# Patient Record
Sex: MALE | Race: WHITE | NOT HISPANIC OR LATINO | Employment: UNEMPLOYED | URBAN - METROPOLITAN AREA
[De-identification: names, ages, dates, MRNs, and addresses within clinical notes are randomized per-mention and may not be internally consistent; named-entity substitution may affect disease eponyms.]

---

## 2017-01-10 ENCOUNTER — TELEPHONE (OUTPATIENT)
Dept: ENDOCRINOLOGY | Facility: CLINIC | Age: 62
End: 2017-01-10

## 2017-01-10 NOTE — TELEPHONE ENCOUNTER
Patient sent two patient portal message in early December-5th and 9th.  He attached letters as well.  Please review and see what you can do to assist with this matter.

## 2017-01-10 NOTE — TELEPHONE ENCOUNTER
It says the letter needs to be done by the physician who performed the surgery ... i cant write it

## 2017-01-10 NOTE — TELEPHONE ENCOUNTER
----- Message from Sid Viveros sent at 1/10/2017 12:32 PM CST -----  Contact: Patient  Patient is requesting a call back in regards to getting paperwork to have birth certificate updated.    Please call patient at [phone].

## 2017-01-24 RX ORDER — METOPROLOL SUCCINATE 100 MG/1
TABLET, EXTENDED RELEASE ORAL
Qty: 30 TABLET | Refills: 1 | Status: SHIPPED | OUTPATIENT
Start: 2017-01-24 | End: 2017-04-15 | Stop reason: SDUPTHER

## 2017-01-24 RX ORDER — LISINOPRIL 40 MG/1
TABLET ORAL
Qty: 30 TABLET | Refills: 1 | Status: SHIPPED | OUTPATIENT
Start: 2017-01-24 | End: 2017-05-01 | Stop reason: SDUPTHER

## 2017-02-09 ENCOUNTER — PATIENT OUTREACH (OUTPATIENT)
Dept: ADMINISTRATIVE | Facility: HOSPITAL | Age: 62
End: 2017-02-09

## 2017-02-09 NOTE — LETTER
February 20, 2017    Mane Ramires  [address] [place] [id number]             [facility]  [address]  [facility] [address]  Phone: [phone] Dear Mr. Ramires:    We have tried to reach you by mychart unsuccessfully.    [facility] is committed to your overall health.  To help you get the most out of each of your visits, we will review your information to make sure you are up to date on all of your recommended tests and/or procedures.  We have Dr. Patience Gordon listed as your primary care provider.     When you were last in our office, your blood pressure was 156/82, which is high.  Dr. Gordon would like you to come in for a nurse visit so that we can check your blood pressure.  We are having a blood pressure fair the week of Fernando Jimenez.  Please contact us so that we can schedule this for you.     She has found that you may also be due for flu and tetanus immunizations.     If you have had any of the above done at an outside facility, please let us know so I can update your record.  If you have a copy of these records, please provide a copy for us to scan into your chart.  If not, please provide that provider/facilities contact information so that we may obtain copies from that facility.     Otherwise, please schedule these appointments at your earliest convenience.  You are due for your annual follow up with Dr. Gordon in September of 2017.     If you have any questions or concerns, please don't hesitate to call.    Thank you for letting us care for you,  Belinda Benavides LPN Clinical Care Coordinator  [facility] [place]  [phone]

## 2017-02-21 ENCOUNTER — TELEPHONE (OUTPATIENT)
Dept: ORTHOPEDIC SURGERY | Facility: CLINIC | Age: 62
End: 2017-02-21

## 2017-02-21 DIAGNOSIS — M54.2 CERVICALGIA: Primary | ICD-10-CM

## 2017-02-21 DIAGNOSIS — M47.22 OSTEOARTHRITIS OF SPINE WITH RADICULOPATHY, CERVICAL REGION: ICD-10-CM

## 2017-02-21 NOTE — TELEPHONE ENCOUNTER
Chart reviewed.  I last saw him 9/2016.  He has not had the cervical spine xrays nor cervical spine MRI as recommended.  New orders placed for both studies.  Xray cervical spine with flex/ext  Mri cervical spine

## 2017-02-21 NOTE — TELEPHONE ENCOUNTER
----- Message from Trell Gregory sent at 2/21/2017 11:58 AM CST -----  Need a new order/referral for MRI so that Pre-cert dept can contact insurance provider  for authorization

## 2017-02-21 NOTE — TELEPHONE ENCOUNTER
----- Message from Sofia Chery LPN sent at 2/21/2017 12:12 PM CST -----      ----- Message -----     From: Trell Gregory     Sent: 2/21/2017  11:58 AM       To: [facility]ASSETT Staff    Need a new order/referral for MRI so that Pre-cert dept can contact insurance provider  for authorization

## 2017-02-22 ENCOUNTER — HOSPITAL ENCOUNTER (OUTPATIENT)
Dept: RADIOLOGY | Facility: CLINIC | Age: 62
Discharge: HOME | End: 2017-02-22
Attending: NEUROLOGICAL SURGERY
Payer: COMMERCIAL

## 2017-02-22 DIAGNOSIS — M54.2 CERVICALGIA: ICD-10-CM

## 2017-02-22 DIAGNOSIS — M47.22 OSTEOARTHRITIS OF SPINE WITH RADICULOPATHY, CERVICAL REGION: ICD-10-CM

## 2017-02-22 PROCEDURE — 72052 X-RAY EXAM NECK SPINE 6/>VWS: CPT | Mod: TC,PO

## 2017-02-22 PROCEDURE — 72052 X-RAY EXAM NECK SPINE 6/>VWS: CPT | Mod: 26,,, | Performed by: RADIOLOGY

## 2017-02-23 ENCOUNTER — PATIENT MESSAGE (OUTPATIENT)
Dept: ENDOCRINOLOGY | Facility: CLINIC | Age: 62
End: 2017-02-23

## 2017-02-23 ENCOUNTER — PATIENT MESSAGE (OUTPATIENT)
Dept: FAMILY MEDICINE | Facility: CLINIC | Age: 62
End: 2017-02-23

## 2017-03-01 ENCOUNTER — HOSPITAL ENCOUNTER (OUTPATIENT)
Dept: RADIOLOGY | Facility: HOSPITAL | Age: 62
Discharge: HOME | End: 2017-03-01
Attending: NEUROLOGICAL SURGERY
Payer: COMMERCIAL

## 2017-03-01 DIAGNOSIS — M47.22 OSTEOARTHRITIS OF SPINE WITH RADICULOPATHY, CERVICAL REGION: ICD-10-CM

## 2017-03-01 DIAGNOSIS — M54.2 CERVICALGIA: ICD-10-CM

## 2017-03-01 PROCEDURE — 72141 MRI NECK SPINE W/O DYE: CPT | Mod: TC

## 2017-03-01 PROCEDURE — 72141 MRI NECK SPINE W/O DYE: CPT | Mod: 26,,, | Performed by: RADIOLOGY

## 2017-03-07 ENCOUNTER — HOSPITAL ENCOUNTER (OUTPATIENT)
Dept: RADIOLOGY | Facility: HOSPITAL | Age: 62
Discharge: HOME | End: 2017-03-07
Attending: INTERNAL MEDICINE
Payer: COMMERCIAL

## 2017-03-07 ENCOUNTER — OFFICE VISIT (OUTPATIENT)
Dept: RHEUMATOLOGY | Facility: CLINIC | Age: 62
End: 2017-03-07
Payer: COMMERCIAL

## 2017-03-07 VITALS
SYSTOLIC BLOOD PRESSURE: 127 MMHG | HEART RATE: 73 BPM | DIASTOLIC BLOOD PRESSURE: 77 MMHG | WEIGHT: 168.44 LBS | BODY MASS INDEX: 28.06 KG/M2 | HEIGHT: 65 IN

## 2017-03-07 DIAGNOSIS — M25.561 CHRONIC PAIN OF RIGHT KNEE: ICD-10-CM

## 2017-03-07 DIAGNOSIS — M51.36 DDD (DEGENERATIVE DISC DISEASE), LUMBAR: ICD-10-CM

## 2017-03-07 DIAGNOSIS — M25.551 RIGHT HIP PAIN: ICD-10-CM

## 2017-03-07 DIAGNOSIS — Z79.1 NSAID LONG-TERM USE: Primary | ICD-10-CM

## 2017-03-07 DIAGNOSIS — G89.29 CHRONIC PAIN OF RIGHT KNEE: ICD-10-CM

## 2017-03-07 PROCEDURE — 73562 X-RAY EXAM OF KNEE 3: CPT | Mod: 26,RT,, | Performed by: RADIOLOGY

## 2017-03-07 PROCEDURE — 3074F SYST BP LT 130 MM HG: CPT | Mod: S$GLB,,, | Performed by: INTERNAL MEDICINE

## 2017-03-07 PROCEDURE — 3078F DIAST BP <80 MM HG: CPT | Mod: S$GLB,,, | Performed by: INTERNAL MEDICINE

## 2017-03-07 PROCEDURE — 73502 X-RAY EXAM HIP UNI 2-3 VIEWS: CPT | Mod: 26,RT,, | Performed by: RADIOLOGY

## 2017-03-07 PROCEDURE — 73502 X-RAY EXAM HIP UNI 2-3 VIEWS: CPT | Mod: TC,RT

## 2017-03-07 PROCEDURE — 73562 X-RAY EXAM OF KNEE 3: CPT | Mod: TC,RT

## 2017-03-07 PROCEDURE — 1160F RVW MEDS BY RX/DR IN RCRD: CPT | Mod: S$GLB,,, | Performed by: INTERNAL MEDICINE

## 2017-03-07 PROCEDURE — 99214 OFFICE O/P EST MOD 30 MIN: CPT | Mod: S$GLB,,, | Performed by: INTERNAL MEDICINE

## 2017-03-07 PROCEDURE — 99999 PR PBB SHADOW E&M-EST. PATIENT-LVL III: CPT | Mod: PBBFAC,,, | Performed by: INTERNAL MEDICINE

## 2017-03-07 RX ORDER — NAPROXEN 500 MG/1
500 TABLET ORAL 2 TIMES DAILY
Qty: 60 TABLET | Refills: 2 | Status: SHIPPED | OUTPATIENT
Start: 2017-03-07 | End: 2017-05-17

## 2017-03-07 ASSESSMENT — ROUTINE ASSESSMENT OF PATIENT INDEX DATA (RAPID3)
MDHAQ FUNCTION SCORE: 1.2
PATIENT GLOBAL ASSESSMENT SCORE: 5
PSYCHOLOGICAL DISTRESS SCORE: 2.2
PAIN SCORE: 8
TOTAL RAPID3 SCORE: 5.67

## 2017-03-07 NOTE — PROGRESS NOTES
"Subjective:       Patient ID: Mane Ramires is a 61 y.o. male.    Chief Complaint: Osteoarthritis  HPI61 yo male with Gender identity disorder who SRS in 1984, 2015 is seen in consultation for joint pain. He saw Dr Kaufman in 2014- diagnosed with OA.   Today, he complains of pain in right hip radiating down to right knee that started 3 months ago. Pain is aching type, 8/10, continuous, worse in the evening, worse with activity better with  NSAIDSNo joint swelling.    He has DDD - with pain in lower back.  Has appointment with neurosurgery tomorrow.  Denies any "red flag signs" including B/L sciatica, saddle anesthesia, spasticity, bladder or bowel incontinence, motor weakness of the lower extremities          Review of Systems   HENT: Negative for ear discharge and ear pain.    Eyes: Negative for pain and redness.   Respiratory: Negative for cough and shortness of breath.    Cardiovascular: Negative for chest pain and palpitations.   Gastrointestinal: Negative for abdominal distention and abdominal pain.   Genitourinary: Negative for genital sores and hematuria.   Musculoskeletal: Positive for back pain. Negative for joint swelling.   Neurological: Negative for tremors and seizures.         Objective:     /77 (BP Location: Left arm, Patient Position: Sitting, BP Method: Automatic)  Pulse 73  Ht 5' 4.5" (1.638 m)  Wt 76.4 kg (168 lb 6.9 oz)  BMI 28.46 kg/m2     Physical Exam   Constitutional: He is oriented to person, place, and time and well-developed, well-nourished, and in no distress.   HENT:   Head: Normocephalic and atraumatic.   Eyes: Conjunctivae and EOM are normal. Pupils are equal, round, and reactive to light.   Neck: Neck supple. No tracheal deviation present. No thyromegaly present.   Cardiovascular: Normal rate and regular rhythm.    Pulmonary/Chest: Effort normal and breath sounds normal.   Abdominal: Soft. Bowel sounds are normal.   Neurological: He is alert and oriented to person, " place, and time.   Skin: Skin is warm and dry.     Psychiatric: Memory and affect normal.   Musculoskeletal: He exhibits no edema.     Right hip  with external rotation 15° and  internal rotation 5°   Bilateral knee crepitus  No joint swelling             Lab Results   Component Value Date    WBC 12.54 05/18/2016    HGB 16.5 05/18/2016    HCT 48.5 05/18/2016    MCV 93 05/18/2016     05/18/2016     CMP  Sodium   Date Value Ref Range Status   08/18/2016 141 136 - 145 mmol/L Final     Potassium   Date Value Ref Range Status   08/18/2016 5.0 3.5 - 5.1 mmol/L Final     Chloride   Date Value Ref Range Status   08/18/2016 108 95 - 110 mmol/L Final     CO2   Date Value Ref Range Status   08/18/2016 26 23 - 29 mmol/L Final     Glucose   Date Value Ref Range Status   08/18/2016 95 70 - 110 mg/dL Final     BUN, Bld   Date Value Ref Range Status   08/18/2016 14 6 - 20 mg/dL Final     Creatinine   Date Value Ref Range Status   08/18/2016 1.0 0.5 - 1.4 mg/dL Final     Calcium   Date Value Ref Range Status   08/18/2016 9.6 8.7 - 10.5 mg/dL Final     Total Protein   Date Value Ref Range Status   08/18/2016 7.4 6.0 - 8.4 g/dL Final     Albumin   Date Value Ref Range Status   08/18/2016 4.2 3.5 - 5.2 g/dL Final     Total Bilirubin   Date Value Ref Range Status   08/18/2016 0.6 0.1 - 1.0 mg/dL Final     Comment:     For infants and newborns, interpretation of results should be based  on gestational age, weight and in agreement with clinical  observations.  Premature Infant recommended reference ranges:  Up to 24 hours.............<8.0 mg/dL  Up to 48 hours............<12.0 mg/dL  3-5 days..................<15.0 mg/dL  6-29 days.................<15.0 mg/dL       Alkaline Phosphatase   Date Value Ref Range Status   08/18/2016 74 55 - 135 U/L Final     AST   Date Value Ref Range Status   08/18/2016 14 10 - 40 U/L Final     ALT   Date Value Ref Range Status   08/18/2016 16 10 - 44 U/L Final     Anion Gap   Date Value Ref Range  "Status   08/18/2016 7 (L) 8 - 16 mmol/L Final     eGFR if    Date Value Ref Range Status   08/18/2016 >60.0 >60 mL/min/1.73 m^2 Final     eGFR if non    Date Value Ref Range Status   08/18/2016 >60.0 >60 mL/min/1.73 m^2 Final     Comment:     Calculation used to obtain the estimated glomerular filtration  rate (eGFR) is the CKD-EPI equation. Since race is unknown   in our information system, the eGFR values for   -American and Non--American patients are given   for each creatinine result.       Lab Results   Component Value Date    SEDRATE 1 08/18/2016     Lab Results   Component Value Date    CRP 1.3 08/18/2016         Assessment:   Right hip and knee pain -check x-ray of right hip and knee  Long term use of NSAIDS  DDD    Plan:   Check x-ray of right hip and knee  Start naproxen 500 mg twice daily with meal  DC Pennsaid and all other NSAIDs  The patient was advised that NSAID-type medications have potential side effects: gastrointestinal irritation including hemorrhage, adverse cardiovascular effects and renal injuries. Patient was asked to take the medication with food and to stop if he experiences any GI upset. Advised to call if any vomiting, abdominal pain or black/bloody stools. The patient expresses understanding of these issues and questions were answered.  Continue gabapentin  Patient informed about the "red flag signs" and advised to call 911 immediately  if the following symptoms develop- worsening back pain, B/L sciatica, saddle anesthesia, spasticity, bladder or bowel incontinence, motor weakness of the lower extremities      RTC in 3 months        "

## 2017-03-07 NOTE — MR AVS SNAPSHOT
[facility]  [address]  [place] [address]  Phone: [phone]  Fax: [phone]                  Mane Ramires   3/7/2017 10:30 AM   Office Visit    Description:  Male : 1955   Provider:  Demetrice Phillip MD   Department:  [place] [facility]           Reason for Visit     Follow-up           Diagnoses this Visit        Comments    NSAID long-term use    -  Primary     Chronic pain of right knee                To Do List           Future Appointments        Provider Department Dept Phone    3/7/2017 11:30 AM [facility] [id number] [facility] [phone]    3/8/2017 10:00 AM Dasia Uriarte PA-C [place] [facility] [phone]    3/17/2017 10:00 AM MD [facility] [phone]      Goals (5 Years of Data)     None       These Medications        Disp Refills Start End    naproxen (EC NAPROSYN) 500 MG EC tablet 60 tablet 2 3/7/2017     Take 1 tablet (500 mg total) by mouth 2 (two) times daily. - Oral    Pharmacy: [facility] #[id number] - [place] - [address] [facility].  #: [phone]         [facility]     [facility] -  Assistance  Registered nurses in the [facility] provide clinical advisement, health education, appointment booking, and other advisory services.  Call for this free service at [phone].             Medications           Message regarding Medications     Verify the changes and/or additions to your medication regime listed below are the same as discussed with your clinician today.  If any of these changes or additions are incorrect, please notify your healthcare provider.        START taking these NEW medications        Refills    naproxen (EC NAPROSYN) 500 MG EC tablet 2    Sig: Take 1 tablet (500 mg total) by mouth 2 (two) times daily.    Class: Normal    Route: Oral      STOP taking these medications     ibuprofen (ADVIL,MOTRIN) 800 MG tablet  "Take 1 tablet (800 mg total) by mouth 3 (three) times daily as needed for Pain.    diclofenac sodium (PENNSAID) 20 mg/gram /actuation(2 %) sopm Apply 40 mg topically 2 (two) times daily.           Verify that the below list of medications is an accurate representation of the medications you are currently taking.  If none reported, the list may be blank. If incorrect, please contact your healthcare provider. Carry this list with you in case of emergency.           Current Medications     diclofenac sodium 1 % Gel APPLY 2 G TOPICALLY 4 (FOUR) TIMES DAILY.    gabapentin (NEURONTIN) 100 MG capsule Take 1 capsule (100 mg total) by mouth every evening.    gabapentin (NEURONTIN) 300 MG capsule Take 300 mg by mouth once daily.     levocetirizine (XYZAL) 5 MG tablet Take 1 tablet (5 mg total) by mouth nightly as needed for Allergies.    lisinopril (PRINIVIL,ZESTRIL) 40 MG tablet TAKE 1 TABLET (40 MG TOTAL) BY MOUTH ONCE DAILY.    metoprolol succinate (TOPROL-XL) 100 MG 24 hr tablet TAKE 1 TABLET (100 MG TOTAL) BY MOUTH ONCE DAILY.    tavaborole (KERYDIN) 5 % Usha Apply 1 application topically once daily.    testosterone cypionate (DEPOTESTOTERONE CYPIONATE) 200 mg/mL injection Inject 1 mL (200 mg total) into the muscle every 14 (fourteen) days. 3 ml syringe with 18 g needle  to draw  X 10   22 g 1 inch needle to inject x 10    diazepam (VALIUM) 10 MG Tab Take 1 tablet (10 mg total) by mouth once. Take 1 tablet 30 minutes prior to MRI.  Must have someone drive you to and from MRI.    naproxen (EC NAPROSYN) 500 MG EC tablet Take 1 tablet (500 mg total) by mouth 2 (two) times daily.    PROAIR HFA 90 mcg/actuation inhaler INHALE 1 TO 2 PUFFS BY MOUTH EVERY 4 TO 6 HOURS AS NEEDED           Clinical Reference Information           Your Vitals Were     BP Pulse Height Weight BMI    127/77 (BP Location: Left arm, Patient Position: Sitting, BP Method: Automatic) 73 5' 4.5" (1.638 m) 76.4 kg (168 lb 6.9 oz) 28.46 kg/m2      Blood " Pressure          Most Recent Value    BP  127/77      Allergies as of 3/7/2017     Sudafed [Pseudoephedrine Hcl]    Codeine    Hydrocodone    Cortisone      Immunizations Administered on Date of Encounter - 3/7/2017     None      Orders Placed During Today's Visit      Normal Orders This Visit    Ambulatory Referral to Physical/Occupational Therapy     Future Labs/Procedures Expected by Expires    X-Ray Hip 2 View Right  3/7/2017 3/7/2018    X-Ray Knee 3 View Right  3/7/2017 3/7/2018      Language Assistance Services     ATTENTION: Language assistance services are available, free of charge. Please call [phone].      ATENCIÓN: Si habla español, tiene a slade disposición servicios gratuitos de asistencia lingüística. Llame al [phone].     CHÚ Ý: N?u b?n nói Ti?ng Vi?t, có các d?ch v? h? tr? ngôn ng? mi?n phí dành cho b?n. G?i s? [phone].         [place] [facility] complies with applicable Federal civil rights laws and does not discriminate on the basis of race, color, national origin, age, disability, or sex.

## 2017-03-08 ENCOUNTER — OFFICE VISIT (OUTPATIENT)
Dept: ORTHOPEDIC SURGERY | Facility: CLINIC | Age: 62
End: 2017-03-08
Payer: COMMERCIAL

## 2017-03-08 VITALS
SYSTOLIC BLOOD PRESSURE: 150 MMHG | HEIGHT: 65 IN | WEIGHT: 169.56 LBS | HEART RATE: 78 BPM | DIASTOLIC BLOOD PRESSURE: 86 MMHG | BODY MASS INDEX: 28.25 KG/M2

## 2017-03-08 DIAGNOSIS — G89.29 CHRONIC RIGHT-SIDED LOW BACK PAIN, WITH SCIATICA PRESENCE UNSPECIFIED: ICD-10-CM

## 2017-03-08 DIAGNOSIS — M54.2 CERVICALGIA: Primary | ICD-10-CM

## 2017-03-08 DIAGNOSIS — M47.22 OSTEOARTHRITIS OF SPINE WITH RADICULOPATHY, CERVICAL REGION: ICD-10-CM

## 2017-03-08 DIAGNOSIS — M54.5 CHRONIC RIGHT-SIDED LOW BACK PAIN, WITH SCIATICA PRESENCE UNSPECIFIED: ICD-10-CM

## 2017-03-08 PROCEDURE — 99999 PR PBB SHADOW E&M-EST. PATIENT-LVL III: CPT | Mod: PBBFAC,,, | Performed by: PHYSICIAN ASSISTANT

## 2017-03-08 PROCEDURE — 3077F SYST BP >= 140 MM HG: CPT | Mod: S$GLB,,, | Performed by: PHYSICIAN ASSISTANT

## 2017-03-08 PROCEDURE — 1160F RVW MEDS BY RX/DR IN RCRD: CPT | Mod: S$GLB,,, | Performed by: PHYSICIAN ASSISTANT

## 2017-03-08 PROCEDURE — 3079F DIAST BP 80-89 MM HG: CPT | Mod: S$GLB,,, | Performed by: PHYSICIAN ASSISTANT

## 2017-03-08 PROCEDURE — 99214 OFFICE O/P EST MOD 30 MIN: CPT | Mod: S$GLB,,, | Performed by: PHYSICIAN ASSISTANT

## 2017-03-08 NOTE — PROGRESS NOTES
Neurosurgery History & Physical    Patient ID: Mane Ramires is a 61 y.o. male.    Chief Complaint   Patient presents with    Low-back Pain    Back Pain    Neck Pain       Review of Systems   Constitutional: Negative for activity change, chills, fatigue and unexpected weight change.   HENT: Negative for hearing loss, tinnitus, trouble swallowing and voice change.    Eyes: Negative for visual disturbance.   Respiratory: Negative for apnea, chest tightness and shortness of breath.    Cardiovascular: Negative for chest pain and palpitations.   Gastrointestinal: Negative for abdominal pain, constipation, diarrhea, nausea and vomiting.   Genitourinary: Negative for difficulty urinating, dysuria and frequency.   Musculoskeletal: Positive for back pain and neck pain. Negative for gait problem and neck stiffness.   Skin: Negative for wound.   Neurological: Positive for numbness. Negative for dizziness, tremors, seizures, facial asymmetry, speech difficulty, weakness, light-headedness and headaches.   Psychiatric/Behavioral: Negative for confusion and decreased concentration.       Past Medical History:   Diagnosis Date    Accident     fell from roof with TBI (word finding issues personality changes, memory deficets), R rib fractures, clavicle fx    Allergy     ASD (atrial septal defect)     noted on ECHO 6/16    Cervical stenosis of spine     noted on 6/15 MRI    DDD (degenerative disc disease), cervical 10/2014    C5-6 and C6-C7    Dyslipidemia     Gender identity disorder     H/O cardiovascular stress test     12/14 normal    Heart murmur 05/26/2016    Herpes simplex type 2 infection     Hypertension     IGT (impaired glucose tolerance)     Myelomalacia     c-spine noted on 8/15 MRI    OA (osteoarthritis)     Prediabetes     TBI (traumatic brain injury)     after falling off a roof in 2003     Social History     Social History    Marital status: Single     Spouse name: N/A    Number of children:  N/A    Years of education: N/A     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    Smoking status: Former Smoker    Smokeless tobacco: Never Used    Alcohol use Yes      Comment: rare     Drug use: No    Sexual activity: Yes     Partners: Female     Other Topics Concern    Not on file     Social History Narrative     Family History   Problem Relation Age of Onset    Diabetes Mother     Heart disease Mother     Coronary artery disease Father     Breast cancer Sister     Lung cancer Paternal Grandfather     Heart disease Paternal Grandfather     Heart disease Maternal Grandmother     Heart attack Brother      Review of patient's allergies indicates:   Allergen Reactions    Sudafed [pseudoephedrine hcl] Other (See Comments)     Heart racing      Codeine Other (See Comments)     Heart racing    Hydrocodone Other (See Comments)     insomnia    Cortisone Palpitations       Current Outpatient Prescriptions:     diclofenac sodium 1 % Gel, APPLY 2 G TOPICALLY 4 (FOUR) TIMES DAILY., Disp: , Rfl: 10    gabapentin (NEURONTIN) 100 MG capsule, Take 1 capsule (100 mg total) by mouth every evening., Disp: 30 capsule, Rfl: 2    gabapentin (NEURONTIN) 300 MG capsule, Take 300 mg by mouth once daily. , Disp: , Rfl:     levocetirizine (XYZAL) 5 MG tablet, Take 1 tablet (5 mg total) by mouth nightly as needed for Allergies., Disp: 30 tablet, Rfl: 11    lisinopril (PRINIVIL,ZESTRIL) 40 MG tablet, TAKE 1 TABLET (40 MG TOTAL) BY MOUTH ONCE DAILY., Disp: 30 tablet, Rfl: 1    metoprolol succinate (TOPROL-XL) 100 MG 24 hr tablet, TAKE 1 TABLET (100 MG TOTAL) BY MOUTH ONCE DAILY., Disp: 30 tablet, Rfl: 1    naproxen (EC NAPROSYN) 500 MG EC tablet, Take 1 tablet (500 mg total) by mouth 2 (two) times daily., Disp: 60 tablet, Rfl: 2    PROAIR HFA 90 mcg/actuation inhaler, INHALE 1 TO 2 PUFFS BY MOUTH EVERY 4 TO 6 HOURS AS NEEDED, Disp: , Rfl: 0    tavaborole (KERYDIN) 5 % Usha, Apply 1 application  "topically once daily., Disp: 10 mL, Rfl: 11    diazepam (VALIUM) 10 MG Tab, Take 1 tablet (10 mg total) by mouth once. Take 1 tablet 30 minutes prior to MRI.  Must have someone drive you to and from MRI., Disp: 1 tablet, Rfl: 0    testosterone cypionate (DEPOTESTOTERONE CYPIONATE) 200 mg/mL injection, Inject 1 mL (200 mg total) into the muscle every 14 (fourteen) days. 3 ml syringe with 18 g needle  to draw  X 10  22 g 1 inch needle to inject x 10, Disp: 10 mL, Rfl: 5    Vitals:    03/08/17 1004   BP: (!) 150/86   BP Location: Right arm   Patient Position: Sitting   BP Method: Automatic   Pulse: 78   Weight: 76.9 kg (169 lb 8.5 oz)   Height: 5' 5" (1.651 m)       Physical Exam   Constitutional: He is oriented to person, place, and time. He appears well-developed and well-nourished.   HENT:   Head: Normocephalic and atraumatic.   Eyes: Pupils are equal, round, and reactive to light.   Neck: Normal range of motion. Neck supple.   Cardiovascular: Normal rate.    Pulmonary/Chest: Effort normal.   Abdominal: He exhibits no distension.   Musculoskeletal: Normal range of motion. He exhibits no edema.   Neurological: He is alert and oriented to person, place, and time. He has a normal Finger-Nose-Finger Test, a normal Heel to Shin Test, a normal Romberg Test and a normal Tandem Gait Test. Gait normal.   Reflex Scores:       Tricep reflexes are 2+ on the right side and 2+ on the left side.       Bicep reflexes are 2+ on the right side and 2+ on the left side.       Brachioradialis reflexes are 2+ on the right side and 2+ on the left side.       Patellar reflexes are 2+ on the right side and 2+ on the left side.       Achilles reflexes are 2+ on the right side and 2+ on the left side.  Skin: Skin is warm and dry.   Psychiatric: He has a normal mood and affect. His speech is normal and behavior is normal. Judgment and thought content normal.   Nursing note and vitals reviewed.      Neurologic Exam     Mental Status "   Oriented to person, place, and time.   Oriented to person.   Oriented to place.   Oriented to time.   Follows 3 step commands.   Attention: normal. Concentration: normal.   Speech: speech is normal   Level of consciousness: alert  Knowledge: consistent with education.   Able to name object. Able to read. Able to repeat. Able to write. Normal comprehension.     Cranial Nerves     CN II   Visual acuity: normal  Right visual field deficit: none  Left visual field deficit: none     CN III, IV, VI   Pupils are equal, round, and reactive to light.  Right pupil: Size: 3 mm. Shape: regular. Reactivity: brisk. Consensual response: intact.   Left pupil: Size: 3 mm. Shape: regular. Reactivity: brisk. Consensual response: intact.   CN III: no CN III palsy  CN VI: no CN VI palsy  Nystagmus: none   Diplopia: none  Ophthalmoparesis: none  Conjugate gaze: present    CN V   Right facial sensation deficit: none  Left facial sensation deficit: none    CN VII   Right facial weakness: none  Left facial weakness: none    CN VIII   Hearing: intact    CN IX, X   CN IX normal.   CN X normal.     CN XI   Right sternocleidomastoid strength: normal  Left sternocleidomastoid strength: normal  Right trapezius strength: normal  Left trapezius strength: normal    CN XII   Fasciculations: absent  Tongue deviation: none    Motor Exam   Muscle bulk: normal  Overall muscle tone: normal  Right arm pronator drift: absent  Left arm pronator drift: absent    Strength   Right neck flexion: 5/5  Left neck flexion: 5/5  Right neck extension: 5/5  Left neck extension: 5/5  Right deltoid: 5/5  Left deltoid: 5/5  Right biceps: 5/5  Left biceps: 5/5  Right triceps: 5/5  Left triceps: 5/5  Right wrist flexion: 5/5  Left wrist flexion: 5/5  Right wrist extension: 5/5  Left wrist extension: 5/5  Right interossei: 5/5  Left interossei: 5/5  Right abdominals: 5/5  Left abdominals: 5/5  Right iliopsoas: 5/5  Left iliopsoas: 5/5  Right quadriceps: 5/5  Left  quadriceps: 5/5  Right hamstrin/5  Left hamstrin/5  Right glutei: 5/5  Left glutei: 5/5  Right anterior tibial: 5/5  Left anterior tibial: 5/5  Right posterior tibial: 5/5  Left posterior tibial: 5/5  Right peroneal: 5/5  Left peroneal: 5/5  Right gastroc: 5/5  Left gastroc: 5/5    Sensory Exam   Right arm light touch: normal  Left arm light touch: normal  Right leg light touch: normal  Left leg light touch: normal  Right arm vibration: normal  Left arm vibration: normal  Right arm pinprick: normal  Left arm pinprick: normal    Gait, Coordination, and Reflexes     Gait  Gait: normal    Coordination   Romberg: negative  Finger to nose coordination: normal  Heel to shin coordination: normal  Tandem walking coordination: normal    Tremor   Resting tremor: absent  Intention tremor: absent  Action tremor: absent    Reflexes   Right brachioradialis: 2+  Left brachioradialis: 2+  Right biceps: 2+  Left biceps: 2+  Right triceps: 2+  Left triceps: 2+  Right patellar: 2+  Left patellar: 2+  Right achilles: 2+  Left achilles: 2+  Right Al: absent  Left Al: absent  Right ankle clonus: absent  Left ankle clonus: absent      Provider dictation:  60 year old  male with history of arthritis, lower back pain, neck pain, and prior cervical spine surgery presents for follow up of his cervcial spine after undergoing xrays and an MRI of the neck.  He recalls a fall from a horse in  resulting in a traumatic brain injury.  He underwent surgery in  with Dr. Myers for C5/6, C6/7 ACDF.  He had tingling and numbness in all fingers bilaterally prior to surgery, but it continues to worsen.  Numbness is constant from the finger tips to the mid foreamr bilaterally.  He continues to have left sided neck pain in the paracervical area without radicular arm pain.  More recently, he has felt right sided neck pain without radicular pain, either.  He rates pain as 4-5/10 today.  He is taking gabapentin for pain.  He  has not had recent PT and states that he cannot tolerate steroids due to side effects.  He also describes right sided lower back pain and muscle spasms without focal radiculoapthy.    NDI:48%  Oswestry score: 44%.    PHQ:  0.    On exam, he is neurologically intact with 2+ DTRs and 5/5 strength bilaterally in the upper and lower extremities.  He is not tender to palpation along the spine and he does not appreciate any sensory deficits.  Phalens and tinels negative at the wrists bilaterally for carpal tunnel.  phalens negative at the elbows for ulnar neuropathy.    I have reviewed an MRI of the cervical spine from [facility] date 6-18-15 and [facility] from 3-1-17. There are no significant changes between the two studies.  There are post op changes at C5/6, C6/7 of ACDF.  C4/5 broad disc/ osteophyte, facet arthropathy and ligamentous hypertrophy with mild central canal narrowing and mild left foraminal narrowing.  C5/6 and C6/7  spondylosis with mild-moderate bilateral foraminal narrowing.    Assessment:  60 year old male with prior cervical spine surgery - C5/6, C6/7 ACDF and persistent left greater than right neck pain and numbness in the bilateral hands greater than the arms.  Known cervical spondylosis at C4/5, C5/6, C6/7.  There has been no major change on the MRI studies.  We will refer to PT for neck and back pain.  We will arrange for EMG/ NCV fo the bialteral upper extremities to evaluate for carpal tunnel vs ulnar neuropathy vs cervical radiculpathy to be the cause of hand numbness.  I believe his neck pain and back pain is myofascial.  It is unclear if hand numbness is related to the neck or something more preipheral, thus nerve testing ordered.  Follow up in about 8 weeks.    Visit Diagnosis:  Cervicalgia  -     Ambulatory Referral to Physical/Occupational Therapy  -     EMG - 2 Extremities; Future    Osteoarthritis of spine with radiculopathy, cervical region  -     Ambulatory Referral to Physical/Occupational  Therapy  -     EMG - 2 Extremities; Future    Chronic right-sided low back pain, with sciatica presence unspecified  -     Ambulatory Referral to Physical/Occupational Therapy        Total time spent counseling greater than fifty percent of total visit time.  Counseling included discussion regarding imaging findings, diagnosis possibilities, treatment options, risks and benefits.   The patient had many questions regarding the options and long-term effects.

## 2017-03-17 ENCOUNTER — OFFICE VISIT (OUTPATIENT)
Dept: CARDIOLOGY | Facility: CLINIC | Age: 62
End: 2017-03-17
Payer: COMMERCIAL

## 2017-03-17 ENCOUNTER — PATIENT MESSAGE (OUTPATIENT)
Dept: CARDIOLOGY | Facility: CLINIC | Age: 62
End: 2017-03-17

## 2017-03-17 VITALS
HEIGHT: 66 IN | WEIGHT: 165.38 LBS | DIASTOLIC BLOOD PRESSURE: 71 MMHG | OXYGEN SATURATION: 97 % | BODY MASS INDEX: 26.58 KG/M2 | SYSTOLIC BLOOD PRESSURE: 146 MMHG | HEART RATE: 79 BPM

## 2017-03-17 DIAGNOSIS — F64.0 GENDER DYSPHORIA IN ADULT: ICD-10-CM

## 2017-03-17 DIAGNOSIS — E78.5 DYSLIPIDEMIA: Primary | ICD-10-CM

## 2017-03-17 DIAGNOSIS — R73.02 IGT (IMPAIRED GLUCOSE TOLERANCE): ICD-10-CM

## 2017-03-17 DIAGNOSIS — Z87.820 HISTORY OF TRAUMATIC BRAIN INJURY: ICD-10-CM

## 2017-03-17 DIAGNOSIS — I10 ESSENTIAL HYPERTENSION: Primary | ICD-10-CM

## 2017-03-17 DIAGNOSIS — E78.5 DYSLIPIDEMIA: ICD-10-CM

## 2017-03-17 DIAGNOSIS — I10 ESSENTIAL HYPERTENSION: ICD-10-CM

## 2017-03-17 PROCEDURE — 99215 OFFICE O/P EST HI 40 MIN: CPT | Mod: S$GLB,,, | Performed by: INTERNAL MEDICINE

## 2017-03-17 PROCEDURE — 3077F SYST BP >= 140 MM HG: CPT | Mod: S$GLB,,, | Performed by: INTERNAL MEDICINE

## 2017-03-17 PROCEDURE — 1160F RVW MEDS BY RX/DR IN RCRD: CPT | Mod: S$GLB,,, | Performed by: INTERNAL MEDICINE

## 2017-03-17 PROCEDURE — 99999 PR PBB SHADOW E&M-EST. PATIENT-LVL III: CPT | Mod: PBBFAC,,, | Performed by: INTERNAL MEDICINE

## 2017-03-17 PROCEDURE — 3078F DIAST BP <80 MM HG: CPT | Mod: S$GLB,,, | Performed by: INTERNAL MEDICINE

## 2017-03-17 NOTE — LETTER
March 17, 2017      Patience Gordon MD  [address]  [facility] [address]           [facility]  [address]  [facility] [address]  Phone: [phone]          Patient: Mane Ramires   MR Number: [id number]   YOB: 1955   Date of Visit: 3/17/2017       Dear Dr. Patience Gordon:    Thank you for referring Mane Ramires to me for evaluation. Attached you will find relevant portions of my assessment and plan of care.    If you have questions, please do not hesitate to call me. I look forward to following Mane Ramires along with you.    Sincerely,    Davian Art MD    Enclosure  CC:  No Recipients    If you would like to receive this communication electronically, please contact [email or web address] or [phone] to request more information on [employer] Link access.    For providers and/or their staff who would like to refer a patient to [facility], please contact us through our one-stop-shop provider referral line, [facility], at [phone].    If you feel you have received this communication in error or would no longer like to receive these types of communications, please e-mail [email or web address]

## 2017-03-17 NOTE — PATIENT INSTRUCTIONS
Continue current medications  Keep log of home blood pressure/heart rate and bring in next visit  Schedule treadmill nuclear stress test  Follow up in 2 months

## 2017-03-17 NOTE — MR AVS SNAPSHOT
[facility]   [address]  [place] [address]  Phone: [phone]                  Mane Ramires   3/17/2017 10:00 AM   Office Visit    Description:  Male : 1955   Provider:  Davian Art MD   Department:  [facility]           Reason for Visit     Consult           Diagnoses this Visit        Comments    Essential hypertension    -  Primary     Dyslipidemia                To Do List           Future Appointments        Provider Department Dept Phone    3/21/2017 11:00 AM Melba Elizondo, PT [facility] [phone]    2017 10:00 AM [facility] NM2 [facility] [phone]    2017 11:00 AM [facility] STRESS TESTS [facility] [phone]    2017 12:00 PM [facility] NM[facility] [phone]    4/10/2017 8:00 AM Katarzyna Flanagan [facility] [phone]      Goals (5 Years of Data)     None      Follow-Up and Disposition     Return in about 2 months (around 2017).      [facility]     [facility] Nurse Care Line - 24/7 Assistance  Registered nurses in the [facility] provide clinical advisement, health education, appointment booking, and other advisory services.  Call for this free service at [phone].             Medications           Message regarding Medications     Verify the changes and/or additions to your medication regime listed below are the same as discussed with your clinician today.  If any of these changes or additions are incorrect, please notify your healthcare provider.        STOP taking these medications     diazepam (VALIUM) 10 MG Tab Take 1 tablet (10 mg total) by mouth once. Take 1 tablet 30 minutes prior to MRI.  Must have someone drive you to and from MRI.    PROAIR HFA 90 mcg/actuation inhaler INHALE 1 TO 2 PUFFS BY MOUTH EVERY 4 TO 6 HOURS AS NEEDED           Verify that the below list of  "medications is an accurate representation of the medications you are currently taking.  If none reported, the list may be blank. If incorrect, please contact your healthcare provider. Carry this list with you in case of emergency.           Current Medications     diclofenac sodium 1 % Gel APPLY 2 G TOPICALLY 4 (FOUR) TIMES DAILY.    gabapentin (NEURONTIN) 100 MG capsule Take 1 capsule (100 mg total) by mouth every evening.    gabapentin (NEURONTIN) 300 MG capsule Take 300 mg by mouth once daily.     levocetirizine (XYZAL) 5 MG tablet Take 1 tablet (5 mg total) by mouth nightly as needed for Allergies.    lisinopril (PRINIVIL,ZESTRIL) 40 MG tablet TAKE 1 TABLET (40 MG TOTAL) BY MOUTH ONCE DAILY.    metoprolol succinate (TOPROL-XL) 100 MG 24 hr tablet TAKE 1 TABLET (100 MG TOTAL) BY MOUTH ONCE DAILY.    naproxen (EC NAPROSYN) 500 MG EC tablet Take 1 tablet (500 mg total) by mouth 2 (two) times daily.    tavaborole (KERYDIN) 5 % Usha Apply 1 application topically once daily.    testosterone cypionate (DEPOTESTOTERONE CYPIONATE) 200 mg/mL injection Inject 1 mL (200 mg total) into the muscle every 14 (fourteen) days. 3 ml syringe with 18 g needle  to draw  X 10   22 g 1 inch needle to inject x 10           Clinical Reference Information           Your Vitals Were     BP Pulse Height Weight SpO2 BMI    146/71 (BP Location: Left arm) 79 5' 6" (1.676 m) 75 kg (165 lb 5.5 oz) 97% 26.69 kg/m2      Blood Pressure          Most Recent Value    Right Arm BP - Sitting  147/77    Left Arm BP - Sitting  146/71    BP  (!)  146/71      Allergies as of 3/17/2017     Sudafed [Pseudoephedrine Hcl]    Codeine    Hydrocodone    Cortisone      Immunizations Administered on Date of Encounter - 3/17/2017     None      Orders Placed During Today's Visit     Future Labs/Procedures Expected by Expires    NM Myocardial Perfusion Spect Multi Exer  3/17/2017 3/17/2018    NM Multi Tread Stress Cardiac Component  5/10/2017 3/17/2018    "   Instructions    Continue current medications  Keep log of home blood pressure/heart rate and bring in next visit  Schedule treadmill nuclear stress test  Follow up in 2 months       Language Assistance Services     ATTENTION: Language assistance services are available, free of charge. Please call [phone].      ATENCIÓN: Si xiomara muñoz, tiene a slade disposición servicios gratuitos de asistencia lingüística. Llame al [phone].     CHÚ Ý: N?u b?n nói Ti?ng Vi?t, có các d?ch v? h? tr? ngôn ng? mi?n phí dành cho b?n. G?i s? [phone].         [facility] complies with applicable Federal civil rights laws and does not discriminate on the basis of race, color, national origin, age, disability, or sex.
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
## 2017-03-21 ENCOUNTER — CLINICAL SUPPORT (OUTPATIENT)
Dept: REHABILITATION | Facility: HOSPITAL | Age: 62
End: 2017-03-21
Attending: NEUROLOGICAL SURGERY
Payer: COMMERCIAL

## 2017-03-21 DIAGNOSIS — M54.5 CHRONIC RIGHT-SIDED LOW BACK PAIN, WITH SCIATICA PRESENCE UNSPECIFIED: ICD-10-CM

## 2017-03-21 DIAGNOSIS — M54.2 CERVICALGIA: Primary | ICD-10-CM

## 2017-03-21 DIAGNOSIS — M47.22 OSTEOARTHRITIS OF SPINE WITH RADICULOPATHY, CERVICAL REGION: ICD-10-CM

## 2017-03-21 DIAGNOSIS — G89.29 CHRONIC RIGHT-SIDED LOW BACK PAIN, WITH SCIATICA PRESENCE UNSPECIFIED: ICD-10-CM

## 2017-03-21 PROBLEM — M54.50 CHRONIC RIGHT-SIDED LOW BACK PAIN: Status: ACTIVE | Noted: 2017-03-21

## 2017-03-21 PROCEDURE — 97161 PT EVAL LOW COMPLEX 20 MIN: CPT | Mod: PN | Performed by: PHYSICAL THERAPIST

## 2017-03-21 PROCEDURE — 97014 ELECTRIC STIMULATION THERAPY: CPT | Mod: PN | Performed by: PHYSICAL THERAPIST

## 2017-03-21 PROCEDURE — 97010 HOT OR COLD PACKS THERAPY: CPT | Mod: PN | Performed by: PHYSICAL THERAPIST
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
## 2017-03-23 ENCOUNTER — CLINICAL SUPPORT (OUTPATIENT)
Dept: REHABILITATION | Facility: HOSPITAL | Age: 62
End: 2017-03-23
Attending: NEUROLOGICAL SURGERY
Payer: COMMERCIAL

## 2017-03-23 DIAGNOSIS — M54.2 CERVICALGIA: ICD-10-CM

## 2017-03-23 PROCEDURE — 97010 HOT OR COLD PACKS THERAPY: CPT | Mod: PN

## 2017-03-23 PROCEDURE — 97014 ELECTRIC STIMULATION THERAPY: CPT | Mod: PN

## 2017-03-23 PROCEDURE — 97110 THERAPEUTIC EXERCISES: CPT | Mod: PN

## 2017-03-23 NOTE — PATIENT INSTRUCTIONS
AROM: Neck Rotation        Turn head slowly to look over one shoulder, then the other. Hold each position ____ seconds.  Repeat ____ times per set. Do ____ sets per session. Do ____ sessions per day.     [email or web address]     Copyright © [employer]. All rights reserved.

## 2017-03-23 NOTE — PROGRESS NOTES
Name: Mane Dodd [facility] Number: [id number]  Date of Treatment: 03/23/2017   Diagnosis:   Encounter Diagnosis   Name Primary?    Cervicalgia        Time in: 1300  Time Out: 1415  Total Treatment Time: 75    Subjective:    Mane reports R LBP and R shoulder occasional numbness/tingling.  Patient reports their pain to be 4/10 on a 0-10 scale with 0 being no pain and 10 being the worst pain imaginable. Took naproxen prior to appt. Pain is worse with sitting and usually has to pause with initial standing 2* back discomfort.    Objective    Patient received individual therapy to increase strength, endurance, ROM, flexibility, posture and core stabilization with activities as follows:     Mane received therapeutic exercises to develop strength, endurance, ROM, flexibility, posture and core stabilization for 55 minutes including:     UBE 5/5 L1  Seated chin tucks 10/2  C/S AROM 10/2 rotation L/R  Seated retro shoulder rolls B 10/2  Seated scap retraction 10/3 B  Seated UT stretches L/R 3/30s  NuStep L3 LE's only 11'  Seated hamstring stretches 3/30s L/R  Seated piriformis stretches 3/30s L/R    The patient received the following supervised modalities after being cleared for contradictions: IFC Electrical Stimulation:  Mane received IFC Electrical Stimulation for pain control applied to the LB. Pt received stimulation for 15 minutes in conjunction with MHP for pain purposes in supine hooklying. Mane tolerated treatment well without any adverse effects.      Educated pt of DOMS effect.     Pt demo good understanding of the education provided. Mane demonstrated good return demonstration of activities.     Assessment:     Antalgic transfers and reports discomfort with sitting, requiring extra time for initial standing from prolonged sitting.    Pt will continue to benefit from skilled PT intervention. Medical Necessity is demonstrated by:  Requires skilled supervision to complete and progress HEP and  Weakness.    Patient is making good progress towards established goals.    Plan:    Continue with established Plan of Care towards PT goals.

## 2017-03-29 ENCOUNTER — PATIENT MESSAGE (OUTPATIENT)
Dept: FAMILY MEDICINE | Facility: CLINIC | Age: 62
End: 2017-03-29

## 2017-03-31 RX ORDER — AMLODIPINE BESYLATE 5 MG/1
5 TABLET ORAL DAILY
Qty: 30 TABLET | Refills: 1 | Status: SHIPPED | OUTPATIENT
Start: 2017-03-31 | End: 2017-04-05

## 2017-04-03 ENCOUNTER — PATIENT MESSAGE (OUTPATIENT)
Dept: FAMILY MEDICINE | Facility: CLINIC | Age: 62
End: 2017-04-03

## 2017-04-04 ENCOUNTER — DOCUMENTATION ONLY (OUTPATIENT)
Dept: ADMINISTRATIVE | Facility: HOSPITAL | Age: 62
End: 2017-04-04

## 2017-04-04 NOTE — PROGRESS NOTES
PRE-VISIT CHART REVIEW    Appointment Scheduled on 4/5/17    Department stratifications & guidelines reviewed:yes    Target Chronic Diagnosis: HTN    Chronic Diagnosis Intervention Due: no    Goals Updated:No    Health Maintenance Due   Topic Date Due    TETANUS VACCINE  10/08/1975    Influenza Vaccine  08/01/2016       Advanced Directives:   65 years of age or older?  No  Directive on file?  N\A                                      Pre-visit patient communication:  In Person    Studies or screenings scheduled pre-visit: no    Educate patient on HTN (146/71)

## 2017-04-05 ENCOUNTER — PATIENT MESSAGE (OUTPATIENT)
Dept: FAMILY MEDICINE | Facility: CLINIC | Age: 62
End: 2017-04-05

## 2017-04-05 ENCOUNTER — OFFICE VISIT (OUTPATIENT)
Dept: FAMILY MEDICINE | Facility: CLINIC | Age: 62
End: 2017-04-05
Payer: COMMERCIAL

## 2017-04-05 VITALS
HEART RATE: 79 BPM | TEMPERATURE: 99 F | OXYGEN SATURATION: 97 % | WEIGHT: 166.13 LBS | DIASTOLIC BLOOD PRESSURE: 75 MMHG | BODY MASS INDEX: 26.81 KG/M2 | SYSTOLIC BLOOD PRESSURE: 140 MMHG

## 2017-04-05 DIAGNOSIS — I10 HYPERTENSION, ESSENTIAL: ICD-10-CM

## 2017-04-05 DIAGNOSIS — G89.29 CHRONIC RIGHT-SIDED LOW BACK PAIN WITH RIGHT-SIDED SCIATICA: Primary | ICD-10-CM

## 2017-04-05 DIAGNOSIS — M54.41 CHRONIC RIGHT-SIDED LOW BACK PAIN WITH RIGHT-SIDED SCIATICA: Primary | ICD-10-CM

## 2017-04-05 PROCEDURE — 1160F RVW MEDS BY RX/DR IN RCRD: CPT | Mod: S$GLB,,, | Performed by: FAMILY MEDICINE

## 2017-04-05 PROCEDURE — 3077F SYST BP >= 140 MM HG: CPT | Mod: S$GLB,,, | Performed by: FAMILY MEDICINE

## 2017-04-05 PROCEDURE — 3078F DIAST BP <80 MM HG: CPT | Mod: S$GLB,,, | Performed by: FAMILY MEDICINE

## 2017-04-05 PROCEDURE — 99214 OFFICE O/P EST MOD 30 MIN: CPT | Mod: S$GLB,,, | Performed by: FAMILY MEDICINE

## 2017-04-05 RX ORDER — TESTOSTERONE CYPIONATE 200 MG/ML
INJECTION, SOLUTION INTRAMUSCULAR
Refills: 5 | COMMUNITY
Start: 2017-03-24 | End: 2017-08-23 | Stop reason: SDUPTHER

## 2017-04-05 RX ORDER — TRAMADOL HYDROCHLORIDE 50 MG/1
50 TABLET ORAL EVERY 8 HOURS PRN
Qty: 45 TABLET | Refills: 0 | Status: SHIPPED | OUTPATIENT
Start: 2017-04-05 | End: 2017-04-15

## 2017-04-05 RX ORDER — AMLODIPINE BESYLATE 10 MG/1
10 TABLET ORAL DAILY
Qty: 30 TABLET | Refills: 1 | Status: SHIPPED | OUTPATIENT
Start: 2017-04-05 | End: 2017-05-17

## 2017-04-05 NOTE — MR AVS SNAPSHOT
[facility]  [id number] [place] [address] [address]  [facility] [address]  Phone: [phone]  Fax: [phone]                  Mane Ramires   2017 11:30 AM   Office Visit    Description:  Male : 1955   Provider:  Patience Gordon MD   Department:  [facility]           Reason for Visit     Back Pain           Diagnoses this Visit        Comments    Chronic right-sided low back pain with right-sided sciatica    -  Primary     Hypertension, essential                To Do List           Future Appointments        Provider Department Dept Phone    2017 2:00 PM Dipika Uriarte PTA [facility] [phone]    4/10/2017 8:00 AM Katarzyna Flanagan [facility] Med[facility]Re[facility] [phone]    2017 2:00 PM Dipika Uriarte PTA [facility] [phone]    2017 10:00 AM [facility] NM2 [facility] [phone]    2017 11:00 AM [facility] STRESS TESTS [facility] [phone]      Goals (5 Years of Data)     None      Follow-Up and Disposition     Return in about 6 weeks (around 2017).       These Medications        Disp Refills Start End    amlodipine (NORVASC) 10 MG tablet 30 tablet 1 2017    Take 1 tablet (10 mg total) by mouth once daily. - Oral    Pharmacy: [facility]/pharmacy #[id number] - [place] - [address]. Ph #: [phone]       tramadol (ULTRAM) 50 mg tablet 45 tablet 0 2017 4/15/2017    Take 1 tablet (50 mg total) by mouth every 8 (eight) hours as needed for Pain. - Oral    Pharmacy: [facility]/pharmacy #[id number] - [place] - [address]. Ph #: [phone]         [facility] On Call     [facility] Nurse Care Line -  Assistance  Unless otherwise directed by your provider, please contact [facility], our nurse care line that is available for  assistance.     Registered nurses in the [facility] provide: appointment  scheduling, clinical advisement, health education, and other advisory services.  Call: [phone] (toll free)               Medications           Message regarding Medications     Verify the changes and/or additions to your medication regime listed below are the same as discussed with your clinician today.  If any of these changes or additions are incorrect, please notify your healthcare provider.        START taking these NEW medications        Refills    amlodipine (NORVASC) 10 MG tablet 1    Sig: Take 1 tablet (10 mg total) by mouth once daily.    Class: Normal    Route: Oral    tramadol (ULTRAM) 50 mg tablet 0    Sig: Take 1 tablet (50 mg total) by mouth every 8 (eight) hours as needed for Pain.    Class: Print    Route: Oral           Verify that the below list of medications is an accurate representation of the medications you are currently taking.  If none reported, the list may be blank. If incorrect, please contact your healthcare provider. Carry this list with you in case of emergency.           Current Medications     amlodipine (NORVASC) 10 MG tablet Take 1 tablet (10 mg total) by mouth once daily.    diclofenac sodium 1 % Gel APPLY 2 G TOPICALLY 4 (FOUR) TIMES DAILY.    gabapentin (NEURONTIN) 300 MG capsule Take 300 mg by mouth once daily.     levocetirizine (XYZAL) 5 MG tablet Take 1 tablet (5 mg total) by mouth nightly as needed for Allergies.    lisinopril (PRINIVIL,ZESTRIL) 40 MG tablet TAKE 1 TABLET (40 MG TOTAL) BY MOUTH ONCE DAILY.    metoprolol succinate (TOPROL-XL) 100 MG 24 hr tablet TAKE 1 TABLET (100 MG TOTAL) BY MOUTH ONCE DAILY.    naproxen (EC NAPROSYN) 500 MG EC tablet Take 1 tablet (500 mg total) by mouth 2 (two) times daily.    tavaborole (KERYDIN) 5 % Usha Apply 1 application topically once daily.    testosterone cypionate (DEPOTESTOTERONE CYPIONATE) 200 mg/mL injection INJECT 1ML INTO THE MUSCLE EVERY 14 DAYS    tramadol (ULTRAM) 50 mg tablet Take 1 tablet (50 mg total) by mouth  every 8 (eight) hours as needed for Pain.           Clinical Reference Information           Your Vitals Were     BP Pulse Temp Weight SpO2 BMI    140/75 (BP Location: Left arm, Patient Position: Sitting, BP Method: Manual) 79 98.7 °F (37.1 °C) (Oral) 75.4 kg (166 lb 1.9 oz) 97% 26.81 kg/m2      Blood Pressure          Most Recent Value    BP  (!)  140/75      Allergies as of 4/5/2017     Sudafed [Pseudoephedrine Hcl]    Codeine    Hydrocodone    Cortisone      Immunizations Administered on Date of Encounter - 4/5/2017     None      Orders Placed During Today's Visit      Normal Orders This Visit    Ambulatory consult to Physical Therapy     Ambulatory referral to Pain Clinic     Assign HDMP Onboarding Questionnaire Series     Hypertension Digital Medicine (HDMP)  Enrollment Order       Instructions    Call [facility] at [phone] to schedule.        Hypertension Digital Medicine Program Information              As discussed, you could benefit from enrolling in the Hypertension Digital Medicine Program. The goal of the program is to help you effectively manage your high blood pressure through an appropriate balance of medication and lifestyle changes, all from the comfort of your own home. Effectively managed blood pressure reduces your risk of having a heart attack or a stroke in the future, so you can enjoy a long and healthy life. We want to make blood pressure control your goal.        What is the Hypertension Digital Medicine Program?  Finding the right balance in managing high blood pressure is different for every person, and we will tailor our treatment approach based on your individual needs using the most current evidence-based guidelines.  As a participant, you will be able to send home blood pressure readings, on your schedule, directly into your medical record at [facility]. I, along with a team of pharmacists, will monitor this data, help you adjust your medication(s) and/or make lifestyle recommendations to  "better manage your hypertension.       What are the requirements of the program?   Participating in the program is as easy as 1 - 2 - 3.   1. Smartphone - You must have your own smartphone to participate (either an iPhone or an Android phone such as Pervasip, HomeAway, HTC, Imagga, [employer] Apt[employer], or LUXA).    2. [facility] account - [facility] offers a great way to connect through the online patient portal, [facility], which is free and provides you access to your [facility] medical record.  3. Digital blood pressure cuff - Using this blood pressure cuff that hooks up to your smartphone, you will be able to send in your home blood pressure readings to the Hypertension Digital Medicine Team. Cuffs are available for purchase at the [facility] locations and financial assistance plans are available           What can I do to get started?   Complete the Hypertension Digital Medicine Patient Consent questionnaire already available in your [facility] account. To access and complete this questionnaire, either  - Use the [facility] website on a computer and select My Medical Record, then Questionnaires.  - Use the EventSorbet rohan on your smartphone and select "Questionnaires".    Once you have given consent, additional onboarding questionnaires will be assigned to you to complete prior to starting the program. You must return to the "Questionnaires" page of your [facility] account to start the additional questionnaires.     How do I purchase a digital blood pressure cuff and obtain a discount?  [facility] has negotiated a discounted price from industry leading healthcare technology companies. While supplies last patients using an Apple iPhone can purchase an iHealth Ease Blood Pressure cuff for $31.99 (originally $39.99) and Android users can purchase the WithinTraitify Blood Pressure Monitor for $79.99 (originally $99.99).  Financial assistance plans are also available for increased discounting. Purchase locations will be sent once all " onboarding questionnaires have been completed (see above).    If you have any questions regarding this program or would like more information, please visit our Hypertension Digital Medicine website ([email or web address]) or call Digital Medicine Patient Support at [phone].          Language Assistance Services     ATTENTION: Language assistance services are available, free of charge. Please call [phone].      ATENCIÓN: Si habla lisbetañol, tiene a slade disposición servicios gratuitos de asistencia lingüística. Llame al [phone].     CHÚ Ý: N?u b?n nói Ti?ng Vi?t, có các d?ch v? h? tr? ngôn ng? mi?n phí dành cho b?n. G?i s? [phone].         [facility] complies with applicable Federal civil rights laws and does not discriminate on the basis of race, color, national origin, age, disability, or sex.

## 2017-04-06 ENCOUNTER — CLINICAL SUPPORT (OUTPATIENT)
Dept: REHABILITATION | Facility: HOSPITAL | Age: 62
End: 2017-04-06
Attending: NEUROLOGICAL SURGERY
Payer: COMMERCIAL

## 2017-04-06 DIAGNOSIS — M54.5 CHRONIC RIGHT-SIDED LOW BACK PAIN, WITH SCIATICA PRESENCE UNSPECIFIED: ICD-10-CM

## 2017-04-06 DIAGNOSIS — G89.29 CHRONIC RIGHT-SIDED LOW BACK PAIN, WITH SCIATICA PRESENCE UNSPECIFIED: ICD-10-CM

## 2017-04-06 DIAGNOSIS — M54.2 CERVICALGIA: ICD-10-CM

## 2017-04-06 PROCEDURE — 97010 HOT OR COLD PACKS THERAPY: CPT | Mod: PN | Performed by: PHYSICAL THERAPIST

## 2017-04-06 PROCEDURE — 97110 THERAPEUTIC EXERCISES: CPT | Mod: PN | Performed by: PHYSICAL THERAPIST

## 2017-04-06 PROCEDURE — 97014 ELECTRIC STIMULATION THERAPY: CPT | Mod: PN | Performed by: PHYSICAL THERAPIST

## 2017-04-06 PROCEDURE — 97140 MANUAL THERAPY 1/> REGIONS: CPT | Mod: PN | Performed by: PHYSICAL THERAPIST

## 2017-04-06 NOTE — PROGRESS NOTES
Name: Mane Dodd [facility] Number: [id number]  Date of Treatment: 04/06/2017   Diagnosis:   Encounter Diagnosis   Name Primary?    Cervicalgia        Time in: 200  Time Out: 255  Total Treatment Time: 55  Group Time: 0      Subjective:    Mane reports he saw MD yesterday regarding high BP and low back pain. Thinking of having RITA.  Patient reports their pain to be 4/10 on a 0-10 scale with 0 being no pain and 10 being the worst pain imaginable.    Objective    Patient received individual therapy to increase strength, ROM and flexibility with 0 patients with activities as follows:     aMne received therapeutic exercises to develop strength, ROM and flexibility for 23 minutes including:     SKC with towel, 3x30s each  LTR, 30x  Glute squeeze, 2x10  Chin tucks, 1x10 5 s hold  Scap retraction 30x  Manual HSS 3x30s    Mane received the following manual therapy techniques: Soft tissue Mobilization were applied to the: B lumbar paraspinals for 18 minutes.     IFC and MHP to the lumbar paraspinals in hooklying x 15 min.       Assessment:   More normalized tone noted, as well as decreased pain, following soft tissue mobilization of the lower R lumbar paraspinals.     Pt will continue to benefit from skilled PT intervention. Medical Necessity is demonstrated by:  Pain limits function of effected part for some activities, Unable to participate fully in daily activities, Requires skilled supervision to complete and progress HEP and Weakness.    Patient is making good progress towards established goals.    New/Revised goals: NA      Plan:  Continue with established Plan of Care towards PT goals.

## 2017-04-08 NOTE — PROGRESS NOTES
Subjective:       Patient ID: Mane Ramires is a 61 y.o. male.    Chief Complaint: Back Pain    HPI   The patient is a 61-year-old who is here today with 2 issues.  Today we discussed the followin)  back pain.  He has been having back pain for the past several months but it is gradually getting worse.  He localizes his back pain to the right SI area.  The pain will move into his right groin and down into his mid thigh.  At its worst, the pain is a 20 on a scale of 1-10.  The pain is usually a 7 out of 10 but can be as good as a 4 out of 10 when he is flat in bed.  He notes that he has a hard time getting up from a supine or seated position.  He cannot sit or stand for too long and is usually up and down every 10-15 minutes.  This all started when he bent over his car to get an umbrella stand out and heard and felt a pop in his back.  He had seen the rheumatologist for this in March.  X-rays were done which were unremarkable.  He was given a prescription for Naprosyn 500 mg twice a day.  Initially the Naprosyn worked very well and completely relieved his symptoms.  Just recently, this is no longer helping and now only gives him approximately 50% relief for the first hour after taking the Naprosyn  2)  hypertension.  Recently, his blood pressures have been trending high.  He had sent me an email regarding high blood pressures on .  At that time, I added Norvasc to his Toprol and lisinopril and requested he schedule a follow-up appointment.  Before the addition of the Norvasc, his blood pressures have been in the 160s to 180s/80s to 90s.  Since adding the Norvasc, his blood pressures have been in the 140s to 150s/80s to 90s      Review of Systems   Constitutional: Negative for appetite change, chills, diaphoresis, fatigue, fever and unexpected weight change.   HENT: Negative for congestion, ear pain, postnasal drip, rhinorrhea, sinus pressure, sneezing, sore throat and trouble swallowing.    Eyes:  Negative for pain, discharge and visual disturbance.   Respiratory: Negative for cough, chest tightness, shortness of breath and wheezing.    Cardiovascular: Negative for chest pain, palpitations and leg swelling.   Gastrointestinal: Negative for abdominal distention, abdominal pain, blood in stool, constipation, diarrhea, nausea and vomiting.   Musculoskeletal: Positive for back pain. Negative for neck pain.   Skin: Negative for rash.   Neurological: Positive for headaches.       Objective:      Physical Exam   Constitutional: He is oriented to person, place, and time. He appears well-developed and well-nourished. No distress.   He is in pain.  He is up and down during our interview because of the pain.  He is walking hunched over and bent at the knees.   HENT:   Head: Normocephalic and atraumatic.   Right Ear: Hearing, tympanic membrane, external ear and ear canal normal.   Left Ear: Hearing, tympanic membrane, external ear and ear canal normal.   Nose: Nose normal.   Mouth/Throat: Oropharynx is clear and moist and mucous membranes are normal. No oral lesions. No oropharyngeal exudate, posterior oropharyngeal edema or posterior oropharyngeal erythema.   Eyes: Conjunctivae, EOM and lids are normal. Pupils are equal, round, and reactive to light. No scleral icterus.   Neck: Normal range of motion. Neck supple. Carotid bruit is not present. No thyroid mass and no thyromegaly present.   Cardiovascular: Normal rate, regular rhythm and normal heart sounds.   No extrasystoles are present. PMI is not displaced.  Exam reveals no gallop.    No murmur heard.  Pulmonary/Chest: Effort normal and breath sounds normal. No accessory muscle usage. No respiratory distress.   Clear to auscultation bilaterally.   Abdominal: Soft. Normal appearance and bowel sounds are normal. He exhibits no abdominal bruit. There is no hepatosplenomegaly. There is no tenderness. There is no rebound.   Musculoskeletal:   Back:  Normal range of motion.   He is tender in the right SI area.  Positive straight leg raise bilaterally.  Lower extremeties reveal normal muscle tone and strength throughout.  Reflexes are 1+ and symmetric.   Lymphadenopathy:        Head (right side): No submental and no submandibular adenopathy present.        Head (left side): No submental and no submandibular adenopathy present.        Right cervical: No superficial cervical, no deep cervical and no posterior cervical adenopathy present.       Left cervical: No superficial cervical, no deep cervical and no posterior cervical adenopathy present.        Right: No supraclavicular adenopathy present.        Left: No supraclavicular adenopathy present.   Neurological: He is alert and oriented to person, place, and time.   Skin: Skin is warm, dry and intact.   Psychiatric: He has a normal mood and affect.     Blood pressure (!) 140/75, pulse 79, temperature 98.7 °F (37.1 °C), temperature source Oral, weight 75.4 kg (166 lb 1.9 oz), SpO2 97 %.Body mass index is 26.81 kg/(m^2).        A/P:  1)  sciatica.  New to me with recent worsening.  I'm going to refer him to physical therapy.  I'm also going to refer him to pain management to be considered for epidural steroid injection.  In the meantime, I am going to treat him with a course of Ultram 50 milligrams 3 times a day when necessary with no refills.  2)  hypertension.  Uncontrolled.  We will increase Norvasc to 10 mg once a day.  He will continue with the lisinopril and Toprol.  I did encourage him to consider registering for the digital hypertension medicine program so we can keep closer tabs on his blood pressure    I will see him back in 6 weeks or sooner if needed

## 2017-04-10 ENCOUNTER — OFFICE VISIT (OUTPATIENT)
Dept: PHYSICAL MEDICINE AND REHAB | Facility: CLINIC | Age: 62
End: 2017-04-10
Payer: COMMERCIAL

## 2017-04-10 DIAGNOSIS — M47.22 OSTEOARTHRITIS OF SPINE WITH RADICULOPATHY, CERVICAL REGION: ICD-10-CM

## 2017-04-10 DIAGNOSIS — M54.2 CERVICALGIA: ICD-10-CM

## 2017-04-10 PROCEDURE — 99999 PR PBB SHADOW E&M-EST. PATIENT-LVL II: CPT | Mod: PBBFAC,,, | Performed by: PHYSICAL MEDICINE & REHABILITATION

## 2017-04-10 PROCEDURE — 99499 UNLISTED E&M SERVICE: CPT | Mod: S$GLB,,, | Performed by: PHYSICAL MEDICINE & REHABILITATION

## 2017-04-10 PROCEDURE — 95910 NRV CNDJ TEST 7-8 STUDIES: CPT | Mod: S$GLB,,, | Performed by: PHYSICAL MEDICINE & REHABILITATION

## 2017-04-10 PROCEDURE — 95886 MUSC TEST DONE W/N TEST COMP: CPT | Mod: S$GLB,,, | Performed by: PHYSICAL MEDICINE & REHABILITATION

## 2017-04-10 NOTE — PROGRESS NOTES
[facility]  Katarzyna Flanagan D.O  Physical Medicine and Rehab  Phone: [phone]   Fax: [phone]        Full Name: TIMMY DOZIER Gender: Male  Patient ID: [id number] YOB: 1955      Visit Date: 4/10/2017 08:28  Age: 61 Years 6 Months Old      Bilateral arm pain and numbness. Neck pain resolving with therapy. Numbness wakes patient at night.  Sensory NCS      Nerve / Sites Rec. Site Onset Lat Peak Lat Ref. NP Amp Ref. PP Amp Ref. Segments Distance Velocity     ms ms ms µV µV µV µV  cm m/s   R Median - Digit II (Antidromic)      Wrist Dig II 2.76 3.65 ?3.40 13.9 ?15.0 31.9 ?20.0 Wrist - Dig II 13 47   L Median - Digit III (Antidromic)      Wrist Dig II 2.71 3.33 ?3.40 18.3 ?15.0 28.9 ?20.0 Wrist - Dig II 13 48   R Ulnar - Digit V (Antidromic)      Wrist Dig V 2.29 2.76 ?3.10 12.2 ?10.0 27.8 ?15.0 Wrist - Dig V 11 48   L Ulnar - Digit V (Antidromic)      Wrist Dig V 2.29 2.86 ?3.10 15.4 ?10.0 26.3 ?15.0 Wrist - Dig V 11 48       Motor NCS      Nerve / Sites Muscle Latency Ref. Amplitude Ref. Amp % Duration Segments Distance Lat Diff Velocity Ref.     ms ms mV mV % ms  cm ms m/s m/s   L Median - APB      Wrist APB 3.39 ?4.40 4.6 ?4.0 100 5.57 Wrist - APB 7         Elbow APB 6.82  5.0  109 5.83 Elbow - Wrist 19 3.44 55 ?49   R Median - APB      Wrist APB 3.23 ?4.40 8.8 ?4.0 100 6.15 Wrist - APB 7         Elbow APB 7.86  6.4  71.9 6.98 Elbow - Wrist 20 4.64 43 ?49   L Ulnar - ADM      Wrist ADM 2.50 ?3.60 9.4 ?5.0 100 5.78 Wrist - ADM 7         B.Elbow ADM 6.15  7.6  80.7 5.78 B.Elbow - Wrist 18 3.65 49 ?49      A.Elbow ADM 7.97  7.4  78.5 5.99 A.Elbow - B.Elbow 10 1.82 55 ?49   R Ulnar - ADM      Wrist ADM 2.60 ?3.60 11.2 ?5.0 100 5.57 Wrist - ADM 7         B.Elbow ADM 6.15  9.9  88.8 5.83 B.Elbow - Wrist 20 3.54 56 ?49      A.Elbow ADM 7.97  9.8  87.7 6.04 A.Elbow - B.Elbow 10 1.82 55 ?49       EMG      EMG Summary Table     Spontaneous MUAP Recruitment   Muscle IA Fib PSW Fasc H.F. Amp  Dur. PPP Pattern   L. Deltoid N None None None None N N N N   R. Deltoid N None None None None N N N N   L. Triceps brachii N None None None None N N N N   R. Triceps brachii N None None None None N N N N   L. Biceps brachii N None None None None N N N N   R. Biceps brachii N None None None None N N N N   L. Extensor carpi radialis brevis N None None None None N N N N   R. Extensor carpi radialis brevis N None None None None N N N N   L. First dorsal interosseous N None None None None N N N N   R. First dorsal interosseous N None None None None N N N N       Summary    The motor conduction test was performed on 4 nerve(s). The results were normal in 3 nerve(s): L Median - APB, L Ulnar - ADM, R Ulnar - ADM. Results outside the specified normal range were found in 1 nerve(s), as follows:   In the R Median - APB study  o the take off velocity result was reduced for Elbow - Wrist segment    The sensory conduction test was performed on 4 nerve(s). The results were normal in 3 nerve(s): L Median - Digit III (Antidromic), R Ulnar - Digit V (Antidromic), L Ulnar - Digit V (Antidromic). Results outside the specified normal range were found in 1 nerve(s), as follows:   In the R Median - Digit II (Antidromic) study  o the peak latency result was increased for Wrist stimulation  o the peak amplitude result was reduced for Wrist stimulation    The needle EMG study was normal in all 10 tested muscles: L. Deltoid, R. Deltoid, L. Triceps brachii, R. Triceps brachii, L. Biceps brachii, R. Biceps brachii, L. Extensor carpi radialis brevis, R. Extensor carpi radialis brevis, L. First dorsal interosseous, R. First dorsal interosseous.      NOTE- Cervical paraspinals not studied in EMG portion of test d/t it being low yield secondary to patient being s/p cervical fusion.      Conclusion:   Mild to moderate right carpal tunnel syndrome.  No electrophysiologic evidence of a cervical  surya.            ____________________________  Katarzyna Flanagan D.O.

## 2017-04-10 NOTE — LETTER
April 10, 2017      Carlos Busby MD  [address] [facility] [address]  [address]  [facility] [id number] [place], [place] [address]Physical Med/Rehab  [address] [place] [address]  Phone: [phone]  Fax: [phone]          Patient: Mane Ramires   MR Number: [id number]   YOB: 1955   Date of Visit: 4/10/2017       Dear Dr. Carlos Busby:    Thank you for referring Mane Ramires to me for evaluation. Attached you will find relevant portions of my assessment and plan of care.    If you have questions, please do not hesitate to call me. I look forward to following Mane Ramires along with you.    Sincerely,    Katarzyna Flanagan,     Enclosure  CC:  No Recipients    If you would like to receive this communication electronically, please contact [email or web address] or [phone] to request more information on [employer] Link access.    For providers and/or their staff who would like to refer a patient to [facility], please contact us through our one-stop-shop provider referral line, [facility] , at [phone].    If you feel you have received this communication in error or would no longer like to receive these types of communications, please e-mail [email or web address]

## 2017-04-12 ENCOUNTER — HOSPITAL ENCOUNTER (OUTPATIENT)
Dept: RADIOLOGY | Facility: HOSPITAL | Age: 62
Discharge: HOME | End: 2017-04-12
Attending: INTERNAL MEDICINE
Payer: COMMERCIAL

## 2017-04-12 ENCOUNTER — HOSPITAL ENCOUNTER (OUTPATIENT)
Dept: CARDIOLOGY | Facility: HOSPITAL | Age: 62
Discharge: HOME | End: 2017-04-12
Attending: INTERNAL MEDICINE
Payer: COMMERCIAL

## 2017-04-12 DIAGNOSIS — I10 ESSENTIAL HYPERTENSION: ICD-10-CM

## 2017-04-12 DIAGNOSIS — E78.5 DYSLIPIDEMIA: ICD-10-CM

## 2017-04-12 LAB — DIASTOLIC DYSFUNCTION: NO

## 2017-04-12 PROCEDURE — 93017 CV STRESS TEST TRACING ONLY: CPT

## 2017-04-12 PROCEDURE — 93016 CV STRESS TEST SUPVJ ONLY: CPT | Mod: ,,, | Performed by: INTERNAL MEDICINE

## 2017-04-12 PROCEDURE — 93018 CV STRESS TEST I&R ONLY: CPT | Mod: ,,, | Performed by: INTERNAL MEDICINE

## 2017-04-12 PROCEDURE — 78452 HT MUSCLE IMAGE SPECT MULT: CPT | Mod: 26,,, | Performed by: INTERNAL MEDICINE

## 2017-04-12 PROCEDURE — 78452 HT MUSCLE IMAGE SPECT MULT: CPT | Mod: TC

## 2017-04-15 RX ORDER — METOPROLOL SUCCINATE 100 MG/1
TABLET, EXTENDED RELEASE ORAL
Qty: 30 TABLET | Refills: 1 | Status: SHIPPED | OUTPATIENT
Start: 2017-04-15 | End: 2017-06-11 | Stop reason: SDUPTHER

## 2017-04-18 ENCOUNTER — CLINICAL SUPPORT (OUTPATIENT)
Dept: REHABILITATION | Facility: HOSPITAL | Age: 62
End: 2017-04-18
Attending: NEUROLOGICAL SURGERY
Payer: COMMERCIAL

## 2017-04-18 DIAGNOSIS — G89.29 CHRONIC RIGHT-SIDED LOW BACK PAIN, WITH SCIATICA PRESENCE UNSPECIFIED: ICD-10-CM

## 2017-04-18 DIAGNOSIS — M54.5 CHRONIC RIGHT-SIDED LOW BACK PAIN, WITH SCIATICA PRESENCE UNSPECIFIED: ICD-10-CM

## 2017-04-18 DIAGNOSIS — M54.2 CERVICALGIA: ICD-10-CM

## 2017-04-18 PROCEDURE — 97110 THERAPEUTIC EXERCISES: CPT | Mod: PN

## 2017-04-18 NOTE — PROGRESS NOTES
"Name: Mane Dodd [facility] Number: [id number]  Date of Treatment: 04/18/2017   Diagnosis:   Encounter Diagnoses   Name Primary?    Cervicalgia     Chronic right-sided low back pain, with sciatica presence unspecified        Time in: 1405  Time Out: 1505  Total Treatment Time: 60  Group Time: 60      Subjective:    Mane reports "not much change, still waking up at night with numbness in my arms from my neck".  Patient reports their pain to be 4/10 on a 0-10 scale with 0 being no pain and 10 being the worst pain imaginable.    Objective    Patient received group therapy to increase strength, endurance, ROM, flexibility, posture and core stabilization with 1 patients with activities as follows:     Mane received therapeutic exercises to develop strength, endurance, ROM, flexibility, posture and core stabilization for 60 minutes including:    BP prior to therex 112/71 HR 65   UBE 5/5 L1   Seated chin tucks 10/2   C/S AROM 10/2 rotation L/R   Seated retro shoulder rolls B 10/2   Seated scap retraction 10/3 B   Seated UT stretches L/R 3/30s   Bike L10 LE's only 10'   Seated hamstring stretches 3/30s L/R   Seated piriformis stretches 3/30s L/R    Patient given HEP of UT stretch 3/30s 2 x day, LS stretch 3/30s 2 x day, AROM cx flex/ext/sb/rot 3/10, chin tucks 3/10, and scap squeeze 3/10 1 x day  Verbal explanation, pictoral, and written instructions for each therex    Assessment:       Pt will continue to benefit from skilled PT intervention. Medical Necessity is demonstrated by:  Continued inability to participate in vocational pursuits, Pain limits function of effected part for some activities, Requires skilled supervision to complete and progress HEP and Weakness.    Patient is making fair progress towards established goals.    Plan:  Continue with established Plan of Care towards PT goals.   "

## 2017-05-01 ENCOUNTER — OFFICE VISIT (OUTPATIENT)
Dept: PAIN MEDICINE | Facility: CLINIC | Age: 62
End: 2017-05-01
Payer: COMMERCIAL

## 2017-05-01 ENCOUNTER — HOSPITAL ENCOUNTER (OUTPATIENT)
Dept: RADIOLOGY | Facility: HOSPITAL | Age: 62
Discharge: HOME | End: 2017-05-01
Attending: ANESTHESIOLOGY
Payer: COMMERCIAL

## 2017-05-01 VITALS
SYSTOLIC BLOOD PRESSURE: 117 MMHG | BODY MASS INDEX: 27.36 KG/M2 | HEIGHT: 65 IN | WEIGHT: 164.25 LBS | HEART RATE: 76 BPM | DIASTOLIC BLOOD PRESSURE: 74 MMHG

## 2017-05-01 DIAGNOSIS — M47.819 FACET ARTHROPATHY: ICD-10-CM

## 2017-05-01 DIAGNOSIS — M50.30 DDD (DEGENERATIVE DISC DISEASE), CERVICAL: ICD-10-CM

## 2017-05-01 DIAGNOSIS — M53.3 SACROILIAC JOINT PAIN: ICD-10-CM

## 2017-05-01 DIAGNOSIS — M51.36 DDD (DEGENERATIVE DISC DISEASE), LUMBAR: Primary | ICD-10-CM

## 2017-05-01 DIAGNOSIS — M51.36 DDD (DEGENERATIVE DISC DISEASE), LUMBAR: ICD-10-CM

## 2017-05-01 DIAGNOSIS — M47.812 OSTEOARTHRITIS OF CERVICAL SPINE, UNSPECIFIED SPINAL OSTEOARTHRITIS COMPLICATION STATUS: ICD-10-CM

## 2017-05-01 PROCEDURE — 72110 X-RAY EXAM L-2 SPINE 4/>VWS: CPT | Mod: TC

## 2017-05-01 PROCEDURE — 72110 X-RAY EXAM L-2 SPINE 4/>VWS: CPT | Mod: 26,,, | Performed by: RADIOLOGY

## 2017-05-01 PROCEDURE — 99999 PR PBB SHADOW E&M-EST. PATIENT-LVL III: CPT | Mod: PBBFAC,,, | Performed by: ANESTHESIOLOGY

## 2017-05-01 PROCEDURE — 99244 OFF/OP CNSLTJ NEW/EST MOD 40: CPT | Mod: S$GLB,,, | Performed by: ANESTHESIOLOGY
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
## 2017-05-01 NOTE — MR AVS SNAPSHOT
[facility]  [address] [place] [address] [address]  [place] [address]  Phone: [phone]                  Mane Ramires   2017 11:00 AM   Office Visit    Description:  Male : 1955   Provider:  Abdirashid David MD   Department:  [facility]           Reason for Visit     Back Pain     Neck Pain     Joint Pain           Diagnoses this Visit        Comments    DDD (degenerative disc disease), lumbar    -  Primary     Facet arthropathy         Sacroiliac joint pain         DDD (degenerative disc disease), cervical         Osteoarthritis of cervical spine, unspecified spinal osteoarthritis complication status                To Do List           Future Appointments        Provider Department Dept Phone    2017 11:45 AM [facility] RU1NOAT [facility] [phone]    2017 1:00 PM [facility] - Neurosurgery [phone]    2017 10:50 AM Patience Gordon MD [facility] [phone]    2017 9:30 AM LAB, MOB [address] [facility] [phone]    2017 3:00 PM Davian Art MD [facility] Card[facility] [phone]      Goals (5 Years of Data)     None      [facility]     [facility] Nurse Care Line - 24/ Assistance  Unless otherwise directed by your provider, please contact [facility], our nurse care line that is available for  assistance.     Registered nurses in the [facility] provide: appointment scheduling, clinical advisement, health education, and other advisory services.  Call: [phone] (toll free)               Medications           Message regarding Medications     Verify the changes and/or additions to your medication regime listed below are the same as discussed with your clinician today.  If any of these changes or additions are incorrect, please notify your healthcare provider.             Verify that the below list of medications is  "an accurate representation of the medications you are currently taking.  If none reported, the list may be blank. If incorrect, please contact your healthcare provider. Carry this list with you in case of emergency.           Current Medications     amlodipine (NORVASC) 10 MG tablet Take 1 tablet (10 mg total) by mouth once daily.    diclofenac sodium 1 % Gel APPLY 2 G TOPICALLY 4 (FOUR) TIMES DAILY.    gabapentin (NEURONTIN) 300 MG capsule Take 300 mg by mouth once daily.     lisinopril (PRINIVIL,ZESTRIL) 40 MG tablet TAKE 1 TABLET (40 MG TOTAL) BY MOUTH ONCE DAILY.    metoprolol succinate (TOPROL-XL) 100 MG 24 hr tablet TAKE 1 TABLET (100 MG TOTAL) BY MOUTH ONCE DAILY.    tavaborole (KERYDIN) 5 % Usha Apply 1 application topically once daily.    levocetirizine (XYZAL) 5 MG tablet Take 1 tablet (5 mg total) by mouth nightly as needed for Allergies.    naproxen (EC NAPROSYN) 500 MG EC tablet Take 1 tablet (500 mg total) by mouth 2 (two) times daily.    testosterone cypionate (DEPOTESTOTERONE CYPIONATE) 200 mg/mL injection INJECT 1ML INTO THE MUSCLE EVERY 14 DAYS           Clinical Reference Information           Your Vitals Were     BP Pulse Height Weight BMI    117/74 76 5' 5" (1.651 m) 74.5 kg (164 lb 3.9 oz) 27.33 kg/m2      Blood Pressure          Most Recent Value    BP  117/74      Allergies as of 5/1/2017     Sudafed [Pseudoephedrine Hcl]    Codeine    Hydrocodone    Cortisone      Immunizations Administered on Date of Encounter - 5/1/2017     None      Orders Placed During Today's Visit     Future Labs/Procedures Expected by Expires    X-Ray Lumbar Spine Complete 5 View  5/1/2017 5/2/2018      Language Assistance Services     ATTENTION: Language assistance services are available, free of charge. Please call [phone].      ATENCIÓN: Si arunla toni, tiene a slade disposición servicios gratuitos de asistencia lingüística. Llame al [phone].     CHÚ Ý: N?u b?n nói Ti?ng Vi?t, có các d?ch v? h? tr? " coral alford? mi?n phí dành cho b?n. G?i s? [phone].         [facility] complies with applicable Federal civil rights laws and does not discriminate on the basis of race, color, national origin, age, disability, or sex.

## 2017-05-02 ENCOUNTER — TELEPHONE (OUTPATIENT)
Dept: PAIN MEDICINE | Facility: CLINIC | Age: 62
End: 2017-05-02

## 2017-05-02 RX ORDER — LISINOPRIL 40 MG/1
TABLET ORAL
Qty: 30 TABLET | Refills: 1 | Status: SHIPPED | OUTPATIENT
Start: 2017-05-02 | End: 2017-06-25 | Stop reason: SDUPTHER

## 2017-05-03 ENCOUNTER — PATIENT OUTREACH (OUTPATIENT)
Dept: ADMINISTRATIVE | Facility: HOSPITAL | Age: 62
End: 2017-05-03

## 2017-05-03 NOTE — PROGRESS NOTES
PRE-VISIT CHART REVIEW    Appointment Scheduled on 5/17/17    Department stratifications & guidelines reviewed:yes    Target Chronic Diagnosis: HTN    Chronic Diagnosis Intervention Due: no    Goals Updated:No    Health Maintenance Due   Topic Date Due    TETANUS VACCINE  10/08/1975       Advanced Directives:   65 years of age or older?  No  Directive on file?  N\A                                      Pre-visit patient communication: 05/03/2017 MyChart/Letter    Studies or screenings scheduled pre-visit: no

## 2017-05-17 ENCOUNTER — OFFICE VISIT (OUTPATIENT)
Dept: FAMILY MEDICINE | Facility: CLINIC | Age: 62
End: 2017-05-17
Payer: COMMERCIAL

## 2017-05-17 VITALS
TEMPERATURE: 98 F | WEIGHT: 165.13 LBS | RESPIRATION RATE: 16 BRPM | HEART RATE: 73 BPM | DIASTOLIC BLOOD PRESSURE: 68 MMHG | SYSTOLIC BLOOD PRESSURE: 118 MMHG | BODY MASS INDEX: 28.19 KG/M2 | HEIGHT: 64 IN

## 2017-05-17 DIAGNOSIS — Z23 IMMUNIZATION DUE: ICD-10-CM

## 2017-05-17 DIAGNOSIS — R73.03 PREDIABETES: ICD-10-CM

## 2017-05-17 DIAGNOSIS — G89.29 CHRONIC RIGHT-SIDED LOW BACK PAIN WITH RIGHT-SIDED SCIATICA: Primary | ICD-10-CM

## 2017-05-17 DIAGNOSIS — I10 HYPERTENSION, ESSENTIAL: ICD-10-CM

## 2017-05-17 DIAGNOSIS — M54.41 CHRONIC RIGHT-SIDED LOW BACK PAIN WITH RIGHT-SIDED SCIATICA: Primary | ICD-10-CM

## 2017-05-17 PROCEDURE — 3074F SYST BP LT 130 MM HG: CPT | Mod: S$GLB,,, | Performed by: FAMILY MEDICINE

## 2017-05-17 PROCEDURE — 99214 OFFICE O/P EST MOD 30 MIN: CPT | Mod: 25,S$GLB,, | Performed by: FAMILY MEDICINE

## 2017-05-17 PROCEDURE — 90471 IMMUNIZATION ADMIN: CPT | Mod: S$GLB,,, | Performed by: FAMILY MEDICINE

## 2017-05-17 PROCEDURE — 3078F DIAST BP <80 MM HG: CPT | Mod: S$GLB,,, | Performed by: FAMILY MEDICINE

## 2017-05-17 PROCEDURE — 1160F RVW MEDS BY RX/DR IN RCRD: CPT | Mod: S$GLB,,, | Performed by: FAMILY MEDICINE

## 2017-05-17 PROCEDURE — 90715 TDAP VACCINE 7 YRS/> IM: CPT | Mod: S$GLB,,, | Performed by: FAMILY MEDICINE

## 2017-05-17 RX ORDER — AMLODIPINE BESYLATE 5 MG/1
5 TABLET ORAL DAILY
Qty: 30 TABLET | Refills: 2 | Status: SHIPPED | OUTPATIENT
Start: 2017-05-17 | End: 2017-09-05 | Stop reason: SDUPTHER

## 2017-05-17 RX ORDER — PREGABALIN 100 MG/1
100 CAPSULE ORAL 2 TIMES DAILY
Qty: 60 CAPSULE | Refills: 1 | Status: SHIPPED | OUTPATIENT
Start: 2017-05-17 | End: 2017-07-18 | Stop reason: SDUPTHER

## 2017-05-17 NOTE — MR AVS SNAPSHOT
[facility]  [address]  [facility] [address]  Phone: [phone]  Fax: [phone]                  Mane Ramires   2017 10:50 AM   Office Visit    Description:  Male : 1955   Provider:  Patience Gordno MD   Department:  [facility]           Reason for Visit     Follow-up           Diagnoses this Visit        Comments    Chronic right-sided low back pain with right-sided sciatica    -  Primary     Hypertension, essential         Prediabetes         Immunization due                To Do List           Future Appointments        Provider Department Dept Phone    2017 3:30 PM Dasia Uriarte PA-C [facility] Neuro[facility] [phone]    2017 9:30 AM LAB, MOB [address] [facility] [phone]    2017 3:00 PM MD [facility] Cardiology [phone]    2017 10:10 AM Patience Gordon MD [facility] [phone]      Goals (5 Years of Data)     None       These Medications        Disp Refills Start End    pregabalin (LYRICA) 100 MG capsule 60 capsule 1 2017 11/15/2017    Take 1 capsule (100 mg total) by mouth 2 (two) times daily. - Oral    Pharmacy: [facility] #[id number] - [place] - [address] Ph #: [phone]       amlodipine (NORVASC) 5 MG tablet 30 tablet 2 2017    Take 1 tablet (5 mg total) by mouth once daily. - Oral    Pharmacy: [facility] #[id number] - [place] - [address] Ph #: [phone]         [facility]     [facility] - 24/ Assistance  Unless otherwise directed by your provider, please contact [facility], our nurse care line that is available for 24/7 assistance.     Registered nurses in the [facility] provide: appointment scheduling, clinical advisement, health education, and other advisory services.  Call: [phone] (toll free)               Medications            Message regarding Medications     Verify the changes and/or additions to your medication regime listed below are the same as discussed with your clinician today.  If any of these changes or additions are incorrect, please notify your healthcare provider.        START taking these NEW medications        Refills    pregabalin (LYRICA) 100 MG capsule 1    Sig: Take 1 capsule (100 mg total) by mouth 2 (two) times daily.    Class: Print    Route: Oral    amlodipine (NORVASC) 5 MG tablet 2    Sig: Take 1 tablet (5 mg total) by mouth once daily.    Class: Normal    Route: Oral      STOP taking these medications     naproxen (EC NAPROSYN) 500 MG EC tablet Take 1 tablet (500 mg total) by mouth 2 (two) times daily.    gabapentin (NEURONTIN) 300 MG capsule Take 300 mg by mouth once daily.            Verify that the below list of medications is an accurate representation of the medications you are currently taking.  If none reported, the list may be blank. If incorrect, please contact your healthcare provider. Carry this list with you in case of emergency.           Current Medications     diclofenac sodium 1 % Gel APPLY 2 G TOPICALLY 4 (FOUR) TIMES DAILY.    levocetirizine (XYZAL) 5 MG tablet Take 1 tablet (5 mg total) by mouth nightly as needed for Allergies.    lisinopril (PRINIVIL,ZESTRIL) 40 MG tablet TAKE 1 TABLET (40 MG TOTAL) BY MOUTH ONCE DAILY.    metoprolol succinate (TOPROL-XL) 100 MG 24 hr tablet TAKE 1 TABLET (100 MG TOTAL) BY MOUTH ONCE DAILY.    tavaborole (KERYDIN) 5 % Usha Apply 1 application topically once daily.    testosterone cypionate (DEPOTESTOTERONE CYPIONATE) 200 mg/mL injection INJECT 1ML INTO THE MUSCLE EVERY 14 DAYS    amlodipine (NORVASC) 5 MG tablet Take 1 tablet (5 mg total) by mouth once daily.    pregabalin (LYRICA) 100 MG capsule Take 1 capsule (100 mg total) by mouth 2 (two) times daily.           Clinical Reference Information           Your Vitals Were     BP Pulse Temp Resp  "Height Weight    118/68 73 98.3 °F (36.8 °C) (Oral) 16 5' 4" (1.626 m) 74.9 kg (165 lb 2 oz)    BMI                28.34 kg/m2          Blood Pressure          Most Recent Value    BP  118/68      Allergies as of 5/17/2017     Sudafed [Pseudoephedrine Hcl]    Codeine    Hydrocodone    Cortisone      Immunizations Administered on Date of Encounter - 5/17/2017     Name Date Dose VIS Date Route    TDAP 5/17/2017 0.5 mL 2/24/2015 Intramuscular      Orders Placed During Today's Visit      Normal Orders This Visit    Tdap Vaccine     Future Labs/Procedures Expected by Expires    Basic metabolic panel  5/17/2017 5/18/2018    Hemoglobin A1c  5/17/2017 5/18/2018      Language Assistance Services     ATTENTION: Language assistance services are available, free of charge. Please call [phone].      ATENCIÓN: Si habla español, tiene a slade disposición servicios gratuitos de asistencia lingüística. Llame al [phone].     SETH Ý: N?u b?n nói Ti?ng Vi?t, có các d?ch v? h? tr? ngôn ng? mi?n phí dành cho b?n. G?i s? [phone].         [facility] complies with applicable Federal civil rights laws and does not discriminate on the basis of race, color, national origin, age, disability, or sex.        "

## 2017-05-22 NOTE — PROGRESS NOTES
Subjective:       Patient ID: Mane Ramires is a 61 y.o. male.    Chief Complaint: Follow-up    HPI   The patient is a 61-year-old who is here today for follow-up.  Today we discussed the followin)  sciatica.  His right-sided sciatica has improved but is still present.  He is still having some pain in his right SI area with radiation into his upper leg but does not extend below the knee.  At its worst, the pain as a 10 out of 10.  He cannot sit for more than 10 minutes.  He has been going to physical therapy but inconsistently due to a family issue.  He recently used one of his significant other's Lyrica and found that the Lyrica worked better than anything else he has tried for his back pain.  He took Lyrica 100 mg at night and the next day he had no pain.  He notes that the Lyrica does not make him feel foggy like the Neurontin dose.  He wonders about receiving a prescription for his own Lyrica.  He has met with pain management and they did recommend a steroid injection.  He is very hesitant to consider steroid injections given his prior experience  2)  hypertension.  At his last visit, we increased his Norvasc to 10 mg once a day and continued with the Toprol- mg once a day and lisinopril 40 mg once a day.  After increasing the Norvasc, he noticed that he was feeling more tired and that his blood pressures were in the low 110s and his heart rates were in the low 60s    Review of Systems   Constitutional: Negative for appetite change, chills, diaphoresis, fatigue, fever and unexpected weight change.   HENT: Negative for congestion, ear pain, postnasal drip, rhinorrhea, sinus pressure, sneezing, sore throat and trouble swallowing.    Eyes: Negative for pain, discharge and visual disturbance.   Respiratory: Negative for cough, chest tightness, shortness of breath and wheezing.    Cardiovascular: Negative for chest pain, palpitations and leg swelling.   Gastrointestinal: Negative for abdominal  distention, abdominal pain, blood in stool, constipation, diarrhea, nausea and vomiting.   Skin: Negative for rash.       Objective:      Physical Exam   Constitutional: He is oriented to person, place, and time. He appears well-developed and well-nourished. No distress.   HENT:   Head: Normocephalic and atraumatic.   Right Ear: Hearing, tympanic membrane, external ear and ear canal normal.   Left Ear: Hearing, tympanic membrane, external ear and ear canal normal.   Nose: Nose normal.   Mouth/Throat: Oropharynx is clear and moist and mucous membranes are normal. No oral lesions. No oropharyngeal exudate, posterior oropharyngeal edema or posterior oropharyngeal erythema.   Eyes: Conjunctivae, EOM and lids are normal. Pupils are equal, round, and reactive to light. No scleral icterus.   Neck: Normal range of motion. Neck supple. Carotid bruit is not present. No thyroid mass and no thyromegaly present.   Cardiovascular: Normal rate, regular rhythm and normal heart sounds.   No extrasystoles are present. PMI is not displaced.  Exam reveals no gallop.    No murmur heard.  Pulmonary/Chest: Effort normal and breath sounds normal. No accessory muscle usage. No respiratory distress.   Clear to auscultation bilaterally.   Abdominal: Soft. Normal appearance and bowel sounds are normal. He exhibits no abdominal bruit. There is no hepatosplenomegaly. There is no tenderness. There is no rebound.   Musculoskeletal:   Tender in the right SI area   Lymphadenopathy:        Head (right side): No submental and no submandibular adenopathy present.        Head (left side): No submental and no submandibular adenopathy present.        Right cervical: No superficial cervical, no deep cervical and no posterior cervical adenopathy present.       Left cervical: No superficial cervical, no deep cervical and no posterior cervical adenopathy present.        Right: No supraclavicular adenopathy present.        Left: No supraclavicular adenopathy  "present.   Neurological: He is alert and oriented to person, place, and time.   Skin: Skin is warm, dry and intact.   Psychiatric: He has a normal mood and affect.     Blood pressure 118/68, pulse 73, temperature 98.3 °F (36.8 °C), temperature source Oral, resp. rate 16, height 5' 4" (1.626 m), weight 74.9 kg (165 lb 2 oz).Body mass index is 28.34 kg/m².            A/P:  1)  right-sided sciatica.  Improving.  We will start him on Lyrica 100 mg twice a day.  He will be more consistent with physical therapy.  Since he is having improvement, we are going to defer the steroid injection but may consider this in the future.  If he develops any new or worsening symptoms he will let me know  2)  hypertension.  Possibly over controlled.  We are going to decrease the Norvasc to 5 mg once a day.  He will continue with the Toprol and lisinopril.  If his blood pressures are consistently higher than 139/89, he will let me know  3)  prediabetes.  Status unknown.  We will check an A1c with his upcoming labs  4)  health maintenance issues.  We will administer Tdap today    "

## 2017-05-30 ENCOUNTER — DOCUMENTATION ONLY (OUTPATIENT)
Dept: REHABILITATION | Facility: HOSPITAL | Age: 62
End: 2017-05-30

## 2017-05-30 NOTE — PT/OT/SLP DISCHARGE
Name: Mane Dodd [facility] Number: [id number]   Age: 61 y.o.   Diagnosis: No diagnosis found.   Physician: No ref. provider found   Original Orders : PT eval and treat  Initial visit: 3/21/17  Date of Last visit: 4/18/17  Date of Discharge Note:  5/30/17  Total Visits Received: 4  Missed Visits: several    Subjective: Pt reported minimal change in symptoms at the last appt he attended. He had cancelled several sessions prior to the last session he attended for various reasons.    Objective:  Treatment :    Included:Therapeutic exercise, Soft tissue mobilizations, Moist heat, Electrical stimulation and Stretching      ROM:  See eval    UE Strength: see eval   LE Strength: see eval      Assessment:  Minimal progress, then stopped attending sessions.   Goals Achieved: NA  Goals Not achieved and why: stopped attending sessions    Discharge reason : Non-Compliance with attendance    Discharge plan :Pt to follow-up with MD as planned    Plan:  This patient is discharged from Physical Therapy Services.

## 2017-06-05 ENCOUNTER — OFFICE VISIT (OUTPATIENT)
Dept: NEUROSURGERY | Facility: CLINIC | Age: 62
End: 2017-06-05
Payer: COMMERCIAL

## 2017-06-05 VITALS
BODY MASS INDEX: 27.53 KG/M2 | DIASTOLIC BLOOD PRESSURE: 80 MMHG | HEIGHT: 65 IN | HEART RATE: 67 BPM | SYSTOLIC BLOOD PRESSURE: 136 MMHG | WEIGHT: 165.25 LBS

## 2017-06-05 DIAGNOSIS — G56.01 CARPAL TUNNEL SYNDROME OF RIGHT WRIST: Primary | ICD-10-CM

## 2017-06-05 DIAGNOSIS — M54.50 CHRONIC BILATERAL LOW BACK PAIN WITHOUT SCIATICA: ICD-10-CM

## 2017-06-05 DIAGNOSIS — Z98.890 H/O CERVICAL SPINE SURGERY: ICD-10-CM

## 2017-06-05 DIAGNOSIS — G89.29 CHRONIC BILATERAL LOW BACK PAIN WITHOUT SCIATICA: ICD-10-CM

## 2017-06-05 DIAGNOSIS — M54.2 NECK PAIN: ICD-10-CM

## 2017-06-05 PROCEDURE — 99213 OFFICE O/P EST LOW 20 MIN: CPT | Mod: S$GLB,,, | Performed by: PHYSICIAN ASSISTANT

## 2017-06-05 RX ORDER — NAPROXEN SODIUM 220 MG
220 TABLET ORAL
COMMUNITY
End: 2017-06-20

## 2017-06-06 NOTE — PROGRESS NOTES
Neurosurgery History & Physical    Patient ID: Mane Ramires is a 61 y.o. male.    Chief Complaint   Patient presents with    Follow-up     Neck pain f/u after PT and EMG. Pt states relief since starting PT. Pt states worsening lower back pain with difficulty walking long distances.        Review of Systems   Constitutional: Negative for activity change, chills, fatigue and unexpected weight change.   HENT: Negative for hearing loss, tinnitus, trouble swallowing and voice change.    Eyes: Negative for visual disturbance.   Respiratory: Negative for apnea, chest tightness and shortness of breath.    Cardiovascular: Negative for chest pain and palpitations.   Gastrointestinal: Negative for abdominal pain, constipation, diarrhea, nausea and vomiting.   Genitourinary: Negative for difficulty urinating, dysuria and frequency.   Musculoskeletal: Positive for back pain and neck pain. Negative for gait problem and neck stiffness.   Skin: Negative for wound.   Neurological: Positive for numbness. Negative for dizziness, tremors, seizures, facial asymmetry, speech difficulty, weakness, light-headedness and headaches.   Psychiatric/Behavioral: Negative for confusion and decreased concentration.       Past Medical History:   Diagnosis Date    Accident     fell from roof with TBI (word finding issues personality changes, memory deficets), R rib fractures, clavicle fx    Allergy     ASD (atrial septal defect)     noted on ECHO 6/16    Cervical stenosis of spine     noted on 6/15 MRI    CTS (carpal tunnel syndrome)     mild-mod on 4/17 NCS    DDD (degenerative disc disease), cervical 10/2014    C5-6 and C6-C7    Dyslipidemia     Gender identity disorder     H/O cardiovascular stress test     12/14 normal    Heart murmur 05/26/2016    Herpes simplex type 2 infection     Hypertension     IGT (impaired glucose tolerance)     Myelomalacia     c-spine noted on 8/15 MRI    OA (osteoarthritis)     Prediabetes      TBI (traumatic brain injury)     after falling off a roof in 2003     Social History     Social History    Marital status: Single     Spouse name: N/A    Number of children: N/A    Years of education: N/A     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    Smoking status: Former Smoker    Smokeless tobacco: Never Used    Alcohol use Yes      Comment: rare     Drug use: No    Sexual activity: Yes     Partners: Female     Other Topics Concern    Not on file     Social History Narrative    No narrative on file     Family History   Problem Relation Age of Onset    Diabetes Mother     Heart disease Mother     Coronary artery disease Father     Breast cancer Sister     Lung cancer Paternal Grandfather     Heart disease Paternal Grandfather     Heart disease Maternal Grandmother     Heart attack Brother      Review of patient's allergies indicates:   Allergen Reactions    Sudafed [pseudoephedrine hcl] Other (See Comments)     Heart racing      Codeine Other (See Comments)     Heart racing    Hydrocodone Other (See Comments)     insomnia    Cortisone Palpitations       Current Outpatient Prescriptions:     amlodipine (NORVASC) 5 MG tablet, Take 1 tablet (5 mg total) by mouth once daily., Disp: 30 tablet, Rfl: 2    diclofenac sodium 1 % Gel, APPLY 2 G TOPICALLY 4 (FOUR) TIMES DAILY., Disp: , Rfl: 10    levocetirizine (XYZAL) 5 MG tablet, Take 1 tablet (5 mg total) by mouth nightly as needed for Allergies., Disp: 30 tablet, Rfl: 11    lisinopril (PRINIVIL,ZESTRIL) 40 MG tablet, TAKE 1 TABLET (40 MG TOTAL) BY MOUTH ONCE DAILY., Disp: 30 tablet, Rfl: 1    metoprolol succinate (TOPROL-XL) 100 MG 24 hr tablet, TAKE 1 TABLET (100 MG TOTAL) BY MOUTH ONCE DAILY., Disp: 30 tablet, Rfl: 1    naproxen sodium (ANAPROX) 220 MG tablet, Take 220 mg by mouth every 12 (twelve) hours., Disp: , Rfl:     pregabalin (LYRICA) 100 MG capsule, Take 1 capsule (100 mg total) by mouth 2 (two) times  "daily., Disp: 60 capsule, Rfl: 1    tavaborole (KERYDIN) 5 % Usha, Apply 1 application topically once daily., Disp: 10 mL, Rfl: 11    testosterone cypionate (DEPOTESTOTERONE CYPIONATE) 200 mg/mL injection, INJECT 1ML INTO THE MUSCLE EVERY 14 DAYS, Disp: , Rfl: 5    Vitals:    06/05/17 1548   BP: 136/80   Pulse: 67   Weight: 75 kg (165 lb 3.8 oz)   Height: 5' 4.5" (1.638 m)       Physical Exam   Constitutional: He is oriented to person, place, and time. He appears well-developed and well-nourished.   HENT:   Head: Normocephalic and atraumatic.   Eyes: Pupils are equal, round, and reactive to light.   Neck: Normal range of motion. Neck supple.   Cardiovascular: Normal rate.    Pulmonary/Chest: Effort normal.   Abdominal: He exhibits no distension.   Musculoskeletal: Normal range of motion. He exhibits no edema.   Neurological: He is alert and oriented to person, place, and time. He has a normal Finger-Nose-Finger Test, a normal Heel to Shin Test, a normal Romberg Test and a normal Tandem Gait Test. Gait normal.   Reflex Scores:       Tricep reflexes are 2+ on the right side and 2+ on the left side.       Bicep reflexes are 2+ on the right side and 2+ on the left side.       Brachioradialis reflexes are 2+ on the right side and 2+ on the left side.       Patellar reflexes are 2+ on the right side and 2+ on the left side.       Achilles reflexes are 2+ on the right side and 2+ on the left side.  Skin: Skin is warm and dry.   Psychiatric: He has a normal mood and affect. His speech is normal and behavior is normal. Judgment and thought content normal.   Nursing note and vitals reviewed.      Neurologic Exam     Mental Status   Oriented to person, place, and time.   Oriented to person.   Oriented to place.   Oriented to time.   Follows 3 step commands.   Attention: normal. Concentration: normal.   Speech: speech is normal   Level of consciousness: alert  Knowledge: consistent with education.   Able to name object. Able " to read. Able to repeat. Able to write. Normal comprehension.     Cranial Nerves     CN II   Visual acuity: normal  Right visual field deficit: none  Left visual field deficit: none     CN III, IV, VI   Pupils are equal, round, and reactive to light.  Right pupil: Size: 3 mm. Shape: regular. Reactivity: brisk. Consensual response: intact.   Left pupil: Size: 3 mm. Shape: regular. Reactivity: brisk. Consensual response: intact.   CN III: no CN III palsy  CN VI: no CN VI palsy  Nystagmus: none   Diplopia: none  Ophthalmoparesis: none  Conjugate gaze: present    CN V   Right facial sensation deficit: none  Left facial sensation deficit: none    CN VII   Right facial weakness: none  Left facial weakness: none    CN VIII   Hearing: intact    CN IX, X   CN IX normal.   CN X normal.     CN XI   Right sternocleidomastoid strength: normal  Left sternocleidomastoid strength: normal  Right trapezius strength: normal  Left trapezius strength: normal    CN XII   Fasciculations: absent  Tongue deviation: none    Motor Exam   Muscle bulk: normal  Overall muscle tone: normal  Right arm pronator drift: absent  Left arm pronator drift: absent    Strength   Right neck flexion: 5/5  Left neck flexion: 5/5  Right neck extension: 5/5  Left neck extension: 5/5  Right deltoid: 5/5  Left deltoid: 5/5  Right biceps: 5/5  Left biceps: 5/5  Right triceps: 5/5  Left triceps: 5/5  Right wrist flexion: 5/5  Left wrist flexion: 5/5  Right wrist extension: 5/5  Left wrist extension: 5/5  Right interossei: 5/5  Left interossei: 5/5  Right abdominals: 5/5  Left abdominals: 5/5  Right iliopsoas: 5/5  Left iliopsoas: 5/5  Right quadriceps: 5/5  Left quadriceps: 5/5  Right hamstrin/5  Left hamstrin/5  Right glutei: 5/5  Left glutei: 5/5  Right anterior tibial: 5/5  Left anterior tibial: 5/5  Right posterior tibial: 5/5  Left posterior tibial: 5/5  Right peroneal: 5/5  Left peroneal: 5/5  Right gastroc: 5/5  Left gastroc: 5/5    Sensory Exam    Right arm light touch: normal  Left arm light touch: normal  Right leg light touch: normal  Left leg light touch: normal  Right arm vibration: normal  Left arm vibration: normal  Right arm pinprick: normal  Left arm pinprick: normal    Gait, Coordination, and Reflexes     Gait  Gait: normal    Coordination   Romberg: negative  Finger to nose coordination: normal  Heel to shin coordination: normal  Tandem walking coordination: normal    Tremor   Resting tremor: absent  Intention tremor: absent  Action tremor: absent    Reflexes   Right brachioradialis: 2+  Left brachioradialis: 2+  Right biceps: 2+  Left biceps: 2+  Right triceps: 2+  Left triceps: 2+  Right patellar: 2+  Left patellar: 2+  Right achilles: 2+  Left achilles: 2+  Right Al: absent  Left Al: absent  Right ankle clonus: absent  Left ankle clonus: absent      Provider dictation:  60 year old  male with history of traumatic brain injury (fall from horse in 2003), arthritis, lower back pain, neck pain, and prior cervical spine surgery (C5/6, C6/7 in 2014 with Dr. Myers) presents for follow up of his cervcial spine after undergoing PT and nerve testing.  He has had some relief of his neck pain only with PT.  He does continue to have tingling and numbness in all fingers bilaterally extending to the mid foreamr bilaterally.  Right hand numbness is the greatest.  He does still have some, bilateral neck pain in the paracervical area without radicular arm pain.  He is taking gabapentin for pain.  He states that he cannot tolerate steroids due to side effects.  He also describes right sided lower back pain and muscle spasms without focal radiculoapthy - he is following with Dr. David and procedures have been discussed.    On exam, he is neurologically intact with 2+ DTRs and 5/5 strength bilaterally in the upper and lower extremities.  He is not tender to palpation along the spine and he does not appreciate any sensory deficits.  Phalens and  tinels negative at the wrists bilaterally for carpal tunnel.  phalens negative at the elbows for ulnar neuropathy.    I have reviewed an MRI of the cervical spine from [facility] date 6-18-15 and [facility] from 3-1-17. There are no significant changes between the two studies.  There are post op changes at C5/6, C6/7 of ACDF.  C4/5 broad disc/ osteophyte, facet arthropathy and ligamentous hypertrophy with mild central canal narrowing and mild left foraminal narrowing.  C5/6 and C6/7  spondylosis with mild-moderate bilateral foraminal narrowing.    EMG/ NCV of the bilateral upper extremities by Dr. Flanagan 4-10-17 reveals mild-moderate right carpal tunnel with no evidence of cervical radiculoapthy    I have reviwed lumbar spine xrays revealing spondylosis at L4/5 and L5/S1.    Assessment:  60 year old male with prior cervical spine surgery - C5/6, C6/7 ACDF and persistent left greater than right neck pain and numbness in the bilateral hands greater than the arms.  Known cervical spondylosis at C4/5, C5/6, C6/7.  There has been no major change on the MRI studies.  His arm numbness is likely residual from nerve damage present prior to his operation.  He does have evidence of right carapl tunnel syndrome, which would explain why the numbness is greatest in the right hand.  I believe his neck pain and back pain is myofascial. He may continue with PT to help with pain.  I have encouraged home exercise as well.  He can discuss any pain management procedures for the neck and lower back with Dr. David.  No cervical spine surgery recommended at this time.  For carpal tunnel, he can wear wrist splints, see ortho for injections or consider surgical release; he prefers to try splinting at home.  Follow up if needed.    Visit Diagnosis:  Carpal tunnel syndrome of right wrist    H/O cervical spine surgery    Neck pain    Chronic bilateral low back pain without sciatica        Total time spent counseling greater than fifty percent of total  visit time.  Counseling included discussion regarding imaging findings, diagnosis possibilities, treatment options, risks and benefits.   The patient had many questions regarding the options and long-term effects.

## 2017-06-07 ENCOUNTER — TELEPHONE (OUTPATIENT)
Dept: CARDIOLOGY | Facility: CLINIC | Age: 62
End: 2017-06-07

## 2017-06-07 NOTE — TELEPHONE ENCOUNTER
Called pt to reschedule appointment per Dr Art discussion with pt. No answer. Left message advising pt that appointments are being rescheduled per his discussion with Dr. Romano number to office if pt needs to contact. Letters mailed out.

## 2017-06-12 RX ORDER — METOPROLOL SUCCINATE 100 MG/1
TABLET, EXTENDED RELEASE ORAL
Qty: 30 TABLET | Refills: 1 | Status: SHIPPED | OUTPATIENT
Start: 2017-06-12 | End: 2017-08-08 | Stop reason: SDUPTHER

## 2017-06-16 RX ORDER — TRAMADOL HYDROCHLORIDE 50 MG/1
50 TABLET ORAL 2 TIMES DAILY PRN
Qty: 60 TABLET | Refills: 0 | Status: ON HOLD | OUTPATIENT
Start: 2017-06-16 | End: 2017-12-04 | Stop reason: HOSPADM

## 2017-06-19 ENCOUNTER — TELEPHONE (OUTPATIENT)
Dept: PODIATRY | Facility: CLINIC | Age: 62
End: 2017-06-19

## 2017-06-19 NOTE — TELEPHONE ENCOUNTER
Called [facility] and they stated that they are unable to get Kerydin from any of their og[facility] so they are switching any of the orders that come in to [facility]ublia.

## 2017-06-19 NOTE — TELEPHONE ENCOUNTER
----- Message from Gayatri Hsu sent at 6/17/2017 11:52 AM CDT -----  [facility] in [place]    Calling in reference to a medication called kerydin, it is no longer available and would like to substitute Jublia.     Please call back at [phone]    Thank you.

## 2017-06-20 ENCOUNTER — HOSPITAL ENCOUNTER (EMERGENCY)
Facility: HOSPITAL | Age: 62
Discharge: HOME | End: 2017-06-20
Attending: EMERGENCY MEDICINE
Payer: COMMERCIAL

## 2017-06-20 VITALS
OXYGEN SATURATION: 96 % | RESPIRATION RATE: 16 BRPM | SYSTOLIC BLOOD PRESSURE: 164 MMHG | WEIGHT: 163 LBS | HEART RATE: 76 BPM | HEIGHT: 65 IN | DIASTOLIC BLOOD PRESSURE: 77 MMHG | BODY MASS INDEX: 27.16 KG/M2

## 2017-06-20 DIAGNOSIS — R10.13 EPIGASTRIC ABDOMINAL PAIN: Primary | ICD-10-CM

## 2017-06-20 PROCEDURE — 99283 EMERGENCY DEPT VISIT LOW MDM: CPT

## 2017-06-20 PROCEDURE — 25000003 PHARM REV CODE 250: Performed by: EMERGENCY MEDICINE

## 2017-06-20 RX ORDER — DICYCLOMINE HYDROCHLORIDE 20 MG/1
20 TABLET ORAL 2 TIMES DAILY
Qty: 20 TABLET | Refills: 0 | Status: SHIPPED | OUTPATIENT
Start: 2017-06-20 | End: 2017-07-20

## 2017-06-20 RX ORDER — SUCRALFATE 1 G/1
1 TABLET ORAL 4 TIMES DAILY
Qty: 20 TABLET | Refills: 0 | Status: SHIPPED | OUTPATIENT
Start: 2017-06-20 | End: 2017-08-02 | Stop reason: ALTCHOICE

## 2017-06-20 RX ADMIN — LIDOCAINE HYDROCHLORIDE: 20 SOLUTION ORAL; TOPICAL at 02:06

## 2017-06-20 NOTE — ED NOTES
Patient identifiers for Mane Ramires checked and correct.  LOC: Patient is awake, alert, and aware of environment with an appropriate affect. Patient is oriented x 3 and speaking appropriately.  APPEARANCE: Patient resting comfortably and in no acute distress. Patient is clean and well groomed, patient's clothing is properly fastened.  SKIN: The skin is warm and dry. Patient has normal skin turgor and moist mucus membrances. Skin is intact; no bruising or breakdown noted.  MUSCULOSKELETAL: Patient is moving all extremities well, no obvious deformities noted. Pulses intact.   RESPIRATORY: Airway is open and patent. Respirations are spontaneous and non-labored with normal effort and rate. Pt placed on continuous pulse ox.  CARDIAC: Patient has a normal rate and rhythm. No peripheral edema noted. Capillary refill < 3 seconds. Pt placed on continuous cardiac monitor.  ABDOMEN: Pt c/o bloating that started around 11pm. Pt c/o epigastric abd pain with tenderness across the upper abd. Pt reports similar episodes in the past similar. EKG obtained.  Bowel sounds active in all 4 quadrants.   NEUROLOGICAL: PERRL. Facial expression is symmetrical. Hand grasps are equal bilaterally. Normal sensation in all extremities when touched with finger.  Allergies reported:   Review of patient's allergies indicates:   Allergen Reactions    Sudafed [pseudoephedrine hcl] Other (See Comments)     Heart racing      Codeine Other (See Comments)     Heart racing    Hydrocodone Other (See Comments)     insomnia    Cortisone Palpitations     Bed locked, lowest position. Call light within easy reach. Side rails up x2.

## 2017-06-20 NOTE — ED PROVIDER NOTES
Encounter Date: 6/20/2017       History     Chief Complaint   Patient presents with    Abdominal Pain     pt c/o epigastric pain / swelling that began about 10:30pm      Review of patient's allergies indicates:   Allergen Reactions    Sudafed [pseudoephedrine hcl] Other (See Comments)     Heart racing      Codeine Other (See Comments)     Heart racing    Hydrocodone Other (See Comments)     insomnia    Cortisone Palpitations     HPI   Patient is a very pleasant 61-year-old man with a history of gender dysphoria who presents emergency department plenty of sudden onset of severe epigastric pain that occurred after eating a strawberry Sunday.  He had been having a mild pain earlier that became significantly worse after eating the Sunday.  His pain is constant, lasting for the past 3 hours without relief.  He states he has had several episodes of similar pain in the past, especially after eating cooked onions but this episode is more pronounced.  There is no radiation.  He took 2 Tums without relief.  Past Medical History:   Diagnosis Date    Accident     fell from roof with TBI (word finding issues personality changes, memory deficets), R rib fractures, clavicle fx    Allergy     ASD (atrial septal defect)     noted on ECHO 6/16    Cervical stenosis of spine     noted on 6/15 MRI    CTS (carpal tunnel syndrome)     mild-mod on 4/17 NCS    DDD (degenerative disc disease), cervical 10/2014    C5-6 and C6-C7    Dyslipidemia     Gender identity disorder     H/O cardiovascular stress test     12/14 normal    Heart murmur 05/26/2016    Herpes simplex type 2 infection     Hypertension     IGT (impaired glucose tolerance)     Myelomalacia     c-spine noted on 8/15 MRI    OA (osteoarthritis)     Prediabetes     TBI (traumatic brain injury)     after falling off a roof in 2003     Past Surgical History:   Procedure Laterality Date    AMPUTATION      L index finger    APPENDECTOMY  1969    BILATERAL  SALPINGOOPHORECTOMY  02/2015    CERVICAL FUSION  10/2014    C5-C6 and C6-C7    HYSTERECTOMY  1984    MIGUEL    MASTECTOMY  02/2015     Family History   Problem Relation Age of Onset    Diabetes Mother     Heart disease Mother     Coronary artery disease Father     Breast cancer Sister     Lung cancer Paternal Grandfather     Heart disease Paternal Grandfather     Heart disease Maternal Grandmother     Heart attack Brother      Social History   Substance Use Topics    Smoking status: Former Smoker    Smokeless tobacco: Never Used    Alcohol use Yes      Comment: rare      Review of Systems  REVIEW OF SYSTEMS  CONSTITUTIONAL: Negative for fever.  HEENT:  Negative for sore throat.   HEART:   Negative for chest pain..  LUNG:  Negative for shortness of breath.  ABDOMEN:  Negative for nausea. Positive for abdominal pain.  :  No discharge, dysuria  EXTREMITIES:  No swelling  NEURO:  Negative for weakness.   SKIN:  Negative for rash.  Psych: No depression  HEME: Does not bruise/bleed easily.           Physical Exam     Initial Vitals [06/20/17 0130]   BP Pulse Resp Temp SpO2   (!) 183/91 88 16 -- 100 %     Physical Exam  Physical Exam   Nursing note and vitals reviewed.   Constitutional: Oriented to person, place, and time. Appears well-developed and well-nourished.   HENT:   Head: Normocephalic and atraumatic.   Eyes: EOM are normal.   Neck: Normal range of motion. Neck supple.   Cardiovascular: Normal rate.   Pulmonary/Chest: Effort normal. Clear BS b/l   Abdominal: Soft, epigastric and left upper quadrant abdominal tenderness. no distension.  Bowel sounds present.  Negative Gil sign.    Musculoskeletal: Normal range of motion.   Neurological:Alert and oriented to person, place, and time.   Psychiatric: Normal mood and affect. Behavior is normal.         ED Course   Procedures  Labs Reviewed - No data to display  EKG Readings: (Independently Interpreted)   Normal sinus rhythm, 85 BPM, normal axis,  nonspecific ST changes, normal T waves, LVH          Medical Decision Making:   History:   Old Medical Records: I decided to obtain old medical records.  Initial Assessment:   Patient is a 61-year-old man who presents emergency department for evaluation of epigastric abdominal pain after eating an ice cream.  He has had several episodes like this in the past, usually with eating cooked onions.  He has a strong family history of lactose intolerance and patient really drinks milk products.  No chest pain or shortness of breath.  On examination he is tender in the epigastrium without Gil sign or periumbilical tenderness.  GI cocktail was provided patient with significant improvement in his symptoms from 11/10 to 4/10 in severity.  EKG does not show any ischemic changes and I do not think further assessment with troponin monitoring is warning.  I have low suspicion for ACS.  Patient would like to go home.  I think this is reasonable.  I have low suspicion for acute pancreatitis, acute cholecystitis or peritonitis.  Return precautions for recurrence or worsening symptoms discussed with patient.  He is discharge improved in no acute distress.                   ED Course     Clinical Impression:   The encounter diagnosis was Epigastric abdominal pain.          Mike Ortiz MD  06/20/17 0314

## 2017-06-22 ENCOUNTER — PATIENT MESSAGE (OUTPATIENT)
Dept: FAMILY MEDICINE | Facility: CLINIC | Age: 62
End: 2017-06-22

## 2017-06-22 ENCOUNTER — PATIENT MESSAGE (OUTPATIENT)
Dept: NEUROSURGERY | Facility: CLINIC | Age: 62
End: 2017-06-22

## 2017-06-26 RX ORDER — LISINOPRIL 40 MG/1
TABLET ORAL
Qty: 30 TABLET | Refills: 1 | Status: SHIPPED | OUTPATIENT
Start: 2017-06-26 | End: 2017-08-18 | Stop reason: SDUPTHER

## 2017-06-27 ENCOUNTER — TELEPHONE (OUTPATIENT)
Dept: NEUROSURGERY | Facility: CLINIC | Age: 62
End: 2017-06-27

## 2017-06-29 ENCOUNTER — TELEPHONE (OUTPATIENT)
Dept: FAMILY MEDICINE | Facility: CLINIC | Age: 62
End: 2017-06-29

## 2017-06-29 ENCOUNTER — OFFICE VISIT (OUTPATIENT)
Dept: FAMILY MEDICINE | Facility: CLINIC | Age: 62
End: 2017-06-29
Payer: COMMERCIAL

## 2017-06-29 VITALS
RESPIRATION RATE: 18 BRPM | SYSTOLIC BLOOD PRESSURE: 124 MMHG | OXYGEN SATURATION: 98 % | TEMPERATURE: 98 F | HEIGHT: 65 IN | DIASTOLIC BLOOD PRESSURE: 74 MMHG | WEIGHT: 163.13 LBS | BODY MASS INDEX: 27.18 KG/M2 | HEART RATE: 73 BPM

## 2017-06-29 DIAGNOSIS — R10.13 EPIGASTRIC PAIN: Primary | ICD-10-CM

## 2017-06-29 DIAGNOSIS — Z00.00 LABORATORY EXAM ORDERED AS PART OF ROUTINE GENERAL MEDICAL EXAMINATION: ICD-10-CM

## 2017-06-29 LAB
ALBUMIN SERPL BCP-MCNC: 4.2 G/DL
ALP SERPL-CCNC: 71 U/L
ALT SERPL W/O P-5'-P-CCNC: 13 U/L
ANION GAP SERPL CALC-SCNC: 10 MMOL/L
AST SERPL-CCNC: 15 U/L
BASOPHILS # BLD AUTO: 0.02 K/UL
BASOPHILS NFR BLD: 0.2 %
BILIRUB SERPL-MCNC: 0.6 MG/DL
BUN SERPL-MCNC: 20 MG/DL
CALCIUM SERPL-MCNC: 9.6 MG/DL
CHLORIDE SERPL-SCNC: 108 MMOL/L
CHOLEST/HDLC SERPL: 6.2 {RATIO}
CO2 SERPL-SCNC: 20 MMOL/L
CREAT SERPL-MCNC: 1.2 MG/DL
DIFFERENTIAL METHOD: ABNORMAL
EOSINOPHIL # BLD AUTO: 0.2 K/UL
EOSINOPHIL NFR BLD: 1.6 %
ERYTHROCYTE [DISTWIDTH] IN BLOOD BY AUTOMATED COUNT: 12.4 %
EST. GFR  (AFRICAN AMERICAN): >60 ML/MIN/1.73 M^2
EST. GFR  (NON AFRICAN AMERICAN): >60 ML/MIN/1.73 M^2
GLUCOSE SERPL-MCNC: 97 MG/DL
HCT VFR BLD AUTO: 48.1 %
HDL/CHOLESTEROL RATIO: 16.2 %
HDLC SERPL-MCNC: 272 MG/DL
HDLC SERPL-MCNC: 44 MG/DL
HGB BLD-MCNC: 16.5 G/DL
LDLC SERPL CALC-MCNC: 195.8 MG/DL
LYMPHOCYTES # BLD AUTO: 2.6 K/UL
LYMPHOCYTES NFR BLD: 26 %
MCH RBC QN AUTO: 31.3 PG
MCHC RBC AUTO-ENTMCNC: 34.3 %
MCV RBC AUTO: 91 FL
MONOCYTES # BLD AUTO: 0.4 K/UL
MONOCYTES NFR BLD: 4.4 %
NEUTROPHILS # BLD AUTO: 6.7 K/UL
NEUTROPHILS NFR BLD: 67.6 %
NONHDLC SERPL-MCNC: 228 MG/DL
PLATELET # BLD AUTO: 241 K/UL
PMV BLD AUTO: 10 FL
POTASSIUM SERPL-SCNC: 4.1 MMOL/L
PROT SERPL-MCNC: 7.7 G/DL
RBC # BLD AUTO: 5.27 M/UL
SODIUM SERPL-SCNC: 138 MMOL/L
TRIGL SERPL-MCNC: 161 MG/DL
TSH SERPL DL<=0.005 MIU/L-ACNC: 1.08 UIU/ML
WBC # BLD AUTO: 9.91 K/UL

## 2017-06-29 PROCEDURE — 80053 COMPREHEN METABOLIC PANEL: CPT

## 2017-06-29 PROCEDURE — 99214 OFFICE O/P EST MOD 30 MIN: CPT | Mod: S$GLB,,, | Performed by: NURSE PRACTITIONER

## 2017-06-29 PROCEDURE — 84443 ASSAY THYROID STIM HORMONE: CPT

## 2017-06-29 PROCEDURE — 36415 COLL VENOUS BLD VENIPUNCTURE: CPT | Mod: S$GLB,,, | Performed by: NURSE PRACTITIONER

## 2017-06-29 PROCEDURE — 80061 LIPID PANEL: CPT

## 2017-06-29 PROCEDURE — 83036 HEMOGLOBIN GLYCOSYLATED A1C: CPT

## 2017-06-29 PROCEDURE — 85025 COMPLETE CBC W/AUTO DIFF WBC: CPT

## 2017-06-29 RX ORDER — EFINACONAZOLE 100 MG/ML
SOLUTION TOPICAL
Refills: 10 | COMMUNITY
Start: 2017-06-19 | End: 2018-09-26

## 2017-06-29 NOTE — TELEPHONE ENCOUNTER
Pt would like to see Dr. Busby. Appt date, time, and location given. Pt verbalized understanding.

## 2017-06-29 NOTE — TELEPHONE ENCOUNTER
Pt is interested in proceeding with [facility] as previously discussed. He sent a [employer] message about this but it was at the very bottom and unsure if it was overlooked. Can you let him know how to proceed?

## 2017-06-29 NOTE — PROGRESS NOTES
Subjective:       Patient ID: Mane Ramires is a 61 y.o. male.    Chief Complaint: Follow-up (From ER on 6/20/2017)    The patient is here for an ER follow-up.  He went to the emergency room on 6/20/17 with severe epigastric pain.  This began after a meal.  The pain got significantly worse and he presented to the emergency room.  He has had episodes of this in the past but never found out with the root cause of the pain was.  He took 2 Tums without relief prior to the ED.  He did respond to the GI cocktail in the emergency room and was discharged to follow-up with us.  The patient did have an EKG without any ischemic changes and troponins were not done.  He tells me since that time he has not had any pain and is feeling well.  He was extremely nervous when this happened and is interested to find out why this keeps recurring.  To his knowledge he has never had an EGD.  He does not suffer with acid reflux on a regular basis.  He was given Carafate in the ER which she is taking.  ER records were reviewed.    The patient does have gender dysphoria.  He is due for routine blood work.      Review of Systems   Constitutional: Negative for activity change and appetite change.   HENT: Negative for congestion, postnasal drip, rhinorrhea and sinus pressure.    Eyes: Negative for pain and redness.   Respiratory: Negative for choking and chest tightness.    Gastrointestinal: Negative for abdominal distention, abdominal pain, blood in stool, constipation, diarrhea, nausea and vomiting.   Endocrine: Negative for polydipsia and polyphagia.   Genitourinary: Negative for dysuria and hematuria.   Musculoskeletal: Negative for arthralgias and myalgias.   Skin: Negative for color change and rash.   Neurological: Negative for dizziness and headaches.   Psychiatric/Behavioral: Negative for agitation and behavioral problems.       Objective:       Vitals:    06/29/17 0935   BP: 124/74   Pulse: 73   Resp: 18   Temp: 98.3 °F (36.8 °C)  "  TempSrc: Oral   SpO2: 98%   Weight: 74 kg (163 lb 2.3 oz)   Height: 5' 5" (1.651 m)   PainSc:   4   PainLoc: Back      IS THOUGHT  Physical Exam   Constitutional: He is oriented to person, place, and time. He appears well-developed and well-nourished. No distress.   HENT:   Head: Normocephalic and atraumatic.   Right Ear: Hearing, tympanic membrane, external ear and ear canal normal.   Left Ear: Hearing, tympanic membrane, external ear and ear canal normal.   Nose: Nose normal.   Mouth/Throat: Uvula is midline, oropharynx is clear and moist and mucous membranes are normal.   Eyes: Conjunctivae and EOM are normal. Pupils are equal, round, and reactive to light. Right eye exhibits no discharge. Left eye exhibits no discharge.   Neck: Trachea normal and normal range of motion. Neck supple. No JVD present. Carotid bruit is not present. No thyromegaly present.   Cardiovascular: Normal rate and regular rhythm.  Exam reveals no gallop and no friction rub.    Murmur heard.   Systolic murmur is present with a grade of 2/6   Pulmonary/Chest: Effort normal and breath sounds normal. No respiratory distress. He has no wheezes. He has no rales. He exhibits no tenderness.   Abdominal: Soft. Bowel sounds are normal. He exhibits no distension and no mass. There is no tenderness. There is no rebound and no guarding.   Musculoskeletal: Normal range of motion.   Neurological: He is alert and oriented to person, place, and time. Coordination normal.   Skin: Skin is warm and dry. He is not diaphoretic.   Psychiatric: He has a normal mood and affect. His behavior is normal. Judgment and thought content normal.       Assessment:       1. Epigastric pain    2. Laboratory exam ordered as part of routine general medical examination        Plan:       Mane was seen today for follow-up.    Diagnoses and all orders for this visit:    Epigastric pain  -     Case request GI: ESOPHAGOGASTRODUODENOSCOPY (EGD)    Laboratory exam ordered as part of " routine general medical examination  -     CBC auto differential  -     Comprehensive metabolic panel  -     Hemoglobin A1c  -     Lipid panel  -     TSH    Greater than 50% of the patient's 40 minute clinic visit was spent on reviewing ER records with patient, discussion on causes of epigastric pain and eduction on EGD.

## 2017-06-30 LAB
ESTIMATED AVG GLUCOSE: 126 MG/DL
HBA1C MFR BLD HPLC: 6 %

## 2017-07-01 ENCOUNTER — TELEPHONE (OUTPATIENT)
Dept: FAMILY MEDICINE | Facility: CLINIC | Age: 62
End: 2017-07-01

## 2017-07-01 DIAGNOSIS — E78.5 DYSLIPIDEMIA: Primary | ICD-10-CM

## 2017-07-02 NOTE — TELEPHONE ENCOUNTER
Labs show a significantly elevated cholesterol. We need to start a statin. Is he willing to start this?

## 2017-07-03 RX ORDER — ATORVASTATIN CALCIUM 40 MG/1
40 TABLET, FILM COATED ORAL DAILY
Qty: 90 TABLET | Refills: 3 | Status: SHIPPED | OUTPATIENT
Start: 2017-07-03 | End: 2017-08-23

## 2017-07-05 NOTE — TELEPHONE ENCOUNTER
Notified pt of rx sent to pharmacy and labs. Pt stated verbal understanding. Scheduled 3 month labs. Time and date confirmed with pt.

## 2017-07-05 NOTE — TELEPHONE ENCOUNTER
----- Message from Dina Rubio sent at 7/5/2017 10:43 AM CDT -----  Contact: Patient  Patient called advising he is returning the nurse's call.  Please call patient back at [phone].  Thank you!

## 2017-07-06 ENCOUNTER — PATIENT MESSAGE (OUTPATIENT)
Dept: FAMILY MEDICINE | Facility: CLINIC | Age: 62
End: 2017-07-06

## 2017-07-06 DIAGNOSIS — M54.41 ACUTE BILATERAL LOW BACK PAIN WITH RIGHT-SIDED SCIATICA: Primary | ICD-10-CM

## 2017-07-07 ENCOUNTER — PATIENT MESSAGE (OUTPATIENT)
Dept: FAMILY MEDICINE | Facility: CLINIC | Age: 62
End: 2017-07-07

## 2017-07-07 RX ORDER — LEVOCETIRIZINE DIHYDROCHLORIDE 5 MG/1
5 TABLET, FILM COATED ORAL NIGHTLY PRN
Qty: 30 TABLET | Refills: 9 | Status: SHIPPED | OUTPATIENT
Start: 2017-07-07 | End: 2018-07-31 | Stop reason: SDUPTHER

## 2017-07-11 ENCOUNTER — OFFICE VISIT (OUTPATIENT)
Dept: PSYCHIATRY | Facility: CLINIC | Age: 62
End: 2017-07-11
Payer: COMMERCIAL

## 2017-07-11 VITALS
HEART RATE: 73 BPM | SYSTOLIC BLOOD PRESSURE: 118 MMHG | HEIGHT: 65 IN | WEIGHT: 164 LBS | BODY MASS INDEX: 27.32 KG/M2 | DIASTOLIC BLOOD PRESSURE: 73 MMHG

## 2017-07-11 DIAGNOSIS — S06.9X0S TBI (TRAUMATIC BRAIN INJURY), WITHOUT LOC, SEQUELA: Primary | ICD-10-CM

## 2017-07-11 DIAGNOSIS — F41.9 ANXIETY DISORDER, UNSPECIFIED TYPE: ICD-10-CM

## 2017-07-11 PROCEDURE — 90792 PSYCH DIAG EVAL W/MED SRVCS: CPT | Mod: S$GLB,,, | Performed by: PSYCHIATRY & NEUROLOGY

## 2017-07-11 PROCEDURE — 99999 PR PBB SHADOW E&M-EST. PATIENT-LVL III: CPT | Mod: PBBFAC,,, | Performed by: PSYCHIATRY & NEUROLOGY
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
## 2017-07-11 NOTE — PATIENT INSTRUCTIONS
1. Set up follow up appointment with me in August.  2. Neuropsychological testing. Call [phone] and ask to speak with Kayleen Ramsay to schedule.   3. Recommend setting up appointment for therapy with Denys Pyle, Ruthy Mustafa, Dina Abarca, Christal Rodriguez, Omer Mcmullen, Jasmine Smith.

## 2017-07-13 ENCOUNTER — CLINICAL SUPPORT (OUTPATIENT)
Dept: REHABILITATION | Facility: HOSPITAL | Age: 62
End: 2017-07-13
Attending: FAMILY MEDICINE
Payer: COMMERCIAL

## 2017-07-13 DIAGNOSIS — M54.41 ACUTE BILATERAL LOW BACK PAIN WITH RIGHT-SIDED SCIATICA: Primary | ICD-10-CM

## 2017-07-13 PROCEDURE — 97110 THERAPEUTIC EXERCISES: CPT | Mod: PN | Performed by: PHYSICAL THERAPIST

## 2017-07-13 PROCEDURE — 97161 PT EVAL LOW COMPLEX 20 MIN: CPT | Mod: PN | Performed by: PHYSICAL THERAPIST

## 2017-07-13 PROCEDURE — 97014 ELECTRIC STIMULATION THERAPY: CPT | Mod: PN | Performed by: PHYSICAL THERAPIST

## 2017-07-13 PROCEDURE — 97010 HOT OR COLD PACKS THERAPY: CPT | Mod: PN | Performed by: PHYSICAL THERAPIST
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
## 2017-07-17 ENCOUNTER — OFFICE VISIT (OUTPATIENT)
Dept: PSYCHIATRY | Facility: CLINIC | Age: 62
End: 2017-07-17
Payer: COMMERCIAL

## 2017-07-17 DIAGNOSIS — F41.9 ANXIETY DISORDER, UNSPECIFIED TYPE: ICD-10-CM

## 2017-07-17 DIAGNOSIS — F41.9 ANXIETY: Primary | ICD-10-CM

## 2017-07-17 DIAGNOSIS — S06.9X0S TBI (TRAUMATIC BRAIN INJURY), WITHOUT LOC, SEQUELA: ICD-10-CM

## 2017-07-17 DIAGNOSIS — R41.89 COGNITIVE CHANGES: Primary | ICD-10-CM

## 2017-07-17 PROCEDURE — 90791 PSYCH DIAGNOSTIC EVALUATION: CPT | Mod: S$GLB,,, | Performed by: PSYCHOLOGIST

## 2017-07-17 PROCEDURE — 90791 PSYCH DIAGNOSTIC EVALUATION: CPT | Mod: S$GLB,,, | Performed by: SOCIAL WORKER

## 2017-07-17 PROCEDURE — 96119 PR NEUROPSYCH TESTING BY TECHNICIAN: CPT | Mod: 59,S$GLB,, | Performed by: PSYCHOLOGIST

## 2017-07-17 PROCEDURE — 96118 PR NEUROPSYCH TESTING BY PSYCH/PHYS: CPT | Mod: S$GLB,,, | Performed by: PSYCHOLOGIST

## 2017-07-17 NOTE — PROGRESS NOTES
Psychiatry Initial Visit (PhD/LCSW)  Diagnostic Interview - CPT 95939    Date: 7/17/2017     Site: [facility]    Referral source: Dr. Jimenes[facility] psychiat[facility]     Clinical status of patient: Outpatient    Mane Ramires, a 61 y.o. male, for initial evaluation visit.  Met with patient.    Chief complaint/reason for encounter: anxiety    History of present illness: 61 year old transgender male presents to the clinic today for initial visit with chief complaint of anxiety.  He is familiar to the clinic, as he is under the care of Dr. Jimenes in psychiatry.  He saw her for an initial visit on 7/11/17.  He has neuropsychological testing scheduled today with Dr. Duran in the clinic as well.  He has a history of TBI (fell off of a roof while working), chronic pain, and anxiety.  He is not being prescribed any psychotropic medications.  He reflects today on interpersonal stressors.  Reports that his relationship with his long time girlfriend recently ended.  They continue to live together.  She continues to be a good support to patient.  Patient notes constant friction with partner's daughter, which has led to frequent conflict with his partner over the past few years.  Patient has a difficult time in session reporting specific emotions and symptoms.  Describes himself as being anxious.  Endorses excessive anxiety, as well as obsessions and compulsions.  Reports difficulty paying attention and keeping train of thought.  Has pain management issues; feels it is controlled relatively well today.  Reports that it has been difficult for him to adjust to life, following the traumatic brain injury he sustained in 2003.  Had initial short term memory loss, which has since improved.  Had difficulty functioning afterwards.  Was more irritable.  He is currently unemployed.  He wants to be tested for Asperger's-reports socialization has been a consistent problem for him.  Was formerly in the .  Patient moved to  [place] shortly after [place].  Spoke briefly with patient's daughter and his ex-girlfriend at the end of session.  Patient's daughter inquires about the possibility of connecting patient with a support group for transgendered men.  She feels patient has had a difficult time coping with gender dysphoria.  Patient would be willing to attend a support group.  Sw to explore options for this.      Pain: 3    Symptoms:   · Mood: denied  · Anxiety: excessive anxiety, restlessness/keyed up, obsessions and compulsions  · Substance abuse: denied  · Cognitive functioning: traumatic brain injury    · Health behaviors: noncontributory    Psychiatric history: currently under psychiatric care    Medical history: sustained traumatic brain injury     Family history of psychiatric illness: three siblings-all have history of substance abuse; mother-prescribed Valium      Social history (marriage, employment, etc.): Patient lives with ex-girlfriend and his ex-girlfriend's two adult children.  Once .  Two daughters (40, 38); one son (34).  Patient's parents are .  Patient has three siblings.  High school graduate.  Honorable discharge-[employer].  Patient is currently unemployed.  Patient denies history of physical/sexual abuse.      Substance use:   Alcohol: infrequent   Drugs: none   Tobacco: none   Caffeine: coffee     Current medications and drug reactions (include OTC, herbal): see medication list     Strengths and liabilities: Strength: Patient accepts guidance/feedback, Strength: Patient is expressive/articulate., Strength: Patient is motivated for change., Strength: Patient has positive support network., Liability: Patient lacks coping skills.    Current Evaluation:     Mental Status Exam:  General Appearance:  unremarkable, age appropriate   Speech: normal tone, normal rate, normal pitch, normal volume      Level of Cooperation: cooperative      Thought Processes: normal and logical   Mood: anxious       Thought Content: normal, no suicidality, no homicidality, delusions, or paranoia   Affect: appropriate   Orientation: Oriented x3   Memory: recent >  intact, remote >  intact   Attention Span & Concentration: intact   Fund of General Knowledge: intact and appropriate to age and level of education   Abstract Reasoning: not assessed    Judgment & Insight: fair     Language  intact     Diagnostic Impression - Plan:       ICD-10-CM ICD-9-CM   1. Anxiety F41.9 300.00       Plan:individual psychotherapy and Neuropsychological Testing    Return to Clinic: as scheduled    Length of Service (minutes): 45

## 2017-07-18 ENCOUNTER — CLINICAL SUPPORT (OUTPATIENT)
Dept: REHABILITATION | Facility: HOSPITAL | Age: 62
End: 2017-07-18
Attending: FAMILY MEDICINE
Payer: COMMERCIAL

## 2017-07-18 DIAGNOSIS — M54.41 ACUTE BILATERAL LOW BACK PAIN WITH RIGHT-SIDED SCIATICA: ICD-10-CM

## 2017-07-18 PROCEDURE — 97014 ELECTRIC STIMULATION THERAPY: CPT | Mod: PN

## 2017-07-18 PROCEDURE — 97110 THERAPEUTIC EXERCISES: CPT | Mod: PN

## 2017-07-18 PROCEDURE — 97010 HOT OR COLD PACKS THERAPY: CPT | Mod: PN

## 2017-07-18 RX ORDER — PREGABALIN 100 MG/1
CAPSULE ORAL
Qty: 60 CAPSULE | Refills: 1 | Status: SHIPPED | OUTPATIENT
Start: 2017-07-18 | End: 2017-11-20 | Stop reason: SDUPTHER

## 2017-07-18 NOTE — PROGRESS NOTES
"Name: Mane Dodd [facility] Number: [id number]  Date of Treatment: 07/18/2017   Diagnosis:   Encounter Diagnosis   Name Primary?    Acute bilateral low back pain with right-sided sciatica        Time in: 1505  Time Out: 1614  Total Treatment Time: 69    Subjective:    Mane reports pain in R LE hip add and abd, as well as SI jt.  Patient reports their pain to be 2/10 on a 0-10 scale with 0 being no pain and 10 being the worst pain imaginable.    Objective  Mane received therapeutic exercises to develop strength, endurance, ROM, flexibility, posture and core stabilization for 54 minutes including:   Bike 10'  Calf stretch 3/30" B  L hamstring stretch 3/30s   Piriformis stretch 3/30s R/L  Pelvic tilt 3/10  ITB stretch 3/30 R  SKTC 3/30 R    MHP and IFC x 15' to low back to decrease pain and increase blood flow    Written Home Exercises Provided: Cont with HEP  Pt demo good understanding of the education provided. Mane demonstrated good return demonstration of activities.     Assessment:   Patient tolerated therex without increase in discomfort, stretches provide relief.    Pt will continue to benefit from skilled PT intervention. Medical Necessity is demonstrated by:  Fall Risk, Pain limits function of effected part for some activities, Requires skilled supervision to complete and progress HEP and Weakness.    Patient is making good progress towards established goals.    Plan:  Continue with established Plan of Care towards PT goals.   "

## 2017-07-20 ENCOUNTER — CLINICAL SUPPORT (OUTPATIENT)
Dept: REHABILITATION | Facility: HOSPITAL | Age: 62
End: 2017-07-20
Attending: FAMILY MEDICINE
Payer: COMMERCIAL

## 2017-07-20 DIAGNOSIS — M54.41 ACUTE BILATERAL LOW BACK PAIN WITH RIGHT-SIDED SCIATICA: ICD-10-CM

## 2017-07-20 PROCEDURE — 97010 HOT OR COLD PACKS THERAPY: CPT | Mod: PN

## 2017-07-20 PROCEDURE — 97110 THERAPEUTIC EXERCISES: CPT | Mod: PN

## 2017-07-20 NOTE — PATIENT INSTRUCTIONS
Isometric Abdominal        Lying on back with knees bent, tighten stomach by pressing elbows down. Hold ____ seconds.  Repeat ____ times per set. Do ____ sets per session. Do ____ sessions per day.     [email or web address]     Copyright © [employer]. All rights reserved.   Strengthening: Hip Abductor - Resisted        With band looped around both legs above knees, push thighs apart.  Repeat ____ times per set. Do ____ sets per session. Do ____ sessions per day.     [email or web address]     Copyright © [employer]. All rights reserved.   Strengthening: Hip Adduction - Isometric        With ball or folded pillow between knees, squeeze knees together. Hold ____ seconds.  Repeat ____ times per set. Do ____ sets per session. Do ____ sessions per day.     [email or web address]     Copyright © [employer]. All rights reserved.   Pelvic Tilt        Flatten back by tightening stomach muscles and buttocks.  Repeat ____ times per set. Do ____ sets per session. Do ____ sessions per day.     [email or web address]     Copyright © [employer]. All rights reserved.

## 2017-07-20 NOTE — PROGRESS NOTES
Name: Mane Dodd [facility] Number: [id number]  Date of Treatment: 07/20/2017   Diagnosis: No diagnosis found.    Time in: 1550  Time Out: 1725  Total Treatment Time: 70    Subjective:    Mane reports L LB aching, R foot. Reports B feet plantar fasciitis is flared.  Patient reports their pain to be 5/10 on a 0-10 scale with 0 being no pain and 10 being the worst pain imaginable. Asking for another copy of HEP.     Objective    Patient received individual therapy with one other pt to increase strength, endurance, ROM, flexibility, posture and core stabilization with activities as follows:     Mane received therapeutic exercises to develop strength, endurance, ROM, flexibility, posture and core stabilization for 55 minutes including:     NuStep L3 10' LE's only  Seated hamstring stretches 3/30s L/R  Standing gastroc stretches 3/30s B over 1/2 foam roll in // bars  Standing ITB stretches 3/30s L/R  Supine hooklying PPT 10/3  Supine hooklying hip aBd with strap isometric 10/3  Supine hooklying hip aDd with ball 10/3  Supine piriformis stretches 3/30s L/R    MHP to LB in supine hooklying x 15' after being cleared of contraindications for pain purposes.     Written and pictorial HEP of ex's in EPIC reviewed and issued to pt. Pt instructed to perform HEP daily and stop if symptoms increase.     Pt demo good understanding of the education provided. Mane demonstrated good return demonstration of activities.     Assessment:     Good response to ex's with decreased discomfort reported to 2/10 after session. Motivated and eager to progress with HEP.     Pt will continue to benefit from skilled PT intervention. Medical Necessity is demonstrated by:  Requires skilled supervision to complete and progress HEP and Weakness.    Patient is making good progress towards established goals.    Plan:    Continue with established Plan of Care towards PT goals.

## 2017-07-21 ENCOUNTER — TELEPHONE (OUTPATIENT)
Dept: PSYCHIATRY | Facility: CLINIC | Age: 62
End: 2017-07-21

## 2017-07-21 NOTE — PROGRESS NOTES
Psychiatry Initial Visit (PhD/LCSW)    Date: 7/17/2017    CPT Code: 98041    Referred by: Dipika Jimenes M.D.    Chief complaint/reason for encounter:  Neuropsychological Evaluation    Clinical status of patient:  Outpatient    Met with:  Patient, biological daughter, ex-partner    History of present illness: Mr. Yousif Ramires is a 61 year old white  transgender male referred for Neuropsychological Evaluation on an outpatient basis due to subjective memory decline.  He reported that he fell off a house in 2003 and sustained a head injury. There was no loss of consciousness. He was hospitalized overnight. He thinks that his memory was impaired due to that accident. His ex-partner reported that he only had brief cognitive impairment just after the accident for a day at most and that was due to medication. She reported that his memory declined three years ago and has gotten worse this past year. His daughter could not comment on the head injury as she was not around, but she agreed that his memory has recently declined. He reported he cannot recall if he had breakfast. His ex-partner reported he does not recall what he is told and does not recall what he ha done during the day. They reported his memory is worse if he is overwhelmed. His daughter also reported he gets quite angry when he does not remember things. He has not been violent. Upon direct questioning, they also reported that he misplaces personal items in the home; forgets conversations and events; repeats himself; gets confused about what day it is; has to use a phone rohan to remind himself of things; forgets what he reads; has left food cooking on the stove; has difficulty operating the TV remote and other electronics at when in an unfamiliar home; gets lost when driving; loses his train of thought in conversation; and reports word-finding difficulty (not observed).  They reported it seems to be getting worse over time. Regarding a precipitant,  they reported he and his ex-partner split up 3 years ago and it was difficult for him. It was not his idea. Family history is significant for with great-aunt memory problems/dementia. Mr. Ramires has a long history of anxiety. He and his family report this has also gotten worse over time. He also wonders if he has Asbergers. He never sought psychiatric help until last week. There was also a question about ADHD. He was seen initially by Dr. Jimenes and will continue to see her for psychiatric care. He was cooperative in interview but appeared anxious, irritable, and somewhat dependent. He tended to look to his ex-partner to provide historical information.    Pain scale: noncontributory    Symptoms:  Mood: denied  Anxiety:  excessive anxiety, worry  Substance abuse: denied  Cognitive functioning:  subjective memory decline    Psychiatric history: none prior to this episode of care    Medical history: cognitive changes, TBI without LOC, gender dysphoria, cervicalgia, OA, chronic right-sided low back pain, HTN, impaired glucose tolerance, and dyslipidemia. No brain imaging was available.    Family history of psychiatric illness: daughter with anxiety, son with Asbergers    Social history (marriage, employment, etc.): High school graduate. 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  once for 12 years. 3 children as a result of that marriage. Had a partner for 24 years. Became transgender. She had two children and they adopted another child. They  3 years ago but he still lives in the guest room of his ex-partners home with her two daughters. Does housework, visits family.       Substance use:   Alcohol: occasional   Drugs: no   Tobacco: no   Caffeine: 1 ½ cups coffee per day    Strengths and Liabilities:   Strength:  Pt has supportive daughter and ex-partner.   Liability:  Pt has subjective memory loss.    Mental Status  Exam:  General appearance:  appears stated age, neatly dressed, red face  Speech:  normal rate and tone  Level of cooperation:  cooperative  Thought processes:  logical, goal-directed  Mood:  anxious, irritable  Thought content:  no illusions, no visual hallucinations, no auditory hallucinations, no delusions, no active or passive homicidal thoughts, no active or passive suicidal ideation, no obsessions, no compulsions, no violence  Affect:  blunted  Orientation:  oriented to person and place, but off three days on date   Memory:  Recent memory:  2 of 3 objects after brief delay.    Remote memory - intact  Attention span and concentration:  spelled HOUSE forward and backwards  Fund of general knowledge: 4 of 4 recent presidents  Abstract reasoning:    Similarities: concrete   Proverbs: abstract.  Judgment and insight: fair  Language:  intact    Diagnostic impressions:   Cognitive changes R41.89  TBI without LOC, sequela S06.9X0S  Anxiety disorder F41.9          Plan:  Pt will complete Neuropsychological testing.  Report of Neuropsychological Evaluation will follow. It can be accessed through the Chart Review activity in Epic under the Notes tab.  It will be titled Psych Testing.  He will continue to see Dr. Jimenes for psychiatric care    Return to clinic:  as scheduled    Length of time:  50 minutes

## 2017-07-21 NOTE — PSYCH TESTING
[facility] [address] [place]  [address]  [phone]    REPORT OF NEUROPSYCHOLOGICAL EVALUATION    NAME: Mane Ramires  OC #: [id number]  : 1955    REFERRED BY: Dipika Jimenes M.D.    EVALUATED BY:  Denita Duran, Ph.D., Clinical Psychologist  Les Box M.S., Psychometrician    DATE OF EVALUATION: 2017    EVALUATION PROCEDURES AND TIME:  Conducted by Psychologist:  Interpretation and report of test data  Review and integration of diagnostic interview, medical record, and test data  Conducted by Technician:  Repeatable Battery for the Assessment of Neuropsychological Status (RBANS)  Temporal Orientation Test  Naming Test from the Neuropsychological Assessment Battery (NAB)  Controlled Oral Word Association Test  Facial Recognition Test  Burger Visual Motor Gestalt Test  Wechsler Memory Scale IV Logical Memory & Visual Reproduction Battery (WMS-IV LMVR)  Wisconsin Card Sorting Test - 64 (WCST-64)  Trail Making Test, Parts A & B  Echavarria Depression Inventory - II (BDI-II)  Echavarria Anxiety Inventory (ISABELLA)  Time: CPT Code 01882 - 2 hours; CPT Code 50376 - 1 hour    EVALUATION FINDINGS:  The diagnostic interview revealed Mr. Mane Ramires is a 61 year old right-handed white transgender male referred for Neuropsychological Evaluation on an outpatient basis due to subjective memory decline.  He reported that he fell off a house in  and sustained a head injury. There was no loss of consciousness. He was hospitalized overnight. He thinks that his memory was impaired due to that accident. His ex-partner reported that he only had brief cognitive impairment just after the accident for a day at most and that was due to medication. She reported that his memory declined three years ago and has gotten worse this past year. His daughter could not comment on the head injury as she was not around, but she agreed that his memory has recently declined. He reported he cannot recall  if he had breakfast. His ex-partner reported he does not recall what he is told and does not recall what he has done during the day. They reported his memory is worse if he is overwhelmed. His daughter also reported he gets quite angry when he does not remember things. He has not been violent. Upon direct questioning, they also reported that he misplaces personal items in the home; forgets conversations and events; repeats himself; gets confused about what day it is; has to use a phone rohan to remind himself of things; forgets what he reads; has left food cooking on the stove; has difficulty operating the TV remote and other electronics at when in an unfamiliar home; gets lost when driving; loses his train of thought in conversation; and reports word-finding difficulty (not observed).  They reported it seems to be getting worse over time. Regarding a precipitant, they reported he and his ex-partner split up 3 years ago and it was difficult for him. It was not his idea. Family history is significant for with great-aunt with memory problems/dementia. Mr. Ramires has a long history of anxiety. He and his family report this has also gotten worse over time. He also wonders if he has Asbergers. He never sought psychiatric help until last week. There was also a question about ADHD. He was seen initially by Dr. Jimenes and will continue to see her for psychiatric care. He was cooperative in interview but appeared anxious, irritable, and somewhat dependent. He tended to look to his ex-partner to provide historical information. The Mental Status Exam suggested reduced temporal orientation, recent memory, and abstraction ability.    The medical record also revealed prior medical history of cognitive changes, TBI without LOC, gender dysphoria, cervicalgia, OA, chronic right-sided low back pain, HTN, impaired glucose tolerance, and dyslipidemia. No brain imaging was available.      TEST DATA:  Neuropsychological tests  administered by the technician revealed that the patient reported he completed 1 year of college. 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 He was alert and cooperative during the evaluation.  Effort on all tests was satisfactory to produce valid results.    Mr. Ramires obtained a total score of 100 on the RBANS, which is at the 50th percentile, suggesting average general cognitive functioning.  Five areas were tested.  The Immediate Memory Index revealed average ability to remember verbal information immediately after it is presented, with a score of 90 at the 25th percentile.  The Visuospatial/Constructional Index revealed low average ability to perceive spatial relations and to construct a spatially accurate copy of a drawing, with a score of 89 at the 23rd percentile. Visuospatial perception was mildly impaired. Constructional ability was average. The Language Index revealed average ability to respond verbally to either naming or retrieving learned material, with a score of 90 at the 25th percentile. Naming was average. Verbal fluency was mildly impaired. Attention, or the capacity to remember and manipulate both visually and orally presented information in short-term storage, was superior, with a score of 125 at the 95th percentile. Delayed memory, or anterograde memory capacity, was average, with a score of 108 at the 70th percentile. Subtest performances revealed superior figure recall and average list recall, list recognition, and story recall. For reference, the expected average score on each index is 100, which is at the 50th percentile.    The Temporal Orientation Test indicated he was oriented to day of the week, month, year, and time of day but not to day of the month (3 days off).  His score on this measure suggested borderline temporal orientation.    Naming, as assessed by the NAB Test, was average.  Verbal fluency, as  assessed by the Controlled Oral Word Association Test, was in the average range.    Performance on the Facial Recognition Test suggested no prosopagnosia was present, as his score was in the average range.  Reproductions of Burger Visual Motor Gestalt Test designs revealed mild constructional dyspraxia.    The WMS-IV LMVR was administered to further assess memory.  His Immediate Memory Index of 102 was in the average range and his Delayed Memory Index of 88 was in the low average range. His Auditory Memory Index of 98 and his Visual Memory Index of 95 were in the average range. The expected average score for each index is 100. Scaled scores of the memory indexes revealed average immediate and delayed auditory and visual memory.    The WCST-64 was administered to assess abstract reasoning and conceptualization.  His categories completed score (4) was in the average range. His total errors score (14) was in the average range and his perseverative errors score (11) was in the below average range.  His performance suggested average abstract reasoning skills.    His score on the Trail Making Test, Part A, (29 seconds for completion) indicated that psychomotor speed was in the average range.  His score on Part B (37 seconds for completion) indicated that his ability to handle complex relationships which require rapid change of set, or mental flexibility, was in the high average range.    The BDI-II was administered to assess for depression.  His score of 28 suggested moderate depression was present.  The ISABELLA was administered to assess for anxiety.  His score of 25 suggested moderate anxiety was present.    SUMMARY AND RECOMMENDATIONS:  Mr. Ramires was referred for Neuropsychological Evaluation on an outpatient basis due to subjective memory decline.  His general cognitive abilities as assessed by the RBANS were in the average range, with superior attention; average immediate verbal memory, language, and delayed memory;  and low average visuospatial/constructional abilities.  Further assessment of specific cognitive abilities revealed no deficits in naming, verbal fluency, facial recognition, abstract reasoning, psychomotor speed, and mental flexibility. Temporal orientation was borderline. Constructional ability was mildly impaired. Additional memory assessment revealed average immediate and delayed auditory and visual memory.  Personality test data suggested the presence of moderate depression and anxiety.  Neuropsychological test results are within normal limits with the exception of mild impairment in visuospatial perception, variability in construction ability and verbal fluency (average and mildly impaired performances in both areas), and moderate depression and anxiety. Overall memory test performances were primarily in the average range. Attention was superior. The mild impairment and variability present do not rise to the level of dementia or mild cognitive impairment. His emotional state likely contributes to his subjective experience of impaired memory. Reassurance that his memory appears unimpaired may be helpful. He will continue to see Dr. Jimenes for psychiatric care.        Interpretation and report and coding were completed on 7/21/2017.

## 2017-07-25 ENCOUNTER — CLINICAL SUPPORT (OUTPATIENT)
Dept: REHABILITATION | Facility: HOSPITAL | Age: 62
End: 2017-07-25
Attending: FAMILY MEDICINE
Payer: COMMERCIAL

## 2017-07-25 DIAGNOSIS — M54.41 ACUTE BILATERAL LOW BACK PAIN WITH RIGHT-SIDED SCIATICA: ICD-10-CM

## 2017-07-25 PROCEDURE — 97010 HOT OR COLD PACKS THERAPY: CPT | Mod: PN

## 2017-07-25 PROCEDURE — 97110 THERAPEUTIC EXERCISES: CPT | Mod: PN

## 2017-07-25 NOTE — PROGRESS NOTES
"Name: Mane Dodd [facility] Number: [id number]  Date of Treatment: 07/25/2017   Diagnosis:   Encounter Diagnosis   Name Primary?    Acute bilateral low back pain with right-sided sciatica        Time in: 1305   Time Out: 1405  Total Treatment Time: 60        Subjective:    Mane reports no significant change, remains with cramps in his R LE.  Patient reports their pain to be 4/10 on a 0-10 scale with 0 being no pain and 10 being the worst pain imaginable.    Objective  Mane received therapeutic exercises to develop strength, endurance, ROM, flexibility, posture and core stabilization for 45 minutes including:   Bike 10'  Calf stretch 3/30" B  L hamstring stretch 3/30s   Piriformis stretch 3/30s R/L  Pelvic tilt 3/10  ITB stretch 3/30 R supine with strap  SKTC 3/30 R  R hip aDD stretches 3/30s R     MHP x 15' to low back to decrease pain and increase blood flow    Written Home Exercises Provided: Cont with stretches at home  Pt demo good understanding of the education provided. Mane demonstrated good return demonstration of activities.     Assessment:   Patient tolerated therex with min amt of cramps in R LE interfering with therex, after stretching affected area at the time patient was able to continue on.    Pt will continue to benefit from skilled PT intervention. Medical Necessity is demonstrated by:  Pain limits function of effected part for some activities, Requires skilled supervision to complete and progress HEP and Weakness.    Patient is making fair progress towards established goals.    Plan:  Continue with established Plan of Care towards PT goals.   "

## 2017-07-27 ENCOUNTER — CLINICAL SUPPORT (OUTPATIENT)
Dept: REHABILITATION | Facility: HOSPITAL | Age: 62
End: 2017-07-27
Attending: FAMILY MEDICINE
Payer: COMMERCIAL

## 2017-07-27 DIAGNOSIS — M54.41 ACUTE BILATERAL LOW BACK PAIN WITH RIGHT-SIDED SCIATICA: ICD-10-CM

## 2017-07-27 PROCEDURE — 97110 THERAPEUTIC EXERCISES: CPT | Mod: PN

## 2017-07-27 PROCEDURE — 97010 HOT OR COLD PACKS THERAPY: CPT | Mod: PN

## 2017-07-27 NOTE — PROGRESS NOTES
"Name: Mane Dodd [facility] Number: [id number]  Date of Treatment: 07/27/2017   Diagnosis:   Encounter Diagnosis   Name Primary?    Acute bilateral low back pain with right-sided sciatica         Time in: 1202  Time Out: 1310  Total Treatment Time: 68      Subjective:    Mane reports "I keep having the cramps in my R leg, down into the front of my calf."  Patient reports their pain to be 5/10 on a 0-10 scale with 0 being no pain and 10 being the worst pain imaginable.    Objective  Mane received therapeutic exercises to develop strength, endurance, ROM, flexibility, posture and core stabilization for 53 minutes including:   Bike 10'  Positional distraction s/l L 5'  Calf stretch 3/30" B  L hamstring stretch 3/30s   Piriformis stretch 3/30s R/L  Pelvic tilt 3/10  ITB stretch 3/30 R supine with strap  SKTC 3/30 R  R hip aDD stretches 3/30s R     MHP x 15' to low back to decrease pain and increase blood flow    Written Home Exercises Provided: Cont with current HEP, add positional distraction when feeling pain radiating down leg.  Patient given copy of how to do, with written instructions on why due to short term memory deficits.  Pt demo good understanding of the education provided. Mane demonstrated good return demonstration of activities.     Assessment:   Patient remains having significant pain in low back, with cramping in R LE.  Patient is knowledgeable in HEP, experienced relief with positional distration.    Pt will continue to benefit from skilled PT intervention. Medical Necessity is demonstrated by:  Fall Risk, Continued inability to participate in vocational pursuits, Pain limits function of effected part for some activities, Unable to participate fully in daily activities, Requires skilled supervision to complete and progress HEP and Weakness.    Patient is making good progress towards established goals.    Plan:  Continue with established Plan of Care towards PT goals.   "

## 2017-08-01 ENCOUNTER — CLINICAL SUPPORT (OUTPATIENT)
Dept: REHABILITATION | Facility: HOSPITAL | Age: 62
End: 2017-08-01
Attending: FAMILY MEDICINE
Payer: COMMERCIAL

## 2017-08-01 DIAGNOSIS — M54.41 ACUTE BILATERAL LOW BACK PAIN WITH RIGHT-SIDED SCIATICA: ICD-10-CM

## 2017-08-01 PROCEDURE — 97010 HOT OR COLD PACKS THERAPY: CPT | Mod: PN

## 2017-08-01 PROCEDURE — 97110 THERAPEUTIC EXERCISES: CPT | Mod: PN

## 2017-08-01 NOTE — PROGRESS NOTES
"Name: Mane Dodd [facility] Number: [id number]  Date of Treatment: 08/01/2017   Diagnosis:   Encounter Diagnosis   Name Primary?    Acute bilateral low back pain with right-sided sciatica        Time in: 1300  Time Out: 1340  Total Treatment Time: 40      Subjective:    Mane reports increased pain, reports performing HEP at home.  Patient reports their pain to be 4/10 on a 0-10 scale with 0 being no pain and 10 being the worst pain imaginable.    Objective  Mane received therapeutic exercises to develop strength, endurance, ROM and flexibility for 20 minutes including:   Bike 6' having to stop due to increased pain, and feeling flush  BP: 153/86 HR 82  CP x 10' to low back to decrease pain and increase blood flow  BP: 138/85 HR 77  Calf stretch 3/30" B  R/L hamstring stretch 3/30s   Piriformis stretch 3/30s R/L      Written Home Exercises Provided: Cont with HEP  Pt demo good understanding of the education provided. Mane demonstrated good return demonstration of activities.     Assessment:   Patient with decreased tolerance of therex today, increase BP, face bright red, feeling flush.    Pt will continue to benefit from skilled PT intervention. Medical Necessity is demonstrated by:  Fall Risk, Continued inability to participate in vocational pursuits, Pain limits function of effected part for some activities, Requires skilled supervision to complete and progress HEP and Weakness.    Patient is making good progress towards established goals.    Plan:  Continue with established Plan of Care towards PT goals.   "

## 2017-08-02 ENCOUNTER — PATIENT MESSAGE (OUTPATIENT)
Dept: ADMINISTRATIVE | Facility: OTHER | Age: 62
End: 2017-08-02

## 2017-08-02 ENCOUNTER — PATIENT MESSAGE (OUTPATIENT)
Dept: PSYCHIATRY | Facility: CLINIC | Age: 62
End: 2017-08-02

## 2017-08-02 ENCOUNTER — OFFICE VISIT (OUTPATIENT)
Dept: RHEUMATOLOGY | Facility: CLINIC | Age: 62
End: 2017-08-02
Payer: COMMERCIAL

## 2017-08-02 VITALS
DIASTOLIC BLOOD PRESSURE: 78 MMHG | WEIGHT: 160.94 LBS | BODY MASS INDEX: 26.81 KG/M2 | HEART RATE: 68 BPM | HEIGHT: 65 IN | SYSTOLIC BLOOD PRESSURE: 130 MMHG

## 2017-08-02 DIAGNOSIS — M15.9 PRIMARY OSTEOARTHRITIS INVOLVING MULTIPLE JOINTS: ICD-10-CM

## 2017-08-02 DIAGNOSIS — M54.40 CHRONIC RIGHT-SIDED LOW BACK PAIN WITH SCIATICA, SCIATICA LATERALITY UNSPECIFIED: Primary | ICD-10-CM

## 2017-08-02 DIAGNOSIS — Z79.1 NSAID LONG-TERM USE: ICD-10-CM

## 2017-08-02 DIAGNOSIS — G89.29 CHRONIC RIGHT-SIDED LOW BACK PAIN WITH SCIATICA, SCIATICA LATERALITY UNSPECIFIED: Primary | ICD-10-CM

## 2017-08-02 PROCEDURE — 99999 PR PBB SHADOW E&M-EST. PATIENT-LVL III: CPT | Mod: PBBFAC,,, | Performed by: INTERNAL MEDICINE

## 2017-08-02 PROCEDURE — 99213 OFFICE O/P EST LOW 20 MIN: CPT | Mod: S$GLB,,, | Performed by: INTERNAL MEDICINE

## 2017-08-02 ASSESSMENT — ROUTINE ASSESSMENT OF PATIENT INDEX DATA (RAPID3)
PSYCHOLOGICAL DISTRESS SCORE: 4.4
PAIN SCORE: 8.5
TOTAL RAPID3 SCORE: 6.28
PATIENT GLOBAL ASSESSMENT SCORE: 5
MDHAQ FUNCTION SCORE: 1.6

## 2017-08-02 NOTE — PROGRESS NOTES
"Subjective:       Patient ID: Mane Ramires is a 61 y.o. male.    Chief Complaint: Osteoarthritis and DDD  HPI61 yo male with Gender identity disorder who SRS in 1984, 2015 is seen in consultation for joint pain. He saw Dr Kaufman in 2014- diagnosed with OA.   Today, he complains of pain in lower back radiating down to legs for which he has an apt tomorrow with NS. Today, he grades the pain as 8.5/10, continuous,no joint swelling.            Review of Systems   HENT: Negative for ear discharge and ear pain.    Eyes: Negative for pain and redness.   Respiratory: Negative for cough and shortness of breath.    Cardiovascular: Negative for chest pain and palpitations.   Gastrointestinal: Negative for abdominal distention and abdominal pain.   Genitourinary: Negative for genital sores and hematuria.   Musculoskeletal: Positive for back pain. Negative for joint swelling.   Neurological: Negative for tremors and seizures.         Objective:     /78 (BP Location: Right arm, Patient Position: Sitting, BP Method: Automatic)   Pulse 68   Ht 5' 5" (1.651 m)   Wt 73 kg (160 lb 15 oz)   BMI 26.78 kg/m²      Physical Exam   Constitutional: He is oriented to person, place, and time and well-developed, well-nourished, and in no distress.   HENT:   Head: Normocephalic and atraumatic.   Eyes: Conjunctivae and EOM are normal. Pupils are equal, round, and reactive to light.   Neck: Neck supple. No tracheal deviation present. No thyromegaly present.   Cardiovascular: Normal rate and regular rhythm.    Pulmonary/Chest: Effort normal and breath sounds normal.   Abdominal: Soft. Bowel sounds are normal.   Neurological: He is alert and oriented to person, place, and time.   Skin: Skin is warm and dry.     Psychiatric: Memory and affect normal.   Musculoskeletal: He exhibits no edema.     No joint swelling              Lab Results   Component Value Date    WBC 9.91 06/29/2017    HGB 16.5 06/29/2017    HCT 48.1 06/29/2017    MCV " 91 06/29/2017     06/29/2017     CMP  Sodium   Date Value Ref Range Status   06/29/2017 138 136 - 145 mmol/L Final     Potassium   Date Value Ref Range Status   06/29/2017 4.1 3.5 - 5.1 mmol/L Final     Chloride   Date Value Ref Range Status   06/29/2017 108 95 - 110 mmol/L Final     CO2   Date Value Ref Range Status   06/29/2017 20 (L) 23 - 29 mmol/L Final     Glucose   Date Value Ref Range Status   06/29/2017 97 70 - 110 mg/dL Final     BUN, Bld   Date Value Ref Range Status   06/29/2017 20 6 - 20 mg/dL Final     Creatinine   Date Value Ref Range Status   06/29/2017 1.2 0.5 - 1.4 mg/dL Final     Calcium   Date Value Ref Range Status   06/29/2017 9.6 8.7 - 10.5 mg/dL Final     Total Protein   Date Value Ref Range Status   06/29/2017 7.7 6.0 - 8.4 g/dL Final     Albumin   Date Value Ref Range Status   06/29/2017 4.2 3.5 - 5.2 g/dL Final     Total Bilirubin   Date Value Ref Range Status   06/29/2017 0.6 0.1 - 1.0 mg/dL Final     Comment:     For infants and newborns, interpretation of results should be based  on gestational age, weight and in agreement with clinical  observations.  Premature Infant recommended reference ranges:  Up to 24 hours.............<8.0 mg/dL  Up to 48 hours............<12.0 mg/dL  3-5 days..................<15.0 mg/dL  6-29 days.................<15.0 mg/dL       Alkaline Phosphatase   Date Value Ref Range Status   06/29/2017 71 55 - 135 U/L Final     AST   Date Value Ref Range Status   06/29/2017 15 10 - 40 U/L Final     ALT   Date Value Ref Range Status   06/29/2017 13 10 - 44 U/L Final     Anion Gap   Date Value Ref Range Status   06/29/2017 10 8 - 16 mmol/L Final     eGFR if    Date Value Ref Range Status   06/29/2017 >60.0 >60 mL/min/1.73 m^2 Final     eGFR if non    Date Value Ref Range Status   06/29/2017 >60.0 >60 mL/min/1.73 m^2 Final     Comment:     Calculation used to obtain the estimated glomerular filtration  rate (eGFR) is the CKD-EPI  equation. Since race is unknown   in our information system, the eGFR values for   -American and Non--American patients are given   for each creatinine result.       Lab Results   Component Value Date    SEDRATE 1 08/18/2016     Lab Results   Component Value Date    CRP 1.3 08/18/2016         Assessment:   Generalized OA  Long term use of NSAIDS  DDD-NS apt on 8/3/17  HTN  Plan:   DC NSAIDS because of HTN  Continue tramadol as prescribed  Follow up with NS for ddd on 8/3/17  Patient educated about OA  RTC 6 months

## 2017-08-03 ENCOUNTER — CLINICAL SUPPORT (OUTPATIENT)
Dept: REHABILITATION | Facility: HOSPITAL | Age: 62
End: 2017-08-03
Attending: FAMILY MEDICINE
Payer: COMMERCIAL

## 2017-08-03 ENCOUNTER — OFFICE VISIT (OUTPATIENT)
Dept: NEUROSURGERY | Facility: CLINIC | Age: 62
End: 2017-08-03
Payer: COMMERCIAL

## 2017-08-03 ENCOUNTER — PATIENT MESSAGE (OUTPATIENT)
Dept: ADMINISTRATIVE | Facility: OTHER | Age: 62
End: 2017-08-03

## 2017-08-03 VITALS
HEART RATE: 73 BPM | WEIGHT: 158.31 LBS | SYSTOLIC BLOOD PRESSURE: 134 MMHG | HEIGHT: 65 IN | DIASTOLIC BLOOD PRESSURE: 83 MMHG | BODY MASS INDEX: 26.38 KG/M2

## 2017-08-03 DIAGNOSIS — M54.2 CERVICALGIA: Primary | ICD-10-CM

## 2017-08-03 DIAGNOSIS — M54.41 ACUTE BILATERAL LOW BACK PAIN WITH RIGHT-SIDED SCIATICA: ICD-10-CM

## 2017-08-03 DIAGNOSIS — G56.03 BILATERAL CARPAL TUNNEL SYNDROME: ICD-10-CM

## 2017-08-03 DIAGNOSIS — M47.22 OSTEOARTHRITIS OF SPINE WITH RADICULOPATHY, CERVICAL REGION: ICD-10-CM

## 2017-08-03 DIAGNOSIS — F41.9 ANXIETY DISORDER, UNSPECIFIED TYPE: ICD-10-CM

## 2017-08-03 PROCEDURE — 3075F SYST BP GE 130 - 139MM HG: CPT | Mod: S$GLB,,, | Performed by: NEUROLOGICAL SURGERY

## 2017-08-03 PROCEDURE — 97140 MANUAL THERAPY 1/> REGIONS: CPT | Mod: PN | Performed by: PHYSICAL THERAPIST

## 2017-08-03 PROCEDURE — 3079F DIAST BP 80-89 MM HG: CPT | Mod: S$GLB,,, | Performed by: NEUROLOGICAL SURGERY

## 2017-08-03 PROCEDURE — 97010 HOT OR COLD PACKS THERAPY: CPT | Mod: PN | Performed by: PHYSICAL THERAPIST

## 2017-08-03 PROCEDURE — 97110 THERAPEUTIC EXERCISES: CPT | Mod: PN | Performed by: PHYSICAL THERAPIST

## 2017-08-03 PROCEDURE — 99214 OFFICE O/P EST MOD 30 MIN: CPT | Mod: S$GLB,,, | Performed by: NEUROLOGICAL SURGERY

## 2017-08-03 PROCEDURE — 3008F BODY MASS INDEX DOCD: CPT | Mod: S$GLB,,, | Performed by: NEUROLOGICAL SURGERY

## 2017-08-03 NOTE — PROGRESS NOTES
Name: Mane Dodd [facility] Number: [id number]  Date of Treatment: 08/03/2017   Diagnosis:   Encounter Diagnosis   Name Primary?    Acute bilateral low back pain with right-sided sciatica        Time in: 1200  Time Out: 110  Total Treatment Time: 70  Group Time: 0      Subjective:    Mane reports he is feeling much better compared to last session. Feels the new statin medication he was prescribed was causing his blood pressure to fluctuate severely.  Patient reports their pain to be 5/10 on a 0-10 scale with 0 being no pain and 10 being the worst pain imaginable.    Objective    Patient received individual therapy to increase strength, ROM, flexibility and posture with 0 patients for 25 min with activities as follows:     Mane received therapeutic exercises to develop strength, ROM, flexibility and posture for 45 minutes including:     Nustep x 10 min, L1  HSS, 3x30s  Piriformis st in sitting, 3x30s  SKTC to opp shoulder, 3x30s   ITB st, RLE with strap, 3x30s   PPT, 30x5s      Mane received the following manual therapy techniques: Soft tissue Mobilization were applied to the: R piriformis  for 10 minutes.     The patient received MHP to R lumbar paraspinals/buttocks x 15 min    Assessment:   When pt walked into clinic today, he was unable to perform heel strike with the RLE. Upon exiting, he was demonstrating normalized gait pattern  With BLE.     Pt will continue to benefit from skilled PT intervention. Medical Necessity is demonstrated by:  Pain limits function of effected part for some activities, Unable to participate fully in daily activities, Requires skilled supervision to complete and progress HEP and Weakness.    Patient is making good progress towards established goals.    New/Revised goals: NA      Plan:  Continue with established Plan of Care towards PT goals.

## 2017-08-07 ENCOUNTER — PATIENT OUTREACH (OUTPATIENT)
Dept: OTHER | Facility: OTHER | Age: 62
End: 2017-08-07

## 2017-08-07 NOTE — TELEPHONE ENCOUNTER
HTN Digital Medicine Program Medication Reconciliation Outreach    Called patient to introduce him/her into the HDMP. Reviewed program details including blood pressure goals, technique for taking readings (timing, cuff placement, body positioning, iHealth rohan), and what to do in case of emergency. Introduced patient to members of care team (health , clinician, and responsible provider). Verified patient's understanding of [facility] MyChart rohan use for contacting clinical team and to ensure that iHealth cuff readings continue to transmit by logging in once every 2 weeks. Confirmation text sent and patient received.  Takes all BP medications in PM. Has been suffering with plantar fasciitis, sciatic nerve pain lately. Once he has relief from his pain issues, he plans on going back to the gym.  Dr. Gordon recently increased his Metoprolol dose to 100mg try to assist with his elevated BP r/t his recent break up.  Pt states that he is concerned about fatigue and bradycardia.    Screening Questionnaire Review:  1. Depression - yes  2. Sleep apnea - not indicated     Depression screening results suggest patient is having depressive symptoms. Patient is being followed and is not interested in referral. Recently went through a breakup and that has been affecting his mood. Has spoken to his MD about it and feels he is being managed properly.    Verified the following information with the patient:  1. Medication list  Current Outpatient Prescriptions on File Prior to Visit   Medication Sig Dispense Refill    amlodipine (NORVASC) 5 MG tablet Take 1 tablet (5 mg total) by mouth once daily. 30 tablet 2    JUBLIA 10 % Usha APPLY 1 DROP TO ALL SMALLER TOES AND 2 DROPS TO GREATER TOES DAILY  10    levocetirizine (XYZAL) 5 MG tablet TAKE 1 TABLET (5 MG TOTAL) BY MOUTH NIGHTLY AS NEEDED FOR ALLERGIES. 30 tablet 9    lisinopril (PRINIVIL,ZESTRIL) 40 MG tablet TAKE 1 TABLET (40 MG TOTAL) BY MOUTH ONCE DAILY. 30 tablet 1    LYRICA  100 mg capsule TAKE ONE CAPSULE BY MOUTH TWICE A DAY 60 capsule 1    metoprolol succinate (TOPROL-XL) 100 MG 24 hr tablet TAKE 1 TABLET (100 MG TOTAL) BY MOUTH ONCE DAILY. 30 tablet 1    testosterone cypionate (DEPOTESTOTERONE CYPIONATE) 200 mg/mL injection INJECT 1ML INTO THE MUSCLE EVERY 14 DAYS  5    tramadol (ULTRAM) 50 mg tablet Take 1 tablet (50 mg total) by mouth 2 (two) times daily as needed. 60 tablet 0    atorvastatin (LIPITOR) 40 MG tablet Take 1 tablet (40 mg total) by mouth once daily. 90 tablet 3     No current facility-administered medications on file prior to visit.        Previous ADRs    2. Medication compliance: Pt has not been taking Atorvastatin for last 3 days because of sinus infection and it was leaving a bad taste in his mouth.       3. Medication Allergies:   Review of patient's allergies indicates:   Allergen Reactions    Sudafed [pseudoephedrine hcl] Other (See Comments)     Heart racing      Codeine Other (See Comments)     Heart racing    Hydrocodone Other (See Comments)     insomnia    Cortisone Palpitations       Explained that our goal is to get his BP consistently below 140/90mmHg.  Patient denies having further questions, concerns. BP is at goal.       Last 5 Patient Entered [facility] Current 30 Day Average: 122/75     Recent Readings 8/5/2017 8/4/2017 8/3/2017    Systolic BP (mmHg) 112 133 121    Diastolic BP (mmHg) 73 75 78    Pulse 57 69 66

## 2017-08-07 NOTE — LETTER
Luna Powell, Yessenia  [address] [place] [address]     Dear Mane Ramires,    Welcome to the [facility] Digital Medicine Program!         My name is Luna Powell PharmD and I am your dedicated Digital Medicine clinician.  As an expert in medication management, I will help ensure that the medications you are taking continue to provide you with the intended benefits.      I am Nichole Hernandez and I will be your health  for the duration of the program.  My  job is to help you identify lifestyle changes to improve your blood pressure control.  We will talk about nutrition, exercise, and other ways that you may be able to adjust your current habits to better your health. Together, we will work to improve your overall health and encourage you to meet your goals for a healthier lifestyle.    What we expect from YOU:    You will need to take blood pressure readings multiple times a week and no less than one reading per week.   It is important that you take your measurements at different times during the day, when possible.     What you should expect from your Digital Medicine Care Team:   We will provide you with education about high blood pressure, including lifestyle changes that could help you to control your blood pressure.   We will review your weekly readings and provide you with monthly blood pressure progress reports after you have been in the program for more than 30 days.   We will send monthly progress reports on your blood pressure control to your physician so they can follow along with your progress as well.    You will be able to reach me by phone at [phone] or through your [facility] account by clicking my name under Care Team on the right side of the home screen.    I look forward to working with you to achieve your blood pressure goals!    Sincerely,    Luna Powell PharmD  Your personal clinician    Please visit  [email or web address] to learn more about high blood pressure and what you can do lower your blood pressure.                                                                                           Mane Dodd [address] [address]

## 2017-08-08 ENCOUNTER — PATIENT OUTREACH (OUTPATIENT)
Dept: OTHER | Facility: OTHER | Age: 62
End: 2017-08-08

## 2017-08-08 ENCOUNTER — PATIENT OUTREACH (OUTPATIENT)
Dept: ADMINISTRATIVE | Facility: HOSPITAL | Age: 62
End: 2017-08-08

## 2017-08-08 ENCOUNTER — CLINICAL SUPPORT (OUTPATIENT)
Dept: REHABILITATION | Facility: HOSPITAL | Age: 62
End: 2017-08-08
Attending: FAMILY MEDICINE
Payer: COMMERCIAL

## 2017-08-08 DIAGNOSIS — M54.41 ACUTE BILATERAL LOW BACK PAIN WITH RIGHT-SIDED SCIATICA: ICD-10-CM

## 2017-08-08 DIAGNOSIS — I10 ESSENTIAL HYPERTENSION: Primary | ICD-10-CM

## 2017-08-08 PROCEDURE — 97110 THERAPEUTIC EXERCISES: CPT | Mod: PN

## 2017-08-08 RX ORDER — METOPROLOL SUCCINATE 50 MG/1
50 TABLET, EXTENDED RELEASE ORAL DAILY
Qty: 90 TABLET | Refills: 3 | Status: SHIPPED | OUTPATIENT
Start: 2017-08-08 | End: 2017-10-16 | Stop reason: SDUPTHER

## 2017-08-08 NOTE — PROGRESS NOTES
"Name: Mane Dodd [facility] Number: [id number]  Date of Treatment: 08/08/2017   Diagnosis:   Encounter Diagnosis   Name Primary?    Acute bilateral low back pain with right-sided sciatica        Time in: 1305  Time Out: 1415  Total Treatment Time: 70        Subjective:    Mane reports "Some days my symptoms and pain are better than others."  Patient reports their pain to be 4/10 on a 0-10 scale with 0 being no pain and 10 being the worst pain imaginable.    Objective  Mane received therapeutic exercises to develop strength, endurance, ROM, flexibility, posture and core stabilization for 55 minutes including:    Bike x 10 min, L3   HSS, 3x30s   Piriformis st in sitting, 3x30s   SKTC to opp shoulder, 3x30s   ITB st, RLE with strap, 3x30s   PPT, 30x5s   Calf stretch, 3x30s   DKTC 3/10   LTR 3/10    The patient received MHP to R lumbar paraspinals/buttocks x 15 min    Written Home Exercises Provided: Cont with HEP  Pt demo good understanding of the education provided. Mane demonstrated good return demonstration of activities.     Assessment:   Patient with slowly increasing mobility.    Pt will continue to benefit from skilled PT intervention. Medical Necessity is demonstrated by:  Pain limits function of effected part for some activities, Unable to participate fully in daily activities, Requires skilled supervision to complete and progress HEP and Weakness.    Patient is making good progress towards established goals.    Plan:  Continue with established Plan of Care towards PT goals.   "

## 2017-08-08 NOTE — PROGRESS NOTES
Last 5 Patient Entered [facility] Current 30 Day Average: 118/74     Recent Readings 8/7/2017 8/6/2017 8/5/2017 8/4/2017 8/3/2017    Systolic BP (mmHg) 122 103 112 133 121    Diastolic BP (mmHg) 74 70 73 75 78    Pulse 63 73 57 69 66        Called Mr. Ramires to introduce him into the Hypertension Digital Medicine Program.     Mr. Ramires's BP is well controlled. He has complaints of feeling tired since increasing metoprolol dose to 100 mg QD. He is also concerned about HR that gets below 60. He said the dose was increased in response to stressful break up, but he feels he can go back to lower dose. Will reduce metoprolol dose to 50 mg today and follow up in 3-4 weeks.     Reviewed patient's medications and verified allergies on file.   Hypertension Medications             amlodipine (NORVASC) 5 MG tablet Take 1 tablet (5 mg total) by mouth once daily.    lisinopril (PRINIVIL,ZESTRIL) 40 MG tablet TAKE 1 TABLET (40 MG TOTAL) BY MOUTH ONCE DAILY.    metoprolol succinate (TOPROL-XL) 50 MG 24 hr tablet Take 1 tablet (50 mg total) by mouth once daily.          Explained that we expect him to obtain several blood pressures/week at random times of day. Also asked that the BP be taken at least 1 hour after taking BP medications.     Explained that our goal is to get his BP to consistently below 140/90mmHg.     Patient and I agreed that the patient will take his BP daily to every other day at varying times of the day.     Emailed patient link to [facility] HTN webpage as well as my direct phone number in case he has in questions.

## 2017-08-09 RX ORDER — METOPROLOL SUCCINATE 100 MG/1
TABLET, EXTENDED RELEASE ORAL
Qty: 30 TABLET | Refills: 1 | OUTPATIENT
Start: 2017-08-09

## 2017-08-09 NOTE — TELEPHONE ENCOUNTER
Spoke to pt and he stated he was lowered to 50 mg metoprolol.  Informed pt that dose was sent to pharm yesterday.   He stated he will check with the pharmacy.

## 2017-08-10 ENCOUNTER — CLINICAL SUPPORT (OUTPATIENT)
Dept: REHABILITATION | Facility: HOSPITAL | Age: 62
End: 2017-08-10
Attending: FAMILY MEDICINE
Payer: COMMERCIAL

## 2017-08-10 ENCOUNTER — PATIENT OUTREACH (OUTPATIENT)
Dept: OTHER | Facility: OTHER | Age: 62
End: 2017-08-10

## 2017-08-10 DIAGNOSIS — M54.41 ACUTE BILATERAL LOW BACK PAIN WITH RIGHT-SIDED SCIATICA: ICD-10-CM

## 2017-08-10 PROCEDURE — 97110 THERAPEUTIC EXERCISES: CPT | Mod: PN

## 2017-08-10 NOTE — PROGRESS NOTES
"Name: Mane Dodd [facility] Number: [id number]  Date of Treatment: 08/10/2017   Diagnosis:   Encounter Diagnosis   Name Primary?    Acute bilateral low back pain with right-sided sciatica        Time in: 1115  Time Out: 1200  Total Treatment Time: 45    Subjective:    Mane reports "weather is probably affecting my back".  Patient reports their pain to be 7/10 on a 0-10 scale with 0 being no pain and 10 being the worst pain imaginable.    Objective:    /74, HR 70    Patient received individual therapy to increase strength, endurance, ROM, flexibility, posture and core stabilization with activities as follows:     Mane received therapeutic exercises to develop strength, endurance, ROM, flexibility, posture and core stabilization for 45 minutes including:     NuStep L3 10' LE's only  Seated hamstring stretches 3/30s L/R  Seated piriformis stretches 3/30s L/R  Standing gastroc stretches 3/30s B over 1/2 foam roll in // bars  Supine PPT 30  Supine bridges 10/3 B  Supine DKC with swiss ball 10/3  Supine LTR with swiss ball 10/3  Seated neural flossing L/R LE 30 reps without chin tuck 2* discomfort at neck.     Continue HEP daily.    Pt demo good understanding of the education provided. Mane demonstrated good return demonstration of activities.     Assessment:     Neck discomfort with neural flossing, so modified for comfort. Slow and guarded movements.     Pt will continue to benefit from skilled PT intervention. Medical Necessity is demonstrated by:  Requires skilled supervision to complete and progress HEP and Weakness.    Patient is making good progress towards established goals.    Plan:    Continue with established Plan of Care towards PT goals.   "

## 2017-08-10 NOTE — PROGRESS NOTES
Last 5 Patient Entered [facility] Current 30 Day Average: 117/74     Recent Readings 8/9/2017 8/8/2017 8/7/2017 8/6/2017 8/5/2017    Systolic BP (mmHg) 111 120 122 103 112    Diastolic BP (mmHg) 71 76 74 70 73    Pulse 61 68 63 73 57        Initial introduction completed with the pt and the role of the health  was explained.   We discussed the following information:  Exercise - little right now d/t extreme osteoarthritis and plantar fasciitis. PT 2 x week.   Diet - already follows low sodium diet, eats at home. Heart patient in their house as well. No alcohol/caffeine. Limits fried foods.    Resources on diet and exercise were sent.     Pt aware that I am not available for emergencies and to call 911 or [facility] on call if one arises.  Pt aware of the importance of medication adherence.  Pt aware of the importance of diet and exercise.  Pt aware that his sodium intake should be no more than 2000mg per day.  Pt aware that the recommended physical activity each week should be about 30 minutes per day at least 5 times per week.   Pt aware of the importance of taking BP readings at least weekly if not more and during different times each day.  Pt aware that the health  can be used as a resource for lifestyle modifications to help reduce or maintain a healthy BP

## 2017-08-15 ENCOUNTER — OFFICE VISIT (OUTPATIENT)
Dept: PSYCHIATRY | Facility: CLINIC | Age: 62
End: 2017-08-15
Payer: COMMERCIAL

## 2017-08-15 DIAGNOSIS — F41.9 ANXIETY: ICD-10-CM

## 2017-08-15 DIAGNOSIS — F32.A DEPRESSION, UNSPECIFIED DEPRESSION TYPE: Primary | ICD-10-CM

## 2017-08-15 PROCEDURE — 90834 PSYTX W PT 45 MINUTES: CPT | Mod: S$GLB,,, | Performed by: SOCIAL WORKER

## 2017-08-15 NOTE — PROGRESS NOTES
"Individual Psychotherapy (PhD/LCSW)    8/15/2017    Site:  [facility]         Therapeutic Intervention: Met with patient.  Outpatient - Insight oriented psychotherapy 45 min - CPT code 49205 and Outpatient - Supportive psychotherapy 45 min - CPT Code 63087    Chief complaint/reason for encounter: depression and anxiety     Interval history and content of current session: Patient returned to the clinic today for follow up appointment.  He is tearful intermittently today.  Reports feelings of depression and anxiety.  Has an upcoming appointment in the clinic with Dr. Jimenes.  Has been having passive wishes to die.  He denies suicidal ideation today.  "I would never do that to my daughter"-per patient.  Reports ongoing conflict with his ex-girlfriend.  He continues to live in her home.  Reports that she frequently over drinks (alcohol), which leads to conflict. Patient has applied for disability.  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  Discussed the importance of life balance and self-care with patient.  Discussed effective forms of communication with patient.  Recommended the BMU-IOP to patient.  He reports he is amenable to the referral.  Furnished patient with contact information for the BMU.         Treatment plan:  · Target symptoms: depression, anxiety   · Why chosen therapy is appropriate versus another modality: relevant to diagnosis, patient responds to this modality  · Outcome monitoring methods: self-report, observation  · Therapeutic intervention type: insight oriented psychotherapy, supportive psychotherapy    Risk parameters:  Patient reports no suicidal ideation  Patient reports no homicidal ideation  Patient reports no self-injurious behavior  Patient reports no violent behavior    Verbal deficits: None    Patient's response to intervention:  The patient's response to intervention is accepting.    Progress toward goals and other mental status changes:  The " patient's progress toward goals is limited.    Diagnosis:     ICD-10-CM ICD-9-CM   1. Depression, unspecified depression type F32.9 311   2. Anxiety F41.9 300.00       Plan:  individual psychotherapy, medication management by physician and BMU    Return to clinic: as scheduled    Length of Service (minutes): 45

## 2017-08-16 ENCOUNTER — CLINICAL SUPPORT (OUTPATIENT)
Dept: REHABILITATION | Facility: HOSPITAL | Age: 62
End: 2017-08-16
Attending: FAMILY MEDICINE
Payer: COMMERCIAL

## 2017-08-16 DIAGNOSIS — M54.41 ACUTE BILATERAL LOW BACK PAIN WITH RIGHT-SIDED SCIATICA: ICD-10-CM

## 2017-08-16 PROCEDURE — 97110 THERAPEUTIC EXERCISES: CPT | Mod: PN | Performed by: PHYSICAL THERAPIST

## 2017-08-16 PROCEDURE — 97010 HOT OR COLD PACKS THERAPY: CPT | Mod: PN | Performed by: PHYSICAL THERAPIST

## 2017-08-16 PROCEDURE — 97140 MANUAL THERAPY 1/> REGIONS: CPT | Mod: PN | Performed by: PHYSICAL THERAPIST

## 2017-08-16 PROCEDURE — 97014 ELECTRIC STIMULATION THERAPY: CPT | Mod: PN | Performed by: PHYSICAL THERAPIST

## 2017-08-16 NOTE — PROGRESS NOTES
"Name: Mane Dodd [facility] Number: [id number]  Date of Treatment: 08/16/2017   Diagnosis:   Encounter Diagnosis   Name Primary?    Acute bilateral low back pain with right-sided sciatica        Time in: 1200  Time Out: 103  Total Treatment Time: 63  Group Time: 0      Subjective:    Mane reports he is having a lot of pain in the "pelvic area" (points to SIJs).  Patient reports their pain to be 7/10 on a 0-10 scale with 0 being no pain and 10 being the worst pain imaginable. He states that his RLE is feeling better.    Objective    Patient received individual therapy to increase strength, ROM, flexibility and core stabilization with 0 patients with activities as follows:     Mane received therapeutic exercises to develop strength, ROM, flexibility and core stabilization for 33 minutes including:     Nustep, L3, 10 min  HSS, 3x30s  Piriformis st, 3x30s  Single knee to opp shoulder, 3x30s  DKTC stretch, 3x30s  PPT, [id number] hold      Mane received the following manual therapy techniques: L and R post innominate rotations, grade III,  were applied  for 12 minutes.     The patient received MHP and IFC to L/S paraspinals x 15 to reduce pain       Assessment:   Pt demonstrated improved posture and gait mechanics following manual therapy techniques today. Pt advised to perform PPT each night to improve core stability and for self mobilization to decrease pain.     Pt will continue to benefit from skilled PT intervention. Medical Necessity is demonstrated by:  Pain limits function of effected part for some activities, Unable to participate fully in daily activities, Requires skilled supervision to complete and progress HEP and Weakness.    Patient is making good progress towards established goals.    New/Revised goals: NA      Plan:  Continue with established Plan of Care towards PT goals.   "

## 2017-08-17 ENCOUNTER — CLINICAL SUPPORT (OUTPATIENT)
Dept: REHABILITATION | Facility: HOSPITAL | Age: 62
End: 2017-08-17
Attending: FAMILY MEDICINE
Payer: COMMERCIAL

## 2017-08-17 DIAGNOSIS — M54.41 ACUTE BILATERAL LOW BACK PAIN WITH RIGHT-SIDED SCIATICA: ICD-10-CM

## 2017-08-17 PROCEDURE — 97110 THERAPEUTIC EXERCISES: CPT | Mod: PN | Performed by: PHYSICAL THERAPIST

## 2017-08-17 PROCEDURE — 97014 ELECTRIC STIMULATION THERAPY: CPT | Mod: PN | Performed by: PHYSICAL THERAPIST

## 2017-08-17 PROCEDURE — 97140 MANUAL THERAPY 1/> REGIONS: CPT | Mod: PN | Performed by: PHYSICAL THERAPIST

## 2017-08-17 PROCEDURE — 97010 HOT OR COLD PACKS THERAPY: CPT | Mod: PN | Performed by: PHYSICAL THERAPIST

## 2017-08-17 NOTE — PROGRESS NOTES
"Name: Mane Dodd [facility] Number: [id number]  Date of Treatment: 08/17/2017   Diagnosis:   Encounter Diagnosis   Name Primary?    Acute bilateral low back pain with right-sided sciatica        Time in: 1000  Time Out: 1103  Total Treatment Time: 63  Group Time: 0      Subjective:    Mane reports improvement of symptoms as compared to yesterday, stating, "I'm having a really good day today".  Patient reports their pain to be 3/10 on a 0-10 scale with 0 being no pain and 10 being the worst pain imaginable.    Objective    Patient received individual therapy to increase strength, ROM, flexibility and core stabilization with 0 patients with activities as follows:     Mane received therapeutic exercises to develop strength, ROM, flexibility and core stabilization for 40 minutes including:     Bike, L4, 10 min  HSS, 3x30s  Piriformis st, 3x30s  PPT, [id number]  Gastroc st, 3x30s  Single knee to opp shoulder stretch for piriformis, 3x30s  DKTC stretch, 3x30s      Mane received the following manual therapy techniques: L/R post innominate rotations, grade III, and L iliacus scouring were applied  for 8 minutes.     The patient received IFC and MHP to the lumbar paraspinals x 15 min in hooklying.    Assessment:   Pt with more effortless, fluid mobility noted throughout session today. Pt did c/o dizziness in L and R sidelying today. I have encouraged him to contact his PCP if he feels he is having increased vertigo symptoms.     Pt will continue to benefit from skilled PT intervention. Medical Necessity is demonstrated by:  Pain limits function of effected part for some activities, Unable to participate fully in daily activities and Weakness.    Patient is making good progress towards established goals.    New/Revised goals: NA      Plan:  Continue with established Plan of Care towards PT goals.   "

## 2017-08-18 RX ORDER — LISINOPRIL 40 MG/1
TABLET ORAL
Qty: 30 TABLET | Refills: 1 | Status: SHIPPED | OUTPATIENT
Start: 2017-08-18 | End: 2017-10-15 | Stop reason: SDUPTHER

## 2017-08-22 ENCOUNTER — CLINICAL SUPPORT (OUTPATIENT)
Dept: REHABILITATION | Facility: HOSPITAL | Age: 62
End: 2017-08-22
Attending: FAMILY MEDICINE
Payer: COMMERCIAL

## 2017-08-22 DIAGNOSIS — M54.41 ACUTE BILATERAL LOW BACK PAIN WITH RIGHT-SIDED SCIATICA: ICD-10-CM

## 2017-08-22 PROBLEM — G56.00 CARPAL TUNNEL SYNDROME: Status: ACTIVE | Noted: 2017-08-22

## 2017-08-22 PROCEDURE — 97110 THERAPEUTIC EXERCISES: CPT | Mod: PN | Performed by: PHYSICAL THERAPIST

## 2017-08-22 PROCEDURE — 97140 MANUAL THERAPY 1/> REGIONS: CPT | Mod: PN | Performed by: PHYSICAL THERAPIST

## 2017-08-22 PROCEDURE — 97010 HOT OR COLD PACKS THERAPY: CPT | Mod: PN | Performed by: PHYSICAL THERAPIST

## 2017-08-22 PROCEDURE — 97014 ELECTRIC STIMULATION THERAPY: CPT | Mod: PN | Performed by: PHYSICAL THERAPIST

## 2017-08-22 NOTE — PLAN OF CARE
TIME RECORD    Date: 08/22/2017    Start Time:  300  Stop Time:  405    PROCEDURES:    TIMED  Procedure Time Min.    Start:  Stop:     Start:  Stop:     Start:  Stop:     Start:  Stop:          UNTIMED  Procedure Time Min.    Start:  Stop:     Start:  Stop:      Total Timed Minutes:  55  Total Timed Units:  3  Total Untimed Units:  1  Charges Billed/# of units:  4    PHYSICAL THERAPY UPDATED PLAN OF TREATMENT    Patient name: Mane Ramires  Onset Date:  chronic  SOC Date:  7/13/17  Primary Diagnosis:    1. Acute bilateral low back pain with right-sided sciatica       Treatment Diagnosis:  LBP with RLE pain  Certification Period:  8/24/17 to 9/21/17  Precautions:  H/o TBI, HTN, HS II  Visits from SOC:  12  Functional Level Prior to SOC:  independent    Updated Assessment:    Subjective:   Pt reports his RLE pain is much improved, but continues to have low back pain. Pt states some days with the back are better than others. He does feel like he is improving with PT and would like to continue. Rates his back pain today at 7/10.    Objective:  AROM Lumbar spine: flex 40*, ext 10*, RSB 35*, LSB 30*    Strength Right Left  Hip flex  4+/5 4+/5  Hip ext  4+/5 4+/5  Hip abd 4+/5 5/5  Hip add 4+/5 5/5  Knee flex 4+/5 4+/5  Knee ext 4+/5 5/5    Hypomobility noted in posterior pelvic rotation bilaterally.     Revised Oswestry LBP Questionnaire: 38/50    Goals from previous POC:  Short Term Goals (3 Weeks):   1. Pt will be compliant with initial HEP.  2. Pt will be able to walk for 10 minutes without increased pain.   Long Term Goals (6 Weeks):   1. Pt will score </=29/50 on Oswestry.  2. Pt will demonstrate 1/2 grade improvement in RLE strength to improve stability in ambulatory activities.  3. Pt will be able to walk for 15 minutes without increased pain.        Previous Short Term Goals Status:   STG 1 met, STG 2 ongoing  New Short Term Goals Status:   Continue STG 2  Long Term Goal Status:   continue per initial plan  of care.  Reasons for Recertification of Therapy:   RLE symptoms centralizing, indicating improvement in condition, however pt's low back pain continues to limit his tolerance for sitting, standing and walking for daily activities.     Certification Period: 8/24/17 to 9/21/17  Recommended Treatment Plan: 2 times per week for 4 weeks: Electrical Stimulation IFC for pain control, Group Therapy, Manual Therapy, Moist Heat/ Ice, Neuromuscular Re-ed, Patient Education, Therapeutic Activites, Therapeutic Exercise, Ultrasound/Phonophoresis and Other dry needling  Other Recommendations: NA        Therapist's Name: Melba Elizondo PT, DPT, MTC   Date: 08/22/2017    I CERTIFY THE NEED FOR THESE SERVICES FURNISHED UNDER THIS PLAN OF TREATMENT AND WHILE UNDER MY CARE    Physician's comments: ________________________________________________________________________________________________________________________________________________      Physician's Name: ___________________________________

## 2017-08-23 ENCOUNTER — TELEPHONE (OUTPATIENT)
Dept: FAMILY MEDICINE | Facility: CLINIC | Age: 62
End: 2017-08-23

## 2017-08-23 ENCOUNTER — OFFICE VISIT (OUTPATIENT)
Dept: FAMILY MEDICINE | Facility: CLINIC | Age: 62
End: 2017-08-23
Payer: COMMERCIAL

## 2017-08-23 VITALS
RESPIRATION RATE: 20 BRPM | TEMPERATURE: 98 F | BODY MASS INDEX: 26.7 KG/M2 | HEART RATE: 72 BPM | WEIGHT: 160.25 LBS | DIASTOLIC BLOOD PRESSURE: 68 MMHG | SYSTOLIC BLOOD PRESSURE: 104 MMHG | HEIGHT: 65 IN

## 2017-08-23 DIAGNOSIS — E78.5 DYSLIPIDEMIA: ICD-10-CM

## 2017-08-23 DIAGNOSIS — R10.13 EPIGASTRIC PAIN: Primary | ICD-10-CM

## 2017-08-23 DIAGNOSIS — I10 ESSENTIAL HYPERTENSION: ICD-10-CM

## 2017-08-23 LAB
CHOLEST/HDLC SERPL: 5.2 {RATIO}
HDL/CHOLESTEROL RATIO: 19.3 %
HDLC SERPL-MCNC: 212 MG/DL
HDLC SERPL-MCNC: 41 MG/DL
LDLC SERPL CALC-MCNC: 138.6 MG/DL
NONHDLC SERPL-MCNC: 171 MG/DL
TRIGL SERPL-MCNC: 162 MG/DL

## 2017-08-23 PROCEDURE — 3008F BODY MASS INDEX DOCD: CPT | Mod: S$GLB,,, | Performed by: FAMILY MEDICINE

## 2017-08-23 PROCEDURE — 3078F DIAST BP <80 MM HG: CPT | Mod: S$GLB,,, | Performed by: FAMILY MEDICINE

## 2017-08-23 PROCEDURE — 3074F SYST BP LT 130 MM HG: CPT | Mod: S$GLB,,, | Performed by: FAMILY MEDICINE

## 2017-08-23 PROCEDURE — 36415 COLL VENOUS BLD VENIPUNCTURE: CPT | Mod: S$GLB,,, | Performed by: FAMILY MEDICINE

## 2017-08-23 PROCEDURE — 80061 LIPID PANEL: CPT

## 2017-08-23 PROCEDURE — 99214 OFFICE O/P EST MOD 30 MIN: CPT | Mod: S$GLB,,, | Performed by: FAMILY MEDICINE

## 2017-08-23 RX ORDER — TESTOSTERONE CYPIONATE 200 MG/ML
INJECTION, SOLUTION INTRAMUSCULAR
Qty: 2 ML | Refills: 5 | Status: SHIPPED | OUTPATIENT
Start: 2017-08-23 | End: 2017-08-28 | Stop reason: SDUPTHER

## 2017-08-23 NOTE — PROGRESS NOTES
Neurosurgery History & Physical    Patient ID: Mane Ramires is a 61 y.o. male.    Chief Complaint   Patient presents with    Follow-up     Follow up to discuss CTR. Previously seen by Dasia Uriarte       Review of Systems   Constitutional: Negative for activity change, chills, fatigue and unexpected weight change.   HENT: Negative for hearing loss, tinnitus, trouble swallowing and voice change.    Eyes: Negative for visual disturbance.   Respiratory: Negative for apnea, chest tightness and shortness of breath.    Cardiovascular: Negative for chest pain and palpitations.   Gastrointestinal: Negative for abdominal pain, constipation, diarrhea, nausea and vomiting.   Genitourinary: Negative for difficulty urinating, dysuria and frequency.   Musculoskeletal: Positive for back pain and neck pain. Negative for gait problem and neck stiffness.   Skin: Negative for wound.   Neurological: Positive for numbness. Negative for dizziness, tremors, seizures, facial asymmetry, speech difficulty, weakness, light-headedness and headaches.   Psychiatric/Behavioral: Negative for confusion and decreased concentration.       Past Medical History:   Diagnosis Date    Accident     fell from roof with TBI (word finding issues personality changes, memory deficets), R rib fractures, clavicle fx    Allergy     ASD (atrial septal defect)     noted on ECHO 6/16    Cervical stenosis of spine     noted on 6/15 MRI    CTS (carpal tunnel syndrome)     mild-mod on 4/17 NCS    DDD (degenerative disc disease), cervical 10/2014    C5-6 and C6-C7    Dyslipidemia     Gender identity disorder     H/O cardiovascular stress test     12/14 normal    Heart murmur 05/26/2016    Herpes simplex type 2 infection     Hypertension     IGT (impaired glucose tolerance)     Myelomalacia     c-spine noted on 8/15 MRI    OA (osteoarthritis)     Prediabetes     TBI (traumatic brain injury)     after falling off a roof in 2003     Social History      Social History    Marital status: Single     Spouse name: N/A    Number of children: N/A    Years of education: N/A     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    Smoking status: Former Smoker    Smokeless tobacco: Never Used    Alcohol use Yes      Comment: rare     Drug use: No    Sexual activity: Yes     Partners: Female     Other Topics Concern    Not on file     Social History Narrative    No narrative on file     Family History   Problem Relation Age of Onset    Diabetes Mother     Heart disease Mother     Coronary artery disease Father     Breast cancer Sister     Lung cancer Paternal Grandfather     Heart disease Paternal Grandfather     Heart disease Maternal Grandmother     Heart attack Brother      Review of patient's allergies indicates:   Allergen Reactions    Sudafed [pseudoephedrine hcl] Other (See Comments)     Heart racing      Codeine Other (See Comments)     Heart racing    Hydrocodone Other (See Comments)     insomnia    Cortisone Palpitations       Current Outpatient Prescriptions:     amlodipine (NORVASC) 5 MG tablet, Take 1 tablet (5 mg total) by mouth once daily., Disp: 30 tablet, Rfl: 2    atorvastatin (LIPITOR) 40 MG tablet, Take 1 tablet (40 mg total) by mouth once daily., Disp: 90 tablet, Rfl: 3    JUBLIA 10 % Usha, APPLY 1 DROP TO ALL SMALLER TOES AND 2 DROPS TO GREATER TOES DAILY, Disp: , Rfl: 10    levocetirizine (XYZAL) 5 MG tablet, TAKE 1 TABLET (5 MG TOTAL) BY MOUTH NIGHTLY AS NEEDED FOR ALLERGIES., Disp: 30 tablet, Rfl: 9    LYRICA 100 mg capsule, TAKE ONE CAPSULE BY MOUTH TWICE A DAY, Disp: 60 capsule, Rfl: 1    testosterone cypionate (DEPOTESTOTERONE CYPIONATE) 200 mg/mL injection, INJECT 1ML INTO THE MUSCLE EVERY 14 DAYS, Disp: , Rfl: 5    tramadol (ULTRAM) 50 mg tablet, Take 1 tablet (50 mg total) by mouth 2 (two) times daily as needed., Disp: 60 tablet, Rfl: 0    lisinopril (PRINIVIL,ZESTRIL) 40 MG tablet, TAKE 1 TABLET  "(40 MG TOTAL) BY MOUTH ONCE DAILY., Disp: 30 tablet, Rfl: 1    metoprolol succinate (TOPROL-XL) 50 MG 24 hr tablet, Take 1 tablet (50 mg total) by mouth once daily., Disp: 90 tablet, Rfl: 3    Vitals:    08/03/17 1513   BP: 134/83   Pulse: 73   Weight: 71.8 kg (158 lb 4.6 oz)   Height: 5' 5" (1.651 m)       Physical Exam   Constitutional: He is oriented to person, place, and time. He appears well-developed and well-nourished.   HENT:   Head: Normocephalic and atraumatic.   Eyes: Pupils are equal, round, and reactive to light.   Neck: Normal range of motion. Neck supple.   Cardiovascular: Normal rate.    Pulmonary/Chest: Effort normal.   Abdominal: He exhibits no distension.   Musculoskeletal: Normal range of motion. He exhibits no edema.   Neurological: He is alert and oriented to person, place, and time. He has a normal Finger-Nose-Finger Test, a normal Heel to Shin Test, a normal Romberg Test and a normal Tandem Gait Test. Gait normal.   Reflex Scores:       Tricep reflexes are 2+ on the right side and 2+ on the left side.       Bicep reflexes are 2+ on the right side and 2+ on the left side.       Brachioradialis reflexes are 2+ on the right side and 2+ on the left side.       Patellar reflexes are 2+ on the right side and 2+ on the left side.       Achilles reflexes are 2+ on the right side and 2+ on the left side.  Skin: Skin is warm and dry.   Psychiatric: He has a normal mood and affect. His speech is normal and behavior is normal. Judgment and thought content normal.   Nursing note and vitals reviewed.      Neurologic Exam     Mental Status   Oriented to person, place, and time.   Oriented to person.   Oriented to place.   Oriented to time.   Follows 3 step commands.   Attention: normal. Concentration: normal.   Speech: speech is normal   Level of consciousness: alert  Knowledge: consistent with education.   Able to name object. Able to read. Able to repeat. Able to write. Normal comprehension.     Cranial " Nerves     CN II   Visual acuity: normal  Right visual field deficit: none  Left visual field deficit: none     CN III, IV, VI   Pupils are equal, round, and reactive to light.  Right pupil: Size: 3 mm. Shape: regular. Reactivity: brisk. Consensual response: intact.   Left pupil: Size: 3 mm. Shape: regular. Reactivity: brisk. Consensual response: intact.   CN III: no CN III palsy  CN VI: no CN VI palsy  Nystagmus: none   Diplopia: none  Ophthalmoparesis: none  Conjugate gaze: present    CN V   Right facial sensation deficit: none  Left facial sensation deficit: none    CN VII   Right facial weakness: none  Left facial weakness: none    CN VIII   Hearing: intact    CN IX, X   CN IX normal.   CN X normal.     CN XI   Right sternocleidomastoid strength: normal  Left sternocleidomastoid strength: normal  Right trapezius strength: normal  Left trapezius strength: normal    CN XII   Fasciculations: absent  Tongue deviation: none    Motor Exam   Muscle bulk: normal  Overall muscle tone: normal  Right arm pronator drift: absent  Left arm pronator drift: absent    Strength   Right neck flexion: 5/5  Left neck flexion: 5/5  Right neck extension: 5/5  Left neck extension: 5/5  Right deltoid: 5/5  Left deltoid: 5/5  Right biceps: 5/5  Left biceps: 5/5  Right triceps: 5/5  Left triceps: 5/5  Right wrist flexion: 5/5  Left wrist flexion: 5/5  Right wrist extension: 5/5  Left wrist extension: 5/5  Right interossei: 5/5  Left interossei: 5/5  Right abdominals: 5/5  Left abdominals: 5/5  Right iliopsoas: 5/5  Left iliopsoas: 5/5  Right quadriceps: 5/5  Left quadriceps: 5/5  Right hamstrin/5  Left hamstrin/5  Right glutei: 5/5  Left glutei: 5/5  Right anterior tibial: 5/5  Left anterior tibial: 5/5  Right posterior tibial: 5/5  Left posterior tibial: 5/5  Right peroneal: 5/5  Left peroneal: 5/5  Right gastroc: 5/5  Left gastroc: 5/5    Sensory Exam   Right arm light touch: normal  Left arm light touch: normal  Right leg light  touch: normal  Left leg light touch: normal  Right arm vibration: normal  Left arm vibration: normal  Right arm pinprick: normal  Left arm pinprick: normal    Gait, Coordination, and Reflexes     Gait  Gait: normal    Coordination   Romberg: negative  Finger to nose coordination: normal  Heel to shin coordination: normal  Tandem walking coordination: normal    Tremor   Resting tremor: absent  Intention tremor: absent  Action tremor: absent    Reflexes   Right brachioradialis: 2+  Left brachioradialis: 2+  Right biceps: 2+  Left biceps: 2+  Right triceps: 2+  Left triceps: 2+  Right patellar: 2+  Left patellar: 2+  Right achilles: 2+  Left achilles: 2+  Right Al: absent  Left Al: absent  Right ankle clonus: absent  Left ankle clonus: absent      Provider dictation:  The patient is a 60 year old right handed  male previously seen by our PA with neck pain and right hand numbness and pain. He has history of traumatic brain injury, osteoarthritis, lower back pain, neck pain, and prior cervical spine surgery (ACDF C6-7 in 2014 with Dr. Myers). He now presents to discuss carpal tunnel release surgery. He has had moderate relief of his neck pain with PT.  He reports tingling and numbness in all fingers in the right hand, with numbness greatest in digits 1,2 and 3.  He does still have some, bilateral neck pain in the paracervical area without radicular arm pain.  He is taking gabapentin for pain.  He states that he cannot tolerate steroids due to side effects.  He also describes right sided lower back pain and muscle spasms without focal radiculoapthy - he is following with Dr. David and procedures have been discussed.    On exam, he is neurologically intact with 2+ DTRs and 5/5 strength bilaterally in the upper and lower extremities.  He is not tender to palpation along the spine and he does not appreciate any sensory deficits.  Phalens and tinels negative at the wrists bilaterally for carpal tunnel.   phalens negative at the elbows for ulnar neuropathy.    I have reviewed an MRI of the cervical spine showing the previous fusion at C5-7, with mild adjacent level disease at C4/5 where there is a broad disc with facet arthropathy and ligamentous hypertrophy with mild central canal narrowing and mild left foraminal narrowing.      EMG/ NCV of the bilateral upper extremities show moderate right carpal tunnel with no evidence of cervical radiculoapthy    I have reviwed lumbar spine xrays revealing spondylosis at L4/5 and L5/S1.    This 60 year old male with prior cervical spine surgery has neck pain due to adjacent level disease and right median neuropathy. He is on atorvastatin but believes the timing of carpal tunnel syndrome is independent of the statin intake.  He is a candidate for carpal tunnel release. I have advised him to postpone any extension of this cervical fusion for the time being.     Visit Diagnosis:  Cervicalgia    Anxiety disorder, unspecified type    Osteoarthritis of spine with radiculopathy, cervical region    Bilateral carpal tunnel syndrome        Total time spent counseling greater than fifty percent of total visit time.  Counseling included discussion regarding imaging findings, diagnosis possibilities, treatment options, risks and benefits.   The patient had many questions regarding the options and long-term effects.

## 2017-08-23 NOTE — PROGRESS NOTES
Venipuncture performed with 21 gauge butterfly, attempts x's 1, to L antecubital vein. Specimens collected per orders.   ?  Pressure dressing applied to site, instructed patient to: remove dressing in 10-15 minutes, OK to re-adjust dressing if pressure causing any discomfort, observe closely for numbness and/or discoloration to hand or fingers, and to notify provider if bleeding persists after applying constant pressure lasting 30 minutes.   ?  Informed patient to follow up as directed by ordering provider.   ?  Patient voices understanding.   ?  After visit summary inclusive of instructions above printed and given to patient.

## 2017-08-24 ENCOUNTER — PATIENT MESSAGE (OUTPATIENT)
Dept: FAMILY MEDICINE | Facility: CLINIC | Age: 62
End: 2017-08-24

## 2017-08-24 ENCOUNTER — OFFICE VISIT (OUTPATIENT)
Dept: PSYCHIATRY | Facility: CLINIC | Age: 62
End: 2017-08-24
Payer: COMMERCIAL

## 2017-08-24 VITALS
SYSTOLIC BLOOD PRESSURE: 117 MMHG | HEIGHT: 65 IN | DIASTOLIC BLOOD PRESSURE: 63 MMHG | WEIGHT: 161.63 LBS | BODY MASS INDEX: 26.93 KG/M2 | HEART RATE: 79 BPM

## 2017-08-24 DIAGNOSIS — F41.1 GENERALIZED ANXIETY DISORDER: Primary | ICD-10-CM

## 2017-08-24 PROCEDURE — 3074F SYST BP LT 130 MM HG: CPT | Mod: S$GLB,,, | Performed by: PSYCHIATRY & NEUROLOGY

## 2017-08-24 PROCEDURE — 3078F DIAST BP <80 MM HG: CPT | Mod: S$GLB,,, | Performed by: PSYCHIATRY & NEUROLOGY

## 2017-08-24 PROCEDURE — 99999 PR PBB SHADOW E&M-EST. PATIENT-LVL III: CPT | Mod: PBBFAC,,, | Performed by: PSYCHIATRY & NEUROLOGY

## 2017-08-24 PROCEDURE — 3008F BODY MASS INDEX DOCD: CPT | Mod: S$GLB,,, | Performed by: PSYCHIATRY & NEUROLOGY

## 2017-08-24 PROCEDURE — 99214 OFFICE O/P EST MOD 30 MIN: CPT | Mod: S$GLB,,, | Performed by: PSYCHIATRY & NEUROLOGY

## 2017-08-24 RX ORDER — FLUOXETINE HYDROCHLORIDE 20 MG/1
CAPSULE ORAL
Qty: 60 CAPSULE | Refills: 3 | Status: SHIPPED | OUTPATIENT
Start: 2017-08-24 | End: 2017-10-23 | Stop reason: SDUPTHER

## 2017-08-24 NOTE — PATIENT INSTRUCTIONS
1. Start prozac 20 mg daily, after 2 weeks if tolerating increase to 40 mg daily. May take 6-8 weeks to have an effect.  2. Continue therapy with Omer Mcmullen  3. Return for follow up in 6 weeks.

## 2017-08-24 NOTE — PROGRESS NOTES
Ambulatory Psychiatry Established Patient Follow-up Note      Chief Complaint  presents for followup of anxiety    Time Spent  30 minutes    HISTORY  Interval History  Patient underwent neuropsych testing which did not find evidence of TBI related cognitive symptoms or any other cognitive disorder. Rather showed evidence of depression and anxiety. Patient reports difficulty describing his own emotions but when anxiety described to him, he agreed that it reflected his internal state. Still with some irritability, avoidance,problems with concentration. Discussed how this all could be attributed to anxiety. Wife also expressed concern for possible Aspergers disorder, discussed how patient does not have the impaired social communication and that his at times affectless expression reflects anxiety more than developmentaldisorder.     ROS   Constitutional: no fatigue or appetite or weight change  Eyes: no problems with vision  ENT/Mouth: no problems with hearing, swallowing  Cardiovascular: no chest pain  Respiratory: no shortness of breath  Gastrointestinal: no constipation or diarrhea or abdominal pain or nausea/vomiting  Genitourinary: no urinary difficulties  Musculoskeletal: no aches or pains  Skin: no rashes  Neurologic: no numbness or weakness   Endocrine: no sweating or hot flashes.   All other systems were negative.    [facility]  Past Medical History was reviewed and there was no change in past medical history  Family History was not reviewed  Social History was reviewed, changes in social history noted in interval history above.  Medications/problem list/allergies were reviewed and updated in the patient summary.    Medications    Scheduled and PRN Medications     Current Outpatient Prescriptions:     amlodipine (NORVASC) 5 MG tablet, Take 1 tablet (5 mg total) by mouth once daily., Disp: 30 tablet, Rfl: 2    JUBLIA 10 % Usha, APPLY 1 DROP TO ALL SMALLER TOES AND 2 DROPS TO GREATER TOES  "DAILY, Disp: , Rfl: 10    levocetirizine (XYZAL) 5 MG tablet, TAKE 1 TABLET (5 MG TOTAL) BY MOUTH NIGHTLY AS NEEDED FOR ALLERGIES., Disp: 30 tablet, Rfl: 9    lisinopril (PRINIVIL,ZESTRIL) 40 MG tablet, TAKE 1 TABLET (40 MG TOTAL) BY MOUTH ONCE DAILY., Disp: 30 tablet, Rfl: 1    LYRICA 100 mg capsule, TAKE ONE CAPSULE BY MOUTH TWICE A DAY, Disp: 60 capsule, Rfl: 1    metoprolol succinate (TOPROL-XL) 50 MG 24 hr tablet, Take 1 tablet (50 mg total) by mouth once daily., Disp: 90 tablet, Rfl: 3    ranitidine (ZANTAC) 300 MG tablet, Take 1 tablet (300 mg total) by mouth every evening., Disp: 30 tablet, Rfl: 2    testosterone cypionate (DEPOTESTOTERONE CYPIONATE) 200 mg/mL injection, INJECT 1ML INTO THE MUSCLE EVERY 14 DAYS, Disp: 2 mL, Rfl: 5    tramadol (ULTRAM) 50 mg tablet, Take 1 tablet (50 mg total) by mouth 2 (two) times daily as needed., Disp: 60 tablet, Rfl: 0    Allergies  Review of patient's allergies indicates:   Allergen Reactions    Sudafed [pseudoephedrine hcl] Other (See Comments)     Heart racing      Codeine Other (See Comments)     Heart racing    Cortisone Palpitations       EXAM  VITALS   Vitals:    08/24/17 1545   BP: 117/63   Pulse: 79   Weight: 73.3 kg (161 lb 9.6 oz)   Height: 5' 5" (1.651 m)       RELEVANT LABS/STUDIES:      PSYCHIATRIC EXAMINATION  Appearance: well groomed, appearing healthy and of stated age and gender    Behavior: cooperative, pleasant, no psychomotor agitation or retardation.  Speech: normal rate, rhythm, prosody, volume and amount  Mood: anxious  Affect: constricted  Thought Process: linear, logical, goal directed  Thought Content: negative for suicidal ideation, homicidal ideation, delusions or hallucinations.  Associations: intact  Memory: has difficulty relating chronology and prior events but othertimes remembering specific events and recent detials well  Level of Consciousness/Orientation: grossly intact  Fund of Knowledge: good  Attention: good  Language: " fluent, able to name abstract and concrete objects.  Insight: fair  Judgment: not clearly impaired but family report that he is overly trusting and at times not careful    Psychomotor signs: no involuntary movements or tremor  Gait: normal    Medical Decision Making    IMPRESSION   62 yo transgender man with lifelong anxiety and more recent head injury with subsequent problems with memory and per family some personality and mood changes. Pt on exam, pleasant but constricted and also with alexithymia. Mental status exam not fully congruent with family's complaints as mood appears calm and not clearly cognitively impaired. Pt returns for follow up after neuropsych testing and starting therapy with Omer Mcmullen,both suggestive of anxiety rather than any cognitive disorder or TBI as cause of symptoms.      DIAGNOSES  Generalized Anxiety Disorder  Traumatic Brain Injury    R/O PTSD    PLAN  1. Start prozac 20 mg daily, after 2 weeks if tolerating increase to 40 mg daily. May take 6-8 weeks to have an effect.  2. Continue therapy with Omer Mcmullen  3.Return in 2 months for follow up.     More than 50% of the time was spent on counseling and coordination of care.  Psychoeducation,behavioral counseling.

## 2017-08-25 NOTE — PROGRESS NOTES
"Subjective:       Patient ID: Mane Ramires is a 61 y.o. male.    Chief Complaint: Follow-up    HPI   The patient is a 61-year-old who is here today for follow-up.  Today we discussed the followin)  pain.  He is having a lot of pain.  He is doing physical therapy for his bilateral plantar fasciitis and his right-sided sciatica.  He has seen improvement in his plantar fasciitis with the physical therapy.  His right-sided sciatica has been slower to improve with physical therapy.  He does have diffuse arthritis pain.  He did meet with the rheumatologist and no specific rheumatologic cause of his symptoms were identified.  2)  hypertension.  He is enrolled in the digital hypertension medicine program.  90% of his blood pressure readings are under the 140/90 isaac.  His Toprol was recently decreased from 100 mg once a day to 50 mg once a day.  He continues with lisinopril 40 and Norvasc 5  3)  hyperlipidemia.  His recent cholesterol level was elevated but he had consumed coffee and 2 cookies prior to the lab test.  He wonders if the coffee and cookies may have falsely elevated his cholesterol levels.  He was started on Lipitor 40 mg based on his elevated cholesterol readings after he consumes coffee and 2 cookies.  He took the Lipitor for 3-4 days but then started to have a metallic taste in his mouth and started feeling "crappy" so he stopped the Lipitor.  He did feel better after he stopped Lipitor but he believes the metallic taste in his mouth and the "crappy" feeling he was experiencing may be due to a viral illness and not the Lipitor  4)  prediabetes.  We did review his recent A1c of 6.0.  He is trying to consume a healthy diet  5)  recent bout of severe epigastric pain.  He was seen in the ER and in office by the nurse practitioner in  for severe epigastric pain.  An EGD was ordered but he has not yet been contacted about this.  Since his last visit, he has had an episode or 2 of milder epigastric " pain.  He does occasionally have a bile taste coming up from his stomach into his mouth.  He does try to watch what he eats because of his epigastric pain and the bile taste that he gets.  He notices that the bile taste is more of a problem after dinner.  He denies any trouble swallowing      Review of Systems   Constitutional: Negative for appetite change, chills, diaphoresis, fatigue, fever and unexpected weight change.   HENT: Negative for congestion, ear pain, postnasal drip, rhinorrhea, sinus pressure, sneezing, sore throat and trouble swallowing.    Eyes: Negative for pain, discharge and visual disturbance.   Respiratory: Negative for cough, chest tightness, shortness of breath and wheezing.    Cardiovascular: Negative for chest pain, palpitations and leg swelling.   Gastrointestinal: Positive for abdominal pain. Negative for abdominal distention, blood in stool, constipation, diarrhea, nausea and vomiting.        Per HPI   Skin: Negative for rash.       Objective:      Physical Exam   Constitutional: He is oriented to person, place, and time. He appears well-developed and well-nourished. No distress.   HENT:   Head: Normocephalic and atraumatic.   Right Ear: Hearing, tympanic membrane, external ear and ear canal normal.   Left Ear: Hearing, tympanic membrane, external ear and ear canal normal.   Nose: Nose normal.   Mouth/Throat: Oropharynx is clear and moist and mucous membranes are normal. No oral lesions. No oropharyngeal exudate, posterior oropharyngeal edema or posterior oropharyngeal erythema.   Eyes: Conjunctivae, EOM and lids are normal. Pupils are equal, round, and reactive to light. No scleral icterus.   Neck: Normal range of motion. Neck supple. Carotid bruit is not present. No thyroid mass and no thyromegaly present.   Cardiovascular: Normal rate, regular rhythm and normal heart sounds.   No extrasystoles are present. PMI is not displaced.  Exam reveals no gallop.    No murmur  "heard.  Pulmonary/Chest: Effort normal and breath sounds normal. No accessory muscle usage. No respiratory distress.   Clear to auscultation bilaterally.   Abdominal: Soft. Normal appearance and bowel sounds are normal. He exhibits no abdominal bruit. There is no hepatosplenomegaly. There is no tenderness. There is no rebound.   Lymphadenopathy:        Head (right side): No submental and no submandibular adenopathy present.        Head (left side): No submental and no submandibular adenopathy present.        Right cervical: No superficial cervical, no deep cervical and no posterior cervical adenopathy present.       Left cervical: No superficial cervical, no deep cervical and no posterior cervical adenopathy present.        Right: No supraclavicular adenopathy present.        Left: No supraclavicular adenopathy present.   Neurological: He is alert and oriented to person, place, and time.   Skin: Skin is warm, dry and intact.   Psychiatric: He has a normal mood and affect.     Blood pressure 104/68, pulse 72, temperature 97.6 °F (36.4 °C), temperature source Oral, resp. rate 20, height 5' 5" (1.651 m), weight 72.7 kg (160 lb 4.4 oz).Body mass index is 26.67 kg/m².          A/P:  1)  plantar fasciitis.  Improving.  Continue physical therapy   2)  right-sided sciatica.  Improving.  Continue with physical therapy.  If his symptoms persist, we did discuss meeting with pain management again for an interventional procedure  3)  hypertension.  Well-controlled.  Continue current medication.  Follow-up with digital hypertension medicine program as planned  4)  possible hyperlipidemia.  Status unknown.  We will check a fasting cholesterol panel today.  If this is again abnormal, we will return to Lipitor.  It is possible that his recent cholesterol panel was skewed due to his intake prior to having the lab drawn  5)  prediabetes.  Stable.  We'll recheck this in 6-12 months  6)  GERD with epigastric pain.  New to me.  We are " going to start him on Zantac 300 mg at night.  We will check on the status of his EGD.

## 2017-08-28 RX ORDER — TESTOSTERONE CYPIONATE 200 MG/ML
INJECTION, SOLUTION INTRAMUSCULAR
Qty: 2 ML | Refills: 5 | Status: SHIPPED | OUTPATIENT
Start: 2017-08-28 | End: 2018-11-29

## 2017-08-30 ENCOUNTER — CLINICAL SUPPORT (OUTPATIENT)
Dept: REHABILITATION | Facility: HOSPITAL | Age: 62
End: 2017-08-30
Attending: FAMILY MEDICINE
Payer: COMMERCIAL

## 2017-08-30 DIAGNOSIS — M54.41 ACUTE BILATERAL LOW BACK PAIN WITH RIGHT-SIDED SCIATICA: ICD-10-CM

## 2017-08-30 PROBLEM — F41.1 GENERALIZED ANXIETY DISORDER: Status: ACTIVE | Noted: 2017-07-11

## 2017-08-30 PROCEDURE — 97014 ELECTRIC STIMULATION THERAPY: CPT | Mod: PN | Performed by: PHYSICAL THERAPIST

## 2017-08-30 PROCEDURE — 97110 THERAPEUTIC EXERCISES: CPT | Mod: PN | Performed by: PHYSICAL THERAPIST

## 2017-08-30 PROCEDURE — 97010 HOT OR COLD PACKS THERAPY: CPT | Mod: PN | Performed by: PHYSICAL THERAPIST

## 2017-08-30 NOTE — PROGRESS NOTES
Name: Mane Dodd [facility] Number: [id number]  Date of Treatment: 08/30/2017   Diagnosis:   Encounter Diagnosis   Name Primary?    Acute bilateral low back pain with right-sided sciatica        Time in: 1200  Time Out: 100  Total Treatment Time: 60  Group Time: 0      Subjective:    Mane reports he is doing well considering he slept on a hospital sofabed last night (ex-wife had surgery).  Patient reports their pain to be 6/10 on a 0-10 scale with 0 being no pain and 10 being the worst pain imaginable.    Objective    Patient received individual therapy to increase strength, ROM, flexibility and core stabilization with 0 patients with activities as follows:     Mane received therapeutic exercises to develop strength, ROM, flexibility and core stabilization for 44 minutes including:     Nustep, L3, 10 min  HSS, 3x30s  Piriformis st, 3x30s  Gastroc st, 3x30s  SKTC, 3x30s  DKTC, 3x30s    Post innominate rotation, L/R, grade III, 5 min      The patient received IFC and MHP to the lumbar paraspinals x 15 min      Assessment:   Pt demonstrates improved mobility of the pelvis in posterior rotation, which should translate to decreased discomfort with ambulatory activities.     Pt will continue to benefit from skilled PT intervention. Medical Necessity is demonstrated by:  Pain limits function of effected part for some activities, Unable to participate fully in daily activities and Weakness.    Patient is making good progress towards established goals.    New/Revised goals: NA      Plan:  Continue with established Plan of Care towards PT goals.

## 2017-09-05 ENCOUNTER — CLINICAL SUPPORT (OUTPATIENT)
Dept: REHABILITATION | Facility: HOSPITAL | Age: 62
End: 2017-09-05
Attending: FAMILY MEDICINE
Payer: COMMERCIAL

## 2017-09-05 DIAGNOSIS — M54.41 ACUTE BILATERAL LOW BACK PAIN WITH RIGHT-SIDED SCIATICA: ICD-10-CM

## 2017-09-05 PROCEDURE — 97010 HOT OR COLD PACKS THERAPY: CPT | Mod: PN | Performed by: PHYSICAL THERAPIST

## 2017-09-05 PROCEDURE — 97140 MANUAL THERAPY 1/> REGIONS: CPT | Mod: PN | Performed by: PHYSICAL THERAPIST

## 2017-09-05 PROCEDURE — 97110 THERAPEUTIC EXERCISES: CPT | Mod: PN | Performed by: PHYSICAL THERAPIST

## 2017-09-05 PROCEDURE — 97014 ELECTRIC STIMULATION THERAPY: CPT | Mod: PN | Performed by: PHYSICAL THERAPIST

## 2017-09-05 RX ORDER — AMLODIPINE BESYLATE 5 MG/1
TABLET ORAL
Qty: 30 TABLET | Refills: 2 | Status: SHIPPED | OUTPATIENT
Start: 2017-09-05 | End: 2017-09-19

## 2017-09-06 ENCOUNTER — SURGERY (OUTPATIENT)
Age: 62
End: 2017-09-06

## 2017-09-06 ENCOUNTER — HOSPITAL ENCOUNTER (OUTPATIENT)
Facility: HOSPITAL | Age: 62
Discharge: HOME | End: 2017-09-06
Attending: INTERNAL MEDICINE | Admitting: INTERNAL MEDICINE
Payer: COMMERCIAL

## 2017-09-06 ENCOUNTER — ANESTHESIA (OUTPATIENT)
Dept: ENDOSCOPY | Facility: HOSPITAL | Age: 62
End: 2017-09-06
Payer: COMMERCIAL

## 2017-09-06 ENCOUNTER — ANESTHESIA EVENT (OUTPATIENT)
Dept: ENDOSCOPY | Facility: HOSPITAL | Age: 62
End: 2017-09-06
Payer: COMMERCIAL

## 2017-09-06 VITALS
BODY MASS INDEX: 25.66 KG/M2 | DIASTOLIC BLOOD PRESSURE: 68 MMHG | RESPIRATION RATE: 17 BRPM | TEMPERATURE: 98 F | HEART RATE: 66 BPM | HEIGHT: 65 IN | SYSTOLIC BLOOD PRESSURE: 118 MMHG | RESPIRATION RATE: 20 BRPM | WEIGHT: 154 LBS | OXYGEN SATURATION: 96 %

## 2017-09-06 DIAGNOSIS — R10.13 EPIGASTRIC PAIN: ICD-10-CM

## 2017-09-06 LAB — H PYLORI INDEX VALUE: NEGATIVE

## 2017-09-06 PROCEDURE — 87449 NOS EACH ORGANISM AG IA: CPT | Mod: PO | Performed by: INTERNAL MEDICINE

## 2017-09-06 PROCEDURE — 88342 IMHCHEM/IMCYTCHM 1ST ANTB: CPT | Mod: 26,,, | Performed by: PATHOLOGY

## 2017-09-06 PROCEDURE — 88305 TISSUE EXAM BY PATHOLOGIST: CPT | Performed by: PATHOLOGY

## 2017-09-06 PROCEDURE — D9220A PRA ANESTHESIA: Mod: CRNA,,, | Performed by: NURSE ANESTHETIST, CERTIFIED REGISTERED

## 2017-09-06 PROCEDURE — 43239 EGD BIOPSY SINGLE/MULTIPLE: CPT | Mod: PO | Performed by: INTERNAL MEDICINE

## 2017-09-06 PROCEDURE — 37000008 HC ANESTHESIA 1ST 15 MINUTES: Mod: PO | Performed by: INTERNAL MEDICINE

## 2017-09-06 PROCEDURE — 88305 TISSUE EXAM BY PATHOLOGIST: CPT | Mod: 26,,, | Performed by: PATHOLOGY

## 2017-09-06 PROCEDURE — 37000009 HC ANESTHESIA EA ADD 15 MINS: Mod: PO | Performed by: INTERNAL MEDICINE

## 2017-09-06 PROCEDURE — 43239 EGD BIOPSY SINGLE/MULTIPLE: CPT | Mod: ,,, | Performed by: INTERNAL MEDICINE

## 2017-09-06 PROCEDURE — 63600175 PHARM REV CODE 636 W HCPCS: Mod: PO | Performed by: NURSE ANESTHETIST, CERTIFIED REGISTERED

## 2017-09-06 PROCEDURE — D9220A PRA ANESTHESIA: Mod: ANES,,, | Performed by: ANESTHESIOLOGY

## 2017-09-06 PROCEDURE — 27201012 HC FORCEPS, HOT/COLD, DISP: Mod: PO | Performed by: INTERNAL MEDICINE

## 2017-09-06 PROCEDURE — 25000003 PHARM REV CODE 250: Mod: PO | Performed by: INTERNAL MEDICINE

## 2017-09-06 RX ORDER — SODIUM CHLORIDE, SODIUM LACTATE, POTASSIUM CHLORIDE, CALCIUM CHLORIDE 600; 310; 30; 20 MG/100ML; MG/100ML; MG/100ML; MG/100ML
INJECTION, SOLUTION INTRAVENOUS CONTINUOUS
Status: DISCONTINUED | OUTPATIENT
Start: 2017-09-06 | End: 2017-09-06 | Stop reason: HOSPADM

## 2017-09-06 RX ORDER — LIDOCAINE HCL/PF 100 MG/5ML
SYRINGE (ML) INTRAVENOUS
Status: DISCONTINUED | OUTPATIENT
Start: 2017-09-06 | End: 2017-09-06

## 2017-09-06 RX ORDER — ESOMEPRAZOLE MAGNESIUM 40 MG/1
40 CAPSULE, DELAYED RELEASE ORAL
Qty: 30 CAPSULE | Refills: 2 | Status: SHIPPED | OUTPATIENT
Start: 2017-09-06 | End: 2017-11-30

## 2017-09-06 RX ORDER — LIDOCAINE HYDROCHLORIDE 10 MG/ML
1 INJECTION, SOLUTION EPIDURAL; INFILTRATION; INTRACAUDAL; PERINEURAL ONCE
Status: COMPLETED | OUTPATIENT
Start: 2017-09-06 | End: 2017-09-06

## 2017-09-06 RX ORDER — PROPOFOL 10 MG/ML
VIAL (ML) INTRAVENOUS
Status: DISCONTINUED | OUTPATIENT
Start: 2017-09-06 | End: 2017-09-06

## 2017-09-06 RX ADMIN — PROPOFOL 25 MG: 10 INJECTION, EMULSION INTRAVENOUS at 10:09

## 2017-09-06 RX ADMIN — SODIUM CHLORIDE, SODIUM LACTATE, POTASSIUM CHLORIDE, AND CALCIUM CHLORIDE: .6; .31; .03; .02 INJECTION, SOLUTION INTRAVENOUS at 10:09

## 2017-09-06 RX ADMIN — PROPOFOL 150 MG: 10 INJECTION, EMULSION INTRAVENOUS at 10:09

## 2017-09-06 RX ADMIN — PROPOFOL 50 MG: 10 INJECTION, EMULSION INTRAVENOUS at 10:09

## 2017-09-06 RX ADMIN — LIDOCAINE HYDROCHLORIDE 1 MG: 10 INJECTION, SOLUTION EPIDURAL; INFILTRATION; INTRACAUDAL; PERINEURAL at 10:09

## 2017-09-06 RX ADMIN — LIDOCAINE HYDROCHLORIDE 100 MG: 20 INJECTION, SOLUTION INTRAVENOUS at 10:09

## 2017-09-06 NOTE — DISCHARGE INSTRUCTIONS
Recovery After Procedural Sedation (Adult)  You have been given medicine by vein to make you sleep during your surgery. This may have included both a pain medicine and sleeping medicine. Most of the effects have worn off. But you may still have some drowsiness for the next 6 to 8 hours.  Home care  Follow these guidelines when you get home:  · For the next 8 hours, you should be watched by a responsible adult. This person should make sure your condition is not getting worse.  · Don't drink any alcohol for the next 24 hours.  · Don't drive, operate dangerous machinery, or make important business or personal decisions during the next 24 hours.  Note: Your healthcare provider may tell you not to take any medicine by mouth for pain or sleep in the next 4 hours. These medicines may react with the medicines you were given in the hospital. This could cause a much stronger response than usual.  Follow-up care  Follow up with your healthcare provider if you are not alert and back to your usual level of activity within 12 hours.  When to seek medical advice  Call your healthcare provider right away if any of these occur:  · Drowsiness gets worse  · Weakness or dizziness gets worse  · Repeated vomiting  · You can't be awakened   Date Last Reviewed: 10/18/2016  © 1650-8940 [employer]. [address] [place] [address], [place] [address]. All rights reserved. This information is not intended as a substitute for professional medical care. Always follow your healthcare professional's instructions.

## 2017-09-06 NOTE — TRANSFER OF CARE
"Anesthesia Transfer of Care Note    Patient: Mane Ramires    Procedure(s) Performed: Procedure(s) (LRB):  ESOPHAGOGASTRODUODENOSCOPY (EGD) (N/A)    Patient location: PACU    Anesthesia Type: general    Transport from OR: Transported from OR on room air with adequate spontaneous ventilation    Post pain: adequate analgesia    Post assessment: no apparent anesthetic complications    Post vital signs: stable    Level of consciousness: awake    Nausea/Vomiting: no nausea/vomiting    Complications: none    Transfer of care protocol was followed      Last vitals:   Visit Vitals  BP (!) 144/70 (BP Location: Left arm, Patient Position: Lying)   Pulse 70   Temp 36.7 °C (98 °F) (Skin)   Resp 19   Ht 5' 5" (1.651 m)   Wt 69.9 kg (154 lb)   SpO2 (!) 92%   BMI 25.63 kg/m²     "

## 2017-09-06 NOTE — ANESTHESIA PREPROCEDURE EVALUATION
09/06/2017  Mane Ramires is a 61 y.o., male.    Anesthesia Evaluation    I have reviewed the Patient Summary Reports.    I have reviewed the Nursing Notes.   I have reviewed the Medications.     Review of Systems  Anesthesia Hx:  Denies Family Hx of Anesthesia complications.   Denies Personal Hx of Anesthesia complications.   Cardiovascular:   Hypertension    Pulmonary:  Pulmonary Normal    Renal/:  Renal/ Normal     Hepatic/GI:  Hepatic/GI Normal    Musculoskeletal:   Arthritis     Neurological:   Denies Neuromuscular Disease.   Peripheral Neuropathy    Endocrine:  Endocrine Normal    Psych:   Psychiatric History          Physical Exam  General:  Well nourished    Airway/Jaw/Neck:  Airway Findings: Mouth Opening: Normal Tongue: Normal  General Airway Assessment: Adult  Mallampati: III  Improves to II with phonation.  TM Distance: Normal, at least 6 cm       Chest/Lungs:  Chest/Lungs Clear    Heart/Vascular:  Heart Findings: Normal            Anesthesia Plan  Type of Anesthesia, risks & benefits discussed:  Anesthesia Type:  general  Patient's Preference:   Intra-op Monitoring Plan: standard ASA monitors  Intra-op Monitoring Plan Comments:   Post Op Pain Control Plan:   Post Op Pain Control Plan Comments:   Induction:   IV  Beta Blocker:  Patient is on a Beta-Blocker and has received one dose within the past 24 hours (No further documentation required).       Informed Consent: Patient understands risks and agrees with Anesthesia plan.  Questions answered. Anesthesia consent signed with patient.  ASA Score: 2     Day of Surgery Review of History & Physical:    H&P update referred to the provider.         Ready For Surgery From Anesthesia Perspective.

## 2017-09-06 NOTE — ANESTHESIA POSTPROCEDURE EVALUATION
"Anesthesia Post Evaluation    Patient: Mane Ramires    Procedure(s) Performed: Procedure(s) (LRB):  ESOPHAGOGASTRODUODENOSCOPY (EGD) (N/A)    Final Anesthesia Type: general  Patient location during evaluation: PACU  Patient participation: Yes- Able to Participate  Level of consciousness: awake and alert  Post-procedure vital signs: reviewed and stable  Pain management: adequate  Airway patency: patent  PONV status at discharge: No PONV  Anesthetic complications: no      Cardiovascular status: blood pressure returned to baseline  Respiratory status: unassisted  Hydration status: euvolemic  Follow-up not needed.        Visit Vitals  BP (!) 144/70 (BP Location: Left arm, Patient Position: Lying)   Pulse 70   Temp 36.7 °C (98 °F) (Skin)   Resp 19   Ht 5' 5" (1.651 m)   Wt 69.9 kg (154 lb)   SpO2 (!) 92%   BMI 25.63 kg/m²       Pain/Ry Score: Pain Assessment Performed: Yes (9/6/2017 11:04 AM)  Presence of Pain: non-verbal indicators present (9/6/2017 11:04 AM)  Ry Score: 8 (9/6/2017 11:04 AM)      "

## 2017-09-06 NOTE — DISCHARGE SUMMARY
Discharge Note  Short Stay      SUMMARY     Admit Date: 9/6/2017    Attending Physician: Darius Ayon MD     Discharge Physician: Darius Ayon MD    Discharge Date: 9/6/2017 11:03 AM    Final Diagnosis: Epigastric pain [R10.13]    Disposition: HOME OR SELF CARE    Patient Instructions:   Current Discharge Medication List      START taking these medications    Details   esomeprazole (NEXIUM) 40 MG capsule Take 1 capsule (40 mg total) by mouth before breakfast.  Qty: 30 capsule, Refills: 2         CONTINUE these medications which have NOT CHANGED    Details   amlodipine (NORVASC) 5 MG tablet TAKE 1 TABLET BY MOUTH EVERY DAY  Qty: 30 tablet, Refills: 2      fluoxetine (PROZAC) 20 MG capsule Take 20 mg daily, after 2 weeks if tolerating increase to 40 mg daily.  Qty: 60 capsule, Refills: 3      JUBLIA 10 % Usha APPLY 1 DROP TO ALL SMALLER TOES AND 2 DROPS TO GREATER TOES DAILY  Refills: 10      levocetirizine (XYZAL) 5 MG tablet TAKE 1 TABLET (5 MG TOTAL) BY MOUTH NIGHTLY AS NEEDED FOR ALLERGIES.  Qty: 30 tablet, Refills: 9      lisinopril (PRINIVIL,ZESTRIL) 40 MG tablet TAKE 1 TABLET (40 MG TOTAL) BY MOUTH ONCE DAILY.  Qty: 30 tablet, Refills: 1      LYRICA 100 mg capsule TAKE ONE CAPSULE BY MOUTH TWICE A DAY  Qty: 60 capsule, Refills: 1    Comments: Not to exceed 4 additional fills before 11/13/2017      metoprolol succinate (TOPROL-XL) 50 MG 24 hr tablet Take 1 tablet (50 mg total) by mouth once daily.  Qty: 90 tablet, Refills: 3    Associated Diagnoses: Essential hypertension      ranitidine (ZANTAC) 300 MG tablet Take 1 tablet (300 mg total) by mouth every evening.  Qty: 30 tablet, Refills: 2      testosterone cypionate (DEPOTESTOTERONE CYPIONATE) 200 mg/mL injection INJECT 1ML INTO THE MUSCLE EVERY 14 DAYS  Qty: 2 mL, Refills: 5    Comments: This request is for a new prescription for a controlled substance as required by Federal/State law.      tramadol (ULTRAM) 50 mg tablet Take 1 tablet (50 mg  total) by mouth 2 (two) times daily as needed.  Qty: 60 tablet, Refills: 0    Comments: Not to exceed 5 additional fills before 10/02/2017             Discharge Procedure Orders (must include Diet, Follow-up, Activity)    Follow Up:  Follow up with PCP as previously scheduled  Resume routine diet.  Activity as tolerated.    No driving day of procedure.

## 2017-09-06 NOTE — PROGRESS NOTES
Name: Mane Dodd [facility] Number: [id number]  Date of Treatment: 9/5/17  Diagnosis:   Encounter Diagnosis   Name Primary?    Acute bilateral low back pain with right-sided sciatica        Time in: 500  Time Out: 600  Total Treatment Time: 60  Group Time: 0      Subjective:    Mane reports he is hurting again today.  Patient reports their pain to be 6/10 on a 0-10 scale with 0 being no pain and 10 being the worst pain imaginable.    Objective    Patient received individual therapy to increase strength, ROM, flexibility and core stabilization with 0 patients with activities as follows:     Mane received therapeutic exercises to develop strength, ROM, flexibility and core stabilization for 30 minutes including:     Bike 10 min  HSS, 3x30s  Piriformis st, 3x30s  Gastroc st, 3x30s  ITB st, 3x30s      Mane received the following manual therapy techniques: Soft tissue Mobilization were applied to the: R ITB for 10 minutes.     The patient received IFC and MHP to the lumbar spine x 15 min    Assessment:   Pt demonstrates more normalized gait pattern s/p STM to R ITB, which had increased tone and tenderness.     Pt will continue to benefit from skilled PT intervention. Medical Necessity is demonstrated by:  Pain limits function of effected part for some activities, Unable to participate fully in daily activities and Weakness.    Patient is making good progress towards established goals.    New/Revised goals: NA      Plan:  Continue with established Plan of Care towards PT goals.

## 2017-09-07 ENCOUNTER — PATIENT OUTREACH (OUTPATIENT)
Dept: OTHER | Facility: OTHER | Age: 62
End: 2017-09-07

## 2017-09-07 ENCOUNTER — LAB VISIT (OUTPATIENT)
Dept: LAB | Facility: HOSPITAL | Age: 62
End: 2017-09-07
Attending: INTERNAL MEDICINE
Payer: COMMERCIAL

## 2017-09-07 ENCOUNTER — OFFICE VISIT (OUTPATIENT)
Dept: PSYCHIATRY | Facility: CLINIC | Age: 62
End: 2017-09-07
Payer: COMMERCIAL

## 2017-09-07 DIAGNOSIS — F64.0 GENDER DYSPHORIA IN ADULT: ICD-10-CM

## 2017-09-07 DIAGNOSIS — R73.02 IGT (IMPAIRED GLUCOSE TOLERANCE): ICD-10-CM

## 2017-09-07 DIAGNOSIS — E78.5 DYSLIPIDEMIA: ICD-10-CM

## 2017-09-07 DIAGNOSIS — Z87.820 HISTORY OF TRAUMATIC BRAIN INJURY: ICD-10-CM

## 2017-09-07 DIAGNOSIS — F41.1 GAD (GENERALIZED ANXIETY DISORDER): Primary | ICD-10-CM

## 2017-09-07 DIAGNOSIS — R73.03 PREDIABETES: ICD-10-CM

## 2017-09-07 DIAGNOSIS — I10 ESSENTIAL HYPERTENSION: ICD-10-CM

## 2017-09-07 LAB
ALBUMIN SERPL BCP-MCNC: 3.8 G/DL
ALP SERPL-CCNC: 145 U/L
ALT SERPL W/O P-5'-P-CCNC: 164 U/L
ANION GAP SERPL CALC-SCNC: 10 MMOL/L
AST SERPL-CCNC: 115 U/L
BILIRUB DIRECT SERPL-MCNC: 0.2 MG/DL
BILIRUB SERPL-MCNC: 0.5 MG/DL
BUN SERPL-MCNC: 18 MG/DL
CALCIUM SERPL-MCNC: 9.8 MG/DL
CHLORIDE SERPL-SCNC: 105 MMOL/L
CHOLEST SERPL-MCNC: 241 MG/DL
CHOLEST/HDLC SERPL: 4.8 {RATIO}
CO2 SERPL-SCNC: 27 MMOL/L
CREAT SERPL-MCNC: 1.2 MG/DL
EST. GFR  (AFRICAN AMERICAN): >60 ML/MIN/1.73 M^2
EST. GFR  (NON AFRICAN AMERICAN): >60 ML/MIN/1.73 M^2
GLUCOSE SERPL-MCNC: 94 MG/DL
HDLC SERPL-MCNC: 50 MG/DL
HDLC SERPL: 20.7 %
LDLC SERPL CALC-MCNC: 168 MG/DL
NONHDLC SERPL-MCNC: 191 MG/DL
POTASSIUM SERPL-SCNC: 4.2 MMOL/L
PROT SERPL-MCNC: 7.6 G/DL
SODIUM SERPL-SCNC: 142 MMOL/L
TRIGL SERPL-MCNC: 115 MG/DL

## 2017-09-07 PROCEDURE — 83036 HEMOGLOBIN GLYCOSYLATED A1C: CPT

## 2017-09-07 PROCEDURE — 90834 PSYTX W PT 45 MINUTES: CPT | Mod: S$GLB,,, | Performed by: SOCIAL WORKER

## 2017-09-07 PROCEDURE — 80061 LIPID PANEL: CPT

## 2017-09-07 PROCEDURE — 36415 COLL VENOUS BLD VENIPUNCTURE: CPT

## 2017-09-07 PROCEDURE — 80048 BASIC METABOLIC PNL TOTAL CA: CPT

## 2017-09-07 PROCEDURE — 80076 HEPATIC FUNCTION PANEL: CPT

## 2017-09-07 NOTE — PROGRESS NOTES
Individual Psychotherapy (PhD/LCSW)    9/7/2017    Site:  [facility]         Therapeutic Intervention: Met with patient.  Outpatient - Insight oriented psychotherapy 45 min - CPT code 40299 and Outpatient - Supportive psychotherapy 45 min - CPT Code 96450    Chief complaint/reason for encounter: anxiety and interpersonal     Interval history and content of current session: Patient returned to the clinic today for follow up appointment.  He checks in as feeling calm today.  Reports that prescribed Prozac has dramatically helped his mood.  He feels less anxious.  Feeling more motivated to accomplish things.  Has been working on creating pendants for sale.  Reflects on ongoing interpersonal stressors.  Reports that his ex-girlfriend's sister continues to be a distraction in the house.  She is scheduled to return to her home at the end of this month.  Patient reports that she is a source of stress for his ex-girlfriend and him as well.  He is working on not being codependent with them.  Patient is planning on moving out of the home, when he is in a position to do so financially.  Planning on applying for disability.  Discussed stress management with patient, as well as coping skills and healthy lifestyle changes.               Treatment plan:  · Target symptoms: anxiety   · Why chosen therapy is appropriate versus another modality: relevant to diagnosis, patient responds to this modality  · Outcome monitoring methods: self-report, observation  · Therapeutic intervention type: insight oriented psychotherapy, supportive psychotherapy    Risk parameters:  Patient reports no suicidal ideation  Patient reports no homicidal ideation  Patient reports no self-injurious behavior  Patient reports no violent behavior    Verbal deficits: None    Patient's response to intervention:  The patient's response to intervention is accepting.    Progress toward goals and other mental status changes:  The patient's progress toward  goals is fair .    Diagnosis:     ICD-10-CM ICD-9-CM   1. JEEVAN (generalized anxiety disorder) F41.1 300.02       Plan:  individual psychotherapy and medication management by physician    Return to clinic: as scheduled    Length of Service (minutes): 45

## 2017-09-07 NOTE — PROGRESS NOTES
Last 5 Patient Entered [employer] Current 30 Day Average: 125/79     Recent Readings 9/19/2017 9/19/2017 9/19/2017 9/19/2017 9/18/2017    Systolic BP (mmHg) 108 118 106 99 123    Diastolic BP (mmHg) 68 70 66 69 80    Pulse 56 58 59 61 57          Mr. De La Rosa BP is well controlled with dose reduction of metoprolol to 50 mg QD about 6 weeks ago. He is not feeling well today as his BP and HR have been low all day. He has lost about 10 lbs recently. He takes all BP medications at night and is questioning if he should take them tonight. Instructed him to HOLD all BP medications today and resume taking them tomorrow night. Also instructed him to stop amlodipine; going forward he will take lisinopril 40 mg QD and metoprolol 50 mg QD. Asked that he call with any more concerns. He would like to continue working on reducing and eliminating antihypertensives.     Current HTN regimen:  Hypertension Medications             lisinopril (PRINIVIL,ZESTRIL) 40 MG tablet TAKE 1 TABLET (40 MG TOTAL) BY MOUTH ONCE DAILY.    metoprolol succinate (TOPROL-XL) 50 MG 24 hr tablet Take 1 tablet (50 mg total) by mouth once daily.          Will continue to monitor regularly. Will follow up in 2-3 weeks, sooner if BP begins to trend upward or downward.    Patient has my contact information and knows to call with any concerns or clinical changes.

## 2017-09-08 ENCOUNTER — CLINICAL SUPPORT (OUTPATIENT)
Dept: REHABILITATION | Facility: HOSPITAL | Age: 62
End: 2017-09-08
Attending: FAMILY MEDICINE
Payer: COMMERCIAL

## 2017-09-08 DIAGNOSIS — M54.41 ACUTE BILATERAL LOW BACK PAIN WITH RIGHT-SIDED SCIATICA: ICD-10-CM

## 2017-09-08 LAB
ESTIMATED AVG GLUCOSE: 117 MG/DL
HBA1C MFR BLD HPLC: 5.7 %

## 2017-09-08 PROCEDURE — 97014 ELECTRIC STIMULATION THERAPY: CPT | Mod: PN

## 2017-09-08 PROCEDURE — 97010 HOT OR COLD PACKS THERAPY: CPT | Mod: PN

## 2017-09-08 PROCEDURE — 97140 MANUAL THERAPY 1/> REGIONS: CPT | Mod: PN

## 2017-09-08 PROCEDURE — 97110 THERAPEUTIC EXERCISES: CPT | Mod: PN

## 2017-09-08 NOTE — PROGRESS NOTES
"Name: Mane Dodd [facility] Number: [id number]  Date of Treatment: 09/08/2017   Diagnosis:   Encounter Diagnosis   Name Primary?    Acute bilateral low back pain with right-sided sciatica        Time in: 1203  Time Out: 1315  Total Treatment Time: 72      Subjective:    Mane reports "I don't like that green bar yall used on me, that hurt so bad that evening, although my pain feels better now."  Patient reports their pain to be 4/10 on a 0-10 scale with 0 being no pain and 10 being the worst pain imaginable.    Objective  Mane received therapeutic exercises to develop strength, endurance, ROM, flexibility, posture and core stabilization for 45 minutes including:   Bike 10 min  HSS, 3x30s R/L  Piriformis st, 3x30s R/L  Gastroc st, 3x30s B  ITB st, 3x30s R/L  Posterior pelvic tilt 3/10 5"    MT in form of STM to R ITB x 10' with therastick and hands.    Written Home Exercises Provided: Cont with HEP  Pt demo good understanding of the education provided. Mane demonstrated good return demonstration of activities.     Assessment:   Patient remains with very tight ITB on R side, reinforced importance of HEP and stretching.    Pt will continue to benefit from skilled PT intervention. Medical Necessity is demonstrated by:  Continued inability to participate in vocational pursuits, Pain limits function of effected part for some activities, Unable to participate fully in daily activities, Requires skilled supervision to complete and progress HEP and Weakness.    Patient is making good progress towards established goals.    Plan:  Continue with established Plan of Care towards PT goals.   "

## 2017-09-09 ENCOUNTER — PATIENT MESSAGE (OUTPATIENT)
Dept: FAMILY MEDICINE | Facility: CLINIC | Age: 62
End: 2017-09-09

## 2017-09-09 DIAGNOSIS — R79.89 ELEVATED LFTS: Primary | ICD-10-CM

## 2017-09-11 ENCOUNTER — PATIENT MESSAGE (OUTPATIENT)
Dept: PSYCHIATRY | Facility: CLINIC | Age: 62
End: 2017-09-11

## 2017-09-11 NOTE — TELEPHONE ENCOUNTER
Notified pt of provider instructions. Pt stated verbal understanding. Labs scheduled. Time and date confirmed with pt.

## 2017-09-12 ENCOUNTER — CLINICAL SUPPORT (OUTPATIENT)
Dept: REHABILITATION | Facility: HOSPITAL | Age: 62
End: 2017-09-12
Attending: FAMILY MEDICINE
Payer: COMMERCIAL

## 2017-09-12 ENCOUNTER — LAB VISIT (OUTPATIENT)
Dept: LAB | Facility: HOSPITAL | Age: 62
End: 2017-09-12
Attending: FAMILY MEDICINE
Payer: COMMERCIAL

## 2017-09-12 ENCOUNTER — PATIENT OUTREACH (OUTPATIENT)
Dept: OTHER | Facility: OTHER | Age: 62
End: 2017-09-12

## 2017-09-12 DIAGNOSIS — G56.03 BILATERAL CARPAL TUNNEL SYNDROME: ICD-10-CM

## 2017-09-12 DIAGNOSIS — R79.89 ELEVATED LFTS: ICD-10-CM

## 2017-09-12 DIAGNOSIS — M54.2 CERVICALGIA: ICD-10-CM

## 2017-09-12 DIAGNOSIS — M54.40 CHRONIC RIGHT-SIDED LOW BACK PAIN WITH SCIATICA, SCIATICA LATERALITY UNSPECIFIED: ICD-10-CM

## 2017-09-12 DIAGNOSIS — G89.29 CHRONIC RIGHT-SIDED LOW BACK PAIN WITH SCIATICA, SCIATICA LATERALITY UNSPECIFIED: ICD-10-CM

## 2017-09-12 LAB
ALBUMIN SERPL BCP-MCNC: 4 G/DL
ALP SERPL-CCNC: 139 U/L
ALT SERPL W/O P-5'-P-CCNC: 69 U/L
AST SERPL-CCNC: 31 U/L
BILIRUB DIRECT SERPL-MCNC: 0.2 MG/DL
BILIRUB SERPL-MCNC: 0.6 MG/DL
PROT SERPL-MCNC: 7.6 G/DL

## 2017-09-12 PROCEDURE — 97110 THERAPEUTIC EXERCISES: CPT | Mod: PN | Performed by: PHYSICAL THERAPIST

## 2017-09-12 PROCEDURE — 97014 ELECTRIC STIMULATION THERAPY: CPT | Mod: PN | Performed by: PHYSICAL THERAPIST

## 2017-09-12 PROCEDURE — 80074 ACUTE HEPATITIS PANEL: CPT

## 2017-09-12 PROCEDURE — 97140 MANUAL THERAPY 1/> REGIONS: CPT | Mod: PN | Performed by: PHYSICAL THERAPIST

## 2017-09-12 PROCEDURE — 97010 HOT OR COLD PACKS THERAPY: CPT | Mod: PN | Performed by: PHYSICAL THERAPIST

## 2017-09-12 PROCEDURE — 36415 COLL VENOUS BLD VENIPUNCTURE: CPT

## 2017-09-12 PROCEDURE — 80076 HEPATIC FUNCTION PANEL: CPT

## 2017-09-13 ENCOUNTER — PATIENT MESSAGE (OUTPATIENT)
Dept: FAMILY MEDICINE | Facility: CLINIC | Age: 62
End: 2017-09-13

## 2017-09-13 LAB
HAV IGM SERPL QL IA: NEGATIVE
HBV CORE IGM SERPL QL IA: NEGATIVE
HBV SURFACE AG SERPL QL IA: NEGATIVE
HCV AB SERPL QL IA: NEGATIVE

## 2017-09-13 NOTE — PROGRESS NOTES
Name: Mane Dodd [facility] Number: [id number]  Date of Treatment: 9/12/17  Diagnosis:   Encounter Diagnoses   Name Primary?    Bilateral carpal tunnel syndrome     Cervicalgia     Chronic right-sided low back pain with sciatica, sciatica laterality unspecified        Time in: 1200  Time Out: 100  Total Treatment Time: 60  Group Time: 0      Subjective:    Mane reports his plantar fascia has been acting up and it is making his back pain worse. Pt would like to learn taping techniques for the plantar fascia.  Patient reports their pain to be 6/10 on a 0-10 scale with 0 being no pain and 10 being the worst pain imaginable.    Objective    Patient received individual therapy to increase strength, ROM, flexibility and core stabilization with 0 patients with activities as follows:     Mane received therapeutic exercises to develop strength, ROM, flexibility and core stabilization for 25 minutes including:     Nustep, 10 min  HSS, 3x30s  Piriformis st, 3x30s  Gastroc st, 3x30s  SKTC to opp shoulder, 3x30s  Plantar fascia st, R foot, 3x30s      Mane received the following manual therapy techniques: Soft tissue Mobilization were applied to the: R ITB/piriformis for 13 minutes.     The patient received MHP and IFC to lumbar paraspinals x 15 min    Assessment:   Persistent recurrence of plantar fasciitis is likely contributing factor to his gait abnormalities and SIJ pain. Pt may benefit from PT to address foot pain and dysfunction to alleviate back pain.     Pt will continue to benefit from skilled PT intervention. Medical Necessity is demonstrated by:  Pain limits function of effected part for some activities, Unable to participate fully in daily activities and Weakness.    Patient is making good progress towards established goals.    New/Revised goals: NA      Plan:  Continue with established Plan of Care towards PT goals.

## 2017-09-14 ENCOUNTER — PATIENT MESSAGE (OUTPATIENT)
Dept: FAMILY MEDICINE | Facility: CLINIC | Age: 62
End: 2017-09-14

## 2017-09-14 ENCOUNTER — CLINICAL SUPPORT (OUTPATIENT)
Dept: REHABILITATION | Facility: HOSPITAL | Age: 62
End: 2017-09-14
Attending: FAMILY MEDICINE
Payer: COMMERCIAL

## 2017-09-14 ENCOUNTER — OFFICE VISIT (OUTPATIENT)
Dept: CARDIOLOGY | Facility: CLINIC | Age: 62
End: 2017-09-14
Payer: COMMERCIAL

## 2017-09-14 VITALS
DIASTOLIC BLOOD PRESSURE: 61 MMHG | HEART RATE: 67 BPM | BODY MASS INDEX: 25.56 KG/M2 | HEIGHT: 65 IN | WEIGHT: 153.44 LBS | OXYGEN SATURATION: 98 % | SYSTOLIC BLOOD PRESSURE: 109 MMHG

## 2017-09-14 DIAGNOSIS — E78.5 DYSLIPIDEMIA: Primary | ICD-10-CM

## 2017-09-14 DIAGNOSIS — I10 ESSENTIAL HYPERTENSION: ICD-10-CM

## 2017-09-14 DIAGNOSIS — M54.41 ACUTE BILATERAL LOW BACK PAIN WITH RIGHT-SIDED SCIATICA: ICD-10-CM

## 2017-09-14 DIAGNOSIS — R79.89 ELEVATED LFTS: Primary | ICD-10-CM

## 2017-09-14 PROCEDURE — 3074F SYST BP LT 130 MM HG: CPT | Mod: S$GLB,,, | Performed by: INTERNAL MEDICINE

## 2017-09-14 PROCEDURE — 3008F BODY MASS INDEX DOCD: CPT | Mod: S$GLB,,, | Performed by: INTERNAL MEDICINE

## 2017-09-14 PROCEDURE — 99999 PR PBB SHADOW E&M-EST. PATIENT-LVL IV: CPT | Mod: PBBFAC,,, | Performed by: INTERNAL MEDICINE

## 2017-09-14 PROCEDURE — 97110 THERAPEUTIC EXERCISES: CPT | Mod: PN | Performed by: PHYSICAL THERAPIST

## 2017-09-14 PROCEDURE — 99215 OFFICE O/P EST HI 40 MIN: CPT | Mod: S$GLB,,, | Performed by: INTERNAL MEDICINE

## 2017-09-14 PROCEDURE — 97140 MANUAL THERAPY 1/> REGIONS: CPT | Mod: PN | Performed by: PHYSICAL THERAPIST

## 2017-09-14 PROCEDURE — 3078F DIAST BP <80 MM HG: CPT | Mod: S$GLB,,, | Performed by: INTERNAL MEDICINE

## 2017-09-14 PROCEDURE — 97014 ELECTRIC STIMULATION THERAPY: CPT | Mod: PN | Performed by: PHYSICAL THERAPIST

## 2017-09-14 NOTE — PROGRESS NOTES
[facility]    CC: Follow Up    Patient ID: Mane Ramires is a 61 y.o. 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  He was kindly referred by Dr. Gordon.  Pertinent history/events are as follows:       -Pt was seen by Dr. Monsalve in [place] on 5/26/2016 for cardiac evaluation due to abnormal EKG and palpitations. He was seen in 2014 for the same EKG abnormality and had a stress test done in 12/14 in [place] that reported normal myocardial perfusion and normal LVEF. Recently he has been experiencing frequent skipped beats on a daily basis.   -Holter monitor from 6/22/2016 unremarkable.  Showed predominant rhythm to be sinus with heart rates varying between 54 and 120 bpm with an average of 83 bpm.  No VT or VF.  No evidence of high grade SA or AV eric block.    -Echo from 6/23/2016 shows normal LVEF of 60-65%; left ventricular diastolic dysfunction. There is evidence of a left to right shunt across the atrial septum on color Doppler consistent with a small secundum ASD. Qp/QS= 1.2.  The atrial septum is  thickened c/w lipomatous hypertrophy.  -Started on Metoprolol succinate 100 mg daily with good control of the palpitations.     -At our initial appointment on 3/17/2017, Mr. Ramires reports occasional palpitations (once or twice a week).  He has no chest pain, SOB, LE edema, PND, claudication, TIA symptoms or syncope.  Taking all medications as prescribed with no issues.  BP today 146/71.  Plan:  Echo shows a small secundum ASD. Qp/QS= 1.2.  Continue Metoprolol succinate 100 mg daily.  Pt to keep log of home blood pressure/heart rate and bring in next visit.  Given strong family history and risk factors, will schedule for exercise nuclear stress test.  Check lipids/LFT's and plan to start statin next visit.    HPI:  Mr. Ramires reports doing well.  Palpitations  occurring less frequently now (approximately three times a week vs. once or twice a week previously).  He has no chest pain, SOB, LE edema, PND, claudication, TIA symptoms or syncope.  Metoprolol succinate reduced to 50 mg daily by his PCP.  ASCVD risk is 3.6%.  Pt recently started on statin and had increase in LFT's.  Statin stopped and LFT's now improving.  BP today 109/61.  Nuclear stress test from 4/12/2017 shows no evidence for myocardial ischemia or injury. Normal LVEF of 53%.      Past Medical History:   Diagnosis Date    Accident     fell from roof with TBI (word finding issues personality changes, memory deficets), R rib fractures, clavicle fx    Allergy     ASD (atrial septal defect)     noted on ECHO 6/16    Cancer     pre-cancerous cells in breast tissue    Cervical stenosis of spine     noted on 6/15 MRI    CTS (carpal tunnel syndrome)     mild-mod on 4/17 NCS    DDD (degenerative disc disease), cervical 10/2014    C5-6 and C6-C7    Gender identity disorder     H/O cardiovascular stress test     12/14 normal    Heart murmur 05/26/2016    Herpes simplex type 2 infection     Hypertension     IGT (impaired glucose tolerance)     Myelomalacia     c-spine noted on 8/15 MRI    OA (osteoarthritis)     Prediabetes     TBI (traumatic brain injury)     after falling off a roof in 2003     Past Surgical History:   Procedure Laterality Date    AMPUTATION      L index finger    APPENDECTOMY  1969    BILATERAL SALPINGOOPHORECTOMY  02/2015    CERVICAL FUSION  10/2014    C5-C6 and C6-C7    HYSTERECTOMY  1984    MIGUEL    MASTECTOMY  02/2015     Social History     Social History    Marital status: Single     Spouse name: N/A    Number of children: N/A    Years of education: N/A     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    Smoking status: Former Smoker    Smokeless tobacco: Never Used    Alcohol use Yes      Comment: rare     Drug use: No    Sexual activity: Yes      Partners: Female     Other Topics Concern    Not on file     Social History Narrative    No narrative on file     Family History   Problem Relation Age of Onset    Diabetes Mother     Heart disease Mother     Coronary artery disease Father     Breast cancer Sister     Lung cancer Paternal Grandfather     Heart disease Paternal Grandfather     Heart disease Maternal Grandmother     Heart attack Brother        Review of patient's allergies indicates:   Allergen Reactions    Sudafed [pseudoephedrine hcl] Other (See Comments)     Heart racing      Codeine Other (See Comments)     Heart racing    Hydrocodone Other (See Comments)     insomnia    Cortisone Palpitations       Medication List with Changes/Refills   Current Medications    AMLODIPINE (NORVASC) 5 MG TABLET    TAKE 1 TABLET BY MOUTH EVERY DAY    ESOMEPRAZOLE (NEXIUM) 40 MG CAPSULE    Take 1 capsule (40 mg total) by mouth before breakfast.    FLUOXETINE (PROZAC) 20 MG CAPSULE    Take 20 mg daily, after 2 weeks if tolerating increase to 40 mg daily.    JUBLIA 10 % ROHIT    APPLY 1 DROP TO ALL SMALLER TOES AND 2 DROPS TO GREATER TOES DAILY    LEVOCETIRIZINE (XYZAL) 5 MG TABLET    TAKE 1 TABLET (5 MG TOTAL) BY MOUTH NIGHTLY AS NEEDED FOR ALLERGIES.    LISINOPRIL (PRINIVIL,ZESTRIL) 40 MG TABLET    TAKE 1 TABLET (40 MG TOTAL) BY MOUTH ONCE DAILY.    LYRICA 100 MG CAPSULE    TAKE ONE CAPSULE BY MOUTH TWICE A DAY    METOPROLOL SUCCINATE (TOPROL-XL) 50 MG 24 HR TABLET    Take 1 tablet (50 mg total) by mouth once daily.    RANITIDINE (ZANTAC) 300 MG TABLET    Take 1 tablet (300 mg total) by mouth every evening.    TESTOSTERONE CYPIONATE (DEPOTESTOTERONE CYPIONATE) 200 MG/ML INJECTION    INJECT 1ML INTO THE MUSCLE EVERY 14 DAYS    TRAMADOL (ULTRAM) 50 MG TABLET    Take 1 tablet (50 mg total) by mouth 2 (two) times daily as needed.       Review of Systems  Constitution: Negative for diaphoresis and fever.   HENT: Negative for hearing loss and hoarse voice.  "  Eyes: Negative for redness.   Cardiovascular: Positive for occasional palpitations. Negative for leg swelling and syncope.   Respiratory: Negative for cough and wheezing.   Endocrine: Negative for cold intolerance.   Hematologic/Lymphatic: Does not bruise/bleed easily.   Skin: Positive for flushing.   Musculoskeletal: Positive for joint pain. Negative for joint swelling.   Gastrointestinal: Negative for abdominal pain and vomiting.   Genitourinary: Negative for hematuria.   Neurological: Negative for seizures.   Psychiatric/Behavioral: The patient is not nervous/anxious.   Allergic/Immunologic: Negative for hives.     Physical Examination  /61 (BP Location: Left arm)   Pulse 67   Ht 5' 5" (1.651 m)   Wt 69.6 kg (153 lb 7 oz)   SpO2 98%   BMI 25.53 kg/m²     Constitutional: No acute distress, conversant  HEENT: Sclera anicteric, Pupils equal, round and reactive to light, extraocular motions intact, Oropharynx clear  Neck: No JVD, no carotid bruits  Cardiovascular: regular rate and rhythm, no murmur, rubs or gallops, normal S1/S2  Pulmonary: Clear to auscultation bilaterally  Abdominal: Abdomen soft, nontender, nondistended, positive bowel sounds  Extremities: No lower extremity edema,   Pulses:  Carotid pulses are 2+ on the right side, and 2+ on the left side.  Radial pulses are 2+ on the right side, and 2+ on the left side.   Femoral pulses are 2+ on the right side, and 2+ on the left side.  Skin: No ecchymosis, erythema, or ulcers  Psych: Alert and oriented x 3, appropriate affect  Neuro: CNII-XII intact, no focal deficits    Labs:  Most Recent Data  CBC:   Lab Results   Component Value Date    WBC 9.91 06/29/2017    HGB 16.5 06/29/2017    HCT 48.1 06/29/2017     06/29/2017    MCV 91 06/29/2017    RDW 12.4 06/29/2017     BMP:   Lab Results   Component Value Date     09/07/2017    K 4.2 09/07/2017     09/07/2017    CO2 27 09/07/2017    BUN 18 09/07/2017    GLU 94 09/07/2017    CALCIUM " 9.8 09/07/2017    MG 2.2 05/18/2016     LFTS;   Lab Results   Component Value Date    PROT 7.6 09/12/2017    ALBUMIN 4.0 09/12/2017    BILITOT 0.6 09/12/2017    AST 31 09/12/2017    ALKPHOS 139 (H) 09/12/2017    ALT 69 (H) 09/12/2017     COAGS: No results found for: INR, PROTIME, PTT  FLP:   Lab Results   Component Value Date    CHOL 241 (H) 09/07/2017    HDL 50 09/07/2017    LDLCALC 168.0 (H) 09/07/2017    TRIG 115 09/07/2017    CHOLHDL 20.7 09/07/2017       EKG 4/12/2017:  Normas sinus rhythm  Nonspecific ST/T wave changes    Nuclear Stress Test 4/12/2017:  Nuclear Quantitative Functional Analysis:   LVEF: 53 % (normal is 47 - 59)  LVED Volume: 66 ml (normal is 91 - 155)  LVES Volume: 31 ml (normal is 40 - 78)    Impression: NORMAL MYOCARDIAL PERFUSION  1. The perfusion scan is free of evidence for myocardial ischemia or injury.   2. Resting wall motion is physiologic.   3. Resting LV function is normal.  (normal is 47 - 59)  4. The ventricular volumes are normal at rest and stress.   5. The extracardiac distribution of radioactivity is normal.     Echo 6/23/2016:  Technical Quality: This is a technically adequate study.     Aorta: The aortic root is normal in size, measuring 2.6 cm at sinotubular junction.     Left Atrium: The left atrium is normal in size, measuring 3.7 cm across in the parasternal view, and 4.1 cm across in the apical view.     Left Ventricle: The left ventricle is normal in size, with an end-diastolic diameter of 4.3 cm, and an end-systolic diameter of 3.1 cm. LV wall thickness is normal, with the septum measuring 1.1 cm and the posterior wall measuring 0.8 cm across. Global   left ventricular systolic function appears normal. Visually estimated ejection fraction is 60-65%.   Mitral inflow patterns reveal an E:A ratio of 0.7, with a deceleration time of 200 msec., consistent with diastolic dysfunction secondary to relaxation abnormality.     Right Atrium: The right atrium is normal in size.  "There is Chiari network in the right atrium.    Right Ventricle: The right ventricle is normal in size measuring 2.3 cm at the base in the apical right ventricle-focused view. Global right ventricular systolic function appears normal. The estimated PA systolic pressure is greater than 19 mmHg.     Aortic Valve:  The aortic valve has normal leaflet mobility.     Mitral Valve:  The mitral valve is thickened. There is mild mitral regurgitation.     Tricuspid Valve:  The tricuspid valve is normal in structure. There is mild tricuspid regurgitation.     Pulmonary Valve:  The pulmonic valve is normal in structure. There is trivial pulmonic regurgitation.     Pericardium: There is evidence of a trivial pericardial effusion.     IVC: The IVC is not visualized.     Shunt: There is evidence of a left to right shunt across the atrial septum on color Doppler consistent with a small secundum ASD. Qp/QS= 1.2.  The atrial septum is thickened c/w lipomatous hypertrophy.    Intracavitary: There is no evidence of intracavity mass, thrombi, or vegetation.     CONCLUSIONS     1 - Normal left ventricular systolic function (EF 60-65%).     2 - Left ventricular diastolic dysfunction.     3 - Normal right ventricular systolic function .     4 - Small interatrial communication as above.    24 hour holter 6/22/2016:  PRE-TEST DATA   The diary was properly completed.    The diary included "heart beating hard/fast" on multiple occasions which correlated with sinus rhythm.    TEST DESCRIPTION   PREDOMINANT RHYTHM  1. Sinus rhythm with heart rates varying between 54 and 120 bpm with an average of 83 bpm.     VENTRICULAR ARRHYTHMIAS  1. There were rare PVCs totalling 6 and averaging less than 1 per hour.     2. There were no episodes of ventricular tachycardia.    SUPRA VENTRICULAR ARRHYTHMIAS  1. There were occasional PACs totalling 25 and averaging 1 per hour.     2. There were no episodes of sustained supraventricular tachycardia.    SINUS " NODE FUNCTION  1. There was no evidence of high grade SA eric block.     AV CONDUCTION  1. There was no evidence of high grade AV block.     DIARY  1. The diary was properly completed.     MISCELLANEOUS  1. There were rare hookup related artifacts. Overall, the study was of good quality.     2. This was a tape of adequate length (24 hrs).     Assessment/Plan:  Mane Ramires is a 61 y.o. 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    1. Palpitations- Occurring less frequently now (approximately three times a week vs. once or twice a week previously).  Echo shows a small secundum ASD. Qp/QS= 1.2.  Continue Metoprolol succinate 50 mg daily.       2. HTN- Continue current regimen.     3. ASCVD risk- Calculated ASCVD risk of 3.6%.  Given recent elevation in LFT's with atorvastatin, will hold off of restarting a statin at this time.  Repeat LFT's in 3 months.  Nuclear stress test from 4/12/2017 shows no evidence for myocardial ischemia or injury. Normal LVEF of 53%.       Follow up in 3 months with CMP prior      Total duration of face to face visit time 30 minutes.  Total time spent counseling greater than fifty percent of total visit time.  Counseling included discussion regarding imaging findings, diagnosis, possibilities, treatment options, risks and benefits.  The patient had many questions regarding the options and long-term effects.    Davian Art MD, PhD  Interventional Cardiology

## 2017-09-18 NOTE — PROGRESS NOTES
Name: Mane Dodd [facility] Number: [id number]  Date of Treatment: 9/14/17  Diagnosis:   Encounter Diagnosis   Name Primary?    Acute bilateral low back pain with right-sided sciatica        Time in: 1100  Time Out: 1200  Total Treatment Time: 60  Group Time: 0      Subjective:    Mane reports he is feeling the same, even though he is walking without an antalgic limp today.  Patient reports their pain to be 6/10 on a 0-10 scale with 0 being no pain and 10 being the worst pain imaginable.    Objective    Patient received individual therapy to increase strength, ROM, flexibility and core stabilization with 0 patients with activities as follows:     Mane received therapeutic exercises to develop strength, ROM, flexibility and core stabilization for 30 minutes including:     Nustep, L4, 10 min  HSS, 3x30s   Piriformis st, 3x30s  SKC to opp shld, 3x30s  Bridging, 2x10  PPT, [id number]      Mane received the following manual therapy techniques: Soft tissue Mobilization were applied to the: STM to R SIJ and R ITB for 13 minutes.     The patient received IFC and MHP to the lumbar paraspinals x 15 min    Assessment:   Pt is more consistently demonstrating a normalized gait pattern.     Pt will continue to benefit from skilled PT intervention. Medical Necessity is demonstrated by:  Pain limits function of effected part for some activities, Unable to participate fully in daily activities and Weakness.    Patient is making good progress towards established goals.    New/Revised goals: NA      Plan:  Continue with established Plan of Care towards PT goals.

## 2017-09-19 ENCOUNTER — CLINICAL SUPPORT (OUTPATIENT)
Dept: REHABILITATION | Facility: HOSPITAL | Age: 62
End: 2017-09-19
Attending: FAMILY MEDICINE
Payer: COMMERCIAL

## 2017-09-19 DIAGNOSIS — M54.41 ACUTE BILATERAL LOW BACK PAIN WITH RIGHT-SIDED SCIATICA: ICD-10-CM

## 2017-09-19 PROCEDURE — 97110 THERAPEUTIC EXERCISES: CPT | Mod: PN | Performed by: PHYSICAL THERAPIST

## 2017-09-19 PROCEDURE — 97010 HOT OR COLD PACKS THERAPY: CPT | Mod: PN | Performed by: PHYSICAL THERAPIST

## 2017-09-26 ENCOUNTER — HOSPITAL ENCOUNTER (OUTPATIENT)
Dept: PSYCHIATRY | Facility: HOSPITAL | Age: 62
Discharge: HOME | End: 2017-09-26
Attending: PSYCHIATRY & NEUROLOGY
Payer: COMMERCIAL

## 2017-09-26 VITALS
RESPIRATION RATE: 18 BRPM | HEIGHT: 65 IN | DIASTOLIC BLOOD PRESSURE: 67 MMHG | BODY MASS INDEX: 25.19 KG/M2 | HEART RATE: 68 BPM | SYSTOLIC BLOOD PRESSURE: 100 MMHG | TEMPERATURE: 98 F | WEIGHT: 151.19 LBS

## 2017-09-26 DIAGNOSIS — S06.9X0S TBI (TRAUMATIC BRAIN INJURY), WITHOUT LOC, SEQUELA: ICD-10-CM

## 2017-09-26 DIAGNOSIS — F32.A DEPRESSION, UNSPECIFIED DEPRESSION TYPE: Primary | ICD-10-CM

## 2017-09-26 LAB
AMPHET+METHAMPHET UR QL: NEGATIVE
BARBITURATES UR QL SCN>200 NG/ML: NEGATIVE
BENZODIAZ UR QL SCN>200 NG/ML: NEGATIVE
BZE UR QL SCN: NEGATIVE
CANNABINOIDS UR QL SCN: NEGATIVE
CREAT UR-MCNC: 264 MG/DL
ETHANOL UR-MCNC: <10 MG/DL
METHADONE UR QL SCN>300 NG/ML: NEGATIVE
OPIATES UR QL SCN: NEGATIVE
PCP UR QL SCN>25 NG/ML: NEGATIVE
TOXICOLOGY INFORMATION: NORMAL

## 2017-09-26 PROCEDURE — 90853 GROUP PSYCHOTHERAPY: CPT | Mod: ,,, | Performed by: PSYCHOLOGIST

## 2017-09-26 PROCEDURE — 99223 1ST HOSP IP/OBS HIGH 75: CPT | Mod: ,,, | Performed by: PSYCHIATRY & NEUROLOGY

## 2017-09-26 PROCEDURE — 90853 GROUP PSYCHOTHERAPY: CPT

## 2017-09-26 PROCEDURE — 80307 DRUG TEST PRSMV CHEM ANLYZR: CPT

## 2017-09-26 NOTE — PLAN OF CARE
09/26/17 1100   Activity/Group Therapy Checklist   Group Educational   Attendance Attended   Follows Direction Followed directions   Group Interactions/Observations Interacted appropriately   Affect/Mood Range Normal range   Affect/Mood Display Appropriate   Goal Progression Progressing

## 2017-09-26 NOTE — TREATMENT PLAN
"[facility]  BEHAVIORAL MEDICINE UNIT  INTERDISCIPLINARY TREATMENT PLAN  INTENSIVE OUTPATIENT PROGRAM    INTERDISCIPLINARY  TREATMENT TEAM:    Bakari Dubose M.D., Psychiatrist      Dina Abarca, Ph.D., Clinical Psychologist    Nadja Blackburn R.N., Registered Nurse    [place]asia Carv[place]al, [facility] [place] [facility] [place]       Resident:    (Signatures scanned into record separately).      ESTIMATED LOS:  2 weeks      The patient has reviewed the treatment plan with staff and has signed the "Patient Responsibilities" form.    (Patient signature scanned into record separately).      TREATMENT PLAN    DIAGNOSIS:      Patient Education Needs/Barriers to Learning (i.e., Language, Reading, Comprehension): None        Support/Advocacy Services/Needs (i.e., Financial, Transportation, Medications): None        Community Resources (i.e., Alcoholics Anonymous, Al Anon): None  Patients Identified Goals:  1. To learn how to stand up for myself  2. Build self esteem  3.  4.    Coping Skills:  1. Go to my room and be alone  2.   3.  4.        Strengths:  1. Compassion  2.  3.  4.      Limitations:  1. Living with my ex  2.  3.  4.      Goals and Objectives:  1. Goal: Attend and participate in all groups   Objective measure: Progress notes indicating active listening,    self-disclosure, feedback   Time frame to reach goal: Each day    2. Goal: Medication management   Objective measure: Physician progress note indicating improved medication   status   Time frame to reach goal: By discharge    3. Goal: Reduce depression   Objective measure: Physician progress note indicating depression is improved   Time frame to reach goal: By discharge    4.  Goal: Reduce anxiety   Objective measure: Physician progress note indicating anxiety is improved   Time frame to reach goal: By discharge    5. Goal: Develop stress coping skills   Objective measure: Patient self-report of improved coping   Time " frame to reach goal: By discharge    6.  Goal: Address health problems   Objective measure: Physician progress note regarding management of health   problems   Time frame to reach goal: Within first week of treatment    7. Goal: Assess level of pain   Objective measure: Physician progress note regarding patient's self-reported pain   Time frame to reach goal: Within first week of treatment    8. Goal:    Objective measure:    Time frame to reach goal:    9. Goal:    Objective measure:    Time frame to reach goal:     10. Goal:    Objective measure:    Time frame to reach goal:       Group Interventions:    Psychodynamic Group Psychotherapy - 1 hour, 5 times per week  Goals: 1. Utilize group empathy and support for problem solving; 2. Apply stress management, communication, and assertiveness skills to personal issues; 3. Discuss mental health symptoms and explore strategies for coping; 4. Discuss ways to change lifestyle to maintain better emotional health.    Communication Skills - 1 hour, 2 times per week  Goals: 1. Learn rules of effective communication; 2. Improve listening skills; 3. Practice clear communication.    Nutrition and Health - 1 hour, 1 time per week  Goals: 1. Explore impact that nutrition has on health; 2. Identify positive nutritional choices; 3. Explore how nutrition relates to stress tolerance.    Personal Growth - 1 hour, 2 times per week  Goals: 1. Discuss the development of self; 2. Create personal awareness and insight; 3. Explore a variety of psycho-educational techniques.    Promoting Healthy Lifestyles - 1 hour, 1 time per week  Goals: 1. Understand the Biopsychosocial Model of Health; 2. Develop insight into how mental health can impact other dimensions of health; 3. Develop appropriate health promotion strategies.    Rational Emotive Therapy (RET) - 1 hour, 1 time per week  Goals: 1. Learn an action-oriented psychotherapy called RET; 2. Focus on identifying, challenging, and replacing  self-defeating thoughts and beliefs; 3. Explore how healthier thinking can lead to healthier emotional well-being.    Relationship Dynamics - 1 hour, 1 time per week  Goals: 1. Learn about factors that shape relationships; 2. Understand the central role of relationships in personal well-being; 3. Learn how to improve all relationships.    Relaxation Training - 1 hour, 3 times per week  Goals: 1. Learn about and implement various techniques for releasing physical tension from the body.    Spirituality - 1 hour, 1 time per week  Goals: 1. Discuss and reflect on the process of seeking peace and comfort; 2. Identify healthy ways of exploring spirituality.    Stress Management - 1 hour, 4 times per week  Goals: 1. Identify types and levels of stress; 2. Identify and change maladaptive beliefs and behaviors; 3. Identify and practice techniques of stress management.
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
## 2017-09-26 NOTE — PROGRESS NOTES
Group Psychotherapy (PhD/LCSW)    Site: [facility]    Clinical status of patient: Intensive Outpatient Program (IOP)    Date: 9/26/2017    Group Focus: Psychodynamic Group Psychotherapy    Length of service: 19990 - 45-50 minutes    Number of patients in attendance: 6    Referred by: Behavioral Medicine Unit Treatment Team    Target symptoms: Depression    Patient's response to treatment: Active Listening, Self-disclosure and Feedback given to another patient    Progress toward goals: Progressing adequately    Interval History: Pt introduced himself appropriately to the group.    Diagnosis: Depression    Plan: Continue treatment on [facility]

## 2017-09-27 ENCOUNTER — HOSPITAL ENCOUNTER (OUTPATIENT)
Dept: PSYCHIATRY | Facility: HOSPITAL | Age: 62
Discharge: HOME | End: 2017-09-27
Attending: PSYCHIATRY & NEUROLOGY
Payer: COMMERCIAL

## 2017-09-27 VITALS — RESPIRATION RATE: 18 BRPM | DIASTOLIC BLOOD PRESSURE: 61 MMHG | SYSTOLIC BLOOD PRESSURE: 114 MMHG | HEART RATE: 68 BPM

## 2017-09-27 DIAGNOSIS — F32.A DEPRESSION, UNSPECIFIED DEPRESSION TYPE: Primary | ICD-10-CM

## 2017-09-27 DIAGNOSIS — S06.9X0S TBI (TRAUMATIC BRAIN INJURY), WITHOUT LOC, SEQUELA: ICD-10-CM

## 2017-09-27 PROCEDURE — 90853 GROUP PSYCHOTHERAPY: CPT | Mod: ,,, | Performed by: PSYCHIATRY & NEUROLOGY

## 2017-09-27 PROCEDURE — 90853 GROUP PSYCHOTHERAPY: CPT | Mod: 59,,, | Performed by: PSYCHOLOGIST

## 2017-09-27 PROCEDURE — 90853 GROUP PSYCHOTHERAPY: CPT | Mod: ,,, | Performed by: PSYCHOLOGIST

## 2017-09-27 PROCEDURE — 90853 GROUP PSYCHOTHERAPY: CPT

## 2017-09-27 NOTE — PROGRESS NOTES
Group Psychotherapy (PhD/LCSW)    Site: [facility]    Clinical status of patient: Intensive Outpatient Program (IOP)    Date: 9/27/2017    Group Focus: Stress Management    Length of service: 76453 - 45-50 minutes    Number of patients in attendance: 10    Referred by: Behavioral Medicine Unit Treatment Team    Target symptoms: Depression    Patient's response to treatment: Active Listening and Self-disclosure    Progress toward goals: Progressing adequately    Interval History:  Identified sources of stress using the Stress Map.    Diagnosis: depression    Plan: Continue treatment on [facility]

## 2017-09-27 NOTE — PROGRESS NOTES
Group Psychotherapy (PhD/LCSW)    Site: [facility]    Clinical status of patient: Intensive Outpatient Program (IOP)    Date: 9/27/2017    Group Focus: Psychodynamic Group Psychotherapy    Length of service: 58049 - 45-50 minutes    Number of patients in attendance: 6    Referred by: Behavioral Medicine Unit Treatment Team    Target symptoms: Depression    Patient's response to treatment: Active Listening, Self-disclosure and Feedback given to another patient    Progress toward goals: Progressing adequately    Interval History: Pt reports that he got very upset last night after an interaction with his ex and her sister and ended up isolating himself. Wants to work on better coping mechanisms, and group gave him feedback and encouragement.    Diagnosis: Depression    Plan: Continue treatment on [facility]

## 2017-09-29 ENCOUNTER — HOSPITAL ENCOUNTER (OUTPATIENT)
Dept: PSYCHIATRY | Facility: HOSPITAL | Age: 62
Discharge: HOME | End: 2017-09-29
Attending: PSYCHIATRY & NEUROLOGY
Payer: COMMERCIAL

## 2017-09-29 VITALS — SYSTOLIC BLOOD PRESSURE: 129 MMHG | DIASTOLIC BLOOD PRESSURE: 70 MMHG | HEART RATE: 62 BPM | RESPIRATION RATE: 18 BRPM

## 2017-09-29 DIAGNOSIS — S06.9X0S TBI (TRAUMATIC BRAIN INJURY), WITHOUT LOC, SEQUELA: ICD-10-CM

## 2017-09-29 DIAGNOSIS — F32.A DEPRESSION, UNSPECIFIED DEPRESSION TYPE: Primary | ICD-10-CM

## 2017-09-29 PROCEDURE — 90853 GROUP PSYCHOTHERAPY: CPT | Mod: 59,,, | Performed by: PSYCHOLOGIST

## 2017-09-29 PROCEDURE — 90853 GROUP PSYCHOTHERAPY: CPT

## 2017-09-29 PROCEDURE — 99232 SBSQ HOSP IP/OBS MODERATE 35: CPT | Mod: ,,, | Performed by: PSYCHIATRY & NEUROLOGY

## 2017-09-29 NOTE — PROGRESS NOTES
[facility]  Progress Note  Behavioral Medicine Unit    Date: 2017  Time: 2:20 PM    Name: Mane Ramires    Age: 61 y.o.       : 1955            Admit Date: 17    SUBJECTIVE:  Patient is a 61 y.o. old male with a history of depression and anxiety admitted with a worsening of depression in the context of the dissolution of a relationship.    Today the patient reports that he recently realized how afraid he is of his ex-girlfriend.  He admits that he has generally been more anxious recently.  He reports he is not eating well.  He also reports sleeping poorly with awakening every 2 hours or so, sometimes staying awake for up to an hour.  He feels tired today but speculates that it may be caused by Lyrica.  He is planning to spend some time with his grandchildren this weekend.        Current Medications:   Current Outpatient Prescriptions on File Prior to Encounter   Medication Sig Dispense Refill    esomeprazole (NEXIUM) 40 MG capsule Take 1 capsule (40 mg total) by mouth before breakfast. 30 capsule 2    fluoxetine (PROZAC) 20 MG capsule Take 20 mg daily, after 2 weeks if tolerating increase to 40 mg daily. 60 capsule 3    JUBLIA 10 % Usha APPLY 1 DROP TO ALL SMALLER TOES AND 2 DROPS TO GREATER TOES DAILY  10    levocetirizine (XYZAL) 5 MG tablet TAKE 1 TABLET (5 MG TOTAL) BY MOUTH NIGHTLY AS NEEDED FOR ALLERGIES. 30 tablet 9    lisinopril (PRINIVIL,ZESTRIL) 40 MG tablet TAKE 1 TABLET (40 MG TOTAL) BY MOUTH ONCE DAILY. 30 tablet 1    LYRICA 100 mg capsule TAKE ONE CAPSULE BY MOUTH TWICE A DAY 60 capsule 1    metoprolol succinate (TOPROL-XL) 50 MG 24 hr tablet Take 1 tablet (50 mg total) by mouth once daily. 90 tablet 3    ranitidine (ZANTAC) 300 MG tablet Take 1 tablet (300 mg total) by mouth every evening. 30 tablet 2    testosterone cypionate (DEPOTESTOTERONE CYPIONATE) 200 mg/mL injection INJECT 1ML INTO THE MUSCLE EVERY 14 DAYS 2 mL 5    tramadol (ULTRAM) 50 mg  tablet Take 1 tablet (50 mg total) by mouth 2 (two) times daily as needed. 60 tablet 0     No current facility-administered medications on file prior to encounter.        Psychiatric ROS:  See Dr. Dubose's Admission Note of date 9/26/17 for ROS.    OBJECTIVE:  Vitals (Most Recent)   vitals were not taken for this visit.    Labs/Imaging/Studies:   No results found for this or any previous visit (from the past 48 hour(s)).   No results found for: PHENYTOIN, PHENOBARB, VALPROATE, CBMZ    Pain: /10      Musculoskeletal Exam:  Abnormal Involuntary Movements: none  Gait: non-ataxic, unassisted     Mental Status Exam:  Appearance: age appropriate, normal weight, casually dressed, neatly groomed  Behavior/Cooperation: friendly and cooperative  Speech: Not loud or pressured, clearly audible, no slurring  Mood: anxious  Affect: full range and appropriate  Thought Process: goal-directed, logical  Thought Content:  no suicidality, no homicidality, delusions, hallucinations or paranoia  Orientation: grossly intact  Memory: Intact for the content of the interview  Attention Span/Concentration: Attends to interview without distraction  Fund of Knowledge: Adequate for interview  Estimate of Intelligence: Average to above average from verbal skills and history  Cognition: grossly intact  Insight: patient has awareness of illness  Judgment: the patient's behavior is adequate to circumstances     ASSESSMENT/PLAN:      General Assessment:  Patient is a 61 y.o. male [facility] to the [facility] for the treatment of depression which appears to be primarily in response to a relationship problem.  Patient however also has symptoms consistent with anxiety and possibly JEEVAN.     Diagnoses:      Patient Active Problem List   Diagnosis    Major Depressive Disorder recurrent moderate     Generalized anxiety disorder    Dyslipidemia    HTN (hypertension)    TBI (traumatic brain injury)    Cervicalgia     Osteoarthritis of spine with radiculopathy, cervical region    GT (impaired glucose tolerance)    Chronic right-sided low back pain    Acute bilateral low back pain with right-sided sciatica    Carpal tunnel syndrome         Plan:  Continue [facility] treatment  Continue fluoxetine 40 mg daily      Bakari Dubose MD

## 2017-09-29 NOTE — PLAN OF CARE
09/29/17 1000   Activity/Group Therapy Checklist   Group Educational   Attendance Attended   Follows Direction Followed directions   Group Interactions/Observations Interacted appropriately   Affect/Mood Range Normal range   Affect/Mood Display Appropriate   Goal Progression Progressing
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
## 2017-10-02 ENCOUNTER — HOSPITAL ENCOUNTER (OUTPATIENT)
Dept: PSYCHIATRY | Facility: HOSPITAL | Age: 62
Discharge: HOME | End: 2017-10-02
Attending: PSYCHIATRY & NEUROLOGY
Payer: COMMERCIAL

## 2017-10-02 DIAGNOSIS — S06.9X0S TRAUMATIC BRAIN INJURY, WITHOUT LOSS OF CONSCIOUSNESS, SEQUELA: ICD-10-CM

## 2017-10-02 DIAGNOSIS — F32.A DEPRESSION, UNSPECIFIED DEPRESSION TYPE: Primary | ICD-10-CM

## 2017-10-02 PROCEDURE — 90853 GROUP PSYCHOTHERAPY: CPT | Performed by: SOCIAL WORKER

## 2017-10-02 PROCEDURE — 90853 GROUP PSYCHOTHERAPY: CPT

## 2017-10-02 PROCEDURE — 90853 GROUP PSYCHOTHERAPY: CPT | Mod: 59,,, | Performed by: PSYCHOLOGIST

## 2017-10-02 PROCEDURE — 90853 GROUP PSYCHOTHERAPY: CPT | Mod: ,,, | Performed by: PSYCHOLOGIST

## 2017-10-02 NOTE — PROGRESS NOTES
"Group Psychotherapy (PhD/LCSW)    Site: [facility]    Clinical status of patient: Intensive Outpatient Program (IOP)    Date: 10/2/2017    Group Focus :  Communication Skills       Length of service: 81078 - 45-50 minutes    Number of patients in attendance: 7    Referred by: Behavioral Medicine Unit Treatment Team    Target symptoms: Depression    Patient's response to treatment: Active Listening, Self-disclosure      Progress toward goals: Progressing adequately    Interval History:  Discussed and modeled use of I messages to enhance dialog and define boundaries. Noted the importance of identifying & "owning" one's problem in constructing an I-message.    Diagnosis: Depression    Plan: Continue treatment on BMU        "

## 2017-10-02 NOTE — PROGRESS NOTES
Group Psychotherapy (PhD/LCSW)    Site: [facility]    Clinical status of patient: Intensive Outpatient Program (IOP)    Date: 10/2/2017    Group Focus: Psychodynamic Group Psychotherapy    Length of service: 91674 - 45-50 minutes    Number of patients in attendance: 3    Referred by: Behavioral Medicine Unit Treatment Team    Target symptoms: Depression    Patient's response to treatment: Active Listening, Self-disclosure and Feedback given to another patient    Progress toward goals: Progressing adequately    Interval History: Pt reports that he had a fairly good weekend and was able to better think through and plan responses to his ex's provocations.    Diagnosis: Depression    Plan: Continue treatment on BMU

## 2017-10-02 NOTE — PLAN OF CARE
10/02/17 1000   Activity/Group Therapy Checklist   Group Cognitive Therapy  (Weekend Check In)   Attendance Attended   Follows Direction Followed directions   Group Interactions/Observations Interacted appropriately   Affect/Mood Range Normal range   Affect/Mood Display Appropriate   Goal Progression Progressing

## 2017-10-03 ENCOUNTER — HOSPITAL ENCOUNTER (OUTPATIENT)
Dept: PSYCHIATRY | Facility: HOSPITAL | Age: 62
Discharge: HOME | End: 2017-10-03
Attending: PSYCHIATRY & NEUROLOGY
Payer: COMMERCIAL

## 2017-10-03 DIAGNOSIS — S06.9X0S TRAUMATIC BRAIN INJURY, WITHOUT LOSS OF CONSCIOUSNESS, SEQUELA: ICD-10-CM

## 2017-10-03 DIAGNOSIS — F32.A DEPRESSION, UNSPECIFIED DEPRESSION TYPE: Primary | ICD-10-CM

## 2017-10-03 PROCEDURE — 90834 PSYTX W PT 45 MINUTES: CPT | Mod: 59,,, | Performed by: PSYCHOLOGIST

## 2017-10-03 PROCEDURE — 90853 GROUP PSYCHOTHERAPY: CPT | Mod: ,,, | Performed by: PSYCHOLOGIST

## 2017-10-03 PROCEDURE — 90853 GROUP PSYCHOTHERAPY: CPT

## 2017-10-03 PROCEDURE — 99231 SBSQ HOSP IP/OBS SF/LOW 25: CPT | Mod: ,,, | Performed by: PSYCHIATRY & NEUROLOGY

## 2017-10-03 NOTE — PROGRESS NOTES
"[facility]  Progress Note  Behavioral Medicine Unit    Date: 10/3/2017  Time: 9:52 AM    Name: Mane Ramires    Age: 61 y.o.       : 1955            Admit Date: 17    SUBJECTIVE:  Patient is a 61 y.o. old male with a history of depression and anxiety admitted with a worsening of depression in the context of the dissolution of a relationship.    Pt reports he is doing okay.  He spent the night at his daughters house with the grandchildren and enjoyed this.  Mood is pretty good.  He had a "little slip up" over the weekend, reports a conflict with ex and had abdominal discomfort in her presence.  He is still having some restlessness.  Not eating well and still losing weight.  He denies problems with SI.  Still tired, maybe because of HTN meds.    He has been reading group material and plans to do more this evening.        Current Medications:   Current Outpatient Prescriptions on File Prior to Encounter   Medication Sig Dispense Refill    esomeprazole (NEXIUM) 40 MG capsule Take 1 capsule (40 mg total) by mouth before breakfast. 30 capsule 2    fluoxetine (PROZAC) 20 MG capsule Take 20 mg daily, after 2 weeks if tolerating increase to 40 mg daily. 60 capsule 3    JUBLIA 10 % Usha APPLY 1 DROP TO ALL SMALLER TOES AND 2 DROPS TO GREATER TOES DAILY  10    levocetirizine (XYZAL) 5 MG tablet TAKE 1 TABLET (5 MG TOTAL) BY MOUTH NIGHTLY AS NEEDED FOR ALLERGIES. 30 tablet 9    lisinopril (PRINIVIL,ZESTRIL) 40 MG tablet TAKE 1 TABLET (40 MG TOTAL) BY MOUTH ONCE DAILY. 30 tablet 1    LYRICA 100 mg capsule TAKE ONE CAPSULE BY MOUTH TWICE A DAY 60 capsule 1    metoprolol succinate (TOPROL-XL) 50 MG 24 hr tablet Take 1 tablet (50 mg total) by mouth once daily. 90 tablet 3    ranitidine (ZANTAC) 300 MG tablet Take 1 tablet (300 mg total) by mouth every evening. 30 tablet 2    testosterone cypionate (DEPOTESTOTERONE CYPIONATE) 200 mg/mL injection INJECT 1ML INTO THE MUSCLE EVERY 14 DAYS " "2 mL 5    tramadol (ULTRAM) 50 mg tablet Take 1 tablet (50 mg total) by mouth 2 (two) times daily as needed. 60 tablet 0     No current facility-administered medications on file prior to encounter.        Psychiatric ROS:  See Dr. Dubose's Admission Note of date 9/26/17 for ROS.    OBJECTIVE:  Vitals (Most Recent)   vitals were not taken for this visit.    Labs/Imaging/Studies:   No results found for this or any previous visit (from the past 48 hour(s)).   No results found for: PHENYTOIN, PHENOBARB, VALPROATE, CBMZ    Pain: /10      Musculoskeletal Exam:  Abnormal Involuntary Movements: none  Gait: non-ataxic, unassisted     Mental Status Exam:  Appearance: age appropriate, normal weight, casually dressed, neatly groomed  Behavior/Cooperation: friendly and cooperative  Speech: Not loud or pressured, clearly audible, no slurring  Mood: "pretty good"  Affect: full range and appropriate  Thought Process: goal-directed, logical  Thought Content:  no suicidality, no homicidality, delusions, hallucinations or paranoia  Orientation: grossly intact  Memory: Intact for the content of the interview  Attention Span/Concentration: Attends to interview without distraction  Fund of Knowledge: Adequate for interview  Estimate of Intelligence: Average to above average from verbal skills and history  Cognition: grossly intact  Insight: patient has awareness of illness  Judgment: the patient's behavior is adequate to circumstances     ASSESSMENT/PLAN:      General Assessment:  Patient is a 61 y.o. male admitted to the [facility] partial hospitalization program for the treatment of depression which appears to be primarily in response to a relationship problem.  Patient however also has symptoms consistent with anxiety and possibly JEEVAN.     Diagnoses:      Patient Active Problem List   Diagnosis    Major Depressive Disorder recurrent moderate     Generalized anxiety disorder    Dyslipidemia    HTN (hypertension)    TBI (traumatic " brain injury)    Cervicalgia    Osteoarthritis of spine with radiculopathy, cervical region    GT (impaired glucose tolerance)    Chronic right-sided low back pain    Acute bilateral low back pain with right-sided sciatica    Carpal tunnel syndrome         Plan:  Continue BMU treatment  Continue fluoxetine 40 mg daily for now.  Consider increase to 60 mg      Bakari Dubose MD
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
## 2017-10-03 NOTE — PLAN OF CARE
10/03/17 1100   Activity/Group Therapy Checklist   Group Educational   Attendance Attended   Follows Direction Followed directions   Group Interactions/Observations Interacted appropriately   Affect/Mood Range Normal range   Affect/Mood Display Appropriate   Goal Progression Progressing

## 2017-10-03 NOTE — PROGRESS NOTES
Group Psychotherapy (PhD/LCSW)    Site: [facility]    Clinical status of patient: Intensive Outpatient Program (IOP)    Date: 10/3/2017    Group Focus: Psychodynamic Group Psychotherapy    Length of service: 73963 - 45-50 minutes    Number of patients in attendance: 5    Referred by: Behavioral Medicine Unit Treatment Team    Target symptoms: Depression    Patient's response to treatment: Active Listening, Self-disclosure and Feedback given to another patient    Progress toward goals: Progressing adequately    Interval History: Pt reports that he was able to spend time with his grandchildren last night, and he is realizing how grounding this can be for him. He reports that he is feeling good today.    Diagnosis: Depression    Plan: Continue treatment on BMU

## 2017-10-04 ENCOUNTER — HOSPITAL ENCOUNTER (OUTPATIENT)
Dept: PSYCHIATRY | Facility: HOSPITAL | Age: 62
Discharge: HOME | End: 2017-10-04
Attending: PSYCHIATRY & NEUROLOGY
Payer: COMMERCIAL

## 2017-10-04 DIAGNOSIS — S06.9X0S TRAUMATIC BRAIN INJURY, WITHOUT LOSS OF CONSCIOUSNESS, SEQUELA: ICD-10-CM

## 2017-10-04 DIAGNOSIS — F32.A DEPRESSION, UNSPECIFIED DEPRESSION TYPE: Primary | ICD-10-CM

## 2017-10-04 PROCEDURE — 90853 GROUP PSYCHOTHERAPY: CPT | Mod: 59,,, | Performed by: PSYCHOLOGIST

## 2017-10-04 PROCEDURE — 90853 GROUP PSYCHOTHERAPY: CPT | Mod: ,,, | Performed by: PSYCHOLOGIST

## 2017-10-04 PROCEDURE — 90853 GROUP PSYCHOTHERAPY: CPT

## 2017-10-04 PROCEDURE — 90853 GROUP PSYCHOTHERAPY: CPT | Mod: ,,, | Performed by: PSYCHIATRY & NEUROLOGY

## 2017-10-04 NOTE — PROGRESS NOTES
Group Psychotherapy (PhD/LCSW)    Site: [facility]    Clinical status of patient: Intensive Outpatient Program (IOP)    Date: 09/29/2017    Group Focus: Psychodynamic Group Psychotherapy    Length of service: 48375 - 45-50 minutes    Number of patients in attendance: 5    Referred by: Behavioral Medicine Unit Treatment Team    Target symptoms: Depression    Patient's response to treatment: Active Listening, Self-disclosure and Feedback given to another patient    Progress toward goals: Progressing adequately    Interval History: Pt engaged in effective weekend planning, particularly focusing on how to avoid confrontation with his ex.    Diagnosis: Depression    Plan: Continue treatment on BMU

## 2017-10-04 NOTE — PROGRESS NOTES
"Group Psychotherapy (MD)     Site: [facility]     Clinical status of patient: Intensive Outpatient Program (IOP)     Date: 9/27/17     Group Focus: Medical and Neuropsychiatric Bases of Psychiatric Disease and Addiction: anxiety     Length of service: 40821 - 45-50 minutes     Number of patients in attendance: 10     Referred by: Behavioral Medicine Unit Treatment Team     Target symptoms: Anxiety     Patient's response to treatment: Active Listening, soliciting feedback and advice       Progress toward goals: Discussed psychiatric symptoms of anxiety and neurochemical basis of anxiety, relating it to addiction and other psychiatric disorders. Provided rationale for use of various anxiety treatments, introduced concept of avoidance and discussed ways to gradually expose self to sources of anxiety. Explored responses to anxiety and helped patient separte those responses into either 'self care" or "self medication".         Diagnosis: Depression     Plan: Continue treatment on BMU             "

## 2017-10-04 NOTE — PROGRESS NOTES
Group Psychotherapy (PhD/LCSW)    Site: [facility]    Clinical status of patient: Intensive Outpatient Program (IOP)    Date: 09/29/2017    Group Focus: Stress Management    Length of service: 37354 - 45-50 minutes    Number of patients in attendance: 10    Referred by: Behavioral Medicine Unit Treatment Team    Target symptoms: Depression    Patient's response to treatment: Active Listening, Self-disclosure and Feedback given to another patient    Progress toward goals: Progressing adequately    Interval History: Group learned mindfulness techniques (breathing, sound, integrating mindfulness into daily life) to improve present-moment awareness, impulsive behavior tendencies, and tolerance of various emotional states.    Diagnosis: Depression    Plan: Continue treatment on BMU

## 2017-10-04 NOTE — PROGRESS NOTES
Group Psychotherapy (PhD/LCSW)    Site: [facility]    Clinical status of patient: Intensive Outpatient Program (IOP)    Date: 10/4/2017    Group Focus: ACT Group Psychotherapy    Length of service: 12819 - 45-50 minutes    Number of patients in attendance: 7    Referred by: Behavioral Medicine Unit Treatment Team    Target symptoms: Depression    Patient's response to treatment: Active Listening, Self-disclosure and Feedback given to another patient    Progress toward goals: Progressing adequately    Interval History: Session focus was Fusion and Defusion.  Patients learned about FDC words and FDC conversations.  They were instructed to write and re-write their own FDC conversations.    Diagnosis: Depression    Plan: Continue treatment on U

## 2017-10-04 NOTE — PROGRESS NOTES
"Group Psychotherapy (PhD/LCSW)    Site: [facility]    Clinical status of patient: Intensive Outpatient Program (IOP)    Date: 10/4/2017    Group Focus: Psychodynamic Group Psychotherapy    Length of service: 65543 - 45-50 minutes    Number of patients in attendance: 5    Referred by: Behavioral Medicine Unit Treatment Team    Target symptoms: Depression    Patient's response to treatment: Active Listening, Self-disclosure and Feedback given to another patient    Progress toward goals: Progressing adequately    Interval History: Pt reports that he engaged in effective behavior last night by retreating to his room to go to sleep early when he learned that his ex was going out drinking. He is still having some difficulty differentiating between "avoidance" and "self-protection" as concepts, and group continues to help him choose "avoidance" when it allows him to avoid toxic confrontations.    Diagnosis: Depression    Plan: Continue treatment on BMU        "

## 2017-10-05 ENCOUNTER — LAB VISIT (OUTPATIENT)
Dept: LAB | Facility: HOSPITAL | Age: 62
End: 2017-10-05
Payer: COMMERCIAL

## 2017-10-05 ENCOUNTER — HOSPITAL ENCOUNTER (OUTPATIENT)
Dept: PSYCHIATRY | Facility: HOSPITAL | Age: 62
Discharge: HOME | End: 2017-10-05
Attending: PSYCHIATRY & NEUROLOGY
Payer: COMMERCIAL

## 2017-10-05 ENCOUNTER — PATIENT OUTREACH (OUTPATIENT)
Dept: OTHER | Facility: OTHER | Age: 62
End: 2017-10-05

## 2017-10-05 DIAGNOSIS — S06.9X0S TRAUMATIC BRAIN INJURY, WITHOUT LOSS OF CONSCIOUSNESS, SEQUELA: ICD-10-CM

## 2017-10-05 DIAGNOSIS — I10 ESSENTIAL HYPERTENSION: ICD-10-CM

## 2017-10-05 DIAGNOSIS — E78.5 DYSLIPIDEMIA: ICD-10-CM

## 2017-10-05 DIAGNOSIS — F32.A DEPRESSION, UNSPECIFIED DEPRESSION TYPE: Primary | ICD-10-CM

## 2017-10-05 LAB
ALBUMIN SERPL BCP-MCNC: 3.9 G/DL
ALP SERPL-CCNC: 140 U/L
ALT SERPL W/O P-5'-P-CCNC: 93 U/L
ANION GAP SERPL CALC-SCNC: 6 MMOL/L
AST SERPL-CCNC: 46 U/L
BILIRUB SERPL-MCNC: 0.6 MG/DL
BUN SERPL-MCNC: 19 MG/DL
CALCIUM SERPL-MCNC: 10 MG/DL
CHLORIDE SERPL-SCNC: 103 MMOL/L
CHOLEST SERPL-MCNC: 260 MG/DL
CHOLEST/HDLC SERPL: 4.5 {RATIO}
CO2 SERPL-SCNC: 30 MMOL/L
CREAT SERPL-MCNC: 1.4 MG/DL
EST. GFR  (AFRICAN AMERICAN): >60 ML/MIN/1.73 M^2
EST. GFR  (NON AFRICAN AMERICAN): 54.6 ML/MIN/1.73 M^2
GLUCOSE SERPL-MCNC: 107 MG/DL
HDLC SERPL-MCNC: 58 MG/DL
HDLC SERPL: 22.3 %
LDLC SERPL CALC-MCNC: 170.6 MG/DL
NONHDLC SERPL-MCNC: 202 MG/DL
POTASSIUM SERPL-SCNC: 4.5 MMOL/L
PROT SERPL-MCNC: 7.5 G/DL
SODIUM SERPL-SCNC: 139 MMOL/L
TRIGL SERPL-MCNC: 157 MG/DL

## 2017-10-05 PROCEDURE — 90853 GROUP PSYCHOTHERAPY: CPT

## 2017-10-05 PROCEDURE — 36415 COLL VENOUS BLD VENIPUNCTURE: CPT

## 2017-10-05 PROCEDURE — 90853 GROUP PSYCHOTHERAPY: CPT | Mod: ,,, | Performed by: PSYCHOLOGIST

## 2017-10-05 PROCEDURE — 80061 LIPID PANEL: CPT

## 2017-10-05 PROCEDURE — 80053 COMPREHEN METABOLIC PANEL: CPT

## 2017-10-05 PROCEDURE — 90853 GROUP PSYCHOTHERAPY: CPT | Mod: 59,,, | Performed by: PSYCHOLOGIST

## 2017-10-05 NOTE — PROGRESS NOTES
Group Psychotherapy (PhD/LCSW)    Site: [facility]    Clinical status of patient: Intensive Outpatient Program (IOP)    Date: 10/5/2017    Group Focus: Spirituality and Well Being    Length of service: 20920 - 45-50 minutes    Number of patients in attendance: 6    Referred by: Behavioral Medicine Unit Treatment Team    Target symptoms: Depression    Patient's response to treatment: Active Listening, Self-disclosure and Feedback given to another patient    Progress toward goals: Progressing adequately    Interval History: Discussed the way 12-step principles (particularly the first three steps) can be helpful in coping with anxiety and depression.        Diagnosis: Depression    Plan: Continue treatment on BMU

## 2017-10-05 NOTE — PROGRESS NOTES
"Last 5 Patient Entered Redings Current 30 Day Average: 117/76     Recent Readings 10/13/2017 10/11/2017 10/9/2017 10/8/2017 10/8/2017    Systolic BP (mmHg) 121 117 119 120 117    Diastolic BP (mmHg) 81 78 76 76 79    Pulse 67 66 67 56 61        Mr. Ramires's BP average remains well controlled, but he continues to complain of feeling tired "all the time." He has lost about 20 lbs in the past few months. He said he was having GI issues that caused him to not want to eat, which led to the weight loss. Will reduce metoprolol again today to 25 mg QD. He has 50 mg tablets at home that he will cut in half. Asked that he call back if he has any concerns with reduced dose of metoprolol.    Current HTN regimen:  Hypertension Medications             lisinopril (PRINIVIL,ZESTRIL) 40 MG tablet TAKE 1 TABLET (40 MG TOTAL) BY MOUTH ONCE DAILY.    metoprolol succinate (TOPROL-XL) 50 MG 24 hr tablet Take 1/2 tablet (25 mg) by mouth once daily.        Will continue to monitor regularly. Will follow up in 2-3 weeks, sooner if BP begins to trend upward or downward.    Patient has my contact information and knows to call with any concerns or clinical changes.             "

## 2017-10-05 NOTE — PATIENT CARE CONFERENCE
Problem List:     MDD rec mod  JEEVAN       Pt was staffed with treatment team today. Staff discussed pt's continued strained relationship with ex spouse. Staff discussed pt's recent realization of fear of ex spouse and role confusion in past in nurturing children. Staff discussed high expressed emotions in pt home. Staff discussed pt inappropriate boundaries with peers related to psychic readings. Staff discussed confrontation and pt being receptive to feedback. Staff discussed pt's leadership role and + rapport with group peers.     Follow-Up Appointments:  Medication Management: Dr. Jimenes  Psychotherapy: Omer Mcmullen     Staff Present:  MD Maggie Frias, Zhanna Anne Dr., WINTER Carvajal LMSW

## 2017-10-05 NOTE — PROGRESS NOTES
"Group Psychotherapy (PhD/LCSW)    Site: [facility]    Clinical status of patient: Intensive Outpatient Program (IOP)    Date: 10/5/2017    Group Focus: Psychodynamic Group Psychotherapy    Length of service: 29873 - 45-50 minutes    Number of patients in attendance: 6    Referred by: Behavioral Medicine Unit Treatment Team    Target symptoms: Depression    Patient's response to treatment: Active Listening, Self-disclosure and Feedback given to another patient    Progress toward goals: Progressing adequately    Interval History: Pt reports that he spent good time with his son-in-law last night, and enjoyed "feeling needed". He gave nice feedback to graduating group members today.    Diagnosis: Depression    Plan: Continue treatment on BMU        "

## 2017-10-06 ENCOUNTER — HOSPITAL ENCOUNTER (OUTPATIENT)
Dept: PSYCHIATRY | Facility: HOSPITAL | Age: 62
Discharge: HOME | End: 2017-10-06
Attending: PSYCHIATRY & NEUROLOGY
Payer: COMMERCIAL

## 2017-10-06 DIAGNOSIS — S06.9X0S TRAUMATIC BRAIN INJURY, WITHOUT LOSS OF CONSCIOUSNESS, SEQUELA: ICD-10-CM

## 2017-10-06 DIAGNOSIS — F32.A DEPRESSION, UNSPECIFIED DEPRESSION TYPE: Primary | ICD-10-CM

## 2017-10-06 PROCEDURE — 90853 GROUP PSYCHOTHERAPY: CPT

## 2017-10-06 PROCEDURE — 90853 GROUP PSYCHOTHERAPY: CPT | Mod: 59,,, | Performed by: PSYCHOLOGIST

## 2017-10-06 PROCEDURE — 99233 SBSQ HOSP IP/OBS HIGH 50: CPT | Mod: ,,, | Performed by: PSYCHIATRY & NEUROLOGY

## 2017-10-06 NOTE — PROGRESS NOTES
Group Psychotherapy (PhD/LCSW)    Site: [facility]    Clinical status of patient: Intensive Outpatient Program (IOP)    Date: 10/6/2017    Group Focus: Psychodynamic Group Psychotherapy    Length of service: 16132 - 45-50 minutes    Number of patients in attendance: 3    Referred by: Behavioral Medicine Unit Treatment Team    Target symptoms: Depression    Patient's response to treatment: Active Listening, Self-disclosure and Feedback given to another patient    Progress toward goals: Progressing adequately    Interval History: Pt reports that he continues to feel good and anticipates no problems this weekend. He is considering whether or not to share some of his emotions about his ex with her this weekend; we considered whether doing so would help him move toward his stated goal of separation from her.    Diagnosis: Depression    Plan: Continue treatment on BMU

## 2017-10-06 NOTE — PROGRESS NOTES
Group Psychotherapy (PhD/LCSW)    Site: [facility]    Clinical status of patient: Intensive Outpatient Program (IOP)    Date: 10/06/2017    Group Focus: Stress Management    Length of service: 79764 - 45-50 minutes    Number of patients in attendance: 6    Referred by: Behavioral Medicine Unit Treatment Team    Target symptoms: Depression    Patient's response to treatment: Active Listening, Self-disclosure and Feedback given to another patient    Progress toward goals: Progressing adequately    Interval History: Group learned mindfulness techniques (eating, body scan) to improve present-moment awareness, impulsive behavior tendencies, and tolerance of various emotional states.    Diagnosis: Depression    Plan: Continue treatment on BMU

## 2017-10-06 NOTE — PLAN OF CARE
10/06/17 1000   Activity/Group Therapy Checklist   Group Cognitive Therapy   Attendance Attended   Follows Direction Followed directions   Group Interactions/Observations Interacted appropriately   Affect/Mood Range Normal range   Affect/Mood Display Appropriate   Goal Progression Progressing

## 2017-10-06 NOTE — PROGRESS NOTES
[facility]  Progress Note  Behavioral Medicine Unit    Date: 10/6/2017  Time: 1:15 PM    Name: Mane Ramires    Age: 61 y.o.       : 1955            Admit Date: 17    SUBJECTIVE:  Patient is a 61 y.o. old male with a history of depression and anxiety admitted with a worsening of depression in the context of the dissolution of a relationship.    Pt reports he is much better than he was last seen.  He reports he started to have improvement after the 2nd day.      He reports he still has some anxiety.  He is less tense.  He still worries.  Shakiness is somewhat better.  He still has some trouble sleeping, awakening hourly and having trouble falling back asleep.      He is bothered by not having anything he feels passionate about or is interested in.  He feels tired frequently.  He still has trouble with his appetite.  Denies desire for death.      Reviewed labs from yesterday ordered by another provider.      Current Medications:   Current Outpatient Prescriptions on File Prior to Encounter   Medication Sig Dispense Refill    esomeprazole (NEXIUM) 40 MG capsule Take 1 capsule (40 mg total) by mouth before breakfast. 30 capsule 2    fluoxetine (PROZAC) 20 MG capsule Take 20 mg daily, after 2 weeks if tolerating increase to 40 mg daily. 60 capsule 3    JUBLIA 10 % Usha APPLY 1 DROP TO ALL SMALLER TOES AND 2 DROPS TO GREATER TOES DAILY  10    levocetirizine (XYZAL) 5 MG tablet TAKE 1 TABLET (5 MG TOTAL) BY MOUTH NIGHTLY AS NEEDED FOR ALLERGIES. 30 tablet 9    lisinopril (PRINIVIL,ZESTRIL) 40 MG tablet TAKE 1 TABLET (40 MG TOTAL) BY MOUTH ONCE DAILY. 30 tablet 1    LYRICA 100 mg capsule TAKE ONE CAPSULE BY MOUTH TWICE A DAY 60 capsule 1    metoprolol succinate (TOPROL-XL) 50 MG 24 hr tablet Take 1 tablet (50 mg total) by mouth once daily. 90 tablet 3    ranitidine (ZANTAC) 300 MG tablet Take 1 tablet (300 mg total) by mouth every evening. 30 tablet 2    testosterone cypionate  (DEPOTESTOTERONE CYPIONATE) 200 mg/mL injection INJECT 1ML INTO THE MUSCLE EVERY 14 DAYS 2 mL 5    tramadol (ULTRAM) 50 mg tablet Take 1 tablet (50 mg total) by mouth 2 (two) times daily as needed. 60 tablet 0     No current facility-administered medications on file prior to encounter.        Psychiatric ROS:  See Dr. Dubose's Admission Note of date 9/26/17 for ROS.    OBJECTIVE:  Vitals (Most Recent)   vitals were not taken for this visit.    Labs/Imaging/Studies:   Recent Results (from the past 48 hour(s))   Comprehensive metabolic panel    Collection Time: 10/05/17  8:19 AM   Result Value Ref Range    Sodium 139 136 - 145 mmol/L    Potassium 4.5 3.5 - 5.1 mmol/L    Chloride 103 95 - 110 mmol/L    CO2 30 (H) 23 - 29 mmol/L    Glucose 107 70 - 110 mg/dL    BUN, Bld 19 6 - 20 mg/dL    Creatinine 1.4 0.5 - 1.4 mg/dL    Calcium 10.0 8.7 - 10.5 mg/dL    Total Protein 7.5 6.0 - 8.4 g/dL    Albumin 3.9 3.5 - 5.2 g/dL    Total Bilirubin 0.6 0.1 - 1.0 mg/dL    Alkaline Phosphatase 140 (H) 55 - 135 U/L    AST 46 (H) 10 - 40 U/L    ALT 93 (H) 10 - 44 U/L    Anion Gap 6 (L) 8 - 16 mmol/L    eGFR if African American >60.0 >60 mL/min/1.73 m^2    eGFR if non  54.6 (A) >60 mL/min/1.73 m^2   Lipid panel    Collection Time: 10/05/17  8:19 AM   Result Value Ref Range    Cholesterol 260 (H) 120 - 199 mg/dL    Triglycerides 157 (H) 30 - 150 mg/dL    HDL 58 40 - 75 mg/dL    LDL Cholesterol 170.6 (H) 63.0 - 159.0 mg/dL    HDL/Chol Ratio 22.3 20.0 - 50.0 %    Total Cholesterol/HDL Ratio 4.5 2.0 - 5.0    Non-HDL Cholesterol 202 mg/dL      No results found for: PHENYTOIN, PHENOBARB, VALPROATE, CBMZ    Pain: /10      Musculoskeletal Exam:  Abnormal Involuntary Movements: none  Gait: non-ataxic, unassisted     Mental Status Exam:  Appearance: age appropriate, normal weight, casually dressed, neatly groomed  Behavior/Cooperation: friendly and cooperative  Speech: Not loud or pressured, clearly audible, no slurring  Mood:  anxious  Affect: full range and appropriate  Thought Process: goal-directed, logical  Thought Content:  no suicidality, no homicidality, delusions, hallucinations or paranoia  Orientation: grossly intact  Memory: Intact for the content of the interview  Attention Span/Concentration: Attends to interview without distraction  Fund of Knowledge: Adequate for interview  Estimate of Intelligence: Average to above average from verbal skills and history  Cognition: grossly intact  Insight: patient has awareness of illness  Judgment: the patient's behavior is adequate to circumstances     ASSESSMENT/PLAN:      General Assessment:  Patient is a 61 y.o. male admitted to the [facility] partial hospitalization program for the treatment of depression which appears to be primarily in response to a relationship problem.  Patient however also has symptoms consistent with anxiety and possibly JEEVAN.     Diagnoses:      Patient Active Problem List   Diagnosis    Major Depressive Disorder recurrent moderate     Generalized anxiety disorder    Dyslipidemia    HTN (hypertension)    TBI (traumatic brain injury)    Cervicalgia    Osteoarthritis of spine with radiculopathy, cervical region    GT (impaired glucose tolerance)    Chronic right-sided low back pain    Acute bilateral low back pain with right-sided sciatica    Carpal tunnel syndrome         Plan:  Continue BMU treatment  Continue fluoxetine 40 mg daily for now.   Discussed elevated liver enzymes and that it is unlikely to be caused by prozac.       Bakari Dubose MD

## 2017-10-07 NOTE — PROGRESS NOTES
Group Psychotherapy (MD)     Site: [facility]     Clinical status of patient: Intensive Outpatient Program (IOP)     Date: 10/4/17     Group Focus: Medical and Neuropsychiatric Bases of Psychiatric Disease and   Addiction: pain     Length of service: 89814 - 45-50 minutes     Number of patients in attendance: 10     Referred by: Behavioral Medicine Unit Treatment Team     Target symptoms: Pain     Patient's response to treatment: Active Listening, asking questions, providing personal examples      Progress toward goals: Reviewed medical model of mood and anxiety disorders and   substance abuse, encouraged view of psychiatric disease through this model to   alleviate stigma and to identify helpful behaviors. Discussed pain in detail,   including neurological, medical and psychological aspects. Discussed subjective   nature of pain as well as multiple factors that play into pain. Utilized this   information to brainstorm ways pain can be treated outside of taking pain   medications. Highlighted risk of opioid use.      Diagnosis: Depression     Plan: Continue treatment on BMU

## 2017-10-09 ENCOUNTER — HOSPITAL ENCOUNTER (OUTPATIENT)
Dept: PSYCHIATRY | Facility: HOSPITAL | Age: 62
Discharge: HOME | End: 2017-10-09
Attending: PSYCHIATRY & NEUROLOGY
Payer: COMMERCIAL

## 2017-10-09 DIAGNOSIS — F32.A DEPRESSION, UNSPECIFIED DEPRESSION TYPE: Primary | ICD-10-CM

## 2017-10-09 PROCEDURE — 90853 GROUP PSYCHOTHERAPY: CPT

## 2017-10-09 PROCEDURE — 90853 GROUP PSYCHOTHERAPY: CPT | Mod: ,,, | Performed by: PSYCHOLOGIST

## 2017-10-09 PROCEDURE — 90853 GROUP PSYCHOTHERAPY: CPT | Mod: 59,,, | Performed by: PSYCHOLOGIST

## 2017-10-09 PROCEDURE — 99231 SBSQ HOSP IP/OBS SF/LOW 25: CPT | Mod: ,,, | Performed by: PSYCHIATRY & NEUROLOGY

## 2017-10-09 RX ORDER — HYDROXYZINE HYDROCHLORIDE 25 MG/1
TABLET, FILM COATED ORAL
Qty: 60 TABLET | Refills: 3 | Status: SHIPPED | OUTPATIENT
Start: 2017-10-09 | End: 2018-03-22

## 2017-10-09 NOTE — PROGRESS NOTES
Group Psychotherapy (PhD/LCSW)    Site: [facility]    Clinical status of patient: Intensive Outpatient Program (IOP)    Date: 10/9/2017    Group Focus: Psychodynamic Group Psychotherapy    Length of service: 86784 - 45-50 minutes    Number of patients in attendance: 3    Referred by: Behavioral Medicine Unit Treatment Team    Target symptoms: Depression    Patient's response to treatment: Active Listening, Self-disclosure and Feedback given to another patient    Progress toward goals: Progressing adequately    Interval History: Pt reports that he had a good weekend. His 62th birthday party went well, and he was open to an apology that he got from his ex.    Diagnosis: Depression    Plan: Continue treatment on BMU

## 2017-10-09 NOTE — PROGRESS NOTES
Group Psychotherapy (PhD/LCSW)    Site: [facility]    Clinical status of patient: Intensive Outpatient Program (IOP)    Date: 10/9/2017    Group Focus: Communication Skills       Length of service: 80610 - 45-50 minutes    Number of patients in attendance: 8    Referred by: Behavioral Medicine Unit Treatment Team    Target symptoms: Depression    Patient's response to treatment: Active Listening    Progress toward goals: Progressing adequately    Interval History:  Discussed basic guidelines for effective communication and demonstrated their use and their value. Role played use of I-messages and Reflective Listening skills.     Diagnosis: Depression    Plan: Continue treatment on BMU

## 2017-10-09 NOTE — PROGRESS NOTES
[facility]  Progress Note  Behavioral Medicine Unit    Date: 10/9/2017  Time: 1:15 PM    Name: Mane Ramires    Age: 62 y.o.       : 1955            Admit Date: 17    SUBJECTIVE:    Briefly discussed LFT's..    Reports, that he had a good birthday celebration yesterday, ex-partner Елена.    No SI. Anxiety is improved, however remains present. Sleep is poor.    Reports, that he is taking Prozac 40 mg daily.    Current Medications:   Current Outpatient Prescriptions on File Prior to Encounter   Medication Sig Dispense Refill    esomeprazole (NEXIUM) 40 MG capsule Take 1 capsule (40 mg total) by mouth before breakfast. 30 capsule 2    fluoxetine (PROZAC) 20 MG capsule Take 20 mg daily, after 2 weeks if tolerating increase to 40 mg daily. 60 capsule 3    JUBLIA 10 % Usha APPLY 1 DROP TO ALL SMALLER TOES AND 2 DROPS TO GREATER TOES DAILY  10    levocetirizine (XYZAL) 5 MG tablet TAKE 1 TABLET (5 MG TOTAL) BY MOUTH NIGHTLY AS NEEDED FOR ALLERGIES. 30 tablet 9    lisinopril (PRINIVIL,ZESTRIL) 40 MG tablet TAKE 1 TABLET (40 MG TOTAL) BY MOUTH ONCE DAILY. 30 tablet 1    LYRICA 100 mg capsule TAKE ONE CAPSULE BY MOUTH TWICE A DAY 60 capsule 1    metoprolol succinate (TOPROL-XL) 50 MG 24 hr tablet Take 1 tablet (50 mg total) by mouth once daily. 90 tablet 3    ranitidine (ZANTAC) 300 MG tablet Take 1 tablet (300 mg total) by mouth every evening. 30 tablet 2    testosterone cypionate (DEPOTESTOTERONE CYPIONATE) 200 mg/mL injection INJECT 1ML INTO THE MUSCLE EVERY 14 DAYS 2 mL 5    tramadol (ULTRAM) 50 mg tablet Take 1 tablet (50 mg total) by mouth 2 (two) times daily as needed. 60 tablet 0     No current facility-administered medications on file prior to encounter.        Psychiatric ROS:  See Dr. Dubose's Admission Note of date 17 for ROS.    OBJECTIVE:  Vitals (Most Recent)   vitals were not taken for this visit.    Labs/Imaging/Studies:   No results found for this or  any previous visit (from the past 48 hour(s)).   No results found for: PHENYTOIN, PHENOBARB, VALPROATE, CBMZ    Musculoskeletal Exam:  Abnormal Involuntary Movements: none  Gait: non-ataxic, unassisted     Mental Status Exam:  Appearance: age appropriate, normal weight, casually dressed, neatly groomed  Behavior/Cooperation: friendly and cooperative  Speech: Not loud or pressured, clearly audible, no slurring  Mood: anxious at times  Affect: full range and appropriate  Thought Process: goal-directed, logical  Thought Content:  no suicidality, no homicidality, delusions, hallucinations or paranoia  Orientation: grossly intact  Memory: Intact for the content of the interview  Attention Span/Concentration: Attends to interview without distraction  Fund of Knowledge: Adequate for interview  Estimate of Intelligence: Average to above average from verbal skills and history  Cognition: grossly intact  Insight: patient has awareness of illness  Judgment: the patient's behavior is adequate to circumstances     ASSESSMENT/PLAN:      General Assessment:  Patient is a 61 y.o. male admitted to the [facility] partial hospitalization program for the treatment of depression which appears to be primarily in response to a relationship problem.  Patient however also has symptoms consistent with anxiety and possibly JEEVAN.     Diagnoses:      Patient Active Problem List   Diagnosis    Major Depressive Disorder recurrent moderate     Generalized anxiety disorder    Dyslipidemia    HTN (hypertension)    TBI (traumatic brain injury)    Cervicalgia    Osteoarthritis of spine with radiculopathy, cervical region    GT (impaired glucose tolerance)    Chronic right-sided low back pain    Acute bilateral low back pain with right-sided sciatica    Carpal tunnel syndrome     Pt. Reports, that he is trans-gender female to male.    Plan:  Continue BMU treatment  Continue fluoxetine 40 mg daily for now.   Discussed elevated liver enzymes and that  it is unlikely to be caused by prozac.  Add Hydroxyzine 25-50 mg at bedtime prn       Liu De La Torre MD

## 2017-10-09 NOTE — PROGRESS NOTES
Group Psychotherapy (PhD/LCSW)    Site: [facility]    Clinical status of patient: Intensive Outpatient Program (IOP)    Date: 10/9/2017    Group Focus: Personal Growth      Length of service: 47665 - 45-50 minutes    Number of patients in attendance: 3    Referred by: Behavioral Medicine Unit Treatment Team    Target symptoms: Depression    Patient's response to treatment: Active Listening; Self-disclosure     Progress toward goals: Progressing adequately    Interval History:  Discussed the dynamics of Personal Growth (awareness, acceptance, action) and how they apply to recent progress in staying out of the middle of arguments between his ex-spouse and his sister-in-law.     Diagnosis: Depression    Plan: Continue treatment on BMU

## 2017-10-10 ENCOUNTER — HOSPITAL ENCOUNTER (OUTPATIENT)
Dept: PSYCHIATRY | Facility: HOSPITAL | Age: 62
Discharge: HOME | End: 2017-10-10
Attending: PSYCHIATRY & NEUROLOGY
Payer: COMMERCIAL

## 2017-10-10 ENCOUNTER — TELEPHONE (OUTPATIENT)
Dept: FAMILY MEDICINE | Facility: CLINIC | Age: 62
End: 2017-10-10

## 2017-10-10 DIAGNOSIS — F32.A DEPRESSION, UNSPECIFIED DEPRESSION TYPE: Primary | ICD-10-CM

## 2017-10-10 DIAGNOSIS — R74.8 ELEVATED ALKALINE PHOSPHATASE LEVEL: ICD-10-CM

## 2017-10-10 DIAGNOSIS — R79.89 ELEVATED LFTS: Primary | ICD-10-CM

## 2017-10-10 DIAGNOSIS — S06.9X0S TRAUMATIC BRAIN INJURY, WITHOUT LOSS OF CONSCIOUSNESS, SEQUELA: ICD-10-CM

## 2017-10-10 PROCEDURE — 99238 HOSP IP/OBS DSCHRG MGMT 30/<: CPT | Mod: ,,, | Performed by: PSYCHIATRY & NEUROLOGY

## 2017-10-10 PROCEDURE — 90853 GROUP PSYCHOTHERAPY: CPT

## 2017-10-10 PROCEDURE — 90853 GROUP PSYCHOTHERAPY: CPT | Mod: 59,,, | Performed by: PSYCHOLOGIST

## 2017-10-10 PROCEDURE — 90853 GROUP PSYCHOTHERAPY: CPT | Mod: ,,, | Performed by: PSYCHOLOGIST

## 2017-10-10 NOTE — PROGRESS NOTES
Group Psychotherapy (PhD/LCSW)    Site: [facility]    Clinical status of patient: Intensive Outpatient Program (IOP)    Date: 10/10/2017    Group Focus: ACT Group Psychotherapy    Length of service: 00387 - 45-50 minutes    Number of patients in attendance: 8    Referred by: Behavioral Medicine Unit Treatment Team    Target symptoms: Depression    Patient's response to treatment: Active Listening, Self-disclosure    Progress toward goals: Progressing adequately    Interval History: Session focus was Values.  Patients were introduced to values and provided with the values compass to complete.  Patient would like to focus on value of education/training/personal growth.    Diagnosis: Depression    Plan: Continue treatment on BMU

## 2017-10-10 NOTE — PLAN OF CARE
10/10/17 1100   Activity/Group Therapy Checklist   Group Cognitive Therapy   Attendance Attended   Follows Direction Followed directions   Group Interactions/Observations Interacted appropriately;Supportive;Sharing  (Leadership role)   Affect/Mood Range Normal range   Affect/Mood Display Appropriate   Goal Progression Progressing
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
## 2017-10-10 NOTE — PROGRESS NOTES
Group Psychotherapy (PhD/LCSW)    Site: [facility]    Clinical status of patient: Intensive Outpatient Program (IOP)    Date: 10/10/2017    Group Focus: Psychodynamic Group Psychotherapy    Length of service: 09971 - 45-50 minutes    Number of patients in attendance: 4    Referred by: Behavioral Medicine Unit Treatment Team    Target symptoms: Depression    Patient's response to treatment: Active Listening, Self-disclosure and Feedback given to another patient    Progress toward goals: Progressing adequately    Interval History: Pt reviewed his progress in treatment and discussed future goals.    Diagnosis: Depression    Plan: Complete BMU treatment; follow up with aftercare plan.

## 2017-10-10 NOTE — TELEPHONE ENCOUNTER
Please let the patient know that his liver enzymes were elevated again as well as his alkaline phosphatase.  I would like to do further blood work to check on the alkaline phosphatase as well as an ultrasound of his liver.  Please schedule these tests.  His cholesterol was elevated as well but we will address this at a later date since his liver functions are elevated.

## 2017-10-11 ENCOUNTER — PATIENT OUTREACH (OUTPATIENT)
Dept: OTHER | Facility: OTHER | Age: 62
End: 2017-10-11

## 2017-10-11 NOTE — PROGRESS NOTES
"Last 5 Patient Entered Redings Current 30 Day Average: 116/75     Recent Readings 10/11/2017 10/9/2017 10/8/2017 10/8/2017 10/8/2017    Systolic BP (mmHg) 117 119 120 117 124    Diastolic BP (mmHg) 78 76 76 79 85    Pulse 66 67 56 61 63        Hypertension Digital Medicine Program (HDMP): Health  Follow Up    Lifestyle Modifications:    1.Low sodium diet: yes - states he has had very poor appetite d/t depression and lost 20 lbs recently. He also c/o always being tired. HC discussed healthy snacks he could eat during the day to boost his energy levels.     2.Physical activity: yes - states since he has been feeling a bit better lately, he has been walking much more than he used to.     3.Hypotension/Hypertension symptoms: no - although he complains of being tired most days and his sleep is not good. States he was just prescribed a medication to help him sleep better at night.   Frequency/Alleviating factors/Precipitating factors, etc.     4.Patient has been compliant with the medication regimen.     Follow up with . Mane Ramires completed. He finished his last day of treatment at the center yesterday for depression and states he is feeling "okay". No further questions or concerns. I will follow up in a few weeks to assess progress.       "

## 2017-10-11 NOTE — TELEPHONE ENCOUNTER
Notified pt of results per provider. Pt stated verbal understanding. US and lab scheduled. Time and date confirmed with pt.

## 2017-10-13 ENCOUNTER — HOSPITAL ENCOUNTER (OUTPATIENT)
Dept: RADIOLOGY | Facility: CLINIC | Age: 62
Discharge: HOME | End: 2017-10-13
Attending: NURSE PRACTITIONER
Payer: COMMERCIAL

## 2017-10-13 DIAGNOSIS — R79.89 ELEVATED LFTS: ICD-10-CM

## 2017-10-13 PROCEDURE — 76705 ECHO EXAM OF ABDOMEN: CPT | Mod: 26,,, | Performed by: RADIOLOGY

## 2017-10-13 PROCEDURE — 76705 ECHO EXAM OF ABDOMEN: CPT | Mod: TC,PO

## 2017-10-14 ENCOUNTER — HOSPITAL ENCOUNTER (EMERGENCY)
Facility: HOSPITAL | Age: 62
Discharge: HOME | End: 2017-10-14
Attending: EMERGENCY MEDICINE
Payer: COMMERCIAL

## 2017-10-14 VITALS
HEART RATE: 83 BPM | WEIGHT: 151 LBS | OXYGEN SATURATION: 100 % | TEMPERATURE: 98 F | BODY MASS INDEX: 25.13 KG/M2 | DIASTOLIC BLOOD PRESSURE: 61 MMHG | RESPIRATION RATE: 16 BRPM | SYSTOLIC BLOOD PRESSURE: 116 MMHG

## 2017-10-14 DIAGNOSIS — R52 PAIN: ICD-10-CM

## 2017-10-14 DIAGNOSIS — S63.92XA HAND SPRAIN, LEFT, INITIAL ENCOUNTER: Primary | ICD-10-CM

## 2017-10-14 PROCEDURE — 29125 APPL SHORT ARM SPLINT STATIC: CPT | Mod: LT

## 2017-10-14 PROCEDURE — 99283 EMERGENCY DEPT VISIT LOW MDM: CPT | Mod: 25

## 2017-10-14 RX ORDER — HYDROCODONE BITARTRATE AND ACETAMINOPHEN 5; 325 MG/1; MG/1
1 TABLET ORAL EVERY 8 HOURS PRN
Qty: 10 TABLET | Refills: 0 | Status: SHIPPED | OUTPATIENT
Start: 2017-10-14 | End: 2017-11-01
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
## 2017-10-15 RX ORDER — LISINOPRIL 40 MG/1
TABLET ORAL
Qty: 30 TABLET | Refills: 1 | Status: SHIPPED | OUTPATIENT
Start: 2017-10-15 | End: 2017-12-12 | Stop reason: SDUPTHER

## 2017-10-16 ENCOUNTER — PATIENT MESSAGE (OUTPATIENT)
Dept: FAMILY MEDICINE | Facility: CLINIC | Age: 62
End: 2017-10-16

## 2017-10-16 DIAGNOSIS — R14.0 BLOATING: ICD-10-CM

## 2017-10-16 DIAGNOSIS — R79.89 ELEVATED LFTS: Primary | ICD-10-CM

## 2017-10-16 RX ORDER — METOPROLOL SUCCINATE 50 MG/1
TABLET, EXTENDED RELEASE ORAL
Qty: 90 TABLET | Refills: 3
Start: 2017-10-16 | End: 2017-10-30 | Stop reason: SDUPTHER

## 2017-10-20 ENCOUNTER — OFFICE VISIT (OUTPATIENT)
Dept: PSYCHIATRY | Facility: CLINIC | Age: 62
End: 2017-10-20
Payer: COMMERCIAL

## 2017-10-20 DIAGNOSIS — F41.1 GAD (GENERALIZED ANXIETY DISORDER): ICD-10-CM

## 2017-10-20 DIAGNOSIS — F33.41 MDD (MAJOR DEPRESSIVE DISORDER), RECURRENT, IN PARTIAL REMISSION: Primary | ICD-10-CM

## 2017-10-20 PROCEDURE — 90834 PSYTX W PT 45 MINUTES: CPT | Mod: S$GLB,,, | Performed by: SOCIAL WORKER

## 2017-10-20 NOTE — PROGRESS NOTES
Individual Psychotherapy (PhD/LCSW)    10/20/2017    Site:  [facility]         Therapeutic Intervention: Met with patient.  Outpatient - Insight oriented psychotherapy 45 min - CPT code 21195 and Outpatient - Supportive psychotherapy 45 min - CPT Code 56288    Chief complaint/reason for encounter: depression and anxiety     Interval history and content of current session: Patient returned to the clinic today for follow up appointment.  He checks in as feeling good today.  Reflects on his treatment experience in the BMU.  [place] like he got a lot out of the program.  Continues to report intermittent conflict with his ex-girlfriend.  Hoping to move out of her house as soon as he can.  Patient reports that he is in the process of applying for SSI.  Has been having some anxiety about driving lately.  Concerned about issues related to his memory.  Discussed cognitive-behavioral coping strategies to help address anxiety.  Reports that he sustained a recent injury to his hand that led him to have to go to the ED at [facility].  He is recovering well from the injury.  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  Continues to see Dr. Jimenes in the clinic.  Recommended BMU aftercare group to patient.                               Treatment plan:  · Target symptoms: depression, anxiety   · Why chosen therapy is appropriate versus another modality: relevant to diagnosis, patient responds to this modality  · Outcome monitoring methods: self-report, observation  · Therapeutic intervention type: insight oriented psychotherapy, supportive psychotherapy    Risk parameters:  Patient reports no suicidal ideation  Patient reports no homicidal ideation  Patient reports no self-injurious behavior  Patient reports no violent behavior    Verbal deficits: None    Patient's response to intervention:  The patient's response to intervention is accepting.    Progress toward goals and other mental status changes:  The  patient's progress toward goals is fair .    Diagnosis:     ICD-10-CM ICD-9-CM   1. MDD (major depressive disorder), recurrent, in partial remission F33.41 296.35   2. JEEVAN (generalized anxiety disorder) F41.1 300.02       Plan:  individual psychotherapy, group psychotherapy and medication management by physician    Return to clinic: as scheduled    Length of Service (minutes): 45

## 2017-10-22 ENCOUNTER — DOCUMENTATION ONLY (OUTPATIENT)
Dept: TRANSPLANT | Facility: CLINIC | Age: 62
End: 2017-10-22

## 2017-10-22 NOTE — LETTER
October 22, 2017    Yousif Ramires  [address] [place] [id number]      Dear Yousif Ramires:    Your doctor has referred you to the [facility]. You will be contacted by our office and an initial appointment will then be scheduled for you.    We look forward to seeing you soon. If you have any further questions, please contact us at [phone].       Sincerely,        [facility]   [address] [place] [id number]  [phone]

## 2017-10-23 ENCOUNTER — OFFICE VISIT (OUTPATIENT)
Dept: PSYCHIATRY | Facility: CLINIC | Age: 62
End: 2017-10-23
Payer: COMMERCIAL

## 2017-10-23 VITALS
DIASTOLIC BLOOD PRESSURE: 61 MMHG | WEIGHT: 151 LBS | SYSTOLIC BLOOD PRESSURE: 119 MMHG | HEIGHT: 65 IN | BODY MASS INDEX: 25.16 KG/M2 | HEART RATE: 75 BPM

## 2017-10-23 DIAGNOSIS — F41.1 GENERALIZED ANXIETY DISORDER: Primary | ICD-10-CM

## 2017-10-23 PROCEDURE — 99214 OFFICE O/P EST MOD 30 MIN: CPT | Mod: S$GLB,,, | Performed by: PSYCHIATRY & NEUROLOGY

## 2017-10-23 PROCEDURE — 99999 PR PBB SHADOW E&M-EST. PATIENT-LVL II: CPT | Mod: PBBFAC,,, | Performed by: PSYCHIATRY & NEUROLOGY

## 2017-10-23 RX ORDER — FLUOXETINE HYDROCHLORIDE 20 MG/1
60 CAPSULE ORAL DAILY
Qty: 90 CAPSULE | Refills: 3 | Status: SHIPPED | OUTPATIENT
Start: 2017-10-23 | End: 2017-12-21 | Stop reason: SDUPTHER
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
## 2017-10-23 NOTE — NURSING
Pt records reviewed.   Pt will be referred to Hepatology.    Initial referral received  from the [facility]que.   Referring Provider/diagnosis  EDY ARNOLD Provider:   Diagnosis: Elevated LFTs           Referral letter sent to provider and patient.

## 2017-10-25 ENCOUNTER — INITIAL CONSULT (OUTPATIENT)
Dept: GASTROENTEROLOGY | Facility: CLINIC | Age: 62
End: 2017-10-25
Payer: COMMERCIAL

## 2017-10-25 VITALS
HEART RATE: 78 BPM | BODY MASS INDEX: 25.63 KG/M2 | SYSTOLIC BLOOD PRESSURE: 100 MMHG | HEIGHT: 64 IN | WEIGHT: 150.13 LBS | RESPIRATION RATE: 18 BRPM | DIASTOLIC BLOOD PRESSURE: 64 MMHG

## 2017-10-25 DIAGNOSIS — R63.4 WEIGHT LOSS: ICD-10-CM

## 2017-10-25 DIAGNOSIS — K80.20 CALCULUS OF GALLBLADDER WITHOUT CHOLECYSTITIS WITHOUT OBSTRUCTION: ICD-10-CM

## 2017-10-25 DIAGNOSIS — K92.1 HEMATOCHEZIA: ICD-10-CM

## 2017-10-25 DIAGNOSIS — R14.0 ABDOMINAL BLOATING: ICD-10-CM

## 2017-10-25 DIAGNOSIS — R10.11 RUQ ABDOMINAL PAIN: Primary | ICD-10-CM

## 2017-10-25 DIAGNOSIS — R79.89 ELEVATED LIVER FUNCTION TESTS: ICD-10-CM

## 2017-10-25 DIAGNOSIS — K21.00 GASTROESOPHAGEAL REFLUX DISEASE WITH ESOPHAGITIS: ICD-10-CM

## 2017-10-25 DIAGNOSIS — K59.00 CONSTIPATION, UNSPECIFIED CONSTIPATION TYPE: ICD-10-CM

## 2017-10-25 PROCEDURE — 99214 OFFICE O/P EST MOD 30 MIN: CPT | Mod: S$GLB,,, | Performed by: NURSE PRACTITIONER

## 2017-10-25 PROCEDURE — 99999 PR PBB SHADOW E&M-EST. PATIENT-LVL IV: CPT | Mod: PBBFAC,,, | Performed by: NURSE PRACTITIONER

## 2017-10-25 NOTE — LETTER
October 27, 2017      Patience Gordon MD  [address] [address] [address]           [facility]  [address] [facility] [address] [place] [address]  Phone: [phone]          Patient: Mane Ramires   MR Number: [id number]   YOB: 1955   Date of Visit: 10/25/2017       Dear Dr. Patience Gordon:    Thank you for referring Mane Ramires to me for evaluation. Attached you will find relevant portions of my assessment and plan of care.    If you have questions, please do not hesitate to call me. I look forward to following Mane Ramires along with you.    Sincerely,    Alison Thakur, [facility]    Enclosure  CC:  No Recipients    If you would like to receive this communication electronically, please contact [email or web address] or [phone] to request more information on [employer] access.    For providers and/or their staff who would like to refer a patient to [facility], please contact us through our one-stop-shop provider referral line, Lei Dailey, at [phone].    If you feel you have received this communication in error or would no longer like to receive these types of communications, please e-mail [email or web address]

## 2017-10-25 NOTE — PATIENT INSTRUCTIONS
Abdominal Pain    Abdominal pain is pain in the stomach or belly area. Everyone has this pain from time to time. In many cases it goes away on its own. But abdominal pain can sometimes be due to a serious problem, such as appendicitis. So its important to know when to seek help.  Causes of abdominal pain  There are many possible causes of abdominal pain. Common causes in adults include:  · Constipation, diarrhea, or gas  · Stomach acid flowing back up into the esophagus (acid reflux or heartburn)  · Severe acid reflux, called GERD (gastroesophageal reflux disease)  · A sore in the lining of the stomach or small intestine (peptic ulcer)  · Inflammation of the gallbladder, liver, or pancreas  · Gallstones or kidney stones  · Appendicitis   · Intestinal blockage   · An internal organ pushing through a muscle or other tissue (hernia)  · Urinary tract infections  · In women, menstrual cramps, fibroids, or endometriosis  · Inflammation or infection of the intestines  Diagnosing the cause of abdominal pain  Your healthcare provider will do a physical exam help find the cause of your pain. If needed, tests will be ordered. Belly pain has many possible causes. So it can be hard to find the reason for your pain. Giving details about your pain can help. Tell your provider where and when you feel the pain, and what makes it better or worse. Also let your provider know if you have other symptoms such as:  · Fever  · Tiredness  · Upset stomach (nausea)  · Vomiting  · Changes in bathroom habits  Treating abdominal pain  Some causes of pain need emergency medical treatment right away. These include appendicitis or a bowel blockage. Other problems can be treated with rest, fluids, or medicines. Your healthcare provider can give you specific instructions for treatment or self-care based on what is causing your pain.  If you have vomiting or diarrhea, sip water or other clear fluids. When you are ready to eat solid foods again,  start with small amounts of easy-to-digest, low-fat foods. These include apple sauce, toast, or crackers.   When to seek medical care  Call 911 or go to the hospital right away if you:  · Cant pass stool and are vomiting  · Are vomiting blood or have bloody diarrhea or black, tarry diarrhea  · Have chest, neck, or shoulder pain  · Feel like you might pass out  · Have pain in your shoulder blades with nausea  · Have sudden, severe belly pain  · Have new, severe pain unlike any you have felt before  · Have a belly that is rigid, hard, and tender to touch  Call your healthcare provider if you have:  · Pain for more than 5 days  · Bloating for more than 2 days  · Diarrhea for more than 5 days  · A fever of 100.4°F (38.0°C) or higher, or as directed by your provider  · Pain that gets worse  · Weight loss for no reason  · Continued lack of appetite  · Blood in your stool  How to prevent abdominal pain  Here are some tips to help prevent abdominal pain:  · Eat smaller amounts of food at one time.  · Avoid greasy, fried, or other high-fat foods.  · Avoid foods that give you gas.  · Exercise regularly.  · Drink plenty of fluids.  To help prevent GERD symptoms:  · Quit smoking.  · Reduce alcohol and certain foods that increase stomach acid.  · Avoid aspirin and over-the-counter pain and fever medicines (NSAIDS or nonsteroidal anti-inflammatory drugs), if possible  · Lose extra weight.  · Finish eating at least 2 hours before you go to bed or lie down.  · Raise the head of your bed.  Date Last Reviewed: 7/1/2016  © 7693-9628 [employer]. [address] [place] [address], [place] [address]. All rights reserved. This information is not intended as a substitute for professional medical care. Always follow your healthcare professional's instructions.        What are Gallstones?    The gallbladder stores bile, a fluid made by the liver. Bile helps digest fats in the foods you eat. Gallstones form when certain substances in the  bile crystallize and become solid. In some cases, the stones dont cause any symptoms. In others, they irritate the walls of the gallbladder. More serious problems can occur if stones move into nearby ducts (such as the common bile duct) and cause blockages. This can block the flow of bile and lead to pain, nausea, and infection.  Common symptoms  Gallbladder problems can cause painful attacks, often after a meal. Some people have only one attack. Others have many. Common symptoms include:  · Severe, steady pain or aching in the upper right belly (abdomen), back, or right shoulder blade, lasting from 30 minutes to several hours  · A dull ache beneath the ribs or breastbone  · Nausea, upset stomach, or vomiting  · A buildup of too much bile in the blood (jaundice), which causes yellowing of the skin and eyes, dark urine, and itching. Call your healthcare provider right away if this occurs.  Risk factors for gallstones  You are more at risk for gallstones if you:  · Are a woman  · Are obese  · Have a history of very fast (rapid) weight loss  · Have certain intestinal diseases, such as Crohns disease  · Have certain blood diseases, such as sickle cell anemia  · Have a family background that includes Native Americans or Sammarinese Americans  Diagnosis  Ultrasound is often used to look at the gallbladder and measure the size and exact location of gallstone.  Blood tests are used to measure liver enzymes and bilirubin. Both of these may be high if the gallstones are blocking bile flow or irritating the liver.  Treating gallstones  If your stones are not causing symptoms, you may choose to delay treatment. But if youve had one or more painful attacks, your healthcare provider will likely recommend removing your gallbladder. This prevents more stones from forming and causing attacks. It also helps prevent problems, such as stones passing into the ducts and causing infection or pancreatitis. After the gallbladder is removed,  your liver will still make bile to aid digestion.  If youre pregnant  Hormone changes during pregnancy can make bile more likely to form stones. If your gallbladder needs to be removed, your doctor will talk with you about the timing for surgery. In some cases, it can be delayed until after childbirth. In others, you may have surgery during pregnancy. This helps protect you and your babys health.   Date Last Reviewed: 12/1/2016  © 7903-4716 [employer]. [address] [place] [address], [place] [address]. All rights reserved. This information is not intended as a substitute for professional medical care. Always follow your healthcare professional's instructions.
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
## 2017-10-30 ENCOUNTER — PATIENT OUTREACH (OUTPATIENT)
Dept: OTHER | Facility: OTHER | Age: 62
End: 2017-10-30

## 2017-10-30 ENCOUNTER — PATIENT MESSAGE (OUTPATIENT)
Dept: PSYCHIATRY | Facility: CLINIC | Age: 62
End: 2017-10-30

## 2017-10-30 DIAGNOSIS — I10 ESSENTIAL HYPERTENSION: ICD-10-CM

## 2017-10-30 RX ORDER — METOPROLOL SUCCINATE 25 MG/1
25 TABLET, EXTENDED RELEASE ORAL DAILY
Qty: 90 TABLET | Refills: 1 | Status: SHIPPED | OUTPATIENT
Start: 2017-10-30 | End: 2018-04-16 | Stop reason: SDUPTHER

## 2017-10-30 NOTE — PROGRESS NOTES
Last 5 Patient Entered Readings Current 30 Day Average: 114/75     Recent Readings 10/28/2017 10/26/2017 10/18/2017 10/17/2017 10/13/2017    Systolic BP (mmHg) 110 105 101 109 121    Diastolic BP (mmHg) 72 67 72 72 81    Pulse 76 70 75 78 67        Mr. Ramires's BP average remains well controlled on reduced dose of metoprolol. He says he is feeling well and pleased with his BP readings.     Current HTN regimen:  Hypertension Medications             lisinopril (PRINIVIL,ZESTRIL) 40 MG tablet TAKE 1 TABLET (40 MG TOTAL) BY MOUTH ONCE DAILY.    metoprolol succinate (TOPROL-XL) 25 MG 24 hr tablet Take 1 tablet (25 mg total) by mouth once daily.        Will continue to monitor regularly. Will follow up in 3-4 months, sooner if BP begins to trend upward or downward.    Patient has my contact information and knows to call with any concerns or clinical changes.

## 2017-11-01 ENCOUNTER — OFFICE VISIT (OUTPATIENT)
Dept: SURGERY | Facility: CLINIC | Age: 62
End: 2017-11-01
Payer: COMMERCIAL

## 2017-11-01 ENCOUNTER — HOSPITAL ENCOUNTER (OUTPATIENT)
Dept: PREADMISSION TESTING | Facility: HOSPITAL | Age: 62
Discharge: HOME | End: 2017-11-01
Attending: INTERNAL MEDICINE
Payer: COMMERCIAL

## 2017-11-01 VITALS
DIASTOLIC BLOOD PRESSURE: 64 MMHG | SYSTOLIC BLOOD PRESSURE: 116 MMHG | HEIGHT: 65 IN | TEMPERATURE: 98 F | BODY MASS INDEX: 25.08 KG/M2 | HEART RATE: 67 BPM | WEIGHT: 150.56 LBS

## 2017-11-01 DIAGNOSIS — K80.50 BILIARY COLIC: Primary | ICD-10-CM

## 2017-11-01 LAB
ALBUMIN SERPL BCP-MCNC: 3.7 G/DL
ALP SERPL-CCNC: 213 U/L
ALT SERPL W/O P-5'-P-CCNC: 105 U/L
ANION GAP SERPL CALC-SCNC: 9 MMOL/L
AST SERPL-CCNC: 59 U/L
BASOPHILS # BLD AUTO: 0 K/UL
BASOPHILS NFR BLD: 0.2 %
BILIRUB SERPL-MCNC: 0.2 MG/DL
BUN SERPL-MCNC: 15 MG/DL
CALCIUM SERPL-MCNC: 9.8 MG/DL
CHLORIDE SERPL-SCNC: 104 MMOL/L
CO2 SERPL-SCNC: 25 MMOL/L
CREAT SERPL-MCNC: 1.2 MG/DL
DIFFERENTIAL METHOD: ABNORMAL
EOSINOPHIL # BLD AUTO: 0.1 K/UL
EOSINOPHIL NFR BLD: 1.5 %
ERYTHROCYTE [DISTWIDTH] IN BLOOD BY AUTOMATED COUNT: 12.5 %
EST. GFR  (AFRICAN AMERICAN): >60 ML/MIN/1.73 M^2
EST. GFR  (NON AFRICAN AMERICAN): >60 ML/MIN/1.73 M^2
GLUCOSE SERPL-MCNC: 139 MG/DL
HCT VFR BLD AUTO: 41.4 %
HGB BLD-MCNC: 14.4 G/DL
LYMPHOCYTES # BLD AUTO: 2.8 K/UL
LYMPHOCYTES NFR BLD: 29.4 %
MCH RBC QN AUTO: 31 PG
MCHC RBC AUTO-ENTMCNC: 34.8 G/DL
MCV RBC AUTO: 89 FL
MONOCYTES # BLD AUTO: 0.3 K/UL
MONOCYTES NFR BLD: 3.7 %
NEUTROPHILS # BLD AUTO: 6.2 K/UL
NEUTROPHILS NFR BLD: 65.2 %
PLATELET # BLD AUTO: 299 K/UL
PMV BLD AUTO: 7.5 FL
POTASSIUM SERPL-SCNC: 4.3 MMOL/L
PROT SERPL-MCNC: 7.6 G/DL
RBC # BLD AUTO: 4.64 M/UL
SODIUM SERPL-SCNC: 138 MMOL/L
WBC # BLD AUTO: 9.5 K/UL

## 2017-11-01 PROCEDURE — 99900104 DSU ONLY-NO CHARGE-EA ADD'L HR (STAT)

## 2017-11-01 PROCEDURE — 36415 COLL VENOUS BLD VENIPUNCTURE: CPT

## 2017-11-01 PROCEDURE — 93005 ELECTROCARDIOGRAM TRACING: CPT

## 2017-11-01 PROCEDURE — 99244 OFF/OP CNSLTJ NEW/EST MOD 40: CPT | Mod: S$GLB,,, | Performed by: SURGERY

## 2017-11-01 PROCEDURE — 93010 ELECTROCARDIOGRAM REPORT: CPT | Mod: ,,, | Performed by: INTERNAL MEDICINE

## 2017-11-01 PROCEDURE — 85025 COMPLETE CBC W/AUTO DIFF WBC: CPT

## 2017-11-01 PROCEDURE — 80053 COMPREHEN METABOLIC PANEL: CPT

## 2017-11-01 PROCEDURE — 99999 PR PBB SHADOW E&M-EST. PATIENT-LVL III: CPT | Mod: PBBFAC,,, | Performed by: SURGERY

## 2017-11-01 PROCEDURE — 99900103 DSU ONLY-NO CHARGE-INITIAL HR (STAT)

## 2017-11-01 RX ORDER — SODIUM CHLORIDE 9 MG/ML
INJECTION, SOLUTION INTRAVENOUS CONTINUOUS
Status: CANCELLED | OUTPATIENT
Start: 2017-11-01

## 2017-11-01 NOTE — LETTER
November 3, 2017      Alison Thakur, GALILEO  [address]  [facility] [address]           [facility]  [address]  [facility] [address]  Phone: [phone]          Patient: Mane Ramires   MR Number: [id number]   YOB: 1955   Date of Visit: 11/1/2017       Dear Alison Thakur:    Thank you for referring Mane Ramires to me for evaluation. Attached you will find relevant portions of my assessment and plan of care.    If you have questions, please do not hesitate to call me. I look forward to following Mane Ramires along with you.    Sincerely,    Miki Guan MD    Enclosure  CC:  No Recipients    If you would like to receive this communication electronically, please contact [email or web address] or [phone] to request more information on [employer] access.    For providers and/or their staff who would like to refer a patient to [facility], please contact us through our one-stop-shop provider referral line, [facility], at [phone].    If you feel you have received this communication in error or would no longer like to receive these types of communications, please e-mail [email or web address]

## 2017-11-03 NOTE — PROGRESS NOTES
Subjective:       Patient ID: Mane Ramires is a 62 y.o. male.    Chief Complaint: Consult (Gallstone)      HPI 62-year-old transgender male presents with right upper quadrant and epigastric pain which radiates into his back.  He's had some nausea but no vomiting.  He has some diarrhea when he drinks milk.  He says fatty foods especially brings on the pain.  He denies any fever or chills.  Ultrasound revealed evidence of cholelithiasis without cholecystitis.    Past Medical History:   Diagnosis Date    Accident     fell from roof with TBI (word finding issues personality changes, memory deficets), R rib fractures, clavicle fx    Addiction to drug     Allergy     Anxiety     ASD (atrial septal defect)     noted on ECHO 6/16    Cancer     pre-cancerous cells in breast tissue    Cervical stenosis of spine     noted on 6/15 MRI    CTS (carpal tunnel syndrome)     mild-mod on 4/17 NCS    DDD (degenerative disc disease), cervical 10/2014    C5-6 and C6-C7    Depression     Fatigue     medication induced    Gender identity disorder     H/O cardiovascular stress test     12/14 normal    Headache     medication induced    Heart murmur 05/26/2016    Herpes simplex type 2 infection     Hx of psychiatric care     prozac    Hypertension     IGT (impaired glucose tolerance)     Myelomalacia     c-spine noted on 8/15 MRI    OA (osteoarthritis)     Prediabetes     Psychiatric exam requested by authority     neuropsych testing     Psychiatric problem     TBI (traumatic brain injury)     after falling off a roof in 2003    Therapy      Past Surgical History:   Procedure Laterality Date    AMPUTATION      L index finger    APPENDECTOMY  1969    BILATERAL SALPINGOOPHORECTOMY  02/2015    CERVICAL FUSION  10/2014    C5-C6 and C6-C7    HYSTERECTOMY  1984    MIGUEL    MASTECTOMY  02/2015    UPPER GASTROINTESTINAL ENDOSCOPY  09/06/2017    Dr. Ayon         Current Outpatient Prescriptions:      esomeprazole (NEXIUM) 40 MG capsule, Take 1 capsule (40 mg total) by mouth before breakfast., Disp: 30 capsule, Rfl: 2    fluoxetine (PROZAC) 20 MG capsule, Take 3 capsules (60 mg total) by mouth once daily., Disp: 90 capsule, Rfl: 3    hydrOXYzine HCl (ATARAX) 25 MG tablet, Take one or two tablets at bedtime as needed for sleep, Disp: 60 tablet, Rfl: 3    lisinopril (PRINIVIL,ZESTRIL) 40 MG tablet, TAKE 1 TABLET (40 MG TOTAL) BY MOUTH ONCE DAILY., Disp: 30 tablet, Rfl: 1    LYRICA 100 mg capsule, TAKE ONE CAPSULE BY MOUTH TWICE A DAY (Patient taking differently: TAKE ONE CAPSULE BY MOUTH ONCE A DAY), Disp: 60 capsule, Rfl: 1    metoprolol succinate (TOPROL-XL) 25 MG 24 hr tablet, Take 1 tablet (25 mg total) by mouth once daily., Disp: 90 tablet, Rfl: 1    ranitidine (ZANTAC) 300 MG tablet, Take 1 tablet (300 mg total) by mouth every evening., Disp: 30 tablet, Rfl: 2    testosterone cypionate (DEPOTESTOTERONE CYPIONATE) 200 mg/mL injection, INJECT 1ML INTO THE MUSCLE EVERY 14 DAYS, Disp: 2 mL, Rfl: 5    JUBLIA 10 % Usha, APPLY 1 DROP TO ALL SMALLER TOES AND 2 DROPS TO GREATER TOES DAILY, Disp: , Rfl: 10    levocetirizine (XYZAL) 5 MG tablet, TAKE 1 TABLET (5 MG TOTAL) BY MOUTH NIGHTLY AS NEEDED FOR ALLERGIES., Disp: 30 tablet, Rfl: 9    tramadol (ULTRAM) 50 mg tablet, Take 1 tablet (50 mg total) by mouth 2 (two) times daily as needed., Disp: 60 tablet, Rfl: 0    Review of patient's allergies indicates:   Allergen Reactions    Sudafed [pseudoephedrine hcl] Other (See Comments)     Heart racing      Codeine Other (See Comments)     Heart racing    Cortisone Palpitations       Family History   Problem Relation Age of Onset    Diabetes Mother     Heart disease Mother     Gallbladder disease Mother     Coronary artery disease Father     Breast cancer Sister     Lung cancer Paternal Grandfather     Heart disease Paternal Grandfather     Heart disease Maternal Grandmother     Gallbladder disease  Maternal Grandmother     Heart attack Brother     Gallbladder disease Daughter     Colon cancer Neg Hx     Colon polyps Neg Hx     Crohn's disease Neg Hx     Ulcerative colitis Neg Hx     Stomach cancer Neg Hx     Esophageal cancer Neg Hx      Social History     Social History    Marital status: Single     Spouse name: N/A    Number of children: N/A    Years of education: N/A     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    Smoking status: Former Smoker     Packs/day: 0.25     Years: 2.00     Types: Cigarettes     Quit date: 11/1/2002    Smokeless tobacco: Never Used    Alcohol use Yes      Comment: rare     Drug use: No    Sexual activity: Yes     Partners: Female     Other Topics Concern    Not on file     Social History Narrative    No narrative on file       Review of Systems   Constitutional: Negative for chills, fatigue, fever and unexpected weight change.   HENT: Negative for congestion, sore throat, trouble swallowing and voice change.    Eyes: Negative for redness and visual disturbance.   Respiratory: Negative for cough, shortness of breath and wheezing.    Cardiovascular: Negative for chest pain and palpitations.   Gastrointestinal: Positive for abdominal pain. Negative for blood in stool, nausea and vomiting.   Genitourinary: Negative for dysuria, frequency, hematuria and urgency.   Musculoskeletal: Negative for arthralgias, myalgias and neck pain.   Skin: Negative for rash and wound.   Allergic/Immunologic: Negative.    Neurological: Negative for tremors, seizures, weakness and headaches.   Hematological: Does not bruise/bleed easily.   Psychiatric/Behavioral: Negative for confusion and dysphoric mood. The patient is not nervous/anxious.      Objective:     Physical Exam   Constitutional: He is oriented to person, place, and time. He appears well-developed and well-nourished. No distress.   HENT:   Head: Normocephalic and atraumatic.   Mouth/Throat: Oropharynx is  clear and moist. No oropharyngeal exudate.   Eyes: Conjunctivae and EOM are normal. Pupils are equal, round, and reactive to light. No scleral icterus.   Neck: Normal range of motion. Neck supple. No thyromegaly present.   Cardiovascular: Normal rate, regular rhythm, normal heart sounds and intact distal pulses.    No murmur heard.  Pulmonary/Chest: Effort normal and breath sounds normal. No respiratory distress. He has no wheezes. He has no rales.   Abdominal: Soft. Bowel sounds are normal. He exhibits no distension. There is tenderness (Mild epigastric and right upper quadrant tenderness.). No hernia.   Musculoskeletal: Normal range of motion. He exhibits no edema or tenderness.   Lymphadenopathy:     He has no cervical adenopathy.   Neurological: He is alert and oriented to person, place, and time. No cranial nerve deficit.   Skin: Skin is warm and dry. No rash noted. No erythema.   Psychiatric: He has a normal mood and affect. His behavior is normal. Judgment normal.       Impression      Solitary gallstone with no evidence of cholecystitis.             Assessment:     Encounter Diagnosis   Name Primary?    Biliary colic Yes       Plan:      1.  Plan laparoscopic, possible open cholecystectomy.  2. Risks and benefits of the planned procedure were discussed at length with the patient.  Risks and benefits of not proceeding with the procedure were discussed as well. All questions were answered. The patient expressed clear understanding and would like to proceed with the procedure as discussed.

## 2017-11-07 ENCOUNTER — DOCUMENTATION ONLY (OUTPATIENT)
Dept: CARDIOLOGY | Facility: CLINIC | Age: 62
End: 2017-11-07

## 2017-11-07 NOTE — PROGRESS NOTES
Preoperative Cardiac Risk Stratification:  Mane Ramires is a 62 y.o. male with a past medical history of small secundum ASD (Qp/QS= 1.2), HTN, HLD, strong family history of CAD, gender identity disorder, who is scheduled for planned laparoscopic, possible open cholecystectomy on 11/9/2017 with Dr. Miki Guan.  Mr. Ramires has no chest pain or history of MI.  He also has no heart failure symptoms and no documented arrhythmia(s).  Nuclear stress test from 4/12/2017 shows normal myocardial perfusion with no evidence for myocardial ischemia or injury.  Therefore, he is at low risk for a perioperative major adverse cardiac event during this intermediate risk procedure.  Revised Cardiac Risk Index of 0.4%.  No further testing indicated.     Nuclear Stress Test 4/12/2017:  Nuclear Quantitative Functional Analysis:   LVEF: 53 % (normal is 47 - 59)  LVED Volume: 66 ml (normal is 91 - 155)  LVES Volume: 31 ml (normal is 40 - 78)    Impression: NORMAL MYOCARDIAL PERFUSION  1. The perfusion scan is free of evidence for myocardial ischemia or injury.   2. Resting wall motion is physiologic.   3. Resting LV function is normal.  (normal is 47 - 59)  4. The ventricular volumes are normal at rest and stress.   5. The extracardiac distribution of radioactivity is normal.

## 2017-11-08 ENCOUNTER — ANESTHESIA EVENT (OUTPATIENT)
Dept: SURGERY | Facility: HOSPITAL | Age: 62
End: 2017-11-08
Payer: COMMERCIAL

## 2017-11-08 ENCOUNTER — TELEPHONE (OUTPATIENT)
Dept: ADMINISTRATIVE | Facility: HOSPITAL | Age: 62
End: 2017-11-08

## 2017-11-09 ENCOUNTER — SURGERY (OUTPATIENT)
Age: 62
End: 2017-11-09

## 2017-11-09 ENCOUNTER — ANESTHESIA (OUTPATIENT)
Dept: SURGERY | Facility: HOSPITAL | Age: 62
End: 2017-11-09
Payer: COMMERCIAL

## 2017-11-09 ENCOUNTER — HOSPITAL ENCOUNTER (OUTPATIENT)
Facility: HOSPITAL | Age: 62
Discharge: HOME | End: 2017-11-09
Attending: SURGERY | Admitting: SURGERY
Payer: COMMERCIAL

## 2017-11-09 VITALS
DIASTOLIC BLOOD PRESSURE: 57 MMHG | OXYGEN SATURATION: 97 % | WEIGHT: 146 LBS | BODY MASS INDEX: 24.32 KG/M2 | TEMPERATURE: 98 F | SYSTOLIC BLOOD PRESSURE: 120 MMHG | HEIGHT: 65 IN | HEART RATE: 84 BPM | RESPIRATION RATE: 16 BRPM

## 2017-11-09 DIAGNOSIS — K80.50 BILIARY COLIC: Primary | ICD-10-CM

## 2017-11-09 PROCEDURE — 47562 LAPAROSCOPIC CHOLECYSTECTOMY: CPT | Mod: ,,, | Performed by: SURGERY

## 2017-11-09 PROCEDURE — D9220A PRA ANESTHESIA: Mod: ANES,,, | Performed by: ANESTHESIOLOGY

## 2017-11-09 PROCEDURE — 25000003 PHARM REV CODE 250: Performed by: ANESTHESIOLOGY

## 2017-11-09 PROCEDURE — 71000016 HC POSTOP RECOV ADDL HR: Performed by: SURGERY

## 2017-11-09 PROCEDURE — 71000039 HC RECOVERY, EACH ADD'L HOUR: Performed by: SURGERY

## 2017-11-09 PROCEDURE — 37000008 HC ANESTHESIA 1ST 15 MINUTES: Performed by: SURGERY

## 2017-11-09 PROCEDURE — 27201423 OPTIME MED/SURG SUP & DEVICES STERILE SUPPLY: Performed by: SURGERY

## 2017-11-09 PROCEDURE — 88304 TISSUE EXAM BY PATHOLOGIST: CPT | Performed by: PATHOLOGY

## 2017-11-09 PROCEDURE — 99900104 DSU ONLY-NO CHARGE-EA ADD'L HR (STAT): Performed by: SURGERY

## 2017-11-09 PROCEDURE — D9220A PRA ANESTHESIA: Mod: CRNA,,, | Performed by: NURSE ANESTHETIST, CERTIFIED REGISTERED

## 2017-11-09 PROCEDURE — 36000709 HC OR TIME LEV III EA ADD 15 MIN: Performed by: SURGERY

## 2017-11-09 PROCEDURE — 88304 TISSUE EXAM BY PATHOLOGIST: CPT | Mod: 26,,, | Performed by: PATHOLOGY

## 2017-11-09 PROCEDURE — 71000033 HC RECOVERY, INTIAL HOUR: Performed by: SURGERY

## 2017-11-09 PROCEDURE — 99900103 DSU ONLY-NO CHARGE-INITIAL HR (STAT): Performed by: SURGERY

## 2017-11-09 PROCEDURE — 36000708 HC OR TIME LEV III 1ST 15 MIN: Performed by: SURGERY

## 2017-11-09 PROCEDURE — 63600175 PHARM REV CODE 636 W HCPCS: Performed by: NURSE ANESTHETIST, CERTIFIED REGISTERED

## 2017-11-09 PROCEDURE — 63600175 PHARM REV CODE 636 W HCPCS: Performed by: ANESTHESIOLOGY

## 2017-11-09 PROCEDURE — 25000003 PHARM REV CODE 250: Performed by: SURGERY

## 2017-11-09 PROCEDURE — 37000009 HC ANESTHESIA EA ADD 15 MINS: Performed by: SURGERY

## 2017-11-09 PROCEDURE — 71000015 HC POSTOP RECOV 1ST HR: Performed by: SURGERY

## 2017-11-09 PROCEDURE — S0020 INJECTION, BUPIVICAINE HYDRO: HCPCS | Performed by: SURGERY

## 2017-11-09 PROCEDURE — 63600175 PHARM REV CODE 636 W HCPCS: Performed by: SURGERY

## 2017-11-09 PROCEDURE — 25000003 PHARM REV CODE 250: Performed by: NURSE ANESTHETIST, CERTIFIED REGISTERED

## 2017-11-09 RX ORDER — KETOROLAC TROMETHAMINE 30 MG/ML
15 INJECTION, SOLUTION INTRAMUSCULAR; INTRAVENOUS ONCE AS NEEDED
Status: COMPLETED | OUTPATIENT
Start: 2017-11-09 | End: 2017-11-09

## 2017-11-09 RX ORDER — SODIUM CHLORIDE 9 MG/ML
INJECTION, SOLUTION INTRAVENOUS CONTINUOUS
Status: DISCONTINUED | OUTPATIENT
Start: 2017-11-09 | End: 2017-11-09 | Stop reason: HOSPADM

## 2017-11-09 RX ORDER — MIDAZOLAM HYDROCHLORIDE 1 MG/ML
INJECTION INTRAMUSCULAR; INTRAVENOUS
Status: DISCONTINUED | OUTPATIENT
Start: 2017-11-09 | End: 2017-11-09

## 2017-11-09 RX ORDER — SODIUM CHLORIDE, SODIUM LACTATE, POTASSIUM CHLORIDE, CALCIUM CHLORIDE 600; 310; 30; 20 MG/100ML; MG/100ML; MG/100ML; MG/100ML
INJECTION, SOLUTION INTRAVENOUS CONTINUOUS
Status: DISCONTINUED | OUTPATIENT
Start: 2017-11-09 | End: 2017-11-09 | Stop reason: HOSPADM

## 2017-11-09 RX ORDER — ROCURONIUM BROMIDE 10 MG/ML
INJECTION, SOLUTION INTRAVENOUS
Status: DISCONTINUED | OUTPATIENT
Start: 2017-11-09 | End: 2017-11-09

## 2017-11-09 RX ORDER — HYDROCODONE BITARTRATE AND ACETAMINOPHEN 7.5; 325 MG/1; MG/1
1 TABLET ORAL EVERY 4 HOURS PRN
Qty: 25 TABLET | Refills: 0 | Status: SHIPPED | OUTPATIENT
Start: 2017-11-09 | End: 2017-11-19

## 2017-11-09 RX ORDER — BUPIVACAINE HYDROCHLORIDE 5 MG/ML
INJECTION, SOLUTION EPIDURAL; INTRACAUDAL
Status: DISCONTINUED | OUTPATIENT
Start: 2017-11-09 | End: 2017-11-09 | Stop reason: HOSPADM

## 2017-11-09 RX ORDER — LIDOCAINE HYDROCHLORIDE 10 MG/ML
1 INJECTION, SOLUTION EPIDURAL; INFILTRATION; INTRACAUDAL; PERINEURAL ONCE
Status: COMPLETED | OUTPATIENT
Start: 2017-11-09 | End: 2017-11-09

## 2017-11-09 RX ORDER — PROPOFOL 10 MG/ML
VIAL (ML) INTRAVENOUS
Status: DISCONTINUED | OUTPATIENT
Start: 2017-11-09 | End: 2017-11-09

## 2017-11-09 RX ORDER — ACETAMINOPHEN 10 MG/ML
1000 INJECTION, SOLUTION INTRAVENOUS ONCE
Status: COMPLETED | OUTPATIENT
Start: 2017-11-09 | End: 2017-11-09

## 2017-11-09 RX ORDER — GLYCOPYRROLATE 0.2 MG/ML
INJECTION INTRAMUSCULAR; INTRAVENOUS
Status: DISCONTINUED | OUTPATIENT
Start: 2017-11-09 | End: 2017-11-09

## 2017-11-09 RX ORDER — CEFAZOLIN SODIUM 2 G/50ML
2 SOLUTION INTRAVENOUS
Status: COMPLETED | OUTPATIENT
Start: 2017-11-09 | End: 2017-11-09

## 2017-11-09 RX ORDER — ONDANSETRON HYDROCHLORIDE 2 MG/ML
INJECTION, SOLUTION INTRAMUSCULAR; INTRAVENOUS
Status: DISCONTINUED | OUTPATIENT
Start: 2017-11-09 | End: 2017-11-09

## 2017-11-09 RX ORDER — SODIUM CHLORIDE, SODIUM LACTATE, POTASSIUM CHLORIDE, CALCIUM CHLORIDE 600; 310; 30; 20 MG/100ML; MG/100ML; MG/100ML; MG/100ML
INJECTION, SOLUTION INTRAVENOUS CONTINUOUS
Status: DISCONTINUED | OUTPATIENT
Start: 2017-11-09 | End: 2017-11-09

## 2017-11-09 RX ORDER — OXYCODONE HYDROCHLORIDE 5 MG/1
5 TABLET ORAL ONCE AS NEEDED
Status: COMPLETED | OUTPATIENT
Start: 2017-11-09 | End: 2017-11-09

## 2017-11-09 RX ORDER — FENTANYL CITRATE 50 UG/ML
INJECTION, SOLUTION INTRAMUSCULAR; INTRAVENOUS
Status: DISCONTINUED | OUTPATIENT
Start: 2017-11-09 | End: 2017-11-09

## 2017-11-09 RX ORDER — LIDOCAINE HCL/PF 100 MG/5ML
SYRINGE (ML) INTRAVENOUS
Status: DISCONTINUED | OUTPATIENT
Start: 2017-11-09 | End: 2017-11-09

## 2017-11-09 RX ORDER — NEOSTIGMINE METHYLSULFATE 1 MG/ML
INJECTION, SOLUTION INTRAVENOUS
Status: DISCONTINUED | OUTPATIENT
Start: 2017-11-09 | End: 2017-11-09

## 2017-11-09 RX ORDER — FENTANYL CITRATE 50 UG/ML
25 INJECTION, SOLUTION INTRAMUSCULAR; INTRAVENOUS EVERY 5 MIN PRN
Status: COMPLETED | OUTPATIENT
Start: 2017-11-09 | End: 2017-11-09

## 2017-11-09 RX ORDER — HYDROCODONE BITARTRATE AND ACETAMINOPHEN 5; 325 MG/1; MG/1
1 TABLET ORAL EVERY 4 HOURS PRN
Status: DISCONTINUED | OUTPATIENT
Start: 2017-11-09 | End: 2017-11-09 | Stop reason: HOSPADM

## 2017-11-09 RX ADMIN — OXYCODONE HYDROCHLORIDE 5 MG: 5 TABLET ORAL at 10:11

## 2017-11-09 RX ADMIN — FENTANYL CITRATE 25 MCG: 50 INJECTION INTRAMUSCULAR; INTRAVENOUS at 10:11

## 2017-11-09 RX ADMIN — MIDAZOLAM HYDROCHLORIDE 2 MG: 1 INJECTION, SOLUTION INTRAMUSCULAR; INTRAVENOUS at 08:11

## 2017-11-09 RX ADMIN — FENTANYL CITRATE 25 MCG: 50 INJECTION INTRAMUSCULAR; INTRAVENOUS at 11:11

## 2017-11-09 RX ADMIN — PROPOFOL 150 MG: 10 INJECTION, EMULSION INTRAVENOUS at 09:11

## 2017-11-09 RX ADMIN — CEFAZOLIN SODIUM 2 G: 2 SOLUTION INTRAVENOUS at 09:11

## 2017-11-09 RX ADMIN — EPHEDRINE SULFATE 10 MG: 50 INJECTION, SOLUTION INTRAMUSCULAR; INTRAVENOUS; SUBCUTANEOUS at 09:11

## 2017-11-09 RX ADMIN — KETOROLAC TROMETHAMINE 15 MG: 30 INJECTION, SOLUTION INTRAMUSCULAR at 11:11

## 2017-11-09 RX ADMIN — ACETAMINOPHEN 1000 MG: 10 INJECTION, SOLUTION INTRAVENOUS at 11:11

## 2017-11-09 RX ADMIN — BUPIVACAINE HYDROCHLORIDE 20 ML: 5 INJECTION, SOLUTION EPIDURAL; INTRACAUDAL; PERINEURAL at 09:11

## 2017-11-09 RX ADMIN — FENTANYL CITRATE 100 MCG: 50 INJECTION, SOLUTION INTRAMUSCULAR; INTRAVENOUS at 09:11

## 2017-11-09 RX ADMIN — LIDOCAINE HYDROCHLORIDE 10 MG: 10 INJECTION, SOLUTION EPIDURAL; INFILTRATION; INTRACAUDAL; PERINEURAL at 08:11

## 2017-11-09 RX ADMIN — LIDOCAINE HYDROCHLORIDE 100 MG: 20 INJECTION, SOLUTION INTRAVENOUS at 09:11

## 2017-11-09 RX ADMIN — ROCURONIUM BROMIDE 30 MG: 10 INJECTION, SOLUTION INTRAVENOUS at 09:11

## 2017-11-09 RX ADMIN — ONDANSETRON 4 MG: 2 INJECTION, SOLUTION INTRAMUSCULAR; INTRAVENOUS at 08:11

## 2017-11-09 RX ADMIN — NEOSTIGMINE METHYLSULFATE 2 MG: 1 INJECTION INTRAVENOUS at 09:11

## 2017-11-09 RX ADMIN — SODIUM CHLORIDE, SODIUM LACTATE, POTASSIUM CHLORIDE, AND CALCIUM CHLORIDE: .6; .31; .03; .02 INJECTION, SOLUTION INTRAVENOUS at 08:11

## 2017-11-09 RX ADMIN — OXYCODONE HYDROCHLORIDE 5 MG: 5 TABLET ORAL at 11:11

## 2017-11-09 RX ADMIN — GLYCOPYRROLATE 0.4 MG: 0.2 INJECTION, SOLUTION INTRAMUSCULAR; INTRAVENOUS at 09:11

## 2017-11-09 NOTE — ANESTHESIA PREPROCEDURE EVALUATION
11/09/2017  Mane Ramires is a 62 y.o., male.    Anesthesia Evaluation      I have reviewed the Medications.     Review of Systems  Anesthesia Hx:  No problems with previous Anesthesia   Social:  Non-Smoker, No Alcohol Use    Cardiovascular:   Hypertension Valvular problems/Murmurs (hx ASD) hyperlipidemia    Pulmonary:  Pulmonary Normal    Renal/:  Renal/ Normal     Hepatic/GI:  Hepatic/GI Normal    Musculoskeletal:   S/p ACDF Spine Disorders: cervical    Neurological:  Neurology Normal    Endocrine:  Endocrine Normal    Psych:   anxiety          Physical Exam  General:  Well nourished    Airway/Jaw/Neck:  Airway Findings: Mouth Opening: Normal General Airway Assessment: Adult  Mallampati: II  Jaw/Neck Findings:  Neck ROM: Extension Decreased, Mild       Chest/Lungs:  Chest/Lungs Findings: Clear to auscultation, Normal Respiratory Rate     Heart/Vascular:  Heart Findings: Rate: Normal  Rhythm: Regular Rhythm  Sounds: Normal  Heart murmur: negative Vascular Findings: Normal (No carotid bruits.)       Mental Status:  Mental Status Findings:  Cooperative, Alert and Oriented         Anesthesia Plan  Type of Anesthesia, risks & benefits discussed:  Anesthesia Type:  general  Patient's Preference:   Intra-op Monitoring Plan:   Intra-op Monitoring Plan Comments:   Post Op Pain Control Plan:   Post Op Pain Control Plan Comments:   Induction:   IV  Beta Blocker:  Patient is not currently on a Beta-Blocker (No further documentation required).       Informed Consent: Patient understands risks and agrees with Anesthesia plan.  Questions answered. Anesthesia consent signed with patient.  ASA Score: 2     Day of Surgery Review of History & Physical:            Ready For Surgery From Anesthesia Perspective.

## 2017-11-09 NOTE — OP NOTE
PATIENT NAME:  Mane Ramires     DATE OF PROCEDURE: 11/9/2017    PROCEDURE: Laparoscopic Cholecystectomy    PREOPERATIVE DIAGNOSIS: Cholelithiasis / Cholecystitis   POSTOPERATIVE DIAGNOSIS: Cholelithiasis / Cholecystitis     SURGEON: Miki Lowe Jr., M.D.  ASSISTANT: None     DESCRIPTION OF PROCEDURE: After appropriate consent was obtained, consent forms   signed and questions answered, the patient was taken to the Operating Room and   placed on the operating room table where general endotracheal anesthesia was   induced without difficulty. Preoperative antibiotics were administered. SCDs were in   place. A Timeout procedure was performed. The abdomen was prepped and draped in the usual sterile fashion. A midline incision was made beneath the umbilicus. Dissection was performed down to the fascia, which was incised. Stay sutures were placed on the fascia and the Renee trocar was inserted. The abdomen was insufflated. Under direct   vision and after injection with local anesthetic, a 5 mm trocar was placed in   the upper midline. A second 5 mm trocar was placed between these two. The   gallbladder was identified, grasped, and retracted. There was some mild to moderate  inflammation. The cystic duct was identified and carefully dissected. Three clips   were placed on the common duct side, 2 on the gallbladder side, and the duct was   transected. The artery was identified, dissected, clipped and cut as well. The   gallbladder was then dissected free of the liver bed with electrocautery. It was   placed in a retrieval bag and removed through the umbilical port site. No bile   was spilled. The gallbladder fossa was examined and found to be hemostatic. The trocars were then removed under direct vision.The abdomen was desufflated. The fascia at the umbilicus was closed with interrupted 0 Vicryl suture and additional local was injected. The skin was then closed with 4-0 Monocryl subcuticular stitches. Steri-Strips  were then applied to all incisions. The patient was awakened, extubated and transferred to the Recovery Room in good condition. The patient tolerated the procedure without complication. Lap and instrument counts were reported as correct at the termination of the procedure.    EBL: 15 cc  SPECIMEN: gallbladder  COMPLICATIONS: none  ANESTHESIA: General

## 2017-11-09 NOTE — PLAN OF CARE
Pt denies any pain at this time.  VSS.  Pt's abdomen clipped in PreOp.  Pt with history of Gender Identity = Brth=female and today = Male.

## 2017-11-09 NOTE — TRANSFER OF CARE
"Anesthesia Transfer of Care Note    Patient: Mane Ramires    Procedure(s) Performed: Procedure(s) (LRB):  CHOLECYSTECTOMY-LAPAROSCOPIC (N/A)    Patient location: PACU    Anesthesia Type: general    Transport from OR: Transported from OR on 2-3 L/min O2 by NC with adequate spontaneous ventilation    Post pain: adequate analgesia    Post assessment: no apparent anesthetic complications and tolerated procedure well    Post vital signs: stable    Level of consciousness: awake    Nausea/Vomiting: no nausea/vomiting    Complications: none    Transfer of care protocol was followed      Last vitals:   Visit Vitals  /64 (BP Location: Left arm, Patient Position: Lying)   Pulse 68   Temp 36.2 °C (97.2 °F) (Temporal)   Resp 18   Ht 5' 4.5" (1.638 m)   Wt 66.2 kg (146 lb)   SpO2 99%   BMI 24.67 kg/m²     "

## 2017-11-09 NOTE — DISCHARGE SUMMARY
Discharge Summary  General Surgery      Admit Date: 11/9/2017    Discharge Date :11/9/2017    Attending Physician: Miki Guan     Discharge Physician: Miki Guan    Discharged Condition: good    Discharge Diagnosis: Biliary colic [K80.50]    Treatments/Procedures: Procedure(s) (LRB):  CHOLECYSTECTOMY-LAPAROSCOPIC (N/A)    Hospital Course: Uneventful; Discharged home from Recovery    Significant Diagnostic Studies: none    Disposition: Home or Self Care    Diet: Regular    Follow up: Office 10-14 days    Activity: No heavy lifting till seen in office.    Patient Instructions:   Current Discharge Medication List      START taking these medications    Details   hydrocodone-acetaminophen 7.5-325mg (NORCO) 7.5-325 mg per tablet Take 1 tablet by mouth every 4 (four) hours as needed for Pain.  Qty: 25 tablet, Refills: 0         CONTINUE these medications which have NOT CHANGED    Details   esomeprazole (NEXIUM) 40 MG capsule Take 1 capsule (40 mg total) by mouth before breakfast.  Qty: 30 capsule, Refills: 2      fluoxetine (PROZAC) 20 MG capsule Take 3 capsules (60 mg total) by mouth once daily.  Qty: 90 capsule, Refills: 3      hydrOXYzine HCl (ATARAX) 25 MG tablet Take one or two tablets at bedtime as needed for sleep  Qty: 60 tablet, Refills: 3      JUBLIA 10 % Usha APPLY 1 DROP TO ALL SMALLER TOES AND 2 DROPS TO GREATER TOES DAILY  Refills: 10      levocetirizine (XYZAL) 5 MG tablet TAKE 1 TABLET (5 MG TOTAL) BY MOUTH NIGHTLY AS NEEDED FOR ALLERGIES.  Qty: 30 tablet, Refills: 9      lisinopril (PRINIVIL,ZESTRIL) 40 MG tablet TAKE 1 TABLET (40 MG TOTAL) BY MOUTH ONCE DAILY.  Qty: 30 tablet, Refills: 1      LYRICA 100 mg capsule TAKE ONE CAPSULE BY MOUTH TWICE A DAY  Qty: 60 capsule, Refills: 1    Comments: Not to exceed 4 additional fills before 11/13/2017      metoprolol succinate (TOPROL-XL) 25 MG 24 hr tablet Take 1 tablet (25 mg total) by mouth once daily.  Qty: 90 tablet, Refills: 1    Associated  Diagnoses: Essential hypertension      ranitidine (ZANTAC) 300 MG tablet Take 1 tablet (300 mg total) by mouth every evening.  Qty: 30 tablet, Refills: 2      testosterone cypionate (DEPOTESTOTERONE CYPIONATE) 200 mg/mL injection INJECT 1ML INTO THE MUSCLE EVERY 14 DAYS  Qty: 2 mL, Refills: 5    Comments: This request is for a new prescription for a controlled substance as required by Federal/State law.      tramadol (ULTRAM) 50 mg tablet Take 1 tablet (50 mg total) by mouth 2 (two) times daily as needed.  Qty: 60 tablet, Refills: 0    Comments: Not to exceed 5 additional fills before 10/02/2017               Discharge Procedure Orders  Diet general     Activity as tolerated     Call MD for:  temperature >100.4     Call MD for:  persistent nausea and vomiting     Call MD for:  severe uncontrolled pain

## 2017-11-09 NOTE — PLAN OF CARE
Patient discharged home with ex-wife and her sister in no present distress, IV removed, all valuables were returned, all stated understanding of all discharge orders and follow up appt. Prescription e-scribed pain at comfortable level

## 2017-11-09 NOTE — ANESTHESIA POSTPROCEDURE EVALUATION
"Anesthesia Post Evaluation    Patient: Mane Ramires    Procedure(s) Performed: Procedure(s) (LRB):  CHOLECYSTECTOMY-LAPAROSCOPIC (N/A)    Final Anesthesia Type: general  Patient location during evaluation: PACU  Patient participation: Yes- Able to Participate  Level of consciousness: awake and alert  Post-procedure vital signs: reviewed and stable  Pain management: adequate  Airway patency: patent  PONV status at discharge: No PONV  Anesthetic complications: no      Cardiovascular status: hemodynamically stable and blood pressure returned to baseline  Respiratory status: unassisted, spontaneous ventilation and room air  Hydration status: euvolemic  Follow-up not needed.        Visit Vitals  BP (!) 120/57 (BP Location: Left arm, Patient Position: Lying)   Pulse 84   Temp 36.6 °C (97.8 °F)   Resp 16   Ht 5' 4.5" (1.638 m)   Wt 66.2 kg (146 lb)   SpO2 97%   BMI 24.67 kg/m²       Pain/Ry Score: Pain Assessment Performed: Yes (11/9/2017 10:20 AM)  Presence of Pain: complains of pain/discomfort (11/9/2017 12:00 PM)  Pain Rating Prior to Med Admin: 7 (11/9/2017 11:49 AM)  Ry Score: 10 (11/9/2017 11:45 AM)      "

## 2017-11-09 NOTE — DISCHARGE INSTRUCTIONS
"Discharge Instructions: After Your Surgery/Procedure  Youve just had surgery. During surgery you were given medicine called anesthesia to keep you relaxed and free of pain. After surgery you may have some pain or nausea. This is common. Here are some tips for feeling better and getting well after surgery.     Stay on schedule with your medication.   Going home  Your doctor or nurse will show you how to take care of yourself when you go home. He or she will also answer your questions. Have an adult family member or friend drive you home.      For your safety we recommend these precaution for the first 24 hours after your procedure:  · Do not drive or use heavy equipment.  · Do not make important decisions or sign legal papers.  · Do not drink alcohol.  · Have someone stay with you, if needed. He or she can watch for problems and help keep you safe.  · Your concentration, balance, coordination, and judgement may be impaired for many hours after anesthesia.  Use caution when ambulating or standing up.     · You may feel weak and "washed out" after anesthesia and surgery.      Subtle residual effects of general anesthesia or sedation with regional / local anesthesia can last more than 24 hours.  Rest for the remainder of the day or longer if your Doctor/Surgeon has advised you to do so.  Although you may feel normal within the first 24 hours, your reflexes and mental ability may be impaired without you realizing it.  You may feel dizzy, lightheaded or sleepy for 24 hours or longer.      Be sure to go to all follow-up visits with your doctor. And rest after your surgery for as long as your doctor tells you to.  Coping with pain  If you have pain after surgery, pain medicine will help you feel better. Take it as told, before pain becomes severe. Also, ask your doctor or pharmacist about other ways to control pain. This might be with heat, ice, or relaxation. And follow any other instructions your surgeon or nurse gives " you.  Tips for taking pain medicine  To get the best relief possible, remember these points:  · Pain medicines can upset your stomach. Taking them with a little food may help.  · Most pain relievers taken by mouth need at least 20 to 30 minutes to start to work.  · Taking medicine on a schedule can help you remember to take it. Try to time your medicine so that you can take it before starting an activity. This might be before you get dressed, go for a walk, or sit down for dinner.  · Constipation is a common side effect of pain medicines. Call your doctor before taking any medicines such as laxatives or stool softeners to help ease constipation. Also ask if you should skip any foods. Drinking lots of fluids and eating foods such as fruits and vegetables that are high in fiber can also help. Remember, do not take laxatives unless your surgeon has prescribed them.  · Drinking alcohol and taking pain medicine can cause dizziness and slow your breathing. It can even be deadly. Do not drink alcohol while taking pain medicine.  · Pain medicine can make you react more slowly to things. Do not drive or run machinery while taking pain medicine.  Your health care provider may tell you to take acetaminophen to help ease your pain. Ask him or her how much you are supposed to take each day. Acetaminophen or other pain relievers may interact with your prescription medicines or other over-the-counter (OTC) drugs. Some prescription medicines have acetaminophen and other ingredients. Using both prescription and OTC acetaminophen for pain can cause you to overdose. Read the labels on your OTC medicines with care. This will help you to clearly know the list of ingredients, how much to take, and any warnings. It may also help you not take too much acetaminophen. If you have questions or do not understand the information, ask your pharmacist or health care provider to explain it to you before you take the OTC medicine.  Managing  nausea  Some people have an upset stomach after surgery. This is often because of anesthesia, pain, or pain medicine, or the stress of surgery. These tips will help you handle nausea and eat healthy foods as you get better. If you were on a special food plan before surgery, ask your doctor if you should follow it while you get better. These tips may help:  · Do not push yourself to eat. Your body will tell you when to eat and how much.  · Start off with clear liquids and soup. They are easier to digest.  · Next try semi-solid foods, such as mashed potatoes, applesauce, and gelatin, as you feel ready.  · Slowly move to solid foods. Dont eat fatty, rich, or spicy foods at first.  · Do not force yourself to have 3 large meals a day. Instead eat smaller amounts more often.  · Take pain medicines with a small amount of solid food, such as crackers or toast, to avoid nausea.     Call your surgeon if  · You still have pain an hour after taking medicine. The medicine may not be strong enough.  · You feel too sleepy, dizzy, or groggy. The medicine may be too strong.  · You have side effects like nausea, vomiting, or skin changes, such as rash, itching, or hives.       If you have obstructive sleep apnea  You were given anesthesia medicine during surgery to keep you comfortable and free of pain. After surgery, you may have more apnea spells because of this medicine and other medicines you were given. The spells may last longer than usual.   At home:  · Keep using the continuous positive airway pressure (CPAP) device when you sleep. Unless your health care provider tells you not to, use it when you sleep, day or night. CPAP is a common device used to treat obstructive sleep apnea.  · Talk with your provider before taking any pain medicine, muscle relaxants, or sedatives. Your provider will tell you about the possible dangers of taking these medicines.  © 3084-1514 [employer]. [address] [place] [address]  [place] [address]. All rights reserved. This information is not intended as a substitute for professional medical care. Always follow your healthcare professional's instructions.    General Information:    1.  Do not drink alcoholic beverages including beer for 24 hours or as long as you are on pain medication..  2.  Do not drive a motor vehicle, operate machinery or power tools, or signs legal papers for 24 hours or as long as you are on pain medication.   3.  You may experience light-headedness, dizziness, and sleepiness following surgery. Please do not stay alone. A responsible adult should be with you for this 24 hour period.  4.  Go home and rest.    5. Progress slowly to a normal diet unless instructed.  Otherwise, begin with liquids such as soft drinks, then soup and crackers working up to solid foods. Drink plenty of nonalcoholic fluids.  6.  Certain anesthetics and pain medications produce nausea and vomiting in certain       individuals. If nausea becomes a problem at home, call you doctor.    7. A nurse will be calling you sometime after surgery. Do not be alarmed. This is our way of finding out how you are doing.    8. Several times every hour while you are awake, take 2-3 deep breaths and cough. If you had stomach surgery hold a pillow or rolled towel firmly against your stomach before you cough. This will help with any pain the cough might cause.  9. Several times every hour while you are awake, pump and flex your feet 5-6 times and do foot circles. This will help prevent blood clots.    10.Call your doctor for severe pain, bleeding, fever, or signs or symptoms of infection (pain, swelling, redness, foul odor, drainage).  Post op instructions for prevention of DVT  What is deep vein thrombosis?  Deep vein thrombosis (DVT) is the medical term for blood clots in the deep veins of the leg.  These blood clots can be dangerous.  A DVT can block a blood vessel and keep blood from getting where it needs to go.   Another problem is that the clot can travel to other parts of the body such as the lungs.  A clot that travels to the lungs is called a pulmonary embolus (PE) and can cause serious problems with breathing which can lead to death.  Am I at risk for DVT/PE?  If you are not very active, you are at risk of DVT.  Anyone confined to bed, sitting for long periods of time, recovering from surgery, etc. increases the risk of DVT.  Other risk factors are cancer diagnosis, certain medications, estrogen replacement in any form,older age, obesity, pregnancy, smoking, history of clotting disorders, and dehydration.  How will I know if I have a DVT?   Swelling in the lower leg   Pain   Warmth, redness, hardness or bulging of the vein  If you have any of these symptoms, call your doctors office right away.  Some people will not have any symptoms until the clot moves to the lungs.  What are the symptoms of a PE?   Panting, shortness of breath, or trouble breathing   Sharp, knife-like chest pain when you breathe   Coughing or coughing up blood   Rapid heartbeat  If you have any of these symptoms or get worse quickly, call 911 for emergency treatment.  How can I prevent a DVT?   Avoid long periods of inactivity and dont cross your legs--get up and walk around every hour or so.   Stay active--walking after surgery is highly encouraged.  This means you should get out of the house and walk in the neighborhood.  Going up and down stairs will not impair healing (unless advised against such activity by your doctor).     Drink plenty of noncaffeinated, nonalcoholic fluids each day to prevent dehydration.   Wear special support stockings, if they have been advised by your doctor.   If you travel, stop at least once an hour and walk around.   Avoid smoking (assistance with stopping is available through your healthcare provider)  Always notify your doctor if you are not able to follow the post operative instructions that are  given to you at the time of discharge.  It may be necessary to prescribe one of the medications available to prevent DVT.      Using Opioids for Pain Management     Your doctor has given instructions for you to take an opioid.  This is a drug for bad pain.  It helps control pain without causing bleeding and kidney problems.  Common opioid names are morphine, hydromorphone, oxycodone, and methadone. These drugs are called narcotics.    There are several safety concerns you need to know.     · It is against the law to give or sell this drug to another person.  You must keep this medicine safely locked.    · You may have side effects from taking this medication.  These include nausea, itching, sweating, sleepiness, a change in your ability to breathe, and depression.  · Do not take alcohol or sleeping pills opioids.    · Long-term opoid use may no longer giver you relief from pain.  It can cause you stomach pain, mental anxiety, and headaches.  Long-term opoid use can potentially lead to unlawful street drug abuse and reduce your ability to stay employed.    · Your body may become opioid tolerant if you need to take more to get relief.    · You must stop taking opioids if you begin having more pain as a result of the medicine.    · Opioid withdrawal occurs when you have to stop taking the drug.  It can cause you to have nausea, vomiting, diarrhea, stomach pain, anxiety, and dilated pupils in your eyes. This condition means you are opioid dependent.    · Addiction is a drug induced brain disease. It means there are changes in how your brain is working.  Children, teens, and young adults under 25 years old are more likely to get addicted to opioids.      · Addiction can happen with repeated opioid use.  It does not happen with short-term use of two weeks or less.       For more information, please speak with your doctor or pharmacist.      We hope your stay was comfortable as you heal now, mend and rest.    For we have  enjoyed taking care of you by giving your our best.    And as you get better, by regaining your health and strength;   We count it as a privilege to have served you and hope your time at [facility] was well spent.      Thank  You!!!  Discharge Instructions for Laparoscopic Cholecystectomy  You have had a procedure known as a laparoscopic cholecystectomy. A laparoscopic cholecystectomy is a procedure to remove your gallbladder. People who have this procedure usually recover more quickly and have less pain than with open gallbladder surgery (called open cholecystectomy). Many surgeons recommend a low-fat diet, avoiding fried food in particular, for the first month after surgery.   You can live a full and healthy life without your gallbladder. This includes eating the foods and doing the things you enjoyed before your gallbladder problems started.  Home care  Recommendations for home care include the following:   · Ask someone to drive you to your appointments for the next 3 days. Dont drive until you are no longer taking pain medicine and are able to step on the brake pedal without hesitation.   · Wash the skin around your incision daily with mild soap and water. It's OK to shower the day after your surgery.  · Eat your regular diet. It is wise to stay away from rich, greasy, or spicy food for a few days.  · Remember, it takes at least 1 week for you to get most of your strength and energy back.  · Make an office visit to talk to your healthcare provider if the following symptoms dont go away within a week after your surgery:  ¨ Fatigue  ¨ Pain around the incision  ¨ Diarrhea or constipation  ¨ Loss of appetite     When to call your healthcare provider  Call your healthcare provider immediately if you have any of the following:  · Yellowing of your eyes or skin (jaundice)  · Chills  · Fever of 100.4°F (38.0°C) or higher, or as directed by your healthcare provider   · Redness, swelling, increasing pain, pus, or a foul  smell at the incision site  · Dark or rust-colored urine  · Stool that is coco-colored or light in color instead of brown  · Increasing belly pain  · Rectal bleeding  · Leg swelling or shortness of breath   Date Last Reviewed: 7/1/2016  © 7209-7446 [employer]. [address] [place] [address]. All rights reserved. This information is not intended as a substitute for professional medical care. Always follow your healthcare professional's instructions.

## 2017-11-15 ENCOUNTER — PATIENT MESSAGE (OUTPATIENT)
Dept: NEUROSURGERY | Facility: CLINIC | Age: 62
End: 2017-11-15

## 2017-11-15 RX ORDER — PREGABALIN 100 MG/1
CAPSULE ORAL
Qty: 60 CAPSULE | Refills: 1 | Status: CANCELLED | OUTPATIENT
Start: 2017-11-15

## 2017-11-17 ENCOUNTER — PATIENT OUTREACH (OUTPATIENT)
Dept: OTHER | Facility: OTHER | Age: 62
End: 2017-11-17

## 2017-11-17 NOTE — PROGRESS NOTES
Last 5 Patient Entered Readings Current 30 Day Average: 108/73     Recent Readings 11/12/2017 11/5/2017 10/28/2017 10/26/2017 10/18/2017    Systolic BP (mmHg) 106 117 110 105 101    Diastolic BP (mmHg) 72 80 72 67 72    Pulse 70 77 76 70 75          Hypertension Digital Medicine Program (HDMP): Health  Follow Up    Lifestyle Modifications:    1.Low sodium diet: yes - states he is trying to gain weight by eating more food lately but that he is still very conscious about reading food labels for sodium intake and they avoid frying anything.     2.Physical activity: yes - walking more regularly.     3.Hypotension/Hypertension symptoms: no   Frequency/Alleviating factors/Precipitating factors, etc.     4.Patient has been compliant with the medication regimen.     Follow up with Mr. Mane Ramires completed. No further questions or concerns. I will follow up in a few weeks to assess progress.

## 2017-11-17 NOTE — TELEPHONE ENCOUNTER
Called pt to schedule surgery date. Tenatively scheduled for 12/5/17. Informed pt I will be calling back to confirm date as well as pre and post op instructions.

## 2017-11-17 NOTE — PROGRESS NOTES
Last 5 Patient Entered Readings Current 30 Day Average: 108/73     Recent Readings 11/12/2017 11/5/2017 10/28/2017 10/26/2017 10/18/2017    Systolic BP (mmHg) 106 117 110 105 101    Diastolic BP (mmHg) 72 80 72 67 72    Pulse 70 77 76 70 75        Hypertension Digital Medicine (HDMP) Health  Follow Up    LVM to follow up with Mr. Mane Ramires.    Per 30 day average, blood pressure is well controlled 108/73 mmHg. Encouraged adherence to low sodium diet and physical activity guidelines. Advised patient to call or message with questions or concerns. WCB in 4 weeks.

## 2017-11-20 RX ORDER — PREGABALIN 100 MG/1
100 CAPSULE ORAL 2 TIMES DAILY
Qty: 60 CAPSULE | Refills: 2 | Status: SHIPPED | OUTPATIENT
Start: 2017-11-20 | End: 2017-12-21 | Stop reason: SDUPTHER

## 2017-11-20 NOTE — TELEPHONE ENCOUNTER
----- Message from Bhumi Whitney sent at 11/20/2017  9:34 AM CST -----  Contact: [facility] pharmacy [phone] called regarding Ranitidine (ZANTAC) 300 MG ;LYRICA 100 mg  for above patient. They are requesting one of the following: refill request. Thanks!

## 2017-11-21 ENCOUNTER — TELEPHONE (OUTPATIENT)
Dept: NEUROSURGERY | Facility: CLINIC | Age: 62
End: 2017-11-21

## 2017-11-21 ENCOUNTER — HOSPITAL ENCOUNTER (OUTPATIENT)
Dept: RADIOLOGY | Facility: HOSPITAL | Age: 62
Discharge: HOME | End: 2017-11-21
Attending: ORTHOPAEDIC SURGERY
Payer: COMMERCIAL

## 2017-11-21 ENCOUNTER — OFFICE VISIT (OUTPATIENT)
Dept: ORTHOPEDICS | Facility: CLINIC | Age: 62
End: 2017-11-21
Payer: COMMERCIAL

## 2017-11-21 VITALS
BODY MASS INDEX: 24.32 KG/M2 | WEIGHT: 146 LBS | DIASTOLIC BLOOD PRESSURE: 69 MMHG | SYSTOLIC BLOOD PRESSURE: 102 MMHG | HEART RATE: 66 BPM | HEIGHT: 65 IN

## 2017-11-21 DIAGNOSIS — G56.01 RIGHT CARPAL TUNNEL SYNDROME: Primary | ICD-10-CM

## 2017-11-21 DIAGNOSIS — M25.521 RIGHT ELBOW PAIN: ICD-10-CM

## 2017-11-21 DIAGNOSIS — M25.521 RIGHT ELBOW PAIN: Primary | ICD-10-CM

## 2017-11-21 DIAGNOSIS — M77.01 MEDIAL EPICONDYLITIS, RIGHT: Primary | ICD-10-CM

## 2017-11-21 PROCEDURE — 99999 PR PBB SHADOW E&M-EST. PATIENT-LVL III: CPT | Mod: PBBFAC,,, | Performed by: ORTHOPAEDIC SURGERY

## 2017-11-21 PROCEDURE — 73080 X-RAY EXAM OF ELBOW: CPT | Mod: TC,PO,RT

## 2017-11-21 PROCEDURE — 97760 ORTHOTIC MGMT&TRAING 1ST ENC: CPT | Mod: S$GLB,,, | Performed by: ORTHOPAEDIC SURGERY

## 2017-11-21 PROCEDURE — 99204 OFFICE O/P NEW MOD 45 MIN: CPT | Mod: 25,S$GLB,, | Performed by: ORTHOPAEDIC SURGERY

## 2017-11-21 PROCEDURE — 73080 X-RAY EXAM OF ELBOW: CPT | Mod: 26,RT,, | Performed by: RADIOLOGY

## 2017-11-21 RX ORDER — PREGABALIN 100 MG/1
CAPSULE ORAL
Qty: 60 CAPSULE | Refills: 1 | OUTPATIENT
Start: 2017-11-21

## 2017-11-21 RX ORDER — DICLOFENAC SODIUM 10 MG/G
2 GEL TOPICAL 4 TIMES DAILY
Qty: 300 G | Refills: 3 | Status: SHIPPED | OUTPATIENT
Start: 2017-11-21 | End: 2017-12-12

## 2017-11-21 RX ORDER — SODIUM CHLORIDE, SODIUM LACTATE, POTASSIUM CHLORIDE, CALCIUM CHLORIDE 600; 310; 30; 20 MG/100ML; MG/100ML; MG/100ML; MG/100ML
INJECTION, SOLUTION INTRAVENOUS CONTINUOUS
Status: CANCELLED | OUTPATIENT
Start: 2017-11-21

## 2017-11-21 RX ORDER — LIDOCAINE HYDROCHLORIDE 10 MG/ML
1 INJECTION, SOLUTION EPIDURAL; INFILTRATION; INTRACAUDAL; PERINEURAL ONCE
Status: CANCELLED | OUTPATIENT
Start: 2017-11-21 | End: 2017-11-21

## 2017-11-21 NOTE — PROGRESS NOTES
Subjective:          Chief Complaint: Mane Ramires is a 62 y.o. male who had concerns including Pain of the Right Elbow.    Mr. Ramires has been having right medial elbow pain for the past several months. The pain  Increases with lifting and flexing the wrist especially against resistance.  He also feels some popping medially. His ulnar digits also get numb at times.    Pain: 3/10          Past Medical History:   Diagnosis Date    Accident     fell from roof with TBI (word finding issues personality changes, memory deficets), R rib fractures, clavicle fx    Allergy     Anxiety     ASD (atrial septal defect)     noted on ECHO 6/16    Cancer     pre-cancerous cells in breast tissue    Cervical stenosis of spine     noted on 6/15 MRI    CTS (carpal tunnel syndrome)     mild-mod on 4/17 NCS    DDD (degenerative disc disease), cervical 10/2014    C5-6 and C6-C7    Depression     Fatigue     medication induced    Gender identity disorder     H/O cardiovascular stress test     12/14 normal    Headache     medication induced    Heart murmur 05/26/2016    Herpes simplex type 2 infection     Hx of psychiatric care     prozac    Hypertension     IGT (impaired glucose tolerance)     Myelomalacia     c-spine noted on 8/15 MRI    OA (osteoarthritis)     Prediabetes     Psychiatric exam requested by authority     neuropsych testing     Psychiatric problem     TBI (traumatic brain injury)     after falling off a roof in 2003    Therapy        Past Surgical History:   Procedure Laterality Date    AMPUTATION      L index finger    APPENDECTOMY  1969    BILATERAL SALPINGOOPHORECTOMY  02/2015    CERVICAL FUSION  10/2014    C5-C6 and C6-C7    FINGER AMPUTATION Left     FRACTURE SURGERY Right 2003    rib fractures    HYSTERECTOMY  1984    MIGUEL    MASTECTOMY  02/2015    UPPER GASTROINTESTINAL ENDOSCOPY  09/06/2017    Dr. Ayon       Family History   Problem Relation Age of Onset    Diabetes  Mother     Heart disease Mother     Gallbladder disease Mother     Coronary artery disease Father     Breast cancer Sister     Lung cancer Paternal Grandfather     Heart disease Paternal Grandfather     Heart disease Maternal Grandmother     Gallbladder disease Maternal Grandmother     Heart attack Brother     Gallbladder disease Daughter     Colon cancer Neg Hx     Colon polyps Neg Hx     Crohn's disease Neg Hx     Ulcerative colitis Neg Hx     Stomach cancer Neg Hx     Esophageal cancer Neg Hx          Current Outpatient Prescriptions:     esomeprazole (NEXIUM) 40 MG capsule, Take 1 capsule (40 mg total) by mouth before breakfast., Disp: 30 capsule, Rfl: 2    fluoxetine (PROZAC) 20 MG capsule, Take 3 capsules (60 mg total) by mouth once daily., Disp: 90 capsule, Rfl: 3    hydrOXYzine HCl (ATARAX) 25 MG tablet, Take one or two tablets at bedtime as needed for sleep, Disp: 60 tablet, Rfl: 3    JUBLIA 10 % Usha, APPLY 1 DROP TO ALL SMALLER TOES AND 2 DROPS TO GREATER TOES DAILY, Disp: , Rfl: 10    levocetirizine (XYZAL) 5 MG tablet, TAKE 1 TABLET (5 MG TOTAL) BY MOUTH NIGHTLY AS NEEDED FOR ALLERGIES., Disp: 30 tablet, Rfl: 9    lisinopril (PRINIVIL,ZESTRIL) 40 MG tablet, TAKE 1 TABLET (40 MG TOTAL) BY MOUTH ONCE DAILY., Disp: 30 tablet, Rfl: 1    metoprolol succinate (TOPROL-XL) 25 MG 24 hr tablet, Take 1 tablet (25 mg total) by mouth once daily., Disp: 90 tablet, Rfl: 1    pregabalin (LYRICA) 100 MG capsule, Take 1 capsule (100 mg total) by mouth 2 (two) times daily., Disp: 60 capsule, Rfl: 2    ranitidine (ZANTAC) 300 MG tablet, Take 1 tablet (300 mg total) by mouth every evening., Disp: 30 tablet, Rfl: 2    testosterone cypionate (DEPOTESTOTERONE CYPIONATE) 200 mg/mL injection, INJECT 1ML INTO THE MUSCLE EVERY 14 DAYS, Disp: 2 mL, Rfl: 5    tramadol (ULTRAM) 50 mg tablet, Take 1 tablet (50 mg total) by mouth 2 (two) times daily as needed., Disp: 60 tablet, Rfl: 0    diclofenac sodium 1  % Gel, Apply 2 g topically 4 (four) times daily. Cannot take NSAIDs at this time secondary to liver function, Disp: 300 g, Rfl: 3    Review of patient's allergies indicates:   Allergen Reactions    Sudafed [pseudoephedrine hcl] Other (See Comments)     Heart racing      Codeine Other (See Comments)     Heart racing    Cortisone Palpitations       Vitals:    11/21/17 0847   BP: 102/69   Pulse: 66       Review of Systems   Musculoskeletal: Positive for joint pain and myalgias.   Neurological: Positive for numbness and paresthesias.   All other systems reviewed and are negative.                  Objective:        General: Mane is well-developed, well-nourished, appears stated age, in no acute distress, alert and oriented to time, place and person.     General    Vitals reviewed.  Constitutional: He is oriented to person, place, and time. He appears well-developed and well-nourished. No distress.   HENT:   Head: Normocephalic and atraumatic.   Nose: Nose normal.   Eyes: Pupils are equal, round, and reactive to light.   Cardiovascular: Normal rate.    Pulmonary/Chest: Effort normal.   Neurological: He is alert and oriented to person, place, and time.   Psychiatric: He has a normal mood and affect. His behavior is normal. Thought content normal.             Right Hand/Wrist Exam     Range of Motion     Wrist   Extension: normal   Flexion: normal   Abduction: normal    Tests   Phalens Sign: negative  Tinels Sign (Medial Nerve): negative  Finkelstein: negative  Carpal Tunnel Compression Test: negative  Cubital Tunnel Compression Test: negative  LT Stability: negative  Colón's Test: negative      Other     Neuorologic Exam    Median Distribution: normal  Ulnar Distribution: normal  Radial Distribution: normal      Right Elbow Exam     Inspection   Scars: absent  Effusion: absent  Bruising: absent  Atrophy: absent    Pain   The patient exhibits pain of the medial epicondyle    Swelling   The patient is swollen on the  medial epicondyle    Tenderness   The patient is tender to palpation of the medial epicondyle.     Range of Motion   Extension: normal   Flexion: normal   Pronation: normal   Supination: normal     Tests Varus: negative    Tennis Elbow: negative  Golfer's Elbow: moderate  Radial Capitellar Grind: negative    Other   Sensation: normal    Comments:  Normal cubital Tinel's cannot palpate a subluxation of the ulnar nerve.      Left Elbow Exam   Left elbow exam is normal.        Muscle Strength   Right Upper Extremity   Wrist Extension: 5/5/5   Wrist Flexion: 5/5/5   : 5/5/5   Pinch Mechanism: 5/5  Elbow Pronation:  5/5   Elbow Supination:  5/5   Elbow Extension: 5/5  Elbow Flexion: 5/5  Intrinsics: 5/5  EPL (Extensor Pollicis Longus): 5/5    Vascular Exam       Capillary Refill  Right Hand: normal capillary refill          Current and previous radiographic studies and results were reviewed with the patient:   No convincing anterior joint effusion is noted.No obvious fracture or dislocation is noted.The osseous structures appear well mineralized and well aligned.    Spurring is noted off of the medial epicondyle and lateral epicondyle as can be seen with a history of epicondylitis      Assessment:       Encounter Diagnoses   Name Primary?    Right elbow pain     Medial epicondylitis, right Yes          Plan:         Cannot take NSAIDs or have cortisone at this time  Voltaren Gel   [id number] - Annabelle Dalal and I, performed a custom orthotic / brace adjustment, fitting and training with the patient. The patient demonstrated understanding and proper care. This was performed for 15 minutes.  Right elbow strap  Occupational Therapy  F/U 6 weeks

## 2017-11-21 NOTE — TELEPHONE ENCOUNTER
Dr DELGADO,    LOV 9/14/17 Scheduled 12/12/17    Neg Stress 4/2017  NO anticoagulant medication in chart    Please advise if it is OK for pt to proceed with general anesthesia.    Thanks  Tianna

## 2017-11-21 NOTE — PATIENT INSTRUCTIONS
Understanding Medial Epicondylitis    Several muscles attach to the arm at the elbow joint. The tough bands of tissue that attach muscle to bones are called tendons. The bone in the upper arm has knobs on the farthest end called epicondyles. Tendons attach some arm muscles to these knobs. The tissues in this area can become irritated.  Epicondylitis is the medical term for a painful elbow over the epicondyle. Medial refers to the inner side of the elbow. Medial epicondylitis is sometimes called golfers elbow.     How to say it  [facility]   Causes of medial epicondylitis  A painful inner elbow may be caused by:  · Using an elbow or hand the same way over and over  · Using poor form or too much force in a sport such as golf, tennis, or baseball  · Lifting too heavy a weight  · Other injuries to the arm or elbow  Symptoms of medial epicondylitis  · Pain or tenderness on the inside of the elbow that may travel down the forearm  · Pain when moving the wrist  · Pain or weakness when gripping something  · A crackling sound or grating feeling when moving the elbow  Treatment for medial epicondylitis  Treatments may include:  · Avoiding or changing the action that caused the problem. This helps prevent irritating the tissues more.  · Prescription or over-the-counter pain medicines. These help reduce inflammation, swelling, and pain.  · Cold or heat packs. These help reduce pain and swelling.  · Stretching and other exercises. These improve flexibility and strength.  · Physical therapy. This may include exercises or other treatments.  · Injections of medicine. This may relieve symptoms.  If other treatments do not relieve symptoms, you may need surgery.  Possible complications  If you dont give your elbow time to heal, symptoms may return or get worse. Follow your healthcare providers instructions on resting and treating your elbow.     When to call your healthcare provider  Call your  healthcare provider right away if you have any of these:  · Fever of 100.4°F (38°C) or higher, or as directed  · Redness, swelling, or warmth that gets worse  · Symptoms that dont get better with prescribed medicines, or get worse  · New symptoms   Date Last Reviewed: 3/10/2016  © 7667-0507 [employer]. [address] [place] [address], [place] [address]. All rights reserved. This information is not intended as a substitute for professional medical care. Always follow your healthcare professional's instructions.

## 2017-11-21 NOTE — TELEPHONE ENCOUNTER
Mane Ramires will be having surgery on 12/4/17 with Dr. Busby, Neurosurgeon, at [facility]. We are reaching out to obtain a surgical clearance from Dr. Art to ensure the safety of our patient. The surgery is a right carpal tunnel release and will be under general/mac anesthesia. This procedure typically lasts approximately 1 hour. We request that the patient hold all anticoagulant/antiinflammatory medications for 5-7 days prior to surgery. Please let us know if our office can be of further assistance.

## 2017-11-22 ENCOUNTER — OFFICE VISIT (OUTPATIENT)
Dept: SURGERY | Facility: CLINIC | Age: 62
End: 2017-11-22
Payer: COMMERCIAL

## 2017-11-22 VITALS — WEIGHT: 150.56 LBS | BODY MASS INDEX: 25.45 KG/M2

## 2017-11-22 DIAGNOSIS — K81.9 CHOLECYSTITIS: Primary | ICD-10-CM

## 2017-11-22 PROCEDURE — 99999 PR PBB SHADOW E&M-EST. PATIENT-LVL II: CPT | Mod: PBBFAC,,, | Performed by: SURGERY

## 2017-11-22 PROCEDURE — 99024 POSTOP FOLLOW-UP VISIT: CPT | Mod: S$GLB,,, | Performed by: SURGERY

## 2017-11-22 RX ORDER — HYDROCODONE BITARTRATE AND ACETAMINOPHEN 7.5; 325 MG/15ML; MG/15ML
SOLUTION ORAL
COMMUNITY
Start: 2017-11-09 | End: 2017-11-30 | Stop reason: ALTCHOICE

## 2017-11-22 RX ORDER — HYDROCODONE BITARTRATE AND ACETAMINOPHEN 7.5; 325 MG/15ML; MG/15ML
SOLUTION ORAL
Refills: 0 | COMMUNITY
Start: 2017-11-09 | End: 2017-11-30 | Stop reason: ALTCHOICE

## 2017-11-22 NOTE — PROGRESS NOTES
POST-OP NOTE    PROCEDURE: Lap mariam    COMPLAINTS: None.    EXAM: Incision well approximated. No drainage. No infection.      IMPRESSION: FINAL PATHOLOGIC DIAGNOSIS  GALLBLADDER: CHRONIC CHOLECYSTITIS, CHOLELITHIASIS, AND CHOLESTEROLOSIS    PLAN: FU as needed.

## 2017-11-24 NOTE — TELEPHONE ENCOUNTER
Tianna Lynch MA   You 2 days ago      Per Dr Atr pt is OK to proceed. See message below.   Thanks (Routing comment)       MD Tianna Kumar MA 3 days ago      Yes ok to proceed (Routing comment)

## 2017-11-28 ENCOUNTER — OFFICE VISIT (OUTPATIENT)
Dept: PSYCHIATRY | Facility: CLINIC | Age: 62
End: 2017-11-28
Payer: COMMERCIAL

## 2017-11-28 DIAGNOSIS — F41.1 GAD (GENERALIZED ANXIETY DISORDER): Primary | ICD-10-CM

## 2017-11-28 PROCEDURE — 90834 PSYTX W PT 45 MINUTES: CPT | Mod: S$GLB,,, | Performed by: SOCIAL WORKER

## 2017-11-28 NOTE — PROGRESS NOTES
"Individual Psychotherapy (PhD/LCSW)    11/28/2017    Site:  [facility]         Therapeutic Intervention: Met with patient.  Outpatient - Insight oriented psychotherapy 45 min - CPT code 57334 and Outpatient - Supportive psychotherapy 45 min - CPT Code 76555    Chief complaint/reason for encounter: depression and anxiety     Interval history and content of current session: Patient returned to the clinic today for follow up appointment.  He is tearful today.  Reflects on how he has ongoing issues with his memory.  Per the patient: "I can't remember things that just happened".  Reports he felt "surprised" when Dr. Jimenes told him that some of his issues with memory may be related to anxiety.  He remains convinced that he has a diagnosis on the PDD spectrum, though this has never been determined to be a diagnosis for him.  He completed psychological testing in the clinic.  His son has been diagnosed on the spectrum.  Reflects on how he needs a diagnosis such as this to "explain to my family why I'm a jerk".  He does report that he is getting along better with his ex-girlfriend, as well as his children.  His son was recently .  Discussed with patient his working diagnosis of generalized anxiety disorder.  Encouraged patient to invite family for a family session with Sw.  Encouraged patient to attend [facility] aftercare group.                       Treatment plan:  · Target symptoms: anxiety   · Why chosen therapy is appropriate versus another modality: relevant to diagnosis, patient responds to this modality  · Outcome monitoring methods: self-report, observation  · Therapeutic intervention type: insight oriented psychotherapy, supportive psychotherapy    Risk parameters:  Patient reports no suicidal ideation  Patient reports no homicidal ideation  Patient reports no self-injurious behavior  Patient reports no violent behavior    Verbal deficits: None    Patient's response to intervention:  The patient's " response to intervention is reluctant.    Progress toward goals and other mental status changes:  The patient's progress toward goals is limited.    Diagnosis:     ICD-10-CM ICD-9-CM   1. JEEVAN (generalized anxiety disorder) F41.1 300.02       Plan:  individual psychotherapy, group psychotherapy, family psychotherapy and medication management by physician    Return to clinic: as scheduled    Length of Service (minutes): 45

## 2017-12-04 PROBLEM — G56.01 RIGHT CARPAL TUNNEL SYNDROME: Status: ACTIVE | Noted: 2017-12-04

## 2017-12-05 ENCOUNTER — TELEPHONE (OUTPATIENT)
Dept: NEUROSURGERY | Facility: CLINIC | Age: 62
End: 2017-12-05

## 2017-12-05 ENCOUNTER — TELEPHONE (OUTPATIENT)
Dept: CARDIOLOGY | Facility: CLINIC | Age: 62
End: 2017-12-05

## 2017-12-05 NOTE — TELEPHONE ENCOUNTER
Called pt regarding message about pain medication. Pt states the pain medication he was prescribed isn't helping at all. He is requesting a different pain medication to be called in. Pt is not having any other symptoms. Pt states the pain started as soon as the anesthesia wore off. Please advise.

## 2017-12-05 NOTE — TELEPHONE ENCOUNTER
Called pt and instructed to take two percocet if needed, elevate/ice his arm, and take advil or aleve if needed per LELO Cartagena. Instructed to call clinic for any further concerns. Pt verbalized understanding.

## 2017-12-05 NOTE — TELEPHONE ENCOUNTER
----- Message from Carla Thornton sent at 12/5/2017  2:46 PM CST -----  Contact: self  Patient left voice message stating he just had CTR and is asking for different pain medication. He reports the prescribed medication isn't working. Patient call back is [phone].

## 2017-12-05 NOTE — TELEPHONE ENCOUNTER
----- Message from Carla Thornton sent at 12/5/2017  2:46 PM CST -----  Contact: self  Patient left voice message stating he just had CTR and is asking for different pain medication. He reports the prescribed medication isn't working. Patient call back is [phone].

## 2017-12-06 ENCOUNTER — LAB VISIT (OUTPATIENT)
Dept: LAB | Facility: HOSPITAL | Age: 62
End: 2017-12-06
Attending: INTERNAL MEDICINE
Payer: COMMERCIAL

## 2017-12-06 DIAGNOSIS — E78.5 DYSLIPIDEMIA: ICD-10-CM

## 2017-12-06 DIAGNOSIS — I10 ESSENTIAL HYPERTENSION: ICD-10-CM

## 2017-12-06 LAB
ALBUMIN SERPL BCP-MCNC: 3.6 G/DL
ALP SERPL-CCNC: 241 U/L
ALT SERPL W/O P-5'-P-CCNC: 214 U/L
ANION GAP SERPL CALC-SCNC: 10 MMOL/L
AST SERPL-CCNC: 184 U/L
BILIRUB SERPL-MCNC: 1.2 MG/DL
BUN SERPL-MCNC: 15 MG/DL
CALCIUM SERPL-MCNC: 9.4 MG/DL
CHLORIDE SERPL-SCNC: 104 MMOL/L
CO2 SERPL-SCNC: 27 MMOL/L
CREAT SERPL-MCNC: 1.1 MG/DL
EST. GFR  (AFRICAN AMERICAN): >60 ML/MIN/1.73 M^2
EST. GFR  (NON AFRICAN AMERICAN): >60 ML/MIN/1.73 M^2
GLUCOSE SERPL-MCNC: 73 MG/DL
POTASSIUM SERPL-SCNC: 3.9 MMOL/L
PROT SERPL-MCNC: 7 G/DL
SODIUM SERPL-SCNC: 141 MMOL/L

## 2017-12-06 PROCEDURE — 80053 COMPREHEN METABOLIC PANEL: CPT

## 2017-12-06 PROCEDURE — 36415 COLL VENOUS BLD VENIPUNCTURE: CPT

## 2017-12-12 ENCOUNTER — OFFICE VISIT (OUTPATIENT)
Dept: CARDIOLOGY | Facility: CLINIC | Age: 62
End: 2017-12-12
Payer: COMMERCIAL

## 2017-12-12 VITALS
SYSTOLIC BLOOD PRESSURE: 101 MMHG | HEIGHT: 64 IN | DIASTOLIC BLOOD PRESSURE: 65 MMHG | OXYGEN SATURATION: 97 % | HEART RATE: 73 BPM | WEIGHT: 150.81 LBS | BODY MASS INDEX: 25.75 KG/M2

## 2017-12-12 DIAGNOSIS — E78.5 DYSLIPIDEMIA: ICD-10-CM

## 2017-12-12 DIAGNOSIS — I10 ESSENTIAL HYPERTENSION: ICD-10-CM

## 2017-12-12 DIAGNOSIS — G56.00 CARPAL TUNNEL SYNDROME, UNSPECIFIED LATERALITY: Primary | ICD-10-CM

## 2017-12-12 DIAGNOSIS — G56.01 RIGHT CARPAL TUNNEL SYNDROME: ICD-10-CM

## 2017-12-12 PROCEDURE — 99999 PR PBB SHADOW E&M-EST. PATIENT-LVL III: CPT | Mod: PBBFAC,,, | Performed by: INTERNAL MEDICINE

## 2017-12-12 PROCEDURE — 99215 OFFICE O/P EST HI 40 MIN: CPT | Mod: S$GLB,,, | Performed by: INTERNAL MEDICINE

## 2017-12-12 RX ORDER — LISINOPRIL 40 MG/1
40 TABLET ORAL DAILY
Qty: 30 TABLET | Refills: 3 | Status: SHIPPED | OUTPATIENT
Start: 2017-12-12 | End: 2018-01-15 | Stop reason: DRUGHIGH

## 2017-12-12 NOTE — PATIENT INSTRUCTIONS
Assessment/Plan:  Mane Ramires is a 61 y.o. 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    1. Palpitations- No further episode since last visit on 9/14/2017.  Echo shows a small secundum ASD. Qp/QS= 1.2.  Now taking Metoprolol succinate 25 mg daily.       2. HTN- Decrease lisinopril to 20 mg daily.  Pt to keep log of blood pressure/heart rate and bring in next visit.      3. ASCVD risk- Calculated ASCVD risk of 3.6%.  Given continued elevation in LFT's with atorvastatin, will hold off of restarting a statin at this time.  Repeat LFT's in 3 months.  Nuclear stress test from 4/12/2017 shows no evidence for myocardial ischemia or injury. Normal LVEF of 53%.       Follow up in 4 months with CMP prior

## 2017-12-12 NOTE — PROGRESS NOTES
[facility]    CC: Follow Up    Patient ID: Mane Ramires is a 61 y.o. 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  He was kindly referred by Dr. Gordon.  Pertinent history/events are as follows:       -Pt was seen by Dr. Monsalve in [place] on 5/26/2016 for cardiac evaluation due to abnormal EKG and palpitations. He was seen in 2014 for the same EKG abnormality and had a stress test done in 12/14 in [place] that reported normal myocardial perfusion and normal LVEF. Recently he has been experiencing frequent skipped beats on a daily basis.   -Holter monitor from 6/22/2016 unremarkable.  Showed predominant rhythm to be sinus with heart rates varying between 54 and 120 bpm with an average of 83 bpm.  No VT or VF.  No evidence of high grade SA or AV eric block.    -Echo from 6/23/2016 shows normal LVEF of 60-65%; left ventricular diastolic dysfunction. There is evidence of a left to right shunt across the atrial septum on color Doppler consistent with a small secundum ASD. Qp/QS= 1.2.  The atrial septum is  thickened c/w lipomatous hypertrophy.  -Started on Metoprolol succinate 100 mg daily with good control of the palpitations.     -At our initial appointment on 3/17/2017, Mr. Ramires reports occasional palpitations (once or twice a week).  He has no chest pain, SOB, LE edema, PND, claudication, TIA symptoms or syncope.  Taking all medications as prescribed with no issues.  BP today 146/71.  Plan:  Echo shows a small secundum ASD. Qp/QS= 1.2.  Continue Metoprolol succinate 100 mg daily.  Pt to keep log of home blood pressure/heart rate and bring in next visit.  Given strong family history and risk factors, will schedule for exercise nuclear stress test.  Check lipids/LFT's and plan to start statin next visit.    -At follow up clinic visit on 9/14/2017, Mr. Ramires  reported doing well.  Palpitations occurring less frequently now (approximately three times a week vs. once or twice a day previously).  He has no chest pain, SOB, LE edema, PND, claudication, TIA symptoms or syncope.  Metoprolol succinate reduced to 50 mg daily by his PCP.  ASCVD risk is 3.6%.  Pt recently started on statin and had increase in LFT's.  Statin stopped and LFT's now improving.  BP today 109/61.  Nuclear stress test from 4/12/2017 shows no evidence for myocardial ischemia or injury. Normal LVEF of 53%.      -Underwent cholecystectomy on 11/9/2017.    -Underwent carpal tunnel surgery on 12/4/2017.    HPI:  Mr. Ramires reports no further episodes of palpitations since our last visit on 9/14/2017.  No chest pain.  Blood pressures running low 100's systolic.  Mr. Ramires states he feel's tired all the time and thinks this may be due to low blood pressures.  Now taking Metoprolol succinate 25 mg daily.    Past Medical History:   Diagnosis Date    Accident     fell from roof with TBI (word finding issues personality changes, memory deficets), R rib fractures, clavicle fx    Allergy     Anxiety     ASD (atrial septal defect)     noted on ECHO 6/16    Cancer     pre-cancerous cells in breast tissue    Cervical stenosis of spine     noted on 6/15 MRI    CTS (carpal tunnel syndrome)     mild-mod on 4/17 NCS    DDD (degenerative disc disease), cervical 10/2014    C5-6 and C6-C7    Depression     Fatigue     medication induced    Gender identity disorder     H/O cardiovascular stress test     12/14 normal    Headache     medication induced    Heart murmur 05/26/2016    Herpes simplex type 2 infection     Hx of psychiatric care     prozac    Hypertension     IGT (impaired glucose tolerance)     Myelomalacia     c-spine noted on 8/15 MRI    OA (osteoarthritis)     Prediabetes     Psychiatric exam requested by authority     neuropsych testing     Psychiatric problem     TBI (traumatic brain  injury)     after falling off a roof in 2003    Therapy      Past Surgical History:   Procedure Laterality Date    AMPUTATION      L index finger    APPENDECTOMY  1969    BILATERAL SALPINGOOPHORECTOMY  02/2015    CERVICAL FUSION  10/2014    C5-C6 and C6-C7    CHOLECYSTECTOMY      FINGER AMPUTATION Left     FRACTURE SURGERY Right 2003    rib fractures    HYSTERECTOMY  1984    MIGUEL    MASTECTOMY  02/2015    UPPER GASTROINTESTINAL ENDOSCOPY  09/06/2017    Dr. Ayon     Social History     Social History    Marital status: Single     Spouse name: N/A    Number of children: N/A    Years of education: N/A     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    Smoking status: Former Smoker     Packs/day: 0.25     Years: 2.00     Types: Cigarettes     Quit date: 11/1/2002    Smokeless tobacco: Never Used    Alcohol use Yes      Comment: rare     Drug use: No    Sexual activity: Yes     Partners: Female     Other Topics Concern    Not on file     Social History Narrative    No narrative on file     Family History   Problem Relation Age of Onset    Diabetes Mother     Heart disease Mother     Gallbladder disease Mother     Coronary artery disease Father     Breast cancer Sister     Heart disease Sister     Lung cancer Paternal Grandfather     Heart disease Paternal Grandfather     Heart disease Maternal Grandmother     Gallbladder disease Maternal Grandmother     Heart attack Brother     Gallbladder disease Daughter     Colon cancer Neg Hx     Colon polyps Neg Hx     Crohn's disease Neg Hx     Ulcerative colitis Neg Hx     Stomach cancer Neg Hx     Esophageal cancer Neg Hx        Review of patient's allergies indicates:   Allergen Reactions    Sudafed [pseudoephedrine hcl] Other (See Comments)     Heart racing      Codeine Other (See Comments)     Heart racing    Hydrocodone Other (See Comments)     insomnia    Cortisone Palpitations       Medication List with  Changes/Refills   Current Medications    DICLOFENAC SODIUM 1 % GEL    Apply 2 g topically 4 (four) times daily. Cannot take NSAIDs at this time secondary to liver function    FLUOXETINE (PROZAC) 20 MG CAPSULE    Take 3 capsules (60 mg total) by mouth once daily.    HYDROXYZINE HCL (ATARAX) 25 MG TABLET    Take one or two tablets at bedtime as needed for sleep    JUBLIA 10 % ROHIT    APPLY 1 DROP TO ALL SMALLER TOES AND 2 DROPS TO GREATER TOES DAILY    LEVOCETIRIZINE (XYZAL) 5 MG TABLET    TAKE 1 TABLET (5 MG TOTAL) BY MOUTH NIGHTLY AS NEEDED FOR ALLERGIES.    LISINOPRIL (PRINIVIL,ZESTRIL) 40 MG TABLET    TAKE 1 TABLET (40 MG TOTAL) BY MOUTH ONCE DAILY.    METOPROLOL SUCCINATE (TOPROL-XL) 25 MG 24 HR TABLET    Take 1 tablet (25 mg total) by mouth once daily.    OXYCODONE-ACETAMINOPHEN (PERCOCET) 5-325 MG PER TABLET    Take 1 tablet by mouth every 4 (four) hours as needed for Pain.    PREGABALIN (LYRICA) 100 MG CAPSULE    Take 1 capsule (100 mg total) by mouth 2 (two) times daily.    RANITIDINE (ZANTAC) 300 MG TABLET    Take 1 tablet (300 mg total) by mouth every evening.    TESTOSTERONE CYPIONATE (DEPOTESTOTERONE CYPIONATE) 200 MG/ML INJECTION    INJECT 1ML INTO THE MUSCLE EVERY 14 DAYS       Review of Systems  Constitution: Negative for diaphoresis and fever.   HENT: Negative for hearing loss and hoarse voice.   Eyes: Negative for redness.   Cardiovascular: Positive for occasional palpitations. Negative for leg swelling and syncope.   Respiratory: Negative for cough and wheezing.   Endocrine: Negative for cold intolerance.   Hematologic/Lymphatic: Does not bruise/bleed easily.   Skin: Positive for flushing.   Musculoskeletal: Positive for joint pain. Negative for joint swelling.   Gastrointestinal: Negative for abdominal pain and vomiting.   Genitourinary: Negative for hematuria.   Neurological: Negative for seizures.   Psychiatric/Behavioral: The patient is not nervous/anxious.   Allergic/Immunologic: Negative for  "hives.     Physical Examination  /65 (BP Location: Left arm)   Pulse 73   Ht 5' 4" (1.626 m)   Wt 68.4 kg (150 lb 12.7 oz)   SpO2 97%   BMI 25.88 kg/m²     Constitutional: No acute distress, conversant  HEENT: Sclera anicteric, Pupils equal, round and reactive to light, extraocular motions intact, Oropharynx clear  Neck: No JVD, no carotid bruits  Cardiovascular: regular rate and rhythm, no murmur, rubs or gallops, normal S1/S2  Pulmonary: Clear to auscultation bilaterally  Abdominal: Abdomen soft, nontender, nondistended, positive bowel sounds  Extremities: No lower extremity edema,   Pulses:  Carotid pulses are 2+ on the right side, and 2+ on the left side.  Radial pulses are 2+ on the right side, and 2+ on the left side.   Femoral pulses are 2+ on the right side, and 2+ on the left side.  Skin: No ecchymosis, erythema, or ulcers  Psych: Alert and oriented x 3, appropriate affect  Neuro: CNII-XII intact, no focal deficits    Labs:  Most Recent Data  CBC:   Lab Results   Component Value Date    WBC 6.91 11/30/2017    HGB 14.0 11/30/2017    HCT 42.5 11/30/2017     11/30/2017    MCV 93 11/30/2017    RDW 12.5 11/30/2017     BMP:   Lab Results   Component Value Date     12/06/2017    K 3.9 12/06/2017     12/06/2017    CO2 27 12/06/2017    BUN 15 12/06/2017    GLU 73 12/06/2017    CALCIUM 9.4 12/06/2017    MG 2.2 05/18/2016     LFTS;   Lab Results   Component Value Date    PROT 7.0 12/06/2017    ALBUMIN 3.6 12/06/2017    BILITOT 1.2 (H) 12/06/2017     (H) 12/06/2017    ALKPHOS 241 (H) 12/06/2017     (H) 12/06/2017     COAGS:   Lab Results   Component Value Date    INR 1.0 11/30/2017     FLP:   Lab Results   Component Value Date    CHOL 260 (H) 10/05/2017    HDL 58 10/05/2017    LDLCALC 170.6 (H) 10/05/2017    TRIG 157 (H) 10/05/2017    CHOLHDL 22.3 10/05/2017       EKG 4/12/2017:  [place] sinus rhythm  Nonspecific ST/T wave changes    Nuclear Stress Test 4/12/2017:  Nuclear " Quantitative Functional Analysis:   LVEF: 53 % (normal is 47 - 59)  LVED Volume: 66 ml (normal is 91 - 155)  LVES Volume: 31 ml (normal is 40 - 78)    Impression: NORMAL MYOCARDIAL PERFUSION  1. The perfusion scan is free of evidence for myocardial ischemia or injury.   2. Resting wall motion is physiologic.   3. Resting LV function is normal.  (normal is 47 - 59)  4. The ventricular volumes are normal at rest and stress.   5. The extracardiac distribution of radioactivity is normal.     Echo 6/23/2016:  Technical Quality: This is a technically adequate study.     Aorta: The aortic root is normal in size, measuring 2.6 cm at sinotubular junction.     Left Atrium: The left atrium is normal in size, measuring 3.7 cm across in the parasternal view, and 4.1 cm across in the apical view.     Left Ventricle: The left ventricle is normal in size, with an end-diastolic diameter of 4.3 cm, and an end-systolic diameter of 3.1 cm. LV wall thickness is normal, with the septum measuring 1.1 cm and the posterior wall measuring 0.8 cm across. Global   left ventricular systolic function appears normal. Visually estimated ejection fraction is 60-65%.   Mitral inflow patterns reveal an E:A ratio of 0.7, with a deceleration time of 200 msec., consistent with diastolic dysfunction secondary to relaxation abnormality.     Right Atrium: The right atrium is normal in size. There is Chiari network in the right atrium.    Right Ventricle: The right ventricle is normal in size measuring 2.3 cm at the base in the apical right ventricle-focused view. Global right ventricular systolic function appears normal. The estimated PA systolic pressure is greater than 19 mmHg.     Aortic Valve:  The aortic valve has normal leaflet mobility.     Mitral Valve:  The mitral valve is thickened. There is mild mitral regurgitation.     Tricuspid Valve:  The tricuspid valve is normal in structure. There is mild tricuspid regurgitation.     Pulmonary Valve:  The  "pulmonic valve is normal in structure. There is trivial pulmonic regurgitation.     Pericardium: There is evidence of a trivial pericardial effusion.     IVC: The IVC is not visualized.     Shunt: There is evidence of a left to right shunt across the atrial septum on color Doppler consistent with a small secundum ASD. Qp/QS= 1.2.  The atrial septum is thickened c/w lipomatous hypertrophy.    Intracavitary: There is no evidence of intracavity mass, thrombi, or vegetation.     CONCLUSIONS     1 - Normal left ventricular systolic function (EF 60-65%).     2 - Left ventricular diastolic dysfunction.     3 - Normal right ventricular systolic function .     4 - Small interatrial communication as above.    24 hour holter 6/22/2016:  PRE-TEST DATA   The diary was properly completed.    The diary included "heart beating hard/fast" on multiple occasions which correlated with sinus rhythm.    TEST DESCRIPTION   PREDOMINANT RHYTHM  1. Sinus rhythm with heart rates varying between 54 and 120 bpm with an average of 83 bpm.     VENTRICULAR ARRHYTHMIAS  1. There were rare PVCs totalling 6 and averaging less than 1 per hour.     2. There were no episodes of ventricular tachycardia.    SUPRA VENTRICULAR ARRHYTHMIAS  1. There were occasional PACs totalling 25 and averaging 1 per hour.     2. There were no episodes of sustained supraventricular tachycardia.    SINUS NODE FUNCTION  1. There was no evidence of high grade SA eric block.     AV CONDUCTION  1. There was no evidence of high grade AV block.     DIARY  1. The diary was properly completed.     MISCELLANEOUS  1. There were rare hookup related artifacts. Overall, the study was of good quality.     2. This was a tape of adequate length (24 hrs).     Assessment/Plan:  Mane Ramires is a 61 y.o. 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    1. " Palpitations- No further episode since last visit on 9/14/2017.  Echo shows a small secundum ASD. Qp/QS= 1.2.  Now taking Metoprolol succinate 25 mg daily.       2. HTN- Decrease lisinopril to 20 mg daily.  Pt to keep log of blood pressure/heart rate and bring in next visit.      3. ASCVD risk- Calculated ASCVD risk of 3.6%.  Given continued elevation in LFT's with atorvastatin, will hold off of restarting a statin at this time.  Repeat LFT's in 3 months.  Nuclear stress test from 4/12/2017 shows no evidence for myocardial ischemia or injury. Normal LVEF of 53%.       Follow up in 4 months with CMP prior      Total duration of face to face visit time 30 minutes.  Total time spent counseling greater than fifty percent of total visit time.  Counseling included discussion regarding imaging findings, diagnosis, possibilities, treatment options, risks and benefits.  The patient had many questions regarding the options and long-term effects.    Davian Art MD, PhD  Interventional Cardiology

## 2017-12-15 RX ORDER — LISINOPRIL 40 MG/1
TABLET ORAL
Qty: 30 TABLET | Refills: 1 | OUTPATIENT
Start: 2017-12-15

## 2017-12-19 ENCOUNTER — TELEPHONE (OUTPATIENT)
Dept: NEUROSURGERY | Facility: CLINIC | Age: 62
End: 2017-12-19

## 2017-12-19 NOTE — TELEPHONE ENCOUNTER
Called pt to notify to keep treating with ice, evelating the hand, and OTC medication per Allie Cartagena. Informed pt to keep follow up appt tomorrow and additional treatments may be discussed. Pt verbalized understanding.

## 2017-12-20 ENCOUNTER — OFFICE VISIT (OUTPATIENT)
Dept: NEUROSURGERY | Facility: CLINIC | Age: 62
End: 2017-12-20
Payer: COMMERCIAL

## 2017-12-20 VITALS
HEART RATE: 80 BPM | WEIGHT: 151 LBS | BODY MASS INDEX: 25.78 KG/M2 | SYSTOLIC BLOOD PRESSURE: 112 MMHG | DIASTOLIC BLOOD PRESSURE: 75 MMHG | RESPIRATION RATE: 17 BRPM | HEIGHT: 64 IN

## 2017-12-20 DIAGNOSIS — M77.11 LATERAL EPICONDYLITIS OF RIGHT ELBOW: ICD-10-CM

## 2017-12-20 DIAGNOSIS — Z98.890 S/P CARPAL TUNNEL RELEASE: Primary | ICD-10-CM

## 2017-12-20 DIAGNOSIS — M77.01 MEDIAL EPICONDYLITIS OF ELBOW, RIGHT: ICD-10-CM

## 2017-12-20 DIAGNOSIS — M79.641 HAND PAIN, RIGHT: ICD-10-CM

## 2017-12-20 PROCEDURE — 99024 POSTOP FOLLOW-UP VISIT: CPT | Mod: S$GLB,,, | Performed by: PHYSICIAN ASSISTANT

## 2017-12-20 PROCEDURE — 99999 PR PBB SHADOW E&M-EST. PATIENT-LVL IV: CPT | Mod: PBBFAC,,, | Performed by: PHYSICIAN ASSISTANT

## 2017-12-20 NOTE — PROGRESS NOTES
Pt is 12 days s/p carpal tunnel release with Dr. Busby. No s/s of infection. Incision cleaned with chloraprep and 5 stitches removed with no issue. Incision is warm, dry, intact. Pt reports post-operative pain level < pre-operative state. Pt is not requesting medication refill today. Pt re-educated on narcotics policy. Educated patient on weight lifting status, bending/lifting/twisting, and to call with any changes or questions. Pt aware of imaging and f/u appt with provider. No further questions.

## 2017-12-21 ENCOUNTER — OFFICE VISIT (OUTPATIENT)
Dept: PSYCHIATRY | Facility: CLINIC | Age: 62
End: 2017-12-21
Payer: COMMERCIAL

## 2017-12-21 VITALS
DIASTOLIC BLOOD PRESSURE: 61 MMHG | BODY MASS INDEX: 26.13 KG/M2 | HEART RATE: 79 BPM | WEIGHT: 152.19 LBS | SYSTOLIC BLOOD PRESSURE: 112 MMHG

## 2017-12-21 DIAGNOSIS — F41.1 GENERALIZED ANXIETY DISORDER: Primary | ICD-10-CM

## 2017-12-21 PROCEDURE — 99999 PR PBB SHADOW E&M-EST. PATIENT-LVL II: CPT | Mod: PBBFAC,,, | Performed by: PSYCHIATRY & NEUROLOGY

## 2017-12-21 PROCEDURE — 99214 OFFICE O/P EST MOD 30 MIN: CPT | Mod: S$GLB,,, | Performed by: PSYCHIATRY & NEUROLOGY

## 2017-12-21 RX ORDER — FLUOXETINE HYDROCHLORIDE 20 MG/1
60 CAPSULE ORAL DAILY
Qty: 90 CAPSULE | Refills: 2 | Status: SHIPPED | OUTPATIENT
Start: 2017-12-21 | End: 2018-03-22

## 2017-12-21 RX ORDER — PREGABALIN 100 MG/1
CAPSULE ORAL
Qty: 90 CAPSULE | Refills: 2 | Status: SHIPPED | OUTPATIENT
Start: 2017-12-21 | End: 2018-03-22

## 2017-12-21 NOTE — PROGRESS NOTES
Ambulatory Psychiatry Established Patient Follow-up Note      Chief Complaint  presents for followup of anxiety    Time Spent  30 minutes    People present:  Patient and wife    HISTORY  Interval History  Reports that he has still problems with memory. He also wonders if he has Autism. Wife who is here wonders the same thing. He still seems to be struggling  according to her with communication. She reports that he has poor boundaries, she states that he will take over her art projects and does not recognize that his voice may be inappropriate or comments may be inappropriate to the situation. She also reports that he is very egocentric. She notes that he has difficulty empathizing with others. They both want an answer to whether he has autism so they can better understand whether he has control over this or not. Explained that it is unlikely to change treatment and that therapy is needed regardless so that he can gain better insight and behavioral skills. He does feel less depressed with prozac. Still struggles with anxiety. Discussed how anxiety seems to underlie much of his complaints. .     ROS   Constitutional: no fatigue or appetite or weight change  Eyes: no problems with vision  ENT/Mouth: no problems with hearing, swallowing  Cardiovascular: no chest pain  Respiratory: no shortness of breath  Gastrointestinal: +gi pain  Genitourinary: no urinary difficulties  Musculoskeletal: no aches or pains  Skin: no rashes  Neurologic: no numbness or weakness   Endocrine: no sweating or hot flashes.   All other systems were negative.    Psych ROS covered in [facility]  Past Medical History was reviewed and there was no change in past medical history  Family History was not reviewed  Social History was reviewed, changes in social history noted in interval history above.  Medications/problem list/allergies were reviewed and updated in the patient summary.    Medications    Scheduled and PRN Medications     Current  Outpatient Prescriptions:     diclofenac sodium 1 % Gel, Apply 2 g topically 4 (four) times daily. Cannot take NSAIDs at this time secondary to liver function, Disp: 300 g, Rfl: 3    fluoxetine (PROZAC) 20 MG capsule, Take 3 capsules (60 mg total) by mouth once daily. (Patient taking differently: Take 20 mg by mouth 3 (three) times daily. ), Disp: 90 capsule, Rfl: 3    hydrOXYzine HCl (ATARAX) 25 MG tablet, Take one or two tablets at bedtime as needed for sleep, Disp: 60 tablet, Rfl: 3    JUBLIA 10 % Usha, APPLY 1 DROP TO ALL SMALLER TOES AND 2 DROPS TO GREATER TOES DAILY, Disp: , Rfl: 10    levocetirizine (XYZAL) 5 MG tablet, TAKE 1 TABLET (5 MG TOTAL) BY MOUTH NIGHTLY AS NEEDED FOR ALLERGIES., Disp: 30 tablet, Rfl: 9    lisinopril (PRINIVIL,ZESTRIL) 40 MG tablet, Take 1 tablet (40 mg total) by mouth once daily. (Patient taking differently: Take 20 mg by mouth once daily. ), Disp: 30 tablet, Rfl: 3    metoprolol succinate (TOPROL-XL) 25 MG 24 hr tablet, Take 1 tablet (25 mg total) by mouth once daily. (Patient taking differently: Take 25 mg by mouth every evening. ), Disp: 90 tablet, Rfl: 1    pregabalin (LYRICA) 100 MG capsule, Take 1 capsule (100 mg total) by mouth 2 (two) times daily., Disp: 60 capsule, Rfl: 2    ranitidine (ZANTAC) 300 MG tablet, Take 1 tablet (300 mg total) by mouth every evening., Disp: 30 tablet, Rfl: 2    testosterone cypionate (DEPOTESTOTERONE CYPIONATE) 200 mg/mL injection, INJECT 1ML INTO THE MUSCLE EVERY 14 DAYS, Disp: 2 mL, Rfl: 5    Allergies  Review of patient's allergies indicates:   Allergen Reactions    Sudafed [pseudoephedrine hcl] Other (See Comments)     Heart racing      Codeine Other (See Comments)     Heart racing    Cortisone Palpitations       EXAM  VITALS   Vitals:    12/21/17 1606   BP: 112/61   Pulse: 79   Weight: 69 kg (152 lb 3.2 oz)       RELEVANT LABS/STUDIES:      PSYCHIATRIC EXAMINATION  Appearance: well groomed, appearing healthy and of stated age  and gender    Behavior: cooperative, pleasant, no psychomotor agitation or retardation.  Speech: normal rate, rhythm, prosody, volume and amount  Mood: anxious  Affect: constricted  Thought Process: linear, logical, goal directed  Thought Content: negative for suicidal ideation, homicidal ideation, delusions or hallucinations.  Associations: intact  Memory: subjective but not evident on exam  Level of Consciousness/Orientation: grossly intact  Fund of Knowledge: good  Attention: good  Language: fluent, able to name abstract and concrete objects.  Insight: fair  Judgment: not clearly impaired but family report that he is careless    Psychomotor signs: no involuntary movements or tremor  Gait: normal    Medical Decision Making    IMPRESSION   60 yo transgender man with lifelong anxiety and more recent head injury with subsequent problems with memory and per family some personality and mood changes. Pt on exam, pleasant but constricted and also with alexithymia. Mental status exam not fully congruent with family's complaints as mood appears calm and not clearly cognitively impaired. Pt returned for follow up after neuropsych testing and starting therapy with Omer Mcmullen,both suggestive of anxiety rather than any cognitive disorder or TBI as cause of symptoms. Started on prozac and referred to BMU for treatment of anxiety. Pt returned for follow up after completing BMU, reporting improvement in anxiety and mood, but still with anxiety and low confidence. Pt complains of cognitive issues still, suspect this is more a function of insecurity and dependent personality traits. Patient returns today reporting continued improvement in mood but still with high anxiety, attention and memory issues (again had been noted not to have significant cognitive defects on testing last year). 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 Continue prozac to 60 mg daily.  2. Increase lyrica to 100 mg qam and 200 mg qhs for anxiety, suspect most of his issues are related to continued anxiety.  3. Continue therapy with Omer Mcmullen to address dependent traits and help with skill navigating interpersonal conflict  4. Recommended family therapy with Omer Mcmullen or Denys Pyle.  5.Return in 3 months for follow up.     More than 50% of the time was spent on counseling and coordination of care.  Psychoeducation,behavioral counseling, counseling to wife, referral for couples counseling.

## 2017-12-22 ENCOUNTER — OFFICE VISIT (OUTPATIENT)
Dept: PSYCHIATRY | Facility: CLINIC | Age: 62
End: 2017-12-22
Payer: COMMERCIAL

## 2017-12-22 DIAGNOSIS — F41.1 GAD (GENERALIZED ANXIETY DISORDER): Primary | ICD-10-CM

## 2017-12-22 PROCEDURE — 90847 FAMILY PSYTX W/PT 50 MIN: CPT | Mod: S$GLB,,, | Performed by: SOCIAL WORKER

## 2017-12-26 NOTE — PROGRESS NOTES
"Mr. Ramires is here today for a post-operative visit.he is 2 weeks status post Right CTR by Dr. Busby. he reports that he is having severe pain.  Pain is 6/10.  he is taking pain medication , but ran out. he denies fever, chills, and sweats since the time of the surgery.     Physical exam:    Vitals:    12/20/17 1303   BP: 112/75   Pulse: 80   Resp: 17   Weight: 68.5 kg (151 lb 0.2 oz)   Height: 5' 4" (1.626 m)   PainSc:   6   PainLoc: Wrist     Incision is clean, dry and intact.  There is no erythema or exudate.  There is no sign of any infection. There is no evidence of carpal tunnel hematoma. He has no edema in his hand and has full ROM of all fingers. Opposition to base of 5th.     General: well developed, well nourished, no distress.   Neurologic: Alert and oriented. Thought content appropriate.  Mental Status: Awake, Alert, Oriented x3  Sensory: intact to light touch throughout    Motor Strength:Moves all extremities spontaneously with good tone.  Full strength upper and lower extremities. No abnormal movements seen.     Strength  Deltoids Triceps Biceps Wrist Extension Wrist Flexion Hand    Upper: R 5/5 5/5 5/5 5/5 5/5 5/5    L 5/5 5/5 5/5 5/5 5/5 5/5                                     Al: absent  Clonus: absent  Babinski: absent  Skin: Intact, no visible rashes or lesions        Assessment:  2  weeks status post right CTR by Dr. Busby    Plan:  Mane was seen today for post-op evaluation.    Diagnoses and all orders for this visit:    S/P carpal tunnel release  -     Ambulatory Referral to Physical/Occupational Therapy    Medial epicondylitis of elbow, right  -     Ambulatory Referral to Physical/Occupational Therapy    Lateral epicondylitis of right elbow  -     Ambulatory Referral to Physical/Occupational Therapy    Hand pain, right  -     Ambulatory Referral to Physical/Occupational Therapy        - Post operative instructions reviewed with patient  - Continue Brace PRN  -OT - will start hand " therapy for pain control   -phone follow-up at 12 weeks and appointment to be made if needed

## 2017-12-27 NOTE — PROGRESS NOTES
"Family Psychotherapy (PhD/LCSW)    12/22/2017    Site: [facility] [place]    Length of service: 45    Therapeutic intervention: 90847-Family therapy with patient; needed because relationship stress     Persons present: patient and patient's girlfriend          Interval history: Patient and his girlfriend presented to the clinic today for family therapy session.  Patient and his girlfriend continue to request that patient be evaluated for potential diagnosis on the PDD spectrum (Asperger's).  They may consider looking into getting an evaluation done outside of the clinic.  Patient and his girlfriend are attempting to reconcile.  They have not been in a relationship for several months, but are now trying again.  Patient's girlfriend reflects on how patient continues to be jealous.  Patient is adamant that he wants to know if his girlfriend was romantically involved with anyone when they were broken up.  Patient's girlfriend became defensive at this, and refused to respond to patient.  She reports that patient continues to be temperamental at home.  He will often lose his patience with others, "over small things"-per patient's girlfriend.  Patient and his girlfriend both want to work on reestablishing trust in their relationship.  Their communication is not always good.  Discussed effective forms of communication with patient and his girlfriend, as well as boundaries.                    Target symptoms: anxiety , communication-relationship stress             Patient's interpersonal/verbal exchanges: 90847-Family therapy with patient:  active listening, frequent questions, self-disclosure and argumentative    Progress toward goals: progressing slowly    Diagnosis: JEEVAN; conflict between partners              Plan: individual psychotherapy  family psychotherapy  medication management by physician    Return to clinic: as scheduled  "

## 2018-01-12 ENCOUNTER — PATIENT MESSAGE (OUTPATIENT)
Dept: CARDIOLOGY | Facility: CLINIC | Age: 63
End: 2018-01-12

## 2018-01-15 NOTE — TELEPHONE ENCOUNTER
Alfredo Gilliam,    Your blood pressure readings look great.  I put in an new prescription for lisinopril 20 mg daily for you at [facility] on [address].  Please let me know if you need anything further.    Thank you,  Dr. Art

## 2018-01-23 ENCOUNTER — OFFICE VISIT (OUTPATIENT)
Dept: ORTHOPEDICS | Facility: CLINIC | Age: 63
End: 2018-01-23
Payer: MEDICAID

## 2018-01-23 VITALS
HEART RATE: 76 BPM | DIASTOLIC BLOOD PRESSURE: 63 MMHG | HEIGHT: 64 IN | WEIGHT: 152 LBS | SYSTOLIC BLOOD PRESSURE: 99 MMHG | BODY MASS INDEX: 25.95 KG/M2

## 2018-01-23 DIAGNOSIS — M77.01 MEDIAL EPICONDYLITIS OF RIGHT ELBOW: ICD-10-CM

## 2018-01-23 DIAGNOSIS — Z88.8 ALLERGY TO CORTICOSTEROIDS: Primary | ICD-10-CM

## 2018-01-23 DIAGNOSIS — Z98.890 S/P CARPAL TUNNEL RELEASE: ICD-10-CM

## 2018-01-23 DIAGNOSIS — M25.521 RIGHT ELBOW PAIN: ICD-10-CM

## 2018-01-23 DIAGNOSIS — M25.521 RIGHT ELBOW PAIN: Primary | ICD-10-CM

## 2018-01-23 PROCEDURE — 99214 OFFICE O/P EST MOD 30 MIN: CPT | Mod: PBBFAC,PN | Performed by: ORTHOPAEDIC SURGERY

## 2018-01-23 PROCEDURE — 99213 OFFICE O/P EST LOW 20 MIN: CPT | Mod: S$PBB,,, | Performed by: ORTHOPAEDIC SURGERY

## 2018-01-23 PROCEDURE — 99999 PR PBB SHADOW E&M-EST. PATIENT-LVL IV: CPT | Mod: PBBFAC,,, | Performed by: ORTHOPAEDIC SURGERY

## 2018-01-23 RX ORDER — DICLOFENAC SODIUM 10 MG/G
GEL TOPICAL
Refills: 3 | COMMUNITY
Start: 2017-12-17 | End: 2018-01-23 | Stop reason: SDUPTHER

## 2018-01-23 RX ORDER — DICLOFENAC SODIUM 10 MG/G
GEL TOPICAL 4 TIMES DAILY
Qty: 1 TUBE | Refills: 3 | Status: SHIPPED | OUTPATIENT
Start: 2018-01-23 | End: 2018-08-10

## 2018-01-23 NOTE — PROGRESS NOTES
Subjective:          Chief Complaint: Mane Ramires is a 62 y.o. male who had concerns including Pain of the Right Elbow.    Mr. Ramires has been having right medial elbow pain for the past several months. The pain  Increases with lifting and flexing the wrist especially against resistance.  He also feels some popping medially. His ulnar digits also get numb at times. He is now s/p right CTR by Dr. Busby in December 2017    Pain: 6/10      Pain   Associated symptoms include joint swelling, myalgias and numbness.         Past Medical History:   Diagnosis Date    Accident     fell from roof with TBI (word finding issues personality changes, memory deficets), R rib fractures, clavicle fx    Allergy     Anxiety     ASD (atrial septal defect)     noted on ECHO 6/16    Cancer     pre-cancerous cells in breast tissue    Cervical stenosis of spine     noted on 6/15 MRI    CTS (carpal tunnel syndrome)     mild-mod on 4/17 NCS    DDD (degenerative disc disease), cervical 10/2014    C5-6 and C6-C7    Depression     Fatigue     medication induced    Gender identity disorder     H/O cardiovascular stress test     12/14 normal    Headache     medication induced    Heart murmur 05/26/2016    Herpes simplex type 2 infection     Hx of psychiatric care     prozac    Hypertension     IGT (impaired glucose tolerance)     Myelomalacia     c-spine noted on 8/15 MRI    OA (osteoarthritis)     Prediabetes     Psychiatric exam requested by authority     neuropsych testing     Psychiatric problem     TBI (traumatic brain injury)     after falling off a roof in 2003    Therapy        Past Surgical History:   Procedure Laterality Date    AMPUTATION      L index finger    APPENDECTOMY  1969    BILATERAL SALPINGOOPHORECTOMY  02/2015    CERVICAL FUSION  10/2014    C5-C6 and C6-C7    CHOLECYSTECTOMY      FINGER AMPUTATION Left     FRACTURE SURGERY Right 2003    rib fractures    HYST[facility]Y  1984    [facility]     MASTECTOMY  02/2015    UPPER GASTROINTESTINAL ENDOSCOPY  09/06/2017    Dr. Ayon       Family History   Problem Relation Age of Onset    Diabetes Mother     Heart disease Mother     Gallbladder disease Mother     Coronary artery disease Father     Breast cancer Sister     Heart disease Sister     Lung cancer Paternal Grandfather     Heart disease Paternal Grandfather     Heart disease Maternal Grandmother     Gallbladder disease Maternal Grandmother     Heart attack Brother     Gallbladder disease Daughter     Colon cancer Neg Hx     Colon polyps Neg Hx     Crohn's disease Neg Hx     Ulcerative colitis Neg Hx     Stomach cancer Neg Hx     Esophageal cancer Neg Hx          Current Outpatient Prescriptions:     diclofenac sodium 1 % Gel, Apply topically 4 (four) times daily. Apply 2 G topically 4 times per day, Disp: 1 Tube, Rfl: 3    FLUoxetine (PROZAC) 20 MG capsule, Take 3 capsules (60 mg total) by mouth once daily., Disp: 90 capsule, Rfl: 2    hydrOXYzine HCl (ATARAX) 25 MG tablet, Take one or two tablets at bedtime as needed for sleep, Disp: 60 tablet, Rfl: 3    JUBLIA 10 % Usha, APPLY 1 DROP TO ALL SMALLER TOES AND 2 DROPS TO GREATER TOES DAILY, Disp: , Rfl: 10    levocetirizine (XYZAL) 5 MG tablet, TAKE 1 TABLET (5 MG TOTAL) BY MOUTH NIGHTLY AS NEEDED FOR ALLERGIES., Disp: 30 tablet, Rfl: 9    lisinopril (PRINIVIL,ZESTRIL) 20 MG tablet, Take 1 tablet (20 mg total) by mouth once daily., Disp: 90 tablet, Rfl: 3    metoprolol succinate (TOPROL-XL) 25 MG 24 hr tablet, Take 1 tablet (25 mg total) by mouth once daily. (Patient taking differently: Take 25 mg by mouth every evening. ), Disp: 90 tablet, Rfl: 1    pregabalin (LYRICA) 100 MG capsule, Take 1 capsule in the morning and 2 capsules at bedtime., Disp: 90 capsule, Rfl: 2    ranitidine (ZANTAC) 300 MG tablet, Take 1 tablet (300 mg total) by mouth every evening., Disp: 30 tablet, Rfl: 2    testosterone cypionate (DEPOTESTOTERONE  CYPIONATE) 200 mg/mL injection, INJECT 1ML INTO THE MUSCLE EVERY 14 DAYS, Disp: 2 mL, Rfl: 5    Review of patient's allergies indicates:   Allergen Reactions    Sudafed [pseudoephedrine hcl] Other (See Comments)     Heart racing      Codeine Other (See Comments)     Heart racing    Cortisone Palpitations       Vitals:    01/23/18 1430   BP: 99/63   Pulse: 76       Review of Systems   Musculoskeletal: Positive for joint pain, joint swelling, muscle cramps and myalgias.   Neurological: Positive for numbness and paresthesias.   All other systems reviewed and are negative.                  Objective:        General: Mane is well-developed, well-nourished, appears stated age, in no acute distress, alert and oriented to time, place and person.     General    Vitals reviewed.  Constitutional: He is oriented to person, place, and time. He appears well-developed and well-nourished. No distress.   HENT:   Head: Normocephalic and atraumatic.   Nose: Nose normal.   Eyes: Pupils are equal, round, and reactive to light.   Cardiovascular: Normal rate.    Pulmonary/Chest: Effort normal.   Neurological: He is alert and oriented to person, place, and time.   Psychiatric: He has a normal mood and affect. His behavior is normal. Thought content normal.             Right Hand/Wrist Exam     Inspection   Scars: Hand -  present    Pain   Hand - The patient exhibits pain of the anterior joint line.    Swelling   Hand - The patient is swollen on the anterior joint line.    Tenderness   The patient is tender to palpation of the castillo area.    Range of Motion     Wrist   Extension: normal   Flexion: normal   Abduction: normal    Tests   Phalens Sign: negative  Tinels Sign (Medial Nerve): negative  Finkelstein: negative  Carpal Tunnel Compression Test: negative  Cubital Tunnel Compression Test: negative  LT Stability: negative  Colón's Test: negative      Other     Neuorologic Exam    Median Distribution: normal  Ulnar Distribution:  abnormal  Radial Distribution: abnormal      Right Elbow Exam     Inspection   Scars: absent  Effusion: absent  Bruising: absent  Atrophy: absent    Pain   The patient exhibits pain of the lateral epicondyle and medial epicondyle    Swelling   The patient is swollen on the lateral epicondyle and medial epicondyle    Tenderness   The patient is tender to palpation of the medial epicondyle.     Range of Motion   Extension: normal   Flexion: normal   Pronation: normal   Supination: normal     Tests Varus: negative  Tinel's Sign (cubital tunnel): negative  Tennis Elbow: negative  Golfer's Elbow: moderate  Radial Capitellar Grind: negative    Other   Sensation: normal    Comments:  Normal cubital Tinel's; cannot palpate any subluxation of the ulnar nerve.  Healed CT incision; decreased ROM of wrist; swelling of hand          Left Elbow Exam   Left elbow exam is normal.        Muscle Strength   Right Upper Extremity   Wrist Extension: 5/5/5   Wrist Flexion: 5/5/5   : 5/5/5   Pinch Mechanism: 5/5  Elbow Pronation:  5/5   Elbow Supination:  5/5   Elbow Extension: 5/5  Elbow Flexion: 5/5  Intrinsics: 5/5  EPL (Extensor Pollicis Longus): 5/5    Vascular Exam     Right Pulses      Radial:                    2+      Capillary Refill  Right Hand: normal capillary refill    Edema  Right Forearm: present          Current and previous radiographic studies and results were reviewed with the patient:   No convincing anterior joint effusion is noted.No obvious fracture or dislocation is noted.The osseous structures appear well mineralized and well aligned.    Spurring is noted off of the medial epicondyle and lateral epicondyle as can be seen with a history of epicondylitis      Assessment:       Encounter Diagnoses   Name Primary?    Right elbow pain     Medial epicondylitis of right elbow     Allergy to corticosteroids Yes    S/P carpal tunnel release           Plan:         Cannot take NSAIDs or have cortisone at this time  (will refer to allergy for evaluation for cortisone allergy)  Voltaren Gel   Continue with Right elbow strap  Occupational Therapy  F/U discussed

## 2018-01-24 ENCOUNTER — PATIENT OUTREACH (OUTPATIENT)
Dept: OTHER | Facility: OTHER | Age: 63
End: 2018-01-24

## 2018-01-24 NOTE — PROGRESS NOTES
Last 5 Patient Entered Readings                                      Current 30 Day Average: 118/75     Recent Readings 1/23/2018 1/23/2018 1/19/2018 1/11/2018 1/2/2018    SBP (mmHg) 116 99 134 113 111    DBP (mmHg) 79 63 80 71 71    Pulse 74 73 61 73 70          Hypertension Digital Medicine Program (HDMP): Health  Follow Up    Lifestyle Modifications:    1.Low sodium diet: yes States he continues with low sodium diet, reading food labels and making healthy choices overall.     2.Physical activity: yes Reports continued active lifestyle with only barrier of fatigue which he agrees to follow up with MD on.     3.Hypotension/Hypertension symptoms: yes Patient reporting fatigue over last three weeks however believes it corresponds with lyrica and fluoxetine increase not blood pressure. Notes the fatigue is almost unbearable.  Reports needing two naps per day. Recommended patient follow up with prescribing physician to discuss side effects.     4.Patient has been compliant with the medication regimen.     Follow up with Mr. Mane Ramires completed. No further questions or concerns. I will follow up in a few weeks to assess progress.

## 2018-01-26 RX ORDER — HYDROXYZINE HYDROCHLORIDE 25 MG/1
TABLET, FILM COATED ORAL
Qty: 60 TABLET | Refills: 3 | OUTPATIENT
Start: 2018-01-26

## 2018-01-29 RX ORDER — HYDROXYZINE HYDROCHLORIDE 25 MG/1
TABLET, FILM COATED ORAL
Qty: 60 TABLET | Refills: 3 | OUTPATIENT
Start: 2018-01-29

## 2018-02-06 ENCOUNTER — CLINICAL SUPPORT (OUTPATIENT)
Dept: REHABILITATION | Facility: HOSPITAL | Age: 63
End: 2018-02-06
Payer: MEDICAID

## 2018-02-06 DIAGNOSIS — M25.631 STIFFNESS OF RIGHT WRIST JOINT: ICD-10-CM

## 2018-02-06 DIAGNOSIS — M25.521 RIGHT ELBOW PAIN: ICD-10-CM

## 2018-02-06 DIAGNOSIS — M25.431 RIGHT WRIST EFFUSION: ICD-10-CM

## 2018-02-06 DIAGNOSIS — M25.531 RIGHT WRIST PAIN: Primary | ICD-10-CM

## 2018-02-06 PROCEDURE — 97165 OT EVAL LOW COMPLEX 30 MIN: CPT | Mod: PN

## 2018-02-06 NOTE — PLAN OF CARE
Date: 2-6-18    Time in: 3  Time out: 340    Procedures: eval  Start: 3  Stop: 340    Total untimed minutes: 40  Charges billed # of units: 1    Onset date: about 6 months ago  Primary dx: r medial epicondylitis; hx of r carpal tunnel release from dec 2017  Tx dx: r elbow pain; r wrist stiffness, pain, effusion    Pmhx relevant to primary or tx dx: n/a    Precautions: universal  Prior therapy: none; says was supposed to get set up for therapy after carpal tunnel release but was never contacted  Medications relevant to primary or tx dx: n/a  Nutrition: wnl  Hx of present illness: insidious onset of medial elbow main, saw ortho who sent here  PLOF: wnl  Social hx: no concerns  Fxnl deficits leading to referral: decreased rom, use, and strength with ADL  Patient therapy goals: full painless use    Subjective    Patient states: apply heat to medial elbow consistently and rubs scar with a good amount of pressure    Pain:   About 6-9 at rest, use, and with sleep medial elbow and r volar wrist; ache, tearing    Objective    Posture: healed ct incision with some mild wrist edema  Palpation: scar with mod soreness  Sensation: intermittent difuse burning, numbness, tingling all 5 digits  ROM:   Prom                r                     l  Ext/flex            0/150           0/150  Sup/pro          90/80           90/80  Df/pf               50/50           80/80  Rd/ud            20/40            2/40    Digits            = bilaterally   Edema: see above  ADL: sustained  and overall use of r ue limited due to pain, weakness, and stiffness  Hand dominance: r  Job: artist  Duties:Normal self and home care tasks  Tx: issued light painless use only; ice instruction for pain relief on elbow, issue proper scar massage, issued wrist df/pf stretches    Assessment    Initial assessment (pertinent findings, problem list and factors affecting outcome): Needs skilled OT to properly promote rom, use, and strength given type, nature,  and extent of diagnosis  Rehab potential: good    Goals:   1. Pt. Will be I with HEP 2. Pt. Will have 2/10 pain with light use 3. Pt. Will have = prom of bilateral wrists To enhance affected arm use with ADL      1. Pt. Will be I with d/c HEP 2. Pt. Will have 1/10 pain with all use 3. Pt. Will have wfl  force to improve use with ADL                                                                          4. Pt. Will be I with HEP      Plan    Certification period: 2-6-18 to 7-17-18  Recommended tx plan: 3 times a week for 24 weeks, eval and tx  Other recommendations: above visit frequency and duration in above dates may be adjusted based on pt. progress and need for therapy    Therapist: sosa juárez c[facility]      eval code grid  Profile and History Assessment of Occupational Performance Level of Clinical Decision Making Complexity Score   Occupational Profile:   Mane Ramires is a 62 y.o. male who lives with their family and is currently employed as artist. Mane Ramires has difficulty with  feeding, bathing, grooming and dressing  And work tasks affecting his/her daily functional abilities. His/her main goal for therapy is full painless use.     Comorbidities:   n/a    Medical and Therapy History Review:   Brief               Performance Deficits    Physical:  Joint Mobility  Muscle Power/Strength  Muscle Endurance  Skin Integrity/Scar Formation  Edema   Strength  Pinch Strength    Cognitive:  No Deficits    Psychosocial:    No Deficits     Clinical Decision Making:  low    Assessment Process:  Problem-Focused Assessments    Modification/Need for Assistance:  Not Necessary    Intervention Selection:  Limited Treatment Options       low  Based on PMHX, co morbidities , data from assessments and functional level of assistance required with task and clinical presentation directly impacting function.           I certify need for these services furnished under this plan of tx and while under my  care    Physician's comments:     Physicians name:

## 2018-02-23 ENCOUNTER — CLINICAL SUPPORT (OUTPATIENT)
Dept: REHABILITATION | Facility: HOSPITAL | Age: 63
End: 2018-02-23
Payer: MEDICAID

## 2018-02-23 DIAGNOSIS — M25.521 RIGHT ELBOW PAIN: ICD-10-CM

## 2018-02-23 DIAGNOSIS — M25.431 RIGHT WRIST EFFUSION: ICD-10-CM

## 2018-02-23 DIAGNOSIS — M25.541 PAIN IN JOINT OF RIGHT HAND: ICD-10-CM

## 2018-02-23 DIAGNOSIS — M25.531 RIGHT WRIST PAIN: Primary | ICD-10-CM

## 2018-02-23 DIAGNOSIS — M25.631 STIFFNESS OF RIGHT WRIST JOINT: ICD-10-CM

## 2018-02-23 PROCEDURE — 97110 THERAPEUTIC EXERCISES: CPT | Mod: PN

## 2018-02-23 PROCEDURE — 97022 WHIRLPOOL THERAPY: CPT | Mod: PN

## 2018-02-23 NOTE — PROGRESS NOTES
Time in 8  Time out 9    untimed units    fluido                               Time:8-815      Timed units  3 therex                             Time:815-9      S:doing HEP, understands likely tx progression  Pain:continues to decrease in intensity and frequency    O:  prom                   r                              l  Df/pf                64/70                        80/80  Rd/ud              20/40                        20/40     thumb opp r base sf l dpc    r digits comp flex, intrinsic stretch all = to l side except LF 2 cm to A1      fluido  Reviewed and performed all home stretches issued today and at last visit  Scar massage      HEP: added thumb opp, intrinsic LF stretch  A: continued therapy essential to fix stiffness and maximize functional use of r hand with required tasks            P:fix hypomobility, promote proper scar maturation

## 2018-02-26 ENCOUNTER — PATIENT OUTREACH (OUTPATIENT)
Dept: OTHER | Facility: OTHER | Age: 63
End: 2018-02-26

## 2018-02-26 NOTE — PROGRESS NOTES
Last 5 Patient Entered Readings                                      Current 30 Day Average: 118/77     Recent Readings 2/21/2018 2/11/2018 2/11/2018 2/10/2018 2/6/2018    SBP (mmHg) 122 126 132 125 110    DBP (mmHg) 78 84 82 80 71    Pulse 61 61 67 66 74        Mr. De La Rosa BP remains well controlled <130/80. He is doing well and thanks me for checking in. He has no questions or concerns and is pleased with is BP average.    Patient's BP average is controlled based on 2017 ACC/AHA HTN guidelines of goal BP <130/80.      Patient denies s/s of hypotension (lightheadedness, dizziness, nausea, fatigue) associated with low readings. Instructed patient to inform me if this occurs, patient confirms understanding.      Patient's health  will be following up every 3-4 weeks. I will continue to monitor regularly and will follow up in 3-4 months, sooner if BP begins to trend upward or downward.    Patient has my contact information and knows to call with any concerns or clinical changes.     Current HTN regimen:  Hypertension Medications             lisinopril (PRINIVIL,ZESTRIL) 20 MG tablet Take 1 tablet (20 mg total) by mouth once daily.    metoprolol succinate (TOPROL-XL) 25 MG 24 hr tablet Take 1 tablet (25 mg total) by mouth once daily.

## 2018-03-02 ENCOUNTER — PATIENT MESSAGE (OUTPATIENT)
Dept: FAMILY MEDICINE | Facility: CLINIC | Age: 63
End: 2018-03-02

## 2018-03-02 DIAGNOSIS — R79.89 ELEVATED LFTS: Primary | ICD-10-CM

## 2018-03-02 DIAGNOSIS — R53.83 FATIGUE, UNSPECIFIED TYPE: ICD-10-CM

## 2018-03-06 ENCOUNTER — CLINICAL SUPPORT (OUTPATIENT)
Dept: REHABILITATION | Facility: HOSPITAL | Age: 63
End: 2018-03-06
Attending: ORTHOPAEDIC SURGERY
Payer: MEDICAID

## 2018-03-06 DIAGNOSIS — M25.531 RIGHT WRIST PAIN: Primary | ICD-10-CM

## 2018-03-06 DIAGNOSIS — M25.431 RIGHT WRIST EFFUSION: ICD-10-CM

## 2018-03-06 DIAGNOSIS — M25.631 STIFFNESS OF RIGHT WRIST JOINT: ICD-10-CM

## 2018-03-06 DIAGNOSIS — M25.521 RIGHT ELBOW PAIN: ICD-10-CM

## 2018-03-06 DIAGNOSIS — M25.541 PAIN IN JOINT OF RIGHT HAND: ICD-10-CM

## 2018-03-06 PROCEDURE — 97022 WHIRLPOOL THERAPY: CPT | Mod: PN

## 2018-03-06 PROCEDURE — 97110 THERAPEUTIC EXERCISES: CPT | Mod: PN

## 2018-03-06 NOTE — PROGRESS NOTES
Time in 1  Time out 2    untimed units    fludio                               Time:1-115      Timed units  3 therex                              Time:115-2      S:no new issues  Pain:continues to decrease in intensity and frequency    O:  Prom                 r                   l  Df/pf               70/80            80/80  Rd/ud             20/40           20/40    Intrinsic stretch on r 1 cm to A1, on l 0  Thumb opp on r dpc, on l dpc    fluido  Stretches as tolerated-pain free: df, pf, intrinsic lf, thumb opp  Issued desensitization HEP            A:overall rom and scar pliability continue to improve            P:fix hypomobility, progress to  and pinch PRE, consider direct tx on medial epicondyle now that wrist pf stiffness resolved

## 2018-03-08 ENCOUNTER — LAB VISIT (OUTPATIENT)
Dept: LAB | Facility: HOSPITAL | Age: 63
End: 2018-03-08
Attending: FAMILY MEDICINE
Payer: MEDICAID

## 2018-03-08 DIAGNOSIS — R53.83 FATIGUE, UNSPECIFIED TYPE: ICD-10-CM

## 2018-03-08 DIAGNOSIS — R79.89 ELEVATED LFTS: ICD-10-CM

## 2018-03-08 LAB
ALBUMIN SERPL BCP-MCNC: 4.1 G/DL
ALP SERPL-CCNC: 101 U/L
ALT SERPL W/O P-5'-P-CCNC: 63 U/L
ANION GAP SERPL CALC-SCNC: 9 MMOL/L
AST SERPL-CCNC: 37 U/L
BASOPHILS # BLD AUTO: 0.03 K/UL
BASOPHILS NFR BLD: 0.4 %
BILIRUB SERPL-MCNC: 0.4 MG/DL
BUN SERPL-MCNC: 21 MG/DL
CALCIUM SERPL-MCNC: 9.7 MG/DL
CHLORIDE SERPL-SCNC: 104 MMOL/L
CO2 SERPL-SCNC: 24 MMOL/L
CREAT SERPL-MCNC: 1.3 MG/DL
DIFFERENTIAL METHOD: NORMAL
EOSINOPHIL # BLD AUTO: 0.1 K/UL
EOSINOPHIL NFR BLD: 0.9 %
ERYTHROCYTE [DISTWIDTH] IN BLOOD BY AUTOMATED COUNT: 12 %
EST. GFR  (AFRICAN AMERICAN): >60 ML/MIN/1.73 M^2
EST. GFR  (NON AFRICAN AMERICAN): 59.3 ML/MIN/1.73 M^2
ESTIMATED AVG GLUCOSE: 117 MG/DL
GLUCOSE SERPL-MCNC: 81 MG/DL
HBA1C MFR BLD HPLC: 5.7 %
HCT VFR BLD AUTO: 43.5 %
HGB BLD-MCNC: 14.3 G/DL
IMM GRANULOCYTES # BLD AUTO: 0.02 K/UL
IMM GRANULOCYTES NFR BLD AUTO: 0.3 %
LYMPHOCYTES # BLD AUTO: 2.6 K/UL
LYMPHOCYTES NFR BLD: 32.3 %
MCH RBC QN AUTO: 30 PG
MCHC RBC AUTO-ENTMCNC: 32.9 G/DL
MCV RBC AUTO: 91 FL
MONOCYTES # BLD AUTO: 0.5 K/UL
MONOCYTES NFR BLD: 5.8 %
NEUTROPHILS # BLD AUTO: 4.8 K/UL
NEUTROPHILS NFR BLD: 60.3 %
NRBC BLD-RTO: 0 /100 WBC
PLATELET # BLD AUTO: 265 K/UL
PMV BLD AUTO: 9.8 FL
POTASSIUM SERPL-SCNC: 4.6 MMOL/L
PROT SERPL-MCNC: 7.6 G/DL
RBC # BLD AUTO: 4.77 M/UL
SODIUM SERPL-SCNC: 137 MMOL/L
TSH SERPL DL<=0.005 MIU/L-ACNC: 0.87 UIU/ML
WBC # BLD AUTO: 7.99 K/UL

## 2018-03-08 PROCEDURE — 84443 ASSAY THYROID STIM HORMONE: CPT

## 2018-03-08 PROCEDURE — 36415 COLL VENOUS BLD VENIPUNCTURE: CPT | Mod: PO

## 2018-03-08 PROCEDURE — 80053 COMPREHEN METABOLIC PANEL: CPT

## 2018-03-08 PROCEDURE — 83036 HEMOGLOBIN GLYCOSYLATED A1C: CPT

## 2018-03-08 PROCEDURE — 85025 COMPLETE CBC W/AUTO DIFF WBC: CPT

## 2018-03-08 NOTE — TELEPHONE ENCOUNTER
I called and spoke with Mr. Ramirse. I have rescheduled ALL the appointments made without patient on the phone.

## 2018-03-09 ENCOUNTER — PATIENT MESSAGE (OUTPATIENT)
Dept: FAMILY MEDICINE | Facility: CLINIC | Age: 63
End: 2018-03-09

## 2018-03-09 DIAGNOSIS — R79.89 ELEVATED LFTS: Primary | ICD-10-CM

## 2018-03-11 ENCOUNTER — DOCUMENTATION ONLY (OUTPATIENT)
Dept: TRANSPLANT | Facility: CLINIC | Age: 63
End: 2018-03-11

## 2018-03-11 NOTE — LETTER
March 11, 2018    Yousif Ramires  [address] [place] [id number]      Dear Yousif Ramires:    Your doctor has referred you to the [facility]. You will be contacted by our office and an initial appointment will then be scheduled for you.    We look forward to seeing you soon. If you have any further questions, please contact us at [phone].       Sincerely,        [facility]   [address] [place] [id number]  [phone]

## 2018-03-15 ENCOUNTER — OFFICE VISIT (OUTPATIENT)
Dept: HEPATOLOGY | Facility: CLINIC | Age: 63
End: 2018-03-15
Payer: MEDICAID

## 2018-03-15 ENCOUNTER — PATIENT OUTREACH (OUTPATIENT)
Dept: OTHER | Facility: OTHER | Age: 63
End: 2018-03-15

## 2018-03-15 ENCOUNTER — LAB VISIT (OUTPATIENT)
Dept: LAB | Facility: HOSPITAL | Age: 63
End: 2018-03-15
Payer: MEDICAID

## 2018-03-15 VITALS
SYSTOLIC BLOOD PRESSURE: 118 MMHG | HEIGHT: 64 IN | HEART RATE: 77 BPM | WEIGHT: 162.06 LBS | BODY MASS INDEX: 27.67 KG/M2 | RESPIRATION RATE: 18 BRPM | OXYGEN SATURATION: 100 % | DIASTOLIC BLOOD PRESSURE: 67 MMHG | TEMPERATURE: 97 F

## 2018-03-15 DIAGNOSIS — R74.8 ELEVATED LIVER ENZYMES: Primary | ICD-10-CM

## 2018-03-15 DIAGNOSIS — R74.8 ELEVATED LIVER ENZYMES: ICD-10-CM

## 2018-03-15 LAB
ALBUMIN SERPL BCP-MCNC: 4.2 G/DL
ALP SERPL-CCNC: 108 U/L
ALT SERPL W/O P-5'-P-CCNC: 41 U/L
ANION GAP SERPL CALC-SCNC: 9 MMOL/L
AST SERPL-CCNC: 27 U/L
BASOPHILS # BLD AUTO: 0.02 K/UL
BASOPHILS NFR BLD: 0.3 %
BILIRUB DIRECT SERPL-MCNC: 0.1 MG/DL
BILIRUB SERPL-MCNC: 0.3 MG/DL
BUN SERPL-MCNC: 21 MG/DL
CALCIUM SERPL-MCNC: 9.9 MG/DL
CERULOPLASMIN SERPL-MCNC: 19 MG/DL
CHLORIDE SERPL-SCNC: 103 MMOL/L
CO2 SERPL-SCNC: 26 MMOL/L
CREAT SERPL-MCNC: 1.2 MG/DL
DIFFERENTIAL METHOD: NORMAL
EOSINOPHIL # BLD AUTO: 0.1 K/UL
EOSINOPHIL NFR BLD: 0.8 %
ERYTHROCYTE [DISTWIDTH] IN BLOOD BY AUTOMATED COUNT: 11.8 %
EST. GFR  (AFRICAN AMERICAN): >60 ML/MIN/1.73 M^2
EST. GFR  (NON AFRICAN AMERICAN): >60 ML/MIN/1.73 M^2
FERRITIN SERPL-MCNC: 399 NG/ML
GLUCOSE SERPL-MCNC: 82 MG/DL
HCT VFR BLD AUTO: 44.1 %
HGB BLD-MCNC: 15.1 G/DL
IGG SERPL-MCNC: 1159 MG/DL
IMM GRANULOCYTES # BLD AUTO: 0.01 K/UL
IMM GRANULOCYTES NFR BLD AUTO: 0.1 %
INR PPP: 0.9
IRON SERPL-MCNC: 118 UG/DL
LYMPHOCYTES # BLD AUTO: 2.2 K/UL
LYMPHOCYTES NFR BLD: 29.1 %
MCH RBC QN AUTO: 31 PG
MCHC RBC AUTO-ENTMCNC: 34.2 G/DL
MCV RBC AUTO: 91 FL
MONOCYTES # BLD AUTO: 0.4 K/UL
MONOCYTES NFR BLD: 4.9 %
NEUTROPHILS # BLD AUTO: 4.8 K/UL
NEUTROPHILS NFR BLD: 64.8 %
NRBC BLD-RTO: 0 /100 WBC
PLATELET # BLD AUTO: 225 K/UL
PMV BLD AUTO: 9.3 FL
POTASSIUM SERPL-SCNC: 4.7 MMOL/L
PROT SERPL-MCNC: 7.7 G/DL
PROTHROMBIN TIME: 9.6 SEC
RBC # BLD AUTO: 4.87 M/UL
SATURATED IRON: 35 %
SODIUM SERPL-SCNC: 138 MMOL/L
TOTAL IRON BINDING CAPACITY: 333 UG/DL
TRANSFERRIN SERPL-MCNC: 225 MG/DL
WBC # BLD AUTO: 7.48 K/UL

## 2018-03-15 PROCEDURE — 86038 ANTINUCLEAR ANTIBODIES: CPT

## 2018-03-15 PROCEDURE — 80307 DRUG TEST PRSMV CHEM ANLYZR: CPT

## 2018-03-15 PROCEDURE — 82103 ALPHA-1-ANTITRYPSIN TOTAL: CPT

## 2018-03-15 PROCEDURE — 99999 PR PBB SHADOW E&M-EST. PATIENT-LVL V: CPT | Mod: PBBFAC,,, | Performed by: PHYSICIAN ASSISTANT

## 2018-03-15 PROCEDURE — 83540 ASSAY OF IRON: CPT

## 2018-03-15 PROCEDURE — 82390 ASSAY OF CERULOPLASMIN: CPT

## 2018-03-15 PROCEDURE — 85025 COMPLETE CBC W/AUTO DIFF WBC: CPT

## 2018-03-15 PROCEDURE — 85610 PROTHROMBIN TIME: CPT

## 2018-03-15 PROCEDURE — 86235 NUCLEAR ANTIGEN ANTIBODY: CPT

## 2018-03-15 PROCEDURE — 82784 ASSAY IGA/IGD/IGG/IGM EACH: CPT

## 2018-03-15 PROCEDURE — 99215 OFFICE O/P EST HI 40 MIN: CPT | Mod: PBBFAC | Performed by: PHYSICIAN ASSISTANT

## 2018-03-15 PROCEDURE — 82728 ASSAY OF FERRITIN: CPT

## 2018-03-15 PROCEDURE — 36415 COLL VENOUS BLD VENIPUNCTURE: CPT

## 2018-03-15 PROCEDURE — 82248 BILIRUBIN DIRECT: CPT

## 2018-03-15 PROCEDURE — 99204 OFFICE O/P NEW MOD 45 MIN: CPT | Mod: S$PBB,,, | Performed by: PHYSICIAN ASSISTANT

## 2018-03-15 PROCEDURE — 86256 FLUORESCENT ANTIBODY TITER: CPT | Mod: 91

## 2018-03-15 PROCEDURE — 80053 COMPREHEN METABOLIC PANEL: CPT

## 2018-03-15 PROCEDURE — 80321 ALCOHOLS BIOMARKERS 1OR 2: CPT

## 2018-03-15 NOTE — PROGRESS NOTES
I have personally performed a face to face diagnostic evaluation on this patient. I have reviewed and agree with today's findings and the care plan outlined by Francisco J Jerry PA-C      My findings are as follows:  Patient presents with fluctuating LFTs  - ?biliary + NAFLD    - liver US, possible MRCP if duct abnormalities  - need to consider liver biopsy if abnormal       he will return to Francisco J Jerry PA-C  for follow-up.

## 2018-03-15 NOTE — LETTER
March 15, 2018      Patience Gordon MD  [id number] [address] [address]           [facility] Hepatology  [address]  [facility] [address]  Phone: [phone]  Fax: [phone]          Patient: Mane Ramires   MR Number: [id number]   YOB: 1955   Date of Visit: 3/15/2018       Dear Dr. Patience Gordon:    Thank you for referring Mane Ramires to me for evaluation. Attached you will find relevant portions of my assessment and plan of care.    If you have questions, please do not hesitate to call me. I look forward to following Mane Ramires along with you.    Sincerely,    Francisco J Jerry PA-C    Enclosure  CC:  No Recipients    If you would like to receive this communication electronically, please contact [email or web address] or [phone] to request more information on [employer] access.    For providers and/or their staff who would like to refer a patient to [facility], please contact us through our one-stop-shop provider referral line, [facility] , at [phone].    If you feel you have received this communication in error or would no longer like to receive these types of communications, please e-mail [email or web address]

## 2018-03-15 NOTE — PROGRESS NOTES
HEPATOLOGY CLINIC VISIT NOTE     REFERRING PROVIDER: Dr. Patience Gordon     REASON FOR VISIT: elevated LFTs     HISTORY: This is a 62 y.o. White male here for evaluation of elevated LFTs, referred by PCP. His transaminases have been fluctuant. He has had intermittent elevations since 2014. They were lat normal in 06/2017, then became elevated. On most recent labs, AST 37, ALT 63. At highest, ,  and alk phos of 241. On most recent labs, he has normal synthetic liver function and PLTs are well preserved. He had a h/o biliary cholic in 10/11/2017 and is now s/p lap choley and had a h/o chronic cholelithaisis. He has had a negative acute hep panel. Alk phos isoenzymes are suggestive of liver origin.     PMH of GID, HTN, HLD. He has been on testosterone for >25 years. He reports not taking testosterone often due to concerns over liver and reports his last injection was about 6 months ago. He reports fatigue and reports cold and heat intolerance. TSH WNL. He repots burning eyes and joint pain. He denies FH of liver disease. He denies FH of AI disorders. He denies drug and alcohol use.     Past Medical History:   Diagnosis Date    Accident     fell from roof with TBI (word finding issues personality changes, memory deficets), R rib fractures, clavicle fx    Allergy     Anxiety     ASD (atrial septal defect)     noted on ECHO 6/16    Cancer     pre-cancerous cells in breast tissue    Cervical stenosis of spine     noted on 6/15 MRI    CTS (carpal tunnel syndrome)     mild-mod on 4/17 NCS    DDD (degenerative disc disease), cervical 10/2014    C5-6 and C6-C7    Depression     Fatigue     medication induced    Gender identity disorder     H/O cardiovascular stress test     12/14 normal    Headache     medication induced    Heart murmur 05/26/2016    Herpes simplex type 2 infection     Hx of psychiatric care     prozac    Hypertension     IGT (impaired glucose tolerance)     Myelomalacia      c-spine noted on 8/15 MRI    OA (osteoarthritis)     Prediabetes     Psychiatric exam requested by authority     neuropsych testing     Psychiatric problem     TBI (traumatic brain injury)     after falling off a roof in 2003     Past Surgical History:   Procedure Laterality Date    AMPUTATION      L index finger    APPENDECTOMY  1969    BILATERAL SALPINGOOPHORECTOMY  02/2015    CERVICAL FUSION  10/2014    C5-C6 and C6-C7    CHOLECYSTECTOMY      FINGER AMPUTATION Left     FRACTURE SURGERY Right 2003    rib fractures    HYSTERECTOMY  1984    MIGUEL    MASTECTOMY  02/2015    UPPER GASTROINTESTINAL ENDOSCOPY  09/06/2017    Dr. Ayon     FAMILY HISTORY: Negative for liver disease    SOCIAL HISTORY:   Not currently working    History   Smoking Status    Former Smoker    Packs/day: 0.25    Years: 2.00    Types: Cigarettes    Quit date: 11/1/2002   Smokeless Tobacco    Never Used     History   Alcohol Use    Yes     Comment: rare    once every 2 weeks    History   Drug Use No     ROS:   No fever, chills, (+) fatigue   No chest pain, dyspnea, cough  No abdominal pain, nausea, vomiting  No skin rashes   No headaches, visual changes, (+) burning eyes   No lower extremity edema  No depression or anxiety      PHYSICAL EXAM:  Friendly White male, in no acute distress; alert and oriented to person, place and time  VITALS: reviewed  HEENT: Sclerae anicteric.   NECK: Supple  CVS: Regular rate and rhythm. No murmurs  LUNGS: Normal respiratory effort. Clear bilaterally  ABDOMEN: Flat, soft, nontender. No organomegaly or masses. No ascites or hernias  SKIN: Warm and dry. No jaundice, No obvious rashes.   EXTREMITIES: No lower extremity edema  NEURO/PSYCH: Normal gate. Memory intact. Thought and speech pattern appropriate. Behavior normal. No depression or anxiety noted.    RECENT LABS:  Lab Results   Component Value Date    WBC 7.99 03/08/2018    HGB 14.3 03/08/2018     03/08/2018     Lab Results    Component Value Date    INR 1.0 11/30/2017     Lab Results   Component Value Date    AST 37 03/08/2018    ALT 63 (H) 03/08/2018    BILITOT 0.4 03/08/2018    ALBUMIN 4.1 03/08/2018    ALKPHOS 101 03/08/2018    CREATININE 1.3 03/08/2018    BUN 21 (H) 03/08/2018     03/08/2018    K 4.6 03/08/2018     RECENT IMAGING:  U/S abdomen 10/2018  Narrative     The liver and pancreas are normal in size and appearance. The gallbladder contains a solitary gallstone measuring 3.4 cm. There are no signs of cholecystitis. The bile ducts are not dilated. Common bile duct diameter is 6 mm. Doppler of the main portal vein confirms a normal waveform and direction of flow. The right kidney is unremarkable and measures 11.4 cm in length. No ascites is present.   Impression      Solitary gallstone with no evidence of cholecystitis.     ASSESSMENT  62 y.o. White male with:  1. ELEVATED LIVER ENZYMES  -- suspect underlying NAFLD, although some cholestasis  -- ?AI given fatigue and joint pain  -- may need MRCP to rule out other biliary causes given alk phos elevation   -- may require liver biopsy     PLAN:  1. Labs today  2. U/S  3. F/u TBD based on work up     Thank you for allowing me to participate in the care of Mane Yousifeh Jerry PA-C

## 2018-03-16 LAB
ANA SER QL IF: NORMAL
MITOCHONDRIA AB TITR SER IF: NORMAL {TITER}
SMOOTH MUSCLE AB TITR SER IF: NORMAL {TITER}

## 2018-03-17 LAB
AMPHETAMINES SERPL QL: NEGATIVE
BARBITURATES SERPL QL SCN: NEGATIVE
BENZODIAZ SERPL QL: NEGATIVE
CANNABINOIDS SERPL QL: NEGATIVE
COCAINE, BLOOD: NEGATIVE
ETHANOL SERPL-MCNC: NEGATIVE MG/DL
METHADONE SERPL QL SCN: NEGATIVE
OPIATES SERPL QL SCN: NEGATIVE
PCP SERPL QL SCN: NEGATIVE
PROPOXYPH SERPL QL: NEGATIVE

## 2018-03-19 ENCOUNTER — HOSPITAL ENCOUNTER (OUTPATIENT)
Dept: RADIOLOGY | Facility: CLINIC | Age: 63
Discharge: HOME | End: 2018-03-19
Attending: PHYSICIAN ASSISTANT
Payer: MEDICAID

## 2018-03-19 DIAGNOSIS — R74.8 ELEVATED LIVER ENZYMES: ICD-10-CM

## 2018-03-19 LAB
A1AT PHENOTYP SERPL-IMP: NORMAL BANDS
A1AT SERPL NEPH-MCNC: 117 MG/DL

## 2018-03-19 PROCEDURE — 76700 US EXAM ABDOM COMPLETE: CPT | Mod: TC,PO

## 2018-03-19 PROCEDURE — 76700 US EXAM ABDOM COMPLETE: CPT | Mod: 26,,, | Performed by: RADIOLOGY

## 2018-03-22 ENCOUNTER — OFFICE VISIT (OUTPATIENT)
Dept: FAMILY MEDICINE | Facility: CLINIC | Age: 63
End: 2018-03-22
Payer: MEDICAID

## 2018-03-22 VITALS
SYSTOLIC BLOOD PRESSURE: 116 MMHG | OXYGEN SATURATION: 97 % | HEART RATE: 66 BPM | DIASTOLIC BLOOD PRESSURE: 80 MMHG | RESPIRATION RATE: 16 BRPM | WEIGHT: 164 LBS | BODY MASS INDEX: 28 KG/M2 | TEMPERATURE: 98 F | HEIGHT: 64 IN

## 2018-03-22 DIAGNOSIS — I10 HYPERTENSION, ESSENTIAL: ICD-10-CM

## 2018-03-22 DIAGNOSIS — G62.9 NEUROPATHY: ICD-10-CM

## 2018-03-22 DIAGNOSIS — G89.29 CHRONIC NECK PAIN: ICD-10-CM

## 2018-03-22 DIAGNOSIS — M54.2 CHRONIC NECK PAIN: ICD-10-CM

## 2018-03-22 DIAGNOSIS — Z12.11 COLON CANCER SCREENING: Primary | ICD-10-CM

## 2018-03-22 PROCEDURE — 99214 OFFICE O/P EST MOD 30 MIN: CPT | Mod: S$GLB,,, | Performed by: FAMILY MEDICINE

## 2018-03-22 RX ORDER — GABAPENTIN 300 MG/1
300 CAPSULE ORAL 2 TIMES DAILY PRN
COMMUNITY
End: 2018-03-22

## 2018-03-22 RX ORDER — HYDROXYZINE HYDROCHLORIDE 50 MG/1
50 TABLET, FILM COATED ORAL NIGHTLY PRN
Qty: 30 TABLET | Refills: 2 | Status: SHIPPED | OUTPATIENT
Start: 2018-03-22 | End: 2018-04-20 | Stop reason: SDUPTHER

## 2018-03-22 RX ORDER — DULOXETIN HYDROCHLORIDE 30 MG/1
30 CAPSULE, DELAYED RELEASE ORAL DAILY
Qty: 30 CAPSULE | Refills: 2 | Status: SHIPPED | OUTPATIENT
Start: 2018-03-22 | End: 2018-05-24

## 2018-03-23 NOTE — PROGRESS NOTES
Subjective:       Patient ID: Mane Ramires is a 62 y.o. male.    Chief Complaint: Follow-up (lab results) and medication review (gabapentin / cymbalta/ hydroxyzine )    HPI   The patient is a 62-year-old who is here today for chronic follow-up.      Today we discussed the followin)  chronic pain.  He wonders about trying Cymbalta for his chronic pain.  He has chronic pain in his neck and low back.  His insurance would not approve the Lyrica and he cannot tolerate the Neurontin due to side effects.  He is going to revisit with his neurosurgeon to discuss his case.  He denies any bowel or bladder incontinence  2)  insomnia.  He does need a refill of his hydroxyzine.  He is currently taking 2 of hydroxyzine 25 mg to help him sleep.  If he takes one of hydroxyzine he only sleeps from 1 AM until 4:30 AM.  If he takes 2 hydroxyzine he sleeps longer  3)  mood disorder.  He wonders about trying something different for his mood.  He is currently taking Prozac 20 mg twice a day but does not find this helpful.  He is looking for a new psychiatrist as he did not like his previous psychiatrist.  He denies any SI or HI  4)  elevated LFTs.  He has met with hepatology team.  He did have an ultrasound and additional labs.  He is waiting for those test results  5)  hypertension.  He is enrolled in the digital hypertension medicine program.  71% of his readings are less than 130/80.  He is currently taking his lisinopril at night and his Toprol in the morning but we discussed taking both of these at night which would be better for him    Review of systems is remarkable for fatigue      Review of Systems   Constitutional: Positive for fatigue. Negative for appetite change, chills, diaphoresis, fever and unexpected weight change.   HENT: Negative for congestion, ear pain, postnasal drip, rhinorrhea, sinus pressure, sneezing, sore throat and trouble swallowing.    Eyes: Negative for pain, discharge and visual disturbance.    Respiratory: Negative for cough, chest tightness, shortness of breath and wheezing.    Cardiovascular: Negative for chest pain, palpitations and leg swelling.   Gastrointestinal: Negative for abdominal distention, abdominal pain, blood in stool, constipation, diarrhea, nausea and vomiting.   Musculoskeletal: Positive for arthralgias, back pain and neck pain.   Skin: Negative for rash.   Psychiatric/Behavioral: Positive for decreased concentration and dysphoric mood. Negative for self-injury, sleep disturbance and suicidal ideas.       Objective:      Physical Exam   Constitutional: He is oriented to person, place, and time. He appears well-developed and well-nourished. No distress.   HENT:   Head: Normocephalic and atraumatic.   Right Ear: Hearing, tympanic membrane, external ear and ear canal normal.   Left Ear: Hearing, tympanic membrane, external ear and ear canal normal.   Nose: Nose normal.   Mouth/Throat: Oropharynx is clear and moist and mucous membranes are normal. No oral lesions. No oropharyngeal exudate, posterior oropharyngeal edema or posterior oropharyngeal erythema.   Eyes: Conjunctivae, EOM and lids are normal. Pupils are equal, round, and reactive to light. No scleral icterus.   Neck: Normal range of motion. Neck supple. Carotid bruit is not present. No thyroid mass and no thyromegaly present.   Cardiovascular: Normal rate, regular rhythm and normal heart sounds.   No extrasystoles are present. PMI is not displaced.  Exam reveals no gallop.    No murmur heard.  Pulmonary/Chest: Effort normal and breath sounds normal. No accessory muscle usage. No respiratory distress.   Clear to auscultation bilaterally.   Abdominal: Soft. Normal appearance and bowel sounds are normal. He exhibits no abdominal bruit. There is no hepatosplenomegaly. There is no tenderness. There is no rebound.   Lymphadenopathy:        Head (right side): No submental and no submandibular adenopathy present.        Head (left side):  "No submental and no submandibular adenopathy present.        Right cervical: No superficial cervical, no deep cervical and no posterior cervical adenopathy present.       Left cervical: No superficial cervical, no deep cervical and no posterior cervical adenopathy present.        Right: No supraclavicular adenopathy present.        Left: No supraclavicular adenopathy present.   Neurological: He is alert and oriented to person, place, and time.   Skin: Skin is warm, dry and intact.   Psychiatric: He has a normal mood and affect.     Blood pressure 116/80, pulse 66, temperature 98.3 °F (36.8 °C), temperature source Oral, resp. rate 16, height 5' 4" (1.626 m), weight 74.4 kg (164 lb 0.4 oz), SpO2 97 %.Body mass index is 28.15 kg/m².          A/P:  1)  chronic pain especially in the neck and low back.  Persistent.  We will try her aymbalta 30 mg once a day.  We can increase this dose further if needed.  He will follow-up with his neurosurgeon  2)  insomnia.  Persistent.  I did refill hydroxyzine 50 mg at night as needed.  3)  mood disorder.  We will wean off the Prozac and start the Cymbalta as noted above.  He will continue to look for a new psychiatrist  4)  elevated LFTs.  Follow-up with hepatology as planned  5)  hypertension.  Well controlled.  Continue current medication  6)  fatigue.  Persistent.  We will see how his energy levels do in the coming weeks.  His recent evaluations have been unremarkable  7)  prediabetes.  Stable.  We will recheck labs in 12 months  8)  Health maintenence issues.  We will do a FIT kit for colon cancer screening    I will see him back in 8 weeks or sooner if needed  "

## 2018-03-28 LAB — PHOSPHATIDYLETHANOL (PETH): NEGATIVE NG/ML

## 2018-03-29 ENCOUNTER — TELEPHONE (OUTPATIENT)
Dept: HEPATOLOGY | Facility: CLINIC | Age: 63
End: 2018-03-29

## 2018-03-29 ENCOUNTER — CLINICAL SUPPORT (OUTPATIENT)
Dept: REHABILITATION | Facility: HOSPITAL | Age: 63
End: 2018-03-29
Attending: ORTHOPAEDIC SURGERY
Payer: MEDICAID

## 2018-03-29 DIAGNOSIS — M25.631 STIFFNESS OF RIGHT WRIST JOINT: ICD-10-CM

## 2018-03-29 DIAGNOSIS — M25.431 RIGHT WRIST EFFUSION: ICD-10-CM

## 2018-03-29 DIAGNOSIS — M25.521 RIGHT ELBOW PAIN: ICD-10-CM

## 2018-03-29 DIAGNOSIS — M25.541 PAIN IN JOINT OF RIGHT HAND: ICD-10-CM

## 2018-03-29 DIAGNOSIS — R74.8 ELEVATED LIVER ENZYMES: Primary | ICD-10-CM

## 2018-03-29 DIAGNOSIS — M25.531 RIGHT WRIST PAIN: Primary | ICD-10-CM

## 2018-03-29 PROCEDURE — 97110 THERAPEUTIC EXERCISES: CPT | Mod: PN

## 2018-03-29 PROCEDURE — 97022 WHIRLPOOL THERAPY: CPT | Mod: PN

## 2018-03-29 NOTE — TELEPHONE ENCOUNTER
----- Message from Francisco J Jerry PA-C sent at 3/29/2018 12:57 PM CDT -----  Please schedule CMP in 4 weeks  Thanks

## 2018-03-29 NOTE — PROGRESS NOTES
Time in 10  Time out 11    untimed units    fluido                               Time:    Timed units  2 therex                              Time:8425-4514      S: overall pain and limited use continue to decrease, understands for OT to continue a new md will need to write an updated rx  Pain:since 1st day of OT r medial elbow pain has decreased 50% and wrist and hand pain has decreased 75%    O:  full prom r wrist all planes except df 74 vs 80 on l; full prom digits 1 thru 5    fluido  scar massage  wrist df stretches, LF intrinsic  as tolerated-pain free             Instructed on proper ice use for pain since pt. Fell on outstretched r hand since last visit and has some volar ulnar pain now; told to continue current HEP until next OT visit  A: scar massage and wrist stretches and slow progression of hand use should be focus of HEP for now; PRE not indicated at this point         P:continue OT if rx written by new md; referring md is no longer with [facility] and order expires on 4-6-18 5-22-18: d/c since pt. Has not attended since 3-29-18

## 2018-04-03 ENCOUNTER — LAB VISIT (OUTPATIENT)
Dept: LAB | Facility: HOSPITAL | Age: 63
End: 2018-04-03
Attending: INTERNAL MEDICINE
Payer: MEDICAID

## 2018-04-03 DIAGNOSIS — I10 ESSENTIAL HYPERTENSION: ICD-10-CM

## 2018-04-03 DIAGNOSIS — E78.5 DYSLIPIDEMIA: ICD-10-CM

## 2018-04-03 DIAGNOSIS — G56.00 CARPAL TUNNEL SYNDROME, UNSPECIFIED LATERALITY: ICD-10-CM

## 2018-04-03 DIAGNOSIS — G56.01 RIGHT CARPAL TUNNEL SYNDROME: ICD-10-CM

## 2018-04-03 LAB
ALBUMIN SERPL BCP-MCNC: 4 G/DL
ALP SERPL-CCNC: 78 U/L
ALT SERPL W/O P-5'-P-CCNC: 18 U/L
ANION GAP SERPL CALC-SCNC: 7 MMOL/L
AST SERPL-CCNC: 17 U/L
BILIRUB SERPL-MCNC: 0.6 MG/DL
BUN SERPL-MCNC: 15 MG/DL
CALCIUM SERPL-MCNC: 9.6 MG/DL
CHLORIDE SERPL-SCNC: 105 MMOL/L
CO2 SERPL-SCNC: 27 MMOL/L
CREAT SERPL-MCNC: 1.3 MG/DL
EST. GFR  (AFRICAN AMERICAN): >60 ML/MIN/1.73 M^2
EST. GFR  (NON AFRICAN AMERICAN): 59 ML/MIN/1.73 M^2
GLUCOSE SERPL-MCNC: 101 MG/DL
POTASSIUM SERPL-SCNC: 4.6 MMOL/L
PROT SERPL-MCNC: 7.6 G/DL
SODIUM SERPL-SCNC: 139 MMOL/L

## 2018-04-03 PROCEDURE — 36415 COLL VENOUS BLD VENIPUNCTURE: CPT

## 2018-04-03 PROCEDURE — 80053 COMPREHEN METABOLIC PANEL: CPT

## 2018-04-16 DIAGNOSIS — I10 ESSENTIAL HYPERTENSION: ICD-10-CM

## 2018-04-16 RX ORDER — METOPROLOL SUCCINATE 25 MG/1
25 TABLET, EXTENDED RELEASE ORAL DAILY
Qty: 90 TABLET | Refills: 2 | Status: SHIPPED | OUTPATIENT
Start: 2018-04-16 | End: 2018-11-09

## 2018-04-21 RX ORDER — HYDROXYZINE HYDROCHLORIDE 50 MG/1
50 TABLET, FILM COATED ORAL NIGHTLY PRN
Qty: 30 TABLET | Refills: 2 | Status: SHIPPED | OUTPATIENT
Start: 2018-04-21 | End: 2018-04-23 | Stop reason: SDUPTHER

## 2018-04-23 RX ORDER — HYDROXYZINE HYDROCHLORIDE 50 MG/1
50 TABLET, FILM COATED ORAL NIGHTLY PRN
Qty: 90 TABLET | Refills: 0 | Status: SHIPPED | OUTPATIENT
Start: 2018-04-23 | End: 2018-05-01 | Stop reason: SDUPTHER

## 2018-04-23 NOTE — TELEPHONE ENCOUNTER
Patient just had filled on 4/21/18; they are requesting a 90 day supply of the Hydroxyzine HCL 50 mg.

## 2018-04-24 ENCOUNTER — LAB VISIT (OUTPATIENT)
Dept: LAB | Facility: HOSPITAL | Age: 63
End: 2018-04-24
Attending: PHYSICIAN ASSISTANT
Payer: MEDICAID

## 2018-04-24 DIAGNOSIS — R74.8 ELEVATED LIVER ENZYMES: ICD-10-CM

## 2018-04-24 LAB
ALBUMIN SERPL BCP-MCNC: 4 G/DL
ALP SERPL-CCNC: 86 U/L
ALT SERPL W/O P-5'-P-CCNC: 31 U/L
ANION GAP SERPL CALC-SCNC: 9 MMOL/L
AST SERPL-CCNC: 22 U/L
BILIRUB SERPL-MCNC: 0.5 MG/DL
BUN SERPL-MCNC: 17 MG/DL
CALCIUM SERPL-MCNC: 9.4 MG/DL
CHLORIDE SERPL-SCNC: 105 MMOL/L
CO2 SERPL-SCNC: 25 MMOL/L
CREAT SERPL-MCNC: 1.3 MG/DL
EST. GFR  (AFRICAN AMERICAN): >60 ML/MIN/1.73 M^2
EST. GFR  (NON AFRICAN AMERICAN): 59.3 ML/MIN/1.73 M^2
GLUCOSE SERPL-MCNC: 122 MG/DL
POTASSIUM SERPL-SCNC: 4.5 MMOL/L
PROT SERPL-MCNC: 7.2 G/DL
SODIUM SERPL-SCNC: 139 MMOL/L

## 2018-04-24 PROCEDURE — 80053 COMPREHEN METABOLIC PANEL: CPT

## 2018-04-24 PROCEDURE — 36415 COLL VENOUS BLD VENIPUNCTURE: CPT | Mod: PO

## 2018-04-26 ENCOUNTER — HOSPITAL ENCOUNTER (EMERGENCY)
Facility: HOSPITAL | Age: 63
Discharge: HOME | End: 2018-04-26
Attending: EMERGENCY MEDICINE
Payer: MEDICAID

## 2018-04-26 VITALS
DIASTOLIC BLOOD PRESSURE: 74 MMHG | OXYGEN SATURATION: 99 % | RESPIRATION RATE: 16 BRPM | HEART RATE: 80 BPM | HEIGHT: 64 IN | SYSTOLIC BLOOD PRESSURE: 131 MMHG | BODY MASS INDEX: 28 KG/M2 | WEIGHT: 164 LBS | TEMPERATURE: 98 F
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
## 2018-04-26 DIAGNOSIS — S09.90XA INJURY OF HEAD, INITIAL ENCOUNTER: ICD-10-CM

## 2018-04-26 DIAGNOSIS — M54.2 NECK PAIN: ICD-10-CM

## 2018-04-26 DIAGNOSIS — R07.81 RIB PAIN: ICD-10-CM

## 2018-04-26 DIAGNOSIS — M25.519 SHOULDER PAIN: ICD-10-CM

## 2018-04-26 DIAGNOSIS — M54.50 LOW BACK PAIN: ICD-10-CM

## 2018-04-26 PROCEDURE — 25000003 PHARM REV CODE 250: Performed by: NURSE PRACTITIONER

## 2018-04-26 PROCEDURE — 99284 EMERGENCY DEPT VISIT MOD MDM: CPT

## 2018-04-26 RX ORDER — ACETAMINOPHEN 325 MG/1
650 TABLET ORAL
Status: COMPLETED | OUTPATIENT
Start: 2018-04-26 | End: 2018-04-26

## 2018-04-26 RX ADMIN — ACETAMINOPHEN 650 MG: 325 TABLET, FILM COATED ORAL at 01:04

## 2018-04-26 NOTE — ED PROVIDER NOTES
"Encounter Date: 4/26/2018    SCRIBE #1 NOTE: IMarietta, alonzo scribing for, and in the presence of, SHAQUILLE NickC.       History     Chief Complaint   Patient presents with    Fall     left rib pain     Neck Pain     headache     Back Pain     04/26/2018  11:37 AM     Chief Complaint: Neck Pain      Mane Ramires is a 62 y.o. male with a pmhx of DDD; ASD; HTN; TBI presenting to the E.D. with complaints of acute mild neck and right shoulder pain which began following a mechanical fall at 3AM morning. The patient was standing on a chair to turn off his fan when he "launched backward" landing onto his neck and right shoulder. Pain is exacerbated with movement of shoulder and pt reports limited ROM secondary to pain. He has taken Aleve following incident with no improvement of symptoms. Other complaints include left rib pain and lower back pain. Unsure of LOC. Denies new numbness or weakness, abdominal pain, nausea, emesis, change in behavior, CP.       The history is provided by the patient.     Review of patient's allergies indicates:   Allergen Reactions    Sudafed [pseudoephedrine hcl] Other (See Comments)     Heart racing      Codeine Other (See Comments)     Heart racing    Cortisone Palpitations     Past Medical History:   Diagnosis Date    Accident     fell from roof with TBI (word finding issues personality changes, memory deficets), R rib fractures, clavicle fx    Allergy     Anxiety     ASD (atrial septal defect)     noted on ECHO 6/16    Cervical stenosis of spine     noted on 6/15 MRI    CTS (carpal tunnel syndrome)     mild-mod on 4/17 NCS    DDD (degenerative disc disease), cervical 10/2014    C5-6 and C6-C7    Depression     Fatigue     medication induced    JEEVAN (generalized anxiety disorder)     Gender identity disorder     H/O cardiovascular stress test     12/14 normal    Headache     medication induced    Herpes simplex type 2 infection     History of atypical " hyperplasia of breast     B precancer lesions    Hypertension     IGT (impaired glucose tolerance)     Myelomalacia     c-spine noted on 8/15 MRI    OA (osteoarthritis)     Prediabetes     Psychiatric problem     TBI (traumatic brain injury)     after falling off a roof in 2003     Past Surgical History:   Procedure Laterality Date    AMPUTATION      L index finger    APPENDECTOMY  1969    BILATERAL SALPINGOOPHORECTOMY  02/2015    CARPAL TUNNEL RELEASE  12/2017    right    CERVICAL FUSION  10/2014    C5-C6 and C6-C7    CHOLECYSTECTOMY      FINGER AMPUTATION Left     FRACTURE SURGERY Right 2003    rib fractures    HYSTERECTOMY  1984    MIGUEL    MASTECTOMY  02/2015    UPPER GASTROINTESTINAL ENDOSCOPY  09/06/2017    Dr. Ayon     Family History   Problem Relation Age of Onset    Diabetes Mother     Heart disease Mother     Gallbladder disease Mother     Coronary artery disease Father     Breast cancer Sister     Heart disease Sister     Lung cancer Paternal Grandfather     Heart disease Paternal Grandfather     Heart disease Maternal Grandmother     Gallbladder disease Maternal Grandmother     Heart attack Brother     Gallbladder disease Daughter     Colon cancer Neg Hx     Colon polyps Neg Hx     Crohn's disease Neg Hx     Ulcerative colitis Neg Hx     Stomach cancer Neg Hx     Esophageal cancer Neg Hx      Social History   Substance Use Topics    Smoking status: Former Smoker     Packs/day: 0.25     Years: 2.00     Types: Cigarettes     Quit date: 11/1/2002    Smokeless tobacco: Never Used    Alcohol use Yes      Comment: rare      Review of Systems   Constitutional: Negative for chills and fever.   HENT: Negative for congestion, sore throat and trouble swallowing.    Eyes: Negative for visual disturbance.   Respiratory: Negative for shortness of breath and wheezing.    Cardiovascular: Negative for chest pain.   Gastrointestinal: Negative for nausea and vomiting.    Genitourinary: Negative for dysuria.   Musculoskeletal: Positive for arthralgias (R shoulder), back pain and neck pain. Negative for gait problem and joint swelling.   Skin: Negative for rash and wound.   Neurological: Negative for weakness.   Hematological: Does not bruise/bleed easily.   Psychiatric/Behavioral: Negative for confusion.       Physical Exam     Initial Vitals [04/26/18 1130]   BP Pulse Resp Temp SpO2   133/66 80 16 98.2 °F (36.8 °C) 97 %      MAP       88.33         Physical Exam    Nursing note and vitals reviewed.  Constitutional: He appears well-developed and well-nourished.   HENT:   Head: Normocephalic and atraumatic.   Mouth/Throat: Oropharynx is clear and moist.   Eyes: Conjunctivae and EOM are normal. Pupils are equal, round, and reactive to light.   Neck: Normal range of motion. Neck supple.   Cardiovascular: Normal rate, regular rhythm, normal heart sounds and intact distal pulses. Exam reveals no gallop and no friction rub.    No murmur heard.  Pulmonary/Chest: Breath sounds normal. He has no wheezes. He has no rhonchi. He has no rales.   Abdominal: Soft. He exhibits no distension. There is no tenderness.   Musculoskeletal:        Right shoulder: He exhibits decreased range of motion and bony tenderness.   No midline tenderness. Right paraspinal tenderness noted in C and L spine. Limited ROM to right shoulder secondary to pain with bony tenderness noted to Acromion process. TTP to left lateral ribs.    Neurological: He is alert and oriented to person, place, and time. He has normal strength. No cranial nerve deficit or sensory deficit. Gait normal. GCS eye subscore is 4. GCS verbal subscore is 5. GCS motor subscore is 6.   No focal neurologic deficits   Skin: No rash noted. No erythema.   Psychiatric: He has a normal mood and affect.         ED Course   Procedures  Labs Reviewed - No data to display          Medical Decision Making:   Differential Diagnosis:   DDx includes fracture,  dislocation, subluxation, spinal cord injury, concussion, intracranial injury.  Clinical Tests:   Radiological Study: Ordered and Reviewed  ED Management:  No acute abnormalities noted per radiologic evaluation.  Symptoms improved following Tylenol administration and application of sling.    Based on history and physical exam, as well as diagnostic results, I considered but do not suspect fracture, dislocation, subluxation, spinal cord injury, or intracranial injury.  Clinical presentation suggests musculoskeletal strains and sprains with potential for a mild concussion.  Patient discharged with instructions to follow up with his primary care provider.  He was also given contact information for the concussion clinic should he decide to follow-up there for any persistent symptoms.  Return precautions given.  Case discussed with supervising physician who agrees with plan.                      Clinical Impression:   The primary encounter diagnosis was Fall, initial encounter. Diagnoses of Neck pain, Low back pain, Rib pain, Shoulder pain, and Injury of head, initial encounter were also pertinent to this visit.                           Dasia Bass NP  04/26/18 6259

## 2018-04-27 ENCOUNTER — HOSPITAL ENCOUNTER (EMERGENCY)
Facility: HOSPITAL | Age: 63
Discharge: HOME | End: 2018-04-27
Attending: EMERGENCY MEDICINE
Payer: MEDICAID

## 2018-04-27 VITALS
BODY MASS INDEX: 27.81 KG/M2 | RESPIRATION RATE: 20 BRPM | DIASTOLIC BLOOD PRESSURE: 69 MMHG | OXYGEN SATURATION: 97 % | HEART RATE: 65 BPM | SYSTOLIC BLOOD PRESSURE: 112 MMHG | TEMPERATURE: 99 F | WEIGHT: 162 LBS

## 2018-04-27 DIAGNOSIS — R20.0 ARM NUMBNESS: Primary | ICD-10-CM

## 2018-04-27 PROCEDURE — 25000003 PHARM REV CODE 250: Performed by: EMERGENCY MEDICINE

## 2018-04-27 PROCEDURE — 99284 EMERGENCY DEPT VISIT MOD MDM: CPT

## 2018-04-27 RX ORDER — LORAZEPAM 1 MG/1
2 TABLET ORAL
Status: COMPLETED | OUTPATIENT
Start: 2018-04-27 | End: 2018-04-27

## 2018-04-27 RX ADMIN — LORAZEPAM 2 MG: 1 TABLET ORAL at 02:04

## 2018-04-27 NOTE — ED NOTES
C/O constant bilateral arm numbness since falling yesterday. Right arm is in sling since falling yesterday after being seen in the ER. AAO x3.

## 2018-04-27 NOTE — ED PROVIDER NOTES
"Encounter Date: 4/27/2018    SCRIBE #1 NOTE: I, Abdirashid Christensen, am scribing for, and in the presence of, Dr. Ortiz .       History     Chief Complaint   Patient presents with    Numbness     Pt states that he has bilateral arm numbness since fall yesterday.  Pt was evaluated in ED yesterday for fall       04/27/2018 1:54 PM     Chief complaint: Numbness      Mane Raimres is a 62 y.o. male with a PMHx of HTN, DDD, OA, and carpal tunnels who presents to the ED for further evaluation of numbness with onset 15 hours ago. Patient reports that he has had numbness in his bilateral arms status post fall yesterday. Patient states that he felt a "pop" in his neck during the fall.  Patient reports that he was evaluated at [facility] ED yesterday, and after discharge the numbness began. He states that he the numbness in bilateral arms. He states that it starts in the finger tips and radiates up to his elbow. Patient denies motor deficits. He states that he cannot distinguish between sharp and dull. Patient endorses the right side is worse than the left.  Patient endorses a headache that is more of a diffuse pressure and has been intermittent since yesterday. Patient denies fever, visual disturbances, back pain, motor deficits, nausea, and vomiting.                The history is provided by the patient.     Review of patient's allergies indicates:   Allergen Reactions    Sudafed [pseudoephedrine hcl] Other (See Comments)     Heart racing      Codeine Other (See Comments)     Heart racing    Cortisone Palpitations     Past Medical History:   Diagnosis Date    Accident     fell from roof with TBI (word finding issues personality changes, memory deficets), R rib fractures, clavicle fx    Allergy     Anxiety     ASD (atrial septal defect)     noted on ECHO 6/16    Cervical stenosis of spine     noted on 6/15 MRI    CTS (carpal tunnel syndrome)     mild-mod on 4/17 NCS    DDD (degenerative disc disease), " cervical 10/2014    C5-6 and C6-C7    Depression     Fatigue     medication induced    JEEVAN (generalized anxiety disorder)     Gender identity disorder     H/O cardiovascular stress test     12/14 normal    Headache     medication induced    Herpes simplex type 2 infection     History of atypical hyperplasia of breast     B precancer lesions    Hypertension     IGT (impaired glucose tolerance)     Myelomalacia     c-spine noted on 8/15 MRI    OA (osteoarthritis)     Prediabetes     Psychiatric problem     TBI (traumatic brain injury)     after falling off a roof in 2003     Past Surgical History:   Procedure Laterality Date    AMPUTATION      L index finger    APPENDECTOMY  1969    BILATERAL SALPINGOOPHORECTOMY  02/2015    CARPAL TUNNEL RELEASE  12/2017    right    CERVICAL FUSION  10/2014    C5-C6 and C6-C7    CHOLECYSTECTOMY      FINGER AMPUTATION Left     FRACTURE SURGERY Right 2003    rib fractures    HYSTERECTOMY  1984    MIGUEL    MASTECTOMY  02/2015    UPPER GASTROINTESTINAL ENDOSCOPY  09/06/2017    Dr. Ayon     Family History   Problem Relation Age of Onset    Diabetes Mother     Heart disease Mother     Gallbladder disease Mother     Coronary artery disease Father     Breast cancer Sister     Heart disease Sister     Lung cancer Paternal Grandfather     Heart disease Paternal Grandfather     Heart disease Maternal Grandmother     Gallbladder disease Maternal Grandmother     Heart attack Brother     Gallbladder disease Daughter     Colon cancer Neg Hx     Colon polyps Neg Hx     Crohn's disease Neg Hx     Ulcerative colitis Neg Hx     Stomach cancer Neg Hx     Esophageal cancer Neg Hx      Social History   Substance Use Topics    Smoking status: Former Smoker     Packs/day: 0.25     Years: 2.00     Types: Cigarettes     Quit date: 11/1/2002    Smokeless tobacco: Never Used    Alcohol use Yes      Comment: rare      Review of Systems   All other systems reviewed  and are negative.  REVIEW OF SYSTEMS  CONSTITUTIONAL: Negative for fever.  EYES: visual disturbances.   HEENT:  Negative for sore throat.   HEART:   Negative for chest pain..  LUNG:  Negative for shortness of breath.  ABDOMEN:  Negative for nausea and vomiting.   :  No discharge, dysuria  EXTREMITIES:  No swelling and back pain.   NEURO:  Negative for weakness. +numbness  SKIN:  Negative for rash.  Psych: No depression  HEME: Does not bruise/bleed easily.               Physical Exam     Initial Vitals [04/27/18 1236]   BP Pulse Resp Temp SpO2   (!) 141/70 78 18 98.5 °F (36.9 °C) 97 %      MAP       93.67         Physical Exam    Nursing note and vitals reviewed.  Constitutional: He appears well-developed and well-nourished.  Non-toxic appearance. No distress.   HENT:   Head: Normocephalic and atraumatic.   Eyes: EOM are normal. Pupils are equal, round, and reactive to light.   Neck: Normal range of motion. Neck supple. No neck rigidity. No JVD present.   Cardiovascular: Normal rate, regular rhythm, normal heart sounds and intact distal pulses. Exam reveals no gallop and no friction rub.    No murmur heard.  Pulmonary/Chest: Breath sounds normal. He has no wheezes. He has no rhonchi. He has no rales.   Abdominal: Soft. Bowel sounds are normal. He exhibits no distension. There is no tenderness. There is no rigidity, no rebound and no guarding.   Musculoskeletal: Normal range of motion.   Right arm is in sling.    Neurological: He is alert and oriented to person, place, and time. He has normal strength and normal reflexes. No cranial nerve deficit or sensory deficit. He exhibits normal muscle tone. Coordination normal. GCS eye subscore is 4. GCS verbal subscore is 5. GCS motor subscore is 6.   Skin: Skin is warm and dry.   Psychiatric: He has a normal mood and affect. His speech is normal and behavior is normal. He is not actively hallucinating.         ED Course   Procedures  Labs Reviewed - No data to display      Imaging Results          MRI Cervical Spine Without Contrast (Final result)  Result time 04/27/18 16:59:07    Final result by Ja Harper MD (04/27/18 16:59:07)                 Impression:      1. There is multilevel degenerative change in addition to postsurgical changes of prior ACDF.  These findings are discussed above.  Spinal canal is borderline small on a developmental basis.  There is some degree of spinal canal and foraminal stenosis.  2. At the C2-3 level, there is mild left foraminal stenosis without spinal stenosis and no change compared to CT.  3. At the C3-4 level, there is borderline to mild spinal stenosis.  The foramina are patent.  4. At the C4-5 level, there is mild spinal stenosis with moderate left foraminal stenosis.  5. At the C5-6 level, there is moderate left foraminal stenosis without spinal stenosis.  6. At the C6-7 level, there is at least moderate bilateral foraminal stenosis and mild spinal stenosis.  7. There is facet joint arthropathy at the C7-T1 level without spinal canal or foraminal stenosis.      Electronically signed by: Ja Harper MD  Date:    04/27/2018  Time:    16:59             Narrative:    EXAMINATION:  MRI CERVICAL SPINE WITHOUT CONTRAST    CLINICAL HISTORY:  Neck pain, prior xray, abn neuro exam;.  Anesthesia of skin.    TECHNIQUE:  Multiplanar, multisequence MR images of the cervical spine were acquired without the administration of contrast.    COMPARISON:  Cervical spine CT performed earlier today at 1:21 p.m.    FINDINGS:  Vertebral column: Redemonstrated are postsurgical changes of prior ACDF of C5 through C7.  There is mild susceptibility artifact related to fusion hardware.  Otherwise, the vertebral bodies maintain normal height and alignment.  There is no fracture or traumatic subluxation.  There is mild disc space narrowing at the C4-5 level.  The discs are desiccated.    Spinal canal, cord, epidural space: The spinal canal is borderline small  on a developmental basis.  Cord is normal in signal intensity.  There is no abnormal epidural collection or mass.    Findings by level:    C2-3: There is mild left facet joint arthropathy and bulging of the annulus.  There is mild left foraminal stenosis without spinal stenosis.  There is no change.    C3-4: There is mild facet joint arthropathy with a very shallow broad central disc protrusion contributing to borderline to mild spinal stenosis.  The foramina are patent.  There is no change.    C4-5: There is mild disc space narrowing.  There is left greater than right facet joint arthropathy and uncovertebral spurring.  There is a mild disc osteophyte complex which narrows the ventral subarachnoid space.  There is mild spinal stenosis.  There is moderate left foraminal stenosis without significant change.    C5-6: Postoperative changes of ACDF are present.  There is moderate left foraminal stenosis.  There is no spinal stenosis or cord compression.  There is no change.    C6-7: There are postoperative changes of ACDF.  There is mild spinal stenosis without cord compression.  There is at least moderate bilateral foraminal stenosis without change.    C7-T1: There is no spinal stenosis.  There is mild left facet joint arthropathy.  There is no significant foraminal stenosis.    Soft tissues, other: The prevertebral soft tissues are normal.  There is no edema or hematoma.                               MRI Brain (Stroke Protocol) Without Contrast (Final result)  Result time 04/27/18 16:49:43    Final result by Ja Harper MD (04/27/18 16:49:43)                 Impression:      1.  There is no acute abnormality.  There is no hemorrhage, mass/mass effect, acute infarction.    2.  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    3.  There is minimal nonspecific white matter change.      Electronically signed by: Ja Harper MD  Date:    04/27/2018  Time:    16:49             Narrative:    EXAM:  MRI BRAIN (STROKE PROTOCOL)  WITHOUT CONTRAST    CLINICAL HISTORY:  bilateral arm numbness s/p fall; .    COMPARISON:  Head CT dated 04/26/2018    FINDINGS:  Intracranial contents:There is no acute intracranial abnormality.  There are no regions of restricted diffusion to suggest acute infarction.  There is no intracranial hemorrhage or mass effect.  Brain volume, ventricular size and position are normal.  There is a minimal burden of periventricular and subcortical white matter T2 prolongation.  These findings are nonspecific but likely reflect sequela of minimal chronic microvascular ischemic disease.  There is an incidental 10 mm pineal cyst with calcified rim.  In retrospect, this was present on head CT as well.  There is no abnormal extra-axial fluid collection.  The basilar cisterns are open.  Flow voids indicating patency are present in the major vessels at the base of the brain.  The cerebellar tonsils are in normal position at the level of the foramen magnum.  The sellar structures are normal.  The orbits are grossly normal.    Extracranial contents, calvarium, soft tissues:Marrow signal intensity is normal.  The nasal septum is deviated to the left.  There is a right-sided adelita bullosa.                               CT Cervical Spine Without Contrast (Final result)  Result time 04/27/18 13:36:58    Final result by Ja Harper MD (04/27/18 13:36:58)                 Impression:      1. There are postsurgical changes of prior C5 through C7 ACDF without complication.  There is no hardware fracture or loosening.  2. There is degenerative change described above.  These findings are present in the setting of a spinal canal which is borderline small on a developmental basis.  3. There is no fracture or traumatic subluxation.  4. At the C2-3 level, there is mild left foraminal stenosis, unchanged.  5. At the C3-4 level, there is borderline to mild spinal stenosis without foraminal stenosis and without change.  6. At the C4-5 level,  there is mild-to-moderate left foraminal stenosis without spinal stenosis, unchanged.  7. At the C5-6 level, there is stable mild left foraminal stenosis without spinal stenosis, unchanged.  8. At the C6-7 level, there is moderate-to-marked right and moderate left foraminal stenosis without change.  There is no spinal stenosis.      Electronically signed by: Ja Harper MD  Date:    04/27/2018  Time:    13:36             Narrative:    EXAMINATION:  CT CERVICAL SPINE WITHOUT CONTRAST    CLINICAL HISTORY:  bilateral arm numbness s/p fall;    TECHNIQUE:  Low dose axial images, sagittal and coronal reformations were preformed though the cervical spine.  Contrast was not administered.    COMPARISON:  Cervical spine MRI dated 03/01/2017    FINDINGS:  Vertebral column: There is straightening of the cervical spine with loss of normal lordosis.  This is likely related to previous ACDF of C5 through C7.  There is no fracture or malalignment.  The odontoid process is intact.  The anterior and posterior arches of C1 are normal.  The facet joints maintain normal articulation.  There is no significant disc space narrowing.  Fusion hardware is intact.  There is interbody fusion of C5/C6 and C6/C7 fusion appears solid.    Spinal canal, cord, epidural space: The spinal canal appears at least borderline small on a developmental basis.  There is no abnormal epidural collection or mass    Findings by level:    C1-2: Alignment is normal.  The joint spaces are preserved    C2-3: There is left facet joint arthropathy with minimal uncovertebral spurring and mild disc bulge.  There is mild left foraminal stenosis, unchanged.    C3-4: There is a shallow broad central disc protrusion.  There is mild facet joint arthropathy.  There is borderline to mild spinal stenosis without foraminal stenosis.  There is no change.    C4-5: There is left facet joint arthropathy and mild uncovertebral spurring.  There is a minimal disc bulge.  There is  no spinal stenosis.  There is mild-to-moderate left foraminal stenosis without change.    C5-6: There are postsurgical changes of previous ACDF.  Fusion hardware is intact.  There is left greater than right uncovertebral spurring.  There is no significant spinal stenosis.  There is stable mild left foraminal stenosis.    C6-7: There are changes of prior ACDF.  There is right greater than left uncovertebral spurring and posterior osteophyte formation.  There is no spinal stenosis.  There is moderate-to-marked right and moderate left foraminal stenosis without significant change.    C7-T1: There is mild facet joint arthropathy.  There is no spinal canal or significant foraminal stenosis.    Soft tissues, other: The prevertebral soft tissues are normal.  The airway is patent.  Shotty lymph nodes in the neck are nonspecific.                                   Medical Decision Making:   History:   Old Medical Records: I decided to obtain old medical records.  Initial Assessment:   Patient is a 62-year-old man who presents the emergency department complaining of persistent bilateral distal arm and hand numbness since falling 1 day ago.  He was evaluated in the emergency department with negative CT of head and plain films of the spine and right shoulder.  Patient does have objective findings of decreased sensation that did not correspond to a particular nerve root and is more in a glove like distribution in bilateral hands and forearms extending almost the elbows.  CT was performed of the cervical spine did not show any acute fracture or dislocation.  He has multilevel degenerative changes with mild to moderate foraminal stenosis and mild spinal stenosis at different levels.  MRI was obtained and showed normal cord signal intensity without any abnormal epidural collection such as epidural hematoma.  There are no acute findings appreciated. Patient's numbness but seems to have been brought upon by his fall indicating  cervical radiculopathy.  Patient has seen Dr. Qi arriola refer him back for re-evaluation.  He is discharged in no acute distress. Return precautions were discussed.  Clinical Tests:   Radiological Study: Ordered and Reviewed            Scribe Attestation:   Scribe #1: I performed the above scribed service and the documentation accurately describes the services I performed. I attest to the accuracy of the note.    I, Angel Bennett, personally performed the services described in this documentation. All medical record entries made by the scribe were at my direction and in my presence.  I have reviewed the chart and agree that the record reflects my personal performance and is accurate and complete. Mike Ortiz MD.  12:10 PM 04/28/2018             Clinical Impression:   The encounter diagnosis was Arm numbness.    Disposition:   Disposition: Discharged  Condition: Stable                        Mike Ortiz MD  04/28/18 1210

## 2018-04-30 ENCOUNTER — PATIENT OUTREACH (OUTPATIENT)
Dept: OTHER | Facility: OTHER | Age: 63
End: 2018-04-30

## 2018-04-30 NOTE — PROGRESS NOTES
"Last 5 Patient Entered Readings                                      Current 30 Day Average: 118/74     Recent Readings 4/24/2018 4/13/2018 4/8/2018 4/1/2018 3/30/2018    SBP (mmHg) 125 119 120 107 111    DBP (mmHg) 80 81 76 60 71    Pulse 79 64 80 77 78          Digital Medicine: Health  Follow Up    Lifestyle Modifications:    1.Dietary Modifications (Sodium intake <2,000mg/day, food labels, dining out): Deferred.     2.Physical Activity: Deferred. Patient has not been active due to recent fall and concern regarding concussion.     3.Medication Therapy: Patient has been compliant with the medication regimen.    4.Patient has the following medication side effects/concerns: None to report. Patient has been continuing to experience dizziness and light-headedness and is noting increased headaches after a fall.  Patient has visited ED multiple times and has follow up with neurology. Notes he feels good about blood pressure, however is following up as he is "experiencing pressure" in his head. Encouraged to contact us with any questions/concerns prior to next digital medicine follow up.  Endorses taking occasional naproxen to assist with decreasing headache. States headaches occur primarily with standing and movement, and that his daughter has been having him rest since being in the ED last week.     Follow up with . Mane Ramires completed. No further questions or concerns. Will continue follow up to achieve health goals. BP is at goal.     "

## 2018-05-01 ENCOUNTER — PATIENT MESSAGE (OUTPATIENT)
Dept: FAMILY MEDICINE | Facility: CLINIC | Age: 63
End: 2018-05-01

## 2018-05-02 ENCOUNTER — OFFICE VISIT (OUTPATIENT)
Dept: PHYSICAL MEDICINE AND REHAB | Facility: CLINIC | Age: 63
End: 2018-05-02
Payer: MEDICAID

## 2018-05-02 ENCOUNTER — PATIENT MESSAGE (OUTPATIENT)
Dept: PHYSICAL MEDICINE AND REHAB | Facility: CLINIC | Age: 63
End: 2018-05-02

## 2018-05-02 ENCOUNTER — PATIENT MESSAGE (OUTPATIENT)
Dept: FAMILY MEDICINE | Facility: CLINIC | Age: 63
End: 2018-05-02

## 2018-05-02 VITALS
BODY MASS INDEX: 27.66 KG/M2 | DIASTOLIC BLOOD PRESSURE: 83 MMHG | SYSTOLIC BLOOD PRESSURE: 127 MMHG | HEART RATE: 80 BPM | HEIGHT: 64 IN | WEIGHT: 162 LBS

## 2018-05-02 DIAGNOSIS — S06.0X1A CONCUSSION WITH BRIEF LOC: Primary | ICD-10-CM

## 2018-05-02 DIAGNOSIS — M54.2 CERVICALGIA: ICD-10-CM

## 2018-05-02 PROCEDURE — 99999 PR PBB SHADOW E&M-EST. PATIENT-LVL III: CPT | Mod: PBBFAC,,, | Performed by: PHYSICAL MEDICINE & REHABILITATION

## 2018-05-02 PROCEDURE — 99213 OFFICE O/P EST LOW 20 MIN: CPT | Mod: PBBFAC,PN | Performed by: PHYSICAL MEDICINE & REHABILITATION

## 2018-05-02 PROCEDURE — 99214 OFFICE O/P EST MOD 30 MIN: CPT | Mod: S$PBB,,, | Performed by: PHYSICAL MEDICINE & REHABILITATION

## 2018-05-02 RX ORDER — HYDROXYZINE HYDROCHLORIDE 50 MG/1
50 TABLET, FILM COATED ORAL NIGHTLY PRN
Qty: 90 TABLET | Refills: 0 | Status: SHIPPED | OUTPATIENT
Start: 2018-05-02 | End: 2018-05-24 | Stop reason: ALTCHOICE

## 2018-05-02 RX ORDER — CYCLOBENZAPRINE HCL 10 MG
10 TABLET ORAL NIGHTLY
Qty: 30 TABLET | Refills: 0 | Status: SHIPPED | OUTPATIENT
Start: 2018-05-02 | End: 2018-05-29 | Stop reason: SDUPTHER

## 2018-05-02 RX ORDER — TOPIRAMATE 25 MG/1
25 TABLET ORAL 2 TIMES DAILY
Qty: 60 TABLET | Refills: 0 | Status: SHIPPED | OUTPATIENT
Start: 2018-05-02 | End: 2018-05-24 | Stop reason: ALTCHOICE

## 2018-05-02 NOTE — PROGRESS NOTES
[facility] VISIT    CONSULTING PROVIDER: Dr. Mike Ortiz    CHIEF COMPLAINT: Closed head injury with possible concussion.     History of Present Illness: Mane is a 62 y.o. male who presents to me for the first time for evaluation of a closed head injury and possible concussion that occurred on 4/26/2018 during a fall.    Mane suffered a fall last week in his home.  He was standing on the edge of a couch trying to adjust some lighting on a ceiling fan when he lost his balance falling backwards striking his shoulder, neck, and apparently head on the ground.  He thinks there was loss of consciousness as there is an approximately 20 minute period where he cannot account for.  The fall was not witnessed.  That night he had trouble falling asleep and he developed numbness and tingling in both hands.  He remembers that he cannot figure out where he was initially.  His symptoms persisted the next day he was transported to the emergency department where CT of the head was negative.  The following day he felt even worse return to the ER where MRI of the brain and cervical spine failed to show any acute processes.  Since last week he has been suffering with mental fogginess, dizziness, and neck pain.  He also has persistent tingling in both hands.  He has been taking Tylenol without much relief.  He reports reduced sleep with increased headaches over the past 1 week.  His family also feels that he is more slow both mentally and physically since the incident.  His headache is fairly constant.  He has not been driving since the incident.  He also reports trouble remembering.    Total number of hours slept last night estimated at 6.    Concussion History: Mane does have a history of having had a prior concussion.  This occurred after a 14 foot fall from a roof in 2003.  Mane has no history of ever having chronic headaches or migraines, epilepsy/seizures, brain surgery, meningitis,  substance/alcohol abuse, ADD/ADHD, sleep disorder/disruption at baseline.  He doesn't a previous history of anxiety, depression, dyslexia, and possible autism spectrum disorder.    Past Medical History:   Past Medical History:   Diagnosis Date    Accident     fell from roof with TBI (word finding issues personality changes, memory deficets), R rib fractures, clavicle fx    Allergy     Anxiety     ASD (atrial septal defect)     noted on ECHO 6/16    Cervical stenosis of spine     noted on 6/15 MRI    CTS (carpal tunnel syndrome)     mild-mod on 4/17 NCS    DDD (degenerative disc disease), cervical 10/2014    C5-6 and C6-C7    Depression     Fatigue     medication induced    JEEVAN (generalized anxiety disorder)     Gender identity disorder     H/O cardiovascular stress test     12/14 normal    Headache     medication induced    Herpes simplex type 2 infection     History of atypical hyperplasia of breast     B precancer lesions    Hypertension     IGT (impaired glucose tolerance)     Myelomalacia     c-spine noted on 8/15 MRI    OA (osteoarthritis)     Prediabetes     Psychiatric problem     TBI (traumatic brain injury)     after falling off a roof in 2003       Family History:   Family History   Problem Relation Age of Onset    Diabetes Mother     Heart disease Mother     Gallbladder disease Mother     Coronary artery disease Father     Breast cancer Sister     Heart disease Sister     Lung cancer Paternal Grandfather     Heart disease Paternal Grandfather     Heart disease Maternal Grandmother     Gallbladder disease Maternal Grandmother     Heart attack Brother     Gallbladder disease Daughter     Colon cancer Neg Hx     Colon polyps Neg Hx     Crohn's disease Neg Hx     Ulcerative colitis Neg Hx     Stomach cancer Neg Hx     Esophageal cancer Neg Hx        Medications:   Current Outpatient Prescriptions on File Prior to Visit   Medication Sig Dispense Refill    diclofenac  sodium 1 % Gel Apply topically 4 (four) times daily. Apply 2 G topically 4 times per day 1 Tube 3    DULoxetine (CYMBALTA) 30 MG capsule Take 1 capsule (30 mg total) by mouth once daily. 30 capsule 2    hydrOXYzine (ATARAX) 50 MG tablet Take 1 tablet (50 mg total) by mouth nightly as needed (sleep). 90 tablet 0    JUBLIA 10 % Usha APPLY 1 DROP TO ALL SMALLER TOES AND 2 DROPS TO GREATER TOES DAILY  10    levocetirizine (XYZAL) 5 MG tablet TAKE 1 TABLET (5 MG TOTAL) BY MOUTH NIGHTLY AS NEEDED FOR ALLERGIES. 30 tablet 9    lisinopril (PRINIVIL,ZESTRIL) 20 MG tablet Take 1 tablet (20 mg total) by mouth once daily. 90 tablet 3    metoprolol succinate (TOPROL-XL) 25 MG 24 hr tablet TAKE 1 TABLET (25 MG TOTAL) BY MOUTH ONCE DAILY. 90 tablet 2    testosterone cypionate (DEPOTESTOTERONE CYPIONATE) 200 mg/mL injection INJECT 1ML INTO THE MUSCLE EVERY 14 DAYS 2 mL 5     No current facility-administered medications on file prior to visit.        Allergies:   Review of patient's allergies indicates:   Allergen Reactions    Sudafed [pseudoephedrine hcl] Other (See Comments)     Heart racing      Codeine Other (See Comments)     Heart racing    Cortisone Palpitations       Social History:   Social History     Social History    Marital status: Single     Spouse name: N/A    Number of children: N/A    Years of education: N/A     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    Smoking status: Former Smoker     Packs/day: 0.25     Years: 2.00     Types: Cigarettes     Quit date: 11/1/2002    Smokeless tobacco: Never Used    Alcohol use Yes      Comment: rare     Drug use: No    Sexual activity: Yes     Partners: Female     Other Topics Concern    None     Social History Narrative    None     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    Review of Systems   Constitutional: Negative for fever.   HENT: Negative for drooling.    Eyes: Negative for discharge.  "  Respiratory: Negative for choking.    Cardiovascular: Negative for chest pain.   Genitourinary: Negative for flank pain.   Skin: Negative for wound.   Allergic/Immunologic: Negative for immunocompromised state.   Neurological: Negative for tremors and syncope.   Psychiatric/Behavioral: Negative for behavioral problems.     PHYSICAL EXAM:   VS:   Vitals:    05/02/18 1637   BP: 127/83   Pulse: 80   Weight: 73.5 kg (162 lb)   Height: 5' 4" (1.626 m)     GENERAL: The patient is awake, alert, cooperative, comfortable appearing and in no acute distress.     PULMONARY/CHEST: Effort normal. No respiratory distress.     ABDOMINAL: There is no guarding.     PSYCHIATRIC: Behavior is normal.     HEENT: Normocephalic, atraumatic. Pupils are equal, round and reactive to light and accommodation with extraocular motion intact bilaterally, but patient noted some discomfort with accomodation. There was no nystagmus when tracking rapid medial/lateral movements. No facial asymmetry. No c/o of HA with EOM testing.    NECK: Supple. No lymphadenopathy. No masses.  Limited range of motion c/o neck discomfort. No tenderness to palpation over the posterior spinous processes of the cervical spine. + TTP at cervical paraspinals. Negative Spurling maneuver to either side.     NEUROMUSCULAR: Cranial nerves II-XII grossly intact bilaterally. Speech clear and coherent. Normal tone throughout both upper and lower extremities. No diplopia. Visual fields intact in all four quadrants. Has 5/5 strength throughout both upper and lower extremities. Sensation reduced to light touch over the C6, C7, and T1 dermatomes on the right in comparison to the left. No missed endpoints with finger-to-nose testing bilaterally, but there was some slowing. Rapid alternating movements, heel-to-shin, and fine motor coordination were somewhat slowed as well. No clonus was elicited at either ankle. Muscle stretch reflexes 2+ throughout both upper and lower extremities. " Negative Al's bilaterally.  Negative Romberg.    Assessment:   1. Concussion with brief LOC    2. Cervicalgia      Plan:    1. A significant amount of time was spent reviewing the pathophysiology of concussions and varying course of symptom resolution based upon each individual's specific injury.    2. The cornerstone of acute concussion management being activity restrictions emphasizing both physical and cognitive rest until there is full resolution of concussion-related symptoms was reviewed as well. This includes restrictions of cognitive stressors such as watching television, movies, using the telephone, texting, computer usage, reading, etc. I explained the recommendation is to limit these activities to 30 minutes or less at a time with equal time breaks in between. Exacerbation of any concussion-related symptoms with these activities should prompt immediate discontinuation.    3. The importance of Mane to attain at least 8 hours of sustained sleep each night to promote brain healing and taking daytime naps when tired in the acute stage of brain healing was reviewed.    4.  He has a primary complaint of neck pain since the incident.  We discussed this is likely secondary to cervical spine strain/whiplash type injury.  He does have the previous history of cervical fusion in the past.  We do have an MRI post injury which was negative for any cord compression, however did show some chronic degenerative changes and neuroforaminal stenosis.  His reflexes are intact and symmetric throughout the upper extremities today.  There are also no signs of an upper motor neuron lesion on today's physical exam.  We discussed starting physical therapy for his cervical spine, however he wishes to wait on this at the present time.  I did prescribe Flexeril 10 mg to take daily at bedtime.    5.  I recommended he follow-up with his mental health provider considering his previous history of depression and anxiety.    6.  I  recommended against driving for least the next 2 weeks.    7.  His headaches been recalcitrant to Tylenol.  I prescribed Topamax 25 mg to take twice a day to help with this symptom.    8. I recommended proper hydration and removal of caffeine from the diet in the short term (neurostimulant, diuretic).    9. At this point, Mane will be placed on the aforementioned activity restrictions emphasizing both physical and cognitive rest until our next visit. Return to clinic in 2-3 weeks time in followup. I have given them my contact information. They can contact my office with any questions or concerns they may have as they arise in the interim.     Total time spent with the patient was 45 minutes with >50% spent in educating and counseling the patient and his family.     The above note was completed, in part, with the aid of Dragon dictation software/hardware. Translation errors may be present.

## 2018-05-02 NOTE — LETTER
May 2, 2018      Mike Ortiz MD  [address] [place] [address]  [place] [address]           [facility]  [address] [place] [address] [address]  [place] [address]  Phone: [phone]  Fax: [phone]          Patient: Mane Ramires   MR Number: [id number]   YOB: 1955   Date of Visit: 5/2/2018       Dear Dr. Mike Ortiz:    Thank you for referring Mane Ramires to me for evaluation. Attached you will find relevant portions of my assessment and plan of care.    If you have questions, please do not hesitate to call me. I look forward to following Mane Ramires along with you.    Sincerely,    Harry Velazquez MD    Enclosure  CC:  No Recipients    If you would like to receive this communication electronically, please contact [email or web address] or [phone] to request more information on [facility] access.    For providers and/or their staff who would like to refer a patient to [facility], please contact us through our one-stop-shop provider referral line, [facility], at [phone].    If you feel you have received this communication in error or would no longer like to receive these types of communications, please e-mail [email or web address]

## 2018-05-03 ENCOUNTER — PATIENT MESSAGE (OUTPATIENT)
Dept: PHYSICAL MEDICINE AND REHAB | Facility: CLINIC | Age: 63
End: 2018-05-03

## 2018-05-03 ENCOUNTER — TELEPHONE (OUTPATIENT)
Dept: FAMILY MEDICINE | Facility: CLINIC | Age: 63
End: 2018-05-03

## 2018-05-04 RX ORDER — TRAZODONE HYDROCHLORIDE 50 MG/1
50 TABLET ORAL NIGHTLY
Qty: 30 TABLET | Refills: 1 | Status: SHIPPED | OUTPATIENT
Start: 2018-05-04 | End: 2018-06-26 | Stop reason: SDUPTHER

## 2018-05-04 NOTE — TELEPHONE ENCOUNTER
Pt called, reports he has not tried trazodone. pt is agreeable to trying medication.   Allergies reviewed phamacy confirmed [facility]

## 2018-05-07 NOTE — TELEPHONE ENCOUNTER
Advised [facility] that the trazodone is the replacement for the hydr oxazine. Verbalized understanding and reports will discard Rx for hydroxyzine.

## 2018-05-07 NOTE — TELEPHONE ENCOUNTER
----- Message from Natividad Hinojosa sent at 5/7/2018 10:31 AM CDT -----  Insurance will not cover Rx Hydroxyzine 50 mg.  Please call [facility]/[phone]

## 2018-05-08 ENCOUNTER — OFFICE VISIT (OUTPATIENT)
Dept: FAMILY MEDICINE | Facility: CLINIC | Age: 63
End: 2018-05-08
Payer: MEDICAID

## 2018-05-08 VITALS
BODY MASS INDEX: 26.67 KG/M2 | HEIGHT: 65 IN | DIASTOLIC BLOOD PRESSURE: 70 MMHG | RESPIRATION RATE: 16 BRPM | SYSTOLIC BLOOD PRESSURE: 100 MMHG | HEART RATE: 76 BPM | TEMPERATURE: 98 F | WEIGHT: 160.06 LBS

## 2018-05-08 DIAGNOSIS — R20.2 PARESTHESIA AND PAIN OF BOTH UPPER EXTREMITIES: ICD-10-CM

## 2018-05-08 DIAGNOSIS — G89.29 CHRONIC NONINTRACTABLE HEADACHE, UNSPECIFIED HEADACHE TYPE: ICD-10-CM

## 2018-05-08 DIAGNOSIS — M25.511 ACUTE PAIN OF RIGHT SHOULDER: ICD-10-CM

## 2018-05-08 DIAGNOSIS — R51.9 CHRONIC NONINTRACTABLE HEADACHE, UNSPECIFIED HEADACHE TYPE: ICD-10-CM

## 2018-05-08 DIAGNOSIS — M79.601 PARESTHESIA AND PAIN OF BOTH UPPER EXTREMITIES: ICD-10-CM

## 2018-05-08 DIAGNOSIS — M79.602 PARESTHESIA AND PAIN OF BOTH UPPER EXTREMITIES: ICD-10-CM

## 2018-05-08 DIAGNOSIS — S06.0X1D CONCUSSION WITH LOSS OF CONSCIOUSNESS OF 30 MINUTES OR LESS, SUBSEQUENT ENCOUNTER: Primary | ICD-10-CM

## 2018-05-08 PROCEDURE — 99214 OFFICE O/P EST MOD 30 MIN: CPT | Mod: S$GLB,,, | Performed by: FAMILY MEDICINE

## 2018-05-10 NOTE — PROGRESS NOTES
"Subjective:       Patient ID: Mane Ramires is a 62 y.o. male.    Chief Complaint: Follow-up (er follow up)    HPI   The patient is a 62-year-old who is here today to follow-up from recent ER visit for a concussion.  He suffered a concussion 2 weeks ago after falling off the sofa as he was trying to reach up to adjust the ceiling fan.  When he fell, he fell on the right side of his body hitting the back of his right shoulder and the side of his head.  After the fall, he estimates that he had a 15 minute episode of loss of consciousness.  He had 2 trips to the emergency room with imaging.  He is now seeing PMR for his persistent symptoms.  He is currently living with his daughter.  Since the fall, he has noticed difficulty with speaking noting that he is speaking slowly and that his words don't come easily.  Since the fall, he occasionally finds that things go black when he stands up.  Since the fall, he notes that his memory is "messed up".  Since the fall, he is less talkative.  Since the fall, he tires easily.  Since the fall, he is bothered by headaches much more frequently than he had been previously.  Since the fall, he notices that he has been having worse tingling in his arms and neck.    Regarding his headaches, his headaches had initially been constant but now are more infrequent.  On May 4, he was started on Topamax by the PMR physician and he finds that this is helpful.  He still has headaches daily but they're not constant and they are triggered by stress.    Regarding the pain in his arms and neck, this is more significant than what he had been experiencing prior to the fall.  Prior to the fall, he was having pain and numbness in the arm and neck triggered by position.  Since the fall, the pain and numbness in his arm and neck is present constantly and is more intense than what he was experiencing before the fall.  He sometimes wakes up with the pain in his neck and arms during the night.  He " "describes feeling as if he wakes up in the morning to "a box of wood" on his arms feeling as if his hands aren't even there.  He has noticed diminished strength in his left arm.  Immediately after the accident, he was having some intermittent numbness in his legs but that has improved.  He is taking Flexeril at night which does help    He does note that he is having persistent pain in his right shoulder since the fall.  He has not previously had problems with his       Review of Systems   Constitutional: Negative for appetite change, chills, diaphoresis, fatigue, fever and unexpected weight change.   HENT: Negative for congestion, ear pain, postnasal drip, rhinorrhea, sinus pressure, sneezing, sore throat and trouble swallowing.    Eyes: Negative for pain, discharge and visual disturbance.   Respiratory: Negative for cough, chest tightness, shortness of breath and wheezing.    Cardiovascular: Negative for chest pain, palpitations and leg swelling.   Gastrointestinal: Negative for abdominal distention, abdominal pain, blood in stool, constipation, diarrhea, nausea and vomiting.   Musculoskeletal:        Per HPI   Skin: Negative for rash.   Neurological: Positive for speech difficulty, numbness and headaches.       Objective:      Physical Exam   Constitutional: He is oriented to person, place, and time. He appears well-developed and well-nourished. No distress.   He is slow to respond to questions.   HENT:   Head: Normocephalic and atraumatic.   Right Ear: Hearing, tympanic membrane, external ear and ear canal normal.   Left Ear: Hearing, tympanic membrane, external ear and ear canal normal.   Nose: Nose normal.   Mouth/Throat: Oropharynx is clear and moist and mucous membranes are normal. No oral lesions. No oropharyngeal exudate, posterior oropharyngeal edema or posterior oropharyngeal erythema.   Eyes: Conjunctivae, EOM and lids are normal. Pupils are equal, round, and reactive to light. No scleral icterus. "   Neck: Normal range of motion. Neck supple. Carotid bruit is not present. No thyroid mass and no thyromegaly present.   Cardiovascular: Normal rate, regular rhythm and normal heart sounds.   No extrasystoles are present. PMI is not displaced.  Exam reveals no gallop.    No murmur heard.  Pulmonary/Chest: Effort normal and breath sounds normal. No accessory muscle usage. No respiratory distress.   Clear to auscultation bilaterally.   Abdominal: Soft. Normal appearance and bowel sounds are normal. He exhibits no abdominal bruit. There is no hepatosplenomegaly. There is no tenderness. There is no rebound.   Musculoskeletal:   Right upper extremity is neurovascularly intact.  He is tender to palpation of his shoulder.  He has limited range of motion due to pain.  He has pain on testing the rotator cuff muscle groups   Lymphadenopathy:        Head (right side): No submental and no submandibular adenopathy present.        Head (left side): No submental and no submandibular adenopathy present.        Right cervical: No superficial cervical, no deep cervical and no posterior cervical adenopathy present.       Left cervical: No superficial cervical, no deep cervical and no posterior cervical adenopathy present.        Right: No supraclavicular adenopathy present.        Left: No supraclavicular adenopathy present.   Neurological: He is alert and oriented to person, place, and time. He has normal strength. No cranial nerve deficit or sensory deficit.   Reflex Scores:       Tricep reflexes are 2+ on the right side and 2+ on the left side.       Bicep reflexes are 2+ on the right side and 2+ on the left side.       Brachioradialis reflexes are 2+ on the right side and 2+ on the left side.       Patellar reflexes are 2+ on the right side and 2+ on the left side.       Achilles reflexes are 2+ on the right side and 2+ on the left side.  Skin: Skin is warm, dry and intact.   Psychiatric: He has a normal mood and affect.     Blood  "pressure 100/70, pulse 76, temperature 98 °F (36.7 °C), temperature source Oral, resp. rate 16, height 5' 4.5" (1.638 m), weight 72.6 kg (160 lb 0.9 oz).Body mass index is 27.05 kg/m².        A/P:  1)  concussion with LOC with prior history of TBI.  New to me.  He will continue to follow with PMR as planned.  He will continue with the Topamax which can be adjusted if needed.  2)  persistent headaches following concussion.  New to me.  Improving.  Continue with postconcussive care and Topamax.  Follow-up with PMR as planned  3)  worsening neck pain with paresthesias after the fall with history of spinal and foraminal cervical stenosis.  We did review his MRI done in the ER after the fall.  He will follow up with PMR and should follow-up with his neurosurgeon as well  4)  persistent right shoulder pain following the fall.  New to me.  We will refer him to the orthopedist for further evaluation  "

## 2018-05-11 ENCOUNTER — PATIENT MESSAGE (OUTPATIENT)
Dept: PHYSICAL MEDICINE AND REHAB | Facility: CLINIC | Age: 63
End: 2018-05-11

## 2018-05-14 NOTE — TELEPHONE ENCOUNTER
Spoke with patient by phone advised to stop the topamax and to try OTC nsaids or tylenol. Patient states he has an appt tomorrow with a liver specialist. In light of that I advised that he avoid the tylenol and try the nsaids if there is not another issue that would preclude him form taking them. He said he would like to try the nsaids for a couple of days and if that does not work he would like to see the headache specialist.

## 2018-05-16 ENCOUNTER — OFFICE VISIT (OUTPATIENT)
Dept: PHYSICAL MEDICINE AND REHAB | Facility: CLINIC | Age: 63
End: 2018-05-16
Payer: MEDICAID

## 2018-05-16 VITALS
WEIGHT: 160.06 LBS | BODY MASS INDEX: 26.67 KG/M2 | HEIGHT: 65 IN | HEART RATE: 93 BPM | DIASTOLIC BLOOD PRESSURE: 66 MMHG | SYSTOLIC BLOOD PRESSURE: 97 MMHG

## 2018-05-16 DIAGNOSIS — S06.0X1A CONCUSSION WITH BRIEF LOC: Primary | ICD-10-CM

## 2018-05-16 PROCEDURE — 99999 PR PBB SHADOW E&M-EST. PATIENT-LVL III: CPT | Mod: PBBFAC,,, | Performed by: PHYSICAL MEDICINE & REHABILITATION

## 2018-05-16 PROCEDURE — 99213 OFFICE O/P EST LOW 20 MIN: CPT | Mod: S$PBB,,, | Performed by: PHYSICAL MEDICINE & REHABILITATION

## 2018-05-16 PROCEDURE — 99213 OFFICE O/P EST LOW 20 MIN: CPT | Mod: PBBFAC,PN | Performed by: PHYSICAL MEDICINE & REHABILITATION

## 2018-05-16 RX ORDER — AMANTADINE HYDROCHLORIDE 100 MG/1
100 CAPSULE, GELATIN COATED ORAL 2 TIMES DAILY
Qty: 60 CAPSULE | Refills: 0 | Status: SHIPPED | OUTPATIENT
Start: 2018-05-16 | End: 2018-06-08 | Stop reason: SDUPTHER

## 2018-05-16 NOTE — PROGRESS NOTES
[facility]    CHIEF COMPLAINT: Closed head injury with concussion.     HISTORY OF PRESENT ILLNESS: Mane is a 62 y.o. male who presents to me in follow-up for a concussion that  occurred on 4/26/2018. Mane was last seen by myself for this concussion on 5/2/2018.     Mane's physical exam was significant for discomfort with accomodation, and slowing with finger to nose testing.     Mane was placed on relative activity restrictions emphasizing both physical and cognitive rest.     Since our last visit, Mane reports some mild improvement.  He notes overall his headaches have somewhat decreased.  At his last visit we did place him on Topamax, however he developed a metallic taste in his mouth and felt that he had reduced cognition and balance.  We did instruct him to discontinue that medicine earlier this week which she has done.  He feels that he is improved since being off the medicine.  He still suffering with persistent dizziness and increased fatigue.  He does have some anxiety as well but denies any depression.  He has continued with persistent neck pain.  He has been taking the Flexeril daily at bedtime which does help him fall asleep.  He did do some light driving for short distances.  He has been taking over-the-counter NSAIDs for his headaches which do help temporarily.    Total number of hours slept last night estimated at 7.    Concussion History: See original clinic visit note.    Past Medical History:   Past Medical History:   Diagnosis Date    Accident     fell from roof with TBI (word finding issues personality changes, memory deficets), R rib fractures, clavicle fx    Allergy     Anxiety     ASD (atrial septal defect)     noted on ECHO 6/16    Cervical stenosis of spine     noted on 6/15 MRI    CTS (carpal tunnel syndrome)     mild-mod on 4/17 NCS    DDD (degenerative disc disease), cervical 10/2014    C5-6 and C6-C7    Depression     Fatigue     medication induced     JEEVAN (generalized anxiety disorder)     Gender identity disorder     H/O cardiovascular stress test     12/14 normal    Headache     medication induced    Herpes simplex type 2 infection     History of atypical hyperplasia of breast     B precancer lesions    Hypertension     IGT (impaired glucose tolerance)     Myelomalacia     c-spine noted on 8/15 MRI    OA (osteoarthritis)     Prediabetes     Psychiatric problem     TBI (traumatic brain injury)     after falling off a roof in 2003     Past Surgical History:   Past Surgical History:   Procedure Laterality Date    AMPUTATION      L index finger    APPENDECTOMY  1969    BILATERAL SALPINGOOPHORECTOMY  02/2015    CARPAL TUNNEL RELEASE  12/2017    right    CERVICAL FUSION  10/2014    C5-C6 and C6-C7    CHOLECYSTECTOMY      FINGER AMPUTATION Left     FRACTURE SURGERY Right 2003    rib fractures    HYSTERECTOMY  1984    MIGUEL    MASTECTOMY  02/2015    UPPER GASTROINTESTINAL ENDOSCOPY  09/06/2017    Dr. Ayon       Family History:   Family History   Problem Relation Age of Onset    Diabetes Mother     Heart disease Mother     Gallbladder disease Mother     Coronary artery disease Father     Breast cancer Sister     Heart disease Sister     Lung cancer Paternal Grandfather     Heart disease Paternal Grandfather     Heart disease Maternal Grandmother     Gallbladder disease Maternal Grandmother     Heart attack Brother     Gallbladder disease Daughter     Colon cancer Neg Hx     Colon polyps Neg Hx     Crohn's disease Neg Hx     Ulcerative colitis Neg Hx     Stomach cancer Neg Hx     Esophageal cancer Neg Hx        Medications:   Current Outpatient Prescriptions on File Prior to Visit   Medication Sig Dispense Refill    cyclobenzaprine (FLEXERIL) 10 MG tablet Take 1 tablet (10 mg total) by mouth every evening. 30 tablet 0    diclofenac sodium 1 % Gel Apply topically 4 (four) times daily. Apply 2 G topically 4 times per day 1  Tube 3    DULoxetine (CYMBALTA) 30 MG capsule Take 1 capsule (30 mg total) by mouth once daily. 30 capsule 2    JUBLIA 10 % Usha APPLY 1 DROP TO ALL SMALLER TOES AND 2 DROPS TO GREATER TOES DAILY  10    levocetirizine (XYZAL) 5 MG tablet TAKE 1 TABLET (5 MG TOTAL) BY MOUTH NIGHTLY AS NEEDED FOR ALLERGIES. 30 tablet 9    lisinopril (PRINIVIL,ZESTRIL) 20 MG tablet Take 1 tablet (20 mg total) by mouth once daily. 90 tablet 3    metoprolol succinate (TOPROL-XL) 25 MG 24 hr tablet TAKE 1 TABLET (25 MG TOTAL) BY MOUTH ONCE DAILY. 90 tablet 2    ranitidine (ZANTAC) 300 MG tablet TAKE 1 TABLET (300 MG TOTAL) BY MOUTH EVERY EVENING.  2    ranitidine (ZANTAC) 300 MG tablet TAKE 1 TABLET (300 MG TOTAL) BY MOUTH EVERY EVENING. 30 tablet 2    testosterone cypionate (DEPOTESTOTERONE CYPIONATE) 200 mg/mL injection INJECT 1ML INTO THE MUSCLE EVERY 14 DAYS 2 mL 5    traZODone (DESYREL) 50 MG tablet Take 1 tablet (50 mg total) by mouth every evening. 30 tablet 1    hydrOXYzine (ATARAX) 50 MG tablet Take 1 tablet (50 mg total) by mouth nightly as needed (sleep). 90 tablet 0    topiramate (TOPAMAX) 25 MG tablet Take 1 tablet (25 mg total) by mouth 2 (two) times daily. 60 tablet 0     No current facility-administered medications on file prior to visit.        Allergies:   Review of patient's allergies indicates:   Allergen Reactions    Sudafed [pseudoephedrine hcl] Other (See Comments)     Heart racing      Codeine Other (See Comments)     Heart racing    Cortisone Palpitations       Social History:   Social History     Social History    Marital status: Single     Spouse name: N/A    Number of children: N/A    Years of education: N/A     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    Smoking status: Former Smoker     Packs/day: 0.25     Years: 2.00     Types: Cigarettes     Quit date: 11/1/2002    Smokeless tobacco: Never Used    Alcohol use Yes      Comment: rare     Drug use: No    Sexual  "activity: Yes     Partners: Female     Other Topics Concern    Not on file     Social History Narrative    No narrative on file     Review of Systems   Constitutional: Negative for fever.   HENT: Negative for drooling.    Eyes: Negative for discharge.   Respiratory: Negative for choking.    Cardiovascular: Negative for chest pain.   Genitourinary: Negative for flank pain.   Skin: Negative for wound.   Allergic/Immunologic: Negative for immunocompromised state.   Neurological: Negative for tremors and syncope.   Psychiatric/Behavioral: Negative for behavioral problems.     PHYSICAL EXAM:   VS:   Vitals:    05/16/18 1403   BP: 97/66   Pulse: 93   Weight: 72.6 kg (160 lb 0.9 oz)   Height: 5' 4.5" (1.638 m)     GENERAL: The patient is awake, alert, cooperative, comfortable appearing and in no acute distress.     PULMONARY/CHEST: Effort normal. No respiratory distress.     ABDOMINAL: There is no guarding.     PSYCHIATRIC: Behavior is normal.     HEENT: Normocephalic, atraumatic. Pupils are equal, round and reactive to light and accommodation with extraocular motion intact bilaterally, but patient noted some discomfort with accomodation. There was no nystagmus when tracking rapid medial/lateral movements. No facial asymmetry. No c/o of HA with EOM testing.     NECK: Supple. No lymphadenopathy. No masses.  Limited range of motion c/o neck discomfort. No tenderness to palpation over the posterior spinous processes of the cervical spine. + TTP at cervical paraspinals. Negative Spurling maneuver to either side.      NEUROMUSCULAR: Cranial nerves II-XII grossly intact bilaterally. Speech clear and coherent. Normal tone throughout both upper and lower extremities. No diplopia. Visual fields intact in all four quadrants. Has 5/5 strength throughout both upper and lower extremities. Sensation reduced to light touch over the C6, C7, and T1 dermatomes on the right in comparison to the left. No missed endpoints with finger-to-nose " testing bilaterally, but there was some slowing. Rapid alternating movements, heel-to-shin, and fine motor coordination were somewhat slowed as well. No clonus was elicited at either ankle. Muscle stretch reflexes 2+ throughout both upper and lower extremities. Negative Al's bilaterally.  Negative Romberg.    ASSESSMENT:   1. Concussion with brief LOC      PLAN:     1. Mane is displaying some mild improvement, however is experiencing several persistent concussion-related symptoms.  He does report some visual issues and he has an appointment set to see an optometrist which I have encouraged him to keep this appointment.  I agree that stopping the Topamax was warranted secondary to apparent side effects.  We discussed that her options are limited in terms of other typical postconcussion headache medicine secondary to potential reactions considering his other medicines.  We discussed referring him to a headache specialist, however he wishes to continue to use NSAIDs and monitor for any improvement over the next week.  We discussed starting amantadine to help with his fatigue and focus.  He does wish to try this and I prescribed amantadine 100 mg take twice a day.  I encouraged him to avoid driving for longer distances or unfamiliar roads.  He may continue Flexeril 10 mg daily at bedtime and I encouraged him to follow-up with Dr. Paulson tomorrow in regards to his neck pain.  I also encouraged him to follow-up with his mental health provider regarding his previous anxiety and depression.    2. It was emphasized that the return of any post-concussion related symptoms should prompt immediate discontinuation of the activity. Potential risks of returning to athletics or other dynamic activities prior to complete brain healing from concussion was reviewed including increased risk of repeat concussion, prolongation/delay in resolution of concussion-related symptoms, increased risk for potential long-term consequences such  as development of postconcussion syndrome.    3. Return to clinic in 3-4 weeks.. They have my contact information. They may contact my office with any questions or concerns they may have as they arise in the interim.     Total time spent with pt was 35 minutes with > 50% of time spent in counseling.    The above note was completed, in part, with the aid of Dragon dictation software/hardware. Translation errors may be present.

## 2018-05-24 ENCOUNTER — OFFICE VISIT (OUTPATIENT)
Dept: FAMILY MEDICINE | Facility: CLINIC | Age: 63
End: 2018-05-24
Payer: MEDICAID

## 2018-05-24 ENCOUNTER — OFFICE VISIT (OUTPATIENT)
Dept: NEUROSURGERY | Facility: CLINIC | Age: 63
End: 2018-05-24
Payer: MEDICAID

## 2018-05-24 VITALS
HEIGHT: 64 IN | DIASTOLIC BLOOD PRESSURE: 80 MMHG | BODY MASS INDEX: 27.28 KG/M2 | HEART RATE: 84 BPM | SYSTOLIC BLOOD PRESSURE: 115 MMHG | WEIGHT: 159.81 LBS

## 2018-05-24 VITALS
RESPIRATION RATE: 18 BRPM | BODY MASS INDEX: 26.55 KG/M2 | DIASTOLIC BLOOD PRESSURE: 60 MMHG | SYSTOLIC BLOOD PRESSURE: 126 MMHG | HEART RATE: 88 BPM | TEMPERATURE: 98 F | WEIGHT: 159.38 LBS | HEIGHT: 65 IN

## 2018-05-24 DIAGNOSIS — R27.0 ATAXIA: ICD-10-CM

## 2018-05-24 DIAGNOSIS — R51.9 NONINTRACTABLE HEADACHE, UNSPECIFIED CHRONICITY PATTERN, UNSPECIFIED HEADACHE TYPE: Primary | ICD-10-CM

## 2018-05-24 DIAGNOSIS — R26.81 GAIT INSTABILITY: ICD-10-CM

## 2018-05-24 DIAGNOSIS — E34.8 CYST OF PINEAL GLAND: ICD-10-CM

## 2018-05-24 DIAGNOSIS — F07.81 POST CONCUSSIVE SYNDROME: Primary | ICD-10-CM

## 2018-05-24 DIAGNOSIS — S06.0X9S CONCUSSION WITH LOSS OF CONSCIOUSNESS, SEQUELA: ICD-10-CM

## 2018-05-24 PROCEDURE — 99999 PR PBB SHADOW E&M-EST. PATIENT-LVL IV: CPT | Mod: PBBFAC,,, | Performed by: PHYSICIAN ASSISTANT

## 2018-05-24 PROCEDURE — 99214 OFFICE O/P EST MOD 30 MIN: CPT | Mod: S$GLB,,, | Performed by: FAMILY MEDICINE

## 2018-05-24 PROCEDURE — 99214 OFFICE O/P EST MOD 30 MIN: CPT | Mod: PBBFAC,PN | Performed by: PHYSICIAN ASSISTANT

## 2018-05-24 PROCEDURE — 99214 OFFICE O/P EST MOD 30 MIN: CPT | Mod: S$PBB,,, | Performed by: PHYSICIAN ASSISTANT

## 2018-05-24 RX ORDER — AMITRIPTYLINE HYDROCHLORIDE 10 MG/1
10 TABLET, FILM COATED ORAL NIGHTLY PRN
Qty: 30 TABLET | Refills: 1 | Status: SHIPPED | OUTPATIENT
Start: 2018-05-24 | End: 2018-07-10 | Stop reason: SDUPTHER

## 2018-05-24 RX ORDER — DULOXETIN HYDROCHLORIDE 60 MG/1
60 CAPSULE, DELAYED RELEASE ORAL DAILY
Qty: 30 CAPSULE | Refills: 1 | Status: SHIPPED | OUTPATIENT
Start: 2018-05-24 | End: 2018-07-17 | Stop reason: SDUPTHER

## 2018-05-24 RX ORDER — IBUPROFEN 800 MG/1
800 TABLET ORAL 3 TIMES DAILY PRN
Qty: 30 TABLET | Refills: 1 | Status: SHIPPED | OUTPATIENT
Start: 2018-05-24 | End: 2018-12-11

## 2018-05-24 NOTE — Clinical Note
Do you see anything in his imaging that would cause severe ataxia, confusion? He fell and has been diagnosed with post concussive syndrome, but that was 4 weeks ago. I'm not sure if it is real or an act

## 2018-05-25 NOTE — PROGRESS NOTES
Neurosurgery History & Physical    Patient ID: Mane Ramires is a 62 y.o. male.    Chief Complaint   Patient presents with    Cervical Spine Pain (C-spine)     pt states he fell on 4/26/18 causing a concussion; pt has headaches, caused from being stressed or laying down and getting up; back of neck and right shoulder hurt causing numbness and tingling to BUE and fingers but mainly in the right; pupils different sizes; vision issues- double vision and blurry vision lisbet in right eye; ringing in right ear; offered PT but has not started yet       Review of Systems   Constitutional: Positive for activity change and fatigue. Negative for chills and unexpected weight change.   HENT: Negative for hearing loss, tinnitus, trouble swallowing and voice change.    Eyes: Negative for visual disturbance.   Respiratory: Negative for apnea, chest tightness and shortness of breath.    Cardiovascular: Negative for chest pain and palpitations.   Gastrointestinal: Negative for abdominal pain, constipation, diarrhea, nausea and vomiting.   Genitourinary: Negative for difficulty urinating, dysuria and frequency.   Musculoskeletal: Positive for back pain, gait problem and neck pain. Negative for neck stiffness.   Skin: Negative for wound.   Neurological: Positive for dizziness, numbness and headaches. Negative for tremors, seizures, facial asymmetry, speech difficulty, weakness and light-headedness.   Psychiatric/Behavioral: Positive for behavioral problems, confusion, decreased concentration, dysphoric mood and sleep disturbance. The patient is nervous/anxious.        Past Medical History:   Diagnosis Date    Accident     fell from roof with TBI (word finding issues personality changes, memory deficets), R rib fractures, clavicle fx    Allergy     Anxiety     ASD (atrial septal defect)     noted on ECHO 6/16    Cervical stenosis of spine     noted on 6/15 MRI    CTS (carpal tunnel syndrome)     mild-mod on 4/17 NCS    DDD  (degenerative disc disease), cervical 10/2014    C5-6 and C6-C7    Depression     Fatigue     medication induced    JEEVAN (generalized anxiety disorder)     Gender identity disorder     H/O cardiovascular stress test     12/14 normal    Headache     medication induced    Herpes simplex type 2 infection     History of atypical hyperplasia of breast     B precancer lesions    Hypertension     IGT (impaired glucose tolerance)     Myelomalacia     c-spine noted on 8/15 MRI    OA (osteoarthritis)     Prediabetes     Psychiatric problem     TBI (traumatic brain injury)     after falling off a roof in 2003     Social History     Social History    Marital status: Single     Spouse name: N/A    Number of children: N/A    Years of education: N/A     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    Smoking status: Former Smoker     Packs/day: 0.25     Years: 2.00     Types: Cigarettes     Quit date: 11/1/2002    Smokeless tobacco: Never Used    Alcohol use Yes      Comment: rare     Drug use: No    Sexual activity: Yes     Partners: Female     Other Topics Concern    Not on file     Social History Narrative    No narrative on file     Family History   Problem Relation Age of Onset    Diabetes Mother     Heart disease Mother     Gallbladder disease Mother     Coronary artery disease Father     Breast cancer Sister     Heart disease Sister     Lung cancer Paternal Grandfather     Heart disease Paternal Grandfather     Heart disease Maternal Grandmother     Gallbladder disease Maternal Grandmother     Heart attack Brother     Gallbladder disease Daughter     Colon cancer Neg Hx     Colon polyps Neg Hx     Crohn's disease Neg Hx     Ulcerative colitis Neg Hx     Stomach cancer Neg Hx     Esophageal cancer Neg Hx      Review of patient's allergies indicates:   Allergen Reactions    Sudafed [pseudoephedrine hcl] Other (See Comments)     Heart racing      Codeine Other (See  "Comments)     Heart racing    Hydrocodone Other (See Comments)     insomnia    Cortisone Palpitations       Current Outpatient Prescriptions:     amantadine HCl (SYMMETREL) 100 mg capsule, Take 1 capsule (100 mg total) by mouth 2 (two) times daily., Disp: 60 capsule, Rfl: 0    amitriptyline (ELAVIL) 10 MG tablet, Take 1 tablet (10 mg total) by mouth nightly as needed for Insomnia., Disp: 30 tablet, Rfl: 1    cyclobenzaprine (FLEXERIL) 10 MG tablet, Take 1 tablet (10 mg total) by mouth every evening., Disp: 30 tablet, Rfl: 0    diclofenac sodium 1 % Gel, Apply topically 4 (four) times daily. Apply 2 G topically 4 times per day, Disp: 1 Tube, Rfl: 3    DULoxetine (CYMBALTA) 60 MG capsule, Take 1 capsule (60 mg total) by mouth once daily., Disp: 30 capsule, Rfl: 1    ibuprofen (ADVIL,MOTRIN) 800 MG tablet, Take 1 tablet (800 mg total) by mouth 3 (three) times daily as needed for Pain., Disp: 30 tablet, Rfl: 1    JUBLIA 10 % Usha, APPLY 1 DROP TO ALL SMALLER TOES AND 2 DROPS TO GREATER TOES DAILY, Disp: , Rfl: 10    levocetirizine (XYZAL) 5 MG tablet, TAKE 1 TABLET (5 MG TOTAL) BY MOUTH NIGHTLY AS NEEDED FOR ALLERGIES., Disp: 30 tablet, Rfl: 9    lisinopril (PRINIVIL,ZESTRIL) 20 MG tablet, Take 1 tablet (20 mg total) by mouth once daily., Disp: 90 tablet, Rfl: 3    metoprolol succinate (TOPROL-XL) 25 MG 24 hr tablet, TAKE 1 TABLET (25 MG TOTAL) BY MOUTH ONCE DAILY., Disp: 90 tablet, Rfl: 2    ranitidine (ZANTAC) 300 MG tablet, TAKE 1 TABLET (300 MG TOTAL) BY MOUTH EVERY EVENING., Disp: , Rfl: 2    testosterone cypionate (DEPOTESTOTERONE CYPIONATE) 200 mg/mL injection, INJECT 1ML INTO THE MUSCLE EVERY 14 DAYS, Disp: 2 mL, Rfl: 5    traZODone (DESYREL) 50 MG tablet, Take 1 tablet (50 mg total) by mouth every evening., Disp: 30 tablet, Rfl: 1    Vitals:    05/24/18 1332   BP: 115/80   Pulse: 84   Weight: 72.5 kg (159 lb 13.3 oz)   Height: 5' 4" (1.626 m)       Physical Exam   Constitutional: He is oriented " to person, place, and time. He appears well-developed and well-nourished.   HENT:   Head: Normocephalic and atraumatic.   Eyes: Pupils are equal, round, and reactive to light.   Neck: Normal range of motion. Neck supple.   Cardiovascular: Normal rate.    Pulmonary/Chest: Effort normal.   Abdominal: He exhibits no distension.   Musculoskeletal: Normal range of motion. He exhibits no edema.   Neurological: He is alert and oriented to person, place, and time. He has an abnormal Romberg Test and an abnormal Tandem Gait Test. He has a normal Finger-Nose-Finger Test and a normal Heel to Shin Test.   Reflex Scores:       Tricep reflexes are 2+ on the right side and 2+ on the left side.       Bicep reflexes are 2+ on the right side and 2+ on the left side.       Brachioradialis reflexes are 2+ on the right side and 2+ on the left side.       Patellar reflexes are 2+ on the right side and 2+ on the left side.       Achilles reflexes are 2+ on the right side and 2+ on the left side.  Skin: Skin is warm and dry.   Psychiatric: He has a normal mood and affect. His speech is normal and behavior is normal. Judgment and thought content normal.   Nursing note and vitals reviewed.      Neurologic Exam     Mental Status   Oriented to person, place, and time.   Oriented to person.   Oriented to place.   Oriented to time.   Follows 3 step commands.   Attention: normal. Concentration: normal.   Speech: speech is normal   Level of consciousness: alert  Knowledge: consistent with education.   Able to name object. Able to read. Able to repeat. Able to write. Normal comprehension.     Cranial Nerves     CN II   Visual acuity: normal  Right visual field deficit: none  Left visual field deficit: none     CN III, IV, VI   Pupils are equal, round, and reactive to light.  Right pupil: Size: 3 mm. Shape: regular. Reactivity: brisk. Consensual response: intact.   Left pupil: Size: 3 mm. Shape: regular. Reactivity: brisk. Consensual response:  intact.   CN III: no CN III palsy  CN VI: no CN VI palsy  Nystagmus: none   Diplopia: none  Ophthalmoparesis: none  Conjugate gaze: present    CN V   Right facial sensation deficit: none  Left facial sensation deficit: none    CN VII   Right facial weakness: none  Left facial weakness: none    CN VIII   Hearing: intact    CN IX, X   CN IX normal.   CN X normal.     CN XI   Right sternocleidomastoid strength: normal  Left sternocleidomastoid strength: normal  Right trapezius strength: normal  Left trapezius strength: normal    CN XII   Fasciculations: absent  Tongue deviation: none    Motor Exam   Muscle bulk: normal  Overall muscle tone: normal  Right arm pronator drift: absent  Left arm pronator drift: absent    Strength   Right neck flexion: 5/5  Left neck flexion: 5/5  Right neck extension: 5/5  Left neck extension: 5/5  Right deltoid: 5/5  Left deltoid: 5/5  Right biceps: 5/5  Left biceps: 5/5  Right triceps: 5/5  Left triceps: 5/5  Right wrist flexion: 5/5  Left wrist flexion: 5/5  Right wrist extension: 5/5  Left wrist extension: 5/5  Right interossei: 5/5  Left interossei: 5/5  Right abdominals: 5/5  Left abdominals: 5/5  Right iliopsoas: 5/5  Left iliopsoas: 5/5  Right quadriceps: 5/5  Left quadriceps: 5/5  Right hamstrin/5  Left hamstrin/5  Right glutei: 5/5  Left glutei: 5/5  Right anterior tibial: 5/5  Left anterior tibial: 5/5  Right posterior tibial: 5/5  Left posterior tibial: 5/5  Right peroneal: 5/5  Left peroneal: 5/5  Right gastroc: 5/5  Left gastroc: 5/5    Sensory Exam   Right arm light touch: normal  Left arm light touch: normal  Right leg light touch: normal  Left leg light touch: normal  Right arm vibration: normal  Left arm vibration: normal  Right arm pinprick: normal  Left arm pinprick: normal    Gait, Coordination, and Reflexes     Gait  Gait: non-neurologic    Coordination   Romberg: positive  Finger to nose coordination: normal  Heel to shin coordination: normal  Tandem walking  coordination: abnormal    Tremor   Resting tremor: absent  Intention tremor: absent  Action tremor: absent    Reflexes   Right brachioradialis: 2+  Left brachioradialis: 2+  Right biceps: 2+  Left biceps: 2+  Right triceps: 2+  Left triceps: 2+  Right patellar: 2+  Left patellar: 2+  Right achilles: 2+  Left achilles: 2+  Right Al: absent  Left Al: absent  Right ankle clonus: absent  Left ankle clonus: absent      Provider dictation:  The patient is a 62-year-old right-hand dominant male known patient to the clinic after undergoing bilateral carpal tunnel releases presents with a new problem of neck pain status post fall.  He fell at the end of April and continues to have headaches and ataxia confusion.  He occasionally reports intermittent right eye pain and vision changes associated with his headache.  The headache moves from right posterior occipital to right frontal.  Initially he had neck pain as well as arm numbness but that has resolved with doing occupational therapy.  He has been seen by Dr. Taveras for postconcussive syndrome.  His wife is with him today and reports that his stumbling and difficulty with words and confusion continues despite approximately a month post fall.  He does report that he hit the back of his head.    His Oswestry score is 72% PHQ is 14     On exam, he has significant ataxia and a positive Romberg.  He is very slow with finger to nose testing although there is no dysmetria.  He has difficulty with sentences and has to stop and look to his wife to help answer questions.  Strength is intact except for slight right iliopsoas weakness.  He requires significant assistance onto the exam table.    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  There are no acute changes or hemorrhages.  CT of the cervical spine as well as the brain independently reviewed shows no evidence of acute complication.  MRI  of the cervical spine shows no evidence of myelopathy and chronic degenerative changes.    I had a long conversation with Mr. Ramires as well as with his wife that he needs to continue postconcussive care.  I discussed the case with Dr. Busby who has recommended a repeat MRI in 6-9 months to ensure the pineal cyst has not increased in size although it is calcified and chronic in nature.  He will follow up will with us on a when necessary basis we will call him with the results of his six-month MRI.  If headaches do not resolve I have recommended him to see neurology with headache specialist.        Visit Diagnosis:  Post concussive syndrome    Cyst of pineal gland    Ataxia

## 2018-05-27 NOTE — PROGRESS NOTES
"Subjective:       Patient ID: Mane Ramires is a 62 y.o. male.    Chief Complaint: Hypertension (2 month follow up Cymbalta)    HPI   The patient is a 62-year-old who is here today to follow-up on multiple issues including a recent concussion.    Today we discussed the followin)  concussion.  He continues to follow with PMR for his recent concussion.  He is making slow progress and is frustrated with how slow his recovery is.  He is frustrated that his mental and physical abilities have slowed and decreased.  He continues to have trouble with speech and word finding.  He has trouble with his memory.  He continues to have pounding headaches on the right side of his head several times throughout the day.  He notices that stress or certain movements (particularly bending over) will trigger his headaches.  When he has his headaches, he notices that he has blurred and double vision in his right eye.  His gait is unsteady and he frequently stumbles.  He has balance issues and occasionally feels as if he's spinning.  Sometimes feels as if he is going to pass out.  He tires easier than he did before his concussion.  He is more anxious since his concussion.  He has had sleep issues since his concussion.  He is up at night with headaches.  He was taking Topamax but was having "crazy dreams" and a metallic taste with the Topamax and so this medicine was discontinued.  He occasionally will use Motrin for a particularly bad headache but is using this less than 3 times a week.  He was recently started on amantadine by the PMR physician and he does note this has "cleared his head"  2)  neck pain with numbness and tingling in both upper extremities.  He had neck pain and numbness and tingling in his upper extremities prior to the fall.  After the fall his symptoms worsened.  His symptoms are better than they were after the fall but not back to where his symptoms were before the fall.  He is going to be meeting with the " neurosurgeon soon for follow-up  3)  persistent shoulder pain.  He continues to have shoulder pain which has been present since the accident.  He has not yet met with the orthopedist as he has had trouble finding one that takes his insurance.  4)  depression and anxiety.  He does find that the Cymbalta has helped him.  He is interested in increasing the dose further      Review of Systems   Constitutional: Negative for appetite change, chills, diaphoresis, fatigue, fever and unexpected weight change.   HENT: Negative for congestion, ear pain, postnasal drip, rhinorrhea, sinus pressure, sneezing, sore throat and trouble swallowing.    Eyes: Positive for visual disturbance (per HPI). Negative for pain and discharge.   Respiratory: Negative for cough, chest tightness, shortness of breath and wheezing.    Cardiovascular: Negative for chest pain, palpitations and leg swelling.   Gastrointestinal: Negative for abdominal distention, abdominal pain, blood in stool, constipation, diarrhea, nausea and vomiting.   Musculoskeletal:        Per HPI   Skin: Negative for rash.   Neurological: Positive for speech difficulty, weakness, numbness and headaches.   Psychiatric/Behavioral: Positive for confusion, dysphoric mood and sleep disturbance. Negative for self-injury and suicidal ideas. The patient is nervous/anxious.        Objective:      Physical Exam   Constitutional: He is oriented to person, place, and time. He appears well-developed and well-nourished. No distress.   HENT:   Head: Normocephalic and atraumatic.   Right Ear: Hearing, tympanic membrane, external ear and ear canal normal.   Left Ear: Hearing, tympanic membrane, external ear and ear canal normal.   Nose: Nose normal.   Mouth/Throat: Oropharynx is clear and moist and mucous membranes are normal. No oral lesions. No oropharyngeal exudate, posterior oropharyngeal edema or posterior oropharyngeal erythema.   Eyes: Conjunctivae, EOM and lids are normal. Pupils are  "equal, round, and reactive to light. No scleral icterus.   Neck: Normal range of motion. Neck supple. Carotid bruit is not present. No thyroid mass and no thyromegaly present.   Cardiovascular: Normal rate, regular rhythm and normal heart sounds.   No extrasystoles are present. PMI is not displaced.  Exam reveals no gallop.    No murmur heard.  Pulmonary/Chest: Effort normal and breath sounds normal. No accessory muscle usage. No respiratory distress.   Clear to auscultation bilaterally.   Abdominal: Soft. Normal appearance and bowel sounds are normal. He exhibits no abdominal bruit. There is no hepatosplenomegaly. There is no tenderness. There is no rebound.   Musculoskeletal:   Gait is unstable   Lymphadenopathy:        Head (right side): No submental and no submandibular adenopathy present.        Head (left side): No submental and no submandibular adenopathy present.        Right cervical: No superficial cervical, no deep cervical and no posterior cervical adenopathy present.       Left cervical: No superficial cervical, no deep cervical and no posterior cervical adenopathy present.        Right: No supraclavicular adenopathy present.        Left: No supraclavicular adenopathy present.   Neurological: He is alert and oriented to person, place, and time.   Skin: Skin is warm, dry and intact.   Psychiatric: He has a normal mood and affect. His speech is normal and behavior is normal. Cognition and memory are impaired.   He is slow to respond to questions     Blood pressure 126/60, pulse 88, temperature 98.4 °F (36.9 °C), temperature source Oral, resp. rate 18, height 5' 4.8" (1.646 m), weight 72.3 kg (159 lb 6.4 oz).Body mass index is 26.69 kg/m².        A/P:  1)  postconcussive syndrome.  Persistent.  He will continue to follow with PMR as planned.  He will continue with the amantadine.  We did discuss trying Elavil for his headaches which she is interested in doing.  We did discuss potential interactions with " the Elavil and Cymbalta and will monitor for any symptoms of serotonin syndrome.  We will start Elavil 10 mg at night (to be started 1 week after the increased dose of his Cymbalta).  We can adjust the Elavil further if needed.  We will refer him to a headache specialist for further evaluation.  We will refer him to physical therapy for gait training and balance issues.  He was given a temporary handicap parking form  2)  acute on chronic neck pain and bilateral upper extremity paresthesias.  Persistent.  Follow-up with neurosurgery as planned  3)  shoulder pain.  Persistent.  They will see the orthopedist as soon as they can find one that takes his insurance.  He was given the Medicaid escalation line for assistance in finding the orthopedist   4)  depression and anxiety.  Improved but still suboptimally controlled.  We will increase the Cymbalta to 60 mg once a day.  We will reassess his response this medication 6 weeks    I will see him back in 6 weeks or sooner if needed

## 2018-05-29 RX ORDER — CYCLOBENZAPRINE HCL 10 MG
10 TABLET ORAL NIGHTLY
Qty: 30 TABLET | Refills: 0 | Status: SHIPPED | OUTPATIENT
Start: 2018-05-29 | End: 2018-06-28

## 2018-06-05 ENCOUNTER — TELEPHONE (OUTPATIENT)
Dept: NEUROLOGY | Facility: CLINIC | Age: 63
End: 2018-06-05

## 2018-06-05 DIAGNOSIS — R40.20 LOC (LOSS OF CONSCIOUSNESS): ICD-10-CM

## 2018-06-05 DIAGNOSIS — R51.9 NONINTRACTABLE HEADACHE, UNSPECIFIED CHRONICITY PATTERN, UNSPECIFIED HEADACHE TYPE: Primary | ICD-10-CM

## 2018-06-05 NOTE — TELEPHONE ENCOUNTER
Returned call and spoke with patient. Appointment scheduled for 7/10 at 10 am patient verbalized understanding.

## 2018-06-05 NOTE — TELEPHONE ENCOUNTER
----- Message from Tonia Menon LPN sent at 6/5/2018 10:24 AM CDT -----  Hey! I'm having trouble scheduling again.. Can you please set this patient up for an appointment with Dr. Mg?

## 2018-06-08 RX ORDER — AMANTADINE HYDROCHLORIDE 100 MG/1
CAPSULE, GELATIN COATED ORAL
Qty: 60 CAPSULE | Refills: 0 | Status: SHIPPED | OUTPATIENT
Start: 2018-06-08 | End: 2018-08-10

## 2018-06-09 RX ORDER — DULOXETIN HYDROCHLORIDE 30 MG/1
30 CAPSULE, DELAYED RELEASE ORAL DAILY
Qty: 30 CAPSULE | Refills: 2 | OUTPATIENT
Start: 2018-06-09 | End: 2019-06-09

## 2018-06-11 RX ORDER — AMANTADINE HYDROCHLORIDE 100 MG/1
CAPSULE, GELATIN COATED ORAL
Qty: 60 CAPSULE | Refills: 0 | Status: SHIPPED | OUTPATIENT
Start: 2018-06-11 | End: 2018-06-26 | Stop reason: SDUPTHER

## 2018-06-13 ENCOUNTER — OFFICE VISIT (OUTPATIENT)
Dept: PHYSICAL MEDICINE AND REHAB | Facility: CLINIC | Age: 63
End: 2018-06-13
Payer: MEDICAID

## 2018-06-13 VITALS
HEART RATE: 83 BPM | DIASTOLIC BLOOD PRESSURE: 64 MMHG | SYSTOLIC BLOOD PRESSURE: 105 MMHG | HEIGHT: 64 IN | BODY MASS INDEX: 27.14 KG/M2 | WEIGHT: 159 LBS

## 2018-06-13 DIAGNOSIS — S06.0X1A CONCUSSION WITH BRIEF LOC: Primary | ICD-10-CM

## 2018-06-13 PROCEDURE — 99999 PR PBB SHADOW E&M-EST. PATIENT-LVL III: CPT | Mod: PBBFAC,,, | Performed by: PHYSICAL MEDICINE & REHABILITATION

## 2018-06-13 PROCEDURE — 99213 OFFICE O/P EST LOW 20 MIN: CPT | Mod: PBBFAC,PN | Performed by: PHYSICAL MEDICINE & REHABILITATION

## 2018-06-13 PROCEDURE — 99213 OFFICE O/P EST LOW 20 MIN: CPT | Mod: S$PBB,,, | Performed by: PHYSICAL MEDICINE & REHABILITATION

## 2018-06-13 NOTE — PROGRESS NOTES
"[facility]    CHIEF COMPLAINT: Closed head injury with concussion.     HISTORY OF PRESENT ILLNESS: Mane is a 62 y.o. male who presents to me in follow-up for a concussion that  occurred on 4/26/2018. Mane was last seen by myself for this concussion on 5/16/2018.     Mane's physical exam at that time continued to be significant for discomfort with accomodation, and slowing with finger to nose testing, fine motor coordination, heel-to-shin, and rapid alternating movements.     Mane was placed on relative activity restrictions emphasizing both physical and cognitive rest. He was prescribed amantadine 100 mg BID, to help with fatigue and focus.     Since our last visit, Mane reports continued improvement.  He notes that the amantadine has "made me clearer". He states that, as far as his focus is concerned, the amantadine has brought him from about 30% of his normal self to about 60%. His headaches have decreased both frequency and intensity. He is having about 5 headaches per week. They are located on the right side of his head mostly in the temporal/parietal region. They typically last about 5 hours. He tries ibuprofen, icepacks to the head, and trazadone (to help him fall asleep when he gets the headache), but none of these work. He is continuing to have blurred vision in his right eye, which is not getting better. It is constantly blurry in his right eye - maybe a little worse first thing in the morning. Still with photophobia, but he can now walk outside without sunglasses, which is an improvement. He feels that his balance is improving, and "I feel steadier now when I walk". He does continue to complain of ringing in both his ears, much worse in the right.    He is still having some issues with sleep. Total number of hours slept last night estimated at 6.5 and this is broken sleep. The longest that he usually sleeps at one time is 4 hours. He wakes approximately 3-4 times per night. " He is usually a heavy (8-12 hours/night) sleeper. He has trazodone, but states that it only occasionally helps him sleep. He has not tried melatonin.    Concussion History: See original clinic visit note.    Past Medical History:   Past Medical History:   Diagnosis Date    Accident     fell from roof with TBI (word finding issues personality changes, memory deficets), R rib fractures, clavicle fx    Allergy     Anxiety     ASD (atrial septal defect)     noted on ECHO 6/16    Cervical stenosis of spine     noted on 6/15 MRI    CTS (carpal tunnel syndrome)     mild-mod on 4/17 NCS    DDD (degenerative disc disease), cervical 10/2014    C5-6 and C6-C7    Depression     Fatigue     medication induced    JEEVAN (generalized anxiety disorder)     Gender identity disorder     H/O cardiovascular stress test     12/14 normal    Headache     medication induced    Herpes simplex type 2 infection     History of atypical hyperplasia of breast     B precancer lesions    Hypertension     IGT (impaired glucose tolerance)     Myelomalacia     c-spine noted on 8/15 MRI    OA (osteoarthritis)     Prediabetes     Psychiatric problem     TBI (traumatic brain injury)     after falling off a roof in 2003     Past Surgical History:   Past Surgical History:   Procedure Laterality Date    AMPUTATION      L index finger    APPENDECTOMY  1969    BILATERAL SALPINGOOPHORECTOMY  02/2015    CARPAL TUNNEL RELEASE  12/2017    right    CERVICAL FUSION  10/2014    C5-C6 and C6-C7    CHOLECYSTECTOMY      FINGER AMPUTATION Left     FRACTURE SURGERY Right 2003    rib fractures    HYSTERECTOMY  1984    MIGUEL    MASTECTOMY  02/2015    UPPER GASTROINTESTINAL ENDOSCOPY  09/06/2017    Dr. Ayon       Family History:   Family History   Problem Relation Age of Onset    Diabetes Mother     Heart disease Mother     Gallbladder disease Mother     Coronary artery disease Father     Breast cancer Sister     Heart disease Sister      Lung cancer Paternal Grandfather     Heart disease Paternal Grandfather     Heart disease Maternal Grandmother     Gallbladder disease Maternal Grandmother     Heart attack Brother     Gallbladder disease Daughter     Colon cancer Neg Hx     Colon polyps Neg Hx     Crohn's disease Neg Hx     Ulcerative colitis Neg Hx     Stomach cancer Neg Hx     Esophageal cancer Neg Hx        Medications:   Current Outpatient Prescriptions on File Prior to Visit   Medication Sig Dispense Refill    amantadine HCl (SYMMETREL) 100 mg capsule TAKE 1 CAPSULE BY MOUTH TWICE A DAY 60 capsule 0    amantadine HCl (SYMMETREL) 100 mg capsule TAKE 1 CAPSULE BY MOUTH TWICE A DAY 60 capsule 0    amitriptyline (ELAVIL) 10 MG tablet Take 1 tablet (10 mg total) by mouth nightly as needed for Insomnia. 30 tablet 1    cyclobenzaprine (FLEXERIL) 10 MG tablet TAKE 1 TABLET (10 MG TOTAL) BY MOUTH EVERY EVENING. 30 tablet 0    diclofenac sodium 1 % Gel Apply topically 4 (four) times daily. Apply 2 G topically 4 times per day 1 Tube 3    DULoxetine (CYMBALTA) 60 MG capsule Take 1 capsule (60 mg total) by mouth once daily. 30 capsule 1    ibuprofen (ADVIL,MOTRIN) 800 MG tablet Take 1 tablet (800 mg total) by mouth 3 (three) times daily as needed for Pain. 30 tablet 1    JUBLIA 10 % Usha APPLY 1 DROP TO ALL SMALLER TOES AND 2 DROPS TO GREATER TOES DAILY  10    levocetirizine (XYZAL) 5 MG tablet TAKE 1 TABLET (5 MG TOTAL) BY MOUTH NIGHTLY AS NEEDED FOR ALLERGIES. 30 tablet 9    lisinopril (PRINIVIL,ZESTRIL) 20 MG tablet Take 1 tablet (20 mg total) by mouth once daily. 90 tablet 3    metoprolol succinate (TOPROL-XL) 25 MG 24 hr tablet TAKE 1 TABLET (25 MG TOTAL) BY MOUTH ONCE DAILY. 90 tablet 2    ranitidine (ZANTAC) 300 MG tablet TAKE 1 TABLET (300 MG TOTAL) BY MOUTH EVERY EVENING.  2    testosterone cypionate (DEPOTESTOTERONE CYPIONATE) 200 mg/mL injection INJECT 1ML INTO THE MUSCLE EVERY 14 DAYS 2 mL 5    traZODone (DESYREL)  "50 MG tablet Take 1 tablet (50 mg total) by mouth every evening. 30 tablet 1     No current facility-administered medications on file prior to visit.        Allergies:   Review of patient's allergies indicates:   Allergen Reactions    Sudafed [pseudoephedrine hcl] Other (See Comments)     Heart racing      Codeine Other (See Comments)     Heart racing    Cortisone Palpitations       Social History:   Social History     Social History    Marital status: Single     Spouse name: N/A    Number of children: N/A    Years of education: N/A     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    Smoking status: Former Smoker     Packs/day: 0.25     Years: 2.00     Types: Cigarettes     Quit date: 11/1/2002    Smokeless tobacco: Never Used    Alcohol use Yes      Comment: rare     Drug use: No    Sexual activity: Yes     Partners: Female     Other Topics Concern    Not on file     Social History Narrative    No narrative on file     Review of Systems   Constitutional: Negative for fever.   HENT: Negative for drooling.    Eyes: Negative for discharge.   Respiratory: Negative for choking.    Cardiovascular: Negative for chest pain.   Genitourinary: Negative for flank pain.   Skin: Negative for wound.   Allergic/Immunologic: Negative for immunocompromised state.   Neurological: Negative for tremors and syncope.   Psychiatric/Behavioral: Negative for behavioral problems.     PHYSICAL EXAM:   VS:/64   Pulse 83   Ht 5' 4" (1.626 m)   Wt 72.1 kg (159 lb)   BMI 27.29 kg/m²     GENERAL: The patient is awake, alert, cooperative, comfortable appearing and in no acute distress.     PULMONARY/CHEST: Effort normal. No respiratory distress.     ABDOMINAL: There is no guarding.     PSYCHIATRIC: Behavior is normal.     HEENT: Normocephalic, atraumatic. Pupils are equal, round and reactive to light and accommodation with extraocular motion intact bilaterally, but patient noted discomfort with " accomodation. There was no nystagmus when tracking rapid medial/lateral movements. No facial asymmetry. No c/o of HA with EOM testing.     NECK: Supple. No lymphadenopathy. No masses.  Limited range of motion c/o neck discomfort. No tenderness to palpation over the posterior spinous processes of the cervical spine. + TTP at cervical paraspinals. Negative Spurling maneuver to either side.      NEUROMUSCULAR: Cranial nerves II-XII grossly intact bilaterally. Speech clear and coherent. Normal tone throughout both upper and lower extremities. No diplopia. Visual fields intact in all four quadrants. Has 5/5 strength throughout both upper and lower extremities. No missed endpoints or slowing with finger-to-nose testing bilaterally. No slowing with rapid alternating movements, heel-to-shin, or fine motor coordination. No clonus was elicited at either ankle. Muscle stretch reflexes 2+ throughout both upper and lower extremities. Negative Al's bilaterally.  Negative Romberg. Negative Spurling's.    ASSESSMENT:   Encounter Diagnosis   Name Primary?    Concussion with brief LOC Yes     PLAN:     1. Mane continues to show signs of improvement, especially with regards to his cognition and coordination/fine motor symptoms. The amantadine has helped, quite apparently, with his fatigue and focus.  He can continue the amantadine at 100 mg twice a day for the next 2 weeks then discontinue.    2.  He is still reporting some blurry vision in the right eye, and he has an appointment set to see an optometrist, which I have encouraged him to keep.     3. With regards to his headache, we discussed that his options are limited in terms of other typical postconcussion headache medicine secondary to potential reactions considering his other medicines, and the fact that he failed Topamax 2/2 side effects.  He has seen neurosurgery, and now has an appointment with neurology.    4. He has an appointment with ENT, and was instructed to  keep this appointment, to discuss his continued tinnitus.    5. He was encouraged to start melatonin 5mg QHS for sleep.     6. I also encouraged him to follow-up with his mental health provider regarding his previous anxiety and depression.    7. It was emphasized that the return of any post-concussion related symptoms should prompt immediate discontinuation of the activity. Potential risks of returning to dynamic activities prior to complete brain healing from concussion was reviewed including increased risk of repeat concussion, prolongation/delay in resolution of concussion-related symptoms, increased risk for potential long-term consequences such as development of postconcussion syndrome.    8. Return to clinic as needed. They have my contact information. They may contact my office with any questions or concerns they may have as they arise.     Total time spent with pt was 35 minutes with > 50% of time spent in counseling.    The above note was completed, in part, with the aid of Dragon dictation software/hardware. Translation errors may be present.

## 2018-06-15 ENCOUNTER — PATIENT OUTREACH (OUTPATIENT)
Dept: OTHER | Facility: OTHER | Age: 63
End: 2018-06-15

## 2018-06-15 NOTE — PROGRESS NOTES
Last 5 Patient Entered Readings                                      Current 30 Day Average: 115/69     Recent Readings 6/12/2018 6/1/2018 5/22/2018 5/13/2018 5/1/2018    SBP (mmHg) 105 104 135 110 132    DBP (mmHg) 66 70 72 74 84    Pulse 89 86 80 88 74            Digital Medicine: Health  Follow Up    Left voicemail to follow up with . Mane Dodd Keyla.  Current BP average 115/69 mmHg is at goal, 130/80 mmHg. FU regarding concussion. Will try again in 2 weeks as PharmD Scheduled for outreach next week.

## 2018-06-18 ENCOUNTER — PATIENT OUTREACH (OUTPATIENT)
Dept: OTHER | Facility: OTHER | Age: 63
End: 2018-06-18

## 2018-06-18 NOTE — PROGRESS NOTES
Last 5 Patient Entered Readings                                      Current 30 Day Average: 115/69     Recent Readings 6/12/2018 6/1/2018 5/22/2018 5/13/2018 5/1/2018    SBP (mmHg) 105 104 135 110 132    DBP (mmHg) 66 70 72 74 84    Pulse 89 86 80 88 74        Patient's BP average is controlled based on 2017 ACC/AHA HTN guidelines of goal BP <130/80.      Mr. Ramires is doing well. He had a fall in April that caused a concussion. He was seeing neurology and is doing better now. His BP remains well controlled and he has no questions or concerns related to BP.    Patient denies s/s of hypotension (lightheadedness, dizziness, nausea, fatigue) associated with low readings. Instructed patient to inform me if this occurs, patient confirms understanding.      Patient's health , Kate Mendez, will be following up every 3-4 weeks. I will continue to monitor regularly and will follow up in 4-6 months, sooner if BP begins to trend upward or downward.    Patient has my contact information and knows to call with any concerns or clinical changes.     Current HTN regimen:  Hypertension Medications             lisinopril (PRINIVIL,ZESTRIL) 20 MG tablet Take 1 tablet (20 mg total) by mouth once daily.    metoprolol succinate (TOPROL-XL) 25 MG 24 hr tablet TAKE 1 TABLET (25 MG TOTAL) BY MOUTH ONCE DAILY.

## 2018-06-26 ENCOUNTER — OFFICE VISIT (OUTPATIENT)
Dept: FAMILY MEDICINE | Facility: CLINIC | Age: 63
End: 2018-06-26
Payer: MEDICAID

## 2018-06-26 VITALS
TEMPERATURE: 98 F | DIASTOLIC BLOOD PRESSURE: 70 MMHG | RESPIRATION RATE: 18 BRPM | WEIGHT: 167.31 LBS | SYSTOLIC BLOOD PRESSURE: 114 MMHG | HEIGHT: 64 IN | HEART RATE: 76 BPM | BODY MASS INDEX: 28.56 KG/M2

## 2018-06-26 DIAGNOSIS — R51.9 CHRONIC NONINTRACTABLE HEADACHE, UNSPECIFIED HEADACHE TYPE: ICD-10-CM

## 2018-06-26 DIAGNOSIS — G89.29 CHRONIC NONINTRACTABLE HEADACHE, UNSPECIFIED HEADACHE TYPE: ICD-10-CM

## 2018-06-26 DIAGNOSIS — J34.89 CONCHA BULLOSA: ICD-10-CM

## 2018-06-26 DIAGNOSIS — F07.81 POSTCONCUSSION SYNDROME: Primary | ICD-10-CM

## 2018-06-26 DIAGNOSIS — J34.89 NASAL SEPTAL DEFECT: ICD-10-CM

## 2018-06-26 PROCEDURE — 99214 OFFICE O/P EST MOD 30 MIN: CPT | Mod: S$GLB,,, | Performed by: FAMILY MEDICINE

## 2018-06-26 RX ORDER — TRAZODONE HYDROCHLORIDE 50 MG/1
50 TABLET ORAL NIGHTLY
Qty: 30 TABLET | Refills: 2 | Status: SHIPPED | OUTPATIENT
Start: 2018-06-26 | End: 2018-09-18 | Stop reason: SDUPTHER

## 2018-07-01 NOTE — PROGRESS NOTES
"Subjective:       Patient ID: Mane Ramires is a 62 y.o. male.    Chief Complaint: Headache (4 week follow up ) and Devated septum    HPI   The patient is a 62-year-old who is here today for follow-up.  Today we discussed the followin)   Concussion.  He is doing much better.  He is getting headaches less frequently now usually twice a week when he is stressed.  When he gets his headaches, he usually will take ibuprofen 800 mg and then sleep them off.  He still has some light sensitivity.  He is having some issues with occasional blurriness in his right eye and does have an appointment with an eye doctor soon to address this further.  He is still having some issues with word finding but this is better than it was just not as good as it was before this last concussion.  He is steadier" than he was and is not having significant dizziness.  He still has an occasional fog in his thinking but it is not as bad as it was and his thinking is clearer now except for when he is really tired.  He has been released from the PMR physician.  He is completing 2 weeks of the amantadine and than will stop it.  He is currently taking Elavil 10 mg once a day  2)  nasal septal deviation and adelita bullosa.  On the MRI done with his neurosurgeon, he was found to have a left-sided nasal deviation and right adelita bullosa.  He is having trouble breathing from his nose.  He would like to meet with an ENT to discuss interventions  3) depression and anxiety.  He is doing well with the higher dose of the Cymbalta.  With the Cymbalta, he is feeling emotionally and physically better.  He feels this is a good dose of medicine for him    Review of Systems   Constitutional: Negative for appetite change, chills, diaphoresis, fatigue, fever and unexpected weight change.   HENT: Negative for congestion, ear pain, postnasal drip, rhinorrhea, sinus pressure, sneezing, sore throat and trouble swallowing.    Eyes: Positive for visual " disturbance. Negative for pain and discharge.   Respiratory: Negative for cough, chest tightness, shortness of breath and wheezing.    Cardiovascular: Negative for chest pain, palpitations and leg swelling.   Gastrointestinal: Negative for abdominal distention, abdominal pain, blood in stool, constipation, diarrhea, nausea and vomiting.   Skin: Negative for rash.   Neurological: Positive for light-headedness and headaches.   Psychiatric/Behavioral: Positive for decreased concentration. Negative for dysphoric mood, self-injury, sleep disturbance and suicidal ideas. The patient is not nervous/anxious.        Objective:      Physical Exam   Constitutional: He is oriented to person, place, and time. He appears well-developed and well-nourished. No distress.   HENT:   Head: Normocephalic and atraumatic.   Right Ear: Hearing, tympanic membrane, external ear and ear canal normal.   Left Ear: Hearing, tympanic membrane, external ear and ear canal normal.   Nose: Nose normal.   Mouth/Throat: Oropharynx is clear and moist and mucous membranes are normal. No oral lesions. No oropharyngeal exudate, posterior oropharyngeal edema or posterior oropharyngeal erythema.   Eyes: Conjunctivae, EOM and lids are normal. Pupils are equal, round, and reactive to light. No scleral icterus.   Neck: Normal range of motion. Neck supple. Carotid bruit is not present. No thyroid mass and no thyromegaly present.   Cardiovascular: Normal rate, regular rhythm and normal heart sounds.   No extrasystoles are present. PMI is not displaced.  Exam reveals no gallop.    No murmur heard.  Pulmonary/Chest: Effort normal and breath sounds normal. No accessory muscle usage. No respiratory distress.   Clear to auscultation bilaterally.   Abdominal: Soft. Normal appearance and bowel sounds are normal. He exhibits no abdominal bruit. There is no hepatosplenomegaly. There is no tenderness. There is no rebound.   Lymphadenopathy:        Head (right side): No  "submental and no submandibular adenopathy present.        Head (left side): No submental and no submandibular adenopathy present.        Right cervical: No superficial cervical, no deep cervical and no posterior cervical adenopathy present.       Left cervical: No superficial cervical, no deep cervical and no posterior cervical adenopathy present.        Right: No supraclavicular adenopathy present.        Left: No supraclavicular adenopathy present.   Neurological: He is alert and oriented to person, place, and time.   Skin: Skin is warm, dry and intact.   Psychiatric: He has a normal mood and affect.     Blood pressure 114/70, pulse 76, temperature 98.2 °F (36.8 °C), temperature source Oral, resp. rate 18, height 5' 4" (1.626 m), weight 75.9 kg (167 lb 5.3 oz).Body mass index is 28.72 kg/m².            A/P:  1) post concussive syndrome.  Improving.  He will complete the amantadine as directed.  Once this is completed, if he is continuing to do well, we will consider stopping the Elavil.  He will follow up with the ophthalmologist regarding his vision issues  2) nasal septal deviation and adelita bullosa .  New to me.  We will refer him to ENT for further evaluation   3)  Depression anxiety.  Well controlled.  Continue with Cymbalta.  If he develops any new or worsening symptoms, he will let me know      As long as he does well, I will see him back in three months or sooner if needed  "

## 2018-07-10 ENCOUNTER — OFFICE VISIT (OUTPATIENT)
Dept: NEUROLOGY | Facility: CLINIC | Age: 63
End: 2018-07-10
Payer: MEDICAID

## 2018-07-10 VITALS
BODY MASS INDEX: 28.68 KG/M2 | DIASTOLIC BLOOD PRESSURE: 76 MMHG | WEIGHT: 168 LBS | HEART RATE: 83 BPM | SYSTOLIC BLOOD PRESSURE: 108 MMHG | HEIGHT: 64 IN | RESPIRATION RATE: 16 BRPM

## 2018-07-10 DIAGNOSIS — G43.719 INTRACTABLE CHRONIC MIGRAINE WITHOUT AURA AND WITHOUT STATUS MIGRAINOSUS: ICD-10-CM

## 2018-07-10 DIAGNOSIS — S06.9X1S TRAUMATIC BRAIN INJURY, WITH LOSS OF CONSCIOUSNESS OF 30 MINUTES OR LESS, SEQUELA: Primary | ICD-10-CM

## 2018-07-10 DIAGNOSIS — F07.81 POST CONCUSSIVE SYNDROME: ICD-10-CM

## 2018-07-10 PROCEDURE — 99204 OFFICE O/P NEW MOD 45 MIN: CPT | Mod: S$PBB,,, | Performed by: PSYCHIATRY & NEUROLOGY

## 2018-07-10 PROCEDURE — 99213 OFFICE O/P EST LOW 20 MIN: CPT | Mod: PBBFAC,PO | Performed by: PSYCHIATRY & NEUROLOGY

## 2018-07-10 PROCEDURE — 99999 PR PBB SHADOW E&M-EST. PATIENT-LVL III: CPT | Mod: PBBFAC,,, | Performed by: PSYCHIATRY & NEUROLOGY

## 2018-07-10 RX ORDER — AMITRIPTYLINE HYDROCHLORIDE 10 MG/1
10 TABLET, FILM COATED ORAL NIGHTLY
Qty: 30 TABLET | Refills: 11 | Status: SHIPPED | OUTPATIENT
Start: 2018-07-10 | End: 2018-09-26

## 2018-07-10 RX ORDER — DICLOFENAC SODIUM 50 MG/1
TABLET, DELAYED RELEASE ORAL
Qty: 30 TABLET | Refills: 11 | Status: SHIPPED | OUTPATIENT
Start: 2018-07-10 | End: 2019-01-28

## 2018-07-10 NOTE — PATIENT INSTRUCTIONS
TESTING:  -- refer for neuro-psychological testing     PREVENTION (use daily regardless of headache):  -- resume amitriptyline 10mg at night  -- continue duloxetine 60mg in the morning    AS-NEEDED TREATMENT (use total no more than 10 days per month unless otherwise stated):  -- instead of ibuprofen, try Diclofenac 2 tablets up to 2 times per day as needed for headache but no more 2 days per week

## 2018-07-10 NOTE — PROGRESS NOTES
"Date of service: 7/10/2018  Referring provider: Dr. Carlos Busby    Subjective:      Chief complaint: Headache       Patient ID: Mane Ramires is a 62 y.o. gentleman with 2 prior concussions who is presenting for the evaluation of headaches and is a new patient to me.     History of Present Illness    ORIGINAL HEADACHE HISTORY - 7/10/18   Age at onset and course over time: Had first concussion in 2003 after falling off a roof. Does not remember if he had headaches after that one. Then April 26 2018 while adjusting the fan at 3am had fall backwards with concussion maybe 15-20 min LOC - headache, pupillary changes with headaches or after resting, stumbling, confusion, difficulty with words afterwards. Drove himself to ED next day after he saw his right pupil was larger. CT head without trauma. Headache started next day. Vision has been "messed up". Since onset his unsteadiness and confusion have improved but not resolved. He stopped amitriptyline a few weeks ago as it was discontinued by Dr. Velazquez and headaches worsened - more frequent and more intense.   Location: front over right eye going into back of head   Quality:  [x] pressure [] tight [x] throbbing [] sharp [] stabbing   Severity: current 4, range 2 to 10   Duration: hours to days  Frequency: 2-3 days weekly   Headaches awaken at night?: once or twice a week   Alleviated by:  [x] sleep [x] darknes [] massage [] heat [x] ice [x] medication  Exacerbated by:  [] fatigue [x] light [x] noise [] smells [] coughing [x] sneezing  [x] bending over [] ovulation [] menses [] alcohol [] change in weather [x]  stress  Associated with: [x] photophobia [x]  phonophobia [] osmophobia [] blurred vision  [] double vision [] loss of appetite [x] nausea [] vomiting [] dizziness [] vertigo  [x] tinnitus [x] irritability [] sinus pressure [x] problems with concentration   [] neck tightness   Ipsilateral autonomic: [] nasal congestion [] lacrimation [] ptosis " [] injection [] edema [] foreign body sensation [] ear fullness   Sleep habits:  Caffeine intake: 1/2 per day     Current acute treatment:  -- ibuprofen 800mg - 2 days per week; helps to put him to sleep     Current prevention:  -- duloxetine 60mg - tolerating well   -- metoprolol  -- lisinopril   (trazodone)     Previously tried/failed acute treatment:  -- tylenol  -- naproxen    Previously tried/failed preventative treatment:  -- amitriptyline 10mg - helped reduce headaches down to one day per week and less severe   -- topiramate - gave metallic taste in mouth   -- lyrica - insurance did not cover   -- gabapentin - caused memory problems       Review of patient's allergies indicates:   Allergen Reactions    Sudafed [pseudoephedrine hcl] Other (See Comments)     Heart racing      Codeine Other (See Comments)     Heart racing    Cortisone Palpitations     Current Outpatient Prescriptions   Medication Sig Dispense Refill    diclofenac sodium 1 % Gel Apply topically 4 (four) times daily. Apply 2 G topically 4 times per day 1 Tube 3    DULoxetine (CYMBALTA) 60 MG capsule Take 1 capsule (60 mg total) by mouth once daily. 30 capsule 1    ibuprofen (ADVIL,MOTRIN) 800 MG tablet Take 1 tablet (800 mg total) by mouth 3 (three) times daily as needed for Pain. 30 tablet 1    JUBLIA 10 % Usha APPLY 1 DROP TO ALL SMALLER TOES AND 2 DROPS TO GREATER TOES DAILY  10    levocetirizine (XYZAL) 5 MG tablet TAKE 1 TABLET (5 MG TOTAL) BY MOUTH NIGHTLY AS NEEDED FOR ALLERGIES. 30 tablet 9    lisinopril (PRINIVIL,ZESTRIL) 20 MG tablet Take 1 tablet (20 mg total) by mouth once daily. 90 tablet 3    metoprolol succinate (TOPROL-XL) 25 MG 24 hr tablet TAKE 1 TABLET (25 MG TOTAL) BY MOUTH ONCE DAILY. 90 tablet 2    ranitidine (ZANTAC) 300 MG tablet TAKE 1 TABLET (300 MG TOTAL) BY MOUTH EVERY EVENING.  2    testosterone cypionate (DEPOTESTOTERONE CYPIONATE) 200 mg/mL injection INJECT 1ML INTO THE MUSCLE EVERY 14 DAYS 2 mL 5     traZODone (DESYREL) 50 MG tablet TAKE 1 TABLET (50 MG TOTAL) BY MOUTH EVERY EVENING. 30 tablet 2    amantadine HCl (SYMMETREL) 100 mg capsule TAKE 1 CAPSULE BY MOUTH TWICE A DAY 60 capsule 0    amitriptyline (ELAVIL) 10 MG tablet Take 1 tablet (10 mg total) by mouth every evening. 30 tablet 11    diclofenac (VOLTAREN) 50 MG EC tablet 1-2 tab PO PRN pain. No more than 4 tabs per day. 30 tablet 11     No current facility-administered medications for this visit.        Past Medical History  Past Medical History:   Diagnosis Date    Accident     fell from roof with TBI (word finding issues personality changes, memory deficets), R rib fractures, clavicle fx    Allergy     Anxiety     ASD (atrial septal defect)     noted on ECHO 6/16    Cervical stenosis of spine     noted on 6/15 MRI    CTS (carpal tunnel syndrome)     mild-mod on 4/17 NCS    DDD (degenerative disc disease), cervical 10/2014    C5-6 and C6-C7    Depression     Fatigue     medication induced    JEEVAN (generalized anxiety disorder)     Gender identity disorder     H/O cardiovascular stress test     12/14 normal    Headache     medication induced    Herpes simplex type 2 infection     History of atypical hyperplasia of breast     B precancer lesions    Hypertension     IGT (impaired glucose tolerance)     Myelomalacia     c-spine noted on 8/15 MRI    OA (osteoarthritis)     Prediabetes     Psychiatric problem     TBI (traumatic brain injury)     after falling off a roof in 2003       Past Surgical History  Past Surgical History:   Procedure Laterality Date    AMPUTATION      L index finger    APPENDECTOMY  1969    BILATERAL SALPINGOOPHORECTOMY  02/2015    CARPAL TUNNEL RELEASE  12/2017    right    CERVICAL FUSION  10/2014    C5-C6 and C6-C7    CHOLECYSTECTOMY      FINGER AMPUTATION Left     FRACTURE SURGERY Right 2003    rib fractures    HYSTERECTOMY  1984    MIGUEL    MASTECTOMY  02/2015    UPPER GASTROINTESTINAL ENDOSCOPY   09/06/2017    Dr. Ayon       Family History  Family History   Problem Relation Age of Onset    Diabetes Mother     Heart disease Mother     Gallbladder disease Mother     Coronary artery disease Father     Breast cancer Sister     Heart disease Sister     Lung cancer Paternal Grandfather     Heart disease Paternal Grandfather     Heart disease Maternal Grandmother     Gallbladder disease Maternal Grandmother     Heart attack Brother     Gallbladder disease Daughter     Colon cancer Neg Hx     Colon polyps Neg Hx     Crohn's disease Neg Hx     Ulcerative colitis Neg Hx     Stomach cancer Neg Hx     Esophageal cancer Neg Hx        Social History  Social History     Social History    Marital status: Single     Spouse name: N/A    Number of children: N/A    Years of education: N/A     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    Smoking status: Former Smoker     Packs/day: 0.25     Years: 2.00     Types: Cigarettes     Quit date: 11/1/2002    Smokeless tobacco: Never Used    Alcohol use Yes      Comment: rare     Drug use: No    Sexual activity: Yes     Partners: Female     Other Topics Concern    Not on file     Social History Narrative    No narrative on file        Review of Systems  14-point review of systems as follows:   No check isaac indicates NEGATIVE response   Constitutional: [] weight loss, [] change to appetite   Eyes: [x] change in vision, [x] double vision   Ears, nose, mouth, throat: [] frequent nose bleeds, [] ringing in the ears   Respiratory: [] cough, [] wheezing   Cardiovascular: [] chest pain, [] palpitations   Gastrointestinal: [] jaundice, [] nausea/vomiting   Genitourinary: [] incontinence, [] burning with urination   Hematologic/lymphatic: [] easy bruising/bleeding, [] night sweats   Neurological: [x] numbness, [] weakness   Endocrine: [x] fatigue, [] heat/cold intolerance   Allergy/Immunologic: [] fevers, [] chills   Musculoskeletal:  [x] muscle pain, [x] joint pain   Psychiatric: [] thoughts of harming self/others, [] depression   Integumentary: [] rashes, [] sores that do not heal     Objective:        Vitals:    07/10/18 0941   BP: 108/76   Pulse: 83   Resp: 16     Body mass index is 28.84 kg/m².    Constitutional: appears in no acute distress, well-developed, well-nourished     Eyes: normal conjunctiva, PERRLA, optic discs are flat and sharp bilaterally     Ears, nose, mouth, throat: external appearance of ears and nose normal, hearing intact     Cardiovascular: regular rate and rhythm, no murmurs appreciated    Respiratory: unlabored respirations, breath sounds normal bilaterally    Gastrointestinal: no visible abdominal masses, no guarding, no visible hernia    Musculoskeletal: normal tone in all four extremities. No atrophy. No abnormal movements. Strength in all muscles groups of the upper and lower extremities is 5/5. Normal regular gait and station but unsteady tandem gait. Digits and nails normal.      Spine:   CERVICAL SPINE:  ROM: normal   MUSCLE SPASM: mild left sided   FACET LOADING: left sided   SPURLING: no  JONATHAN / LEON tender: no     Psychiatric: normal judgment and insight. Oriented to person, place, and time.     Neurologic:   Cortical functions: recent and remote memory intact, normal attention span and concentration, speech fluent, adequate fund of knowledge   Cranial nerves: visual fields full, PERRLA, EOMI, facial sensation intact in V1-V3, symmetric facial strength, hearing intact to finger rub, palate elevates symmetrically, shoulder shrug 5/5, tongue protrudes midline   Reflexes: 2+ in the upper and lower extremities, no Al  Sensation: intact to temperature throughout. Romberg positive  Coordination: normal finger to nose    Data Review:     I have personally reviewed the referring provider's notes, labs, & imaging made available to me today.      RADIOLOGY STUDIES:  I have personally reviewed the pertinent images  performed.     4/27/18 MRI brain   1.  There is no acute abnormality.  There is no hemorrhage, mass/mass effect, acute infarction.    2.  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    3.  There is minimal nonspecific white matter change.    Results for orders placed or performed during the hospital encounter of 04/26/18   CT Head Without Contrast    Narrative    EXAMINATION:  CT HEAD WITHOUT CONTRAST    CLINICAL HISTORY:  Headache, post trauma;    TECHNIQUE:  Routine unenhanced axial images were obtained through the head.  Sagittal and coronal reformatted images were created.  The study is reviewed in bone and soft tissue windows.    COMPARISON:  None    FINDINGS:  Intracranial contents: There is no acute intracranial abnormality.  Brain volume, ventricular size and position are normal for age.  There is no hemorrhage or mass/mass effect.  There is no acute edema or ischemia.  The gray-white interface is preserved without obvious acute infarction.  There are no definite regions of abnormal attenuation in the brain.  There is no dense vessel.  There is no abnormal extra-axial fluid collection.  The basilar cisterns are open.  The cerebellar tonsils are in normal position at the level of the foramen magnum.    Extracranial contents, calvarium, soft tissues: The calvarium is normal.  There is no acute fracture.  There is a small retention cyst in the superior right maxillary sinus.  Otherwise, the included paranasal sinuses and mastoid air cells are clear.  Incidentally noted is a right-sided adelita bullosa with nasal septum deviation to the left.      Impression    1.  There is no acute intracranial abnormality.  There is no hemorrhage, mass/mass effect, acute edema or ischemia.    2.  There is no acute skull fracture.      Electronically signed by: Ja Harper MD  Date:    04/26/2018  Time:    12:53       Lab Results   Component Value Date     04/24/2018    K 4.5 04/24/2018    MG 2.2 05/18/2016     04/24/2018     CO2 25 04/24/2018    BUN 17 04/24/2018    CREATININE 1.3 04/24/2018     (H) 04/24/2018    HGBA1C 5.7 (H) 03/08/2018    AST 22 04/24/2018    ALT 31 04/24/2018    ALBUMIN 4.0 04/24/2018    PROT 7.2 04/24/2018    BILITOT 0.5 04/24/2018    CHOL 260 (H) 10/05/2017    HDL 58 10/05/2017    LDLCALC 170.6 (H) 10/05/2017    TRIG 157 (H) 10/05/2017       Lab Results   Component Value Date    WBC 7.48 03/15/2018    HGB 15.1 03/15/2018    HCT 44.1 03/15/2018    MCV 91 03/15/2018     03/15/2018       Lab Results   Component Value Date    TSH 0.869 03/08/2018           Assessment & Plan:       Problem List Items Addressed This Visit        Neuro    TBI (traumatic brain injury) - Primary    Overview     after falling off a roof in 2003 and again 4/26/18     Refer for formal neuropsychological testing         Relevant Orders    Ambulatory Referral to Neuropsychology    Post concussive syndrome    Overview     Headaches, confusion, ataxia, vision change occurring after a fall on 4/26/18 with posterior head trauma and LOC 15-20 min, symptoms improved but not resolved 3 months out.          Relevant Medications    amitriptyline (ELAVIL) 10 MG tablet    Other Relevant Orders    Ambulatory Referral to Neuropsychology    Intractable chronic migraine without aura and without status migrainosus    Overview     Post-traumatic in etiology with migrainous phenotype, at worst frequency 5 days per week, now 2-3 days per week, while on amitriptyline was down to 1 day per week. Resume. If failed, then seek authorization for botox as patient has failed topiramate, lisinopril, metoprolol, gabapentin, duloxetine.     I normally do not like combining 3 antidepressants but patient is on low doses of all 2 and had done OK in the past (no serotonin syndrome). I have educated patient on the possibility of serotonin syndrome occurring with this combination, which is expected to occur within 24 hours, thus he is to report any new problems to  me immediately should they occur on resuming amitriptyline.    Change NSAIDs for acute         Relevant Medications    amitriptyline (ELAVIL) 10 MG tablet    diclofenac (VOLTAREN) 50 MG EC tablet              TESTING:  -- refer for neuro-psychological testing     PREVENTION (use daily regardless of headache):  -- resume amitriptyline 10mg at night  -- continue duloxetine 60mg in the morning    AS-NEEDED TREATMENT (use total no more than 10 days per month unless otherwise stated):  -- instead of ibuprofen, try Diclofenac 2 tablets up to 2 times per day as needed for headache but no more 2 days per week    Follow-up in about 2 months (around 9/10/2018).    Naty Mg MD

## 2018-07-10 NOTE — LETTER
July 10, 2018      Carlos Busby MD  [address] [facility] [address]  [address]  [facility] [address]           [facility] [place]  [address] [facility] [address] [place] [address]  Phone: [phone]  Fax: [phone]          Patient: Mane Ramires   MR Number: [id number]   YOB: 1955   Date of Visit: 7/10/2018       Dear Dr. Carlos Busby:    Thank you for referring Mane Ramires to me for evaluation. Attached you will find relevant portions of my assessment and plan of care.    If you have questions, please do not hesitate to call me. I look forward to following Mane Ramires along with you.    Sincerely,    Naty Mg MD    Enclosure  CC:  No Recipients    If you would like to receive this communication electronically, please contact [email or web address] or [phone] to request more information on [facility] Link access.    For providers and/or their staff who would like to refer a patient to [facility], please contact us through our one-stop-shop provider referral line, [facility], at [phone].    If you feel you have received this communication in error or would no longer like to receive these types of communications, please e-mail [email or web address]

## 2018-07-13 NOTE — PROGRESS NOTES
Last 5 Patient Entered Readings                                      Current 30 Day Average: 120/71     Recent Readings 7/7/2018 7/3/2018 6/26/2018 6/18/2018 6/12/2018    SBP (mmHg) 105 131 125 120 105    DBP (mmHg) 65 71 78 71 66    Pulse 98 63 75 71 89          Digital Medicine: Health  Follow Up    Lifestyle Modifications:    1.Dietary Modifications (Sodium intake <2,000mg/day, food labels, dining out): Patient reports continued low sodium diet without changes. Encouraged to continue.     2.Physical Activity: States he is slowly re-integrating walking as he feels comfortable, however his right arm is continuing to be painful. Has appointment with orthopedic specialist in one week and is looking forward to this in order to create a plan moving forward. Patient is also reporting increased resting heart rate at night. Denies any changes in stress, caffiene, activity, or nutrition. States he's looking to obtain a new bp monitor in one week due to a connectivity issue as well. Patient encouraged to continue with HR monitoring.     3.Medication Therapy: Patient has been compliant with the medication regimen.    4.Patient has the following medication side effects/concerns: Patient main concern is increased HR of  some evenings. States he's also gained about 5 lbs in the past few weeks from amytriptaline.     Follow up with Mr. Mane Ramires completed. No further questions or concerns. Will continue follow up to achieve health goals.

## 2018-07-17 RX ORDER — DULOXETIN HYDROCHLORIDE 60 MG/1
60 CAPSULE, DELAYED RELEASE ORAL DAILY
Qty: 30 CAPSULE | Refills: 2 | Status: SHIPPED | OUTPATIENT
Start: 2018-07-17 | End: 2018-10-03 | Stop reason: SDUPTHER

## 2018-07-20 ENCOUNTER — TELEPHONE (OUTPATIENT)
Dept: FAMILY MEDICINE | Facility: CLINIC | Age: 63
End: 2018-07-20

## 2018-07-20 NOTE — TELEPHONE ENCOUNTER
----- Message from Dipika Lopez sent at 7/20/2018  9:19 AM CDT -----  Contact: Dr Michael Maxwell  Would like chart notes from 6/26/18. Please call back at  fax

## 2018-07-20 NOTE — TELEPHONE ENCOUNTER
Spoke w/ Andie, pt is seeing Ortho Surgery , marc are requesting Xray of shoulder that pt had in ER on 4/26. Xray faxed.--lp

## 2018-07-25 ENCOUNTER — OFFICE VISIT (OUTPATIENT)
Dept: OTOLARYNGOLOGY | Facility: CLINIC | Age: 63
End: 2018-07-25
Payer: MEDICAID

## 2018-07-25 VITALS
WEIGHT: 166.69 LBS | DIASTOLIC BLOOD PRESSURE: 77 MMHG | HEART RATE: 88 BPM | BODY MASS INDEX: 28.46 KG/M2 | HEIGHT: 64 IN | SYSTOLIC BLOOD PRESSURE: 121 MMHG

## 2018-07-25 DIAGNOSIS — J34.3 NASAL TURBINATE HYPERTROPHY: ICD-10-CM

## 2018-07-25 DIAGNOSIS — J34.2 NASAL SEPTAL DEVIATION: ICD-10-CM

## 2018-07-25 DIAGNOSIS — M95.0 NASAL DEFORMITY, ACQUIRED: ICD-10-CM

## 2018-07-25 DIAGNOSIS — J34.89 NASAL OBSTRUCTION: Primary | ICD-10-CM

## 2018-07-25 DIAGNOSIS — Z01.818 PRE-OP TESTING: Primary | ICD-10-CM

## 2018-07-25 PROCEDURE — 99204 OFFICE O/P NEW MOD 45 MIN: CPT | Mod: S$PBB,,, | Performed by: OTOLARYNGOLOGY

## 2018-07-25 PROCEDURE — 99213 OFFICE O/P EST LOW 20 MIN: CPT | Mod: PBBFAC | Performed by: OTOLARYNGOLOGY

## 2018-07-25 PROCEDURE — 99999 PR PBB SHADOW E&M-EST. PATIENT-LVL III: CPT | Mod: PBBFAC,,, | Performed by: OTOLARYNGOLOGY

## 2018-07-26 DIAGNOSIS — J34.3 NASAL TURBINATE HYPERTROPHY: ICD-10-CM

## 2018-07-26 DIAGNOSIS — J34.2 NASAL SEPTAL DEVIATION: ICD-10-CM

## 2018-07-26 DIAGNOSIS — M95.0 NASAL DEFORMITY, ACQUIRED: ICD-10-CM

## 2018-07-26 DIAGNOSIS — J34.89 NASAL OBSTRUCTION: Primary | ICD-10-CM

## 2018-07-27 NOTE — H&P (VIEW-ONLY)
Mr. Ramires presents for consultation.    VITAL SIGNS:  Per nurses' notes.    CHIEF COMPLAINT:  Nasal obstruction.    HISTORY OF PRESENT ILLNESS:  This is a 62-year-old white male who sustained a   fall in late April of this year, approximately three months ago.  He injured his   arm and rotator cuff and also struck his head and face.  He underwent a CT scan   at that time of his head, which was clear intracranially; however, showed a   large bulbous lesion in his nose.  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  Additional symptoms include nasal obstruction, occasional facial   pressure pain, difficulty sleeping.  He does have a history of nasal trauma in    as a younger man.  He was struck in the nose by a baseball.    REVIEW OF SYSTEMS:  CONSTITUTIONAL: Weight loss or weight gain: Negative.  ALLERGY/IMMUNOLOGIC: Negative.  ENT/Mouth:  Hearing Loss/Dizziness/Tinnitus: Negative.  Ear Infections/Otalgia: Negative.  Rhinitis/Sinusitis/Epistaxis: Negative.  Headache/Facial Pain: Negative.  Nasal Obstruction/Snoring/YARI: Negative.  Throat: Infections/Pain: Negative.  Hoarseness/Speech Disturbance: Negative.  Salivary Glands Disorder: Negative.  Trauma: Hx: Negative.    Cardiovascular:  MI/Angina: Negative.  Hypertension: Negative.  Endo: DM/Steroids: Negative.  Eyes: Negative.  GI: Dysphagia/Reflux: Negative.  : GYN Pregnancy: Negative.      Renal: Dialysis: Negative.  Lymph: Neck Mass/Lymphadenopathy: Negative.  Musculoskeletal: Negative.  Hem: Bleeding Disorders/Anemia: Negative.  Neuro: Cranial/Neuralgia: Negative.  Pulm: Asthma/SOB/Cough: Negative.  Skin/Breast: Negative.    PAST MEDICAL/FAMILY/SOCIAL HISTORY:    Additional Past Medical History   ENT Surgery: Negative.   Occupational Exposure: Negative.    Problems: Negative.   Cancer: Negative.   Positive for anxiety, arthritis, hypertension, heart disease as well as spine   problems.  Past surgical history includes hysterectomy,  appendectomy, cervical   fusion, mastectomy, fracture surgery.  Additional past medical history is   positive for gender identity disorder.    Past Family History   Family history hearing loss: Negative.   Family history cancer: Positive for breast and lung cancer.   Positive for heart disease, diabetes.    Past Social History   Tobacco: Former smoker who quit in 2002.   Alcohol: Rare social drinker.    MEDICATIONS:  Per MedCard.    ALLERGIES:  Per MedCard.    EXAMINATION:  General Appearance:  Well-developed, well-nourished 62-year-old in no apparent   distress.  Communication Ability:  Good.  EARS, NOSE, THROAT, MOUTH;  EARS: Clear.   External auditory canals: Clear.   Hearing: Grossly Intact.   Tympanic membranes: Clear.  NOSE:   External: Shows bilateral internal valve collapse with nasal deviation to the   right.   Intranasal: There is a marked septal deviation to the left with 2+ turbinates.    Also, on CT of the head from 04/26 confirms severe septal deviation to the left   and large right adelita bullosa, all of this creating 90% nasal obstruction.    His sinuses per se are clear.  MOUTH:   Intraorally: Lips, teeth and gums: Normal.   Oropharynx: Normal.   Mucosa: Normal.  THROAT:   Tongue: Normal.   Palate: Normal.   Tonsils: Minimum.   Posterior pharynx: Normal.  HEAD/FACE INSPECTION: Normal and atraumatic.   Palpation/Percussion:  Non tender.   Facial Strength: Normal and symmetric.   Salivary glands: Normal.    NECK: Supple.  THYROID: No masses.  LYMPHATICS: No nodes.  RESPIRATORY:   Effort: Normal.  EYES:   Ocular Mobility: Normal.   Vision: Grossly intact.  NEURO/PSYCH:   Cranial nerves: 2-12 grossly intact.   Orientation: x3.   Mood/Affect: Normal.    IMPRESSION:  A 62-year-old white male with marked nasal and septal deformity,   large adelita bullosa, all creating marked nasal obstruction.    RECOMMENDATION:  I have discussed my findings with him in detail as well as my   recommendations for treatment.   My recommendation would be for nasal   reconstruction with possible osteotomies, bilateral  grafts,   septoplasty, submucosal resection of turbinates and excision right adelita   bullosa.  We will set this up at his convenience.          SULEMA/TREVER  dd: 07/26/2018 13:19:04 (CDT)  td: 07/26/2018 20:15:02 (CDT)  Doc ID   #[id number]  Job ID #[id number]    CC:

## 2018-07-27 NOTE — CONSULTS
Mr. Ramires presents for consultation.    VITAL SIGNS:  Per nurses' notes.    CHIEF COMPLAINT:  Nasal obstruction.    HISTORY OF PRESENT ILLNESS:  This is a 62-year-old white male who sustained a   fall in late April of this year, approximately three months ago.  He injured his   arm and rotator cuff and also struck his head and face.  He underwent a CT scan   at that time of his head, which was clear intracranially; however, showed a   large bulbous lesion in his nose.  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  Additional symptoms include nasal obstruction, occasional facial   pressure pain, difficulty sleeping.  He does have a history of nasal trauma in    as a younger man.  He was struck in the nose by a baseball.    REVIEW OF SYSTEMS:  CONSTITUTIONAL: Weight loss or weight gain: Negative.  ALLERGY/IMMUNOLOGIC: Negative.  ENT/Mouth:  Hearing Loss/Dizziness/Tinnitus: Negative.  Ear Infections/Otalgia: Negative.  Rhinitis/Sinusitis/Epistaxis: Negative.  Headache/Facial Pain: Negative.  Nasal Obstruction/Snoring/YARI: Negative.  Throat: Infections/Pain: Negative.  Hoarseness/Speech Disturbance: Negative.  Salivary Glands Disorder: Negative.  Trauma: Hx: Negative.    Cardiovascular:  MI/Angina: Negative.  Hypertension: Negative.  Endo: DM/Steroids: Negative.  Eyes: Negative.  GI: Dysphagia/Reflux: Negative.  : GYN Pregnancy: Negative.      Renal: Dialysis: Negative.  Lymph: Neck Mass/Lymphadenopathy: Negative.  Musculoskeletal: Negative.  Hem: Bleeding Disorders/Anemia: Negative.  Neuro: Cranial/Neuralgia: Negative.  Pulm: Asthma/SOB/Cough: Negative.  Skin/Breast: Negative.    PAST MEDICAL/FAMILY/SOCIAL HISTORY:    Additional Past Medical History   ENT Surgery: Negative.   Occupational Exposure: Negative.    Problems: Negative.   Cancer: Negative.   Positive for anxiety, arthritis, hypertension, heart disease as well as spine   problems.  Past surgical history includes hysterectomy,  appendectomy, cervical   fusion, mastectomy, fracture surgery.  Additional past medical history is   positive for gender identity disorder.    Past Family History   Family history hearing loss: Negative.   Family history cancer: Positive for breast and lung cancer.   Positive for heart disease, diabetes.    Past Social History   Tobacco: Former smoker who quit in 2002.   Alcohol: Rare social drinker.    MEDICATIONS:  Per MedCard.    ALLERGIES:  Per MedCard.    EXAMINATION:  General Appearance:  Well-developed, well-nourished 62-year-old in no apparent   distress.  Communication Ability:  Good.  EARS, NOSE, THROAT, MOUTH;  EARS: Clear.   External auditory canals: Clear.   Hearing: Grossly Intact.   Tympanic membranes: Clear.  NOSE:   External: Shows bilateral internal valve collapse with nasal deviation to the   right.   Intranasal: There is a marked septal deviation to the left with 2+ turbinates.    Also, on CT of the head from 04/26 confirms severe septal deviation to the left   and large right adelita bullosa, all of this creating 90% nasal obstruction.    His sinuses per se are clear.  MOUTH:   Intraorally: Lips, teeth and gums: Normal.   Oropharynx: Normal.   Mucosa: Normal.  THROAT:   Tongue: Normal.   Palate: Normal.   Tonsils: Minimum.   Posterior pharynx: Normal.  HEAD/FACE INSPECTION: Normal and atraumatic.   Palpation/Percussion:  Non tender.   Facial Strength: Normal and symmetric.   Salivary glands: Normal.    NECK: Supple.  THYROID: No masses.  LYMPHATICS: No nodes.  RESPIRATORY:   Effort: Normal.  EYES:   Ocular Mobility: Normal.   Vision: Grossly intact.  NEURO/PSYCH:   Cranial nerves: 2-12 grossly intact.   Orientation: x3.   Mood/Affect: Normal.    IMPRESSION:  A 62-year-old white male with marked nasal and septal deformity,   large adelita bullosa, all creating marked nasal obstruction.    RECOMMENDATION:  I have discussed my findings with him in detail as well as my   recommendations for treatment.   My recommendation would be for nasal   reconstruction with possible osteotomies, bilateral  grafts,   septoplasty, submucosal resection of turbinates and excision right adelita   bullosa.  We will set this up at his convenience.          SULEMA/TREVER  dd: 07/26/2018 13:19:04 (CDT)  td: 07/26/2018 20:15:02 (CDT)  Doc ID   #[id number]  Job ID #[id number]    CC:

## 2018-07-31 RX ORDER — LEVOCETIRIZINE DIHYDROCHLORIDE 5 MG/1
5 TABLET, FILM COATED ORAL NIGHTLY PRN
Qty: 30 TABLET | Refills: 5 | Status: SHIPPED | OUTPATIENT
Start: 2018-07-31 | End: 2019-01-15 | Stop reason: SDUPTHER

## 2018-08-07 ENCOUNTER — PATIENT MESSAGE (OUTPATIENT)
Dept: FAMILY MEDICINE | Facility: CLINIC | Age: 63
End: 2018-08-07

## 2018-08-07 RX ORDER — AMANTADINE HYDROCHLORIDE 100 MG/1
CAPSULE, GELATIN COATED ORAL
Qty: 60 CAPSULE | Refills: 0 | Status: SHIPPED | OUTPATIENT
Start: 2018-08-07 | End: 2018-08-10

## 2018-08-10 ENCOUNTER — OFFICE VISIT (OUTPATIENT)
Dept: FAMILY MEDICINE | Facility: CLINIC | Age: 63
End: 2018-08-10
Payer: MEDICAID

## 2018-08-10 VITALS
TEMPERATURE: 98 F | HEIGHT: 64 IN | SYSTOLIC BLOOD PRESSURE: 118 MMHG | DIASTOLIC BLOOD PRESSURE: 74 MMHG | WEIGHT: 170.38 LBS | HEART RATE: 92 BPM | RESPIRATION RATE: 16 BRPM | OXYGEN SATURATION: 96 % | BODY MASS INDEX: 29.09 KG/M2

## 2018-08-10 DIAGNOSIS — J32.9 RHINOSINUSITIS: Primary | ICD-10-CM

## 2018-08-10 PROCEDURE — 99213 OFFICE O/P EST LOW 20 MIN: CPT | Mod: S$GLB,,, | Performed by: INTERNAL MEDICINE

## 2018-08-10 NOTE — PROGRESS NOTES
Subjective:       Patient ID: Mane Ramires is a 62 y.o. male.    Medication List with Changes/Refills   Current Medications    AMITRIPTYLINE (ELAVIL) 10 MG TABLET    Take 1 tablet (10 mg total) by mouth every evening.    DICLOFENAC (VOLTAREN) 50 MG EC TABLET    1-2 tab PO PRN pain. No more than 4 tabs per day.    DULOXETINE (CYMBALTA) 60 MG CAPSULE    TAKE 1 CAPSULE (60 MG TOTAL) BY MOUTH ONCE DAILY.    IBUPROFEN (ADVIL,MOTRIN) 800 MG TABLET    Take 1 tablet (800 mg total) by mouth 3 (three) times daily as needed for Pain.    JUBLIA 10 % ROHIT    APPLY 1 DROP TO ALL SMALLER TOES AND 2 DROPS TO GREATER TOES DAILY    LEVOCETIRIZINE (XYZAL) 5 MG TABLET    TAKE 1 TABLET (5 MG TOTAL) BY MOUTH NIGHTLY AS NEEDED FOR ALLERGIES.    LISINOPRIL (PRINIVIL,ZESTRIL) 20 MG TABLET    Take 1 tablet (20 mg total) by mouth once daily.    METOPROLOL SUCCINATE (TOPROL-XL) 25 MG 24 HR TABLET    TAKE 1 TABLET (25 MG TOTAL) BY MOUTH ONCE DAILY.    RANITIDINE (ZANTAC) 300 MG TABLET    TAKE 1 TABLET (300 MG TOTAL) BY MOUTH EVERY EVENING.    TESTOSTERONE CYPIONATE (DEPOTESTOTERONE CYPIONATE) 200 MG/ML INJECTION    INJECT 1ML INTO THE MUSCLE EVERY 14 DAYS    TRAZODONE (DESYREL) 50 MG TABLET    TAKE 1 TABLET (50 MG TOTAL) BY MOUTH EVERY EVENING.   Discontinued Medications    AMANTADINE HCL (SYMMETREL) 100 MG CAPSULE    TAKE 1 CAPSULE BY MOUTH TWICE A DAY    AMANTADINE HCL (SYMMETREL) 100 MG CAPSULE    TAKE 1 CAPSULE TWICE DAILY    DICLOFENAC SODIUM 1 % GEL    Apply topically 4 (four) times daily. Apply 2 G topically 4 times per day    RANITIDINE (ZANTAC) 300 MG TABLET    TAKE 1 TABLET (300 MG TOTAL) BY MOUTH EVERY EVENING.       Chief Complaint: Sinus Problem; Dizziness (when pt moves his eyes); and Cough  He presents today with 4-5 days of dizziness and sinus congestion.     He started with vertigo, nausea and balance issues 5 days ago. No fevers. He says when he turned his head or moved his eyes he felt like he was going to throw up.   "He took diclofenac and says over the last 2 days that has resolved. He now complains of pain in his sinuses right > left with congestion. He has a mild sore throat today. He has a mild cough today. No fevers.  No headaches (he had a right sided headache but this resolved).  No diarrhea or vomiting. No wheezing.  No shortness of breath. No rashes.  He is getting yellow nasal drainage.  He is scheduled for nasal reconstruction due to a fixed obstruction on the right with ENT on 8/23/2018.    Review of Systems   Constitutional: Negative for activity change, appetite change, chills, fatigue and fever.   HENT: Positive for congestion, sinus pressure and sore throat. Negative for ear discharge, ear pain, mouth sores, postnasal drip and rhinorrhea.    Eyes: Negative for pain, discharge and redness.   Respiratory: Positive for cough. Negative for chest tightness, shortness of breath and wheezing.    Gastrointestinal: Negative for abdominal pain, constipation, diarrhea, nausea and vomiting.   Genitourinary: Negative for dysuria.   Musculoskeletal: Negative for arthralgias and neck stiffness.   Skin: Negative for rash.   Neurological: Negative for headaches.   Hematological: Negative for adenopathy.       Objective:      Vitals:    08/10/18 0926   BP: 118/74   Pulse: 92   Resp: 16   Temp: 98 °F (36.7 °C)   TempSrc: Oral   SpO2: 96%   Weight: 77.3 kg (170 lb 6.4 oz)   Height: 5' 4" (1.626 m)     Body mass index is 29.25 kg/m².  Physical Exam    General appearance: alert, no acute distress  Head: atraumatic  Eyes: PERRL, EMOI, normal conjunctiva, no drainage  Ears: tm normal with good visualization of landmarks, no erythema or pus, canals normal, external ear normal  Nose: erythematous mucosa, no polyps or sores, no rhinorrhea  Throat: no erythema, no exudates, tonsils appear normal  Mouth: no sores or lesion, moist mucous membranes  Neck: supple, FROM, no masses, no tenderness  Lymph: no posterior or cervical " adenopathy  Lungs: no distress, no retractions, clear to ascultation bilaterally, no wheezing, no rales, no rhonchi  Heart:: Regular rate and rhythm, no murmur  Abdomen: soft, non-tender, no guarding, no rebound, no peritoneal signs, bowel sounds normal, no hepatosplenomegaly, no masses  Skin: no rashes or lesion  Perfusion: good capillary refill, normal pulses      Assessment:       1. Rhinosinusitis        Plan:       Rhinosinusitis  Reassurance and supportive care. No need for antibiotics at this point. Advised of the signs of worsening to return to clinic.  If no improvement by Monday to consider starting clindamycin. He will call and let me know how he is doing.     Follow-up if symptoms worsen or fail to improve.

## 2018-08-14 ENCOUNTER — HOSPITAL ENCOUNTER (OUTPATIENT)
Dept: PREADMISSION TESTING | Facility: HOSPITAL | Age: 63
Discharge: HOME | End: 2018-08-14
Attending: ANESTHESIOLOGY
Payer: MEDICAID

## 2018-08-14 ENCOUNTER — ANESTHESIA EVENT (OUTPATIENT)
Dept: SURGERY | Facility: HOSPITAL | Age: 63
End: 2018-08-14
Payer: MEDICAID

## 2018-08-14 VITALS
HEART RATE: 88 BPM | DIASTOLIC BLOOD PRESSURE: 81 MMHG | OXYGEN SATURATION: 95 % | SYSTOLIC BLOOD PRESSURE: 134 MMHG | RESPIRATION RATE: 16 BRPM | WEIGHT: 172 LBS | HEIGHT: 65 IN | BODY MASS INDEX: 28.66 KG/M2 | TEMPERATURE: 98 F

## 2018-08-14 NOTE — DISCHARGE INSTRUCTIONS
Your surgery has been scheduled for:__________________________________________    You should report to:  ____[facility], located on the [place] side of the first floor of the           [facility] ([phone])  ____[facility], located on the [facility] side of the            Second floor of the [facility] ([phone])  ____3rd Floor [facility] located on the [facility] side of the [facility] [phone]  Please Note   - Tell your doctor if you take Aspirin, products containing Aspirin, herbal medications  or blood thinners, such as Coumadin, Ticlid, or Plavix.  (Consult your provider regarding holding or stopping before surgery).  - Arrange for someone to drive you home following surgery.  You will not be allowed to leave the surgical facility alone or drive yourself home following sedation and anesthesia.  Before Surgery  - Stop taking all herbal medications 14days prior to surgery  - No Motrin/Advil (Ibuprofen) 7 days before surgery  - No Aleve (Naproxen) 7 days before surgery  - Stop Taking Asprin, products containing Asprin _____days before surgery  - Stop taking blood thinners_______days before surgery  - No Goody's/BC  Powder 7 days before surgery  - Refrain from drinking alcoholic beverages for 24hours before and after surgery  - Stop or limit smoking _________days before surgery  - You may take Tylenol for pain  Night before Surgery  Stop ALL solid food, gum, candy (including vitamins) 8 hours before arrival time.  Stop all CLOUDY liquids: coffee with creamer, formula, tube feeds, cloudy juices, non-human milk and breast milk with additives, 6 hours prior to arrival time.  Stop plain breast milk 4 hours prior to arrival time.  The patient should be ENCOURAGED to drink carbohydrate-rich clear liquids (sports drinks, clear juices) until 2 hours prior to arrival time.  CLEAR liquids include only water, black  coffee NO creamer, clear oral rehydration drinks, clear sports drinks or clear fruit juices (no orange juice, no pulpy juices, no apple cider). Advise patients if they can read newsprint through the liquid, it qualifies as clear liquid.   IF IN DOUBT, drink water instead.   - Take a shower or bath (shower is recommended).  Bathe with Hibiclens soap or an antibacterial soap from the neck down.  If not supplied by your surgeon, hibiclens soap will need to be purchased over the counter in pharmacy.  Rinse soap off thoroughly.  - Shampoo your hair with your regular shampoo  The Day of Surgery  · NOTHING TO  DRINK 2 hours before arrival time. If you are told to take medication on the morning of surgery, it may be taken with a sip of water.   - Take another bath or shower with hibiclens or any antibacterial soap, to reduce the chance of infection.  - Take heart and blood pressure medications with a small sip of water, as advised by the perioperative team.  - Do not take fluid pills  - You may brush your teeth and rinse your mouth, but do not swall any additional water.   - Do not apply perfumes, powder, body lotions or deodorant on the day of surgery.  - Nail polish should be removed.  - Do not wear makeup or moisturizer  - Wear comfortable clothes, such as a button front shirt and loose fitting pants.  - Leave all jewelry, including body piercings, and valuables at home.    - Bring any devices you will neeed after surgery such as crutches or canes.  - If you have sleep apnea, please bring your CPAP machine  In the event that your physical condition changes including the onset of a cold or respiratory illness, or if you have to delay or cancel your surgery, please notify your surgeon.  Anesthesia: General Anesthesia     You are watched continuously during your procedure by your anesthesia provider.     Youre due to have surgery. During surgery, youll be given medicine called anesthesia or anesthetic. This will keep you  comfortable and pain-free. Your anesthesia provider will use general anesthesia.  What is general anesthesia?  General anesthesia puts you into a state like deep sleep. It goes into the bloodstream (IV anesthetics), into the lungs (gas anesthetics), or both. You feel nothing during the procedure. You will not remember it. During the procedure, the anesthesia provider monitors you continuously. He or she checks your heart rate and rhythm, blood pressure, breathing, and blood oxygen.  · IV anesthetics. IV anesthetics are given through an IV line in your arm. Theyre often given first. This is so you are asleep before a gas anesthetic is started. Some kinds of IV anesthetics relieve pain. Others relax you. Your doctor will decide which kind is best in your case.  · Gas anesthetics. Gas anesthetics are breathed into the lungs. They are often used to keep you asleep. They can be given through a facemask or a tube placed in your larynx or trachea (breathing tube).  ? If you have a facemask, your anesthesia provider will most likely place it over your nose and mouth while youre still awake. Youll breathe oxygen through the mask as your IV anesthetic is started. Gas anesthetic may be added through the mask.  ? If you have a tube in the larynx or trachea, it will be inserted into your throat after youre asleep.  Anesthesia tools and medicines  You will likely have:  · IV anesthetics. These are put into an IV line into your bloodstream.  · Gas anesthetics. You breathe these anesthetics into your lungs, where they pass into your bloodstream.  · Pulse oximeter. This is a small clip that is attached to the end of your finger. This measures your blood oxygen level.  · Electrocardiography leads (electrodes). These are small sticky pads that are placed on your chest. They record your heart rate and rhythm.  · Blood pressure cuff. This reads your blood pressure.  Risks and possible complications  General anesthesia has some  risks. These include:  · Breathing problems  · Nausea and vomiting  · Sore throat or hoarseness (usually temporary)  · Allergic reaction to the anesthetic  · Irregular heartbeat (rare)  · Cardiac arrest (rare)   Anesthesia safety  · Follow all instructions you are given for how long not to eat or drink before your procedure.  · Be sure your doctor knows what medicines and drugs you take. This includes over-the-counter medicines, herbs, supplements, alcohol or other drugs. You will be asked when those were last taken.  · Have an adult family member or friend drive you home after the procedure.  · For the first 24 hours after your surgery:  ? Do not drive or use heavy equipment.  ? Do not make important decisions or sign legal documents. If important decisions or signing legal documents is necessary during the first 24 hours after surgery, have a trusted family member or spouse act on your behalf.  ? Avoid alcohol.  ? Have a responsible adult stay with you. He or she can watch for problems and help keep you safe.  Date Last Reviewed: 12/1/2016 © 2000-2017 [employer], "[employer] [address] [place] [address], [place] [address]. All rights reserved. This information is not intended as a substitute for professional medical care. Always follow your healthcare professional's instructions

## 2018-08-14 NOTE — ANESTHESIA PREPROCEDURE EVALUATION
08/14/2018  Mane Ramires is a 62 y.o., male.    Anesthesia Evaluation         Review of Systems  Anesthesia Hx:  History of prior surgery of interest to airway management or planning: cervical fusion. Previous anesthesia: MAC 12/2017: Right CTR with MAC.  Procedure performed at an [facility]. Denies Family Hx of Anesthesia complications.   Denies Personal Hx of Anesthesia complications.   Social:  Gender Identity Disorder Tobacco Use: Former smoker, quit smoking >10 years ago Alcohol Use: Pt consumes rarely,    EENT/Dental:   Nasal Problems: include Hypertrophic Turbinates, Septal Deviation nasal obstruction  large right adelita bullosa Denies Jaw Problems   Cardiovascular:  Functional Capacity limited due to concussion: denies CP/SOB  Denies Coronary Artery Disease.   Congenital Heart Disease    Congenital Heart Disease:  Atrial Septal Defect (ASD)  Denies Deep Venous Thrombosis (DVT)  Hypertension , Recent typical clinic B/P of 134/81 @ POC visit    Pulmonary:  Denies Asthma.  Denies Chronic Obstructive Pulmonary Disease (COPD).  Possible Obstructive Sleep Apnea , (STOP/BANG) Symptoms S - Snoring (loud), P - Pressure being treated for high BP and A - Age > 50    Renal/:  Denies Kidney Function/Disease    Hepatic/GI:  Esophageal / Stomach Disorders Gerd Controlled by chronic antireflux medication.  Denies Liver Disease    Musculoskeletal:  Joint Disease:  Arthritis, Osteoarthritis  Spine Disorders: cervical Degenerative disease and Disc disease Spinal Stenosis, Cervical Spinal Stenosis   Neurological:   Post concussive syndrome- S/P fall 4/2018  Hx Traumatic brain injury 2003  Pineal gland cyst   Neuro Symptoms of numbness, tingling BUE/BLEOsteoarthritis  Denies Seizure Disorder  Denies CVA - Cerebrovasular Accident  Denies TIA - Transient Ischemic Attack    Endocrine:  Denies Diabetes  Denies  Thyroid Disease  Metabolic Disorders, Hyperlipoproteinemia  Psych:  Anxiety Disorder.  Depression.          Physical Exam  General:  Well nourished    Airway/Jaw/Neck:  Airway Findings: Mouth Opening: Small, but > 3cm Jaw/Neck Findings:  Neck ROM: Normal ROM       Chest/Lungs:  Chest/Lungs Findings: Clear to auscultation, Normal Respiratory Rate     Heart/Vascular:  Heart Findings: Rate: Normal  Rhythm: Regular Rhythm  Vascular Findings: Normal       Mental Status:  Mental Status Findings:  Cooperative, Alert and Oriented         Anesthesia Plan  Type of Anesthesia, risks & benefits discussed:  Anesthesia Type:  general  Patient's Preference:   Intra-op Monitoring Plan: standard ASA monitors  Intra-op Monitoring Plan Comments:   Post Op Pain Control Plan: IV/PO Opioids PRN and multimodal analgesia  Post Op Pain Control Plan Comments:   Induction:   IV  Beta Blocker:  Patient is on a Beta-Blocker and has received one dose within the past 24 hours (No further documentation required).       Informed Consent: Patient understands risks and agrees with Anesthesia plan.  Questions answered. Anesthesia consent signed with patient.  ASA Score: 3     Day of Surgery Review of History & Physical:    H&P update referred to the surgeon.         Ready For Surgery From Anesthesia Perspective.    Lorene Hayes RN

## 2018-08-22 ENCOUNTER — TELEPHONE (OUTPATIENT)
Dept: OTOLARYNGOLOGY | Facility: CLINIC | Age: 63
End: 2018-08-22

## 2018-08-22 NOTE — TELEPHONE ENCOUNTER
----- Message from Dasia Tavarez sent at 8/22/2018 11:38 AM CDT -----  Contact: patient   Needs Advice    Reason for call: surgery arrival time        Communication Preference: [phone]

## 2018-08-23 ENCOUNTER — HOSPITAL ENCOUNTER (OUTPATIENT)
Facility: HOSPITAL | Age: 63
Discharge: HOME | End: 2018-08-23
Attending: OTOLARYNGOLOGY | Admitting: OTOLARYNGOLOGY
Payer: MEDICAID

## 2018-08-23 ENCOUNTER — ANESTHESIA (OUTPATIENT)
Dept: SURGERY | Facility: HOSPITAL | Age: 63
End: 2018-08-23
Payer: MEDICAID

## 2018-08-23 VITALS
OXYGEN SATURATION: 95 % | BODY MASS INDEX: 28.66 KG/M2 | HEIGHT: 65 IN | DIASTOLIC BLOOD PRESSURE: 60 MMHG | HEART RATE: 98 BPM | SYSTOLIC BLOOD PRESSURE: 123 MMHG | RESPIRATION RATE: 18 BRPM | WEIGHT: 172 LBS | TEMPERATURE: 98 F

## 2018-08-23 DIAGNOSIS — J34.89 NASAL OBSTRUCTION: Primary | ICD-10-CM

## 2018-08-23 PROCEDURE — D9220A PRA ANESTHESIA: Mod: CRNA,,, | Performed by: NURSE ANESTHETIST, CERTIFIED REGISTERED

## 2018-08-23 PROCEDURE — 25000003 PHARM REV CODE 250: Performed by: NURSE ANESTHETIST, CERTIFIED REGISTERED

## 2018-08-23 PROCEDURE — 71000015 HC POSTOP RECOV 1ST HR: Performed by: OTOLARYNGOLOGY

## 2018-08-23 PROCEDURE — 30140 RESECT INFERIOR TURBINATE: CPT | Mod: 50,51,, | Performed by: OTOLARYNGOLOGY

## 2018-08-23 PROCEDURE — 37000008 HC ANESTHESIA 1ST 15 MINUTES: Performed by: OTOLARYNGOLOGY

## 2018-08-23 PROCEDURE — 63600175 PHARM REV CODE 636 W HCPCS: Performed by: NURSE ANESTHETIST, CERTIFIED REGISTERED

## 2018-08-23 PROCEDURE — 63600175 PHARM REV CODE 636 W HCPCS: Performed by: OTOLARYNGOLOGY

## 2018-08-23 PROCEDURE — 27100025 HC TUBING, SET FLUID WARMER: Performed by: NURSE ANESTHETIST, CERTIFIED REGISTERED

## 2018-08-23 PROCEDURE — 36000709 HC OR TIME LEV III EA ADD 15 MIN: Performed by: OTOLARYNGOLOGY

## 2018-08-23 PROCEDURE — 00160 ANES PX NOSE&SINUS NOS: CPT | Performed by: OTOLARYNGOLOGY

## 2018-08-23 PROCEDURE — 30465 REPAIR NASAL STENOSIS: CPT | Mod: ,,, | Performed by: OTOLARYNGOLOGY

## 2018-08-23 PROCEDURE — 25000003 PHARM REV CODE 250: Performed by: OTOLARYNGOLOGY

## 2018-08-23 PROCEDURE — 71000033 HC RECOVERY, INTIAL HOUR: Performed by: OTOLARYNGOLOGY

## 2018-08-23 PROCEDURE — D9220A PRA ANESTHESIA: Mod: ANES,,, | Performed by: ANESTHESIOLOGY

## 2018-08-23 PROCEDURE — 36000708 HC OR TIME LEV III 1ST 15 MIN: Performed by: OTOLARYNGOLOGY

## 2018-08-23 PROCEDURE — 27201423 OPTIME MED/SURG SUP & DEVICES STERILE SUPPLY: Performed by: OTOLARYNGOLOGY

## 2018-08-23 PROCEDURE — 37000009 HC ANESTHESIA EA ADD 15 MINS: Performed by: OTOLARYNGOLOGY

## 2018-08-23 PROCEDURE — 31240 NSL/SNS NDSC CNCH BULL RESCJ: CPT | Mod: 51,RT,, | Performed by: OTOLARYNGOLOGY

## 2018-08-23 PROCEDURE — 30520 REPAIR OF NASAL SEPTUM: CPT | Mod: 51,50,, | Performed by: OTOLARYNGOLOGY

## 2018-08-23 PROCEDURE — 88305 TISSUE EXAM BY PATHOLOGIST: CPT | Performed by: PATHOLOGY

## 2018-08-23 PROCEDURE — 88305 TISSUE EXAM BY PATHOLOGIST: CPT | Mod: 26,,, | Performed by: PATHOLOGY

## 2018-08-23 RX ORDER — SODIUM CHLORIDE 0.9 % (FLUSH) 0.9 %
3 SYRINGE (ML) INJECTION
Status: DISCONTINUED | OUTPATIENT
Start: 2018-08-23 | End: 2018-08-23 | Stop reason: HOSPADM

## 2018-08-23 RX ORDER — PHENYLEPHRINE HYDROCHLORIDE 10 MG/ML
INJECTION INTRAVENOUS
Status: DISCONTINUED | OUTPATIENT
Start: 2018-08-23 | End: 2018-08-23

## 2018-08-23 RX ORDER — ROCURONIUM BROMIDE 10 MG/ML
INJECTION, SOLUTION INTRAVENOUS
Status: DISCONTINUED | OUTPATIENT
Start: 2018-08-23 | End: 2018-08-23

## 2018-08-23 RX ORDER — HYDROCODONE BITARTRATE AND ACETAMINOPHEN 5; 325 MG/1; MG/1
1 TABLET ORAL EVERY 6 HOURS PRN
Status: DISCONTINUED | OUTPATIENT
Start: 2018-08-23 | End: 2018-08-23 | Stop reason: HOSPADM

## 2018-08-23 RX ORDER — CEPHALEXIN 500 MG/1
500 CAPSULE ORAL EVERY 12 HOURS
Qty: 20 CAPSULE | Refills: 0 | Status: SHIPPED | OUTPATIENT
Start: 2018-08-23 | End: 2018-09-26 | Stop reason: ALTCHOICE

## 2018-08-23 RX ORDER — OXYMETAZOLINE HCL 0.05 %
SPRAY, NON-AEROSOL (ML) NASAL
Status: DISCONTINUED | OUTPATIENT
Start: 2018-08-23 | End: 2018-08-23 | Stop reason: HOSPADM

## 2018-08-23 RX ORDER — ONDANSETRON 4 MG/1
4 TABLET, ORALLY DISINTEGRATING ORAL EVERY 6 HOURS PRN
Qty: 10 TABLET | Refills: 0 | Status: SHIPPED | OUTPATIENT
Start: 2018-08-23 | End: 2018-08-29

## 2018-08-23 RX ORDER — SODIUM CHLORIDE 9 MG/ML
INJECTION, SOLUTION INTRAVENOUS CONTINUOUS
Status: DISCONTINUED | OUTPATIENT
Start: 2018-08-23 | End: 2018-08-23 | Stop reason: HOSPADM

## 2018-08-23 RX ORDER — ONDANSETRON 2 MG/ML
INJECTION INTRAMUSCULAR; INTRAVENOUS
Status: DISCONTINUED | OUTPATIENT
Start: 2018-08-23 | End: 2018-08-23

## 2018-08-23 RX ORDER — DEXAMETHASONE SODIUM PHOSPHATE 4 MG/ML
INJECTION, SOLUTION INTRA-ARTICULAR; INTRALESIONAL; INTRAMUSCULAR; INTRAVENOUS; SOFT TISSUE
Status: DISCONTINUED | OUTPATIENT
Start: 2018-08-23 | End: 2018-08-23

## 2018-08-23 RX ORDER — PROPOFOL 10 MG/ML
VIAL (ML) INTRAVENOUS
Status: DISCONTINUED | OUTPATIENT
Start: 2018-08-23 | End: 2018-08-23

## 2018-08-23 RX ORDER — LIDOCAINE HYDROCHLORIDE 10 MG/ML
1 INJECTION, SOLUTION EPIDURAL; INFILTRATION; INTRACAUDAL; PERINEURAL ONCE
Status: COMPLETED | OUTPATIENT
Start: 2018-08-23 | End: 2018-08-23

## 2018-08-23 RX ORDER — FENTANYL CITRATE 50 UG/ML
INJECTION, SOLUTION INTRAMUSCULAR; INTRAVENOUS
Status: DISCONTINUED | OUTPATIENT
Start: 2018-08-23 | End: 2018-08-23

## 2018-08-23 RX ORDER — EPINEPHRINE 1 MG/ML
INJECTION, SOLUTION INTRACARDIAC; INTRAMUSCULAR; INTRAVENOUS; SUBCUTANEOUS
Status: DISCONTINUED | OUTPATIENT
Start: 2018-08-23 | End: 2018-08-23 | Stop reason: HOSPADM

## 2018-08-23 RX ORDER — BUPIVACAINE HYDROCHLORIDE AND EPINEPHRINE 5; 5 MG/ML; UG/ML
INJECTION, SOLUTION EPIDURAL; INTRACAUDAL; PERINEURAL
Status: DISCONTINUED | OUTPATIENT
Start: 2018-08-23 | End: 2018-08-23 | Stop reason: HOSPADM

## 2018-08-23 RX ORDER — HYDROCODONE BITARTRATE AND ACETAMINOPHEN 5; 325 MG/1; MG/1
1 TABLET ORAL EVERY 6 HOURS PRN
Qty: 28 TABLET | Refills: 0 | Status: SHIPPED | OUTPATIENT
Start: 2018-08-23 | End: 2019-01-28

## 2018-08-23 RX ORDER — CEFAZOLIN SODIUM 1 G/3ML
2 INJECTION, POWDER, FOR SOLUTION INTRAMUSCULAR; INTRAVENOUS ONCE
Status: COMPLETED | OUTPATIENT
Start: 2018-08-23 | End: 2018-08-23

## 2018-08-23 RX ORDER — ACETAMINOPHEN 10 MG/ML
INJECTION, SOLUTION INTRAVENOUS
Status: DISCONTINUED | OUTPATIENT
Start: 2018-08-23 | End: 2018-08-23

## 2018-08-23 RX ORDER — NEOSTIGMINE METHYLSULFATE 1 MG/ML
INJECTION, SOLUTION INTRAVENOUS
Status: DISCONTINUED | OUTPATIENT
Start: 2018-08-23 | End: 2018-08-23

## 2018-08-23 RX ORDER — MIDAZOLAM HYDROCHLORIDE 1 MG/ML
INJECTION, SOLUTION INTRAMUSCULAR; INTRAVENOUS
Status: DISCONTINUED | OUTPATIENT
Start: 2018-08-23 | End: 2018-08-23

## 2018-08-23 RX ORDER — LIDOCAINE HYDROCHLORIDE AND EPINEPHRINE 10; 10 MG/ML; UG/ML
INJECTION, SOLUTION INFILTRATION; PERINEURAL
Status: DISCONTINUED | OUTPATIENT
Start: 2018-08-23 | End: 2018-08-23 | Stop reason: HOSPADM

## 2018-08-23 RX ORDER — LIDOCAINE HCL/PF 100 MG/5ML
SYRINGE (ML) INTRAVENOUS
Status: DISCONTINUED | OUTPATIENT
Start: 2018-08-23 | End: 2018-08-23

## 2018-08-23 RX ORDER — GLYCOPYRROLATE 0.2 MG/ML
INJECTION INTRAMUSCULAR; INTRAVENOUS
Status: DISCONTINUED | OUTPATIENT
Start: 2018-08-23 | End: 2018-08-23

## 2018-08-23 RX ADMIN — SODIUM CHLORIDE: 0.9 INJECTION, SOLUTION INTRAVENOUS at 01:08

## 2018-08-23 RX ADMIN — FENTANYL CITRATE 50 MCG: 50 INJECTION, SOLUTION INTRAMUSCULAR; INTRAVENOUS at 05:08

## 2018-08-23 RX ADMIN — ACETAMINOPHEN 1000 MG: 10 INJECTION, SOLUTION INTRAVENOUS at 05:08

## 2018-08-23 RX ADMIN — ONDANSETRON 4 MG: 2 INJECTION INTRAMUSCULAR; INTRAVENOUS at 06:08

## 2018-08-23 RX ADMIN — DEXAMETHASONE SODIUM PHOSPHATE 8 MG: 4 INJECTION, SOLUTION INTRAMUSCULAR; INTRAVENOUS at 04:08

## 2018-08-23 RX ADMIN — PHENYLEPHRINE HYDROCHLORIDE 50 MCG: 10 INJECTION INTRAVENOUS at 04:08

## 2018-08-23 RX ADMIN — CEFAZOLIN 2 G: 330 INJECTION, POWDER, FOR SOLUTION INTRAMUSCULAR; INTRAVENOUS at 03:08

## 2018-08-23 RX ADMIN — SODIUM CHLORIDE, SODIUM GLUCONATE, SODIUM ACETATE, POTASSIUM CHLORIDE, MAGNESIUM CHLORIDE, SODIUM PHOSPHATE, DIBASIC, AND POTASSIUM PHOSPHATE: .53; .5; .37; .037; .03; .012; .00082 INJECTION, SOLUTION INTRAVENOUS at 04:08

## 2018-08-23 RX ADMIN — PROPOFOL 180 MG: 10 INJECTION, EMULSION INTRAVENOUS at 03:08

## 2018-08-23 RX ADMIN — LIDOCAINE HYDROCHLORIDE: 10 INJECTION, SOLUTION EPIDURAL; INFILTRATION; INTRACAUDAL; PERINEURAL at 01:08

## 2018-08-23 RX ADMIN — LIDOCAINE HYDROCHLORIDE 100 MG: 20 INJECTION, SOLUTION INTRAVENOUS at 03:08

## 2018-08-23 RX ADMIN — FENTANYL CITRATE 100 MCG: 50 INJECTION, SOLUTION INTRAMUSCULAR; INTRAVENOUS at 03:08

## 2018-08-23 RX ADMIN — HYDROCODONE BITARTRATE AND ACETAMINOPHEN 1 TABLET: 5; 325 TABLET ORAL at 06:08

## 2018-08-23 RX ADMIN — PHENYLEPHRINE HYDROCHLORIDE 100 MCG: 10 INJECTION INTRAVENOUS at 05:08

## 2018-08-23 RX ADMIN — ROCURONIUM BROMIDE 40 MG: 10 INJECTION, SOLUTION INTRAVENOUS at 03:08

## 2018-08-23 RX ADMIN — MIDAZOLAM HYDROCHLORIDE 2 MG: 1 INJECTION, SOLUTION INTRAMUSCULAR; INTRAVENOUS at 03:08

## 2018-08-23 RX ADMIN — FENTANYL CITRATE 50 MCG: 50 INJECTION, SOLUTION INTRAMUSCULAR; INTRAVENOUS at 06:08

## 2018-08-23 RX ADMIN — GLYCOPYRROLATE 0.4 MG: 0.2 INJECTION, SOLUTION INTRAMUSCULAR; INTRAVENOUS at 06:08

## 2018-08-23 RX ADMIN — NEOSTIGMINE METHYLSULFATE 5 MG: 1 INJECTION INTRAVENOUS at 06:08

## 2018-08-23 RX ADMIN — SODIUM CHLORIDE, SODIUM GLUCONATE, SODIUM ACETATE, POTASSIUM CHLORIDE, MAGNESIUM CHLORIDE, SODIUM PHOSPHATE, DIBASIC, AND POTASSIUM PHOSPHATE: .53; .5; .37; .037; .03; .012; .00082 INJECTION, SOLUTION INTRAVENOUS at 06:08

## 2018-08-23 RX ADMIN — PHENYLEPHRINE HYDROCHLORIDE 100 MCG: 10 INJECTION INTRAVENOUS at 04:08

## 2018-08-23 NOTE — TRANSFER OF CARE
"Anesthesia Transfer of Care Note    Patient: Mane Ramires    Procedure(s) Performed: Procedure(s) (LRB):  RECONSTRUCTION, NOSE (N/A)  GRAFT- (Bilateral)  SEPTOPLASTY, NASAL, ENDOSCOPIC (N/A)  EXCISION,NASAL TURBINATE,SUBMUCOSAL (Bilateral)    Patient location: PACU    Anesthesia Type: general    Transport from OR: Transported from OR on 6-10 L/min O2 by face mask with adequate spontaneous ventilation    Post pain: adequate analgesia    Post assessment: no apparent anesthetic complications    Post vital signs: stable    Level of consciousness: awake    Nausea/Vomiting: no nausea/vomiting    Complications: none    Transfer of care protocol was followed      Last vitals:   Visit Vitals  BP (!) 107/55 (BP Location: Right arm, Patient Position: Lying)   Pulse 97   Temp 36.7 °C (98.1 °F) (Temporal)   Resp 16   Ht 5' 5" (1.651 m)   Wt 78 kg (172 lb)   SpO2 (!) 93%   BMI 28.62 kg/m²     "

## 2018-08-23 NOTE — BRIEF OP NOTE
[facility]  Brief Operative Note     SUMMARY     Surgery Date: 8/23/2018     Surgeon(s) and Role:     * Reji Arce III, MD - Primary     * Jacob Patrick Brunner, MD - Resident - Assisting        Pre-op Diagnosis:  Nasal obstruction [J34.89]  Nasal turbinate hypertrophy [J34.3]  Nasal septal deviation [J34.2]  Nasal deformity, acquired [M95.0]    Post-op Diagnosis:  Post-Op Diagnosis Codes:     * Nasal obstruction [J34.89]     * Nasal turbinate hypertrophy [J34.3]     * Nasal septal deviation [J34.2]     * Nasal deformity, acquired [M95.0]    Procedure(s) (LRB):  RECONSTRUCTION, NOSE (N/A)  GRAFT- (Bilateral)  SEPTOPLASTY, NASAL, ENDOSCOPIC (N/A)  EXCISION,NASAL TURBINATE,SUBMUCOSAL (Bilateral)    Anesthesia: General    Description of the findings of the procedure: nasal reconstruction    Findings/Key Components: see operative dictation    Estimated Blood Loss: 50 mL         Specimens:   Specimen (12h ago, onward)    Start     Ordered    08/23/18 1800  Specimen to Pathology - Surgery  Once     Comments:  1. Right Ilsa Bullosa -- Perm.     Start Status   08/23/18 1800 Collected (08/23/18 1801)       08/23/18 1800          Discharge Note    SUMMARY     Admit Date: 8/23/2018    Discharge Date and Time:  08/23/2018 6:03 PM    Hospital Course (synopsis of major diagnoses, care, treatment, and services provided during the course of the hospital stay): POD0 s/p nasal reconstruction, procedure went without complications and was weell tolerated by patient, discharge to home.      Final Diagnosis: Post-Op Diagnosis Codes:     * Nasal obstruction [J34.89]     * Nasal turbinate hypertrophy [J34.3]     * Nasal septal deviation [J34.2]     * Nasal deformity, acquired [M95.0]    Disposition: Home or Self Care    Follow Up/Patient Instructions:     Medications:  Reconciled Home Medications:      Medication List      START taking these medications    cephALEXin 500 MG capsule  Commonly known as:   KEFLEX  Take 1 capsule (500 mg total) by mouth every 12 (twelve) hours.     HYDROcodone-acetaminophen 5-325 mg per tablet  Commonly known as:  NORCO  Take 1 tablet by mouth every 6 (six) hours as needed for Pain.     ondansetron 4 MG Tbdl  Commonly known as:  ZOFRAN-ODT  Take 1 tablet (4 mg total) by mouth every 6 (six) hours as needed.        CONTINUE taking these medications    amitriptyline 10 MG tablet  Commonly known as:  ELAVIL  Take 1 tablet (10 mg total) by mouth every evening.     diclofenac 50 MG EC tablet  Commonly known as:  VOLTAREN  1-2 tab PO PRN pain. No more than 4 tabs per day.     DULoxetine 60 MG capsule  Commonly known as:  CYMBALTA  TAKE 1 CAPSULE (60 MG TOTAL) BY MOUTH ONCE DAILY.     ibuprofen 800 MG tablet  Commonly known as:  ADVIL,MOTRIN  Take 1 tablet (800 mg total) by mouth 3 (three) times daily as needed for Pain.     JUBLIA 10 % Krys  Generic drug:  efinaconazole  APPLY 1 DROP TO ALL SMALLER TOES AND 2 DROPS TO GREATER TOES DAILY     levocetirizine 5 MG tablet  Commonly known as:  XYZAL  TAKE 1 TABLET (5 MG TOTAL) BY MOUTH NIGHTLY AS NEEDED FOR ALLERGIES.     lisinopril 20 MG tablet  Commonly known as:  PRINIVIL,ZESTRIL  Take 1 tablet (20 mg total) by mouth once daily.     metoprolol succinate 25 MG 24 hr tablet  Commonly known as:  TOPROL-XL  TAKE 1 TABLET (25 MG TOTAL) BY MOUTH ONCE DAILY.     ranitidine 300 MG tablet  Commonly known as:  ZANTAC  TAKE 1 TABLET (300 MG TOTAL) BY MOUTH EVERY EVENING.     testosterone cypionate 200 mg/mL injection  Commonly known as:  DEPOTESTOTERONE CYPIONATE  INJECT 1ML INTO THE MUSCLE EVERY 14 DAYS     traZODone 50 MG tablet  Commonly known as:  DESYREL  TAKE 1 TABLET (50 MG TOTAL) BY MOUTH EVERY EVENING.          Discharge Procedure Orders   Lifting restrictions     Notify your health care provider if you experience any of the following:  persistent dizziness, light-headedness, or visual disturbances     Notify your health care provider if you  experience any of the following:  increased confusion or weakness     Notify your health care provider if you experience any of the following:  worsening rash     Notify your health care provider if you experience any of the following:  difficulty breathing or increased cough     Notify your health care provider if you experience any of the following:  persistent nausea and vomiting or diarrhea     Notify your health care provider if you experience any of the following:  temperature >100.4     Leave dressing on - Keep it clean, dry, and intact until clinic visit     Follow-up Information     H Kee Arce MD In 1 week.    Specialty:  Otolaryngology  Contact information:  [address]  [facility] [id number] [phone]

## 2018-08-23 NOTE — OP NOTE
DATE OF PROCEDURE: 8/23/2018      PREOPERATIVE DIAGNOSES:  1. Turbinate hypertrophy  2. Deviated nasal septum.  3. Internal nasal valve collapse  4. Right adelita bulosa    POSTOPERATIVE DIAGNOSES:  1. Turbinate hypertrophy  2. Deviated nasal septum.  3. Internal nasal valve collapse  4. Right adelita bullosa    PROCEDURES:  1. Septoplasty. CPT 31817  2. Submucosal reduction of turbinates. CPT 34098-51  3. Septal graft CPT 32544  4. Nasal reconstruction with bilateral osteotomies and  grafts CPT 19584  5. Resection of right adelita bullosa CPT 42296    SURGEON: ALEXSANDRA Arce III, M.D.    ASSISTANT SURGEON: Jacob Brunner, M.D. (RES).    ANESTHESIA: General anesthesia with endotracheal intubation.    ESTIMATED BLOOD LOSS: 20 mL.    PROCEDURE IN DETAIL: After the appropriate consents were obtained, the patient   was brought to the Operating Room. Hewas positioned supine on the operating   room table. After successful endotracheal intubation, general anesthesia was   initiated. The patient was then rotated 90 degrees away from anesthesia. The   patient was then prepped and draped in the usual fashion. A complete timeout   was then held with the nursing, surgical, and Anesthesia teams.  First the removal of the right adelita bullosa was performed. A zero degree endoscope was used to enter the right nasal cavity and a sickle knife was used to make an incision in the right adelita bullosa, which was then removed with turbinate scissors.      Next the septoplasty was performed. A hemitransfixion incision was made on the left. A mucoperichondrial flap was then raised using a [place]   elevator. The bony cartilaginous junction was then identified. The cartilage   was disarticulated from the bone. A superior cut was then made of the septal   bone using Metzenbaum scissors. The septal bone was then resected. A 2 cm piece of the cartilaginous septum was removed as a graft for use in nasal reconstruction. This was placed in  saline until used in the reconstruction. The initial hemitransfixion incision was then closed using 4-0 chromic suture in a simple interrupted fashion. A coapting stitch was then placed with a 4-0 plain gut suture.  Attention was turned towards submucous reduction of   turbinates. This was performed using a power microdebrider with a turbinate   blade. This was first performed on the right hand side and then the left hand   side was completed in a similar fashion. Outfracturing was then performed   bilaterally with a Goldmann elevator.     Our attention was then turned to the nasal reconstruction. A gullwing incision was made on the skin of the columella. This was connected to a marginal incision in the bilateral nares. The skin and subcutaneous envelope of the nose was elevated off of the bony cartilaginous skeleton. An incision was made near the septum at the area of the internal nasal valve. The cartilage graft taken from the septum was trimmed to fit in the bilateral valve areas.Medial osteotomies were made and a lateral osteotomy on the right was made and a similar smaller lateral osteotomy on the left. The bony structure of the nose was then straightened.   The bilateral  grafts were then  placed in and sewn in place with 6-0 prolene.     Splints were then applied to the nasal   septum and secured with a nylon suture. Telfa rolls were placed. A thermoplastic splint was placed over the nasal dorsum. The stomach contents were suctioned with an OG tube.   The patient was then turned back to the Anesthesia. He was successfully   awakened and extubated in the Operating Room. He was then transferred to the   Postanesthesia Care Unit in stable condition.    DISPOSITION: Transferred to Postanesthesia Care Unit.    COMPLICATIONS: None.    Dr. Arce was present and participated in the entirety of the case.

## 2018-08-24 NOTE — ANESTHESIA POSTPROCEDURE EVALUATION
"Anesthesia Post Evaluation    Patient: Mane Ramires    Procedure(s) Performed: Procedure(s) (LRB):  RECONSTRUCTION, NOSE (N/A)  GRAFT- (Bilateral)  SEPTOPLASTY, NASAL, ENDOSCOPIC (N/A)  EXCISION,NASAL TURBINATE,SUBMUCOSAL (Bilateral)    Final Anesthesia Type: general  Patient location during evaluation: PACU  Patient participation: Yes- Able to Participate  Level of consciousness: awake and alert  Post-procedure vital signs: reviewed and stable  Pain management: adequate  Airway patency: patent  PONV status at discharge: No PONV  Anesthetic complications: no      Cardiovascular status: blood pressure returned to baseline  Respiratory status: unassisted  Hydration status: euvolemic  Follow-up not needed.        Visit Vitals  /60   Pulse 98   Temp 36.8 °C (98.2 °F) (Temporal)   Resp 18   Ht 5' 5" (1.651 m)   Wt 78 kg (172 lb)   SpO2 95%   BMI 28.62 kg/m²       Pain/Ry Score: Pain Assessment Performed: Yes (8/23/2018  7:35 PM)  Presence of Pain: complains of pain/discomfort (8/23/2018  7:35 PM)  Pain Rating Prior to Med Admin: 4 (8/23/2018  6:36 PM)  Pain Rating Post Med Admin: 2 (8/23/2018  7:35 PM)  Ry Score: 10 (8/23/2018  7:35 PM)  Modified Ry Score: 18 (8/23/2018  7:35 PM)        "

## 2018-08-24 NOTE — DISCHARGE INSTRUCTIONS
Nasal Surgery: Septoplasty    Youre scheduled to have nasal surgery. The type of nasal surgery youre having is called septoplasty. Read on to learn more about what to expect during this surgery.During surgery, the surgeon may remove cartilage and bone to reshape the deviated septum. After surgery, there is more breathing space. Enough cartilage and bone remain to give the nose support.  What to expect during septoplasty  This surgery repairs a blockage inside the nose caused by a deviated septum. With a deviated septum, there is a problem with the wall that divides the nose into two chambers. A deviated septum may block air coming through one or both nostrils. This makes it harder for you to breathe through your nose. During septoplasty, the surgeon makes incisions inside the nose. Then the surgeon trims, reshapes, moves, or removes cartilage and sometimes bone from the septum.  Risks and possible complications  As with any surgery, nasal surgery has some risks. These include a slight risk of bleeding and infection. Your doctor will discuss any other risks and complications with you.   After septoplasty  After septoplasty, youll be taken to a recovery area or to your hospital room. Your experience may be as follows:  · You may have packing material inside your nose. This reduces bleeding and promotes healing. You may also have bandages (dressings) on the outside of your nose.  · Its normal to have some mucus and blood drain from your nose. Until packing is removed, you may have to breathe through your mouth.  · You may have some swelling or bruising around the eyelids if a rhinoplasty was also done.  · Expect some throat dryness and irritation.  · Pain medicine will be prescribed as needed. Dont take medicine that contains aspirin or ibuprofen. These can cause increased bleeding.  Follow-up care  Youll need to follow up with your doctor within a week after your surgery. Here is what to expect:  · Any  packing, splint, or dressings will probably be removed. You may feel slight discomfort and bleed a little when this is done.  · After the splint or packing is removed, youll most likely breathe better than you did before surgery.  · You may have minor numbness, pain, swelling, and a little stiffness under the tip of the nose.  · In a few days, the inside of your nose may swell and briefly block your breathing. Or a scab or crust may block breathing for a short time. Leave the scab alone. Your doctor can remove it. Using saline (irrigation or aerosol) regularly after surgery helps to reduce the amount of crusting at each visit.  · Contact your healthcare provider if you have any questions or concerns.    PATIENT INSTRUCTIONS  POST-ANESTHESIA    IMMEDIATELY FOLLOWING SURGERY:  Do not drive or operate machinery for the first twenty four hours after surgery.  Do not make any important decisions for twenty four hours after surgery or while taking narcotic pain medications or sedatives.  If you develop intractable nausea and vomiting or a severe headache please notify your doctor immediately.    FOLLOW-UP:  Please make an appointment with your surgeon as instructed. You do not need to follow up with anesthesia unless specifically instructed to do so.    WOUND CARE INSTRUCTIONS (if applicable):  Keep a dry clean dressing on the anesthesia/puncture wound site if there is drainage.  Once the wound has quit draining you may leave it open to air.  Generally you should leave the bandage intact for twenty four hours unless there is drainage.  If the epidural site drains for more than 36-48 hours please call the anesthesia department.    QUESTIONS?:  Please feel free to call your physician or the hospital  if you have any questions, and they will be happy to assist you.       [facility] Anesthesia Department  [address] [facility]  [phone]

## 2018-08-29 ENCOUNTER — OFFICE VISIT (OUTPATIENT)
Dept: OTOLARYNGOLOGY | Facility: CLINIC | Age: 63
End: 2018-08-29
Payer: MEDICAID

## 2018-08-29 VITALS — HEART RATE: 91 BPM | SYSTOLIC BLOOD PRESSURE: 139 MMHG | DIASTOLIC BLOOD PRESSURE: 87 MMHG

## 2018-08-29 DIAGNOSIS — Z98.890 POST-OPERATIVE STATE: ICD-10-CM

## 2018-08-29 DIAGNOSIS — M95.0 NASAL DEFORMITY, ACQUIRED: Primary | ICD-10-CM

## 2018-08-29 DIAGNOSIS — J34.2 NASAL SEPTAL DEVIATION: ICD-10-CM

## 2018-08-29 DIAGNOSIS — J34.3 NASAL TURBINATE HYPERTROPHY: ICD-10-CM

## 2018-08-29 PROCEDURE — 99212 OFFICE O/P EST SF 10 MIN: CPT | Mod: PBBFAC | Performed by: OTOLARYNGOLOGY

## 2018-08-29 PROCEDURE — 99999 PR PBB SHADOW E&M-EST. PATIENT-LVL II: CPT | Mod: PBBFAC,,, | Performed by: OTOLARYNGOLOGY

## 2018-08-29 PROCEDURE — 99024 POSTOP FOLLOW-UP VISIT: CPT | Mod: ,,, | Performed by: OTOLARYNGOLOGY

## 2018-08-29 NOTE — PROGRESS NOTES
One week S/P nasal reconstruction with bilateral  grafts and osteotomies,septo,turbs, right adelita.  All packs,splints,and sutures removed.  Healing well,septum and nose straight,airway clear.  Reviewed post op instructions including massages.  RTC 3 weeks

## 2018-08-29 NOTE — LETTER
August 29, 2018      Patience Gordon MD  [address] [address] [address]           [facility] - Otorhinolaryngology  [address]  [facility] [address]  Phone: [phone]  Fax: [phone]          Patient: Mane Ramires   MR Number: [id number]   YOB: 1955   Date of Visit: 8/29/2018       Dear Dr. Patience Gordon:    Thank you for referring Mane Ramires to me for evaluation. Attached you will find relevant portions of my assessment and plan of care.    If you have questions, please do not hesitate to call me. I look forward to following Mane Ramires along with you.    Sincerely,    Fullerton GREGORIO Arce III, MD    Enclosure  CC:  No Recipients    If you would like to receive this communication electronically, please contact [email or web address] or [phone] to request more information on [employer] access.    For providers and/or their staff who would like to refer a patient to [facility], please contact us through our one-stop-shop provider referral line, [facility], at [phone].    If you feel you have received this communication in error or would no longer like to receive these types of communications, please e-mail [email or web address]

## 2018-09-04 ENCOUNTER — PATIENT OUTREACH (OUTPATIENT)
Dept: OTHER | Facility: OTHER | Age: 63
End: 2018-09-04

## 2018-09-04 NOTE — PROGRESS NOTES
Last 5 Patient Entered Readings                                      Current 30 Day Average: 128/82     Recent Readings 8/30/2018 8/22/2018 8/15/2018 8/12/2018 8/10/2018    SBP (mmHg) 142 117 118 130 120    DBP (mmHg) 89 75 79 88 84    Pulse 82 79 90 95 93        Digital Medicine: Health  Follow Up    Lifestyle Modifications:    1.Dietary Modifications (Sodium intake <2,000mg/day, food labels, dining out): Reports no dietary changes and states he continues to adhere to healthy, low sodium diet.     2.Physical Activity: States he is not yet cleared to resume after his nasal surgery, but that he is overall feeling much better and has been moving around the house more. States that once he's cleared he would like to obtain a gym membership to resume walking on the treadmill.     3.Medication Therapy: Patient has been compliant with the medication regimen.    4.Patient has the following medication side effects/concerns: No side effects/concerns per patient at this time. Attributes higher reading of 142/89 mmHg last week to pain.     Follow up with Mr. Mane Ramires completed. No further questions or concerns. Will continue follow up to achieve health goals.

## 2018-09-05 ENCOUNTER — TELEPHONE (OUTPATIENT)
Dept: NEUROLOGY | Facility: CLINIC | Age: 63
End: 2018-09-05

## 2018-09-05 NOTE — TELEPHONE ENCOUNTER
----- Message from Wendy Kendrick sent at 9/5/2018  6:57 AM CDT -----  Contact: Pt   Pt was calling to r/s appt.    Pt would like a call back at [phone].    Thank You

## 2018-09-18 RX ORDER — TRAZODONE HYDROCHLORIDE 50 MG/1
50 TABLET ORAL NIGHTLY
Qty: 30 TABLET | Refills: 2 | Status: SHIPPED | OUTPATIENT
Start: 2018-09-18 | End: 2019-04-21 | Stop reason: SDUPTHER

## 2018-09-19 ENCOUNTER — OFFICE VISIT (OUTPATIENT)
Dept: OTOLARYNGOLOGY | Facility: CLINIC | Age: 63
End: 2018-09-19
Payer: MEDICAID

## 2018-09-19 VITALS — SYSTOLIC BLOOD PRESSURE: 103 MMHG | DIASTOLIC BLOOD PRESSURE: 79 MMHG | HEART RATE: 89 BPM

## 2018-09-19 DIAGNOSIS — Z98.890 POST-OPERATIVE STATE: ICD-10-CM

## 2018-09-19 DIAGNOSIS — J34.2 NASAL SEPTAL DEVIATION: ICD-10-CM

## 2018-09-19 DIAGNOSIS — M95.0 NASAL DEFORMITY, ACQUIRED: Primary | ICD-10-CM

## 2018-09-19 DIAGNOSIS — J34.3 NASAL TURBINATE HYPERTROPHY: ICD-10-CM

## 2018-09-19 PROCEDURE — 99212 OFFICE O/P EST SF 10 MIN: CPT | Mod: PBBFAC | Performed by: OTOLARYNGOLOGY

## 2018-09-19 PROCEDURE — 99999 PR PBB SHADOW E&M-EST. PATIENT-LVL II: CPT | Mod: PBBFAC,,, | Performed by: OTOLARYNGOLOGY

## 2018-09-19 PROCEDURE — 99024 POSTOP FOLLOW-UP VISIT: CPT | Mod: ,,, | Performed by: OTOLARYNGOLOGY

## 2018-09-20 NOTE — PROGRESS NOTES
One month S/P nasal reconstruction with bilateral  grafts and osteotomies,septo,turbs, right adelita.  Feels great, says major increased quality of life.  Exam: septum and nose straight,valves well supported,airway clear.  Plan: RTC 2 months

## 2018-09-26 ENCOUNTER — OFFICE VISIT (OUTPATIENT)
Dept: FAMILY MEDICINE | Facility: CLINIC | Age: 63
End: 2018-09-26
Payer: MEDICAID

## 2018-09-26 VITALS
HEIGHT: 65 IN | SYSTOLIC BLOOD PRESSURE: 106 MMHG | BODY MASS INDEX: 27.36 KG/M2 | WEIGHT: 164.25 LBS | DIASTOLIC BLOOD PRESSURE: 70 MMHG | HEART RATE: 80 BPM | TEMPERATURE: 98 F | RESPIRATION RATE: 18 BRPM

## 2018-09-26 DIAGNOSIS — F32.A DEPRESSION, UNSPECIFIED DEPRESSION TYPE: ICD-10-CM

## 2018-09-26 DIAGNOSIS — F41.9 ANXIETY: ICD-10-CM

## 2018-09-26 DIAGNOSIS — F07.81 POSTCONCUSSION SYNDROME: ICD-10-CM

## 2018-09-26 DIAGNOSIS — I10 HYPERTENSION, ESSENTIAL: Primary | ICD-10-CM

## 2018-09-26 PROCEDURE — 99214 OFFICE O/P EST MOD 30 MIN: CPT | Mod: S$GLB,,, | Performed by: FAMILY MEDICINE

## 2018-09-26 RX ORDER — KETOCONAZOLE 20 MG/G
CREAM TOPICAL DAILY
Qty: 60 G | Refills: 1 | Status: SHIPPED | OUTPATIENT
Start: 2018-09-26 | End: 2021-06-28 | Stop reason: SDUPTHER

## 2018-09-26 RX ORDER — LISINOPRIL 10 MG/1
10 TABLET ORAL DAILY
Qty: 90 TABLET | Refills: 3 | Status: SHIPPED | OUTPATIENT
Start: 2018-09-26 | End: 2018-11-09

## 2018-10-01 ENCOUNTER — PATIENT MESSAGE (OUTPATIENT)
Dept: FAMILY MEDICINE | Facility: CLINIC | Age: 63
End: 2018-10-01

## 2018-10-01 DIAGNOSIS — M25.519 SHOULDER PAIN, UNSPECIFIED CHRONICITY, UNSPECIFIED LATERALITY: Primary | ICD-10-CM

## 2018-10-02 NOTE — TELEPHONE ENCOUNTER
Spoke with pt. He is going to [facility] in [place] fot PT and I told him I am sending his referral to the ortho

## 2018-10-03 RX ORDER — DULOXETIN HYDROCHLORIDE 60 MG/1
60 CAPSULE, DELAYED RELEASE ORAL DAILY
Qty: 30 CAPSULE | Refills: 3 | Status: SHIPPED | OUTPATIENT
Start: 2018-10-03 | End: 2018-12-17

## 2018-10-03 NOTE — PROGRESS NOTES
Subjective:       Patient ID: Mane Ramires is a 62 y.o. male.    Chief Complaint: Hypertension (3 month follow up: Declined Flu Shot)    HPI   The patient is a 62-year-old who is here today for follow-up.  He is feeling better today than I have ever been.  He has  from his significant other and  is now living with his daughter and her family.  He has recovered from sinus surgery and tells me that life is wonderful following the sinus surgery.    Today we discussed the followin) hypertension.  His blood pressure is low today.  He has not been checking his blood pressure outside of the office.  He cannot tell when his blood pressure is high or low.  He is currently taking lisinopril 20 mg a day and Toprol XL 25 mg once a day.  2) post concussive syndrome.  His concussion symptoms have improved significantly.  He is no longer having any headaches.  He was able to stop the Elavil.  He does is occasionally experience blurry vision in the right eye with a change in his pupil size of the right eye.  He is occasionally still bothered by balance issues but this is much better than it was previously.  He is no longer following with PMR or Neurology.  He does plan to see the eye doctor soon about his vision issues.  3) anxiety and depression.  Since  from his significant other, he has been  is much less stressed and much happier.  He is doing well with his current dose of Cymbalta.  He denies any SI or HI      Of note, he is still dealing with chronic shoulder a pain since his fall.  He did have an orthopedist   who had planned to do an MRI.  Unfortunately, the MRI could not be approved and now his orthopedist is no longer taking his  Insurance.    Review of Systems   Constitutional: Negative for appetite change, chills, diaphoresis, fatigue, fever and unexpected weight change.   HENT: Negative for congestion, ear pain, postnasal drip, rhinorrhea, sinus pressure, sneezing, sore throat and  trouble swallowing.    Eyes: Negative for pain, discharge and visual disturbance.   Respiratory: Negative for cough, chest tightness, shortness of breath and wheezing.    Cardiovascular: Negative for chest pain, palpitations and leg swelling.   Gastrointestinal: Negative for abdominal distention, abdominal pain, blood in stool, constipation, diarrhea, nausea and vomiting.   Skin: Negative for rash.       Objective:      Physical Exam   Constitutional: He is oriented to person, place, and time. He appears well-developed and well-nourished. No distress.   HENT:   Head: Normocephalic and atraumatic.   Right Ear: Hearing, tympanic membrane, external ear and ear canal normal.   Left Ear: Hearing, tympanic membrane, external ear and ear canal normal.   Nose: Nose normal.   Mouth/Throat: Oropharynx is clear and moist and mucous membranes are normal. No oral lesions. No oropharyngeal exudate, posterior oropharyngeal edema or posterior oropharyngeal erythema.   Eyes: Conjunctivae, EOM and lids are normal. Pupils are equal, round, and reactive to light. No scleral icterus.   Neck: Normal range of motion. Neck supple. Carotid bruit is not present. No thyroid mass and no thyromegaly present.   Cardiovascular: Normal rate, regular rhythm and normal heart sounds.  No extrasystoles are present. PMI is not displaced. Exam reveals no gallop.   No murmur heard.  Pulmonary/Chest: Effort normal and breath sounds normal. No accessory muscle usage. No respiratory distress.   Clear to auscultation bilaterally.   Abdominal: Soft. Normal appearance and bowel sounds are normal. He exhibits no abdominal bruit. There is no hepatosplenomegaly. There is no tenderness. There is no rebound.   Lymphadenopathy:        Head (right side): No submental and no submandibular adenopathy present.        Head (left side): No submental and no submandibular adenopathy present.        Right cervical: No superficial cervical, no deep cervical and no posterior  "cervical adenopathy present.       Left cervical: No superficial cervical, no deep cervical and no posterior cervical adenopathy present.        Right: No supraclavicular adenopathy present.        Left: No supraclavicular adenopathy present.   Neurological: He is alert and oriented to person, place, and time.   Skin: Skin is warm, dry and intact.   Psychiatric: He has a normal mood and affect.     Blood pressure 106/70, pulse 80, temperature 98 °F (36.7 °C), temperature source Oral, resp. rate 18, height 5' 5" (1.651 m), weight 74.5 kg (164 lb 3.9 oz).Body mass index is 27.33 kg/m².          A/P:  1) hypertension.  Over controlled.  We are going to decrease his lisinopril to 10 mg once a day.  We will continue with the Toprol XL.  I will see him back in 6 weeks or sooner if needed  2) postconcussive syndrome.  Improving.  He will follow up with the ophthalmologist regarding his vision changes soon .  If his balance issues continue, we will consider a course of physical therapy      3) anxiety and depression.  Well controlled.  In the future, we may consider decreasing the Cymbalta  4)  Gender dysmorphia.  I did encourage him to follow up with endocrinology  .  He will let me know when labs are going to be done for the endocrinologist and I will order additional labs as needed  "

## 2018-10-12 ENCOUNTER — CLINICAL SUPPORT (OUTPATIENT)
Dept: REHABILITATION | Facility: HOSPITAL | Age: 63
End: 2018-10-12
Payer: MEDICAID

## 2018-10-12 DIAGNOSIS — M25.519 SHOULDER PAIN, UNSPECIFIED CHRONICITY, UNSPECIFIED LATERALITY: ICD-10-CM

## 2018-10-12 PROCEDURE — 97161 PT EVAL LOW COMPLEX 20 MIN: CPT | Mod: PN

## 2018-10-12 PROCEDURE — 97110 THERAPEUTIC EXERCISES: CPT | Mod: PN

## 2018-10-12 NOTE — PLAN OF CARE
TIME RECORD    Date: 10/12/2018    Start Time:  1410  Stop Time:  1500    PROCEDURES:    TIMED  Procedure Time Min.   Thera EX Start:1445  Stop: 1500 15         UNTIMED  Procedure Time Min.   PT Eval Start:1510  Stop:1535 35     Total Timed Minutes:  15  Total Timed Units:  1  Total Untimed Units:  1  Charges Billed/# of units:  2    OUTPATIENT PHYSICAL THERAPY   PATIENT EVALUATION  Onset Date: 10/02/18  Primary Diagnosis: shoulder pain, unsppecified chronicity.  Treatment Diagnosis: limited ROM right sh dysfunction  Past Medical History:   Diagnosis Date    Accident     fell from roof with TBI (word finding issues personality changes, memory deficets), R rib fractures, clavicle fx    Allergy     Anxiety     ASD (atrial septal defect)     noted on ECHO 6/16    Cervical stenosis of spine     noted on 6/15 MRI    CTS (carpal tunnel syndrome)     mild-mod on 4/17 NCS    DDD (degenerative disc disease), cervical 10/2014    C5-6 and C6-C7    Depression     JEEVAN (generalized anxiety disorder)     Gender identity disorder     H/O cardiovascular stress test     12/14 normal    Herpes simplex type 2 infection     History of atypical hyperplasia of breast     B precancer lesions    Hypertension     IGT (impaired glucose tolerance)     Myelomalacia     c-spine noted on 8/15 MRI    OA (osteoarthritis)     Prediabetes     TBI (traumatic brain injury)     after falling off a roof in 2003     Precautions: anxiety, universal  Prior Therapy: a year ago  Medications: Mane Ramires has a current medication list which includes the following prescription(s): diclofenac, duloxetine, hydrocodone-acetaminophen, ibuprofen, ketoconazole, levocetirizine, lisinopril, metoprolol succinate, ranitidine, testosterone cypionate, and trazodone.  Nutrition:  Normal  History of Present Illness: Patient fall backwards and hit the back of the head and right shoulder. 6 months ago.4/26/18.   Prior Level of Function:  "Independent  Social History: unemployed.  Artist.  Place of Residence (Steps/Adaptations): lives with family  Functional Deficits Leading to Referral/Nature of Injury: Pain to R shoulder is getting worse that it even wakes him up at night. Unable to use right shoulder to  objects.  Patient Therapy Goals: to regain function and rid of pain.    Subjective     Mane Ramires states that he cannot pain or get comfortable with using the right shoulder/arm. Pain-free resting position but attempts to move against gravity causes pain. Had to support right elbow when walking to minimize swing..    Pain:  Location: shoulder   Description: Aching, Grabbing, Tight, Deep and Sharp  Activities Which Increase Pain: Standing, Lifting and Getting out of bed/chair  Activities Which Decrease Pain: rest  Pain Scale: 0/10 at best 3/10 now  8/10 at worst    Objective     Palpation: mild tenderness on deep pressure anterior and posterior aspect of the glenohumeral junction.  Sensation: intact  Range of Motion/Strength:    Shoulder  Right   Left  Pain/Dysfunction with Movement    AROM PROM MMT AROM PROM MMT    flexion 45 deg 165 * 2-/5 WFL WFL WFL    extension 0deg 0deg 2-/5 " " "    abduction 40 deg 145 * 3-/5 " " "    adduction 0 deg WFL 3/5 " " " "   Internal rotation 15 deg 20 deg 2-/5 " " "    ER at 90° abd 45 deg 65 deg 2-/5 " " "    ER at 0° abd 30 deg 45 deg 3-/5 " " "          Flexibility: severe guarding to right sh movements  Gait: Without AD  Analysis: independent, diminished right arm swing  Bed Mobility:Independent  Transfers: Independent  Other: UEFI  14/80  Treatment: Therapeutic Ex's: PROM to AAROM right shoulder through available range in all planes.    Assessment       Initial Assessment (Pertinent finding, problem list and factors affecting outcome): Severe pain and limitation to right shoulder especially with overhead activity and rotation, internal worse than external. Poor active use of the right " arm.  Rehab Potiential: good    Short Term Goals (2 Weeks):   1. PROM to right shoulder >90% in all planes.  2. Decreased recurrence pain <4/10 at worse  Long Term Goals (6 Weeks):   1. UEFI   >40/80  2. AROMEx's overhead sh function  70%  3. Minimum Pain intensity with movements <2/10    Plan     Certification Period: 10/12/18 to 11/23/18  Recommended Treatment Plan: 2 times per week for 6 weeks: Manual Therapy, Moist Heat/ Ice, Therapeutic Activites, Therapeutic Exercise and Ultrasound  Other Recommendations: Dry needling      Therapist: Rian Kahn, PT    I CERTIFY THE NEED FOR THESE SERVICES FURNISHED UNDER THIS PLAN OF TREATMENT AND WHILE UNDER MY CARE    Physician's comments: ________________________________________________________________________________________________________________________________________________      Physician's Name: ___________________________________

## 2018-10-16 ENCOUNTER — PATIENT MESSAGE (OUTPATIENT)
Dept: FAMILY MEDICINE | Facility: CLINIC | Age: 63
End: 2018-10-16

## 2018-10-16 ENCOUNTER — PATIENT OUTREACH (OUTPATIENT)
Dept: OTHER | Facility: OTHER | Age: 63
End: 2018-10-16

## 2018-10-16 ENCOUNTER — TELEPHONE (OUTPATIENT)
Dept: ENDOCRINOLOGY | Facility: CLINIC | Age: 63
End: 2018-10-16

## 2018-10-16 ENCOUNTER — PATIENT MESSAGE (OUTPATIENT)
Dept: ENDOCRINOLOGY | Facility: CLINIC | Age: 63
End: 2018-10-16

## 2018-10-16 DIAGNOSIS — E78.5 DYSLIPIDEMIA: ICD-10-CM

## 2018-10-16 DIAGNOSIS — R73.02 IGT (IMPAIRED GLUCOSE TOLERANCE): Primary | ICD-10-CM

## 2018-10-16 NOTE — PROGRESS NOTES
Last 5 Patient Entered Readings                                      Current 30 Day Average: 124/81     Recent Readings 10/11/2018 10/5/2018 9/29/2018 9/28/2018 9/27/2018    SBP (mmHg) 144 127 129 141 128    DBP (mmHg) 89 85 90 86 78    Pulse 76 86 97 83 89        Digital Medicine: Health  Follow Up    Lifestyle Modifications:    1.Dietary Modifications (Sodium intake <2,000mg/day, food labels, dining out): Patient reports no dietary changes. Discussed slight increase in diastolic readings and encouraged monitoring food labels and avoiding salt or limiting to less than 2000 mg per day. Is now living with daughter and her family. Agrees to monitor this a bit more closely over next 4 weeks.     2.Physical Activity: Reports he's slowly feeling more comfortable being more active. Encouraged to continue integrating physical activity. Reporting decreased stress after ending relationship with significant other.     3.Medication Therapy: Patient has not been any compliant with the medication regimen. Admits to missed medication dose 10/11 and attributes elevated reading to this. Encouraged use of pill box. States he normally does not miss a dose.     4.Patient has the following medication side effects/concerns: No side effects/concerns per patient.     Follow up with Mr. Mane Ramires completed. No further questions or concerns. Will continue to follow up to achieve health goals.

## 2018-10-24 ENCOUNTER — CLINICAL SUPPORT (OUTPATIENT)
Dept: REHABILITATION | Facility: HOSPITAL | Age: 63
End: 2018-10-24
Attending: FAMILY MEDICINE
Payer: MEDICAID

## 2018-10-24 DIAGNOSIS — M25.519 SHOULDER PAIN, UNSPECIFIED CHRONICITY, UNSPECIFIED LATERALITY: ICD-10-CM

## 2018-10-24 PROCEDURE — 97110 THERAPEUTIC EXERCISES: CPT | Mod: PN

## 2018-10-24 NOTE — PROGRESS NOTES
Name: Mane Dodd [facility] Number: [id number]  Date of Treatment: 10/24/2018   Diagnosis:   Encounter Diagnosis   Name Primary?    Shoulder pain, unspecified chronicity, unspecified laterality        Time in: 1410  Time Out: 1500  Total Treatment Time: 50    Subjective:    Mane reports anterior R shoulder discomfort.  Patient reports their pain to be 4/10 on a 0-10 scale with 0 being no pain and 10 being the worst pain imaginable.    Objective:    Patient received individual therapy to increase strength, endurance, ROM, flexibility, posture and core stabilization with activities as follows:     Mane received therapeutic exercises to develop strength, endurance, ROM, flexibility, posture and core stabilization for 50 minutes including:     Seated UBE 3' fwd  Seated OH pulley scaption 3'  Standing R shoulder isometrics with ball 10/3: flex, aBd  Standing finger ladder R shoulder flexion 10 x  Seated scap retraction 10/3 B  Seated shoulder shrugs 10/3 B  Seated retro shoulder rolls B 10/3  Supine serratus punches R 10  Supine PROM R shoulder and rhythmic gentle stabilization at 90* flexion    Educated pt of DOMS effect.     Pt demo good understanding of the education provided. Patient demonstrated good return demonstration of activities.     Assessment:     Guarding R shoulder with all acts and discomfort with eccentric R shoulder extn.     Pt will continue to benefit from skilled PT intervention. Medical Necessity is demonstrated by:  Requires skilled supervision to complete and progress HEP and Weakness.    Patient is making good progress towards established goals.    Plan:    Continue with established Plan of Care towards PT goals.

## 2018-10-26 ENCOUNTER — CLINICAL SUPPORT (OUTPATIENT)
Dept: REHABILITATION | Facility: HOSPITAL | Age: 63
End: 2018-10-26
Attending: FAMILY MEDICINE
Payer: MEDICAID

## 2018-10-26 DIAGNOSIS — M25.519 SHOULDER PAIN, UNSPECIFIED CHRONICITY, UNSPECIFIED LATERALITY: ICD-10-CM

## 2018-10-26 PROCEDURE — 97110 THERAPEUTIC EXERCISES: CPT | Mod: PN

## 2018-10-26 NOTE — PROGRESS NOTES
"Name: Mane Dodd [facility] Number: [id number]  Date of Treatment: 10/26/2018   Diagnosis:   Encounter Diagnosis   Name Primary?    Shoulder pain, unspecified chronicity, unspecified laterality        Time in: 1205  Time Out: 1300  Total Treatment Time: 55    Subjective:    Mane reports improvement of symptoms.  Patient reports their R shoulder pain to be 3/10 on a 0-10 scale with 0 being no pain and 10 being the worst pain imaginable. States this morning was the first time he was able to get up out of bed without holding R UE with L UE for protection/pain. Was sore after last session but improved now.     Objective:    Patient received individual therapy to increase strength, endurance, ROM, flexibility, posture and core stabilization with activities as follows:     Mane received therapeutic exercises to develop strength, endurance, ROM, flexibility, posture and core stabilization for 55 minutes including:     Seated UBE 4' fwd  Seated OH pulley scaption 5'  Standing R shoulder isometrics with ball 10/3: flex, aBd (stopped at 4 reps 2* discomfort)  Standing finger ladder R shoulder flexion 10 x  Seated scap retraction 10/3 B  Seated shoulder shrugs 10/3 B  Seated retro shoulder rolls B 10/3  Supine serratus punches R 10/3 B  Supine PROM R shoulder and rhythmic gentle stabilization at 90* flexion    Continue HEP daily.    Pt demo good understanding of the education provided. Patient demonstrated good return demonstration of activities.     Assessment:     Felt a  "pop" in R ant shoulder during UBE which felt "good like I needed it". Decreased R UE guarding today with increased UE swing during gait. Discomfort with isometric aBD which resolved after stopping activity.     Pt will continue to benefit from skilled PT intervention. Medical Necessity is demonstrated by:  Requires skilled supervision to complete and progress HEP and Weakness.    Patient is making good progress towards established " goals.    Plan:    Continue with established Plan of Care towards PT goals.

## 2018-10-29 ENCOUNTER — PATIENT MESSAGE (OUTPATIENT)
Dept: FAMILY MEDICINE | Facility: CLINIC | Age: 63
End: 2018-10-29

## 2018-10-30 ENCOUNTER — CLINICAL SUPPORT (OUTPATIENT)
Dept: REHABILITATION | Facility: HOSPITAL | Age: 63
End: 2018-10-30
Payer: MEDICAID

## 2018-10-30 DIAGNOSIS — M25.519 SHOULDER PAIN, UNSPECIFIED CHRONICITY, UNSPECIFIED LATERALITY: ICD-10-CM

## 2018-10-30 PROCEDURE — 97010 HOT OR COLD PACKS THERAPY: CPT | Mod: PN

## 2018-10-30 PROCEDURE — 97110 THERAPEUTIC EXERCISES: CPT | Mod: PN

## 2018-11-01 ENCOUNTER — OFFICE VISIT (OUTPATIENT)
Dept: HEPATOLOGY | Facility: CLINIC | Age: 63
End: 2018-11-01
Payer: MEDICAID

## 2018-11-01 VITALS
BODY MASS INDEX: 27.73 KG/M2 | WEIGHT: 166.44 LBS | OXYGEN SATURATION: 99 % | TEMPERATURE: 97 F | RESPIRATION RATE: 18 BRPM | SYSTOLIC BLOOD PRESSURE: 110 MMHG | DIASTOLIC BLOOD PRESSURE: 65 MMHG | HEIGHT: 65 IN | HEART RATE: 78 BPM

## 2018-11-01 DIAGNOSIS — R74.8 ELEVATED LIVER ENZYMES: Primary | ICD-10-CM

## 2018-11-01 PROCEDURE — 99999 PR PBB SHADOW E&M-EST. PATIENT-LVL V: CPT | Mod: PBBFAC,,, | Performed by: PHYSICIAN ASSISTANT

## 2018-11-01 PROCEDURE — 99215 OFFICE O/P EST HI 40 MIN: CPT | Mod: PBBFAC | Performed by: PHYSICIAN ASSISTANT

## 2018-11-01 PROCEDURE — 99214 OFFICE O/P EST MOD 30 MIN: CPT | Mod: S$PBB,,, | Performed by: PHYSICIAN ASSISTANT

## 2018-11-01 NOTE — PROGRESS NOTES
HEPATOLOGY CLINIC VISIT NOTE     REFERRING PROVIDER: Dr. Patience Gordon     REASON FOR VISIT: elevated LFTs     HISTORY: This is a 63 y.o. White male here for follow up. He was initially seen by me 03/2018 for  evaluation of elevated LFTs, referred by PCP. His transaminases have been fluctuant. He has had intermittent elevations since 2014. They were lat normal in 06/2017, then became elevated. On most recent labs, AST 37, ALT 63. At highest, ,  and alk phos of 241. On most recent labs, he has normal synthetic liver function and PLTs are well preserved. He had a h/o biliary cholic in 10/11/2017 and is now s/p lap choley and had a h/o chronic cholelithaisis. He has had a negative acute hep panel. Alk phos isoenzymes are suggestive of liver origin.     PMH of GID, HTN, HLD. He has been on testosterone for >25 years. He reports not taking testosterone often due to concerns over liver and reports his last injection was about 6 months ago. He reports fatigue and reports cold and heat intolerance. TSH WNL. He repots burning eyes and joint pain. He denies FH of liver disease. He denies FH of AI disorders. He denies drug and alcohol use.     Since last visit, liver enzymes have remained normal.     Past Medical History:   Diagnosis Date    Accident     fell from roof with TBI (word finding issues personality changes, memory deficets), R rib fractures, clavicle fx    Allergy     Anxiety     ASD (atrial septal defect)     noted on ECHO 6/16    Cervical stenosis of spine     noted on 6/15 MRI    CTS (carpal tunnel syndrome)     mild-mod on 4/17 NCS    DDD (degenerative disc disease), cervical 10/2014    C5-6 and C6-C7    Depression     JEEVAN (generalized anxiety disorder)     Gender identity disorder     H/O cardiovascular stress test     12/14 normal    Herpes simplex type 2 infection     History of atypical hyperplasia of breast     B precancer lesions    Hypertension     IGT (impaired glucose  tolerance)     Myelomalacia     c-spine noted on 8/15 MRI    OA (osteoarthritis)     Prediabetes     TBI (traumatic brain injury)     after falling off a roof in 2003     Past Surgical History:   Procedure Laterality Date    AMPUTATION      L index finger    APPENDECTOMY  1969    BILATERAL SALPINGOOPHORECTOMY  02/2015    CARPAL TUNNEL RELEASE  12/2017    right    CERVICAL FUSION  10/2014    C5-C6 and C6-C7    CHOLECYSTECTOMY      CHOLECYSTECTOMY-LAPAROSCOPIC N/A 11/9/2017    Performed by [facility]an [facility] at [facility]    ENDOSCOPIC NASAL SEPTOPLASTY N/A 8/23/2018    Procedure: SEPTOPLASTY, NASAL, ENDOSCOPIC;  Surgeon: Reji Arce III, MD;  Location: [facility] OR [address] [place] [address];  Service: ENT;  Laterality: N/A;    ESOPHAGOGASTRODUODENOSCOPY (EGD) N/A 9/6/2017    Performed by Darius Ayon [facility] at [facility]O    EXCISION,NASAL TURBINATE,SUBMUCOSAL Bilateral 8/23/2018    Performed by Reji Arce III, MD at [facility] OR [address] [place] [address]    FINGER AMPUTATION Left     FRACTURE SURGERY Right 2003    rib fractures    GRAFT- Bilateral 8/23/2018    Performed by Reji Arce III, MD at [facility] OR [address] [place] [address]    HYSTERECTOMY  1984    MIGUEL    MASTECTOMY  02/2015    MASTECTOMY-BREAST-BILATERAL Bilateral 2/16/2015    Performed by Kali Yi MD at [facility] OR [address] [place] [address]    RECONSTRUCTION OF NOSE N/A 8/23/2018    Procedure: RECONSTRUCTION, NOSE;  Surgeon: Reji Arce III, MD;  Location: [facility] OR [address] [place] [address];  Service: ENT;  Laterality: N/A;    RECONSTRUCTION, NOSE N/A 8/23/2018    Performed by Reji [facility]ce III, MD at [facility]    RELEASE-CARPAL TUNNEL  Right carpal tunnel release Right 12/4/2017    Performed by Carlos Busby [facility] at [facility]    YAJLQLWH-OHGAYNEENOIA-YSBZRNMOWWOB Bilateral 2/16/2015    Performed by Jenna Carbajal MD at [facility] OR [address] [place] [address]    SEPTOPLASTY, NASAL, ENDOSCOPIC N/A 8/23/2018    Performed by Reji Arce III, MD at [facility] OR [address] [place] [address]    UPPER GASTROINTESTINAL ENDOSCOPY  09/06/2017     Dr. Ayon     FAMILY HISTORY: Negative for liver disease    SOCIAL HISTORY:   Not currently working    Social History     Tobacco Use   Smoking Status Former Smoker    Packs/day: 0.25    Years: 2.00    Pack years: 0.50    Types: Cigarettes    Last attempt to quit: 2002    Years since quittin.0   Smokeless Tobacco Never Used     Social History     Substance and Sexual Activity   Alcohol Use Yes    Comment: rare    once every 2 weeks    Social History     Substance and Sexual Activity   Drug Use Yes    Types: Hydrocodone     ROS:   No fever, chills, (+) fatigue   No chest pain, dyspnea, cough  No abdominal pain, nausea, vomiting  No skin rashes   No lower extremity edema  No depression or anxiety      PHYSICAL EXAM:  Friendly White male, in no acute distress; alert and oriented to person, place and time  VITALS: reviewed  HEENT: Sclerae anicteric.   NECK: Supple  LUNGS: Normal respiratory effort  ABDOMEN: Flat, soft, nontender.  SKIN: Warm and dry. No jaundice, No obvious rashes.   EXTREMITIES: No lower extremity edema  NEURO/PSYCH: Normal gate. Memory intact. Thought and speech pattern appropriate. Behavior normal. No depression or anxiety noted.    RECENT LABS:  Lab Results   Component Value Date    WBC 10.77 2018    HGB 17.0 2018     2018     Lab Results   Component Value Date    INR 0.9 03/15/2018     Lab Results   Component Value Date    AST 22 2018    ALT 31 2018    BILITOT 0.5 2018    ALBUMIN 4.0 2018    ALKPHOS 86 2018    CREATININE 1.3 2018    BUN 16 2018     2018    K 4.3 2018     RECENT IMAGING:  U/S abdomen 10/2018  Narrative     The liver and pancreas are normal in size and appearance. The gallbladder contains a solitary gallstone measuring 3.4 cm. There are no signs of cholecystitis. The bile ducts are not dilated. Common bile duct diameter is 6 mm. Doppler of the main portal vein confirms a normal  waveform and direction of flow. The right kidney is unremarkable and measures 11.4 cm in length. No ascites is present.   Impression      Solitary gallstone with no evidence of cholecystitis.     ASSESSMENT  63 y.o. White male with:  1. ELEVATED LIVER ENZYMES- now resolved   -- negative serological work up  -- continue to trend  -- fibroscan, if no significant fibrosis would imagine that it is a transient process not causing significant damage  -- if enzymes become abnormal, may consider liver biopsy      PLAN:  1. Labs to check enzymes, fibroscan  2. F/u TBD    Thank you for allowing me to participate in the care of Mane Ramires  Francisco J Jerry PA-C

## 2018-11-02 ENCOUNTER — CLINICAL SUPPORT (OUTPATIENT)
Dept: REHABILITATION | Facility: HOSPITAL | Age: 63
End: 2018-11-02
Payer: MEDICAID

## 2018-11-02 DIAGNOSIS — M25.519 SHOULDER PAIN, UNSPECIFIED CHRONICITY, UNSPECIFIED LATERALITY: ICD-10-CM

## 2018-11-02 PROCEDURE — 97110 THERAPEUTIC EXERCISES: CPT | Mod: PN

## 2018-11-02 PROCEDURE — 97010 HOT OR COLD PACKS THERAPY: CPT | Mod: PN

## 2018-11-02 NOTE — PROGRESS NOTES
Name: Mane Dodd [facility] Number: [id number]  Date of Treatment: 11/02/2018   Diagnosis:   Encounter Diagnosis   Name Primary?    Shoulder pain, unspecified chronicity, unspecified laterality        Time in:  1205  Time Out: 1300  Total Treatment Time: 55    Subjective:    Mane reports minimal pain at rest but has increased pain level with R UE movements to 9/10. Consulted with Rian, PT and cleared to cont to tolerance only.     Objective:    Patient received individual therapy to increase strength, endurance, ROM, flexibility, posture and core stabilization with activities as follows:     Mane received therapeutic exercises to develop strength, endurance, ROM, flexibility, posture and core stabilization for 35 minutes including:     MHP to R shoulder x 10 min seated in armchair after being cleared of contraindications.     Seated OH pulley scaption 5'  Seated scap retraction 10/3 B  Seated shoulder shrugs 10/3 B  Seated retro shoulder rolls B 10/3  Seated PROM R shoulder all planes    CP applied to the R shoudler x 10 minutes in seated position after being cleared for contraindications.      Continue HEP daily.    Pt demo good understanding of the education provided. Patient demonstrated good return demonstration of activities.     Assessment:     Decreased guarding R shoulder and reports improved pain after session but observed to grab R shoulder once when walking out of clinic.     Pt will continue to benefit from skilled PT intervention. Medical Necessity is demonstrated by:  Requires skilled supervision to complete and progress HEP and Weakness.    Patient is making good progress towards established goals.    Plan:    Continue with established Plan of Care towards PT goals.

## 2018-11-06 ENCOUNTER — CLINICAL SUPPORT (OUTPATIENT)
Dept: REHABILITATION | Facility: HOSPITAL | Age: 63
End: 2018-11-06
Payer: MEDICAID

## 2018-11-06 DIAGNOSIS — M25.511 RIGHT SHOULDER PAIN, UNSPECIFIED CHRONICITY: ICD-10-CM

## 2018-11-06 PROCEDURE — 97010 HOT OR COLD PACKS THERAPY: CPT | Mod: PN

## 2018-11-06 PROCEDURE — 97110 THERAPEUTIC EXERCISES: CPT | Mod: PN

## 2018-11-06 NOTE — PROGRESS NOTES
Name: Mane Dodd [facility] Number: [id number]  Date of Treatment: 11/06/2018   Diagnosis: Dysfunction R shoulder  Encounter Diagnosis   Name Primary?    Right shoulder pain, unspecified chronicity        Time in: 1200  Time Out: 1255  Total Treatment Time: 55  Group Time: 0      Subjective:    Mane reports decreased pain.  Patient reports their pain to be 5/10 on a 0-10 scale with 0 being no pain and 10 being the worst pain imaginable.    Objective    Patient received individual therapy to increase strength, endurance and ROM with activities as follows:     Mane received therapeutic exercises to develop strength, endurance and ROM for 45 minutes including:   Nu-step 5'  PROMEx all planes  AAROM Ex's  Joint approximation/weight bearing  Gentle isometrics emphasis on gleno humeral function  ARROMEX emphasis on scapular mobility    The patient received the following direct contact modalities: Ice cold packs x 10' R shoulder    Assessment:     Pt will continue to benefit from skilled PT intervention. Medical Necessity is demonstrated by:  Unable to participate in daily activities and Continued inability to participate in vocational pursuits.    Patient is making fair progress towards established goals.      Plan:  Continue with established Plan of Care towards PT goals.

## 2018-11-08 ENCOUNTER — CLINICAL SUPPORT (OUTPATIENT)
Dept: REHABILITATION | Facility: HOSPITAL | Age: 63
End: 2018-11-08
Attending: FAMILY MEDICINE
Payer: MEDICAID

## 2018-11-08 DIAGNOSIS — M25.511 RIGHT SHOULDER PAIN, UNSPECIFIED CHRONICITY: ICD-10-CM

## 2018-11-08 PROCEDURE — 97110 THERAPEUTIC EXERCISES: CPT | Mod: PN

## 2018-11-08 PROCEDURE — 97010 HOT OR COLD PACKS THERAPY: CPT | Mod: PN

## 2018-11-08 NOTE — PROGRESS NOTES
Name: Mane Dodd [facility] Number: [id number]  Date of Treatment: 11/08/2018   Diagnosis:   Encounter Diagnosis   Name Primary?    Right shoulder pain, unspecified chronicity        Time in: 1315  Time Out: 1400  Total Treatment Time: 40    Subjective:    Mane reports improvement of symptoms.  Patient reports their ant R shoulder pain to be 3/10 on a 0-10 scale with 0 being no pain and 10 being the worst pain imaginable.    Objective:    Patient received individual therapy to increase strength, endurance, ROM, flexibility, posture and core stabilization with activities as follows:     Mane received therapeutic exercises to develop strength, endurance, ROM, flexibility, posture and core stabilization for 30 minutes including:     Seated OH pulley scaption 5'  Seated scap retraction 10/3 B  Seated shoulder shrugs 10/3 B  Seated retro shoulder rolls B 10/3  Standing finger ladder R 10 trials flexion  Seated PROM R shoulder all planes     CP applied to the R shoudler x 10 minutes in seated position after being cleared for contraindications.       Continue HEP daily.    Pt demo good understanding of the education provided. Patient demonstrated good return demonstration of activities.     Assessment:     Improved tolerance for ex's today.     Pt will continue to benefit from skilled PT intervention. Medical Necessity is demonstrated by:  Requires skilled supervision to complete and progress HEP and Weakness.    Patient is making good progress towards established goals.    Plan:    Continue with established Plan of Care towards PT goals.

## 2018-11-09 ENCOUNTER — TELEPHONE (OUTPATIENT)
Dept: FAMILY MEDICINE | Facility: CLINIC | Age: 63
End: 2018-11-09

## 2018-11-09 ENCOUNTER — OFFICE VISIT (OUTPATIENT)
Dept: FAMILY MEDICINE | Facility: CLINIC | Age: 63
End: 2018-11-09
Payer: MEDICAID

## 2018-11-09 VITALS
HEART RATE: 78 BPM | DIASTOLIC BLOOD PRESSURE: 70 MMHG | RESPIRATION RATE: 18 BRPM | BODY MASS INDEX: 27.86 KG/M2 | TEMPERATURE: 98 F | SYSTOLIC BLOOD PRESSURE: 118 MMHG | HEIGHT: 65 IN | WEIGHT: 167.25 LBS

## 2018-11-09 DIAGNOSIS — R00.2 PALPITATIONS: Primary | ICD-10-CM

## 2018-11-09 DIAGNOSIS — I10 HYPERTENSION, ESSENTIAL: ICD-10-CM

## 2018-11-09 PROCEDURE — 90686 IIV4 VACC NO PRSV 0.5 ML IM: CPT | Mod: S$GLB,,, | Performed by: FAMILY MEDICINE

## 2018-11-09 PROCEDURE — 99214 OFFICE O/P EST MOD 30 MIN: CPT | Mod: 25,S$GLB,, | Performed by: FAMILY MEDICINE

## 2018-11-09 PROCEDURE — 90471 IMMUNIZATION ADMIN: CPT | Mod: S$GLB,,, | Performed by: FAMILY MEDICINE

## 2018-11-09 RX ORDER — METOPROLOL SUCCINATE 50 MG/1
50 TABLET, EXTENDED RELEASE ORAL DAILY
Qty: 30 TABLET | Refills: 2 | Status: SHIPPED | OUTPATIENT
Start: 2018-11-09 | End: 2018-12-03 | Stop reason: DRUGHIGH

## 2018-11-09 NOTE — TELEPHONE ENCOUNTER
Dr Gordon would like this pt seen for a follow up sometime in December. Pls call pt to sched an appt,. Thx .--lp

## 2018-11-10 NOTE — PROGRESS NOTES
Subjective:       Patient ID: Mane Ramires is a 63 y.o. male.    Chief Complaint: Hypertension (6 week follow up: Flu shot today)    HPI   To the patient is a 63-year-old who is here today for follow-up.  Overall, he continues to do well.  He is living with his daughter and her family.  Today we discussed the followin) hypertension.  His blood pressure is 118/70.  We did review his readings with the digital hypertension medicine program.  He is currently taking Toprol XL 25 mg and lisinopril 10 mg once a day.  2) palpitations.  He mentions that he has been having some increased palpitations recently.  He is currently taking Toprol XL 25 mg once a day.  He it is overdue for follow-up with his cardiologist and would be willing to schedule that  3) shoulder pain.  He continues to have significant issues with his right shoulder.  He is going through physical therapy but is having hard time tolerating that.  He is also having hard time seeing an orthopedic surgeon.  He has been told at the [facility] that they do not have his referral yet  4) anxiety and depression.  He is doing well with his current dose of Cymbalta.  He feels better living with his daughter and her family .  He is trying to put some of his past relationships behind him    Review of Systems   Constitutional: Negative for appetite change, chills, diaphoresis, fatigue, fever and unexpected weight change.   HENT: Negative for congestion, ear pain, postnasal drip, rhinorrhea, sinus pressure, sneezing, sore throat and trouble swallowing.    Eyes: Negative for pain, discharge and visual disturbance.   Respiratory: Negative for cough, chest tightness, shortness of breath and wheezing.    Cardiovascular: Positive for palpitations. Negative for chest pain and leg swelling.   Gastrointestinal: Negative for abdominal distention, abdominal pain, blood in stool, constipation, diarrhea, nausea and vomiting.   Musculoskeletal:        Per HPI   Skin:  Negative for rash.       Objective:      Physical Exam   Constitutional: He is oriented to person, place, and time. He appears well-developed and well-nourished. No distress.   HENT:   Head: Normocephalic and atraumatic.   Right Ear: Hearing, tympanic membrane, external ear and ear canal normal.   Left Ear: Hearing, tympanic membrane, external ear and ear canal normal.   Nose: Nose normal.   Mouth/Throat: Oropharynx is clear and moist and mucous membranes are normal. No oral lesions. No oropharyngeal exudate, posterior oropharyngeal edema or posterior oropharyngeal erythema.   Eyes: Conjunctivae, EOM and lids are normal. Pupils are equal, round, and reactive to light. No scleral icterus.   Neck: Normal range of motion. Neck supple. Carotid bruit is not present. No thyroid mass and no thyromegaly present.   Cardiovascular: Normal rate, regular rhythm and normal heart sounds.  No extrasystoles are present. PMI is not displaced. Exam reveals no gallop.   No murmur heard.  Pulmonary/Chest: Effort normal and breath sounds normal. No accessory muscle usage. No respiratory distress.   Clear to auscultation bilaterally.   Abdominal: Soft. Normal appearance and bowel sounds are normal. He exhibits no abdominal bruit. There is no hepatosplenomegaly. There is no tenderness. There is no rebound.   Musculoskeletal:   He has limited range of motion of his right shoulder   Lymphadenopathy:        Head (right side): No submental and no submandibular adenopathy present.        Head (left side): No submental and no submandibular adenopathy present.        Right cervical: No superficial cervical, no deep cervical and no posterior cervical adenopathy present.       Left cervical: No superficial cervical, no deep cervical and no posterior cervical adenopathy present.        Right: No supraclavicular adenopathy present.        Left: No supraclavicular adenopathy present.   Neurological: He is alert and oriented to person, place, and  "time.   Skin: Skin is warm, dry and intact.   Psychiatric: He has a normal mood and affect.     Blood pressure 118/70, pulse 78, temperature 98.1 °F (36.7 °C), temperature source Oral, resp. rate 18, height 5' 5" (1.651 m), weight 75.8 kg (167 lb 3.5 oz).Body mass index is 27.83 kg/m².        A/P:  1) hypertension.  Well controlled.  We are going to stop the lisinopril.  We are going to increase the Toprol XL to 50 mg once a day.  He will continue the digital hypertension program so that we can monitor his blood pressure responses to this adjustment.  If his readings are consistently higher than 135/85, he will let me know.  He does have upcoming fasting labs scheduled  2) palpitations.  Recurrent.  We will increase the Toprol as noted above.  We also scheduled follow-up with the cardiologist.  We will check additional labs with his upcoming blood work  3) persistent right shoulder pain.  We have previously submitted the referral.  We did offer him [facility] escalation line  4)   Anxiety and depression.  Well controlled.  Continue with Cymbalta 5  5)  Pre diabetes.  Status unknown.  We will see what his upcoming A1c shows    As long as she does well, I will see back in 6 weeks or sooner if needed.  He is having upcoming labs done and I will contact him with those results  "

## 2018-11-12 ENCOUNTER — PATIENT MESSAGE (OUTPATIENT)
Dept: FAMILY MEDICINE | Facility: CLINIC | Age: 63
End: 2018-11-12

## 2018-11-13 ENCOUNTER — CLINICAL SUPPORT (OUTPATIENT)
Dept: REHABILITATION | Facility: HOSPITAL | Age: 63
End: 2018-11-13
Attending: FAMILY MEDICINE
Payer: MEDICAID

## 2018-11-13 DIAGNOSIS — M25.511 RIGHT SHOULDER PAIN, UNSPECIFIED CHRONICITY: ICD-10-CM

## 2018-11-13 PROCEDURE — 97010 HOT OR COLD PACKS THERAPY: CPT | Mod: PN

## 2018-11-13 PROCEDURE — 97110 THERAPEUTIC EXERCISES: CPT | Mod: PN

## 2018-11-13 NOTE — PROGRESS NOTES
Name: Mane Dodd [facility] Number: [id number]  Date of Treatment: 11/13/2018   Diagnosis:   Encounter Diagnosis   Name Primary?    Right shoulder pain, unspecified chronicity        Time in: 1300  Time Out: 1400  Total Treatment Time: 20    Subjective:    Mane reports improvement of symptoms.  Patient reports their pain to be 4/10 on a 0-10 scale with 0 being no pain and 10 being the worst pain imaginable. Has been working on R UE swing.     Objective:    Patient received individual therapy to increase strength, endurance, ROM, flexibility, posture and core stabilization with activities as follows:     Mane received therapeutic exercises to develop strength, endurance, ROM, flexibility, posture and core stabilization for 20 minutes including:     MHP prior to RX 10' for mm relaxation purposes.   Seated OH pulley scaption 5'  Seated scap retraction 10/3 B  Seated shoulder shrugs 10/3 B  Seated retro shoulder rolls B 10/3  Standing finger ladder R 10 trials flexion to L24  Seated scapular setting 10/3 B  Seated AAROM R UE IR/ER with table and washrag 10/3   Seated R table wipes for flexion with washrag 10/3   Supine PROM R shoulder all planes     CP applied to the R shoulder x 10 minutes in seated position after being cleared for contraindications.    Continue HEP daily.    Pt demo good understanding of the education provided. Patient demonstrated good return demonstration of activities.     Assessment:     Decreased mm guarding with gait with improved swing and decreased pain and grimacing with PROM compared to previous sessions.     Pt will continue to benefit from skilled PT intervention. Medical Necessity is demonstrated by:  Requires skilled supervision to complete and progress HEP and Weakness.    Patient is making good progress towards established goals.    Plan:    Continue with established Plan of Care towards PT goals.

## 2018-11-15 ENCOUNTER — CLINICAL SUPPORT (OUTPATIENT)
Dept: REHABILITATION | Facility: HOSPITAL | Age: 63
End: 2018-11-15
Attending: FAMILY MEDICINE
Payer: MEDICAID

## 2018-11-15 DIAGNOSIS — M25.511 RIGHT SHOULDER PAIN, UNSPECIFIED CHRONICITY: Primary | ICD-10-CM

## 2018-11-15 PROCEDURE — 97110 THERAPEUTIC EXERCISES: CPT | Mod: PN

## 2018-11-15 NOTE — PROGRESS NOTES
Name: Mane Dodd [facility] Number: [id number]  Date of Treatment: 11/15/2018   Diagnosis: No diagnosis found.    Time in: 1204  Time Out: 1258  Total Treatment Time: 54  Group Time: 0      Subjective:    Mane reports improvement of symptoms and decreased pain.  Patient reports their pain to be 3/10 on a 0-10 scale with 0 being no pain and 10 being the worst pain imaginable.    Objective    Patient received individual therapy to increase strength, endurance and ROM with 0 patients with activities as follows:     Mane received therapeutic exercises to develop strength, endurance and ROM for 54 minutes including: Overhead pulley  Shoulder approximation to facilitate congruent capsule of the glenohumeral joint.  Gentle isometrics and rhythmic relaxation in all planes of motion.  ARROMExs with manual resist towards  pain-free limits all planes  AROMEX of the scapulohumeral rhythmic movements.    Encouraged HEP with AROM towards full range.    Assessment:       Pt will continue to benefit from skilled PT intervention. Medical Necessity is demonstrated by:  Pain limits function of effected part for some activities and Weakness.    Patient is making good progress towards established goals.    Plan:  Continue with established Plan of Care towards PT goals.

## 2018-11-16 ENCOUNTER — LAB VISIT (OUTPATIENT)
Dept: LAB | Facility: HOSPITAL | Age: 63
End: 2018-11-16
Attending: FAMILY MEDICINE
Payer: MEDICAID

## 2018-11-16 DIAGNOSIS — Z12.11 COLON CANCER SCREENING: ICD-10-CM

## 2018-11-16 LAB — HEMOCCULT STL QL IA: NEGATIVE

## 2018-11-16 PROCEDURE — 82274 ASSAY TEST FOR BLOOD FECAL: CPT

## 2018-11-17 ENCOUNTER — PATIENT MESSAGE (OUTPATIENT)
Dept: FAMILY MEDICINE | Facility: CLINIC | Age: 63
End: 2018-11-17

## 2018-11-17 ENCOUNTER — TELEPHONE (OUTPATIENT)
Dept: HEPATOLOGY | Facility: CLINIC | Age: 63
End: 2018-11-17

## 2018-11-19 ENCOUNTER — CLINICAL SUPPORT (OUTPATIENT)
Dept: REHABILITATION | Facility: HOSPITAL | Age: 63
End: 2018-11-19
Attending: FAMILY MEDICINE
Payer: MEDICAID

## 2018-11-19 DIAGNOSIS — M25.511 RIGHT SHOULDER PAIN, UNSPECIFIED CHRONICITY: ICD-10-CM

## 2018-11-19 PROCEDURE — 97010 HOT OR COLD PACKS THERAPY: CPT | Mod: PN

## 2018-11-19 PROCEDURE — 97110 THERAPEUTIC EXERCISES: CPT | Mod: PN

## 2018-11-19 NOTE — PROGRESS NOTES
Name: Mane Dodd [facility] Number: [id number]  Date of Treatment: 11/19/2018   Diagnosis:   Encounter Diagnosis   Name Primary?    Right shoulder pain, unspecified chronicity        Time in: 1405  Time Out: 1455  Total Treatment Time: 50  Group Time: 25      Subjective:    Mane reports improvement of symptoms.  Patient reports their R shoulder pain to be 3/10 on a 0-10 scale with 0 being no pain and 10 being the worst pain imaginable. Was feeling much better until he helped move furniture over the weekend. Pain is now improved and back to baseline.     Objective:    Patient received individual therapy to increase strength, endurance, ROM, flexibility, posture and core stabilization with activities as follows:     Mane received therapeutic exercises to develop strength, endurance, ROM, flexibility, posture and core stabilization for 25 minutes including:     MHP prior to RX 10' for mm relaxation purposes.   Seated OH pulley scaption 5'  Standing finger ladder R 10 trials flexion to L24  Isometrics R shoulder 10/3 with ball: flexion, extn, aBd, aDd, ER    CP applied to the R shoulder x 10 minutes in seated position after being cleared for contraindications.    Continue HEP daily.    Pt demo good understanding of the education provided. Patient demonstrated good return demonstration of activities.     Assessment:     Improving pain per pt report and increased R UE minimally, improving posture with decreased fwd R shoulder.     Pt will continue to benefit from skilled PT intervention. Medical Necessity is demonstrated by:  Requires skilled supervision to complete and progress HEP and Weakness.    Patient is making good progress towards established goals.    Plan:    Continue with established Plan of Care towards PT goals.

## 2018-11-20 ENCOUNTER — PATIENT OUTREACH (OUTPATIENT)
Dept: OTHER | Facility: OTHER | Age: 63
End: 2018-11-20

## 2018-11-20 NOTE — PROGRESS NOTES
Last 5 Patient Entered Readings                                      Current 30 Day Average: 136/84     Recent Readings 11/16/2018 11/16/2018 11/14/2018 11/13/2018 11/12/2018    SBP (mmHg) 137 141 126 151 142    DBP (mmHg) 85 80 81 91 83    Pulse 64 68 73 66 71        Digital Medicine: Health  Follow Up    Lifestyle Modifications:    1.Dietary Modifications (Sodium intake <2,000mg/day, food labels, dining out): Reports he has been successful in reducing salt intake per our last discussion. Is no longer adding it and avoids cooking with it as well. Encouraged to continue monitoring this. Patient admits he has not been consuming enough water, and feels some high blood pressure readings may be due to this. Agrees to focus on hydration for next 4 weeks.     2.Physical Activity: Deferred.     3.Medication Therapy: Patient has been compliant with the medication regimen.    4.Patient has the following medication side effects/concerns: Denies side effects/concerns.     Follow up with Mr. Mane Ramires completed. No further questions or concerns. Will continue to follow up to achieve health goals.

## 2018-11-21 ENCOUNTER — CLINICAL SUPPORT (OUTPATIENT)
Dept: REHABILITATION | Facility: HOSPITAL | Age: 63
End: 2018-11-21
Payer: MEDICAID

## 2018-11-21 DIAGNOSIS — M25.511 RIGHT SHOULDER PAIN, UNSPECIFIED CHRONICITY: ICD-10-CM

## 2018-11-21 PROCEDURE — 97110 THERAPEUTIC EXERCISES: CPT | Mod: PN

## 2018-11-21 NOTE — PROGRESS NOTES
Name: Mane Dodd [facility] Number: [id number]  Date of Treatment: 11/21/2018   Diagnosis:   Encounter Diagnosis   Name Primary?    Right shoulder pain, unspecified chronicity        Time in: 0907  Time Out: 1000  Total Treatment Time: 50    Subjective:    Mane reports improvement of symptoms.  Patient reports no pain at present. States he felt so good yesterday that he tried lifting weight to see if he could do it.     Objective:    Patient received individual therapy to increase strength, endurance, ROM, flexibility, posture and core stabilization with activities as follows:     Seated OH pulley scaption 5'  Standing finger ladder R 10 trials flexion to L24  Isometrics R shoulder 10/3 with ball: flexion, extn, aBd, aDd, ER  Standing B shoulder ER with chin tucks 30  Supine PROM L shoulder    Continue HEP daily.    Pt demo good understanding of the education provided. Patient demonstrated good return demonstration of activities.     Assessment:     Improving tolerance for PROM without withdrawal or grimacing. Improved ROM.     Pt will continue to benefit from skilled PT intervention. Medical Necessity is demonstrated by:  Requires skilled supervision to complete and progress HEP and Weakness.    Patient is making good progress towards established goals.    Plan:    Continue with established Plan of Care towards PT goals.

## 2018-11-21 NOTE — PLAN OF CARE
PHYSICAL THERAPY UPDATED PLAN OF TREATMENT    Patient name: Mane Ramires  Onset Date:  10/02/18  SOC Date:  10/12/18  Primary Diagnosis:    1. Right shoulder pain, unspecified chronicity       Treatment Diagnosis:  Limited ROM Right shoulder dysfunction  Certification Period:  10/12/18 to 11/23/18  Precautions:  Anxiety, no lifting  Visits from SOC:  11  Functional Level Prior to SOC:  Independent    Updated Assessment:  PROM is improved  except for right shoulder internal rotation 45 deg. All active mobility is still guarded and limited against gravity. Weak motor strength from pain inhibition.  Pain frequency is better as patient able to sleep without being awaken. Pain with movements persists.  Previous Short Term Goals Status: .  1.  PROM to right shoulder >90% in all planes.(met)  2. Decreased recurrence pain <4/10 at worse (partially met)  New Short Term Goals Status:    1. Active ROM against gravity > 50%  2. Patient will be able to sustain 5#'s of load with arms on side.  Long Term Goal Status:   continue per initial plan of care.  Reasons for Recertification of Therapy:   Patient has not achieved  goals.  Certification Period: 11/21/18 to 12/19/18  Recommended Treatment Plan: 2 times per week for 4 weeks: Manual Therapy, Moist Heat/ Ice and Therapeutic Exercise  Other Recommendations: Ortho consult        Therapist's Name: Rian Kahn, PT   Date: 11/21/2018    I CERTIFY THE NEED FOR THESE SERVICES FURNISHED UNDER THIS PLAN OF TREATMENT AND WHILE UNDER MY CARE    Physician's comments: ________________________________________________________________________________________________________________________________________________      Physician's Name: ___________________________________

## 2018-11-26 ENCOUNTER — LAB VISIT (OUTPATIENT)
Dept: LAB | Facility: HOSPITAL | Age: 63
End: 2018-11-26
Attending: INTERNAL MEDICINE
Payer: MEDICAID

## 2018-11-26 ENCOUNTER — PATIENT MESSAGE (OUTPATIENT)
Dept: FAMILY MEDICINE | Facility: CLINIC | Age: 63
End: 2018-11-26

## 2018-11-26 DIAGNOSIS — R73.02 IGT (IMPAIRED GLUCOSE TOLERANCE): ICD-10-CM

## 2018-11-26 DIAGNOSIS — E78.5 DYSLIPIDEMIA: ICD-10-CM

## 2018-11-26 DIAGNOSIS — R74.8 ELEVATED LIVER ENZYMES: ICD-10-CM

## 2018-11-26 DIAGNOSIS — R00.2 PALPITATIONS: ICD-10-CM

## 2018-11-26 LAB
25(OH)D3+25(OH)D2 SERPL-MCNC: 22 NG/ML
ALBUMIN SERPL BCP-MCNC: 3.5 G/DL
ALBUMIN SERPL BCP-MCNC: 3.5 G/DL
ALP SERPL-CCNC: 66 U/L
ALP SERPL-CCNC: 66 U/L
ALT SERPL W/O P-5'-P-CCNC: 21 U/L
ALT SERPL W/O P-5'-P-CCNC: 21 U/L
ANION GAP SERPL CALC-SCNC: 7 MMOL/L
ANION GAP SERPL CALC-SCNC: 7 MMOL/L
AST SERPL-CCNC: 19 U/L
AST SERPL-CCNC: 19 U/L
BASOPHILS # BLD AUTO: 0.05 K/UL
BASOPHILS NFR BLD: 0.5 %
BILIRUB SERPL-MCNC: 0.6 MG/DL
BILIRUB SERPL-MCNC: 0.6 MG/DL
BUN SERPL-MCNC: 9 MG/DL
BUN SERPL-MCNC: 9 MG/DL
CALCIUM SERPL-MCNC: 8.9 MG/DL
CALCIUM SERPL-MCNC: 8.9 MG/DL
CHLORIDE SERPL-SCNC: 105 MMOL/L
CHLORIDE SERPL-SCNC: 105 MMOL/L
CHOLEST SERPL-MCNC: 249 MG/DL
CHOLEST/HDLC SERPL: 5.9 {RATIO}
CO2 SERPL-SCNC: 28 MMOL/L
CO2 SERPL-SCNC: 28 MMOL/L
CREAT SERPL-MCNC: 1.3 MG/DL
CREAT SERPL-MCNC: 1.3 MG/DL
DIFFERENTIAL METHOD: NORMAL
EOSINOPHIL # BLD AUTO: 0.2 K/UL
EOSINOPHIL NFR BLD: 2 %
ERYTHROCYTE [DISTWIDTH] IN BLOOD BY AUTOMATED COUNT: 13.1 %
EST. GFR  (AFRICAN AMERICAN): >60 ML/MIN/1.73 M^2
EST. GFR  (AFRICAN AMERICAN): >60 ML/MIN/1.73 M^2
EST. GFR  (NON AFRICAN AMERICAN): 58.9 ML/MIN/1.73 M^2
EST. GFR  (NON AFRICAN AMERICAN): 58.9 ML/MIN/1.73 M^2
ESTIMATED AVG GLUCOSE: 123 MG/DL
GLUCOSE SERPL-MCNC: 114 MG/DL
GLUCOSE SERPL-MCNC: 114 MG/DL
HBA1C MFR BLD HPLC: 5.9 %
HCT VFR BLD AUTO: 46.1 %
HDLC SERPL-MCNC: 42 MG/DL
HDLC SERPL: 16.9 %
HGB BLD-MCNC: 15 G/DL
IMM GRANULOCYTES # BLD AUTO: 0.02 K/UL
IMM GRANULOCYTES NFR BLD AUTO: 0.2 %
LDLC SERPL CALC-MCNC: 165.8 MG/DL
LYMPHOCYTES # BLD AUTO: 2.6 K/UL
LYMPHOCYTES NFR BLD: 24.6 %
MAGNESIUM SERPL-MCNC: 1.9 MG/DL
MCH RBC QN AUTO: 30.4 PG
MCHC RBC AUTO-ENTMCNC: 32.5 G/DL
MCV RBC AUTO: 93 FL
MONOCYTES # BLD AUTO: 0.5 K/UL
MONOCYTES NFR BLD: 4.8 %
NEUTROPHILS # BLD AUTO: 7.3 K/UL
NEUTROPHILS NFR BLD: 67.9 %
NONHDLC SERPL-MCNC: 207 MG/DL
NRBC BLD-RTO: 0 /100 WBC
PLATELET # BLD AUTO: 280 K/UL
PMV BLD AUTO: 9.5 FL
POTASSIUM SERPL-SCNC: 3.9 MMOL/L
POTASSIUM SERPL-SCNC: 3.9 MMOL/L
PROT SERPL-MCNC: 6.7 G/DL
PROT SERPL-MCNC: 6.7 G/DL
RBC # BLD AUTO: 4.94 M/UL
SODIUM SERPL-SCNC: 140 MMOL/L
SODIUM SERPL-SCNC: 140 MMOL/L
TRIGL SERPL-MCNC: 206 MG/DL
TSH SERPL DL<=0.005 MIU/L-ACNC: 1.34 UIU/ML
WBC # BLD AUTO: 10.67 K/UL

## 2018-11-26 PROCEDURE — 82306 VITAMIN D 25 HYDROXY: CPT

## 2018-11-26 PROCEDURE — 80053 COMPREHEN METABOLIC PANEL: CPT

## 2018-11-26 PROCEDURE — 36415 COLL VENOUS BLD VENIPUNCTURE: CPT | Mod: PO

## 2018-11-26 PROCEDURE — 80061 LIPID PANEL: CPT

## 2018-11-26 PROCEDURE — 84443 ASSAY THYROID STIM HORMONE: CPT

## 2018-11-26 PROCEDURE — 83735 ASSAY OF MAGNESIUM: CPT

## 2018-11-26 PROCEDURE — 85025 COMPLETE CBC W/AUTO DIFF WBC: CPT

## 2018-11-26 PROCEDURE — 83036 HEMOGLOBIN GLYCOSYLATED A1C: CPT

## 2018-11-27 ENCOUNTER — PATIENT MESSAGE (OUTPATIENT)
Dept: FAMILY MEDICINE | Facility: CLINIC | Age: 63
End: 2018-11-27

## 2018-11-28 ENCOUNTER — OFFICE VISIT (OUTPATIENT)
Dept: OTOLARYNGOLOGY | Facility: CLINIC | Age: 63
End: 2018-11-28
Payer: MEDICAID

## 2018-11-28 ENCOUNTER — PATIENT MESSAGE (OUTPATIENT)
Dept: FAMILY MEDICINE | Facility: CLINIC | Age: 63
End: 2018-11-28

## 2018-11-28 VITALS — SYSTOLIC BLOOD PRESSURE: 152 MMHG | DIASTOLIC BLOOD PRESSURE: 93 MMHG | HEART RATE: 73 BPM

## 2018-11-28 DIAGNOSIS — J34.2 NASAL SEPTAL DEVIATION: ICD-10-CM

## 2018-11-28 DIAGNOSIS — J34.3 NASAL TURBINATE HYPERTROPHY: ICD-10-CM

## 2018-11-28 DIAGNOSIS — M95.0 NASAL DEFORMITY, ACQUIRED: ICD-10-CM

## 2018-11-28 DIAGNOSIS — Z98.890 POST-OPERATIVE STATE: Primary | ICD-10-CM

## 2018-11-28 PROCEDURE — 99999 PR PBB SHADOW E&M-EST. PATIENT-LVL III: CPT | Mod: PBBFAC,,, | Performed by: OTOLARYNGOLOGY

## 2018-11-28 PROCEDURE — 99213 OFFICE O/P EST LOW 20 MIN: CPT | Mod: PBBFAC | Performed by: OTOLARYNGOLOGY

## 2018-11-28 PROCEDURE — 99024 POSTOP FOLLOW-UP VISIT: CPT | Mod: ,,, | Performed by: OTOLARYNGOLOGY

## 2018-11-28 NOTE — PROGRESS NOTES
Three month S/P nasal reconstruction with bilateral  grafts and osteotomies,septo,turbs, right adelita.  Feels great, says major increased quality of life.  Having some itchy eyes and ear fullness which started after stopping his levocetirizine.  Exam: septum and nose straight,valves well supported,airway clear.  Plan: RTC 9 months  Continue levocetirizine; if allergies continue to be a problem, will refer to Allergist.

## 2018-11-29 ENCOUNTER — PATIENT MESSAGE (OUTPATIENT)
Dept: ENDOCRINOLOGY | Facility: CLINIC | Age: 63
End: 2018-11-29

## 2018-11-29 ENCOUNTER — PROCEDURE VISIT (OUTPATIENT)
Dept: HEPATOLOGY | Facility: CLINIC | Age: 63
End: 2018-11-29
Attending: PHYSICIAN ASSISTANT
Payer: MEDICAID

## 2018-11-29 ENCOUNTER — OFFICE VISIT (OUTPATIENT)
Dept: ENDOCRINOLOGY | Facility: CLINIC | Age: 63
End: 2018-11-29
Payer: MEDICAID

## 2018-11-29 VITALS
HEIGHT: 65 IN | WEIGHT: 171.06 LBS | BODY MASS INDEX: 28.5 KG/M2 | SYSTOLIC BLOOD PRESSURE: 124 MMHG | HEART RATE: 80 BPM | DIASTOLIC BLOOD PRESSURE: 80 MMHG

## 2018-11-29 DIAGNOSIS — I10 ESSENTIAL HYPERTENSION: ICD-10-CM

## 2018-11-29 DIAGNOSIS — R74.8 ELEVATED LIVER ENZYMES: ICD-10-CM

## 2018-11-29 DIAGNOSIS — E55.9 VITAMIN D DEFICIENCY: ICD-10-CM

## 2018-11-29 DIAGNOSIS — R73.02 IGT (IMPAIRED GLUCOSE TOLERANCE): ICD-10-CM

## 2018-11-29 PROCEDURE — 99214 OFFICE O/P EST MOD 30 MIN: CPT | Mod: S$PBB,,, | Performed by: INTERNAL MEDICINE

## 2018-11-29 PROCEDURE — 99213 OFFICE O/P EST LOW 20 MIN: CPT | Mod: PBBFAC,25 | Performed by: INTERNAL MEDICINE

## 2018-11-29 PROCEDURE — 91200 LIVER ELASTOGRAPHY: CPT | Mod: 26,S$PBB,, | Performed by: PHYSICIAN ASSISTANT

## 2018-11-29 PROCEDURE — 99999 PR PBB SHADOW E&M-EST. PATIENT-LVL III: CPT | Mod: PBBFAC,,, | Performed by: INTERNAL MEDICINE

## 2018-11-29 PROCEDURE — 91200 LIVER ELASTOGRAPHY: CPT | Mod: PBBFAC | Performed by: PHYSICIAN ASSISTANT

## 2018-11-29 RX ORDER — TESTOSTERONE CYPIONATE 200 MG/ML
100 INJECTION, SOLUTION INTRAMUSCULAR
Qty: 10 ML | Refills: 5 | Status: SHIPPED | OUTPATIENT
Start: 2018-11-29 | End: 2019-12-05 | Stop reason: SDUPTHER

## 2018-11-29 RX ORDER — ACETAMINOPHEN 500 MG
1 TABLET ORAL DAILY
Start: 2018-11-29 | End: 2021-10-29

## 2018-11-29 NOTE — ASSESSMENT & PLAN NOTE
Transman: gender incongruence         Will start  Sq Testosterone replacement therapy 200 mg q 2  weeks          Healthy lifestyle stressed        Reviewed risks and benefits.

## 2018-11-29 NOTE — PROGRESS NOTES
Subjective:      Patient ID: Mane Ramires is a 63 y.o. male.    Chief Complaint:  Impaired glucose tolerance and Gender dysphoria      History of Present Illness  Here for his  trans care  He and his wife are   -April      pt had b/l total complete mastectomy (2/16/15) with Dr Yi, and oophorectomy/removal of fallopian tubes with lysis of adhesions by Dr Carbajal (on 2/16/15)        Legally changed 2007   Started cross hormone therapy 1992      current dose 200 mg every 14 days   - wants to change to sq        Relationships  - woman    Has 3 biological children    Wife has 2 daughters of her own  +2 grandchildren      He is an artist       Drugs - none       With regards to the vitamin d deficiency:  Not taking otc 2000 iu a day      No recent fractures   Did fall and had rotator cuff tear        With regards to  igt  Denies symptoms of hyperglycemia such as polyuria, polydipsia, nocturia, unexplained weight loss or blurred vision      Review of Systems   Constitutional: Negative for unexpected weight change.   Eyes: Negative for visual disturbance.   Respiratory: Negative for shortness of breath.    Cardiovascular: Negative for chest pain.   Gastrointestinal: Negative for abdominal pain.   Musculoskeletal: Positive for arthralgias and myalgias.   Skin: Negative for wound.   Neurological: Negative for headaches.   Hematological: Does not bruise/bleed easily.   Psychiatric/Behavioral: Negative for sleep disturbance.       Objective:   Physical Exam   Neck: No thyromegaly present.   Cardiovascular: Normal rate.   Pulmonary/Chest: Effort normal.   Abdominal: Soft.   Musculoskeletal: He exhibits no edema.   Vitals reviewed.      Body mass index is 28.47 kg/m².    Lab Review:   Lab Results   Component Value Date    HGBA1C 5.9 (H) 11/26/2018     Lab Results   Component Value Date    CHOL 249 (H) 11/26/2018    HDL 42 11/26/2018    LDLCALC 165.8 (H) 11/26/2018    TRIG 206 (H) 11/26/2018    CHOLHDL 16.9  (L) 11/26/2018     Lab Results   Component Value Date     11/26/2018     11/26/2018    K 3.9 11/26/2018    K 3.9 11/26/2018     11/26/2018     11/26/2018    CO2 28 11/26/2018    CO2 28 11/26/2018     (H) 11/26/2018     (H) 11/26/2018    BUN 9 11/26/2018    BUN 9 11/26/2018    CREATININE 1.3 11/26/2018    CREATININE 1.3 11/26/2018    CALCIUM 8.9 11/26/2018    CALCIUM 8.9 11/26/2018    PROT 6.7 11/26/2018    PROT 6.7 11/26/2018    ALBUMIN 3.5 11/26/2018    ALBUMIN 3.5 11/26/2018    BILITOT 0.6 11/26/2018    BILITOT 0.6 11/26/2018    ALKPHOS 66 11/26/2018    ALKPHOS 66 11/26/2018    AST 19 11/26/2018    AST 19 11/26/2018    ALT 21 11/26/2018    ALT 21 11/26/2018    ANIONGAP 7 (L) 11/26/2018    ANIONGAP 7 (L) 11/26/2018    ESTGFRAFRICA >60.0 11/26/2018    ESTGFRAFRICA >60.0 11/26/2018    EGFRNONAA 58.9 (A) 11/26/2018    EGFRNONAA 58.9 (A) 11/26/2018    TSH 1.338 11/26/2018   Results for ANGELA DOZIER (MRN [id number]) as of 11/29/2018 10:24   Ref. Range 11/26/2018 09:10   Vit D, 25-Hydroxy Latest Ref Range: 30 - 96 ng/mL 22 (L)     Nl h/h     Assessment and Plan     HTN (hypertension)  Controlled     IGT (impaired glucose tolerance)    alerted as to the increased risk for t2DM  Stressed diet and exercise  Goal 5-7% weight loss    Periodic hba1c levels      Endocrine disorder in female-to-male transgender person  Transman: gender incongruence         Will start  Sq Testosterone replacement therapy 200 mg q 2  weeks          Healthy lifestyle stressed        Reviewed risks and benefits.       Vitamin D deficiency   over the counter vitamin D 2000 iu a day

## 2018-11-29 NOTE — PROCEDURES
Fibroscan Procedure     Name: Mane Ramires  Date of Procedure : 2018   :: Francisco J Jerry PA-C  Diagnosis: NAFLD    Probe: XL    Fibroscan readin.8 KPa    Fibrosis:F4     CAP readin dB/m    Steatosis: :<S1

## 2018-11-29 NOTE — ASSESSMENT & PLAN NOTE
alerted as to the increased risk for t2DM  Stressed diet and exercise  Goal 5-7% weight loss    Periodic hba1c levels

## 2018-11-30 ENCOUNTER — PATIENT MESSAGE (OUTPATIENT)
Dept: FAMILY MEDICINE | Facility: CLINIC | Age: 63
End: 2018-11-30

## 2018-12-03 ENCOUNTER — PATIENT OUTREACH (OUTPATIENT)
Dept: OTHER | Facility: OTHER | Age: 63
End: 2018-12-03

## 2018-12-03 DIAGNOSIS — I10 ESSENTIAL HYPERTENSION: Primary | ICD-10-CM

## 2018-12-03 RX ORDER — LISINOPRIL 10 MG/1
10 TABLET ORAL DAILY
Qty: 30 TABLET | Refills: 3 | Status: SHIPPED | OUTPATIENT
Start: 2018-12-03 | End: 2018-12-17

## 2018-12-03 RX ORDER — METOPROLOL SUCCINATE 50 MG/1
50 TABLET, EXTENDED RELEASE ORAL 2 TIMES DAILY
Qty: 60 TABLET | Refills: 1 | Status: SHIPPED | OUTPATIENT
Start: 2018-12-03 | End: 2018-12-17

## 2018-12-03 RX ORDER — METOPROLOL SUCCINATE 50 MG/1
50 TABLET, EXTENDED RELEASE ORAL 2 TIMES DAILY
COMMUNITY
End: 2018-12-03 | Stop reason: SDUPTHER

## 2018-12-03 NOTE — PROGRESS NOTES
"Last 5 Patient Entered Readings                                      Current 30 Day Average: 140/85     Recent Readings 11/30/2018 11/25/2018 11/16/2018 11/16/2018 11/14/2018    SBP (mmHg) 151 147 137 141 126    DBP (mmHg) 89 87 85 80 81    Pulse 70 79 64 68 73        Mr. Ramires's BP average has trended up and is now above goal. Dr. Gordon stopped lisinopril and increased metoprolol due to palpitations. Mr. Ramires states that his palpitations have resolved, but now he is "tired all the time and only wants to sleep all day." Explained that this is likely a side effect of the higher dose of metoprolol. He has follow up with cardiology next week. Advised he discuss palpitations and metoprolol dose at this visit. He was previously on lisinopril 10 mg QD and BP was at goal. Will resume lisinopril 10 mg QD in addition to metoprolol 50 mg BID. Will follow up after he sees cardiology. Discussed starting low dose diuretic instead of lisinopril, but since he is out of town and has lisinopril with him, will resume this for now.     Patient's BP average is above goal of <130/80.     Will continue to monitor regularly. Will follow up in 2-3 weeks, sooner if BP begins to trend upward or downward.    Patient has my contact information and knows to call with any concerns or clinical changes.     Current HTN regimen:  Hypertension Medications             lisinopril 10 MG tablet Take 1 tablet (10 mg total) by mouth once daily.    metoprolol succinate (TOPROL-XL) 50 MG 24 hr tablet Take 1 tablet (50 mg total) by mouth 2 (two) times daily.              "

## 2018-12-04 ENCOUNTER — PATIENT MESSAGE (OUTPATIENT)
Dept: HEPATOLOGY | Facility: CLINIC | Age: 63
End: 2018-12-04

## 2018-12-04 DIAGNOSIS — K74.00 LIVER FIBROSIS: Primary | ICD-10-CM

## 2018-12-10 ENCOUNTER — TELEPHONE (OUTPATIENT)
Dept: HEPATOLOGY | Facility: CLINIC | Age: 63
End: 2018-12-10

## 2018-12-10 ENCOUNTER — PATIENT MESSAGE (OUTPATIENT)
Dept: HEPATOLOGY | Facility: CLINIC | Age: 63
End: 2018-12-10

## 2018-12-10 NOTE — TELEPHONE ENCOUNTER
Received message from Francisco J NUR to arrange for the patient to have a Liver Biopsy.  Patient called and message relayed and agreed to do the Liver Biopsy.  Pre Procedure testing scheduled for Wed 12/12/18 [facility] location.  Pre and Post Procedure teaching done with the patient.  The patient would like to do the Liver Biopsy ASAP Early AM.  Last dose of Voltaren was taken about 11/12/18.

## 2018-12-11 ENCOUNTER — OFFICE VISIT (OUTPATIENT)
Dept: CARDIOLOGY | Facility: CLINIC | Age: 63
End: 2018-12-11
Payer: MEDICAID

## 2018-12-11 VITALS
OXYGEN SATURATION: 97 % | WEIGHT: 172 LBS | SYSTOLIC BLOOD PRESSURE: 124 MMHG | HEIGHT: 65 IN | BODY MASS INDEX: 28.66 KG/M2 | DIASTOLIC BLOOD PRESSURE: 80 MMHG | HEART RATE: 97 BPM

## 2018-12-11 DIAGNOSIS — Z82.49 FAMILY HISTORY OF ASCVD (ARTERIOSCLEROTIC CARDIOVASCULAR DISEASE): ICD-10-CM

## 2018-12-11 DIAGNOSIS — Z91.89 RISK FOR CORONARY ARTERY DISEASE LESS THAN 10% IN NEXT 10 YEARS: ICD-10-CM

## 2018-12-11 DIAGNOSIS — R07.9 CHEST PAIN, UNSPECIFIED TYPE: Primary | ICD-10-CM

## 2018-12-11 PROCEDURE — 99214 OFFICE O/P EST MOD 30 MIN: CPT | Mod: PBBFAC,PN | Performed by: INTERNAL MEDICINE

## 2018-12-11 PROCEDURE — 99215 OFFICE O/P EST HI 40 MIN: CPT | Mod: S$PBB,,, | Performed by: INTERNAL MEDICINE

## 2018-12-11 PROCEDURE — 99999 PR PBB SHADOW E&M-EST. PATIENT-LVL IV: CPT | Mod: PBBFAC,,, | Performed by: INTERNAL MEDICINE

## 2018-12-11 RX ORDER — CARBOXYMETHYLCELLULOSE SODIUM 5 MG/ML
1 SOLUTION/ DROPS OPHTHALMIC NIGHTLY
COMMUNITY

## 2018-12-11 NOTE — PATIENT INSTRUCTIONS
Assessment/Plan:  Mane Ramires is a 61 y.o. 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    1. Chest Pain- Pt with multiple risk factors for CAD.  Check echo and treadmill nuclear stress test to evaluate further.      2. Palpitations- No further episode since visit on 9/14/2017.  Echo shows a small secundum ASD. Qp/QS= 1.2.  Now taking Metoprolol succinate 50 mg daily.       3. HTN- Controlled.  Continue lisinopril 10 mg and metoprolol succinate 50 mg daily.        4. ASCVD risk- Calculated ASCVD risk of 6.4%.  Given continued elevation in LFT's with atorvastatin, will hold off of restarting a statin at this time.   Nuclear stress test from 4/12/2017 shows no evidence for myocardial ischemia or injury. Normal LVEF of 53%.       Will call pt with results  Otherwise follow up in 6 months

## 2018-12-11 NOTE — PROGRESS NOTES
[facility]    CC: Follow Up    Patient ID: Mane Ramires is a 61 y.o. 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  He was kindly referred by Dr. Gordon.  Pertinent history/events are as follows:       -Pt was seen by Dr. Monsalve in [place] on 5/26/2016 for cardiac evaluation due to abnormal EKG and palpitations. He was seen in 2014 for the same EKG abnormality and had a stress test done in 12/14 in [place] that reported normal myocardial perfusion and normal LVEF. Recently he has been experiencing frequent skipped beats on a daily basis.   -Holter monitor from 6/22/2016 unremarkable.  Showed predominant rhythm to be sinus with heart rates varying between 54 and 120 bpm with an average of 83 bpm.  No VT or VF.  No evidence of high grade SA or AV eric block.    -Echo from 6/23/2016 shows normal LVEF of 60-65%; left ventricular diastolic dysfunction. There is evidence of a left to right shunt across the atrial septum on color Doppler consistent with a small secundum ASD. Qp/QS= 1.2.  The atrial septum is  thickened c/w lipomatous hypertrophy.  -Started on Metoprolol succinate 100 mg daily with good control of the palpitations.     -At our initial appointment on 3/17/2017, Mr. Ramires reported occasional palpitations (once or twice a week).  He has no chest pain, SOB, LE edema, PND, claudication, TIA symptoms or syncope.  Taking all medications as prescribed with no issues.  BP today 146/71.  Plan:  Echo shows a small secundum ASD. Qp/QS= 1.2.  Continue Metoprolol succinate 100 mg daily.  Pt to keep log of home blood pressure/heart rate and bring in next visit.  Given strong family history and risk factors, will schedule for exercise nuclear stress test.  Check lipids/LFT's and plan to start statin next visit.    -At follow up clinic visit on 9/14/2017, Mr. Ramires  "reported doing well.  Palpitations occurring less frequently now (approximately three times a week vs. once or twice a day previously).  He has no chest pain, SOB, LE edema, PND, claudication, TIA symptoms or syncope.  Metoprolol succinate reduced to 50 mg daily by his PCP.  ASCVD risk is 3.6%.  Pt recently started on statin and had increase in LFT's.  Statin stopped and LFT's now improving.  BP today 109/61.  Nuclear stress test from 4/12/2017 shows no evidence for myocardial ischemia or injury. Normal LVEF of 53%.      -Underwent cholecystectomy on 11/9/2017.    -Underwent carpal tunnel surgery on 12/4/2017.    -At follow up clinic visit on 12/12/2017, Mr. Ramires reported no further episodes of palpitations since our last visit on 9/14/2017.  No chest pain.  Blood pressures running low 100's systolic.  Mr. Ramires states he feel's tired all the time and thinks this may be due to low blood pressures.  Now taking Metoprolol succinate 25 mg daily.  Plan: No further episodes of palpitations since last visit on 9/14/2017.  Echo shows a small secundum ASD. Qp/QS= 1.2.  Now taking Metoprolol succinate 25 mg daily.  Decrease lisinopril to 20 mg daily.  Pt to keep log of blood pressure/heart rate and bring in next visit.  Calculated ASCVD risk of 3.6%.  Given continued elevation in LFT's with atorvastatin, will hold off of restarting a statin at this time.  Repeat LFT's in 3 months.  Nuclear stress test from 4/12/2017 shows no evidence for myocardial ischemia or injury. Normal LVEF of 53%.      HPI:  Mr. Ramires reports episodes of chest pain at rest approximately 3 weeks ago.  Described as "pressure", located at mid-chest, non-radiating, 6/10, no associated n/v/d.  No significant smoking history.  Family history of CAD with MI in mother in her 60's; dad at age 38; brother at age 62.  Ten year ASCVD risk score is 6.4%.      Past Medical History:   Diagnosis Date    Accident     fell from roof with TBI (word finding " issues personality changes, memory deficets), R rib fractures, clavicle fx    Allergy     Anxiety     ASD (atrial septal defect)     noted on ECHO 6/16    Cervical stenosis of spine     noted on 6/15 MRI    CTS (carpal tunnel syndrome)     mild-mod on 4/17 NCS    DDD (degenerative disc disease), cervical 10/2014    C5-6 and C6-C7    Depression     JEEVAN (generalized anxiety disorder)     Gender identity disorder     H/O cardiovascular stress test     12/14 normal    Herpes simplex type 2 infection     History of atypical hyperplasia of breast     B precancer lesions    Hypertension     IGT (impaired glucose tolerance)     Myelomalacia     c-spine noted on 8/15 MRI    OA (osteoarthritis)     Prediabetes     TBI (traumatic brain injury)     after falling off a roof in 2003     Past Surgical History:   Procedure Laterality Date    AMPUTATION      L index finger    APPENDECTOMY  1969    BILATERAL SALPINGOOPHORECTOMY  02/2015    CARPAL TUNNEL RELEASE  12/2017    right    CERVICAL FUSION  10/2014    C5-C6 and C6-C7    CHOLECYSTECTOMY      CHOLECYSTECTOMY-LAPAROSCOPIC N/A 11/9/2017    Per[facility]an [facility] at [facility]    ENDOSCOPIC NASAL SEPTOPLASTY N/A 8/23/2018    Procedure: SEPTOPLASTY, NASAL, ENDOSCOPIC;  Surgeon: Reji Arce III, MD;  Location: [facility] OR [address] [place] [address];  Service: ENT;  Laterality: N/A;    ESOPHAGOGASTRODUODENOSCOPY (EGD) N/A 9/6/2017    Performed by [facility]yon [facility] at [facility] ENDO    EXCISION,NASAL TURBINATE,SUBMUCOSAL Bilateral 8/23/2018    Performed by Reji Arce III, MD at [facility] OR [address] [place] [address]    FINGER AMPUTATION Left     FRACTURE SURGERY Right 2003    rib fractures    GRAFT- Bilateral 8/23/2018    [facility]    HYSTERECTOMY  1984    MIGUEL    MASTECTOMY  02/2015    MASTECTOMY-BREAST-BILATERAL Bilateral 2/16/2015    Performed by Kali Yi MD at [facility] OR [address] [place] [address]    RECONSTRUCTION OF NOSE N/A  2018    Procedure: RECONSTRUCTION, NOSE;  Surgeon: Reji Arce III, MD;  Location: [facility] OR [address] [place] [address];  Service: ENT;  Laterality: N/A;    RECONSTRUCTION, NOSE N/A 2018    [facility]    RELEASE-CARPAL TUNNEL  Right carpal tunnel release Right 2017    [facility]    KUKCYQMA-[id number]-ZQTRROCQUBFZ Bilateral 2015    Performed by Jenna Carbajal MD at [facility] OR [address] [place] [address]    SEPTOPLASTY, NASAL, ENDOSCOPIC N/A 2018    Performed by Reji Arce III, MD at [facility] OR [address] [place] [address]    UPPER GASTROINTESTINAL ENDOSCOPY  2017    Dr. Ayon     Social History     Socioeconomic History    Marital status: Single     Spouse name: Not on file    Number of children: Not on file    Years of education: Not on file    Highest education level: Not on file   Social Needs    Financial resource strain: Not on file    Food insecurity - worry: Not on file    Food insecurity - inability: Not on file    Transportation needs - medical: Not on file    Transportation needs - non-medical: Not on file   Occupational History    Occupation: Artist   Tobacco Use    Smoking status: Former Smoker     Packs/day: 0.25     Years: 2.00     Pack years: 0.50     Types: Cigarettes     Last attempt to quit: 2002     Years since quittin.1    Smokeless tobacco: Never Used   Substance and Sexual Activity    Alcohol use: Yes     Comment: rare     Drug use: Yes     Types: Hydrocodone    Sexual activity: No   Other Topics Concern    Patient feels they ought to cut down on drinking/drug use Not Asked    Patient annoyed by others criticizing their drinking/drug use Not Asked    Patient has felt bad or guilty about drinking/drug use Not Asked    Patient has had a drink/used drugs as an eye opener in the AM Not Asked   Social History Narrative    Not on file     Family History   Problem Relation Age of Onset    Diabetes Mother      Heart disease Mother     Gallbladder disease Mother     Coronary artery disease Father     Breast cancer Sister     Heart disease Sister     Lung cancer Paternal Grandfather     Heart disease Paternal Grandfather     Heart disease Maternal Grandmother     Gallbladder disease Maternal Grandmother     Heart attack Brother     Gallbladder disease Daughter     Colon cancer Neg Hx     Colon polyps Neg Hx     Crohn's disease Neg Hx     Ulcerative colitis Neg Hx     Stomach cancer Neg Hx     Esophageal cancer Neg Hx        Review of patient's allergies indicates:   Allergen Reactions    Sudafed [pseudoephedrine hcl] Other (See Comments)     Heart racing      Codeine Other (See Comments)     Heart racing    Hydrocodone Other (See Comments)     insomnia    Cortisone Palpitations          Medication List           Accurate as of 12/11/18  1:59 PM. If you have any questions, ask your nurse or doctor.               CHANGE how you take these medications    metoprolol succinate 50 MG 24 hr tablet  Commonly known as:  TOPROL-XL  Take 1 tablet (50 mg total) by mouth 2 (two) times daily.  What changed:  when to take this     traZODone 50 MG tablet  Commonly known as:  DESYREL  TAKE 1 TABLET (50 MG TOTAL) BY MOUTH EVERY EVENING.  What changed:    · when to take this  · reasons to take this        CONTINUE taking these medications    carboxymethylcellulose 0.5 % Dpet  Commonly known as:  REFRESH PLUS     cholecalciferol (vitamin D3) 2,000 unit Cap  Commonly known as:  VITAMIN D3  Take 1 capsule (2,000 Units total) by mouth once daily.     diclofenac 50 MG EC tablet  Commonly known as:  VOLTAREN  1-2 tab PO PRN pain. No more than 4 tabs per day.     DULoxetine 60 MG capsule  Commonly known as:  CYMBALTA  TAKE 1 CAPSULE (60 MG TOTAL) BY MOUTH ONCE DAILY.     HYDROcodone-acetaminophen 5-325 mg per tablet  Commonly known as:  NORCO  Take 1 tablet by mouth every 6 (six) hours as needed for Pain.     ketoconazole 2 %  "cream  Commonly known as:  NIZORAL  Apply topically once daily.     levocetirizine 5 MG tablet  Commonly known as:  XYZAL  TAKE 1 TABLET (5 MG TOTAL) BY MOUTH NIGHTLY AS NEEDED FOR ALLERGIES.     lisinopril 10 MG tablet  Take 1 tablet (10 mg total) by mouth once daily.     ranitidine 300 MG tablet  Commonly known as:  ZANTAC     testosterone cypionate 200 mg/mL injection  Commonly known as:  DEPOTESTOTERONE CYPIONATE  Inject 0.5 mLs (100 mg total) into the muscle every 7 days. 3 ml syringe with 18 g needle  to draw  X 10   25 g 1/2 inch needle to inject x 10        STOP taking these medications    ibuprofen 800 MG tablet  Commonly known as:  ADVIL,MOTRIN  Stopped by:  Davian Art MD PhD            Review of Systems  Constitution: Negative for diaphoresis and fever.   HENT: Negative for hearing loss and hoarse voice.   Eyes: Negative for redness.   Cardiovascular: Positive for occasional palpitations. Negative for leg swelling and syncope.   Respiratory: Negative for cough and wheezing.   Endocrine: Negative for cold intolerance.   Hematologic/Lymphatic: Does not bruise/bleed easily.   Skin: Positive for flushing.   Musculoskeletal: Positive for joint pain. Negative for joint swelling.   Gastrointestinal: Negative for abdominal pain and vomiting.   Genitourinary: Negative for hematuria.   Neurological: Negative for seizures.   Psychiatric/Behavioral: The patient is not nervous/anxious.   Allergic/Immunologic: Negative for hives.     Physical Examination  /80 (BP Location: Left arm, Patient Position: Sitting, BP Method: X-Large (Manual))   Pulse 97   Ht 5' 5" (1.651 m)   Wt 78 kg (172 lb)   SpO2 97%   BMI 28.62 kg/m²     Constitutional: No acute distress, conversant  HEENT: Sclera anicteric, Pupils equal, round and reactive to light, extraocular motions intact, Oropharynx clear  Neck: No JVD, no carotid bruits  Cardiovascular: regular rate and rhythm, no murmur, rubs or gallops, normal " S1/S2  Pulmonary: Clear to auscultation bilaterally  Abdominal: Abdomen soft, nontender, nondistended, positive bowel sounds  Extremities: No lower extremity edema,   Pulses:  Carotid pulses are 2+ on the right side, and 2+ on the left side.  Radial pulses are 2+ on the right side, and 2+ on the left side.   Femoral pulses are 2+ on the right side, and 2+ on the left side.  Skin: No ecchymosis, erythema, or ulcers  Psych: Alert and oriented x 3, appropriate affect  Neuro: CNII-XII intact, no focal deficits    Labs:  Most Recent Data  CBC:   Lab Results   Component Value Date    WBC 10.67 11/26/2018    HGB 15.0 11/26/2018    HCT 46.1 11/26/2018     11/26/2018    MCV 93 11/26/2018    RDW 13.1 11/26/2018     BMP:   Lab Results   Component Value Date     11/26/2018     11/26/2018    K 3.9 11/26/2018    K 3.9 11/26/2018     11/26/2018     11/26/2018    CO2 28 11/26/2018    CO2 28 11/26/2018    BUN 9 11/26/2018    BUN 9 11/26/2018     (H) 11/26/2018     (H) 11/26/2018    CALCIUM 8.9 11/26/2018    CALCIUM 8.9 11/26/2018    MG 1.9 11/26/2018     LFTS;   Lab Results   Component Value Date    PROT 6.7 11/26/2018    PROT 6.7 11/26/2018    ALBUMIN 3.5 11/26/2018    ALBUMIN 3.5 11/26/2018    BILITOT 0.6 11/26/2018    BILITOT 0.6 11/26/2018    AST 19 11/26/2018    AST 19 11/26/2018    ALKPHOS 66 11/26/2018    ALKPHOS 66 11/26/2018    ALT 21 11/26/2018    ALT 21 11/26/2018     COAGS:   Lab Results   Component Value Date    INR 0.9 03/15/2018     FLP:   Lab Results   Component Value Date    CHOL 249 (H) 11/26/2018    HDL 42 11/26/2018    LDLCALC 165.8 (H) 11/26/2018    TRIG 206 (H) 11/26/2018    CHOLHDL 16.9 (L) 11/26/2018       EKG 4/12/2017:  Normas sinus rhythm  Nonspecific ST/T wave changes    Nuclear Stress Test 4/12/2017:  Nuclear Quantitative Functional Analysis:   LVEF: 53 % (normal is 47 - 59)  LVED Volume: 66 ml (normal is 91 - 155)  LVES Volume: 31 ml (normal is 40 -  78)    Impression: NORMAL MYOCARDIAL PERFUSION  1. The perfusion scan is free of evidence for myocardial ischemia or injury.   2. Resting wall motion is physiologic.   3. Resting LV function is normal.  (normal is 47 - 59)  4. The ventricular volumes are normal at rest and stress.   5. The extracardiac distribution of radioactivity is normal.     Echo 6/23/2016:  Technical Quality: This is a technically adequate study.     Aorta: The aortic root is normal in size, measuring 2.6 cm at sinotubular junction.     Left Atrium: The left atrium is normal in size, measuring 3.7 cm across in the parasternal view, and 4.1 cm across in the apical view.     Left Ventricle: The left ventricle is normal in size, with an end-diastolic diameter of 4.3 cm, and an end-systolic diameter of 3.1 cm. LV wall thickness is normal, with the septum measuring 1.1 cm and the posterior wall measuring 0.8 cm across. Global   left ventricular systolic function appears normal. Visually estimated ejection fraction is 60-65%.   Mitral inflow patterns reveal an E:A ratio of 0.7, with a deceleration time of 200 msec., consistent with diastolic dysfunction secondary to relaxation abnormality.     Right Atrium: The right atrium is normal in size. There is Chiari network in the right atrium.    Right Ventricle: The right ventricle is normal in size measuring 2.3 cm at the base in the apical right ventricle-focused view. Global right ventricular systolic function appears normal. The estimated PA systolic pressure is greater than 19 mmHg.     Aortic Valve:  The aortic valve has normal leaflet mobility.     Mitral Valve:  The mitral valve is thickened. There is mild mitral regurgitation.     Tricuspid Valve:  The tricuspid valve is normal in structure. There is mild tricuspid regurgitation.     Pulmonary Valve:  The pulmonic valve is normal in structure. There is trivial pulmonic regurgitation.     Pericardium: There is evidence of a trivial pericardial  "effusion.     IVC: The IVC is not visualized.     Shunt: There is evidence of a left to right shunt across the atrial septum on color Doppler consistent with a small secundum ASD. Qp/QS= 1.2.  The atrial septum is thickened c/w lipomatous hypertrophy.    Intracavitary: There is no evidence of intracavity mass, thrombi, or vegetation.     CONCLUSIONS     1 - Normal left ventricular systolic function (EF 60-65%).     2 - Left ventricular diastolic dysfunction.     3 - Normal right ventricular systolic function .     4 - Small interatrial communication as above.    24 hour holter 6/22/2016:  PRE-TEST DATA   The diary was properly completed.    The diary included "heart beating hard/fast" on multiple occasions which correlated with sinus rhythm.    TEST DESCRIPTION   PREDOMINANT RHYTHM  1. Sinus rhythm with heart rates varying between 54 and 120 bpm with an average of 83 bpm.     VENTRICULAR ARRHYTHMIAS  1. There were rare PVCs totalling 6 and averaging less than 1 per hour.     2. There were no episodes of ventricular tachycardia.    SUPRA VENTRICULAR ARRHYTHMIAS  1. There were occasional PACs totalling 25 and averaging 1 per hour.     2. There were no episodes of sustained supraventricular tachycardia.    SINUS NODE FUNCTION  1. There was no evidence of high grade SA eric block.     AV CONDUCTION  1. There was no evidence of high grade AV block.     DIARY  1. The diary was properly completed.     MISCELLANEOUS  1. There were rare hookup related artifacts. Overall, the study was of good quality.     2. This was a tape of adequate length (24 hrs).     Assessment/Plan:  Mane Ramires is a 61 y.o. 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    1. Chest Pain- Pt with multiple risk factors for CAD.  Check echo and treadmill nuclear stress test to evaluate further.      2. Palpitations- " No further episode since visit on 9/14/2017.  Echo shows a small secundum ASD. Qp/QS= 1.2.  Now taking Metoprolol succinate 50 mg daily.       3. HTN- Controlled.  Continue lisinopril 10 mg and metoprolol succinate 50 mg daily.        4. ASCVD risk- Calculated ASCVD risk of 6.4%.  Given continued elevation in LFT's with atorvastatin, will hold off of restarting a statin at this time.   Nuclear stress test from 4/12/2017 shows no evidence for myocardial ischemia or injury. Normal LVEF of 53%.       Will call pt with results  Otherwise follow up in 6 months    Total duration of face to face visit time 30 minutes.  Total time spent counseling greater than fifty percent of total visit time.  Counseling included discussion regarding imaging findings, diagnosis, possibilities, treatment options, risks and benefits.  The patient had many questions regarding the options and long-term effects.    Davian Art MD, PhD  Interventional Cardiology

## 2018-12-12 ENCOUNTER — HOSPITAL ENCOUNTER (OUTPATIENT)
Dept: RADIOLOGY | Facility: CLINIC | Age: 63
Discharge: HOME | End: 2018-12-12
Attending: PHYSICIAN ASSISTANT
Payer: MEDICAID

## 2018-12-12 DIAGNOSIS — K74.00 LIVER FIBROSIS: ICD-10-CM

## 2018-12-12 PROCEDURE — 76700 US EXAM ABDOM COMPLETE: CPT | Mod: TC,PO

## 2018-12-12 PROCEDURE — 76700 US EXAM ABDOM COMPLETE: CPT | Mod: 26,,, | Performed by: RADIOLOGY

## 2018-12-13 ENCOUNTER — TELEPHONE (OUTPATIENT)
Dept: HEPATOLOGY | Facility: CLINIC | Age: 63
End: 2018-12-13

## 2018-12-13 NOTE — TELEPHONE ENCOUNTER
Patient completed his Pre Procedure Testing (labs and u/s) for the U/S Guided Liver Biopsy.  Request faxed to Radiology for an appointment.

## 2018-12-14 ENCOUNTER — TELEPHONE (OUTPATIENT)
Dept: HEPATOLOGY | Facility: CLINIC | Age: 63
End: 2018-12-14

## 2018-12-14 ENCOUNTER — PATIENT OUTREACH (OUTPATIENT)
Dept: OTHER | Facility: OTHER | Age: 63
End: 2018-12-14

## 2018-12-14 DIAGNOSIS — K74.00 LIVER FIBROSIS: Primary | ICD-10-CM

## 2018-12-14 NOTE — PROGRESS NOTES
"Last 5 Patient Entered Readings                                      Current 30 Day Average: 135/81     Recent Readings 12/14/2018 12/14/2018 12/9/2018 12/4/2018 11/30/2018    SBP (mmHg) 142 72 132 113 151    DBP (mmHg) 86 64 79 75 89    Pulse 66 69 70 79 70        Patient's BP average is above goal of <130/80.     Mr. Ramires saw cardiology last week. He informed me that he has self-adjusted his metoprolol to 50 mg daily again. He was unable to tolerate the 50 mg BID dose due to "feeling tired all the time." He has not had any palpitations and is feeling well. Discussed stopping lisinopril and starting a low dose of irbesartan in place of it, but will hold off until after his liver biopsy and echo.    Will continue to monitor regularly. Will follow up in 4-6 weeks, sooner if BP begins to trend upward or downward.    Patient has my contact information and knows to call with any concerns or clinical changes.     Current HTN regimen:  Hypertension Medications             lisinopril 10 MG tablet Take 1 tablet (10 mg total) by mouth once daily.    metoprolol succinate (TOPROL-XL) 50 MG 24 hr tablet Take 1 tablet (50 mg total) by mouth 2 (two) times daily.              "

## 2018-12-14 NOTE — TELEPHONE ENCOUNTER
Received call from Radiology with approval for the patient to have the U/S Guided Liver Biopsy on Tuesday 12/18/18 with Dosc at 7:30 am and Procedure at 9 am.    Patient called. Date and time is acceptable.  Pre Procedure teaching reinforced.  Last dose of Voltaren was 11/12/18.  Verbalized understanding instructions.

## 2018-12-17 ENCOUNTER — OFFICE VISIT (OUTPATIENT)
Dept: FAMILY MEDICINE | Facility: CLINIC | Age: 63
End: 2018-12-17
Payer: MEDICAID

## 2018-12-17 VITALS
SYSTOLIC BLOOD PRESSURE: 124 MMHG | DIASTOLIC BLOOD PRESSURE: 70 MMHG | HEIGHT: 65 IN | HEART RATE: 76 BPM | BODY MASS INDEX: 28.28 KG/M2 | RESPIRATION RATE: 18 BRPM | TEMPERATURE: 99 F | WEIGHT: 169.75 LBS

## 2018-12-17 DIAGNOSIS — R07.9 CHEST PAIN, UNSPECIFIED TYPE: Primary | ICD-10-CM

## 2018-12-17 DIAGNOSIS — I10 HYPERTENSION, ESSENTIAL: Primary | ICD-10-CM

## 2018-12-17 DIAGNOSIS — F41.1 GAD (GENERALIZED ANXIETY DISORDER): ICD-10-CM

## 2018-12-17 DIAGNOSIS — F32.A DEPRESSION, UNSPECIFIED DEPRESSION TYPE: ICD-10-CM

## 2018-12-17 PROCEDURE — 99214 OFFICE O/P EST MOD 30 MIN: CPT | Mod: S$GLB,,, | Performed by: FAMILY MEDICINE

## 2018-12-17 RX ORDER — LISINOPRIL 20 MG/1
20 TABLET ORAL DAILY
Qty: 30 TABLET | Refills: 1 | Status: SHIPPED | OUTPATIENT
Start: 2018-12-17 | End: 2019-01-17 | Stop reason: ALTCHOICE

## 2018-12-17 RX ORDER — METOPROLOL SUCCINATE 25 MG/1
25 TABLET, EXTENDED RELEASE ORAL DAILY
Qty: 30 TABLET | Refills: 1 | Status: SHIPPED | OUTPATIENT
Start: 2018-12-17 | End: 2019-01-17

## 2018-12-17 RX ORDER — DULOXETIN HYDROCHLORIDE 30 MG/1
30 CAPSULE, DELAYED RELEASE ORAL DAILY
Qty: 30 CAPSULE | Refills: 2 | Status: SHIPPED | OUTPATIENT
Start: 2018-12-17 | End: 2019-01-28

## 2018-12-17 NOTE — PROGRESS NOTES
Subjective:       Patient ID: Mane Ramires is a 63 y.o. male.    Chief Complaint: Hypertension (6  week follow up )    HPI   The patient is a 63-year-old who is here today for short-term follow-up.  Of note, tomorrow he is having a liver biopsy.  Today we discussed the followin)  hypertension.  His blood pressures have been a bit erratic since our last adjustment.  He is currently taking lisinopril 10 mg and Toprol 50 mg once a day.  With his blood pressure adjustments, he has been feeling tired and wonders if it is due to the higher dose of the Toprol He is involved in the digital hypertension medicine program  2) depression and anxiety.  He has recently been taking his Cymbalta 60 mg every other day as he wants to work away from the medicine.  We did discuss decreasing the dose to 30 mg once a day which he would be willing to do.  He does feel that emotionally he is doing better and he is in a better situation currently than he was previously.  Currently he denies any significant depression or anxiety    Review of Systems   Constitutional: Positive for fatigue. Negative for appetite change, chills, diaphoresis, fever and unexpected weight change.   HENT: Negative for congestion, ear pain, postnasal drip, rhinorrhea, sinus pressure, sneezing, sore throat and trouble swallowing.    Eyes: Negative for pain, discharge and visual disturbance.   Respiratory: Negative for cough, chest tightness, shortness of breath and wheezing.    Cardiovascular: Positive for palpitations. Negative for chest pain and leg swelling.   Gastrointestinal: Negative for abdominal distention, abdominal pain, blood in stool, constipation, diarrhea, nausea and vomiting.   Musculoskeletal: Positive for arthralgias.   Skin: Negative for rash.       Objective:      Physical Exam   Constitutional: He is oriented to person, place, and time. He appears well-developed and well-nourished. No distress.   HENT:   Head: Normocephalic and  "atraumatic.   Right Ear: Hearing, tympanic membrane, external ear and ear canal normal.   Left Ear: Hearing, tympanic membrane, external ear and ear canal normal.   Nose: Nose normal.   Mouth/Throat: Oropharynx is clear and moist and mucous membranes are normal. No oral lesions. No oropharyngeal exudate, posterior oropharyngeal edema or posterior oropharyngeal erythema.   Eyes: Conjunctivae, EOM and lids are normal. Pupils are equal, round, and reactive to light. No scleral icterus.   Neck: Normal range of motion. Neck supple. Carotid bruit is not present. No thyroid mass and no thyromegaly present.   Cardiovascular: Normal rate, regular rhythm and normal heart sounds.  No extrasystoles are present. PMI is not displaced. Exam reveals no gallop.   No murmur heard.  Pulmonary/Chest: Effort normal and breath sounds normal. No accessory muscle usage. No respiratory distress.   Clear to auscultation bilaterally.   Abdominal: Soft. Normal appearance and bowel sounds are normal. He exhibits no abdominal bruit. There is no hepatosplenomegaly. There is no tenderness. There is no rebound.   Lymphadenopathy:        Head (right side): No submental and no submandibular adenopathy present.        Head (left side): No submental and no submandibular adenopathy present.        Right cervical: No superficial cervical, no deep cervical and no posterior cervical adenopathy present.       Left cervical: No superficial cervical, no deep cervical and no posterior cervical adenopathy present.        Right: No supraclavicular adenopathy present.        Left: No supraclavicular adenopathy present.   Neurological: He is alert and oriented to person, place, and time.   Skin: Skin is warm, dry and intact.   Psychiatric: He has a normal mood and affect.     Blood pressure 124/70, pulse 76, temperature 98.5 °F (36.9 °C), temperature source Oral, resp. rate 18, height 5' 5" (1.651 m), weight 77 kg (169 lb 12.1 oz).Body mass index is 28.25 kg/m².   "        A/P:  1) possible cirrhosis.  Follow up with hepatology for further evaluation as planned  2) hypertension.  Uncontrolled.  We will increase lisinopril to 20 mg once a day and decrease Toprol to 25 mg once a day.  He will continue with the digital hypertension medicine program.  If his fatigue does not improve with the adjustment in his medicine, he will let us know  3)  depression and anxiety.  Improved.  We will wean the Cymbalta 30 mg once a day.  If he does well with this lower dose, we could wean it further.  If he develops any new or worsening symptoms, he will let me know        I will see him back in 6 weeks or sooner if needed

## 2018-12-18 ENCOUNTER — HOSPITAL ENCOUNTER (OUTPATIENT)
Facility: HOSPITAL | Age: 63
Discharge: HOME | End: 2018-12-18
Attending: INTERNAL MEDICINE | Admitting: INTERNAL MEDICINE
Payer: MEDICAID

## 2018-12-18 VITALS
HEIGHT: 65 IN | DIASTOLIC BLOOD PRESSURE: 70 MMHG | HEART RATE: 59 BPM | WEIGHT: 169 LBS | SYSTOLIC BLOOD PRESSURE: 127 MMHG | OXYGEN SATURATION: 95 % | BODY MASS INDEX: 28.16 KG/M2 | TEMPERATURE: 98 F | RESPIRATION RATE: 16 BRPM

## 2018-12-18 DIAGNOSIS — K74.00 LIVER FIBROSIS: ICD-10-CM

## 2018-12-18 DIAGNOSIS — K74.00 HEPATIC FIBROSIS: ICD-10-CM

## 2018-12-18 PROCEDURE — 25000003 PHARM REV CODE 250: Performed by: INTERNAL MEDICINE

## 2018-12-18 PROCEDURE — 25000003 PHARM REV CODE 250: Performed by: STUDENT IN AN ORGANIZED HEALTH CARE EDUCATION/TRAINING PROGRAM

## 2018-12-18 PROCEDURE — 88307 TISSUE EXAM BY PATHOLOGIST: CPT | Mod: 26,,, | Performed by: PATHOLOGY

## 2018-12-18 PROCEDURE — 88313 SPECIAL STAINS GROUP 2: CPT | Mod: 26,,, | Performed by: PATHOLOGY

## 2018-12-18 PROCEDURE — 88342 IMHCHEM/IMCYTCHM 1ST ANTB: CPT | Mod: 26,,, | Performed by: PATHOLOGY

## 2018-12-18 PROCEDURE — 88307 TISSUE EXAM BY PATHOLOGIST: CPT | Performed by: PATHOLOGY

## 2018-12-18 PROCEDURE — 88313 SPECIAL STAINS GROUP 2: CPT | Performed by: PATHOLOGY

## 2018-12-18 PROCEDURE — 63600175 PHARM REV CODE 636 W HCPCS: Performed by: RADIOLOGY

## 2018-12-18 RX ORDER — MIDAZOLAM HYDROCHLORIDE 1 MG/ML
INJECTION INTRAMUSCULAR; INTRAVENOUS CODE/TRAUMA/SEDATION MEDICATION
Status: COMPLETED | OUTPATIENT
Start: 2018-12-18 | End: 2018-12-18

## 2018-12-18 RX ORDER — LIDOCAINE HYDROCHLORIDE 10 MG/ML
1 INJECTION, SOLUTION EPIDURAL; INFILTRATION; INTRACAUDAL; PERINEURAL ONCE
Status: COMPLETED | OUTPATIENT
Start: 2018-12-18 | End: 2018-12-18

## 2018-12-18 RX ORDER — SODIUM CHLORIDE 9 MG/ML
INJECTION, SOLUTION INTRAVENOUS CONTINUOUS
Status: DISCONTINUED | OUTPATIENT
Start: 2018-12-18 | End: 2018-12-18 | Stop reason: HOSPADM

## 2018-12-18 RX ORDER — FENTANYL CITRATE 50 UG/ML
50 INJECTION, SOLUTION INTRAMUSCULAR; INTRAVENOUS ONCE
Status: DISCONTINUED | OUTPATIENT
Start: 2018-12-18 | End: 2018-12-18 | Stop reason: HOSPADM

## 2018-12-18 RX ORDER — FENTANYL CITRATE 50 UG/ML
INJECTION, SOLUTION INTRAMUSCULAR; INTRAVENOUS CODE/TRAUMA/SEDATION MEDICATION
Status: COMPLETED | OUTPATIENT
Start: 2018-12-18 | End: 2018-12-18

## 2018-12-18 RX ORDER — IBUPROFEN 200 MG
600 TABLET ORAL EVERY 6 HOURS PRN
Status: DISCONTINUED | OUTPATIENT
Start: 2018-12-18 | End: 2018-12-18 | Stop reason: HOSPADM

## 2018-12-18 RX ORDER — MIDAZOLAM HYDROCHLORIDE 1 MG/ML
2 INJECTION INTRAMUSCULAR; INTRAVENOUS ONCE
Status: DISCONTINUED | OUTPATIENT
Start: 2018-12-18 | End: 2018-12-18 | Stop reason: HOSPADM

## 2018-12-18 RX ADMIN — LIDOCAINE HYDROCHLORIDE 2 MG: 10 INJECTION, SOLUTION EPIDURAL; INFILTRATION; INTRACAUDAL; PERINEURAL at 08:12

## 2018-12-18 RX ADMIN — MIDAZOLAM HYDROCHLORIDE 1.5 MG: 1 INJECTION, SOLUTION INTRAMUSCULAR; INTRAVENOUS at 09:12

## 2018-12-18 RX ADMIN — SODIUM CHLORIDE: 0.9 INJECTION, SOLUTION INTRAVENOUS at 08:12

## 2018-12-18 RX ADMIN — FENTANYL CITRATE 50 MCG: 50 INJECTION, SOLUTION INTRAMUSCULAR; INTRAVENOUS at 09:12

## 2018-12-18 NOTE — PLAN OF CARE
Pt discharged instructions given to family. Understanding verbalized. Pt with no bleed, no pain at this time.

## 2018-12-18 NOTE — DISCHARGE INSTRUCTIONS
Instructions  Depending on the complexity of your surgery, you may need to be admitted to the hospital for a few days.   Have someone drive you home after surgery and help you at home for 1 week.   Get plenty of rest.   Follow balanced diet.   Decreased activity may promote constipation, so you may want to add more raw fruit to your diet, and be sure to increase fluid intake.   Take pain medication as prescribed. Do not take aspirin or any products containing aspirin.   Do not drink alcohol when taking pain medications.   Even when not taking pain medications, no alcohol for 3 weeks as it causes fluid retention.   Do not smoke, as smoking delays healing and increases the risk of complications.   If you are provided with an abdominal binder, wear it as instructed. Do not wear a compression garment unless approved by your physician.  Activities  Turning on your side and pushing off with your arm when getting out of bed will reduce stress on your incision.   Start walking as soon as possible, as this helps to reduce swelling and lowers the chance of blood clots.   Do not drive until you are no longer taking any pain medications (narcotics).   Do not drive until you have full range of motion in your legs and no discomfort in your abdomen when lifting your legs.   No lifting greater than 5 lbs. for 6 weeks.   Resume sexual activity as comfort permits, usually 2-3 weeks postoperatively.   Avoid straining of abdominal muscles. Strenuous exercise and activities are restricted for 6 weeks.   Return to work in 6 weeks.  Incision Care  Your surgeon may use staples or sutures to close your incision. You may also have 2 to 4 drains inserted following your surgery.   You may shower 48 hours after removal of the drainage tubes. Drains may stay in for several weeks.   Avoid exposing scars to sun for at least 12 months.   Always use a strong sunblock, if sun exposure is unavoidable (SPF 30 or greater).   Keep steri-strips on;  replace if they come off.   Keep incisions clean and inspect daily for signs of infection.   No tub soaking while sutures or drains are in place.   May wear soft support underpants for comfort; may pad incision with dressings for comfort.   Sleep with pillow under knees and head elevated on 2 pillows.  What to Expect  You may experience temporary pain, soreness, numbness of abdominal skin, incision discomfort.   Maximum discomfort will occur the first few days.   You will have bruising and swelling of the abdomen. The majority of bruising and swelling will subside in 6-8 weeks.   You may feel tired for several weeks or months.   1 or more of your drains may remain in for several weeks.  Appearance  This procedure only removes the pannus, it does not narrow your waistline.   You will walk slightly bent forward and gradually return to normal posture over next 3 weeks.   Scars will be reddened for 6 months. After that, they will fade and soften.   The scar will extend from near one hipbone to the other, low on the abdomen.  Follow-Up Care  Abdominal drains removed when less than 30 ml for 24 hours.   Surface stitches removed in 7-10 days but may remain in longer if there is concern of incision separation.  When To Call  If you have increased swelling or bruising.   If swelling and redness persist after a few days.   If you have increased redness along the incision.   If you have severe or increased pain not relieved by medication.   If you have any side effects to medications; such as, rash, nausea, headache, vomiting.   If you have an oral temperature over 100.4 degrees.   If you have any yellowish or greenish drainage from the incisions or notice a foul odor.   If you have bleeding from the incisions that is difficult to control with light pressure.   If you have loss of feeling or motion.  For scheduling: Call Irene at [phone]    For questions or concerns call: RICKIE MON-FRI 8 AM- 5PM [phone]. Radiology  resident on call [phone].    For immediate concerns that are not emergent, you may call our radiology clinic at: [phone]          Discharge Instructions for Liver Biopsy  You had a procedure called liver biopsy. A healthcare provider used a special needle to remove a small piece of tissue from your liver. Then it was examined for signs of damage or disease. A liver biopsy is ordered after other tests have shown that your liver is not working properly. You may also have a liver biopsy when liver disease is suspected, to determine whether there is too much iron in the liver, or to rule out cancer.  Home care  Recommendations include the following:   · Because you had anesthesia, you should not drive until the day after your biopsy.   · Remove the bandage covering the biopsy site 48 hours after the procedure.  · Rest for 6 hours and take it easy when you arrive home.  · Dont shower for 24 hours after the biopsy. If you wish, you may wash yourself with a sponge or washcloth. When you are able to shower, dont scrub the site. Gently wash the area and pat it dry.  · Dont lift anything heavier than 10 pounds for up to 1 week after the procedure, or as advised by your healthcare provider.  · Don't do strenuous activities or exercises for up to 1 week after the procedure.  · Ask your healthcare provider when you can return to work.  · Do not start taking blood thinners without clear instructions from your healthcare provider.  Follow-up care  Make a follow-up appointment as directed by our staff.     When to call your healthcare provider  Call your healthcare provider immediately if you have any of the following:  · Bleeding from the biopsy site  · Dizziness or lightheadedness  · Sudden or increased shortness of breath  · Sudden chest pain  · Fever of 100.4°F (38.0°C) or higher, or as directed by your healthcare provider  · Shaking chills  · Yellow eyes or skin  · Increasing redness, tenderness, or swelling at  the biopsy site  · Drainage from the biopsy site  · Opening of the biopsy site  · Vomiting blood  · Rectal bleeding or bloody stools  · Increasing pain, with or without activity, in the liver or belly area, or pain shooting to the right shoulder   Date Last Reviewed: 7/1/2016  © 0528-2263 [employer]. [address] [place] [id number]. All rights reserved. This information is not intended as a substitute for professional medical care. Always follow your healthcare professional's instructions.

## 2018-12-18 NOTE — H&P
Radiology History & Physical      SUBJECTIVE:     Chief Complaint: hepatic fibrosis and transaminitis    History of Present Illness:  Mane Ramires is a 63 y.o. male with HTN, transgender, hepatic fibrosis who presents for liver biopsy.     Past Medical History:   Diagnosis Date    Accident     fell from roof with TBI (word finding issues personality changes, memory deficets), R rib fractures, clavicle fx    Allergy     Anxiety     ASD (atrial septal defect)     noted on ECHO 6/16    Cervical stenosis of spine     noted on 6/15 MRI    CTS (carpal tunnel syndrome)     mild-mod on 4/17 NCS    DDD (degenerative disc disease), cervical 10/2014    C5-6 and C6-C7    Depression     JEEVAN (generalized anxiety disorder)     Gender identity disorder     H/O cardiovascular stress test     12/14 normal    Herpes simplex type 2 infection     History of atypical hyperplasia of breast     B precancer lesions    Hypertension     IGT (impaired glucose tolerance)     Myelomalacia     c-spine noted on 8/15 MRI    OA (osteoarthritis)     Prediabetes     TBI (traumatic brain injury)     after falling off a roof in 2003     Past Surgical History:   Procedure Laterality Date    AMPUTATION      L index finger    APPENDECTOMY  1969    BILATERAL SALPINGOOPHORECTOMY  02/2015    CARPAL TUNNEL RELEASE  12/2017    right    CERVICAL FUSION  10/2014    C5-C6 and C6-C7    CHOLECYSTECTOMY      CHOLECYSTECTOMY-LAPAROSCOPIC N/A 11/9/2017    [facility]    ENDOSCOPIC NASAL SEPTOPLASTY N/A 8/23/2018    Procedure: SEPTOPLASTY, NASAL, ENDOSCOPIC;  Surgeon: Reji Arce III, MD;  Location: [facility] OR [address] [place] [address];  Service: ENT;  Laterality: N/A;    ESOPHAGOGASTRODUODENOSCOPY (EGD) N/A 9/6/2017    Performed by D[facility]yon [facility] at [facility] ENDO    EXCISION,NASAL TURBINATE,SUBMUCOSAL Bilateral 8/23/2018    Performed by Reji Arce III, MD at [facility] OR [address] [place] [address]    FINGER AMPUTATION Left      FRACTURE SURGERY Right 2003    rib fractures    GRAFT- Bilateral 8/23/2018    Performed by Reji Arce III, MD at [facility] OR [address] [place] [address]    HYSTERECTOMY  1984    MIGUEL    MASTECTOMY  02/2015    MASTECTOMY-BREAST-BILATERAL Bilateral 2/16/2015    Performed by Kali Yi MD at [facility] OR [address] [place] [address]    RECONSTRUCTION OF NOSE N/A 8/23/2018    Procedure: RECONSTRUCTION, NOSE;  Surgeon: Reji Arce III, MD;  Location: [facility] OR [address] [place] [address];  Service: ENT;  Laterality: N/A;    RECONSTRUCTION, NOSE N/A 8/23/2018    Performed by Reji Arce III, MD at [facility] OR [address] [place] [address]    RELEASE-CARPAL TUNNEL  Right carpal tunnel release Right 12/4/2017    [facility] at [facility]    EFLEPMYH-VCDHHERQKWAZ-UPHLYAKJUOAL Bilateral 2/16/2015    Performed by Jenna Carbajal MD at [facility] OR [address] [place] [address]    SEPTOPLASTY, NASAL, ENDOSCOPIC N/A 8/23/2018    Performed by Reji Arce III, MD at [facility] OR [address] [place] [address]    UPPER GASTROINTESTINAL ENDOSCOPY  09/06/2017    Dr. Ayon       Home Meds:   Prior to Admission medications    Medication Sig Start Date End Date Taking? Authorizing Provider   lisinopril (PRINIVIL,ZESTRIL) 20 MG tablet Take 1 tablet (20 mg total) by mouth once daily. 12/17/18 12/17/19 Yes Patience Gordon MD   metoprolol succinate (TOPROL-XL) 25 MG 24 hr tablet Take 1 tablet (25 mg total) by mouth once daily. 12/17/18 12/17/19 Yes Patience Gordon MD   ranitidine (ZANTAC) 300 MG tablet TAKE 1 TABLET (300 MG TOTAL) BY MOUTH EVERY EVENING. 4/14/18  Yes Historical Provider, MD   carboxymethylcellulose (REFRESH PLUS) 0.5 % Dpet 1 drop nightly.    Historical Provider, MD   cholecalciferol, vitamin D3, (VITAMIN D3) 2,000 unit Cap Take 1 capsule (2,000 Units total) by mouth once daily. 11/29/18   Nakia Ellsworth MD   diclofenac (VOLTAREN) 50 MG EC tablet 1-2 tab PO PRN pain. No more than 4 tabs per day. 7/10/18   Naty Mg MD   DULoxetine (CYMBALTA) 30 MG capsule Take 1 capsule (30 mg total) by mouth once  daily. 12/17/18 12/17/19  Patience Gordon MD   HYDROcodone-acetaminophen (NORCO) 5-325 mg per tablet Take 1 tablet by mouth every 6 (six) hours as needed for Pain. 8/23/18   Jacob Patrick Brunner, MD   ketoconazole (NIZORAL) 2 % cream Apply topically once daily. 9/26/18   Patience Gordon MD   levocetirizine (XYZAL) 5 MG tablet TAKE 1 TABLET (5 MG TOTAL) BY MOUTH NIGHTLY AS NEEDED FOR ALLERGIES. 7/31/18   Patience Gordon MD   testosterone cypionate (DEPOTESTOTERONE CYPIONATE) 200 mg/mL injection Inject 0.5 mLs (100 mg total) into the muscle every 7 days. 3 ml syringe with 18 g needle  to draw  X 10   25 g 1/2 inch needle to inject x 10 11/29/18 12/29/18  Nakia Ellsworth MD   traZODone (DESYREL) 50 MG tablet TAKE 1 TABLET (50 MG TOTAL) BY MOUTH EVERY EVENING.  Patient taking differently: Take 50 mg by mouth nightly as needed.  9/18/18 12/17/18  Patience Gordon MD     Anticoagulants/Antiplatelets: no anticoagulation    Allergies:   Review of patient's allergies indicates:   Allergen Reactions    Sudafed [pseudoephedrine hcl] Other (See Comments)     Heart racing      Codeine Other (See Comments)     Heart racing    Pseudoephedrine     Cortisone Palpitations     Sedation History:  no adverse reactions    Review of Systems:   Hematological: no known coagulopathies  Respiratory: no shortness of breath  Cardiovascular: no chest pain  Gastrointestinal: no abdominal pain  Genito-Urinary: no dysuria  Musculoskeletal: negative  Neurological: no TIA or stroke symptoms         OBJECTIVE:     Vital Signs (Most Recent)  Temp: 98.2 °F (36.8 °C) (12/18/18 0836)  Pulse: 78 (12/18/18 0836)  Resp: 18 (12/18/18 0836)  BP: (!) 161/91 (12/18/18 0845)  SpO2: 98 % (12/18/18 0836)    Physical Exam:  ASA: III  Mallampati: III    General: no acute distress  Mental Status: alert and oriented to person, place and time  HEENT: normocephalic, atraumatic  Chest: unlabored breathing  Heart: regular heart rate  Abdomen:  nondistended  Extremity: moves all extremities    Laboratory  Lab Results   Component Value Date    INR 0.9 12/12/2018       Lab Results   Component Value Date    WBC 8.64 12/12/2018    HGB 15.9 12/12/2018    HCT 47.8 12/12/2018    MCV 93 12/12/2018     12/12/2018      Lab Results   Component Value Date    GLU 86 12/12/2018     12/12/2018    K 4.5 12/12/2018     12/12/2018    CO2 24 12/12/2018    BUN 12 12/12/2018    CREATININE 1.3 12/12/2018    CALCIUM 10.1 12/12/2018    MG 1.9 11/26/2018    ALT 19 12/12/2018    AST 19 12/12/2018    ALBUMIN 3.8 12/12/2018    BILITOT 0.4 12/12/2018    BILIDIR 0.1 03/15/2018       ASSESSMENT/PLAN:     Sedation Plan: moderate  Patient will undergo native liver biopsy.    Aracelis Mcfarlane, PGY-2

## 2018-12-18 NOTE — PROGRESS NOTES
Pt c/o headache states he thinks it's because he hasn't eaten; denies blurred vision and denies chest pain.

## 2018-12-18 NOTE — DISCHARGE SUMMARY
Radiology Discharge Summary      Hospital Course: No complications    Admit Date: 12/18/2018  Discharge Date: 12/18/2018     Instructions Given to Patient: Yes  Diet: Resume prior diet  Activity: activity as tolerated    Description of Condition on Discharge: Stable  Vital Signs (Most Recent): Temp: 98.2 °F (36.8 °C) (12/18/18 0836)  Pulse: 71 (12/18/18 0945)  Resp: 13 (12/18/18 0945)  BP: (!) 145/83 (12/18/18 0945)  SpO2: 95 % (12/18/18 0945)    Discharge Disposition: Home    Discharge Diagnosis: status post liver biopsy     Follow-up: with referring provider     Aracelis Mcfarlane, PGY-2

## 2018-12-18 NOTE — PROGRESS NOTES
Pt arrived to [facility], bay 4. Report received from YIN Stahl. Dressing to abdomen dry and intact. Family at bedside.

## 2018-12-18 NOTE — PROCEDURES
Radiology Post-Procedure Note    Pre Op Diagnosis: Abnormal LFTs    Post Op Diagnosis: Same    Procedure: Ultrasound guided liver biopsy    Procedure performed by: Guillermina West MD    Written Informed Consent Obtained: Yes    Specimen Removed: Yes: Single 16 gauge core biopsy of liver    Estimated Blood Loss: Minimal    Findings: Moderate sedation and local anesthesia were used.    A 16-gauge Monopty biopsy device was used to remove 1 random right hepatic lobe specimen.  This specimen was sent to pathology for further evaluation.      The patient tolerated the procedure well and there were no complications.  Please see Imaging report for further details.      Aracelis Mcfarlane, PGY-2

## 2018-12-21 ENCOUNTER — HOSPITAL ENCOUNTER (OUTPATIENT)
Dept: CARDIOLOGY | Facility: HOSPITAL | Age: 63
Discharge: HOME | End: 2018-12-21
Attending: INTERNAL MEDICINE
Payer: MEDICAID

## 2018-12-21 VITALS — DIASTOLIC BLOOD PRESSURE: 85 MMHG | SYSTOLIC BLOOD PRESSURE: 117 MMHG | HEART RATE: 79 BPM

## 2018-12-21 DIAGNOSIS — R07.9 CHEST PAIN, UNSPECIFIED TYPE: ICD-10-CM

## 2018-12-21 LAB
AORTIC ROOT ANNULUS: 2.4 CM
AORTIC VALVE CUSP SEPERATION: 1.4 CM
CV ECHO LV RWT: 0.46 CM
CV STRESS BASE HR: 68
DIASTOLIC BLOOD PRESSURE: 83
DOP CALC LVOT AREA: 3.14 CM2
DOP CALC LVOT DIAMETER: 2 CM
ECHO LV POSTERIOR WALL: 1.06 CM (ref 0.6–1.1)
FRACTIONAL SHORTENING: 32 % (ref 28–44)
INTERVENTRICULAR SEPTUM: 1.09 CM (ref 0.6–1.1)
LEFT ATRIUM SIZE: 4.1 CM
LEFT INTERNAL DIMENSION IN SYSTOLE: 3.16 CM (ref 2.1–4)
LEFT VENTRICLE DIASTOLIC VOLUME: 99.65 ML
LEFT VENTRICLE SYSTOLIC VOLUME: 39.68 ML
LEFT VENTRICULAR INTERNAL DIMENSION IN DIASTOLE: 4.65 CM (ref 3.5–6)
LEFT VENTRICULAR MASS: 178.55 G
OHS CV CPX 1 MINUTE RECOVERY HEART RATE: 112 BPM
OHS CV CPX 85 PERCENT MAX PREDICTED HEART RATE MALE: 135
OHS CV CPX ESTIMATED METS: 8
OHS CV CPX MAX PREDICTED HEART RATE: 159
OHS CV CPX PATIENT IS FEMALE: 0
OHS CV CPX PATIENT IS MALE: 1
OHS CV CPX PEAK DIASTOLIC BLOOD PRESSURE: 76 MMHG
OHS CV CPX PEAK HEAR RATE: 144
OHS CV CPX PEAK RATE PRESSURE PRODUCT: NORMAL
OHS CV CPX PEAK SYSTOLIC BLOOD PRESSURE: 146
OHS CV CPX PERCENT MAX PREDICTED HEART RATE ACHIEVED: 91
OHS CV CPX PERCENT TARGET HEART RATE ACHIEVED: 106.67
OHS CV CPX RATE PRESSURE PRODUCT PRESENTING: 8296
OHS CV CPX TARGET HEART RATE: 135
RIGHT VENTRICULAR END-DIASTOLIC DIMENSION: 2.6 CM
STRESS ECHO POST EXERCISE DUR MIN: 5 MIN
STRESS ECHO POST EXERCISE DUR SEC: 16
STRESS ST DEPRESSION: 1.5 MM
SYSTOLIC BLOOD PRESSURE: 122

## 2018-12-21 PROCEDURE — 93351 STRESS TTE COMPLETE: CPT

## 2018-12-21 PROCEDURE — 93351 STRESS TTE COMPLETE: CPT | Mod: 26,,, | Performed by: INTERNAL MEDICINE

## 2018-12-24 NOTE — PROGRESS NOTES
Your biopsy showed no scar tissue in the liver. The fibroscan was inaccurate. There was some questionable bile duct injury that may represent whatever process causes your enzymes to go up so significantly a few months ago. There was no cause identified. I would recommend that you continue to follow with PCP for your liver but there was nothing concerning on the biopsy at this time.

## 2018-12-26 ENCOUNTER — TELEPHONE (OUTPATIENT)
Dept: HEPATOLOGY | Facility: CLINIC | Age: 63
End: 2018-12-26

## 2018-12-26 NOTE — TELEPHONE ENCOUNTER
Liver biopsy reviewed    No steatosis, no fibrosis  H/o hepatic injury, process appears to be resolved    Normal liver enzymes    F/u with PCP for routine monitoring, hepatology f/u PRN

## 2018-12-31 ENCOUNTER — DOCUMENTATION ONLY (OUTPATIENT)
Dept: REHABILITATION | Facility: HOSPITAL | Age: 63
End: 2018-12-31

## 2018-12-31 NOTE — PROGRESS NOTES
REHAB SERVICES OUTPATIENT DISCHARGE SUMMARY  Physical Therapy      Name:  Mane Ramires  Date:  12/31/18  Date of Evaluation:  10/12/18  Physician:  Patience Gordon MD  Total # Of Visits:  7 Cancelled:  5   Diagnosis:  Limited ROM left sh dysfunction    Physical/Functional Status:  At time of discharge, patient was able to achieve full left shoulder passive ROM except internal rotation 45 deg. Pain is variable but unable to tolerate active movements against gravity with overhead shoulder function. Improved sleep pattern and comfort at rest.    The patient is to be discharged from our Therapy service for the following reason(s):  Patient requested discharge    Degree of Goal Achievement:  Patient has not met goals secondary to:  persistent pain    Patient Education: HEP as tolerated    Discharge Plan:  Home Program:  [facility]                              To see MD and consider other options.

## 2018-12-31 NOTE — H&P
History & Physical - Short Stay  Gastroenterology      SUBJECTIVE:     Procedure: EGD    Chief Complaint/Indication for Procedure: Epigastric Pain    PTA Medications   Medication Sig    amlodipine (NORVASC) 5 MG tablet TAKE 1 TABLET BY MOUTH EVERY DAY    fluoxetine (PROZAC) 20 MG capsule Take 20 mg daily, after 2 weeks if tolerating increase to 40 mg daily.    JUBLIA 10 % Usha APPLY 1 DROP TO ALL SMALLER TOES AND 2 DROPS TO GREATER TOES DAILY    levocetirizine (XYZAL) 5 MG tablet TAKE 1 TABLET (5 MG TOTAL) BY MOUTH NIGHTLY AS NEEDED FOR ALLERGIES.    lisinopril (PRINIVIL,ZESTRIL) 40 MG tablet TAKE 1 TABLET (40 MG TOTAL) BY MOUTH ONCE DAILY.    LYRICA 100 mg capsule TAKE ONE CAPSULE BY MOUTH TWICE A DAY    metoprolol succinate (TOPROL-XL) 50 MG 24 hr tablet Take 1 tablet (50 mg total) by mouth once daily.    ranitidine (ZANTAC) 300 MG tablet Take 1 tablet (300 mg total) by mouth every evening.    testosterone cypionate (DEPOTESTOTERONE CYPIONATE) 200 mg/mL injection INJECT 1ML INTO THE MUSCLE EVERY 14 DAYS    tramadol (ULTRAM) 50 mg tablet Take 1 tablet (50 mg total) by mouth 2 (two) times daily as needed.       Review of patient's allergies indicates:   Allergen Reactions    Sudafed [pseudoephedrine hcl] Other (See Comments)     Heart racing      Codeine Other (See Comments)     Heart racing    Cortisone Palpitations        Past Medical History:   Diagnosis Date    Accident     fell from roof with TBI (word finding issues personality changes, memory deficets), R rib fractures, clavicle fx    Allergy     ASD (atrial septal defect)     noted on ECHO 6/16    Cancer     pre-cancerous cells in breast tissue    Cervical stenosis of spine     noted on 6/15 MRI    CTS (carpal tunnel syndrome)     mild-mod on 4/17 NCS    DDD (degenerative disc disease), cervical 10/2014    C5-6 and C6-C7    Gender identity disorder     H/O cardiovascular stress test     12/14 normal    Heart murmur 05/26/2016     Herpes simplex type 2 infection     Hypertension     IGT (impaired glucose tolerance)     Myelomalacia     c-spine noted on 8/15 MRI    OA (osteoarthritis)     Prediabetes     TBI (traumatic brain injury)     after falling off a roof in 2003     Past Surgical History:   Procedure Laterality Date    AMPUTATION      L index finger    APPENDECTOMY  1969    BILATERAL SALPINGOOPHORECTOMY  02/2015    CERVICAL FUSION  10/2014    C5-C6 and C6-C7    HYSTERECTOMY  1984    MIGUEL    MASTECTOMY  02/2015     Family History   Problem Relation Age of Onset    Diabetes Mother     Heart disease Mother     Coronary artery disease Father     Breast cancer Sister     Lung cancer Paternal Grandfather     Heart disease Paternal Grandfather     Heart disease Maternal Grandmother     Heart attack Brother      Social History   Substance Use Topics    Smoking status: Former Smoker    Smokeless tobacco: Never Used    Alcohol use Yes      Comment: rare          OBJECTIVE:     Vital Signs (Most Recent)  Temp: 97.5 °F (36.4 °C) (09/06/17 1009)  Pulse: (!) 56 (09/06/17 1009)  Resp: 18 (09/06/17 1009)  BP: 121/65 (09/06/17 1009)  SpO2: 98 % (09/06/17 1009)    Physical Exam:                                                       GENERAL:  Comfortable, in no acute distress.                                 HEENT EXAM:  Nonicteric.  No adenopathy.  Oropharynx is clear.               NECK:  Supple.                                                               LUNGS:  Clear.                                                               CARDIAC:  Regular rate and rhythm.  S1, S2.  No murmur.                      ABDOMEN:  Soft, positive bowel sounds, nontender.  No hepatosplenomegaly or masses.  No rebound or guarding.                                             EXTREMITIES:  No edema.     MENTAL STATUS:  Normal, alert and oriented.      ASSESSMENT/PLAN:     Assessment: Epigastric Pain    Plan: EGD    Anesthesia Plan: General    ASA  Grade: ASA 2 - Patient with mild systemic disease with no functional limitations    MALLAMPATI SCORE:  I (soft palate, uvula, fauces, and tonsillar pillars visible)       No No

## 2019-01-14 ENCOUNTER — PATIENT MESSAGE (OUTPATIENT)
Dept: FAMILY MEDICINE | Facility: CLINIC | Age: 64
End: 2019-01-14

## 2019-01-14 DIAGNOSIS — M25.519 SHOULDER PAIN, UNSPECIFIED CHRONICITY, UNSPECIFIED LATERALITY: Primary | ICD-10-CM

## 2019-01-15 DIAGNOSIS — K21.9 GASTROESOPHAGEAL REFLUX DISEASE, ESOPHAGITIS PRESENCE NOT SPECIFIED: Primary | ICD-10-CM

## 2019-01-15 DIAGNOSIS — J30.2 SEASONAL ALLERGIES: ICD-10-CM

## 2019-01-15 RX ORDER — LEVOCETIRIZINE DIHYDROCHLORIDE 5 MG/1
5 TABLET, FILM COATED ORAL NIGHTLY PRN
Qty: 90 TABLET | Refills: 3 | Status: SHIPPED | OUTPATIENT
Start: 2019-01-15 | End: 2019-12-16 | Stop reason: SDUPTHER

## 2019-01-15 NOTE — TELEPHONE ENCOUNTER
DEFER: Ranitidine/Levocetirizine - NCO recently    Recent visit with Otolaryngology Dr. Arce, 11/18, continue Levocetirizine.   Defer to you on both. Thanks.

## 2019-01-15 NOTE — TELEPHONE ENCOUNTER
Spoke to patient.  He is requesting an ortho referral for his torn right rotator cuff from fall in April.  [place].

## 2019-01-15 NOTE — PROGRESS NOTES
Refill Authorization Note     is requesting a refill authorization.    Brief assessment and rationale for refill: DEFER: Ranitidine-Gerd NCO recently; Levocetirizine-Seasonal allergies NCO recently  Amount/Quantity of medication ordered: 90d        Refills Authorized: Yes  If authorized number of refills: 0           Medication Therapy Plan: Labs WNL 12/18; LOV 12/18; defer both to you, will queue for 3 more each  Name and strength of medication: RANITIDINE 300 MG TABLET/LEVOCETIRIZINE 5 MG TABLET  How patient will take medication: utd  Medication reconciliation completed: No        Comments:   Lab Results   Component Value Date    CREATININE 1.3 12/12/2018    BUN 12 12/12/2018     12/12/2018    K 4.5 12/12/2018     12/12/2018    CO2 24 12/12/2018

## 2019-01-17 ENCOUNTER — PATIENT OUTREACH (OUTPATIENT)
Dept: OTHER | Facility: OTHER | Age: 64
End: 2019-01-17

## 2019-01-17 DIAGNOSIS — I10 ESSENTIAL HYPERTENSION: Primary | ICD-10-CM

## 2019-01-17 RX ORDER — IRBESARTAN 150 MG/1
150 TABLET ORAL NIGHTLY
Qty: 30 TABLET | Refills: 3 | Status: SHIPPED | OUTPATIENT
Start: 2019-01-17 | End: 2019-01-18

## 2019-01-17 RX ORDER — METOPROLOL SUCCINATE 50 MG/1
50 TABLET, EXTENDED RELEASE ORAL DAILY
Qty: 30 TABLET | Refills: 1
Start: 2019-01-17 | End: 2019-02-22 | Stop reason: SDUPTHER

## 2019-01-17 NOTE — PROGRESS NOTES
Last 5 Patient Entered Readings                                      Current 30 Day Average: 138/86     Recent Readings 1/14/2019 1/14/2019 1/8/2019 1/2/2019 1/2/2019    SBP (mmHg) 148 149 120 138 138    DBP (mmHg) 90 93 76 92 92    Pulse 81 84 88 97 97        Mr. Ramires's BP average is above goal. He saw Dr. Gordon and she increased lisinopril to 20 mg QD. Mr. Ramires remains on metoprolol 50 mg QD and feels the fatigue has improved. Will update his chart to reflect this. Given elevated BP on current dose of lisinopril, will stop lisinopril and start irbesartan.     Patient's BP average is above goal of <130/80.     Will continue to monitor regularly. Will follow up in 2-3 weeks, sooner if BP begins to trend upward or downward.    Patient has my contact information and knows to call with any concerns or clinical changes.     Current HTN regimen:  Hypertension Medications             irbesartan (AVAPRO) 150 MG tablet Take 1 tablet (150 mg total) by mouth every evening.    metoprolol succinate (TOPROL-XL) 50 MG 24 hr tablet Take 1 tablet (50 mg total) by mouth once daily.

## 2019-01-18 ENCOUNTER — TELEPHONE (OUTPATIENT)
Dept: FAMILY MEDICINE | Facility: CLINIC | Age: 64
End: 2019-01-18

## 2019-01-18 RX ORDER — LOSARTAN POTASSIUM 50 MG/1
50 TABLET ORAL DAILY
Qty: 30 TABLET | Refills: 1 | Status: SHIPPED | OUTPATIENT
Start: 2019-01-18 | End: 2019-03-13 | Stop reason: ALTCHOICE

## 2019-01-18 NOTE — TELEPHONE ENCOUNTER
Insurance ( [phone])  will not cover Irbesartan. Pt must have tried and failed on Losartan or Losartan/ HCT . Pt has Lisinopril at home that he can take . Pt concerned about Lisinopril. He read study that Lisinopril can cause cancer if you take it more than 5 years .Pls advise .--lp

## 2019-01-28 ENCOUNTER — OFFICE VISIT (OUTPATIENT)
Dept: FAMILY MEDICINE | Facility: CLINIC | Age: 64
End: 2019-01-28
Payer: MEDICAID

## 2019-01-28 VITALS
BODY MASS INDEX: 28.64 KG/M2 | HEART RATE: 74 BPM | SYSTOLIC BLOOD PRESSURE: 110 MMHG | WEIGHT: 171.88 LBS | TEMPERATURE: 98 F | RESPIRATION RATE: 18 BRPM | HEIGHT: 65 IN | DIASTOLIC BLOOD PRESSURE: 60 MMHG

## 2019-01-28 DIAGNOSIS — I10 HYPERTENSION, ESSENTIAL: Primary | ICD-10-CM

## 2019-01-28 DIAGNOSIS — H53.9 VISION CHANGES: ICD-10-CM

## 2019-01-28 DIAGNOSIS — M25.519 SHOULDER PAIN, UNSPECIFIED CHRONICITY, UNSPECIFIED LATERALITY: ICD-10-CM

## 2019-01-28 DIAGNOSIS — R42 DIZZINESS: ICD-10-CM

## 2019-01-28 DIAGNOSIS — F39 MOOD DISORDER: ICD-10-CM

## 2019-01-28 PROCEDURE — 99214 PR OFFICE/OUTPT VISIT, EST, LEVL IV, 30-39 MIN: ICD-10-PCS | Mod: S$GLB,,, | Performed by: FAMILY MEDICINE

## 2019-01-28 PROCEDURE — 99214 OFFICE O/P EST MOD 30 MIN: CPT | Mod: S$GLB,,, | Performed by: FAMILY MEDICINE

## 2019-01-28 RX ORDER — DULOXETIN HYDROCHLORIDE 60 MG/1
60 CAPSULE, DELAYED RELEASE ORAL DAILY
Qty: 30 CAPSULE | Refills: 2 | Status: SHIPPED | OUTPATIENT
Start: 2019-01-28 | End: 2019-05-21 | Stop reason: SDUPTHER

## 2019-01-28 RX ORDER — HYDROCODONE BITARTRATE AND ACETAMINOPHEN 5; 325 MG/1; MG/1
1 TABLET ORAL EVERY 6 HOURS PRN
Qty: 20 TABLET | Refills: 0 | Status: SHIPPED | OUTPATIENT
Start: 2019-01-28 | End: 2019-04-10 | Stop reason: SDUPTHER

## 2019-01-28 NOTE — PATIENT INSTRUCTIONS
Call [facility] set up your appointment.    If you have any trouble getting an appointment, call [facility] Medicaid Escalation Line - [phone]

## 2019-01-28 NOTE — PROGRESS NOTES
Subjective:       Patient ID: Mane Ramires is a 63 y.o. male.    Chief Complaint: Hypertension (6 month follow up )    HPI   The patient is a 63-year-old who is here today for follow-up.  Today we discussed the followin) hypertension.  His blood pressure is good today.  He continues to follow with the digital hypertension medicine program.  He is currently taking Cozaar and Toprol which he is tolerating well  2) persistent right shoulder pain. He is continuing to have trouble with his right shoulder.  He feels that the pain is worsening.  He describes the pain as like being jabbed by a serrated knife.  He has not been able to sleep because of the pain. He wonders if I would be able to give him a pain medicine so that he can use to get some relief from the pain and get some sleep.  He has completed physical therapy as they did not feel they had anything else to offer him.  He is still waiting to see the orthopedist at the [facility].    3) depression anxiety.  He does note some changes with the lower dose of Cymbalta.  He is interested in increasing his dose of medicine back to 60 mg once a day.  He denies any SI or HI  4) neuro issues.  He is having continued trouble with his vision and the eye doctor told him it was a neuro problem.  In addition to his vision issues, he is having balance issues, memory issues and word-finding issues.  It has been recommended that he see the neurologist but he has not been remembering to schedule that    Review of Systems   Constitutional: Negative for appetite change, chills, diaphoresis, fatigue, fever and unexpected weight change.   HENT: Negative for congestion, ear pain, postnasal drip, rhinorrhea, sinus pressure, sneezing, sore throat and trouble swallowing.    Eyes: Positive for visual disturbance. Negative for pain and discharge.   Respiratory: Negative for cough, chest tightness, shortness of breath and wheezing.    Cardiovascular: Negative for chest pain,  palpitations and leg swelling.   Gastrointestinal: Negative for abdominal distention, abdominal pain, blood in stool, constipation, diarrhea, nausea and vomiting.   Musculoskeletal: Positive for arthralgias and back pain.   Skin: Negative for rash.   Psychiatric/Behavioral: Positive for confusion, dysphoric mood and sleep disturbance (from shoulder pain).       Objective:      Physical Exam   Constitutional: He is oriented to person, place, and time. He appears well-developed and well-nourished. No distress.   HENT:   Head: Normocephalic and atraumatic.   Right Ear: Hearing, tympanic membrane, external ear and ear canal normal.   Left Ear: Hearing, tympanic membrane, external ear and ear canal normal.   Nose: Nose normal.   Mouth/Throat: Oropharynx is clear and moist and mucous membranes are normal. No oral lesions. No oropharyngeal exudate, posterior oropharyngeal edema or posterior oropharyngeal erythema.   Eyes: Conjunctivae, EOM and lids are normal. Pupils are equal, round, and reactive to light. No scleral icterus.   Neck: Normal range of motion. Neck supple. Carotid bruit is not present. No thyroid mass and no thyromegaly present.   Cardiovascular: Normal rate, regular rhythm and normal heart sounds.  No extrasystoles are present. PMI is not displaced. Exam reveals no gallop.   No murmur heard.  Pulmonary/Chest: Effort normal and breath sounds normal. No accessory muscle usage. No respiratory distress.   Clear to auscultation bilaterally.   Abdominal: Soft. Normal appearance and bowel sounds are normal. He exhibits no abdominal bruit. There is no hepatosplenomegaly. There is no tenderness. There is no rebound.   Musculoskeletal:   RUE NVI.  R shoulder with limited ROM and tenderness anteriorly.     Lymphadenopathy:        Head (right side): No submental and no submandibular adenopathy present.        Head (left side): No submental and no submandibular adenopathy present.        Right cervical: No superficial  "cervical, no deep cervical and no posterior cervical adenopathy present.       Left cervical: No superficial cervical, no deep cervical and no posterior cervical adenopathy present.        Right: No supraclavicular adenopathy present.        Left: No supraclavicular adenopathy present.   Neurological: He is alert and oriented to person, place, and time.   Skin: Skin is warm, dry and intact.   Psychiatric: His speech is normal and behavior is normal. Judgment and thought content normal. Cognition and memory are normal. He exhibits a depressed mood.     Blood pressure 110/60, pulse 74, temperature 98.2 °F (36.8 °C), temperature source Oral, resp. rate 18, height 5' 4.5" (1.638 m), weight 78 kg (171 lb 13.6 oz).Body mass index is 29.04 kg/m².        A/P:  1) hypertension.  Well controlled based on today's reading.  He will continue with his current medication and follow up with the digital hypertension medicine program as planned   2)   Persistent shoulder pain. He will follow up with the orthopedist as soon as he can.  I did give him Norco 5/325 mg tablets to use 1 q.i.d. p.r.n. 20. with no refills.  He understands this is a narcotic with the potential for addiction and tolerance.  The patient also understands that all narcotics impair reflexes and cognition and so the patient should not drive, operate heavy machinery or make significant decisions while taking the  narcotic .  The patient should also not take the  narcotics with alcohol      3) mood disorder with depression anxiety.  Uncontrolled.  We will increase Cymbalta to 60 mg once a day.  If his symptoms do not improve with the higher dose of Cymbalta, he will let me know    4) neuro symptoms including visual changes, balance issues, memory issues and word-finding issues.  I also recommended he see the neurologist for further evaluation  and he was given a note to help remind him to do so    I will see him back in 3 months or sooner if needed    "

## 2019-02-05 ENCOUNTER — PATIENT OUTREACH (OUTPATIENT)
Dept: OTHER | Facility: OTHER | Age: 64
End: 2019-02-05

## 2019-02-05 NOTE — PROGRESS NOTES
Last 5 Patient Entered Readings                                      Current 30 Day Average: 132/82     Recent Readings 1/31/2019 1/27/2019 1/20/2019 1/19/2019 1/19/2019    SBP (mmHg) 106 137 133 150 153    DBP (mmHg) 69 79 78 85 83    Pulse 81 75 66 71 76        Mr. Ramires was unable to get irbesartan 150 mg QD due to insurance coverage. Dr. Gordon started losartan 50 mg QD instead per insurance requirement (must try losartan and fail before insurance will cover irbesartan). His BP is trending down since this change. Will monitor on current regimen and if BP remains above goal, will increase losartan to 100 mg QD.     Will continue to monitor regularly. Will follow up in 3-4 weeks, sooner if BP begins to trend upward or downward.    Patient has my contact information and knows to call with any concerns or clinical changes.     Current HTN regimen:  Hypertension Medications             losartan (COZAAR) 50 MG tablet Take 1 tablet (50 mg total) by mouth once daily.    metoprolol succinate (TOPROL-XL) 50 MG 24 hr tablet Take 1 tablet (50 mg total) by mouth once daily.

## 2019-02-21 ENCOUNTER — PATIENT MESSAGE (OUTPATIENT)
Dept: ADMINISTRATIVE | Facility: OTHER | Age: 64
End: 2019-02-21

## 2019-02-22 ENCOUNTER — PATIENT MESSAGE (OUTPATIENT)
Dept: ADMINISTRATIVE | Facility: OTHER | Age: 64
End: 2019-02-22

## 2019-02-22 DIAGNOSIS — I10 ESSENTIAL HYPERTENSION: Primary | ICD-10-CM

## 2019-02-22 RX ORDER — METOPROLOL SUCCINATE 50 MG/1
TABLET, EXTENDED RELEASE ORAL
Qty: 90 TABLET | Refills: 0 | Status: SHIPPED | OUTPATIENT
Start: 2019-02-22 | End: 2019-03-28 | Stop reason: DRUGHIGH

## 2019-02-22 NOTE — PROGRESS NOTES
Refill Authorization Note     is requesting a refill authorization.    Brief assessment and rationale for refill: APPROVE: prr  Name and strength of medication: metoprolol succinate (TOPROL-XL) 50 MG 24 hr tablet       Medication Therapy Plan: Dosage changed reported last month by DGT HTN- (no print) for 2 month supply; BP stable and tolerating lco (lov 1/2019) and Digital med on 2/2019); approve 3 more    Medication reconciliation completed: No   Pharmacist Review Requested: Yes          How patient will take medication: t1t po qd          Comments: see below

## 2019-02-22 NOTE — PROGRESS NOTES
Refill Authorization Note     is requesting a refill authorization.    Brief assessment and rationale for refill: APPROVE: prr  Name and strength of medication: metoprolol succinate (TOPROL-XL) 50 MG 24 hr tablet       Medication Therapy Plan: Dosage changed reported last month by DGT HTN- (no print) for 2 month supply;     Medication reconciliation completed: No   Pharmacist Review Requested: Yes          How patient will take medication: t1t po qd          Comments:   Blood Pressure Readings: <139/89mmHg (12 months) Heart Rate > 50bpm (BB/NDHP-CCBS)   BP Readings from Last 3 Encounters:   01/28/19 110/60   12/21/18 117/85   12/18/18 127/70    Pulse Readings from Last 3 Encounters:   01/28/19 74   12/21/18 79   12/18/18 (!) 59        Digital Hypertension Data (if enrolled)  Last 5 Patient Entered Readings                                      Current 30 Day Average: 133/82     Recent Readings 2/21/2019 2/13/2019 2/6/2019 2/5/2019 2/5/2019    SBP (mmHg) 139 136 132 146 156    DBP (mmHg) 80 83 78 90 90    Pulse 71 69 72 68 73          APPOINTMENTS (past 12 m or future 3m authorizing provider)  LAST VISIT DATE  Patience Gordon MD 1/28/2019         NEXT VISIT DATE  Patience Gordon MD 4/29/2019

## 2019-02-26 ENCOUNTER — PATIENT MESSAGE (OUTPATIENT)
Dept: ADMINISTRATIVE | Facility: OTHER | Age: 64
End: 2019-02-26

## 2019-03-01 ENCOUNTER — OFFICE VISIT (OUTPATIENT)
Dept: PLASTIC SURGERY | Facility: CLINIC | Age: 64
End: 2019-03-01
Payer: MEDICAID

## 2019-03-01 VITALS
BODY MASS INDEX: 28.76 KG/M2 | HEART RATE: 88 BPM | WEIGHT: 170.19 LBS | DIASTOLIC BLOOD PRESSURE: 75 MMHG | SYSTOLIC BLOOD PRESSURE: 111 MMHG

## 2019-03-01 DIAGNOSIS — Z90.13 S/P MASTECTOMY, BILATERAL: Primary | ICD-10-CM

## 2019-03-01 PROCEDURE — 99203 OFFICE O/P NEW LOW 30 MIN: CPT | Mod: S$PBB,,, | Performed by: PHYSICIAN ASSISTANT

## 2019-03-01 PROCEDURE — 99203 PR OFFICE/OUTPT VISIT, NEW, LEVL III, 30-44 MIN: ICD-10-PCS | Mod: S$PBB,,, | Performed by: PHYSICIAN ASSISTANT

## 2019-03-01 PROCEDURE — 99999 PR PBB SHADOW E&M-EST. PATIENT-LVL III: CPT | Mod: PBBFAC,,, | Performed by: PHYSICIAN ASSISTANT

## 2019-03-01 PROCEDURE — 99999 PR PBB SHADOW E&M-EST. PATIENT-LVL III: ICD-10-PCS | Mod: PBBFAC,,, | Performed by: PHYSICIAN ASSISTANT

## 2019-03-01 PROCEDURE — 99213 OFFICE O/P EST LOW 20 MIN: CPT | Mod: PBBFAC,PO | Performed by: PHYSICIAN ASSISTANT

## 2019-03-01 NOTE — PROGRESS NOTES
Subjective:      Mane Ramires is a 63 y.o. year old male who [facility] for consultation for 3D nipple areola tattoo status post bilateral mastectomy in 2015 Dr. Kali Yi. He is doing well today with no issues, he is pleased with his appearance and would like to proceed with tattoo. Denies fever, chills, nausea, vomiting, or other systemic signs of infection.    Vitals:    03/01/19 0928   BP: 111/75   Pulse: 88        Review of patient's allergies indicates:   Allergen Reactions    Sudafed [pseudoephedrine hcl] Other (See Comments)     Heart racing      Codeine Other (See Comments)     Heart racing    Pseudoephedrine     Cortisone Palpitations       Current Outpatient Medications on File Prior to Visit   Medication Sig Dispense Refill    carboxymethylcellulose (REFRESH PLUS) 0.5 % Dpet 1 drop nightly.      cholecalciferol, vitamin D3, (VITAMIN D3) 2,000 unit Cap Take 1 capsule (2,000 Units total) by mouth once daily.      DULoxetine (CYMBALTA) 60 MG capsule Take 1 capsule (60 mg total) by mouth once daily. 30 capsule 2    HYDROcodone-acetaminophen (NORCO) 5-325 mg per tablet Take 1 tablet by mouth every 6 (six) hours as needed for Pain. 20 tablet 0    ketoconazole (NIZORAL) 2 % cream Apply topically once daily. 60 g 1    levocetirizine (XYZAL) 5 MG tablet TAKE 1 TABLET (5 MG TOTAL) BY MOUTH NIGHTLY AS NEEDED FOR ALLERGIES. 90 tablet 3    losartan (COZAAR) 50 MG tablet Take 1 tablet (50 mg total) by mouth once daily. 30 tablet 1    metoprolol succinate (TOPROL-XL) 50 MG 24 hr tablet TAKE 1 TABLET BY MOUTH EVERY DAY 90 tablet 0    ranitidine (ZANTAC) 300 MG tablet TAKE 1 TABLET (300 MG TOTAL) BY MOUTH EVERY EVENING. 90 tablet 3    testosterone cypionate (DEPOTESTOTERONE CYPIONATE) 200 mg/mL injection Inject 0.5 mLs (100 mg total) into the muscle every 7 days. 3 ml syringe with 18 g needle  to draw  X 10   25 g 1/2 inch needle to inject x 10 10 mL 5     traZODone (DESYREL) 50 MG tablet TAKE 1 TABLET (50 MG TOTAL) BY MOUTH EVERY EVENING. (Patient taking differently: Take 50 mg by mouth nightly as needed. ) 30 tablet 2     No current facility-administered medications on file prior to visit.        Patient Active Problem List   Diagnosis    HTN (hypertension)    Vitamin D deficiency    IGT (impaired glucose tolerance)    Dyslipidemia    Endocrine disorder in female-to-male transgender person    TBI (traumatic brain injury)    Cervicalgia    Osteoarthritis of spine with radiculopathy, cervical region    Chronic right-sided low back pain    Generalized anxiety disorder    Acute bilateral low back pain with right-sided sciatica    Carpal tunnel syndrome    Epigastric pain    Depression    Biliary colic    Right carpal tunnel syndrome    Right wrist pain    Right wrist effusion    Stiffness of right wrist joint    Right elbow pain    Pain in joint of right hand    Post concussive syndrome    Intractable chronic migraine without aura and without status migrainosus    Nasal obstruction    Shoulder pain    Chest pain    Family history of ASCVD (arteriosclerotic cardiovascular disease)    Risk for coronary artery disease less than 10% in next 10 years    Hepatic fibrosis       [unfilled]    Social History     Socioeconomic History    Marital status: Single     Spouse name: Not on file    Number of children: Not on file    Years of education: Not on file    Highest education level: Not on file   Social Needs    Financial resource strain: Not on file    Food insecurity - worry: Not on file    Food insecurity - inability: Not on file    Transportation needs - medical: Not on file    Transportation needs - non-medical: Not on file   Occupational History    Occupation: Artist   Tobacco Use    Smoking status: Former Smoker     Packs/day: 0.25     Years: 2.00     Pack years: 0.50     Types: Cigarettes     Last attempt to quit: 11/1/2002      Years since quittin.3    Smokeless tobacco: Never Used   Substance and Sexual Activity    Alcohol use: Yes     Comment: rare     Drug use: No    Sexual activity: No   Other Topics Concern    Patient feels they ought to cut down on drinking/drug use Not Asked    Patient annoyed by others criticizing their drinking/drug use Not Asked    Patient has felt bad or guilty about drinking/drug use Not Asked    Patient has had a drink/used drugs as an eye opener in the AM Not Asked   Social History Narrative    Not on file           Review of Systems: s/p B mastectomy    Objective:     Physical Exam:  Vitals:    19 0928   BP: 111/75   Pulse: 88       WD WN NAD  VSS  Normal resp effort  R breast - incision healed, no erythema/drainage, surgically absent nipple  L breast - incision healed, no erythema/drainage, surgically absent nipple        Assessment:       1. S/P mastectomy, bilateral        Plan:   63 y.o. male status post B mastectomy, desires 3D nipple areola tattoo  - Doing well, no issues  - 3D nipple areola tattoo procedure discussed in detail with the patient. He understands that this is a two stage process. Risks were reviewed. He understands that the risks include but are not limited to bleeding, scarring, infection, pain, numbness, asymmetry, deformity, allergic reaction, failure to take. After all questions were answered, the patient agreed to proceed with plan for 3D nipple areola tattoo  - Patient scheduled for bilateral 3D nipple areola tattoo on May 17, 2019 at [facility]    All questions were answered. The patient was advised to call the clinic with any questions or concerns prior to their next visit.

## 2019-03-03 ENCOUNTER — PATIENT MESSAGE (OUTPATIENT)
Dept: ADMINISTRATIVE | Facility: OTHER | Age: 64
End: 2019-03-03

## 2019-03-11 ENCOUNTER — PATIENT OUTREACH (OUTPATIENT)
Dept: OTHER | Facility: OTHER | Age: 64
End: 2019-03-11

## 2019-03-11 NOTE — PROGRESS NOTES
"Last 5 Patient Entered Readings                                      Current 30 Day Average: 141/80     Recent Readings 3/9/2019 3/3/2019 2/26/2019 2/25/2019 2/21/2019    SBP (mmHg) 147 145 133 144 139    DBP (mmHg) 72 81 79 84 80    Pulse 86 89 68 81 71        3/11: Called patient to introduce myself as new doris .  Patient was grateful for the call.  Patient denies needing help with anything at this time.  Will follow up more in depth at next outreach.     Confirmed that patient started new medication regimen as prescribed by Digital Medicine Clinician.  Patient reports that he has been having "crazy dreams" every night since starting the new medication.   Patient knows PharmD will be following up shortly.      "

## 2019-03-12 ENCOUNTER — PATIENT OUTREACH (OUTPATIENT)
Dept: OTHER | Facility: OTHER | Age: 64
End: 2019-03-12

## 2019-03-12 DIAGNOSIS — I10 ESSENTIAL HYPERTENSION: Primary | ICD-10-CM

## 2019-03-12 RX ORDER — LOSARTAN POTASSIUM 50 MG/1
TABLET ORAL
Qty: 90 TABLET | Refills: 0 | OUTPATIENT
Start: 2019-03-12

## 2019-03-12 NOTE — PROGRESS NOTES
Refill Authorization Note     is requesting a refill authorization.    Brief assessment and rationale for refill: DEFER: new start   Name and strength of medication: LOSARTAN POTASSIUM 50 MG TAB       Medication Therapy Plan: On insurance preferred; recently started; defer to your team as has not been on this for >3m                   How patient will take medication: t1t po daily           Comments:   BP Readings from Last 3 Encounters:   03/01/19 111/75   01/28/19 110/60   12/21/18 117/85      Lab Results   Component Value Date    CREATININE 1.3 12/12/2018    BUN 12 12/12/2018     12/12/2018    K 4.5 12/12/2018     12/12/2018    CO2 24 12/12/2018

## 2019-03-12 NOTE — PROGRESS NOTES
"Last 5 Patient Entered Readings                                      Current 30 Day Average: 141/81     Recent Readings 3/11/2019 3/9/2019 3/3/2019 2/26/2019 2/25/2019    SBP (mmHg) 141 147 145 133 144    DBP (mmHg) 86 72 81 79 84    Pulse 68 86 89 68 81        Mr. De La Rosa BP is not controlled since starting losartan 50 mg QD. He is also experiencing "crazy dreams" and not sleeping well at night. Will stop losartan and will start amlodipine 5 mg QD. He took amlodipine previously, but when BP started to drop, it was discontinued.     Per 30 day average, 141/81 mmHg, patient's BP is not at goal.     Will continue to monitor regularly. Will follow up in 2-3 weeks, sooner if BP begins to trend upward or downward.    Asked patient to call or message with questions or concerns.     Current HTN regimen:  Hypertension Medications             amLODIPine (NORVASC) 5 MG tablet Take 1 tablet (5 mg total) by mouth once daily.    metoprolol succinate (TOPROL-XL) 50 MG 24 hr tablet TAKE 1 TABLET BY MOUTH EVERY DAY                          "

## 2019-03-13 RX ORDER — AMLODIPINE BESYLATE 5 MG/1
5 TABLET ORAL DAILY
Qty: 30 TABLET | Refills: 3 | Status: SHIPPED | OUTPATIENT
Start: 2019-03-13 | End: 2019-04-29

## 2019-03-25 DIAGNOSIS — I10 ESSENTIAL HYPERTENSION: ICD-10-CM

## 2019-03-26 DIAGNOSIS — I10 HYPERTENSION, ESSENTIAL: Primary | ICD-10-CM

## 2019-03-28 ENCOUNTER — PATIENT OUTREACH (OUTPATIENT)
Dept: OTHER | Facility: OTHER | Age: 64
End: 2019-03-28

## 2019-03-28 RX ORDER — METOPROLOL SUCCINATE 50 MG/1
50 TABLET, EXTENDED RELEASE ORAL DAILY
Qty: 30 TABLET | Refills: 6 | Status: SHIPPED | OUTPATIENT
Start: 2019-03-28 | End: 2019-07-15

## 2019-03-28 NOTE — PROGRESS NOTES
Last 5 Patient Entered Readings                                      Current 30 Day Average: 143/82     Recent Readings 3/25/2019 3/24/2019 3/22/2019 3/22/2019 3/20/2019    SBP (mmHg) 145 149 152 130 134    DBP (mmHg) 82 85 91 81 80    Pulse 83 72 72 78 76        Mr. Ramires is tolerating amlodipine better than losartan. He has no side effects. BP was starting to improve, but has trended up. He is associating higher BP readings to arm pain that he will be seeing the MD for soon. Will not make any changes at this time.     Per 30 day average, 143/82 mmHg, patient's BP is not at goal.     Will continue to monitor regularly. Will follow up in 2-3 weeks, sooner if BP begins to trend upward or downward.    Asked patient to call or message with questions or concerns.     Current HTN regimen:  Hypertension Medications             amLODIPine (NORVASC) 5 MG tablet Take 1 tablet (5 mg total) by mouth once daily.    metoprolol succinate (TOPROL-XL) 50 MG 24 hr tablet Take 1 tablet (50 mg total) by mouth once daily.

## 2019-04-01 ENCOUNTER — PATIENT OUTREACH (OUTPATIENT)
Dept: OTHER | Facility: OTHER | Age: 64
End: 2019-04-01

## 2019-04-01 NOTE — PROGRESS NOTES
Last 5 Patient Entered Readings                                      Current 30 Day Average: 144/82     Recent Readings 3/25/2019 3/24/2019 3/22/2019 3/22/2019 3/20/2019    SBP (mmHg) 145 149 152 130 134    DBP (mmHg) 82 85 91 81 80    Pulse 83 72 72 78 76        Digital Medicine: Health  Follow Up    Patient reports he has been sleeping better since switching to Amlodipine.     Lifestyle Modifications:    1.Dietary Modifications (Sodium intake <2,000mg/day, food labels, dining out):   Pt denies needing any other help with nutrition at this time.    2.Physical Activity:   Patient reports he has been dealing his plantar fascitis flaring up, as well as some hip pain.  Instructed patient to try to stretch and walk minimally to continue to try to stay active.  Patient reports he has been stretching more and walks when his pain is bearable.     3.Medication Therapy:   Patient has been compliant with the medication regimen.  Confirmed that patient started new medication regimen as prescribed by Digital Medicine Clinician.    4.Patient has the following medication side effects/concerns: None  (Frequency/Alleviating factors/Precipitating factors, etc.)     Follow up with Mr. Mane Ramires completed. No further questions or concerns. Will continue to follow up to achieve health goals.  Patient is currently not at goal, 144/82 mmHg does exceed <130/80 mmHg.

## 2019-04-10 ENCOUNTER — HOSPITAL ENCOUNTER (EMERGENCY)
Facility: HOSPITAL | Age: 64
Discharge: HOME | End: 2019-04-10
Attending: EMERGENCY MEDICINE
Payer: MEDICAID

## 2019-04-10 VITALS
SYSTOLIC BLOOD PRESSURE: 139 MMHG | TEMPERATURE: 99 F | RESPIRATION RATE: 20 BRPM | WEIGHT: 167 LBS | DIASTOLIC BLOOD PRESSURE: 90 MMHG | HEART RATE: 110 BPM | OXYGEN SATURATION: 95 % | BODY MASS INDEX: 28.22 KG/M2

## 2019-04-10 DIAGNOSIS — G89.18 POST-OP PAIN: Primary | ICD-10-CM

## 2019-04-10 PROCEDURE — 63600175 PHARM REV CODE 636 W HCPCS: Performed by: PHYSICIAN ASSISTANT

## 2019-04-10 PROCEDURE — 96372 THER/PROPH/DIAG INJ SC/IM: CPT

## 2019-04-10 PROCEDURE — 25000003 PHARM REV CODE 250: Performed by: PHYSICIAN ASSISTANT

## 2019-04-10 PROCEDURE — 99284 EMERGENCY DEPT VISIT MOD MDM: CPT | Mod: 25

## 2019-04-10 RX ORDER — HYDROCODONE BITARTRATE AND ACETAMINOPHEN 5; 325 MG/1; MG/1
1 TABLET ORAL EVERY 6 HOURS PRN
Qty: 20 TABLET | Refills: 0 | Status: SHIPPED | OUTPATIENT
Start: 2019-04-10 | End: 2019-04-29 | Stop reason: SDUPTHER

## 2019-04-10 RX ORDER — HYDROMORPHONE HYDROCHLORIDE 2 MG/ML
1 INJECTION, SOLUTION INTRAMUSCULAR; INTRAVENOUS; SUBCUTANEOUS
Status: COMPLETED | OUTPATIENT
Start: 2019-04-10 | End: 2019-04-10

## 2019-04-10 RX ORDER — ONDANSETRON 4 MG/1
4 TABLET, FILM COATED ORAL EVERY 6 HOURS PRN
Qty: 20 TABLET | Refills: 0 | Status: SHIPPED | OUTPATIENT
Start: 2019-04-10 | End: 2019-04-29

## 2019-04-10 RX ORDER — ONDANSETRON 4 MG/1
4 TABLET, ORALLY DISINTEGRATING ORAL
Status: COMPLETED | OUTPATIENT
Start: 2019-04-10 | End: 2019-04-10

## 2019-04-10 RX ADMIN — HYDROMORPHONE HYDROCHLORIDE 1 MG: 2 INJECTION, SOLUTION INTRAMUSCULAR; INTRAVENOUS; SUBCUTANEOUS at 06:04

## 2019-04-10 RX ADMIN — ONDANSETRON 4 MG: 4 TABLET, ORALLY DISINTEGRATING ORAL at 06:04

## 2019-04-10 NOTE — ED PROVIDER NOTES
Encounter Date: 4/10/2019    SCRIBE #1 NOTE: I, Betty José, am scribing for, and in the presence of, Demetrice Harry PA-C.       History     Chief Complaint   Patient presents with    Post-op Problem     S/p right rotator cuff surgery yesterday. States Percocet not helping with pain at all. Called surgeon (Dr. Longoria) and told to come to ED.        Time seen by provider: 6:45 PM on 04/10/2019    Mane Ramires is a 63 y.o. male with a PMHx of HTN and prediabetes who presents to the ED with a sudden onset of constant right shoulder pain and nausea that began one day ago. He had rotator cuff surgery of his right shoulder by Dr. Andrez Longoria one day ago. Dr. Longoria prescribed the pt Percocet to help with his pain, but he has had no relief. He called Dr. Longoria, whom advised him to report to the ED to have an evaluation. His last dose of pain medication was > 8 hours PTA. He denied any numbness/tingling to the arm. He denied any redness. The patient denies any other symptoms at this time. No other pertinent PSHx noted. He is a former smoker. Drug allergies include Sudafed, Codeine, Pseudoephedrine, and Cortisone.    The history is provided by the patient.     Review of patient's allergies indicates:   Allergen Reactions    Sudafed [pseudoephedrine hcl] Other (See Comments)     Heart racing      Codeine Other (See Comments)     Heart racing    Pseudoephedrine     Cortisone Palpitations     Past Medical History:   Diagnosis Date    Accident     fell from roof with TBI (word finding issues personality changes, memory deficets), R rib fractures, clavicle fx    Allergy     Anxiety     ASD (atrial septal defect)     noted on ECHO 6/16    Cervical stenosis of spine     noted on 6/15 MRI    CTS (carpal tunnel syndrome)     mild-mod on 4/17 NCS    DDD (degenerative disc disease), cervical 10/2014    C5-6 and C6-C7    Depression     JEEVAN (generalized anxiety disorder)     Gender identity  disorder     H/O cardiovascular stress test     12/14 normal    Herpes simplex type 2 infection     History of atypical hyperplasia of breast     B precancer lesions    Hypertension     IGT (impaired glucose tolerance)     Myelomalacia     c-spine noted on 8/15 MRI    OA (osteoarthritis)     Prediabetes     TBI (traumatic brain injury)     after falling off a roof in 2003     Past Surgical History:   Procedure Laterality Date    AMPUTATION      L index finger    APPENDECTOMY  1969    BILATERAL SALPINGOOPHORECTOMY  02/2015    BIOPSY, LIVER, WITH US GUIDANCE N/A 12/18/2018    Performed by [facility] Provider at [facility] OR 2ND FLR    CARPAL TUNNEL RELEASE  12/2017    right    CERVICAL FUSION  10/2014    C5-C6 and C6-C7    CHOLECYSTECTOMY      CHOLECYSTECTOMY-LAPAROSCOPIC N/A 11/9/2017    [facility]    ESOPHAGOGASTRODUODENOSCOPY (EGD) N/A 9/6/2017    Performed by [facility] Ayon [facility] at [facility] ENDO    EXCISION,NASAL TURBINATE,SUBMUCOSAL Bilateral 8/23/2018    Performed by Reji Arce III, MD at [facility] OR 2ND FLR    FINGER AMPUTATION Left     FRACTURE SURGERY Right 2003    rib fractures    GRAFT- Bilateral 8/23/2018    Performed by Reji [facility]ce III, MD at [facility] OR 2ND FLR    HYSTERECTOMY  1984    MIGUEL    MASTECTOMY  02/2015    MASTECTOMY-BREAST-BILATERAL Bilateral 2/16/2015    Performed by Kali Yi MD at [facility] OR 2ND FLR    RECONSTRUCTION, NOSE N/A 8/23/2018    Performed by [facility], [facility] at [facility] 2ND FLR    RELEASE-CARPAL TUNNEL  Right carpal tunnel release Right 12/4/2017    [facility]    DLFYSFDD-PMKDCXAEJDIN-[id number] Bilateral 2/16/2015    Performed by Jenna Carbajal MD at [facility] OR 2ND FLR    SEPTOPLASTY, NASAL, ENDOSCOPIC N/A 8/23/2018    [facility]    UPPER GASTROINTESTINAL ENDOSCOPY  09/06/2017    Dr. Ayon     Family History    Problem Relation Age of Onset    Diabetes Mother     Heart disease Mother     Gallbladder disease Mother     Coronary artery disease Father     Breast cancer Sister     Heart disease Sister     Lung cancer Paternal Grandfather     Heart disease Paternal Grandfather     Heart disease Maternal Grandmother     Gallbladder disease Maternal Grandmother     Heart attack Brother     Gallbladder disease Daughter     Colon cancer Neg Hx     Colon polyps Neg Hx     Crohn's disease Neg Hx     Ulcerative colitis Neg Hx     Stomach cancer Neg Hx     Esophageal cancer Neg Hx      Social History     Tobacco Use    Smoking status: Former Smoker     Packs/day: 0.25     Years: 2.00     Pack years: 0.50     Types: Cigarettes     Last attempt to quit: 2002     Years since quittin.4    Smokeless tobacco: Never Used   Substance Use Topics    Alcohol use: Yes     Comment: rare     Drug use: No     Types: Hydrocodone     Review of Systems   Constitutional: Negative for chills and fever.   HENT: Negative for facial swelling and trouble swallowing.    Eyes: Negative for discharge.   Respiratory: Negative for cough, chest tightness, shortness of breath and wheezing.    Cardiovascular: Negative for chest pain and palpitations.   Gastrointestinal: Positive for nausea. Negative for abdominal pain and diarrhea.   Genitourinary: Negative for dysuria and hematuria.   Musculoskeletal: Positive for arthralgias. Negative for back pain, joint swelling, myalgias, neck pain and neck stiffness.        Positive for right shoulder pain.   Skin: Negative for color change, pallor, rash and wound.   Neurological: Negative for dizziness, syncope, weakness, light-headedness, numbness and headaches.   Hematological: Does not bruise/bleed easily.   Psychiatric/Behavioral: The patient is not nervous/anxious.        Physical Exam     Initial Vitals [04/10/19 1732]   BP Pulse Resp Temp SpO2   (!) 139/90 110 20 98.9 °F (37.2 °C) 95 %       MAP       --         Physical Exam    Nursing note and vitals reviewed.  Constitutional: He appears well-developed and well-nourished. He is cooperative.  Non-toxic appearance. He does not have a sickly appearance.   HENT:   Head: Normocephalic and atraumatic.   Right Ear: External ear normal.   Left Ear: External ear normal.   Nose: Nose normal.   Eyes: Conjunctivae and lids are normal.   Neck: Normal range of motion and full passive range of motion without pain. Neck supple.   Cardiovascular: Normal rate, regular rhythm and normal heart sounds. Exam reveals no gallop and no friction rub.    No murmur heard.  Pulses:       Radial pulses are 2+ on the right side.   Pulmonary/Chest: Breath sounds normal. He has no wheezes. He has no rhonchi. He has no rales.   Abdominal: Soft. Normal appearance. There is no tenderness. There is no rigidity, no rebound and no guarding.   Musculoskeletal:        Right hand: Normal sensation noted. Normal strength noted.   Soft compartments of right upper extremity. ROM of right shoulder deferred secondary to recent surgery and immobilization. C/D/I surgical site of right shoulder.   Neurological: He is alert.   Sensation intact to right upper extremity. Good  strength of right hand.   Skin: Skin is warm, dry and intact. No rash noted.         ED Course   Procedures  Labs Reviewed - No data to display       Imaging Results    None          Medical Decision Making:   History:   Old Medical Records: I decided to obtain old medical records.       APC / Resident Notes:   Urgent evaluation of a 63-year-old male who presents with right shoulder pain uncontrolled by his Percocet at home.  It has been 8 hr since taking his medications.  He is neurovascularly intact. He has no swelling or erythema.  He has soft compartments.  I doubt DVT or compartment syndrome.  His dressing is clean dry and intact.  He is afebrile.  Range of motion was not performed secondary to recent surgery.  He  was given IM Dilaudid with improvement in his pain.  Recommend that he follow up with his surgeon for further pain management. Return precautions given. Based on my clinical evaluation, I do not appreciate any immediate, emergent, or life threatening condition or etiology that warrants additional workup today and feel that the patient can be discharged with close follow up care.  Patient is to follow up with their primary care provider. Case was discussed with Dr. Watkins who is in agreement with the plan of care. All questions answered.          Scribe Attestation:   Scribe #1: I performed the above scribed service and the documentation accurately describes the services I performed. I attest to the accuracy of the note.    I, Allie Harry PA-C, personally performed the services described in this documentation. All medical record entries made by the scribe were at my direction and in my presence.  I have reviewed the chart and agree that the record reflects my personal performance and is accurate and complete. Allie Harry PA-C.  12:56 AM 04/11/2019             Clinical Impression:       ICD-10-CM ICD-9-CM   1. Post-op pain G89.18 338.18         Disposition:   Disposition: Discharged  Condition: Stable                        Allie Harry PA-C  04/11/19 0057

## 2019-04-11 NOTE — DISCHARGE INSTRUCTIONS
Call your surgeon in the morning so that he can prescribe further pain medication for you. You can take the pain medication in between as needed.  Take nausea medication as needed.  For worsening symptoms, chest pain, shortness of breath, increased abdominal pain, high grade fever, stroke or stroke like symptoms, immediately go to the nearest Emergency Room or call 911 as soon as possible.

## 2019-04-11 NOTE — ED NOTES
C/o increased pain to right shoulder post op yesterday states last dose of pain medication was at 0800 today because he has felt very nauseated. Arm remains in sling and immobilized. Alert calm family at bedside. Aware to notify nurse of needs or concerns.

## 2019-04-15 ENCOUNTER — PATIENT OUTREACH (OUTPATIENT)
Dept: ADMINISTRATIVE | Facility: HOSPITAL | Age: 64
End: 2019-04-15

## 2019-04-17 DIAGNOSIS — M25.511 RIGHT SHOULDER PAIN: Primary | ICD-10-CM

## 2019-04-22 RX ORDER — TRAZODONE HYDROCHLORIDE 50 MG/1
50 TABLET ORAL NIGHTLY
Qty: 30 TABLET | Refills: 2 | Status: SHIPPED | OUTPATIENT
Start: 2019-04-22 | End: 2019-07-20 | Stop reason: SDUPTHER

## 2019-04-23 ENCOUNTER — CLINICAL SUPPORT (OUTPATIENT)
Dept: REHABILITATION | Facility: HOSPITAL | Age: 64
End: 2019-04-23
Attending: ORTHOPAEDIC SURGERY
Payer: MEDICAID

## 2019-04-23 ENCOUNTER — TELEPHONE (OUTPATIENT)
Dept: REHABILITATION | Facility: HOSPITAL | Age: 64
End: 2019-04-23

## 2019-04-23 DIAGNOSIS — M25.611 SHOULDER STIFFNESS, RIGHT: ICD-10-CM

## 2019-04-23 DIAGNOSIS — R29.898 SHOULDER WEAKNESS: ICD-10-CM

## 2019-04-23 DIAGNOSIS — M25.511 RIGHT SHOULDER PAIN, UNSPECIFIED CHRONICITY: Primary | ICD-10-CM

## 2019-04-23 PROCEDURE — 97165 OT EVAL LOW COMPLEX 30 MIN: CPT | Mod: PN

## 2019-04-23 NOTE — PLAN OF CARE
Date: 4-23-19    Time in: 2  Time out: 225    Procedures: eval  Start: 2  Stop: 225    Total untimed minutes: eval  Charges billed # of units: 1    Onset date: 2003  Surgery date: 2 weeks ago per patient                   Primary dx: r rotator cuff repair  Tx dx: r shoulder pain, stiffness, weakness    Pmhx relevant to primary or tx dx: n/a    Precautions: universal, protocol  Prior therapy: reports got instructions on pendulums from referring md  Medications relevant to primary or tx dx: n/a  Nutrition: wnl  Hx of present illness: reports fell in 2003 with onset of s/s, says pain decreased around 2013 but weakness persisted, tried therapy last year after a fall which brought on r shoulder poppiing, had surgery as above and sent here  PLOF: full painless use  Social hx: pain meds for r shoulder surgery, denies nicotine, rarely intakes caffeine  Fxnl deficits leading to referral: decreased rom, use, and strength with ADL  Patient therapy goals: full painless use    Subjective    Patient states: understanding of HEP importance and general anticipated progression of therapy    Pain: C5 ache with some distal referral    Rest up to 2/10        use 0         Sleep 6    Objective    Posture: sutured portal sites and sutured incision about 3 cm long anterior humerus noted with no outward s/s of infection  Palpation: nt  Sensation: denies burning, numbness, tingling  ROM: nt  ADL: not using r ue for ADL due to precautions  Hand dominance: r  Job: not working  DME: sling with abd pillow  Duties:Normal self and home care tasks  Tx: issued pendulums, proper ice use for pain, told to wear sling and abd pillow 24/7 except for issued exercises and to lean over and let arm hang to clean under shoulder      see epic phone log for effort to get op note, told patient to call md office to op note, explained to patient me revieweing op note imperative to correctly advance his rehab    Assessment    Initial assessment (pertinent  findings, problem list and factors affecting outcome): Needs skilled OT to properly promote rom, use, and strength given type, nature, and extent of diagnosis  Rehab potential: good    Goals:   stg 1. Pt. Will be I with HEP 2. Pt. Will have 2/10 pain with light use 3. Pt. Will have = prom of bilateral shoulders to enhance affected arm use with ADL      ltg 1. Pt. Will be I with d/c HEP 2. Pt. Will have 1/10 pain with all use 3. Pt. Will have have roughly 3+/5 MMT elevation, er to improve use with ADL                                                                          4. Pt. Will be I with ADL        Plan    Certification period: 4-23-19 to 7-16-19  Recommended tx plan: 3 times a week for 12 weeks; eval and tx   Other recommendations: above visit frequency and duration in above dates may be adjusted based on pt. progress and need for therapy    Therapist: sosa juárez cht      eval code grid    Profile and History Assessment of Occupational Performance Level of Clinical Decision Making Complexity Score   Occupational Profile:   Mane Ramires is a 63 y.o. male who lives with their daughter and is currently not working as. Mane Ramires has difficulty with all ADL  affecting his/her daily functional abilities. His/her main goal for therapy is full painless use.     Comorbidities:   n/a    Medical and Therapy History Review:   Brief               Performance Deficits    Physical:  Joint Mobility  Muscle Power/Strength  Pain    Cognitive:  No Deficits    Psychosocial:    No Deficits     Clinical Decision Making:  low    Assessment Process:  Problem-Focused Assessments    Modification/Need for Assistance:  Not Necessary    Intervention Selection:  Limited Treatment Options       low  Based on PMHX, co morbidities , data from assessments and functional level of assistance required with task and clinical presentation directly impacting function.

## 2019-04-25 ENCOUNTER — PATIENT OUTREACH (OUTPATIENT)
Dept: OTHER | Facility: OTHER | Age: 64
End: 2019-04-25

## 2019-04-25 NOTE — PROGRESS NOTES
Last 5 Patient Entered Readings                                      Current 30 Day Average: 142/84     Recent Readings 4/17/2019 4/17/2019 4/8/2019 4/3/2019 4/3/2019    SBP (mmHg) 151 157 129 145 147    DBP (mmHg) 85 88 76 81 88    Pulse 78 81 80 73 78          4/25: Left voicemail to follow up with Mr. Mane Ramires.  Current BP average 142/84 mmHg is not at goal, <130/80 mmHg.  No readings since 4/17.  Sending [employer] message.   Patient returned call.     Digital Medicine: Health  Follow Up    Patient reports having rotator cuff surgery two weeks ago.   Patient reports he has been having trouble taking readings because of his surgery.  Patient denies wanting to go on hiatus and states he will have someone help him take readings.     Lifestyle Modifications:    1.Dietary Modifications (Sodium intake <2,000mg/day, food labels, dining out):   Encouraged patient to control his salt intake even more being that he is not able to do much activity at the moment.     2.Physical Activity:   Patient reports he will be starting physical therapy for 12 weeks.     3.Medication Therapy:   Patient has been compliant with the medication regimen.    4.Patient has the following medication side effects/concerns: None  (Frequency/Alleviating factors/Precipitating factors, etc.)     Follow up with Mr. Mane Ramires completed. No further questions or concerns. Will continue to follow up to achieve health goals.  Patient is currently not at goal, 142/84 mmHg does exceed <130/80 mmHg.

## 2019-04-29 ENCOUNTER — TELEPHONE (OUTPATIENT)
Dept: FAMILY MEDICINE | Facility: CLINIC | Age: 64
End: 2019-04-29

## 2019-04-29 ENCOUNTER — CLINICAL SUPPORT (OUTPATIENT)
Dept: REHABILITATION | Facility: HOSPITAL | Age: 64
End: 2019-04-29
Attending: ORTHOPAEDIC SURGERY
Payer: MEDICAID

## 2019-04-29 ENCOUNTER — OFFICE VISIT (OUTPATIENT)
Dept: FAMILY MEDICINE | Facility: CLINIC | Age: 64
End: 2019-04-29
Payer: MEDICAID

## 2019-04-29 VITALS
BODY MASS INDEX: 27.49 KG/M2 | HEIGHT: 65 IN | HEART RATE: 68 BPM | RESPIRATION RATE: 16 BRPM | WEIGHT: 165 LBS | TEMPERATURE: 98 F | OXYGEN SATURATION: 98 % | SYSTOLIC BLOOD PRESSURE: 144 MMHG | DIASTOLIC BLOOD PRESSURE: 76 MMHG

## 2019-04-29 DIAGNOSIS — F39 MOOD DISORDER: ICD-10-CM

## 2019-04-29 DIAGNOSIS — M25.611 SHOULDER STIFFNESS, RIGHT: ICD-10-CM

## 2019-04-29 DIAGNOSIS — I10 HYPERTENSION, ESSENTIAL: Primary | ICD-10-CM

## 2019-04-29 DIAGNOSIS — M25.511 RIGHT SHOULDER PAIN, UNSPECIFIED CHRONICITY: Primary | ICD-10-CM

## 2019-04-29 DIAGNOSIS — M25.519 SHOULDER PAIN, UNSPECIFIED CHRONICITY, UNSPECIFIED LATERALITY: ICD-10-CM

## 2019-04-29 DIAGNOSIS — R29.898 SHOULDER WEAKNESS: ICD-10-CM

## 2019-04-29 DIAGNOSIS — F07.81 POSTCONCUSSION SYNDROME: ICD-10-CM

## 2019-04-29 PROBLEM — M25.431 RIGHT WRIST EFFUSION: Status: RESOLVED | Noted: 2018-02-06 | Resolved: 2019-04-29

## 2019-04-29 PROBLEM — K74.00 HEPATIC FIBROSIS: Status: RESOLVED | Noted: 2018-12-18 | Resolved: 2019-04-29

## 2019-04-29 PROBLEM — R10.13 EPIGASTRIC PAIN: Status: RESOLVED | Noted: 2017-09-06 | Resolved: 2019-04-29

## 2019-04-29 PROCEDURE — 99214 PR OFFICE/OUTPT VISIT, EST, LEVL IV, 30-39 MIN: ICD-10-PCS | Mod: S$GLB,,, | Performed by: FAMILY MEDICINE

## 2019-04-29 PROCEDURE — 99214 OFFICE O/P EST MOD 30 MIN: CPT | Mod: S$GLB,,, | Performed by: FAMILY MEDICINE

## 2019-04-29 PROCEDURE — 97110 THERAPEUTIC EXERCISES: CPT | Mod: PN

## 2019-04-29 RX ORDER — IRBESARTAN 75 MG/1
75 TABLET ORAL NIGHTLY
Qty: 30 TABLET | Refills: 1 | Status: SHIPPED | OUTPATIENT
Start: 2019-04-29 | End: 2019-06-03

## 2019-04-29 RX ORDER — HYDROCODONE BITARTRATE AND ACETAMINOPHEN 5; 325 MG/1; MG/1
1 TABLET ORAL EVERY 6 HOURS PRN
Qty: 40 TABLET | Refills: 0 | Status: SHIPPED | OUTPATIENT
Start: 2019-04-29 | End: 2019-04-30 | Stop reason: SDUPTHER

## 2019-04-29 RX ORDER — VALSARTAN 80 MG/1
80 TABLET ORAL DAILY
Qty: 30 TABLET | Refills: 1 | Status: SHIPPED | OUTPATIENT
Start: 2019-04-29 | End: 2019-04-29

## 2019-04-29 NOTE — PROGRESS NOTES
Subjective:       Patient ID: Mane Ramires is a 63 y.o. male.    Chief Complaint: Blood Pressure Check and Follow-up    HPI   The patient is a 63-year-old who is here today for short-term follow-up.  Today we discussed the followin)  hypertension.  He is currently taking Norvasc 5 mg and Toprol 50 mg once a day.  Since starting the Norvasc, he has been bottom out tired.  Since starting the Norvasc, he can sleep for 10 hr at night and takes 2 naps during the day and still not feel rested.  His blood pressures have been high.  He suspects some of his high blood pressures may be due to the pain that he has had with his recent right shoulder surgery.  Of note, in the past, he has not tolerated Cozaar well  2) right shoulder surgery.  He recently had right shoulder surgery on .  The surgeons sent him home with oxycodone 10 mg but this did not work for him.  He went to the ER on  and received hydrocodone 5 mg tablets which have worked very well for him.  On , he received 20 with the hydrocodone 5 mg tablets.  He is due to see the surgeon later this week.  He is going to physical therapy later today and is worried about his pain after physical therapy.  Currently his pain is a 6 on the pain scale  3) mood disorder with depression and anxiety.  He has been doing much better with the higher dose of Cymbalta.  He feels that this is working well for him.  He is not having any significant depression or anxiety    Since I have seen him last, he has not been able to see the neurologist at [facility].  He does have a neurology referral that is still waiting.  He does feel that his vision and balance issues have gotten a bit better but his memory and word-finding continue to be an issue        Review of Systems   Constitutional: Negative for appetite change, chills, diaphoresis, fatigue, fever and unexpected weight change.   HENT: Negative for congestion, ear pain, postnasal drip, rhinorrhea, sinus  pressure, sneezing, sore throat and trouble swallowing.    Eyes: Negative for pain, discharge and visual disturbance.   Respiratory: Negative for cough, chest tightness, shortness of breath and wheezing.    Cardiovascular: Negative for chest pain, palpitations and leg swelling.   Gastrointestinal: Negative for abdominal distention, abdominal pain, blood in stool, constipation, diarrhea, nausea and vomiting.   Musculoskeletal:        Per HPI   Skin: Negative for rash.   Neurological:        Per HPI   Psychiatric/Behavioral: Negative for dysphoric mood, self-injury, sleep disturbance and suicidal ideas. The patient is not nervous/anxious.        Objective:      Physical Exam   Constitutional: He is oriented to person, place, and time. He appears well-developed and well-nourished. No distress.   He is in a right shoulder immobilizer   HENT:   Head: Normocephalic and atraumatic.   Right Ear: Hearing, tympanic membrane, external ear and ear canal normal.   Left Ear: Hearing, tympanic membrane, external ear and ear canal normal.   Nose: Nose normal.   Mouth/Throat: Oropharynx is clear and moist and mucous membranes are normal. No oral lesions. No oropharyngeal exudate, posterior oropharyngeal edema or posterior oropharyngeal erythema.   Eyes: Pupils are equal, round, and reactive to light. Conjunctivae, EOM and lids are normal. No scleral icterus.   Neck: Normal range of motion. Neck supple. Carotid bruit is not present. No thyroid mass and no thyromegaly present.   Cardiovascular: Normal rate, regular rhythm and normal heart sounds.  No extrasystoles are present. PMI is not displaced. Exam reveals no gallop.   No murmur heard.  Pulmonary/Chest: Effort normal and breath sounds normal. No accessory muscle usage. No respiratory distress.   Clear to auscultation bilaterally.   Abdominal: Soft. Normal appearance and bowel sounds are normal. He exhibits no abdominal bruit. There is no hepatosplenomegaly. There is no  "tenderness. There is no rebound.   Lymphadenopathy:        Head (right side): No submental and no submandibular adenopathy present.        Head (left side): No submental and no submandibular adenopathy present.        Right cervical: No superficial cervical, no deep cervical and no posterior cervical adenopathy present.       Left cervical: No superficial cervical, no deep cervical and no posterior cervical adenopathy present.        Right: No supraclavicular adenopathy present.        Left: No supraclavicular adenopathy present.   Neurological: He is alert and oriented to person, place, and time.   Skin: Skin is warm, dry and intact.   Psychiatric: He has a normal mood and affect.     Blood pressure (!) 144/76, pulse 68, temperature 98.1 °F (36.7 °C), temperature source Oral, resp. rate 16, height 5' 4.5" (1.638 m), weight 74.9 kg (165 lb 0.2 oz), SpO2 98 %.Body mass index is 27.89 kg/m².          A/P:  1) hypertension.  Suboptimally controlled with intolerance associated with the Norvasc.  We are going to stop the Norvasc.  We are going to continue with the Toprol.  We are going to try Diovan.  He will continue follow with the digital hypertension medicine program and will take blood pressure readings consistently  2) right shoulder pain status post surgery.  I did give him a limited supply of lorcet 5/325 mg  3) mood disorder with depression anxiety.  Improved.  Continue with Cymbalta   4)   Post concussive syndrome.  Persistent.  He will reach out to [facility] and [facility] medic[facility] escalation line for assistance as well        I will see him back in 6 weeks or sooner if needed    "

## 2019-04-29 NOTE — TELEPHONE ENCOUNTER
Pharmacy requesting an Rx for Valsartan's preferred alternative Losartan.     Cost to patient for valsartan is $119.63 for 30 day supply

## 2019-04-30 ENCOUNTER — TELEPHONE (OUTPATIENT)
Dept: FAMILY MEDICINE | Facility: CLINIC | Age: 64
End: 2019-04-30

## 2019-04-30 ENCOUNTER — PATIENT MESSAGE (OUTPATIENT)
Dept: FAMILY MEDICINE | Facility: CLINIC | Age: 64
End: 2019-04-30

## 2019-04-30 RX ORDER — HYDROCODONE BITARTRATE AND ACETAMINOPHEN 5; 325 MG/1; MG/1
1 TABLET ORAL EVERY 6 HOURS PRN
Qty: 8 TABLET | Refills: 0 | Status: SHIPPED | OUTPATIENT
Start: 2019-04-30 | End: 2019-05-07 | Stop reason: SDUPTHER

## 2019-04-30 NOTE — PROGRESS NOTES
Time in 1  Time out 2      Timed units  4 therex                              Time:1-2      S:seecaroline batistaro this week  Pain:slight decrease in intensity and frequency since last visit    O:  HEP: issued er 0, 45 abd stretch    manual tx: n/a    prom as tolerated-pain free: supine flex to 90, abd; pendulums all 4 directions    stretches as tolerated-pain free: er 0, 45 abd    theraband 2 x 20:  n/a    isometrics 2 x 20: n/a    education: explained despite faxed request op note not received to clarify if front, back, or both sides of cuff repaired; explained somebody from md's office returned my message and stated posterior cuff repaired; explained given extreme tightness noted today, stretches issued are reasonable; explained will send progress note and request to md for return to doctor date 5-1-19    ube: n/a    arom: n/a      prom er 0 abd 0, er 45 abd 10      A:gh capsular pattern noted        functional upper extremity status/ADL improvement: n/a    P:fix gh stiffness via manual tx and stretches then screen remaining elevation chain and/or prom elevation x 3

## 2019-04-30 NOTE — TELEPHONE ENCOUNTER
Spoke with the pharmacy. they stated that they did not fill the Norco but according to the medication monitoring system some was filled for him 5 days ago and they are unable to dispense more due to it being too soon.

## 2019-04-30 NOTE — TELEPHONE ENCOUNTER
Pls see email instructions   Start PA to override quantity limit   Dx recent shoulder shoulder  Side effects from oxycodone so changes to hydrocodone

## 2019-04-30 NOTE — TELEPHONE ENCOUNTER
Pls see email instructions   Can we contact pharmacy about this?  They can dispense only 5 days if necessary but he's had surgery and has already received a short term supply recently

## 2019-05-01 NOTE — TELEPHONE ENCOUNTER
Pharmacy stated Avapro was ready for pickup at pharmacy. Patient notified and verbalized understanding.

## 2019-05-02 ENCOUNTER — CLINICAL SUPPORT (OUTPATIENT)
Dept: REHABILITATION | Facility: HOSPITAL | Age: 64
End: 2019-05-02
Attending: ORTHOPAEDIC SURGERY
Payer: MEDICAID

## 2019-05-02 DIAGNOSIS — M25.511 RIGHT SHOULDER PAIN, UNSPECIFIED CHRONICITY: Primary | ICD-10-CM

## 2019-05-02 DIAGNOSIS — M25.611 SHOULDER STIFFNESS, RIGHT: ICD-10-CM

## 2019-05-02 DIAGNOSIS — R29.898 SHOULDER WEAKNESS: ICD-10-CM

## 2019-05-02 PROCEDURE — 97530 THERAPEUTIC ACTIVITIES: CPT | Mod: PN

## 2019-05-02 PROCEDURE — 97110 THERAPEUTIC EXERCISES: CPT | Mod: PN

## 2019-05-02 NOTE — PROGRESS NOTES
Time in 12  Time out 1    Timed units  4 therex                              Time:12-1      S:got sutures removed since last OT visit  Pain:continues to decrease in intensity and frequency    O:  Prom                    r                    l  Er 0 abd              0                   72  Er 0 45 abd        50                 90    HEP:  reviewed    manual tx: n/a    prom as tolerated-pain free: supine abd, flex, circumduction    stretches as tolerated-pain free: er 0, 45 abd    theraband 2 x 20:  n/a    isometrics 2 x 20: n/a    education: likely tx progression, discussed surgical report    ube: n/a    arom: n/a    surgical report received via fx    A:gh capsular pattern and overall reactivity decreasing          functional upper extremity status/ADL improvement: n/a    P:start scar massage once incision healed, d/c sling around 6 wks s/p and start posterior capsular stretches

## 2019-05-03 ENCOUNTER — TELEPHONE (OUTPATIENT)
Dept: FAMILY MEDICINE | Facility: CLINIC | Age: 64
End: 2019-05-03

## 2019-05-03 ENCOUNTER — PATIENT OUTREACH (OUTPATIENT)
Dept: OTHER | Facility: OTHER | Age: 64
End: 2019-05-03

## 2019-05-03 NOTE — TELEPHONE ENCOUNTER
Pls notify pt:    Pain meds do have a PA  I do want to limit these but let me know if you feel you need more

## 2019-05-03 NOTE — TELEPHONE ENCOUNTER
PA approved for Hydrocodone- Acetaminophen tablets 5-325mg, quantity of 120 for 30 days has been approved for 4 months from 5/2/2019-8/31/2019.

## 2019-05-03 NOTE — PROGRESS NOTES
Last 5 Patient Entered Readings                                      Current 30 Day Average: 139/84     Recent Readings 4/28/2019 4/25/2019 4/17/2019 4/17/2019 4/8/2019    SBP (mmHg) 145 124 151 157 129    DBP (mmHg) 85 85 85 88 76    Pulse 69 69 78 81 80        Per 30 day average, 139/84 mmHg, patient's BP is not at goal.     Mr. Ramires had rotator cuff repair surgery a few weeks ago. He is having some pain from the surgery. Dr. Gordon stopped amlodipine and started irbesartan (valsartan was not covered by insurance, losartan was not well tolerated). He is feeling less tired during the day since making this change. Will monitor on current dose. Explained that he may need a dose increase, but will reassess after he heals from surgery.     Will continue to monitor regularly. Will follow up in 2-3 weeks, sooner if BP begins to trend upward or downward.    Asked patient to call or message with questions or concerns.     Current HTN regimen:  Hypertension Medications             irbesartan (AVAPRO) 75 MG tablet Take 1 tablet (75 mg total) by mouth every evening.    metoprolol succinate (TOPROL-XL) 50 MG 24 hr tablet Take 1 tablet (50 mg total) by mouth once daily.

## 2019-05-06 ENCOUNTER — PATIENT MESSAGE (OUTPATIENT)
Dept: FAMILY MEDICINE | Facility: CLINIC | Age: 64
End: 2019-05-06

## 2019-05-07 ENCOUNTER — CLINICAL SUPPORT (OUTPATIENT)
Dept: REHABILITATION | Facility: HOSPITAL | Age: 64
End: 2019-05-07
Attending: ORTHOPAEDIC SURGERY
Payer: MEDICAID

## 2019-05-07 ENCOUNTER — PATIENT MESSAGE (OUTPATIENT)
Dept: FAMILY MEDICINE | Facility: CLINIC | Age: 64
End: 2019-05-07

## 2019-05-07 DIAGNOSIS — M25.611 SHOULDER STIFFNESS, RIGHT: ICD-10-CM

## 2019-05-07 DIAGNOSIS — M25.511 RIGHT SHOULDER PAIN, UNSPECIFIED CHRONICITY: Primary | ICD-10-CM

## 2019-05-07 DIAGNOSIS — R29.898 SHOULDER WEAKNESS: ICD-10-CM

## 2019-05-07 PROCEDURE — 97110 THERAPEUTIC EXERCISES: CPT | Mod: PN

## 2019-05-07 RX ORDER — HYDROCODONE BITARTRATE AND ACETAMINOPHEN 5; 325 MG/1; MG/1
1 TABLET ORAL 2 TIMES DAILY PRN
Qty: 30 TABLET | Refills: 0 | Status: SHIPPED | OUTPATIENT
Start: 2019-05-07 | End: 2020-03-24

## 2019-05-07 NOTE — PROGRESS NOTES
Time in 12  Time out 1      Timed units  4 therex                              Time:12-1      S:no new issues  Pain:continues to decrease in intensity and frequency    O:  HEP: discharged abd pillow    manual tx: n/a    prom as tolerated-pain free: flex, abd, d2    stretches as tolerated-pain free: er 0, 45 abd    theraband 2 x 20:  n/a    isometrics 2 x 20: n/a    education: likely tx progression, scapula humeral ratio    ube: n/a    arom: n/a            A:overall gh stiffness and pain decreasing nicely        functional upper extremity status/ADL improvement: n/a    P:issue scar massage soon; test prom er 0, 45 abd

## 2019-05-09 ENCOUNTER — CLINICAL SUPPORT (OUTPATIENT)
Dept: REHABILITATION | Facility: HOSPITAL | Age: 64
End: 2019-05-09
Attending: ORTHOPAEDIC SURGERY
Payer: MEDICAID

## 2019-05-09 DIAGNOSIS — M25.611 SHOULDER STIFFNESS, RIGHT: ICD-10-CM

## 2019-05-09 DIAGNOSIS — R29.898 SHOULDER WEAKNESS: ICD-10-CM

## 2019-05-09 DIAGNOSIS — M25.511 RIGHT SHOULDER PAIN, UNSPECIFIED CHRONICITY: Primary | ICD-10-CM

## 2019-05-09 PROCEDURE — 97530 THERAPEUTIC ACTIVITIES: CPT | Mod: PN

## 2019-05-09 NOTE — PROGRESS NOTES
Time in 2  Time out 3    Timed units  4 theract                              Time:2-3      S:understands likely tx progression  Pain:continues to decrease in intensity and frequency    O:  HEP: reviewed, issued scar massage (steristrips removed today which revealed healed incision)    manual tx: n/a    prom as tolerated-pain free: supine, abd, flex, gh circumduction; pendulums all 4 directions    stretches as tolerated-pain free: er 0, 45 abd; supine abd    theraband 2 x 20:  n/a    isometrics 2 x 20: n/a    education: explained gh capsular pattern and probable stretch progression    ube: n/a    arom: n/a            A:looks faithful with HEP        functional upper extremity status/ADL improvement: n/a    P:test elbow complex prom

## 2019-05-14 ENCOUNTER — CLINICAL SUPPORT (OUTPATIENT)
Dept: REHABILITATION | Facility: HOSPITAL | Age: 64
End: 2019-05-14
Attending: ORTHOPAEDIC SURGERY
Payer: MEDICAID

## 2019-05-14 DIAGNOSIS — R29.898 SHOULDER WEAKNESS: ICD-10-CM

## 2019-05-14 DIAGNOSIS — M25.611 SHOULDER STIFFNESS, RIGHT: ICD-10-CM

## 2019-05-14 DIAGNOSIS — M25.511 RIGHT SHOULDER PAIN, UNSPECIFIED CHRONICITY: Primary | ICD-10-CM

## 2019-05-14 DIAGNOSIS — M25.621 ELBOW STIFFNESS, RIGHT: ICD-10-CM

## 2019-05-14 PROCEDURE — 97110 THERAPEUTIC EXERCISES: CPT | Mod: PN

## 2019-05-14 PROCEDURE — 97140 MANUAL THERAPY 1/> REGIONS: CPT | Mod: PN

## 2019-05-14 NOTE — PROGRESS NOTES
Time in 12  Time out 1      Timed units  1 manual                              Time:  3 therex                              Time:1215-1      S:no new issues  Pain:continues to decrease in intensity and frequency    O:  Prom at elbow full into ext and flex yet has some mild end range tension into ext    HEP: reviewed, issued elbow ext stretches    manual tx: open pack traction gh joint, static inf slides at abd open pack    prom as tolerated-pain free: abd, flex    stretches as tolerated-pain free: er 0, 45 abd    theraband 2 x 20:  n/a    isometrics 2 x 20: n/a    education: likely tx progression    ube: n/a    arom: n/a            A:slow but steady decrease in pain, stiffness, and reactivity since day 1 of OT        functional upper extremity status/ADL improvement: n/a    P: d/c sling at 6 wks

## 2019-05-16 ENCOUNTER — CLINICAL SUPPORT (OUTPATIENT)
Dept: REHABILITATION | Facility: HOSPITAL | Age: 64
End: 2019-05-16
Attending: ORTHOPAEDIC SURGERY
Payer: MEDICAID

## 2019-05-16 DIAGNOSIS — M25.611 SHOULDER STIFFNESS, RIGHT: ICD-10-CM

## 2019-05-16 DIAGNOSIS — M25.511 RIGHT SHOULDER PAIN, UNSPECIFIED CHRONICITY: Primary | ICD-10-CM

## 2019-05-16 DIAGNOSIS — R29.898 SHOULDER WEAKNESS: ICD-10-CM

## 2019-05-16 PROCEDURE — 97530 THERAPEUTIC ACTIVITIES: CPT | Mod: PN

## 2019-05-16 NOTE — PROGRESS NOTES
Time in 12  Time out 1      Timed units  4 therex                              Time:12-1        S:no new issues  Pain:continues to decrease in intensity and frequency    O:  r prom er at 0 abd 20   at 45 abd 72      l prom er at 0 abd 54    at 45 abd 90    HEP: d/cd sling, told to use r ue for very light painless nonrepetitive use only    manual tx: n/a    prom as tolerated-pain free: supine abd, flex, gh circumduction    stretches as tolerated-pain free: er 0, 45 abd, supine abd    theraband 2 x 20:  n/a    isometrics 2 x 20: n/a    education: see above    ube: n/a    arom: n/a            A:severe gh capsular pattern noted, proper rehab progression essential to resolve shoulder complex tightness        functional upper extremity status/ADL improvement: n/a    P:add supine abd stretch to HEP

## 2019-05-21 ENCOUNTER — CLINICAL SUPPORT (OUTPATIENT)
Dept: REHABILITATION | Facility: HOSPITAL | Age: 64
End: 2019-05-21
Attending: ORTHOPAEDIC SURGERY
Payer: MEDICAID

## 2019-05-21 DIAGNOSIS — M25.511 RIGHT SHOULDER PAIN, UNSPECIFIED CHRONICITY: Primary | ICD-10-CM

## 2019-05-21 DIAGNOSIS — M25.611 SHOULDER STIFFNESS, RIGHT: ICD-10-CM

## 2019-05-21 DIAGNOSIS — R29.898 SHOULDER WEAKNESS: ICD-10-CM

## 2019-05-21 PROCEDURE — 97110 THERAPEUTIC EXERCISES: CPT | Mod: PN

## 2019-05-21 RX ORDER — DULOXETIN HYDROCHLORIDE 60 MG/1
CAPSULE, DELAYED RELEASE ORAL
Qty: 30 CAPSULE | Refills: 2 | Status: SHIPPED | OUTPATIENT
Start: 2019-05-21 | End: 2019-08-26 | Stop reason: SDUPTHER

## 2019-05-21 NOTE — PROGRESS NOTES
"Time in 12  Time out 1      Timed units  4 therex                              Time:12-1      S:reports doing arom for abd in supine with palm facing down, understands to stop this exercise  Pain:increased since last visit    O:  r prom er at 0 abd 10   at 45 abd 56    at 90 abd nt since abd to 70;       sidelying ir  30        ir behind back back pocket      all capsular  l prom er at 0 abd 72    at 45 abd 90    at  90 abd 90;      sidelying ir 70         ir behind back 15" lift off    HEP: added sleeper stretch    manual tx: n/a    prom as tolerated-pain free: supine abd, flex, d2    stretches as tolerated-pain free: sleeper; er 0, 45 abd    theraband 2 x 20:  n/a    isometrics 2 x 20: n/a    education: diablo effect    ube: n/a    arom: n/a        A:consistent and proper stretch HEP imperative to resolve gh capsular pattern, continued rehab imperative to resolve shoulder complex stiffness and transition to PRE program to facilitate proper mechanics and full functional and independent r ue use        functional upper extremity status/ADL improvement: able to brush teeth with right arm while flexing trunk now    P:issue supine abd stretch  "

## 2019-05-23 ENCOUNTER — CLINICAL SUPPORT (OUTPATIENT)
Dept: REHABILITATION | Facility: HOSPITAL | Age: 64
End: 2019-05-23
Attending: ORTHOPAEDIC SURGERY
Payer: MEDICAID

## 2019-05-23 DIAGNOSIS — M25.611 SHOULDER STIFFNESS, RIGHT: ICD-10-CM

## 2019-05-23 DIAGNOSIS — M25.511 RIGHT SHOULDER PAIN, UNSPECIFIED CHRONICITY: Primary | ICD-10-CM

## 2019-05-23 DIAGNOSIS — R29.898 SHOULDER WEAKNESS: ICD-10-CM

## 2019-05-23 PROCEDURE — 97110 THERAPEUTIC EXERCISES: CPT | Mod: PN

## 2019-05-23 PROCEDURE — 97140 MANUAL THERAPY 1/> REGIONS: CPT | Mod: PN

## 2019-05-23 NOTE — PROGRESS NOTES
Time in 12  Time out 1      Timed units    Manual x 1           therex  X 3         1215-1    S: using r arm for donning pants much easier than day sling discharged  Pain: 4/10 at rest    O:    HEP: explained likely tx progression    manual tx:     gh resting position traction    prom as tolerated-pain free: supine abd, flex, d2    stretches as tolerated-pain free: sleeper; er 0, 45 abd; functional ir    theraband 2 x 20:  n/a    isometrics 2 x 20: n/a    education: concept of capsular pattern, effusion, joint seeks position of ease, etc.    ube: n/a    arom: n/a        A: contractile and noncontractile tightness decreasing, r ue with less guarding and improved sway with gait since day sling discharged        P: test prom er x 3 and ir x 2; issue functional ir stretch; fix shoulder complex tightness and progress to scapula and cuff PRE to facilitate strong, painless, and balanced mechanics long term with all ADL

## 2019-05-28 ENCOUNTER — CLINICAL SUPPORT (OUTPATIENT)
Dept: REHABILITATION | Facility: HOSPITAL | Age: 64
End: 2019-05-28
Attending: ORTHOPAEDIC SURGERY
Payer: MEDICAID

## 2019-05-28 DIAGNOSIS — M25.511 RIGHT SHOULDER PAIN, UNSPECIFIED CHRONICITY: Primary | ICD-10-CM

## 2019-05-28 DIAGNOSIS — R29.898 SHOULDER WEAKNESS: ICD-10-CM

## 2019-05-28 DIAGNOSIS — M25.611 SHOULDER STIFFNESS, RIGHT: ICD-10-CM

## 2019-05-28 PROCEDURE — 97530 THERAPEUTIC ACTIVITIES: CPT | Mod: PN

## 2019-05-28 NOTE — PROGRESS NOTES
"Time in 12  Time out 1      Timed units  1 manual                              Time:  3 therex                               Time:1215-1      S:reaching into r side pocket on pants with r arm much easier than about 1 week ago  Pain:3/10 with light use, C5 ache    O:  r prom er at 0 abd 20   at 45 abd 40    at 90 abd nt since abd to 70 only;       sidelying ir  58        ir behind back psis        All capsular  l prom er at 0 abd 72    at 45 abd 90    at  90 abd 90;      sidelying ir 70         ir behind back 15" lift off    HEP: issued functional ir stretch    manual tx:    gh resting position traction, static inf slides open pack at , gh circumduction    prom as tolerated-pain free: supine flex, d2, abd    stretches as tolerated-pain free:er 0, 45 abd; sleeper, functional ir    theraband 2 x 20: n/a    isometrics 2 x 20: n/a     education: basics of  ligamentous anatomy    ube:n/a     arom:n/a            A:light functional use below shoulder level slowly increasing, fixing all shoulder complex tightness is imperative for full, strong, and painless use below and above shoulder level long term          P:consider supine abd stretch for HEP  "

## 2019-05-30 ENCOUNTER — CLINICAL SUPPORT (OUTPATIENT)
Dept: REHABILITATION | Facility: HOSPITAL | Age: 64
End: 2019-05-30
Attending: ORTHOPAEDIC SURGERY
Payer: MEDICAID

## 2019-05-30 DIAGNOSIS — R29.898 SHOULDER WEAKNESS: ICD-10-CM

## 2019-05-30 DIAGNOSIS — M25.611 SHOULDER STIFFNESS, RIGHT: ICD-10-CM

## 2019-05-30 DIAGNOSIS — M25.511 RIGHT SHOULDER PAIN, UNSPECIFIED CHRONICITY: Primary | ICD-10-CM

## 2019-05-30 PROCEDURE — 97110 THERAPEUTIC EXERCISES: CPT | Mod: PN

## 2019-05-30 PROCEDURE — 97140 MANUAL THERAPY 1/> REGIONS: CPT | Mod: PN

## 2019-05-30 NOTE — PROGRESS NOTES
Time in 12  Time out 1      Timed units  1 manual                              Time:  3 therex                               Time:1215-1      S: no new issues, doing HEP as advised  Pain:continues to decrease in intensity and frequency    O:    HEP: explained likely progression    manual tx:    gh resting position traction, static inf slides open pack for abd    prom as tolerated-pain free: supine abd, gh circumduction    stretches as tolerated-pain free: er 0, 45 abd; sleeper, functional ir    theraband 2 x 20: n/a    isometrics 2 x 20: n/a     education: need to fix all shoulder complex stiffness to promote proper mechanics long term    ube: n/a    arom: n/a            A: noncontractile and contractile tightness as well as reactivity with good reduction in last 4 weeks          P:test prom er x 3 and ir x 2

## 2019-06-03 ENCOUNTER — PATIENT OUTREACH (OUTPATIENT)
Dept: OTHER | Facility: OTHER | Age: 64
End: 2019-06-03

## 2019-06-03 DIAGNOSIS — I10 ESSENTIAL HYPERTENSION: Primary | ICD-10-CM

## 2019-06-03 RX ORDER — IRBESARTAN 150 MG/1
150 TABLET ORAL NIGHTLY
Qty: 30 TABLET | Refills: 3 | Status: SHIPPED | OUTPATIENT
Start: 2019-06-03 | End: 2019-06-13

## 2019-06-03 NOTE — PROGRESS NOTES
Last 5 Patient Entered Readings                                      Current 30 Day Average: 136/80     Recent Readings 6/3/2019 5/24/2019 5/18/2019 5/8/2019 5/5/2019    SBP (mmHg) 142 139 135 120 144    DBP (mmHg) 77 79 86 73 87    Pulse 94 74 88 86 80        Per 30 day average, 136/80 mmHg, patient's BP is not at goal.     Mr. Ramires's BP average remains above goal. He has been dealing with pain since rotator cuff repair surgery. Will increase irbesartan to 150 mg QD.     Will continue to monitor regularly. Will follow up in 2-3 weeks, sooner if BP begins to trend upward or downward.    Asked patient to call or message with questions or concerns.     Current HTN regimen:  Hypertension Medications             irbesartan (AVAPRO) 150 MG tablet Take 1 tablet (150 mg total) by mouth every evening.    metoprolol succinate (TOPROL-XL) 50 MG 24 hr tablet Take 1 tablet (50 mg total) by mouth once daily.

## 2019-06-04 ENCOUNTER — CLINICAL SUPPORT (OUTPATIENT)
Dept: REHABILITATION | Facility: HOSPITAL | Age: 64
End: 2019-06-04
Attending: ORTHOPAEDIC SURGERY
Payer: MEDICAID

## 2019-06-04 DIAGNOSIS — R29.898 SHOULDER WEAKNESS: ICD-10-CM

## 2019-06-04 DIAGNOSIS — M25.511 RIGHT SHOULDER PAIN, UNSPECIFIED CHRONICITY: Primary | ICD-10-CM

## 2019-06-04 DIAGNOSIS — M25.611 SHOULDER STIFFNESS, RIGHT: ICD-10-CM

## 2019-06-04 PROCEDURE — 97110 THERAPEUTIC EXERCISES: CPT | Mod: PN

## 2019-06-04 PROCEDURE — 97140 MANUAL THERAPY 1/> REGIONS: CPT | Mod: PN

## 2019-06-04 NOTE — PROGRESS NOTES
"Time in 12  Time out 1      Timed units  1 manual                              Time:  3 therex                              Time:1215-1      S:slept on left side with r arm supported on pillow last night  Pain:slight increase since last visit, C5 ache    O:    r prom er at 0 abd 30   at 45 abd 50    at 90 abd nt since abd to 80;       sidelying ir  64        ir behind back psis       all capsular  l prom er at 0 abd 72    at 45 abd 90    at  90 abd 90;      sidelying ir 70         ir behind back 15" lift off    HEP: reviewed and discussed likely progression, told to use heating pad on medium heat x 15' pre HEP    manual tx:gh resting position traction    prom as tolerated-pain free:supine abd    stretches as tolerated-pain free:er 0, 45 abd; sleeper, functional ir    theraband 2 x 20:n/a    isometrics 2 x 20:n/a     education: see above    ube:n/a     arom:n/a        A: slow but steady reduction in pain, hypomobility, and muscle shortening            P:manual tx, stretching  "

## 2019-06-06 ENCOUNTER — HOSPITAL ENCOUNTER (EMERGENCY)
Facility: HOSPITAL | Age: 64
Discharge: HOME | End: 2019-06-06
Attending: EMERGENCY MEDICINE | Admitting: HOSPITALIST
Payer: MEDICAID

## 2019-06-06 ENCOUNTER — PATIENT MESSAGE (OUTPATIENT)
Dept: FAMILY MEDICINE | Facility: CLINIC | Age: 64
End: 2019-06-06

## 2019-06-06 VITALS
TEMPERATURE: 100 F | BODY MASS INDEX: 27.32 KG/M2 | RESPIRATION RATE: 12 BRPM | OXYGEN SATURATION: 98 % | DIASTOLIC BLOOD PRESSURE: 85 MMHG | HEART RATE: 91 BPM | SYSTOLIC BLOOD PRESSURE: 151 MMHG | HEIGHT: 65 IN | WEIGHT: 164 LBS

## 2019-06-06 DIAGNOSIS — K04.01 TOOTH PULPITIS: Primary | ICD-10-CM

## 2019-06-06 DIAGNOSIS — K04.7 DENTAL ABSCESS: ICD-10-CM

## 2019-06-06 PROCEDURE — 99284 EMERGENCY DEPT VISIT MOD MDM: CPT

## 2019-06-06 RX ORDER — IBUPROFEN 600 MG/1
600 TABLET ORAL
Status: DISCONTINUED | OUTPATIENT
Start: 2019-06-06 | End: 2019-06-06 | Stop reason: HOSPADM

## 2019-06-06 RX ORDER — CLINDAMYCIN HYDROCHLORIDE 150 MG/1
300 CAPSULE ORAL 4 TIMES DAILY
Qty: 56 CAPSULE | Refills: 0 | Status: SHIPPED | OUTPATIENT
Start: 2019-06-06 | End: 2019-06-13

## 2019-06-06 RX ORDER — SODIUM CHLORIDE 9 MG/ML
1000 INJECTION, SOLUTION INTRAVENOUS
Status: DISCONTINUED | OUTPATIENT
Start: 2019-06-06 | End: 2019-06-06

## 2019-06-06 RX ORDER — ONDANSETRON 4 MG/1
4 TABLET, FILM COATED ORAL EVERY 6 HOURS PRN
Qty: 12 TABLET | Refills: 0 | Status: SHIPPED | OUTPATIENT
Start: 2019-06-06 | End: 2020-03-24

## 2019-06-06 NOTE — ED PROVIDER NOTES
Encounter Date: 6/6/2019    SCRIBE #1 NOTE: Umm ENCISO, alonzo scribing for, and in the presence of, RACHANA Tolentino.       History     Chief Complaint   Patient presents with    Facial Swelling     L face with tenderness around L nare.       Time seen by provider: 11:07 AM on 06/06/2019    Mane Ramires is a 63 y.o. male who presents to the ED with complaints of left front dental pain associated with left sided facial swelling. He reports dental pain started a few days ago and the facial swelling began this morning. The patient denies visiting a dentist. He denies recent fevers but mentions having chills for the past few days. The patient had nasal surgery x1 year ago. He denies onset of any other new symptoms currently. He has no other medical concerns or complaints at this moment. PMHx includes HTN, myelomalacia, and TBI. SHx includes cervical fusion and reconstruction of nose. Known drug allergies includes Pseudoephedrine, Codeine, and Cortisone.     The history is provided by the patient.     Review of patient's allergies indicates:   Allergen Reactions    Sudafed [pseudoephedrine hcl] Other (See Comments)     Heart racing      Codeine Other (See Comments)     Heart racing    Pseudoephedrine     Cortisone Palpitations     Past Medical History:   Diagnosis Date    Accident     fell from roof with TBI (word finding issues personality changes, memory deficets), R rib fractures, clavicle fx    Allergy     Anxiety     ASD (atrial septal defect)     noted on ECHO 6/16    Cervical stenosis of spine     noted on 6/15 MRI    CTS (carpal tunnel syndrome)     mild-mod on 4/17 NCS    DDD (degenerative disc disease), cervical 10/2014    C5-6 and C6-C7    Depression     JEEVAN (generalized anxiety disorder)     Gender identity disorder     H/O cardiovascular stress test     12/14 normal    Herpes simplex type 2 infection     History of atypical hyperplasia of breast     B precancer lesions     Hypertension     IGT (impaired glucose tolerance)     Myelomalacia     c-spine noted on 8/15 MRI    OA (osteoarthritis)     Prediabetes     TBI (traumatic brain injury)     after falling off a roof in 2003     Past Surgical History:   Procedure Laterality Date    AMPUTATION      L index finger    APPENDECTOMY  1969    BILATERAL SALPINGOOPHORECTOMY  02/2015    BIOPSY, LIVER, WITH US GUIDANCE N/A 12/18/2018    Performed by [facility] OR 2ND FLR    CARPAL TUNNEL RELEASE  12/2017    right    CERVICAL FUSION  10/2014    C5-C6 and C6-C7    CHOLECYSTECTOMY      CHOLECYSTECTOMY-LAPAROSCOPIC N/A 11/9/2017    [facility] at [facility] OR    ESOPHAGOGASTRODUODENOSCOPY (EGD) N/A 9/6/2017    Performed by [facility] at [facility]O    EXCISION,NASAL TURBINATE,SUBMUCOSAL Bilateral 8/23/2018    Performed by Reji Arce III, MD at [facility] OR 2ND FLR    FINGER AMPUTATION Left     FRACTURE SURGERY Right 2003    rib fractures    GRAFT- Bilateral 8/23/2018    Performed by Reji [facility]ce III, MD at [facility] OR 2ND FLR    HYSTERECTOMY  1984    MIGUEL    MASTECTOMY  02/2015    MASTECTOMY-BREAST-BILATERAL Bilateral 2/16/2015    Performed by Kali Yi [facility] at [facility] OR 2ND FLR    RECONSTRUCTION, NOSE N/A 8/23/2018    Performed by Reji Arce III, MD at [facility] OR 2ND FLR    RELEASE-CARPAL TUNNEL  Right carpal tunnel release Right 12/4/2017    Performed by [facility] at [facility]    ROTATOR CUFF REPAIR Right 2019    SOGVXXNU-NZIYDGOZNEFB-[id number] Bilateral 2/16/2015    Performed by Jenna Carbajal MD at [facility] OR 2ND FLR    SEPTOPLASTY, NASAL, ENDOSCOPIC N/A 8/23/2018    Performed by Reji Arce III, MD at [facility] OR [address] [place] [address]    UPPER GASTROINTESTINAL ENDOSCOPY  09/06/2017    Dr. Ayon     Family History   Problem Relation Age of Onset    Diabetes Mother     Heart disease Mother     Gallbladder disease Mother     Coronary artery disease  Father     Breast cancer Sister     Heart disease Sister     Lung cancer Paternal Grandfather     Heart disease Paternal Grandfather     Heart disease Maternal Grandmother     Gallbladder disease Maternal Grandmother     Heart attack Brother     Gallbladder disease Daughter     Colon cancer Neg Hx     Colon polyps Neg Hx     Crohn's disease Neg Hx     Ulcerative colitis Neg Hx     Stomach cancer Neg Hx     Esophageal cancer Neg Hx      Social History     Tobacco Use    Smoking status: Former Smoker     Packs/day: 0.25     Years: 2.00     Pack years: 0.50     Types: Cigarettes     Last attempt to quit: 2002     Years since quittin.6    Smokeless tobacco: Never Used   Substance Use Topics    Alcohol use: Yes     Comment: rare     Drug use: No     Types: Hydrocodone     Review of Systems   Constitutional: Positive for chills. Negative for fever.   HENT: Positive for dental problem (left central incisor) and facial swelling (left side).    Eyes: Negative for visual disturbance.   Respiratory: Negative for cough.    Cardiovascular: Negative for chest pain.   Gastrointestinal: Negative for nausea.   Musculoskeletal: Negative for myalgias.   Skin: Negative for rash.   Neurological: Negative for headaches.   Hematological: Does not bruise/bleed easily.       Physical Exam     Initial Vitals [19 1051]   BP Pulse Resp Temp SpO2   (!) 151/85 91 12 99.7 °F (37.6 °C) 98 %      MAP       --         Physical Exam    Nursing note and vitals reviewed.  Constitutional: Vital signs are normal. He appears well-developed and well-nourished. He is not diaphoretic. No distress.   HENT:   Head: Normocephalic and atraumatic.       Nose: Nose normal. Right sinus exhibits no maxillary sinus tenderness and no frontal sinus tenderness. Left sinus exhibits no maxillary sinus tenderness and no frontal sinus tenderness.   Mouth/Throat: Uvula is midline, oropharynx is clear and moist and mucous membranes are  normal. He does not have dentures. No oral lesions. No trismus in the jaw. Abnormal dentition. Dental caries present. No dental abscesses, uvula swelling or lacerations.   Left central incisor with tenderness along upper gum. No palpable masses. No erythema noted. No visible facial swelling, erythema, or warmth. No sinus tenderness noted.    Eyes: Pupils are equal, round, and reactive to light.   Neck: Normal range of motion. Neck supple.   Cardiovascular: Normal rate, regular rhythm, normal heart sounds and intact distal pulses. Exam reveals no gallop and no friction rub.    No murmur heard.  Pulmonary/Chest: Breath sounds normal. He has no wheezes. He has no rhonchi. He has no rales.   Abdominal: Normal appearance.   Neurological: He is alert and oriented to person, place, and time.   Skin: Skin is warm, dry and intact.   Psychiatric: He has a normal mood and affect. His speech is normal.         ED Course   Procedures  Labs Reviewed - No data to display       Imaging Results    None          Medical Decision Making:   History:   Old Medical Records: I decided to obtain old medical records.  Differential Diagnosis:   Pulpitis  Dental abscess  Facial cellulitis  Sinusitis        APC / Resident Notes:   Patient is a 63 y.o. male who presents to the ED 06/06/2019 who underwent emergent evaluation for left facial swelling that started today.  Patient has been complaining of dental pain for a few days.  Patient's left central incisor is the tooth that he complains of the pain and.  He denies any trauma.  He has multiple absent and missing teeth and dental caries present.  The tooth is not loose.  There are no signs of fracture.  He denies any trauma. He states the pain and swelling are immediately above this tooth where the gingiva meets the upper lip however there is no visible or palpable abscess.  Patient also states is where he feels the swelling however there is no visible facial swelling or erythema on exam.   Vital signs normal.  Patient is not appear septic or toxic.  He has no sinus tenderness.  I do not think sinusitis.  Discussed cold compresses and anti-inflammatories to use as needed for pain. Patient states he has a prescription for Norco waiting for him at the pharmacy any prefers sounds take anti-inflammatories.  Discussed with patient he may uses Norco for pain as needed.  He reports multiple sensitivities to antibiotics however no anaphylaxis.  He is given Zofran which she is agreeable to take with clindamycin at this time.  Discussed this is likely a dental infection and the patient needs to follow-up and make an appointment promptly with a dentist.  Patient verbalized understanding.  He is given multiple referrals to a dentist.  I do not think IV antibiotics or further diagnostic studies are indicated at this time.  Patient has no lip or tongue or oral swelling. Normal phonation.  Respirations even nonlabored.  There are no signs of airway compromise.  I do not think Portillo's angina.  I do not think anaphylaxis or other emergent process. Based on my clinical evaluation, I do not appreciate any immediate, emergent, or life threatening condition or etiology that warrants additional workup today and feel that the patient can be discharged with close follow up care. Case discussed with Dr. Christie who is agreeable to plan of care. Follow up and return precautions discussed; patient verbalized understanding and is agreeable to plan of care. Patient discharged home in stable condition.               Scribe Attestation:   Scribe #1: I performed the above scribed service and the documentation accurately describes the services I performed. I attest to the accuracy of the note.    Attending Attestation:           Physician Attestation for Scribe:  Physician Attestation Statement for Scribe #1: I, Dasia Lynn, reviewed documentation, as scribed by in my presence, and it is both accurate and complete.     Comments: I,  DOROTHY Tolentino, personally performed the services described in this documentation. All medical record entries made by the scribe were at my direction and in my presence.  I have reviewed the chart and agree that the record reflects my personal performance and is accurate and complete. DOROTHY Tolentino.  12:02 PM 06/06/2019                    Clinical Impression:       ICD-10-CM ICD-9-CM   1. Tooth pulpitis K04.01 522.0   2. Dental abscess K04.7 522.5         Disposition:   Disposition: Discharged  Condition: Stable                        Dasia Lynn NP  06/06/19 1203

## 2019-06-06 NOTE — ED NOTES
"Pt had chills yesterday, woke up today with facial swelling and pain and dental pain. No DINA or drooling.     Declined ibuprofen "I have a history of liver problems"     Waiting discharge   "

## 2019-06-06 NOTE — PROGRESS NOTES
Last 5 Patient Entered Readings                                      Current 30 Day Average: 136/82     Recent Readings 6/4/2019 6/3/2019 5/24/2019 5/18/2019 5/8/2019    SBP (mmHg) 145 142 139 135 120    DBP (mmHg) 93 77 79 86 73    Pulse 100 94 74 88 86        6/6: PharmD spoke with patient on 6/3.  Patient is also admitted in the ED today.  Will push outreach.

## 2019-06-11 ENCOUNTER — CLINICAL SUPPORT (OUTPATIENT)
Dept: REHABILITATION | Facility: HOSPITAL | Age: 64
End: 2019-06-11
Attending: ORTHOPAEDIC SURGERY
Payer: MEDICAID

## 2019-06-11 DIAGNOSIS — R29.898 SHOULDER WEAKNESS: ICD-10-CM

## 2019-06-11 DIAGNOSIS — M25.611 SHOULDER STIFFNESS, RIGHT: ICD-10-CM

## 2019-06-11 DIAGNOSIS — M25.511 RIGHT SHOULDER PAIN, UNSPECIFIED CHRONICITY: Primary | ICD-10-CM

## 2019-06-11 PROCEDURE — 97530 THERAPEUTIC ACTIVITIES: CPT | Mod: PN

## 2019-06-11 NOTE — PROGRESS NOTES
"Time in 12  Time out 1      Timed units  4 therex                              Time:12-1    S: no new issues, doing HEP  Pain:continues to decrease in intensity and frequency    O:  r prom er at 0 abd 36   at 45 abd 60    at 90 abd nt abd to 80 sanjeev;       sidelying ir  64        ir behind back belt       all capsular  l prom er at 0 abd 72    at 45 abd 90    at  90 abd 90;      sidelying ir 70         ir behind back 15" lift off    HEP: added supine abd stretch    manual tx: n/a    prom as tolerated-pain free: supine abd, d2, flex    stretches as tolerated-pain free: er 0, 45 abd; sleeper, functional ir    theraband 2 x 20: n/a    isometrics 2 x 20:n/a     education: pec major sternal head stretch benefit with issued stretch as above    ube: n/a    arom:n/a            A: using r ue for dressing and most hygiene much easier than 2 weeks ago, over shoulder use not possible due to stiffness and weakness          P:manual tx, stretching, upgrade HEP prn; fix shoulder complex stiffness and ease into scapula and cuff PRE to promote strong, painless, and proper mechanics long term  "

## 2019-06-13 ENCOUNTER — OFFICE VISIT (OUTPATIENT)
Dept: FAMILY MEDICINE | Facility: CLINIC | Age: 64
End: 2019-06-13
Payer: MEDICAID

## 2019-06-13 ENCOUNTER — CLINICAL SUPPORT (OUTPATIENT)
Dept: REHABILITATION | Facility: HOSPITAL | Age: 64
End: 2019-06-13
Attending: ORTHOPAEDIC SURGERY
Payer: MEDICAID

## 2019-06-13 VITALS
HEIGHT: 65 IN | HEART RATE: 89 BPM | TEMPERATURE: 99 F | BODY MASS INDEX: 26.89 KG/M2 | WEIGHT: 161.38 LBS | DIASTOLIC BLOOD PRESSURE: 76 MMHG | OXYGEN SATURATION: 98 % | SYSTOLIC BLOOD PRESSURE: 112 MMHG

## 2019-06-13 DIAGNOSIS — M25.611 SHOULDER STIFFNESS, RIGHT: ICD-10-CM

## 2019-06-13 DIAGNOSIS — M25.511 RIGHT SHOULDER PAIN, UNSPECIFIED CHRONICITY: Primary | ICD-10-CM

## 2019-06-13 DIAGNOSIS — I10 HYPERTENSION, ESSENTIAL: Primary | ICD-10-CM

## 2019-06-13 DIAGNOSIS — R29.898 SHOULDER WEAKNESS: ICD-10-CM

## 2019-06-13 PROCEDURE — 99213 OFFICE O/P EST LOW 20 MIN: CPT | Mod: S$GLB,,, | Performed by: FAMILY MEDICINE

## 2019-06-13 PROCEDURE — 99213 PR OFFICE/OUTPT VISIT, EST, LEVL III, 20-29 MIN: ICD-10-PCS | Mod: S$GLB,,, | Performed by: FAMILY MEDICINE

## 2019-06-13 PROCEDURE — 97530 THERAPEUTIC ACTIVITIES: CPT | Mod: PN

## 2019-06-13 RX ORDER — IRBESARTAN 300 MG/1
300 TABLET ORAL NIGHTLY
Qty: 30 TABLET | Refills: 1 | Status: SHIPPED | OUTPATIENT
Start: 2019-06-13 | End: 2019-08-06 | Stop reason: SDUPTHER

## 2019-06-17 ENCOUNTER — PATIENT OUTREACH (OUTPATIENT)
Dept: OTHER | Facility: OTHER | Age: 64
End: 2019-06-17

## 2019-06-17 NOTE — PROGRESS NOTES
Last 5 Patient Entered Readings                                      Current 30 Day Average: 141/85     Recent Readings 6/15/2019 6/12/2019 6/4/2019 6/3/2019 5/24/2019    SBP (mmHg) 145 142 145 142 139    DBP (mmHg) 89 85 93 77 79    Pulse 74 82 100 94 74        128/82    Mr. Ramires saw Dr. Gordon last week and irbesartan dose was increased again to 300 mg QD. He is tolerating this dose increase with no issues. He checked BP today on another monitor at home and it was 128/82. Will monitor on current regimen.     Per 30 day average, 141/85 mmHg, patient's BP is not at goal.     Will continue to monitor regularly. Will follow up in 2-3 weeks, sooner if BP begins to trend upward or downward.    Asked patient to call or message with questions or concerns.     Current HTN regimen:  Hypertension Medications             irbesartan (AVAPRO) 300 MG tablet Take 1 tablet (300 mg total) by mouth every evening.    metoprolol succinate (TOPROL-XL) 50 MG 24 hr tablet Take 1 tablet (50 mg total) by mouth once daily.

## 2019-06-17 NOTE — PROGRESS NOTES
Time in 12  Time out 1      Timed units  4 therex                              Time:12-1      S: doing HEP, light use with basic ADL slowly improving (reaching behind body for toileting, donning clothes, etc.)  Pain: about 2/10 at rest, up to 4 with some light use, C5 ache    O:  HEP: reviewed    manual tx:n/a    prom as tolerated-pain free:supine flex, d2, abd    stretches as tolerated-pain free:er 0, 45 abd; sleeper, functional ir    theraband 2 x 20:n/a    isometrics 2 x 20:n/a     education: explained focus at this point is on fixing gh hypomobility    ube:n/a     arom:n/a            A: considering date of onset of symptoms, date of surgery, and date of 1st therapy visit progress has been good; considerable reduction in pain and tightness since 4 weeks ago; unable to actively lift arm past about 50 degrees; more tx is imperative to resolve all shoulder complex stiffness and then transition to scapula and cuff strengthening to facilitate strong, painless, and full  use with ADL as well as proper mechanics; all goals ongoing            P:test prom er x 3 and ir x 2

## 2019-06-18 ENCOUNTER — CLINICAL SUPPORT (OUTPATIENT)
Dept: REHABILITATION | Facility: HOSPITAL | Age: 64
End: 2019-06-18
Attending: ORTHOPAEDIC SURGERY
Payer: MEDICAID

## 2019-06-18 DIAGNOSIS — M25.611 SHOULDER STIFFNESS, RIGHT: ICD-10-CM

## 2019-06-18 DIAGNOSIS — R29.898 SHOULDER WEAKNESS: ICD-10-CM

## 2019-06-18 DIAGNOSIS — M25.511 RIGHT SHOULDER PAIN, UNSPECIFIED CHRONICITY: Primary | ICD-10-CM

## 2019-06-18 PROCEDURE — 97110 THERAPEUTIC EXERCISES: CPT | Mod: PN

## 2019-06-18 NOTE — PROGRESS NOTES
"Time in 12  Time out 1      Timed units  4 therex                              Time:12-1      S: since 1 month using r arm for dressing is much better  Pain: with light use about 4/10, C5 ache    O:    r prom er at 0 abd 50   at 45 abd 62    at 90 abd nt abd to 80 only;       sidelying ir  64        ir behind back 4" lift off         all capsular  l prom er at 0 abd 72    at 45 abd 90    at  90 abd 90;      sidelying ir 80         ir behind back 15" lift off    HEP: reviewed    manual tx:n/a    prom as tolerated-pain free: n/a    stretches as tolerated-pain free: supine abd; er 0, 45 abd; sleeper, functional ir    theraband 2 x 20: n/a    isometrics 2 x 20:n/a     education: contractile vs noncontractile tightness, scapula dyskinesis, diablo effect    ube:n/a     arom:n/a    Left slir retested today    A:resolving all shoulder complex stiffness mandatory for full, strong, and painless r ue use with all ADL, ac and sc joint tx likely will not be needed          P:issue er 90 abd stretch once starting position for this stretch possible, consider gh end range glides for elevation restrictions in future once gh stiffness decreases  "

## 2019-06-20 ENCOUNTER — CLINICAL SUPPORT (OUTPATIENT)
Dept: REHABILITATION | Facility: HOSPITAL | Age: 64
End: 2019-06-20
Payer: MEDICAID

## 2019-06-20 DIAGNOSIS — M25.621 ELBOW STIFFNESS, RIGHT: ICD-10-CM

## 2019-06-20 DIAGNOSIS — M25.511 RIGHT SHOULDER PAIN, UNSPECIFIED CHRONICITY: Primary | ICD-10-CM

## 2019-06-20 DIAGNOSIS — M25.611 SHOULDER STIFFNESS, RIGHT: ICD-10-CM

## 2019-06-20 DIAGNOSIS — R29.898 SHOULDER WEAKNESS: ICD-10-CM

## 2019-06-20 PROBLEM — R07.9 CHEST PAIN: Status: RESOLVED | Noted: 2018-12-11 | Resolved: 2019-06-20

## 2019-06-20 PROBLEM — M25.521 RIGHT ELBOW PAIN: Status: RESOLVED | Noted: 2018-02-06 | Resolved: 2019-06-20

## 2019-06-20 PROBLEM — M25.631 STIFFNESS OF RIGHT WRIST JOINT: Status: RESOLVED | Noted: 2018-02-06 | Resolved: 2019-06-20

## 2019-06-20 PROBLEM — M25.541 PAIN IN JOINT OF RIGHT HAND: Status: RESOLVED | Noted: 2018-02-23 | Resolved: 2019-06-20

## 2019-06-20 PROBLEM — M25.531 RIGHT WRIST PAIN: Status: RESOLVED | Noted: 2018-02-06 | Resolved: 2019-06-20

## 2019-06-20 PROCEDURE — 97530 THERAPEUTIC ACTIVITIES: CPT | Mod: PN

## 2019-06-20 NOTE — PROGRESS NOTES
Subjective:       Patient ID: Mane Ramires is a 63 y.o. male.    Chief Complaint: Hypertension and ER Follow up (possible dental abcess, dentist appt tomorrow)    HPI   The patient is a 63-year-old who is here today to follow-up on his hypertension.  Currently he is taking Toprol 50 mg once a day and Avapro 150 mg once a day.  His blood pressure is good here today but his digital hypertension medicine blood pressure readings have not been at goal.  He would be willing to adjust his medications    Since I have seen him last, he did have an ER visit and was diagnosed with a dental abscess.  He was prescribed clindamycin which he has been taking consistently.  The clindamycin has significantly improved his symptoms with significant improvement in the facial swelling firmness and tenderness he had been experiencing.  He is seeing the dentist tomorrow    He is overall doing well.  He may have some work me to sign regarding legally changing his gender    He is continuing with physical therapy for his shoulder and is happy to see some progress being made     Review of Systems   Constitutional: Negative for appetite change, chills, diaphoresis, fatigue, fever and unexpected weight change.   HENT: Negative for congestion, ear pain, postnasal drip, rhinorrhea, sinus pressure, sneezing, sore throat and trouble swallowing.    Eyes: Negative for pain, discharge and visual disturbance.   Respiratory: Negative for cough, chest tightness, shortness of breath and wheezing.    Cardiovascular: Negative for chest pain, palpitations and leg swelling.   Gastrointestinal: Negative for abdominal distention, abdominal pain, blood in stool, constipation, diarrhea, nausea and vomiting.   Musculoskeletal:        Per HPI   Skin: Negative for rash.       Objective:      Physical Exam   Constitutional: He is oriented to person, place, and time. He appears well-developed and well-nourished. No distress.   HENT:   Head: Normocephalic and  "atraumatic.   Right Ear: Hearing, tympanic membrane, external ear and ear canal normal.   Left Ear: Hearing, tympanic membrane, external ear and ear canal normal.   Nose: Nose normal.   Mouth/Throat: Oropharynx is clear and moist and mucous membranes are normal. No oral lesions. No oropharyngeal exudate, posterior oropharyngeal edema or posterior oropharyngeal erythema.   Eyes: Pupils are equal, round, and reactive to light. Conjunctivae, EOM and lids are normal. No scleral icterus.   Neck: Normal range of motion. Neck supple. Carotid bruit is not present. No thyroid mass and no thyromegaly present.   Cardiovascular: Normal rate, regular rhythm and normal heart sounds.  No extrasystoles are present. PMI is not displaced. Exam reveals no gallop.   No murmur heard.  Pulmonary/Chest: Effort normal and breath sounds normal. No accessory muscle usage. No respiratory distress.   Clear to auscultation bilaterally.   Abdominal: Soft. Normal appearance and bowel sounds are normal. He exhibits no abdominal bruit. There is no hepatosplenomegaly. There is no tenderness. There is no rebound.   Lymphadenopathy:        Head (right side): No submental and no submandibular adenopathy present.        Head (left side): No submental and no submandibular adenopathy present.        Right cervical: No superficial cervical, no deep cervical and no posterior cervical adenopathy present.       Left cervical: No superficial cervical, no deep cervical and no posterior cervical adenopathy present.        Right: No supraclavicular adenopathy present.        Left: No supraclavicular adenopathy present.   Neurological: He is alert and oriented to person, place, and time.   Skin: Skin is warm, dry and intact.   Psychiatric: He has a normal mood and affect.     Blood pressure 112/76, pulse 89, temperature 98.5 °F (36.9 °C), temperature source Oral, height 5' 4.5" (1.638 m), weight 73.2 kg (161 lb 6.4 oz), SpO2 98 %.Body mass index is 27.28 kg/m².    "         A/P:  1) hypertension.  Suboptimally controlled based on digital hypertension medicine readings.  We will increase Avapro to 300 mg once a day.  He will continue with the Toprol.  He will continue with the digital hypertension medicine program  2) dental abscess.  Resolving.  Complete clindamycin and follow up with a dentist as planned    3) shoulder pain status post surgery.  Continue with physical therapy and follow up with Ortho as planned      As long as he does well, I will see him back in 3 months.  He can submit any paperwork needs minus sign prior to that visit

## 2019-06-20 NOTE — PROGRESS NOTES
Time in 11  Time out 12        Timed units  4 therex                              Time:11-12      S:understands er 90 abd stretch likely will be added within 3-4 weeks pending ease of positioning for this stretch  Pain:2/10 at rest, C5 ache    O:  HEP: reviewed    manual tx:n/a    prom as tolerated-pain free:n/a    stretches as tolerated-pain free:er 0, 45 abd; sleeper, functional ir    theraband 2 x 20:n/a    isometrics 2 x 20:n/a     education: explained once gh stiffness resolved tx for fixing tightness into flex, d2, and abd will be done which typically consists of d2 and pec minor stretches    ube:n/a     arom:n/a            A:light functional use and movement patterns with light self care increasing nicely            P:fix gh hypomobility

## 2019-06-25 ENCOUNTER — CLINICAL SUPPORT (OUTPATIENT)
Dept: REHABILITATION | Facility: HOSPITAL | Age: 64
End: 2019-06-25
Attending: ORTHOPAEDIC SURGERY
Payer: MEDICAID

## 2019-06-25 DIAGNOSIS — M25.621 ELBOW STIFFNESS, RIGHT: ICD-10-CM

## 2019-06-25 DIAGNOSIS — M25.611 SHOULDER STIFFNESS, RIGHT: Primary | ICD-10-CM

## 2019-06-25 DIAGNOSIS — R29.898 SHOULDER WEAKNESS: ICD-10-CM

## 2019-06-25 PROCEDURE — 97110 THERAPEUTIC EXERCISES: CPT | Mod: PN

## 2019-06-25 NOTE — PROGRESS NOTES
TIME RECORD    Date:  06/25/2019    Start Time:  1210  Stop Time:  1304    PROCEDURES:    TIMED  Procedure Time Min.   exercise Start:1210  Stop:1304 54    Start:  Stop:     Start:  Stop:     Start:  Stop:          UNTIMED  Procedure Time Min.    Start:  Stop:     Start:  Stop:      Total Timed Minutes:  54  Total Timed Units:  4  Total Untimed Units:  0  Charges Billed/# of units:  4      Progress/Current Status    Subjective:     Patient ID: Mane Ramires is a 63 y.o. male.  Diagnosis:   1. Shoulder stiffness, right     2. Shoulder weakness     3. Elbow stiffness, right       Pain: 0 /10      Objective:     Session included external rotator stretches held for 5-10 minutes each: supine with dowel, against wall 10 reps each. Internal rotation included: sleeper stretches held for 10 minutes, 1 on each side, with strap behind back 10 reps. Isometrics in supine for shoulder adduction, abduction.    Assessment:     Pt appears to be compliant with HEP as he was able to demonstrate all of it correctly. Pt told therapist that he was unable to even raise his arm prior to therapy.    Patient Education/Response:     ongoing    Plans and Goals:     Cont per poc

## 2019-06-26 ENCOUNTER — PATIENT OUTREACH (OUTPATIENT)
Dept: OTHER | Facility: OTHER | Age: 64
End: 2019-06-26

## 2019-06-26 NOTE — PROGRESS NOTES
"Last 5 Patient Entered Readings                                      Current 30 Day Average: 135/81     Recent Readings 6/25/2019 6/24/2019 6/17/2019 6/15/2019 6/12/2019    SBP (mmHg) 126 109 134 145 142    DBP (mmHg) 79 73 74 89 85    Pulse 92 77 71 74 82        Digital Medicine: Health  Follow Up    Patient reports he is still experiencing pain in his shoulder.  Patient reports he has lost a few pounds. Gave encouragement to patient.    Lifestyle Modifications:    1.Dietary Modifications (Sodium intake <2,000mg/day, food labels, dining out):   Deferred    2.Physical Activity:   Patient reports he is doing physical therapy for his shoulder.  Patient states he is trying to stay active and move around more.      3.Medication Therapy:   Patient has been compliant with the medication regimen.  Patient reports taking a pain pill "every 3-4 days" for the pain in his shoulder.     4.Patient has the following medication side effects/concerns (Frequency/Alleviating factors/Precipitating factors, etc.):  Patient reports he is tired "almost all of the time" and relates is to the increased dosage of BP medication.  Will notify PharmD.      Follow up with Mr. Mane Dodd Keyla completed. No further questions or concerns. Will continue to follow up to achieve health goals.  Patient is currently not at goal, 135/81 mmHg does exceed <130/80 mmHg.      "

## 2019-06-27 ENCOUNTER — CLINICAL SUPPORT (OUTPATIENT)
Dept: REHABILITATION | Facility: HOSPITAL | Age: 64
End: 2019-06-27
Attending: ORTHOPAEDIC SURGERY
Payer: MEDICAID

## 2019-06-27 PROCEDURE — 97110 THERAPEUTIC EXERCISES: CPT | Mod: PN

## 2019-07-01 ENCOUNTER — PATIENT OUTREACH (OUTPATIENT)
Dept: OTHER | Facility: OTHER | Age: 64
End: 2019-07-01

## 2019-07-01 DIAGNOSIS — I10 HYPERTENSION, ESSENTIAL: ICD-10-CM

## 2019-07-01 NOTE — PROGRESS NOTES
PLAN OF CARE UPDATE    Date:  06/27/2019    Start Time:  1201  Stop Time:  1300    PROCEDURES:    TIMED  Procedure Time Min.   exercise Start:1201  Stop:1300 59    Start:  Stop:     Start:  Stop:     Start:  Stop:          UNTIMED  Procedure Time Min.    Start:  Stop:     Start:  Stop:      Total Timed Minutes:  59  Total Timed Units:  4  Total Untimed Units:  0  Charges Billed/# of units:  4      Progress/Current Status    Subjective:     Patient ID: Mane Ramires is a 63 y.o. male.  Diagnosis: No diagnosis found.  Pain: 2 /10      Objective:     Session included patient going through all exercises on HEP and manual therapy to increase shoulder mobility.    Assessment:     Doing well, compliant   Initial assessment (pertinent findings, problem list and factors affecting outcome): Needs skilled OT to properly promote rom, use, and strength given type, nature, and extent of diagnosis  Rehab potential: good     Goals:   stg 1. Pt. Will be I with HEP 2. Pt. Will have 2/10 pain with light use 3. Pt. Will have = prom of bilateral shoulders to enhance affected arm use with ADL        ltg 1. Pt. Will be I with d/c HEP 2. Pt. Will have 1/10 pain with all use 3. Pt. Will have have roughly 3+/5 MMT elevation, er to improve use with ADL                                                                          4. Pt. Will be I with ADL           Plan     Certification period:7-16-19 to 8-16-19   Recommended tx plan: 2 times a week for 4 weeks; to meet LTGs in initial plan of care. Long Term Goals have not been met.  Other recommendations: above visit frequency and duration in above dates may be adjusted based on pt. progress and need for therapy         Patient Education/Response:     Ongoing, has not yet met LTGs    Plans and Goals:     Cont 4 more weeks 2x week.

## 2019-07-01 NOTE — PROGRESS NOTES
Last 5 Patient Entered Readings                                      Current 30 Day Average: 135/81     Recent Readings 6/25/2019 6/24/2019 6/17/2019 6/15/2019 6/12/2019    SBP (mmHg) 126 109 134 145 142    DBP (mmHg) 79 73 74 89 85    Pulse 92 77 71 74 82        LVM to discuss BP readings and medications.

## 2019-07-03 ENCOUNTER — PATIENT MESSAGE (OUTPATIENT)
Dept: FAMILY MEDICINE | Facility: CLINIC | Age: 64
End: 2019-07-03

## 2019-07-03 RX ORDER — FAMCICLOVIR 500 MG/1
500 TABLET ORAL 3 TIMES DAILY
Qty: 21 TABLET | Refills: 0 | Status: SHIPPED | OUTPATIENT
Start: 2019-07-03 | End: 2021-07-27 | Stop reason: SDUPTHER

## 2019-07-05 ENCOUNTER — CLINICAL SUPPORT (OUTPATIENT)
Dept: REHABILITATION | Facility: HOSPITAL | Age: 64
End: 2019-07-05
Attending: ORTHOPAEDIC SURGERY
Payer: MEDICAID

## 2019-07-05 DIAGNOSIS — M25.511 RIGHT SHOULDER PAIN, UNSPECIFIED CHRONICITY: ICD-10-CM

## 2019-07-05 DIAGNOSIS — M25.611 SHOULDER STIFFNESS, RIGHT: Primary | ICD-10-CM

## 2019-07-05 DIAGNOSIS — R29.898 SHOULDER WEAKNESS: ICD-10-CM

## 2019-07-05 PROCEDURE — 97110 THERAPEUTIC EXERCISES: CPT | Mod: PN

## 2019-07-10 ENCOUNTER — CLINICAL SUPPORT (OUTPATIENT)
Dept: REHABILITATION | Facility: HOSPITAL | Age: 64
End: 2019-07-10
Attending: ORTHOPAEDIC SURGERY
Payer: MEDICAID

## 2019-07-10 DIAGNOSIS — M25.511 RIGHT SHOULDER PAIN, UNSPECIFIED CHRONICITY: ICD-10-CM

## 2019-07-10 DIAGNOSIS — R29.898 SHOULDER WEAKNESS: ICD-10-CM

## 2019-07-10 DIAGNOSIS — M25.611 SHOULDER STIFFNESS, RIGHT: Primary | ICD-10-CM

## 2019-07-10 PROCEDURE — 97110 THERAPEUTIC EXERCISES: CPT | Mod: PN

## 2019-07-10 NOTE — PROGRESS NOTES
TIME RECORD    Date:  07/05/2019    Start Time:  1300  Stop Time:  1400    PROCEDURES:    TIMED  Procedure Time Min.   exercise Start:1300  Stop:1400 60    Start:  Stop:     Start:  Stop:     Start:  Stop:          UNTIMED  Procedure Time Min.    Start:  Stop:     Start:  Stop:      Total Timed Minutes:  60  Total Timed Units:  4  Total Untimed Units:  0  Charges Billed/# of units:  4      Progress/Current Status    Subjective:     Patient ID: Mane Ramires is a 63 y.o. male.  Diagnosis:   1. Shoulder stiffness, right     2. Shoulder weakness     3. 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      Objective:     Pt completed HEP correctly. Completed entire protocol.    Assessment:     Making progress    Patient Education/Response:     ongoing    Plans and Goals:     Cont per poc

## 2019-07-12 ENCOUNTER — CLINICAL SUPPORT (OUTPATIENT)
Dept: REHABILITATION | Facility: HOSPITAL | Age: 64
End: 2019-07-12
Attending: ORTHOPAEDIC SURGERY
Payer: MEDICAID

## 2019-07-12 DIAGNOSIS — M25.611 SHOULDER STIFFNESS, RIGHT: Primary | ICD-10-CM

## 2019-07-12 DIAGNOSIS — M25.511 RIGHT SHOULDER PAIN, UNSPECIFIED CHRONICITY: ICD-10-CM

## 2019-07-12 DIAGNOSIS — R29.898 SHOULDER WEAKNESS: ICD-10-CM

## 2019-07-12 PROCEDURE — 97110 THERAPEUTIC EXERCISES: CPT | Mod: PN

## 2019-07-12 NOTE — PROGRESS NOTES
TIME RECORD    Date:  07/12/2019    Start Time:  1100  Stop Time:  1200    PROCEDURES:    TIMED  Procedure Time Min.   exercise Start:1100  Stop:1200 60    Start:  Stop:     Start:  Stop:     Start:  Stop:          UNTIMED  Procedure Time Min.    Start:  Stop:     Start:  Stop:      Total Timed Minutes:  60  Total Timed Units:  4  Total Untimed Units:  0  Charges Billed/# of units:  4      Progress/Current Status    Subjective:     Patient ID: Mane Ramires is a 63 y.o. male.  Diagnosis:   1. Shoulder stiffness, right     2. Shoulder weakness     3. Right shoulder pain, unspecified chronicity       Pain: 5 /10  Pt reports that he lifted his 11 month grandson over head and felt immediate pain.    Objective:     Pt completed entire exercise program but unable to achieve prior ROM 2/2 to lifting grandson. Modified program to accommodate for pain    Assessment:     Hurting today but still doing HEP modified    Patient Education/Response:     excellent    Plans and Goals:     Cont per poc

## 2019-07-12 NOTE — PROGRESS NOTES
TIME RECORD    Date:  07/12/2019    Start Time:  1100  Stop Time:  1200    PROCEDURES:    TIMED  Procedure Time Min.   exercise Start:1100  Stop:1200 60    Start:  Stop:     Start:  Stop:     Start:  Stop:          UNTIMED  Procedure Time Min.    Start:  Stop:     Start:  Stop:      Total Timed Minutes:  60  Total Timed Units:  4  Total Untimed Units:  0  Charges Billed/# of units:  4      Progress/Current Status    Subjective:     Patient ID: Mane Ramires is a 63 y.o. male.  Diagnosis:   1. Shoulder stiffness, right     2. Right shoulder pain, unspecified chronicity     3. Shoulder weakness       Pain: 5 /10  Shoulder still painful from lifting grandson.    Objective:     Modified exercise program completed    Assessment:     Still in significant pain    Patient Education/Response:     ongoing    Plans and Goals:     Cont per poc

## 2019-07-15 RX ORDER — METOPROLOL SUCCINATE 50 MG/1
TABLET, EXTENDED RELEASE ORAL
Qty: 30 TABLET | Refills: 3
Start: 2019-07-15 | End: 2019-08-28 | Stop reason: SDUPTHER

## 2019-07-15 NOTE — PROGRESS NOTES
Last 5 Patient Entered Readings                                      Current 30 Day Average: 135/81     Recent Readings 7/12/2019 7/5/2019 6/28/2019 6/25/2019 6/24/2019    SBP (mmHg) 138 143 148 126 109    DBP (mmHg) 75 91 83 79 73    Pulse 90 98 89 92 77        Mr. Ramires's BP has been elevated above goal. He denies any changes, but has not been very active due to arm injury. Will increase metoprolol to 75 mg QD. HR can tolerate dose increase.     Per 30 day average, 135/81 mmHg, patient's BP is not at goal.     Will continue to monitor regularly. Will follow up in 2-3 weeks, sooner if BP begins to trend upward or downward.    Asked patient to call or message with questions or concerns.     Current HTN regimen:  Hypertension Medications             irbesartan (AVAPRO) 300 MG tablet Take 1 tablet (300 mg total) by mouth every evening.    metoprolol succinate (TOPROL-XL) 50 MG 24 hr tablet Take 1.5 tablets (75 mg) by mouth once daily.

## 2019-07-16 ENCOUNTER — CLINICAL SUPPORT (OUTPATIENT)
Dept: REHABILITATION | Facility: HOSPITAL | Age: 64
End: 2019-07-16
Attending: ORTHOPAEDIC SURGERY
Payer: MEDICAID

## 2019-07-16 DIAGNOSIS — M25.611 SHOULDER STIFFNESS, RIGHT: ICD-10-CM

## 2019-07-16 DIAGNOSIS — M25.511 RIGHT SHOULDER PAIN, UNSPECIFIED CHRONICITY: Primary | ICD-10-CM

## 2019-07-16 PROCEDURE — 97110 THERAPEUTIC EXERCISES: CPT | Mod: PN

## 2019-07-19 ENCOUNTER — CLINICAL SUPPORT (OUTPATIENT)
Dept: REHABILITATION | Facility: HOSPITAL | Age: 64
End: 2019-07-19
Attending: ORTHOPAEDIC SURGERY
Payer: MEDICAID

## 2019-07-19 DIAGNOSIS — M25.611 SHOULDER STIFFNESS, RIGHT: ICD-10-CM

## 2019-07-19 DIAGNOSIS — M25.511 RIGHT SHOULDER PAIN, UNSPECIFIED CHRONICITY: Primary | ICD-10-CM

## 2019-07-19 DIAGNOSIS — R29.898 SHOULDER WEAKNESS: ICD-10-CM

## 2019-07-19 PROCEDURE — 97010 HOT OR COLD PACKS THERAPY: CPT | Mod: PN

## 2019-07-19 PROCEDURE — 97110 THERAPEUTIC EXERCISES: CPT | Mod: PN

## 2019-07-22 RX ORDER — TRAZODONE HYDROCHLORIDE 50 MG/1
50 TABLET ORAL NIGHTLY
Qty: 30 TABLET | Refills: 2 | Status: SHIPPED | OUTPATIENT
Start: 2019-07-22 | End: 2019-10-14 | Stop reason: SDUPTHER

## 2019-07-22 NOTE — PROGRESS NOTES
TIME RECORD    Date:  07/16/2019    Start Time:  1303  Stop Time:  1400    PROCEDURES:    TIMED  Procedure Time Min.   exercise Start:1303  Stop:1400 57    Start:  Stop:     Start:  Stop:     Start:  Stop:          UNTIMED  Procedure Time Min.    Start:  Stop:     Start:  Stop:      Total Timed Minutes:  57  Total Timed Units:  4  Total Untimed Units:  0  Charges Billed/# of units:  4      Progress/Current Status    Subjective:     Patient ID: Mane Ramires is a 63 y.o. male.  Diagnosis:   1. Right shoulder pain, unspecified chronicity     2. Shoulder stiffness, right       Pain: 5 /10  Pain from lifting grandson still significant    Objective:     Mr. Ramires completed his entire HEP as tolerated. ROM still limited by acute pain.    Assessment:     Compliant but in pain    Patient Education/Response:     ongoing    Plans and Goals:     Cont per poc

## 2019-07-23 ENCOUNTER — CLINICAL SUPPORT (OUTPATIENT)
Dept: REHABILITATION | Facility: HOSPITAL | Age: 64
End: 2019-07-23
Attending: ORTHOPAEDIC SURGERY
Payer: MEDICAID

## 2019-07-23 DIAGNOSIS — R29.898 SHOULDER WEAKNESS: ICD-10-CM

## 2019-07-23 DIAGNOSIS — M25.611 SHOULDER STIFFNESS, RIGHT: ICD-10-CM

## 2019-07-23 DIAGNOSIS — M25.511 RIGHT SHOULDER PAIN, UNSPECIFIED CHRONICITY: Primary | ICD-10-CM

## 2019-07-23 PROCEDURE — 97530 THERAPEUTIC ACTIVITIES: CPT | Mod: PN

## 2019-07-23 NOTE — PROGRESS NOTES
"Time in 2  Time out 3    Timed units  4 therex                              Time:2-3      S: doing HEP, encouraged by progress in last month, reports some point tenderness along lateral deltoid insertion  Pain:light tasks with about 2/10 sometimes otherwise 0/10    O:  r prom er at 0 abd 60 at 45 abd 72    at 90 abd 30 with tension;       sidelying ir  70        ir behind back 15" lift off   all capsular  l prom er at 0 abd 72 at 45 abd 90    at  90 abd 90;      sidelying ir 70         ir behind back 15" lift off    HEP: explained er 90 abd stretch will be added once tension noted above gone    manual tx: coronal traction 90 abd    prom as tolerated-pain free: prom flex, d2, abd    stretches as tolerated-pain free:abd at 90, er 45 abd, functional ir, sidelying ir    theraband 2 x 20:n/a    isometrics 2 x 20:n/a     education: likely tx progression, explained may have overuse of lateral deltoid and this should decrease as glenohumeral joint loosens    Ube:n/a     Arom:n/a          A:gh capsular pattern and reactivity continue to decrease, functional use with basic self care continues to improve        functional upper extremity status/ADL improvement: able to wash hair with r arm now yet full active elevation not possible    P:fix all shoulder stiffness so long term use over shoulder level is feasible  "

## 2019-07-25 ENCOUNTER — CLINICAL SUPPORT (OUTPATIENT)
Dept: REHABILITATION | Facility: HOSPITAL | Age: 64
End: 2019-07-25
Attending: ORTHOPAEDIC SURGERY
Payer: MEDICAID

## 2019-07-25 DIAGNOSIS — R29.898 SHOULDER WEAKNESS: ICD-10-CM

## 2019-07-25 DIAGNOSIS — M25.511 RIGHT SHOULDER PAIN, UNSPECIFIED CHRONICITY: Primary | ICD-10-CM

## 2019-07-25 DIAGNOSIS — M25.611 SHOULDER STIFFNESS, RIGHT: ICD-10-CM

## 2019-07-25 PROCEDURE — 97530 THERAPEUTIC ACTIVITIES: CPT | Mod: PN

## 2019-07-25 NOTE — PROGRESS NOTES
Time in 3  Time out 4      Timed units  4 therex                              Time:3-4      S:used hot shower on r shoulder today and has increased pain, understands to not do this in future  Pain:about 3/10 at rest, C5 ache    O:  HEP: reviewed    manual tx:n/a    prom as tolerated-pain free:flex, abd    stretches as tolerated-pain free:er 0, 45 abd; sleeper, functional ir    theraband 2 x 20:n/a    isometrics 2 x 20:n/a     education: n/a    Ube:n/a     Arom:n/a            A:light isometrics indicated          P:test prom er x 3 and ir x 2

## 2019-07-27 NOTE — PROGRESS NOTES
TIME RECORD    Date:  07/19/2019    Start Time:  1500  Stop Time:  1600    PROCEDURES:    TIMED  Procedure Time Min.   exercise Start:1500  Stop:1545 45    Start:  Stop:     Start:  Stop:     Start:  Stop:          UNTIMED  Procedure Time Min.   Ice pack Start:1545  Stop:1600 15    Start:  Stop:      Total Timed Minutes:  45  Total Timed Units:  3  Total Untimed Units:  1  Charges Billed/# of units:  4      Progress/Current Status    Subjective:     Patient ID: Mane Ramires is a 63 y.o. male.  Diagnosis:   1. Right shoulder pain, unspecified chronicity     2. Shoulder stiffness, right     3. Shoulder weakness       Pain: 3 /10  Still with pain from holding grandson over head    Objective:     Mr. Ramires completed his HEP as instructed. Has been compliant at home.    Assessment:     Pain is limiting ROM    Patient Education/Response:     ongoing    Plans and Goals:     cont

## 2019-07-29 ENCOUNTER — PATIENT OUTREACH (OUTPATIENT)
Dept: OTHER | Facility: OTHER | Age: 64
End: 2019-07-29

## 2019-07-29 NOTE — PROGRESS NOTES
"Last 5 Patient Entered Readings                                      Current 30 Day Average: 136/85     Recent Readings 7/28/2019 7/19/2019 7/17/2019 7/12/2019 7/5/2019    SBP (mmHg) 137 129 131 138 143    DBP (mmHg) 83 86 88 75 91    Pulse 72 88 89 90 98        Mr. Ramires's BP has started to trend down on higher dose of metoprolol. He is feeling well on this dose. He checked his BP on a different monitor yesterday and states it was "111/??". Will continue current regimen. Mr. Ramires will send in a BP reading using the digital monitor today.     Per 30 day average, 136/85 mmHg, patient's BP is not at goal.     Will continue to monitor regularly. Will follow up in 2-3 weeks, sooner if BP begins to trend upward or downward.    Asked patient to call or message with questions or concerns.     Current HTN regimen:  Hypertension Medications             irbesartan (AVAPRO) 300 MG tablet Take 1 tablet (300 mg total) by mouth every evening.    metoprolol succinate (TOPROL-XL) 50 MG 24 hr tablet Take 1.5 tablets (75 mg) by mouth once daily.                      "

## 2019-07-30 ENCOUNTER — CLINICAL SUPPORT (OUTPATIENT)
Dept: REHABILITATION | Facility: HOSPITAL | Age: 64
End: 2019-07-30
Attending: ORTHOPAEDIC SURGERY
Payer: MEDICAID

## 2019-07-30 DIAGNOSIS — R29.898 SHOULDER WEAKNESS: ICD-10-CM

## 2019-07-30 DIAGNOSIS — M25.511 RIGHT SHOULDER PAIN, UNSPECIFIED CHRONICITY: Primary | ICD-10-CM

## 2019-07-30 DIAGNOSIS — M25.611 SHOULDER STIFFNESS, RIGHT: ICD-10-CM

## 2019-07-30 PROCEDURE — 97530 THERAPEUTIC ACTIVITIES: CPT | Mod: PN

## 2019-07-30 NOTE — PROGRESS NOTES
"Time in 12  Time out 1      Timed units  4 therex                              Time:12-1      S: understands given extensive and chronic shoulder tightness pre surgery, he is doing well  Pain:about 3/10 at rest, C5 ache    O:  HEP: reviewed    manual tx:n/a    prom as tolerated-pain free:flex, abd    stretches as tolerated-pain free:er 0, 45 abd; sleeper    theraband 2 x 20:n/a    isometrics 2 x 20:n/a     education: told to put weight by mid humerus to stabilize arm in proper position with home sleeper stretches    Ube:n/a     Arom:n/a    r prom er at 0 abd 68  at 45 abd 76    at 90 abd 30 withtension;       sidelying ir  70        ir behind back 15" lift off       all capsular  l prom er at 0 abd 72    at 45 abd 90    at  90 abd 90;      sidelying ir 80         ir behind back 15" lift off        A: overall reactivity and stiffness slowly decreasing, below shoulder level use with ADL with no real issues, active elevation to about 70          P: fix shoulder complex tightness and transition to scapula and cuff PRE to promote strong, painless, and proper mechanics long term especially with over shoulder level tasks  "

## 2019-08-01 ENCOUNTER — CLINICAL SUPPORT (OUTPATIENT)
Dept: REHABILITATION | Facility: HOSPITAL | Age: 64
End: 2019-08-01
Attending: ORTHOPAEDIC SURGERY
Payer: MEDICAID

## 2019-08-01 DIAGNOSIS — M25.511 RIGHT SHOULDER PAIN, UNSPECIFIED CHRONICITY: Primary | ICD-10-CM

## 2019-08-01 DIAGNOSIS — R29.898 SHOULDER WEAKNESS: ICD-10-CM

## 2019-08-01 DIAGNOSIS — M25.611 SHOULDER STIFFNESS, RIGHT: ICD-10-CM

## 2019-08-01 PROCEDURE — 97530 THERAPEUTIC ACTIVITIES: CPT | Mod: PN

## 2019-08-01 NOTE — PROGRESS NOTES
Time in 12  Time out 1      Timed units  4 therex                              Time:12-1      S: slow but steady increase in light use continues, active elevation past shoulder level not possible  Pain:2/10 with basic ADL sometimes    O:  HEP: explained likely progression    manual tx:n/a    prom as tolerated-pain free:n/a    stretches as tolerated-pain free: abd, er 0 abd, er 45 abd, sleeper    theraband 2 x 20:n/a    isometrics 2 x 20:n/a     education: explained given chronicity of shoulder dysfunction, date of surgery, and date of first rehab visit progress is satisfactory    ube:n/a     arom:n/a            A: contractile and noncontractile tightness continues to decrease, fixing gh hypomobility then transitioning to elevation gh glides and techniques indicated to get full prom elevation x 3 long term          P:resume gh pain relief and glides prn, consider end range elevation glides at gh joint eventually

## 2019-08-06 ENCOUNTER — CLINICAL SUPPORT (OUTPATIENT)
Dept: REHABILITATION | Facility: HOSPITAL | Age: 64
End: 2019-08-06
Attending: ORTHOPAEDIC SURGERY
Payer: MEDICAID

## 2019-08-06 DIAGNOSIS — R29.898 SHOULDER WEAKNESS: ICD-10-CM

## 2019-08-06 DIAGNOSIS — M25.511 RIGHT SHOULDER PAIN, UNSPECIFIED CHRONICITY: Primary | ICD-10-CM

## 2019-08-06 DIAGNOSIS — M25.611 SHOULDER STIFFNESS, RIGHT: ICD-10-CM

## 2019-08-06 PROCEDURE — 97530 THERAPEUTIC ACTIVITIES: CPT | Mod: PN

## 2019-08-06 RX ORDER — IRBESARTAN 300 MG/1
300 TABLET ORAL NIGHTLY
Qty: 30 TABLET | Refills: 1 | Status: SHIPPED | OUTPATIENT
Start: 2019-08-06 | End: 2019-08-28 | Stop reason: ALTCHOICE

## 2019-08-06 NOTE — PROGRESS NOTES
"Time in 11  Time out 12      Timed units  3 therex                              Time:1115-12  1 manual                              Time:      S:doing HEP, understands rationale of tx  Pain:C5 ache, about 2/10 with some basic self care    O:  r prom er at 0 abd 70   at 45 abd 80    at 90 abd 40 with tension;       sidelying ir  70        ir behind back 15"    lift off  l prom er at 0 abd 72    at 45 abd 90    at  90 abd 90;      sidelying ir 80         ir behind back 15"    lift off    HEP: reviewed    manual tx:coronal traction abd at 90    prom as tolerated-pain free:n/a    stretches as tolerated-pain free:er 0, 45 abd; sleeper, functional ir; abd supine    theraband 2 x 20:n/a    isometrics 2 x 20:n/a     education: deleterious effects of immobile joints    ube:n/a     arom:n/a        A: Continued skilled and structured rehab imperative to properly promote full rom, balanced strength, and good mechanics long term with ADL        functional upper extremity status/ADL improvement: compared to one month brushing hair, sleeping, and hygiene much easier and less symptomatic; over shoulder level use not feasible due to pain, stiffness, and weaknes    P:fix gh hypomobility, issue er 90 abd stretch once appropriate, consider elevation end range manual tx long term  "

## 2019-08-07 ENCOUNTER — PATIENT OUTREACH (OUTPATIENT)
Dept: OTHER | Facility: OTHER | Age: 64
End: 2019-08-07

## 2019-08-07 NOTE — PROGRESS NOTES
Last 5 Patient Entered Readings                                      Current 30 Day Average: 128/80     Recent Readings 8/2/2019 7/31/2019 7/28/2019 7/19/2019 7/17/2019    SBP (mmHg) 125 110 137 129 131    DBP (mmHg) 77 73 83 86 88    Pulse 90 73 72 88 89            Digital Medicine: Health  Follow Up    8/7: Left voicemail to follow up with Mr. Mane Ramires.  Current BP average 128/80 mmHg is at goal, <130/80 mmHg.  BP has trended down and is now in control.

## 2019-08-08 ENCOUNTER — CLINICAL SUPPORT (OUTPATIENT)
Dept: REHABILITATION | Facility: HOSPITAL | Age: 64
End: 2019-08-08
Attending: ORTHOPAEDIC SURGERY
Payer: MEDICAID

## 2019-08-08 DIAGNOSIS — M25.611 SHOULDER STIFFNESS, RIGHT: ICD-10-CM

## 2019-08-08 DIAGNOSIS — M25.511 RIGHT SHOULDER PAIN, UNSPECIFIED CHRONICITY: Primary | ICD-10-CM

## 2019-08-08 DIAGNOSIS — R29.898 SHOULDER WEAKNESS: ICD-10-CM

## 2019-08-08 PROCEDURE — 97140 MANUAL THERAPY 1/> REGIONS: CPT | Mod: PN

## 2019-08-08 PROCEDURE — 97110 THERAPEUTIC EXERCISES: CPT | Mod: PN

## 2019-08-08 NOTE — PROGRESS NOTES
Time in 11  Time out 12      Timed units  3 therex                              Time:1115-12  1 manual                              Time:      S:no new issues  Pain:C5 ache, about 2/10 with some basic self care    O:    HEP: reviewed    manual tx:coronal traction abd at 90    prom as tolerated-pain free:n/a    stretches as tolerated-pain free:er 0, 45 abd; sleeper, functional ir; abd supine    theraband 2 x 20:n/a    isometrics 2 x 20:n/a     education: ac and sc hypomobility is an unlikely decreased passive elevation factor and that once gh hypomobility resolved, pec major sternal head stretches will be integrated into rehab    ube:n/a     arom:n/a        A: excellent decrease in gh hypomobility and reactivity since day 1 of OT, full passive motion thru shoulder complex imperative for proper mechanics and full over shoulder level use long term          P:fix gh hypomobility

## 2019-08-12 ENCOUNTER — PATIENT MESSAGE (OUTPATIENT)
Dept: FAMILY MEDICINE | Facility: CLINIC | Age: 64
End: 2019-08-12

## 2019-08-12 DIAGNOSIS — M72.2 PLANTAR FASCIITIS: Primary | ICD-10-CM

## 2019-08-13 ENCOUNTER — PATIENT MESSAGE (OUTPATIENT)
Dept: ENDOCRINOLOGY | Facility: CLINIC | Age: 64
End: 2019-08-13

## 2019-08-13 ENCOUNTER — TELEPHONE (OUTPATIENT)
Dept: ENDOCRINOLOGY | Facility: CLINIC | Age: 64
End: 2019-08-13

## 2019-08-13 ENCOUNTER — PATIENT MESSAGE (OUTPATIENT)
Dept: FAMILY MEDICINE | Facility: CLINIC | Age: 64
End: 2019-08-13

## 2019-08-13 ENCOUNTER — CLINICAL SUPPORT (OUTPATIENT)
Dept: REHABILITATION | Facility: HOSPITAL | Age: 64
End: 2019-08-13
Attending: ORTHOPAEDIC SURGERY
Payer: MEDICAID

## 2019-08-13 DIAGNOSIS — R29.898 SHOULDER WEAKNESS: ICD-10-CM

## 2019-08-13 DIAGNOSIS — M25.611 SHOULDER STIFFNESS, RIGHT: ICD-10-CM

## 2019-08-13 DIAGNOSIS — M25.511 RIGHT SHOULDER PAIN, UNSPECIFIED CHRONICITY: Primary | ICD-10-CM

## 2019-08-13 PROCEDURE — 97530 THERAPEUTIC ACTIVITIES: CPT | Mod: PN

## 2019-08-13 NOTE — PROGRESS NOTES
"Time in 11  Time out 12      Timed units  3 therex                              Time:1115-12  1 manual                              Time:      S:no new issues, lifting arm past about 80 not possible with ADL mainly due to stiffness  Pain:C5 ache, about 2/10 with some basic self care    O:    HEP: reviewed    manual tx:coronal traction abd at 90    prom as tolerated-pain free:n/a    stretches as tolerated-pain free:er 0, 45 abd; sleeper, functional ir; abd supine    theraband 2 x 20:n/a    isometrics 2 x 20:n/a     education: explained manual tx at gh joint for terminal elevation in future may be of value    ube:n/a     arom:n/a    r prom er at 0 abd 72   at 45 abd 80    at 90 abd 60 with tension;       sidelying ir  74        ir behind back 15"    lift off      all capsular  l prom er at 0 abd 72    at 45 abd 90    at  90 abd 90;      sidelying ir 80         ir behind back 15"   lift off    A: er 90 abd prom progress good considering this motion not being worked on at home, full prom of shoulder complex is imperative for full use over shoulder level as well as correct scapula humeral ratio long term        P:fix gh hypomobility, issue er 90 abd stretch once tension at end range not felt  "

## 2019-08-15 ENCOUNTER — CLINICAL SUPPORT (OUTPATIENT)
Dept: REHABILITATION | Facility: HOSPITAL | Age: 64
End: 2019-08-15
Attending: ORTHOPAEDIC SURGERY
Payer: MEDICAID

## 2019-08-15 DIAGNOSIS — R29.898 SHOULDER WEAKNESS: ICD-10-CM

## 2019-08-15 DIAGNOSIS — M25.611 SHOULDER STIFFNESS, RIGHT: ICD-10-CM

## 2019-08-15 DIAGNOSIS — M25.511 RIGHT SHOULDER PAIN, UNSPECIFIED CHRONICITY: Primary | ICD-10-CM

## 2019-08-15 PROCEDURE — 97530 THERAPEUTIC ACTIVITIES: CPT | Mod: PN

## 2019-08-15 NOTE — PROGRESS NOTES
Time in 12  Time out 1      Timed units  3 therex                              Time:1115-12  1 manual                              Time:      S: doing HEP  Pain:C5 ache, about 2/10 with some basic self care    O:    HEP: reviewed    manual tx:coronal traction abd at 90    prom as tolerated-pain free:n/a    stretches as tolerated-pain free:er 0, 45 abd; sleeper, functional ir; abd supine; er 90 abd (briefly since became painful)    theraband 2 x 20:n/a    isometrics 2 x 20:n/a     education: likely tx progression    ube:n/a     arom:n/a      A: IGHL stretching imperative to get full passive elevation      P:fix gh hypomobility

## 2019-08-20 ENCOUNTER — CLINICAL SUPPORT (OUTPATIENT)
Dept: REHABILITATION | Facility: HOSPITAL | Age: 64
End: 2019-08-20
Attending: ORTHOPAEDIC SURGERY
Payer: MEDICAID

## 2019-08-20 DIAGNOSIS — R29.898 SHOULDER WEAKNESS: ICD-10-CM

## 2019-08-20 DIAGNOSIS — M25.611 SHOULDER STIFFNESS, RIGHT: ICD-10-CM

## 2019-08-20 DIAGNOSIS — M25.511 RIGHT SHOULDER PAIN, UNSPECIFIED CHRONICITY: Primary | ICD-10-CM

## 2019-08-20 PROCEDURE — 97530 THERAPEUTIC ACTIVITIES: CPT | Mod: PN

## 2019-08-20 NOTE — PROGRESS NOTES
Time in 3  Time out 4      Timed units  4 therex                              Time:3-4      S: doing HEP  Pain:C5 ache, about 2/10 with some basic self care    O:    HEP: reviewed    manual tx:n/a    prom as tolerated-pain free:n/a    stretches as tolerated-pain free:er 0, 45 abd; sleeper, functional ir; abd supine; er 90 abd (briefly since became painful)    theraband 2 x 20:n/a    isometrics 2 x 20:n/a     education: likely tx progression    ube:n/a     arom:n/a      A: continued rehab essential to promote increased use and correct mechanics; progressing well towards all goals      P:fix gh hypomobility    UPDATED POC    2 x week x 8 weeks; 8-20-19 thru 10-15-19    I certify the need for the services furnished under this plan of care.    doctor's signature:______________________________________________________

## 2019-08-21 NOTE — PROGRESS NOTES
"Last 5 Patient Entered Readings                                      Current 30 Day Average: 136/81     Recent Readings 8/20/2019 8/19/2019 8/17/2019 8/12/2019 8/2/2019    SBP (mmHg) 141 157 153 130 125    DBP (mmHg) 79 93 86 79 77    Pulse 80 68 82 78 90        Digital Medicine: Health  Follow Up    Addressed recent elevation in BP.  Patient is unsure as to why it has been elevated.  After further discussion, patient reports he has not charged his machine in a while.  Instructed patient to charge his machine.    Lifestyle Modifications:    1.Dietary Modifications (Sodium intake <2,000mg/day, food labels, dining out):   Patient reports he has been "eaiting a little better" lately.  Patient reports he has been cutting out carbs.  Patient denies needing any help with nutrition at this time.     2.Physical Activity:   Deferred    3.Medication Therapy:   Patient has been compliant with the medication regimen.  Patient reports he will need a refill "in a week or so" of his Metoprolol due to have to take 1.5 pills daily.  Will notify PharmD.    4.Patient has the following medication side effects/concerns: None  (Frequency/Alleviating factors/Precipitating factors, etc.)     Follow up with Mr. Mane Dodd Keyla completed. No further questions or concerns. Will continue to follow up to achieve health goals.  Patient is currently not at goal, 136/81 mmHg does exceed <130/80 mmHg.      "

## 2019-08-22 ENCOUNTER — CLINICAL SUPPORT (OUTPATIENT)
Dept: REHABILITATION | Facility: HOSPITAL | Age: 64
End: 2019-08-22
Attending: ORTHOPAEDIC SURGERY
Payer: MEDICAID

## 2019-08-22 DIAGNOSIS — M25.611 SHOULDER STIFFNESS, RIGHT: ICD-10-CM

## 2019-08-22 DIAGNOSIS — R29.898 SHOULDER WEAKNESS: ICD-10-CM

## 2019-08-22 DIAGNOSIS — M79.672 FOOT PAIN, BILATERAL: ICD-10-CM

## 2019-08-22 DIAGNOSIS — M79.671 FOOT PAIN, BILATERAL: ICD-10-CM

## 2019-08-22 DIAGNOSIS — M25.511 RIGHT SHOULDER PAIN, UNSPECIFIED CHRONICITY: Primary | ICD-10-CM

## 2019-08-22 PROCEDURE — 97161 PT EVAL LOW COMPLEX 20 MIN: CPT | Mod: PN

## 2019-08-22 PROCEDURE — 97110 THERAPEUTIC EXERCISES: CPT | Mod: PN

## 2019-08-22 PROCEDURE — 97530 THERAPEUTIC ACTIVITIES: CPT | Mod: PN

## 2019-08-22 PROCEDURE — 97150 GROUP THERAPEUTIC PROCEDURES: CPT | Mod: PN

## 2019-08-22 NOTE — PROGRESS NOTES
"Time in 3  Time out 4      Timed units  4 therex                              Time:2-3      S: understands er 90 abd stretch may be added next week  Pain:C5 ache, about 2/10 with some basic self care    O:    r prom er at 0 abd 72   at 45 abd 80    at 90 abd 60 with tension at end range;       sidelying ir  80        ir behind back 15"    lift off     all capsular  l prom er at 0 abd 72    at 45 abd 90    at  90 abd 90;      sidelying ir 80         ir behind back 15"   lift off        HEP: reviewed        stretches as tolerated-pain free: er 0, 45 abd; abd, sleeper, functional ir       education: likely tx progression, see above, contractile vs noncontractile tightness        A: d2 and hands clasped behind head stretch likely will be needed once gh stiffness resolved; full flexibility of right shoulder complex essential for proper mechanics, correct scapula humeral ratio, and strong and painless use     P:fix gh hypomobility then screen prom elevation for flexion, d2, and abd      "

## 2019-08-22 NOTE — PROGRESS NOTES
[facility]  Physical Therapy Initial Evaluation    Name: Mane Ramires  Clinic Number: [id number]    Therapy Diagnosis:   Encounter Diagnosis   Name Primary?    Foot pain, bilateral      Physician: Patience Gordon MD    Physician Orders: {AMB PT KNEE ORDERS:60161} ***  Medical Diagnosis from Referral: ***  Evaluation Date: 8/22/2019  Authorization Period Expiration: ***  Plan of Care Expiration: ***  Visit # / Visits authorized: ***/ ***    Time In: ***  Time Out: ***  Total Billable Time: *** minutes    Precautions: {IP WOUND PRECAUTIONS OHS:67526}    Subjective   Date of onset: ***  History of current condition - Yousif reports: ***     Medical History:   Past Medical History:   Diagnosis Date    Accident     fell from roof with TBI (word finding issues personality changes, memory deficets), R rib fractures, clavicle fx    Allergy     Anxiety     ASD (atrial septal defect)     noted on ECHO 6/16    Cervical stenosis of spine     noted on 6/15 MRI    CTS (carpal tunnel syndrome)     mild-mod on 4/17 NCS    DDD (degenerative disc disease), cervical 10/2014    C5-6 and C6-C7    Depression     JEEVAN (generalized anxiety disorder)     Gender identity disorder     H/O cardiovascular stress test     12/14 normal    Herpes simplex type 2 infection     History of atypical hyperplasia of breast     B precancer lesions    Hypertension     IGT (impaired glucose tolerance)     Myelomalacia     c-spine noted on 8/15 MRI    OA (osteoarthritis)     Prediabetes     TBI (traumatic brain injury)     after falling off a roof in 2003       Surgical History:   Mane Ramires  has a past surgical history that includes Hysterectomy (1984); Appendectomy (1969); Cervical fusion (10/2014); Mastectomy (02/2015); Bilateral salpingoophorectomy (02/2015); Amputation; Upper gastrointestinal endoscopy (09/06/2017); Finger amputation (Left); Fracture surgery (Right, 2003); Cholecystectomy;  "Carpal tunnel release (12/2017); Reconstruction of nose (N/A, 8/23/2018); Endoscopic nasal septoplasty (N/A, 8/23/2018); Biopsy of liver with ultrasound guidance (N/A, 12/18/2018); and Rotator cuff repair (Right, 2019).    Medications:   Mane has a current medication list which includes the following prescription(s): carboxymethylcellulose, cholecalciferol (vitamin d3), duloxetine, hydrocodone-acetaminophen, irbesartan, ketoconazole, levocetirizine, metoprolol succinate, ondansetron, ranitidine, testosterone cypionate, and trazodone.    Allergies:   Review of patient's allergies indicates:   Allergen Reactions    Sudafed [pseudoephedrine hcl] Other (See Comments)     Heart racing      Codeine Other (See Comments)     Heart racing    Pseudoephedrine     Cortisone Palpitations        Imaging, {Mri/ctscan/bone scan:10565}: ***    Prior Therapy: ***  Social History: *** {LIVES WITH:77021}  Occupation: ***  Prior Level of Function: ***  Current Level of Function: ***    Pain:  Current {0-10:09303::"0"}/10, worst {0-10:25262::"0"}/10, best {0-10:34650::"0"}/10   Location: {RIGHT LEFT BILATERAL:60875} {LOCATION ON BODY:70660}  Description: {Pain Description:64313}  Aggravating Factors: {Causes; Pain:87270}  Easing Factors: {Pain (activities that relieve):26909}    Pts goals: ***    Objective     Posture: ***  Palpation: ***  Sensation: ***  DTRs: ***  Range of Motion/Strength: ***     ROM: Left Right   Ankle Dorsiflexion *** degrees *** degrees   Ankle Plantarflexion *** degrees *** degrees   Ankle Inversion *** degrees *** degrees   Ankle Eversion *** degrees *** degrees       MMT: Left Right   Ankle Dorsiflexion {AMB PT VESTIBULAR STRENGTH:99418} {AMB PT VESTIBULAR STRENGTH:26754}   Ankle Plantarflexion {AMB PT VESTIBULAR STRENGTH:59108} {AMB PT VESTIBULAR STRENGTH:72497}   Ankle Inversion {AMB PT VESTIBULAR STRENGTH:56903} {AMB PT VESTIBULAR STRENGTH:72647}   Ankle Eversion {AMB PT VESTIBULAR STRENGTH:18904} {AMB " "PT VESTIBULAR STRENGTH:57446}       Flexibility Left Right   Hamstrings *** degrees *** degrees   Achilles *** degrees *** degrees     Girth Measurements Left Right   Figure 8 *** cm *** cm      Special Tests Left Right   Talar tilt *** ***   Anterior drawer *** ***            Gait {AD:67483}   Analysis ***       Other:   LEFS   ***% impairment ( G code *** )          CMS Impairment/Limitation/Restriction for FOTO *** Survey    Therapist reviewed FOTO scores for Mane Ramires on 8/22/2019.   FOTO documents entered into EPIC - see Media section.    Limitation Score: ***%  Category: {Blank single:24482::"Other","Self Care","Body Position","Carrying","Mobility"}    Current : {G Codes:78359}  Goal: {G Codes:92601}  Discharge: {G Codes:27536}         TREATMENT   Treatment Time In: ***  Treatment Time Out: ***  Total Treatment time separate from Evaluation: *** minutes    Yousif received therapeutic exercises to develop {AMB PT PROGRESS OBJECTIVE:33560} for *** minutes including:  ***    Yousif received the following manual therapy techniques: {AMB PT PROGRESS MANUAL THERAPY:11177} were applied to the: *** for *** minutes, including:  ***    Yousif participated in neuromuscular re-education activities to improve: {AMB PT PROGRESS NEURO RE-ED:22309} for *** minutes. The following activities were included:  ***    Yousif participated in dynamic functional therapeutic activities to improve functional performance for ***  minutes, including:  ***    Yousif participated in gait training to improve functional mobility and safety for ***  minutes, including:  ***    Yousif received the following direct contact modalities after being cleared for contraindications: {AMB PT PROGRESS DIRECT CONTACT MODES:21826}    Yousif received the following supervised modalities after being cleared for contradictions: {AMB PT SUPERVISED MODES:75584}    Yousif received hot pack for *** minutes to ***.    Yousif received cold pack for *** minutes to " "***.    Home Exercises and Patient Education Provided    Education provided:   - ***    Written Home Exercises Provided: {Blank single:21086::"yes","Patient instructed to cont prior HEP"}.  Exercises were reviewed and Yousif was able to demonstrate them prior to the end of the session.  Yousif demonstrated {Desc; good/fair/poor:68629} understanding of the education provided.     See EMR under {Blank single:65341::"Media","Patient Instructions"} for exercises provided {Blank single:08226::"8/22/2019","prior visit"}.    Assessment   Mane is a 63 y.o. male referred to outpatient Physical Therapy with a medical diagnosis of ***. Pt presents with ***    Pt prognosis is {REHAB PROGNOSIS OHS:49761}.   Pt will benefit from skilled outpatient Physical Therapy to address the deficits stated above and in the chart below, provide pt/family education, and to maximize pt's level of independence.     Plan of care discussed with patient: {YES:22041}  Pt's spiritual, cultural and educational needs considered and patient is agreeable to the plan of care and goals as stated below:     Anticipated Barriers for therapy: ***    Medical Necessity is demonstrated by the following  History  Co-morbidities and personal factors that may impact the plan of care Co-morbidities:   {Co-morbidities:95480}    Personal Factors:   {Personal Factors:09538}     {Desc; low/moderate/high:732536}   Examination  Body Structures and Functions, activity limitations and participation restrictions that may impact the plan of care Body Regions:   {Body Regions:43608}    Body Systems:    {Body Systems:12537}    Participation Restrictions:   ***    Activity limitations:   Learning and applying knowledge  {Learning and applying knowledge:59845}    General Tasks and Commands  {Gen tasks and commands:51431}    Communication  {Communication:39188}    Mobility  {Mobility:26539}    Self care  {Self Care:92548}    Domestic Life  {Domestic " "Life:39953}    Interactions/Relationships  {Interactions/Relationships:40173}    Life Areas  {Life Areas:95645}    Community and Social Life  {Community/Social Life:04670}         {Desc; low/moderate/high:377304}   Clinical Presentation {Clinical Presentation :46205} {Desc; low/moderate/high:538808}   Decision Making/ Complexity Score: {Desc; low/moderate/high:022981}     Goals:  Short Term Goals: *** weeks   ***    Long Term Goals: *** weeks   ***    Plan   Plan of care Certification: 8/22/2019 to ***.    Outpatient Physical Therapy {NUMBERS 1-5:94562} times weekly for {0-10:57392::"0"} weeks to include the following interventions: {TX PLAN:11569}.     Betzy Mclain, PT  "

## 2019-08-22 NOTE — PLAN OF CARE
[facility]  Physical Therapy Initial Evaluation    Name: Mane Ramires  Clinic Number: [id number]    Therapy Diagnosis:   Encounter Diagnosis   Name Primary?    Foot pain, bilateral      Physician: Patience Gordon MD    Physician Orders: PT Eval and Treat   Medical Diagnosis from Referral: B plantar fascitis  Evaluation Date: 8/22/2019  Authorization Period Expiration: 9/30/19  Plan of Care Expiration: 9/27/19  Visit # / Visits authorized: 1/ 8    Time In: 300  Time Out: 355  Total Billable Time: 55 minutes    Precautions: HTN, Dizziness    Subjective   Date of onset: Mid-July of this year. Pt reports chronic intermittent B plantar fascia pn since 3 years ago. Reports having steroid shots in the past as treatment; had an allergic reaction.      Medical History:   Past Medical History:   Diagnosis Date    Accident     fell from roof with TBI (word finding issues personality changes, memory deficets), R rib fractures, clavicle fx    Allergy     Anxiety     ASD (atrial septal defect)     noted on ECHO 6/16    Cervical stenosis of spine     noted on 6/15 MRI    CTS (carpal tunnel syndrome)     mild-mod on 4/17 NCS    DDD (degenerative disc disease), cervical 10/2014    C5-6 and C6-C7    Depression     JEEVAN (generalized anxiety disorder)     Gender identity disorder     H/O cardiovascular stress test     12/14 normal    Herpes simplex type 2 infection     History of atypical hyperplasia of breast     B precancer lesions    Hypertension     IGT (impaired glucose tolerance)     Myelomalacia     c-spine noted on 8/15 MRI    OA (osteoarthritis)     Prediabetes     TBI (traumatic brain injury)     after falling off a roof in 2003       Surgical History:   Mane Ramires  has a past surgical history that includes Hysterectomy (1984); Appendectomy (1969); Cervical fusion (10/2014); Mastectomy (02/2015); Bilateral salpingoophorectomy (02/2015); Amputation; Upper  gastrointestinal endoscopy (09/06/2017); Finger amputation (Left); Fracture surgery (Right, 2003); Cholecystectomy; Carpal tunnel release (12/2017); Reconstruction of nose (N/A, 8/23/2018); Endoscopic nasal septoplasty (N/A, 8/23/2018); Biopsy of liver with ultrasound guidance (N/A, 12/18/2018); and Rotator cuff repair (Right, 2019).    Medications:   Mane has a current medication list which includes the following prescription(s): carboxymethylcellulose, cholecalciferol (vitamin d3), duloxetine, hydrocodone-acetaminophen, irbesartan, ketoconazole, levocetirizine, metoprolol succinate, ondansetron, ranitidine, testosterone cypionate, and trazodone.    Allergies:   Review of patient's allergies indicates:   Allergen Reactions    Sudafed [pseudoephedrine hcl] Other (See Comments)     Heart racing      Codeine Other (See Comments)     Heart racing    Pseudoephedrine     Cortisone Palpitations        Imaging: None    Prior Therapy: yes  Social History: Lives with daughter and her family; 1-story home with 1 step into the house  Occupation: Currently on disability; experience in the [employer] business  Prior Level of Function: independent with self-care, home, yard, accessing community / No AD  Current Level of Function: using cane for mobility; dec community access due to pn with driving    Pain:  Current 3/10, worst 10/10, best 2/10   Location: bilateral feet; arch insertion  Description: Shooting and stabbing  Aggravating Factors: driving  Easing Factors: ice, rest and elevation    Pts goals: to decrease pain and play with grandkids    Objective     Posture: B high medial arches, B supination  Palpation: TTP to B mid medial arch and arch insertion; no swelling, discoloration    Range of Motion/Strength:      ROM: Left Right   Ankle Dorsiflexion 15 degrees 15 degrees   Ankle Plantarflexion 70 degrees 70 degrees   Ankle Inversion 28 degrees 30 degrees   Ankle Eversion 35 degrees 33 degrees       MMT: Left Right    Ankle Dorsiflexion 5/5 5/5   Ankle Plantarflexion 3/5 3/5   Ankle Inversion 5/5 5/5   Ankle Eversion 5/5 5/5       Flexibility Left Right   Hamstrings -45 degrees -45 degrees     Gait With AD.  Device Used -  Cane   Analysis Antalgic; dec heel contact and inc toe contact in stance     TREATMENT   Treatment Time In: 300  Treatment Time Out: 355  Total Treatment time separate from Evaluation: 25 minutes    Yousif received therapeutic exercises to develop strength, ROM and flexibility including:  Stretch: gastroc strap, seated arch, 3X30s  Alphabet: 1 set  Tennis ball massage: 2 min ea    Home Exercises and Patient Education Provided    Therapeutic activity: education provided: shoe support; utilizing ice at home 3xday for 2 days    Written Home Exercises Provided: yes.  Exercises were reviewed and Yousif was able to demonstrate them prior to the end of the session.  Yousif demonstrated good  understanding of the education provided.     See EMR under Media for exercises provided 8/22/2019.    Assessment   Mane is a 63 y.o. male referred to outpatient Physical Therapy with a medical diagnosis of B plantar fascitis. Pt presents with pain, weakness, decreased flexibility, and gait and balance deficits.    Pt prognosis is Good.   Pt will benefit from skilled outpatient Physical Therapy to address the deficits stated above and in the chart below, provide pt/family education, and to maximize pt's level of independence.     Plan of care discussed with patient: Yes  Pt's spiritual, cultural and educational needs considered and patient is agreeable to the plan of care and goals as stated below:     Anticipated Barriers for therapy: chronicity of condition    Medical Necessity is demonstrated by the following  History  Co-morbidities and personal factors that may impact the plan of care Co-morbidities:   coping style/mechanism    Personal Factors:   coping style  lifestyle     low   Examination  Body Structures and Functions,  activity limitations and participation restrictions that may impact the plan of care Body Regions:   lower extremities    Body Systems:    ROM  strength  balance  gait    Participation Restrictions:   Unable to work    Activity limitations:   Learning and applying knowledge  no deficits    General Tasks and Commands  no deficits    Communication  no deficits    Mobility  walking  driving (bike, car, motorcycle)    Self care  looking after one's health    Domestic Life  cooking  doing house work (cleaning house, washing dishes, laundry)  assisting others    Interactions/Relationships  no deficits    Life Areas  no deficits    Community and Social Life  community life  recreation and leisure         low   Clinical Presentation stable and uncomplicated low   Decision Making/ Complexity Score: low     Short Term Goals: 2 weeks   1. Pt will demonstrate compliance with HEP  2. Pt will increase gastroc flexibility in order to increase heel contact while walking.  3. Pt will decrease pain while driving for 15 minutes to 2/10.    Long Term Goals: 4 weeks   1. Pt will demonstrate independence with HEP  2. Pt will inc R 2nd toe strength by 1/2 grade in order to tolerate inc WB.  3. Pt will dec pain to 0/10 with walking for 15 minutes.    Plan   Plan of care Certification: 8/22/2019 to 9/27/19.    Outpatient Physical Therapy 2 times weekly for 4 weeks to include the following interventions: Gait Training, Manual Therapy, Moist Heat/ Ice, Neuromuscular Re-ed, Orthotic Management and Training, Patient Education, Self Care, Therapeutic Activites, Therapeutic Exercise and Ultrasound.     Betzy Mclain, PT

## 2019-08-26 ENCOUNTER — CLINICAL SUPPORT (OUTPATIENT)
Dept: REHABILITATION | Facility: HOSPITAL | Age: 64
End: 2019-08-26
Attending: ORTHOPAEDIC SURGERY
Payer: MEDICAID

## 2019-08-26 DIAGNOSIS — M25.611 SHOULDER STIFFNESS, RIGHT: ICD-10-CM

## 2019-08-26 DIAGNOSIS — R29.898 SHOULDER WEAKNESS: ICD-10-CM

## 2019-08-26 DIAGNOSIS — M25.511 RIGHT SHOULDER PAIN, UNSPECIFIED CHRONICITY: Primary | ICD-10-CM

## 2019-08-26 PROCEDURE — 97530 THERAPEUTIC ACTIVITIES: CPT | Mod: PN

## 2019-08-26 RX ORDER — DULOXETIN HYDROCHLORIDE 60 MG/1
CAPSULE, DELAYED RELEASE ORAL
Qty: 30 CAPSULE | Refills: 3 | Status: SHIPPED | OUTPATIENT
Start: 2019-08-26 | End: 2019-12-16 | Stop reason: SDUPTHER

## 2019-08-26 NOTE — PROGRESS NOTES
Time in 9  Time out 10      Timed units  4 therex                              Time:9-10      S: doing HEP, understands to only feel mild discomfort with HEP  Pain:C5 ache, about 2/10 with some basic self care    O:          HEP: reviewed        stretches as tolerated-pain free: er 0, 45 abd; abd, sleeper, functional ir       education: likely tx progression        A: good decrease in reactivity since session after he aggravated shoulder with painting    P:fix gh hypomobility then screen prom elevation for flexion, d2, and abd

## 2019-08-28 ENCOUNTER — PATIENT OUTREACH (OUTPATIENT)
Dept: OTHER | Facility: OTHER | Age: 64
End: 2019-08-28

## 2019-08-28 DIAGNOSIS — I10 HYPERTENSION, ESSENTIAL: ICD-10-CM

## 2019-08-28 RX ORDER — METOPROLOL SUCCINATE 50 MG/1
50 TABLET, EXTENDED RELEASE ORAL DAILY
Qty: 30 TABLET | Refills: 3 | Status: SHIPPED | OUTPATIENT
Start: 2019-08-28 | End: 2019-10-15

## 2019-08-28 RX ORDER — VALSARTAN 320 MG/1
320 TABLET ORAL DAILY
Qty: 30 TABLET | Refills: 3 | Status: SHIPPED | OUTPATIENT
Start: 2019-08-28 | End: 2019-11-27

## 2019-08-28 RX ORDER — METOPROLOL SUCCINATE 25 MG/1
25 TABLET, EXTENDED RELEASE ORAL DAILY
Qty: 30 TABLET | Refills: 3 | Status: SHIPPED | OUTPATIENT
Start: 2019-08-28 | End: 2019-10-15

## 2019-08-28 NOTE — PROGRESS NOTES
"Last 5 Patient Entered Readings                                      Current 30 Day Average: 138/83     Recent Readings 8/26/2019 8/23/2019 8/21/2019 8/21/2019 8/20/2019    SBP (mmHg) 130 147 152 157 141    DBP (mmHg) 77 88 91 88 79    Pulse 66 66 69 74 80        Mr. De La Rosa BP was elevated last week, but most recent reading has improved. He cannot identify a reason for the higher readings. He denies any changes to medications. He denies taking any NSAIDs or other OTC medications that may have caused the elevation. Will change irbesartan to valsartan to see if BP responds better. Previously, insurance did not cover valsartan, but he states this was "because they wanted all other options exhausted first." Will send the prescription to the pharmacy. He will remain on metoprolol 75 mg QD for now.     Per 30 day average, 138/83 mmHg, patient's BP is not at goal.     Will continue to monitor regularly. Will follow up in 2-3 weeks, sooner if BP begins to trend upward or downward.    Asked patient to call or message with questions or concerns.     Current HTN regimen:  Hypertension Medications             metoprolol succinate (TOPROL-XL) 25 MG 24 hr tablet Take 1 tablet (25 mg total) by mouth once daily. Take with metoprolol succinate 50 mg tablet.    metoprolol succinate (TOPROL-XL) 50 MG 24 hr tablet Take 1 tablet (50 mg total) by mouth once daily. Take with metoprolol succinate 25 mg tablet.    valsartan (DIOVAN) 320 MG tablet Take 1 tablet (320 mg total) by mouth once daily.                        "

## 2019-08-29 ENCOUNTER — CLINICAL SUPPORT (OUTPATIENT)
Dept: REHABILITATION | Facility: HOSPITAL | Age: 64
End: 2019-08-29
Attending: ORTHOPAEDIC SURGERY
Payer: MEDICAID

## 2019-08-29 DIAGNOSIS — M79.671 FOOT PAIN, BILATERAL: ICD-10-CM

## 2019-08-29 DIAGNOSIS — M79.672 FOOT PAIN, BILATERAL: ICD-10-CM

## 2019-08-29 DIAGNOSIS — M25.611 SHOULDER STIFFNESS, RIGHT: ICD-10-CM

## 2019-08-29 DIAGNOSIS — M25.511 RIGHT SHOULDER PAIN, UNSPECIFIED CHRONICITY: Primary | ICD-10-CM

## 2019-08-29 DIAGNOSIS — R29.898 SHOULDER WEAKNESS: ICD-10-CM

## 2019-08-29 PROCEDURE — 97530 THERAPEUTIC ACTIVITIES: CPT | Mod: PN

## 2019-08-29 PROCEDURE — 97110 THERAPEUTIC EXERCISES: CPT | Mod: PN

## 2019-08-29 NOTE — PROGRESS NOTES
"Time in 12  Time out 1      Timed units  4 therex                              Time:9-10      S: doing HEP, understands to do er 0 and er 45 abd stretch as long as possible prior to supine abd stretch at home  Pain:C5 ache, about 2/10 with some basic self care    O:    r prom er at 0 abd 72   at 45 abd 82    at 90 abd 70;       sidelying ir  80        ir behind back 15"    lift off         All capsular  l prom er at 0 abd 72    at 45 abd 90    at  90 abd 90;      sidelying ir 80         ir behind back 15"   lift off      HEP: reviewed        stretches as tolerated-pain free: er 0, 45 abd; abd, sleeper, functional ir       education: likely tx progression        A: good amount of time for sustained supine abd at 90 stretch today, hopefully er 90 abd stretch can be added soon    P:resume gh manual tx for pain relief and mobilization    "

## 2019-08-29 NOTE — PROGRESS NOTES
Physical Therapy Daily Treatment Note     Name: Mane Dodd [facility] Number: [id number]    Therapy Diagnosis:   Encounter Diagnosis   Name Primary?    Foot pain, bilateral      Physician: Andrez Longoria*    Visit Date: 8/29/2019    Physician Orders: PT eval and treat  Medical Diagnosis: B plantar fascitis  Evaluation Date: 8/22/19  Authorization Period Expiration: 9/30/19  Plan of Care Certification Period: 9/27/19  Visit #/Visits authorized: 2/ 8     Time In: 1100  Time Out: 1155  Total Billable Time: 55 minutes    Precautions: Weightbearing    Subjective     Pt reports: he is feeling better. Stretches feel good.    He was compliant with home exercise program.  Response to previous treatment: positive  Functional change: increased walking    Pain: 2/10 on L / 4/10 on R  Location: bilateral feet      Objective     Yousif received therapeutic exercises to develop strength, ROM and flexibility for 45 minutes including:  Stretch: gastroc strap, seated arch, stair, soleus 3X30s  DF slideboard  Alphabet: 1 set  Tennis ball massage: 2 min ea      Yousif received the following manual therapy techniques: Soft tissue Mobilization and Friction Massage were applied to the: plantar regions of both feet for 15 minutes    Home Exercises Provided and Patient Education Provided     Education provided: support feet at all times.    Written Home Exercises Provided: yes.  Exercises were reviewed and Yousif was able to demonstrate them prior to the end of the session.  Yousif demonstrated good  understanding of the education provided.     See EMR under Media for exercises provided 8/29/2019.    Assessment     Pt demonstrates pain and WB intolerance. Pt performed all new therex without difficulty.     Yousif is progressing well towards his goals.   Pt prognosis is Good.     Pt will continue to benefit from skilled outpatient physical therapy to address the deficits listed in the problem list box on initial evaluation, provide  pt/family education and to maximize pt's level of independence in the home and community environment.     Pt's spiritual, cultural and educational needs considered and pt agreeable to plan of care and goals.    Anticipated barriers to physical therapy: chronicity of condition    Short Term Goals: 2 weeks   1. Pt will demonstrate compliance with HEP  2. Pt will increase gastroc flexibility in order to increase heel contact while walking.  3. Pt will decrease pain while driving for 15 minutes to 2/10.     Long Term Goals: 4 weeks   1. Pt will demonstrate independence with HEP  2. Pt will inc R 2nd toe strength by 1/2 grade in order to tolerate inc WB.  3. Pt will dec pain to 0/10 with walking for 15 minutes.    Plan     Continue PT as deemed per POC. Add gastroc lunge next visit.    Betzy Mclain, PT

## 2019-09-03 ENCOUNTER — CLINICAL SUPPORT (OUTPATIENT)
Dept: REHABILITATION | Facility: HOSPITAL | Age: 64
End: 2019-09-03
Attending: ORTHOPAEDIC SURGERY
Payer: MEDICAID

## 2019-09-03 DIAGNOSIS — M79.671 FOOT PAIN, BILATERAL: ICD-10-CM

## 2019-09-03 DIAGNOSIS — M25.511 RIGHT SHOULDER PAIN, UNSPECIFIED CHRONICITY: Primary | ICD-10-CM

## 2019-09-03 DIAGNOSIS — R29.898 SHOULDER WEAKNESS: ICD-10-CM

## 2019-09-03 DIAGNOSIS — M79.672 FOOT PAIN, BILATERAL: ICD-10-CM

## 2019-09-03 DIAGNOSIS — M25.611 SHOULDER STIFFNESS, RIGHT: ICD-10-CM

## 2019-09-03 PROCEDURE — 97110 THERAPEUTIC EXERCISES: CPT | Mod: PN

## 2019-09-03 PROCEDURE — 97530 THERAPEUTIC ACTIVITIES: CPT | Mod: PN

## 2019-09-03 NOTE — PROGRESS NOTES
Time in 1130  Time out 1230      Timed units  4 therex                              Time:2476-8962      S: doing HEP, able to reach the second shelf in a cabinet at home now  Pain:C5 ache, about 2/10 with some basic self care    O:    Manual tx: gh resting position traction    HEP: reviewed        stretches as tolerated-pain free: er 0, 45, 90 abd; abd       education: likely tx progression        A: er 90 abd stretch done with no problems    P:issue er 90 abd stretch soon

## 2019-09-03 NOTE — PROGRESS NOTES
Physical Therapy Daily Treatment Note     Name: Mane Dodd [facility] Number: [id number]    Therapy Diagnosis:   Encounter Diagnosis   Name Primary?    Foot pain, bilateral      Physician: Andrez Longoria*    Visit Date: 9/3/2019    Physician Orders: PT eval and treat  Medical Diagnosis: B plantar fascitis  Evaluation Date: 8/22/19  Authorization Period Expiration: 9/30/19  Plan of Care Certification Period: 9/27/19  Visit #/Visits authorized: 3/ 8     Time In: 100  Time Out: 155  Total Billable Time: 55 minutes    Precautions: Weightbearing    Subjective     Pt reports: he is feeling better overall. Has pain when he takes initial step from car; will shoot up to a 6/10.    He was compliant with home exercise program.  Response to previous treatment: positive  Functional change: increased walking    Pain: 2/10 on L / 4/10 on R  Location: bilateral feet      Objective     Yousif received therapeutic exercises to develop strength, ROM and flexibility for 45 minutes including:  Stretch: gastroc strap, seated arch, stair, soleus strap, lunge gastroc and soleus 3X30s  DF slideboard, 10X15s  Alphabet: 1 set  Tennis ball massage: 2 min ea        Home Exercises Provided and Patient Education Provided     Education provided: support feet at all times.    Written Home Exercises Provided: no  Exercises were reviewed and Yousif was able to demonstrate them prior to the end of the session.  Yousif demonstrated good  understanding of the education provided.     See EMR under Media for exercises provided 8/29/2019.    Assessment     Pt demonstrates pain and WB intolerance. Added slideboard and lunge stretches to increase lengthening of gastroc and soleus mm.    Yousif is progressing well towards his goals.   Pt prognosis is Good.     Pt will continue to benefit from skilled outpatient physical therapy to address the deficits listed in the problem list box on initial evaluation, provide pt/family education and to maximize  pt's level of independence in the home and community environment.     Pt's spiritual, cultural and educational needs considered and pt agreeable to plan of care and goals.    Anticipated barriers to physical therapy: chronicity of condition    Short Term Goals: 2 weeks   1. Pt will demonstrate compliance with HEP  2. Pt will increase gastroc flexibility in order to increase heel contact while walking.  3. Pt will decrease pain while driving for 15 minutes to 2/10.     Long Term Goals: 4 weeks   1. Pt will demonstrate independence with HEP  2. Pt will inc R 2nd toe strength by 1/2 grade in order to tolerate inc WB.  3. Pt will dec pain to 0/10 with walking for 15 minutes.    Plan     Continue PT as deemed per POC. Add gastroc and soleus lunge to HEP next visit.    Betzy Mclain, PT

## 2019-09-05 ENCOUNTER — CLINICAL SUPPORT (OUTPATIENT)
Dept: REHABILITATION | Facility: HOSPITAL | Age: 64
End: 2019-09-05
Attending: ORTHOPAEDIC SURGERY
Payer: MEDICAID

## 2019-09-05 DIAGNOSIS — M79.672 FOOT PAIN, BILATERAL: ICD-10-CM

## 2019-09-05 DIAGNOSIS — M25.611 SHOULDER STIFFNESS, RIGHT: ICD-10-CM

## 2019-09-05 DIAGNOSIS — R29.898 SHOULDER WEAKNESS: ICD-10-CM

## 2019-09-05 DIAGNOSIS — M25.511 RIGHT SHOULDER PAIN, UNSPECIFIED CHRONICITY: Primary | ICD-10-CM

## 2019-09-05 DIAGNOSIS — M79.671 FOOT PAIN, BILATERAL: ICD-10-CM

## 2019-09-05 PROCEDURE — 97530 THERAPEUTIC ACTIVITIES: CPT | Mod: PN

## 2019-09-05 PROCEDURE — 97110 THERAPEUTIC EXERCISES: CPT | Mod: PN

## 2019-09-05 NOTE — PROGRESS NOTES
Time in 930  Time out 1030      Timed units  4 therex                              Time:930-1030      S: reaching for light cord on lamp much easier now  Pain:C5 ache, about 2/10 with some basic self care    O:    Manual tx: n/a      HEP: reviewed, issued er 90 abd stretch        stretches as tolerated-pain free: er 0, 45, 90 abd       education: explained once all anterior gh hypomobility gone, transition to stretches to get full passive elevation will be done        A:  full flexibility thru shoulder complex followed by proper stabilization strengthening routine critical for full active elevation and proper mechanics with ADL, active elevation about 120 right now      P: consider end range elevation glides to get terminal passive elevation for flex

## 2019-09-05 NOTE — PROGRESS NOTES
"  Physical Therapy Daily Treatment Note     Name: Mane Dodd [facility] Number: [id number]    Therapy Diagnosis:   Encounter Diagnosis   Name Primary?    Foot pain, bilateral      Physician: Andrez Longoria*    Visit Date: 2019    Physician Orders: PT eval and treat  Medical Diagnosis: B plantar fascitis  Evaluation Date: 19  Authorization Period Expiration: 19  Plan of Care Certification Period: 19  Visit #/Visits authorized:      Time In: 1100  Time Out: 1155  Total Billable Time: 55 minutes    Precautions: Weightbearing    Subjective     Pt reports: he is feeling better overall. Still hurts with initial step.    He was compliant with home exercise program.  Response to previous treatment: positive  Functional change: increased walking    Pain: 2/10 on L / 4/10 on R  Location: bilateral feet      Objective     Yousif received therapeutic exercises to develop strength, ROM and flexibility for 45 minutes including:  Stretch: gastroc strap, seated arch, stair, soleus strap, lunge gastroc and soleus 3X30s  DF slideboard, 10X15s  Alphabet: 1 set  Tennis ball massage STANDIN min ea  HR/TR STAND, 20X  [place], 1 set  Toe ext rolls, 30X        Home Exercises Provided and Patient Education Provided     Education provided: support feet at all times.    Written Home Exercises Provided: yes  Exercises were reviewed and Yousif was able to demonstrate them prior to the end of the session.  Yousif demonstrated good  understanding of the education provided.     See EMR under Media for exercises provided 19    Assessment     Pt demonstrates pain with standing from chair in lobby. Added lunge stretches to HEP to increase lengthening of gastroc and soleus mm. Pt was able to perform HR/TR in standing; reported it "felt good" to work those muscles; no increased pn with WB.    Yousif is progressing well towards his goals.   Pt prognosis is Good.     Pt will continue to benefit from skilled " outpatient physical therapy to address the deficits listed in the problem list box on initial evaluation, provide pt/family education and to maximize pt's level of independence in the home and community environment.     Pt's spiritual, cultural and educational needs considered and pt agreeable to plan of care and goals.    Anticipated barriers to physical therapy: chronicity of condition    Short Term Goals: 2 weeks   1. Pt will demonstrate compliance with HEP  2. Pt will increase gastroc flexibility in order to increase heel contact while walking.  3. Pt will decrease pain while driving for 15 minutes to 2/10.     Long Term Goals: 4 weeks   1. Pt will demonstrate independence with HEP  2. Pt will inc R 2nd toe strength by 1/2 grade in order to tolerate inc WB.  3. Pt will dec pain to 0/10 with walking for 15 minutes.    Plan     Continue PT as deemed per POC. Review new HEP next visit.    Betzy Mclain, PT

## 2019-09-10 ENCOUNTER — PATIENT OUTREACH (OUTPATIENT)
Dept: ADMINISTRATIVE | Facility: HOSPITAL | Age: 64
End: 2019-09-10

## 2019-09-11 ENCOUNTER — PATIENT OUTREACH (OUTPATIENT)
Dept: OTHER | Facility: OTHER | Age: 64
End: 2019-09-11

## 2019-09-11 ENCOUNTER — CLINICAL SUPPORT (OUTPATIENT)
Dept: REHABILITATION | Facility: HOSPITAL | Age: 64
End: 2019-09-11
Attending: ORTHOPAEDIC SURGERY
Payer: MEDICAID

## 2019-09-11 DIAGNOSIS — M79.672 FOOT PAIN, BILATERAL: ICD-10-CM

## 2019-09-11 DIAGNOSIS — M25.611 SHOULDER STIFFNESS, RIGHT: ICD-10-CM

## 2019-09-11 DIAGNOSIS — M25.511 RIGHT SHOULDER PAIN, UNSPECIFIED CHRONICITY: Primary | ICD-10-CM

## 2019-09-11 DIAGNOSIS — R29.898 SHOULDER WEAKNESS: ICD-10-CM

## 2019-09-11 DIAGNOSIS — M79.671 FOOT PAIN, BILATERAL: ICD-10-CM

## 2019-09-11 PROCEDURE — 97110 THERAPEUTIC EXERCISES: CPT | Mod: PN

## 2019-09-11 PROCEDURE — 97530 THERAPEUTIC ACTIVITIES: CPT | Mod: PN

## 2019-09-11 NOTE — PROGRESS NOTES
Digital Medicine: Clinician Follow-Up    Mr. Ramires states that he feels much better after starting valsartan in place of irbesartan. He does feel tired at times and thinks it's related to lower HR.         Follow Up  Follow-up reason(s): reading review and medication change follow-up      Readings are trending down   Patient started new medication.    Is patient tolerating med change?:  Yes      Assessment/Plan        BP is trending down on valsartan. Will continue current regimen for now.     If he continues to have fatigue, will discuss reducing metoprolol back to 50 mg QD.     BP is not at goal of <130/80. Will follow up in 2-3 weeks.       Last 5 Patient Entered Readings                                      Current 30 Day Average: 141/85     Recent Readings 9/5/2019 9/1/2019 8/26/2019 8/23/2019 8/21/2019    SBP (mmHg) 128 132 130 147 152    DBP (mmHg) 82 83 77 88 91    Pulse 63 68 66 66 69             Hypertension Medications             metoprolol succinate (TOPROL-XL) 25 MG 24 hr tablet Take 1 tablet (25 mg total) by mouth once daily. Take with metoprolol succinate 50 mg tablet.    metoprolol succinate (TOPROL-XL) 50 MG 24 hr tablet Take 1 tablet (50 mg total) by mouth once daily. Take with metoprolol succinate 25 mg tablet.    valsartan (DIOVAN) 320 MG tablet Take 1 tablet (320 mg total) by mouth once daily.

## 2019-09-11 NOTE — PROGRESS NOTES
Physical Therapy Daily Treatment Note     Name: Mane Dodd [facility] Number: [id number]    Therapy Diagnosis:   Encounter Diagnosis   Name Primary?    Foot pain, bilateral      Physician: Patience Gordon MD    Visit Date: 9/11/2019  Physician Orders: PT eval and treat  Medical Diagnosis: B plantar fascitis  Evaluation Date: 8/22/19  Authorization Period Expiration: 9/30/19  Plan of Care Certification Period: 9/27/19  Visit #/Visits authorized: 5/ 8       Time In: 1101  Time Out: 1156  Total Billable Time: 55 minutes    Precautions: Standard and Fall    Subjective     Pt reports: doing better overall.  He was compliant with home exercise program.  Response to previous treatment:   Functional change:     Pain: 2/10  Location: bilateral feet      Objective     Yousif received therapeutic exercises to develop strength and flexibility for 55 minutes including:    Heel slides 10 x 15 sec B  Seated gastroc stretch 6 x 15 sec B  Seated soleus stretch 3 x 30 sec B  [place]  x 4 minutes, alternating, B  Arch curls x 30 B  Alphabet x 1 B  [place] extension/rolls x 30 B    SLS on floor x 15 sec B; on green disc 2 x 15 sec B  Runners stretch x 30 sec B  Slant board soleus stretch 2 x 30 sec B  Eccentric HR x 20 B  Edge of step gastroc stretch x 30 sec B  Standing tennis ball self massage x 1 minute R/L each    Home Exercises Provided and Patient Education Provided     Education provided:    - cont HEP    Written Home Exercises Provided: Patient instructed to cont prior HEP.  Exercises were reviewed and Yousif was able to demonstrate them prior to the end of the session.  Yousif demonstrated good  understanding of the education provided.     See EMR under Patient Instructions for exercises provided prior visit.    Assessment     Good tolerance for activity.  No increase in s/s reported.  Pain decreasing overall.    Yousif is progressing well towards his goals.   Pt prognosis is Good.     Pt will continue to benefit from  skilled outpatient physical therapy to address the deficits listed in the problem list box on initial evaluation, provide pt/family education and to maximize pt's level of independence in the home and community environment.     Pt's spiritual, cultural and educational needs considered and pt agreeable to plan of care and goals.    Anticipated barriers to physical therapy: none    Short Term Goals: 2 weeks   1. Pt will demonstrate compliance with HEP  2. Pt will increase gastroc flexibility in order to increase heel contact while walking.  3. Pt will decrease pain while driving for 15 minutes to 2/10.     Long Term Goals: 4 weeks   1. Pt will demonstrate independence with HEP  2. Pt will inc R 2nd toe strength by 1/2 grade in order to tolerate inc WB.  3. Pt will dec pain to 0/10 with walking for 15 minutes.      Plan     Cont per POC    Gayatri Sanders, PTA

## 2019-09-11 NOTE — PROGRESS NOTES
Time in 930  Time out 1030      Timed units  4 therex                              Time:930-1030      S: understands need to fix stiffness and transition to full shoulder complex PRE to promote full use over shoulder level  Pain:C5 ache, about 2/10 with some basic self care    O:    Manual tx: n/a      HEP: reviewed        stretches as tolerated-pain free: er 0, 45, 90 abd       education: explained we will slowly progress strengthening routine    Isometrics 2 x 20: row, add      A:  outstanding reduction in stiffness, reactivity, and improper movement patterns vs 8 weeks ago    P: test prom er x 3 and ir x 2

## 2019-09-12 ENCOUNTER — CLINICAL SUPPORT (OUTPATIENT)
Dept: REHABILITATION | Facility: HOSPITAL | Age: 64
End: 2019-09-12
Attending: ORTHOPAEDIC SURGERY
Payer: MEDICAID

## 2019-09-12 DIAGNOSIS — R29.898 SHOULDER WEAKNESS: ICD-10-CM

## 2019-09-12 DIAGNOSIS — M25.611 SHOULDER STIFFNESS, RIGHT: ICD-10-CM

## 2019-09-12 DIAGNOSIS — M79.672 FOOT PAIN, BILATERAL: ICD-10-CM

## 2019-09-12 DIAGNOSIS — M79.671 FOOT PAIN, BILATERAL: ICD-10-CM

## 2019-09-12 DIAGNOSIS — M25.511 RIGHT SHOULDER PAIN, UNSPECIFIED CHRONICITY: Primary | ICD-10-CM

## 2019-09-12 PROCEDURE — 97530 THERAPEUTIC ACTIVITIES: CPT | Mod: PN

## 2019-09-12 PROCEDURE — 97110 THERAPEUTIC EXERCISES: CPT | Mod: PN

## 2019-09-12 NOTE — PROGRESS NOTES
Physical Therapy Daily Treatment Note     Name: Mane Dodd [facility] Number: [id number]    Therapy Diagnosis:   Encounter Diagnosis   Name Primary?    Foot pain, bilateral      Physician: Patience Gordon MD    Visit Date: 9/12/2019  Physician Orders: PT eval and treat  Medical Diagnosis: B plantar fascitis  Evaluation Date: 8/22/19  Authorization Period Expiration: 9/30/19  Plan of Care Certification Period: 9/27/19  Visit #/Visits authorized: 6/ 8       Time In: 1000  Time Out: 1055  Total Billable Time: 55 minutes    Precautions: Standard and Fall    Subjective     Pt reports: he continues to feel better. Has organized all his HEP in a folder.  He was compliant with home exercise program.  Response to previous treatment:   Functional change:     Pain: 1 on the R; 0 on the L  Location: bilateral plantar regions      Objective     Yousif received therapeutic exercises to develop strength and flexibility for 55 minutes including:    Heel slides 10 x 15 sec B  Seated gastroc stretch 6 x 15 sec B  Seated soleus stretch 3 x 30 sec B  [place]  x 4 minutes, alternating, B  Alphabet x 1 B  [place] extension/rolls x 30 B    SLS on floor x 15 sec B; on green disc 2 x 15 sec B  Runners stretch x 30 sec B    Eccentric HR x 40 B  Edge of step gastroc stretch x 30 sec B  Standing tennis ball self massage x 1 minute R/L each    Home Exercises Provided and Patient Education Provided     Education provided:  Start new HEP    Written Home Exercises Provided: yes  Exercises were reviewed and Yousif was able to demonstrate them prior to the end of the session.  Yousif demonstrated good  understanding of the education provided.     See EMR under media for exercises provided 9/12/19    Assessment     Pt able to perform eccentric HR without pn.  Added PF strengthening progression and new gastroc step stretch to HEP.    Yousif is progressing well towards his goals.   Pt prognosis is Good.     Pt will continue to benefit from  skilled outpatient physical therapy to address the deficits listed in the problem list box on initial evaluation, provide pt/family education and to maximize pt's level of independence in the home and community environment.     Pt's spiritual, cultural and educational needs considered and pt agreeable to plan of care and goals.    Anticipated barriers to physical therapy: none    Short Term Goals: 2 weeks   1. Pt will demonstrate compliance with HEP  2. Pt will increase gastroc flexibility in order to increase heel contact while walking.  3. Pt will decrease pain while driving for 15 minutes to 2/10.     Long Term Goals: 4 weeks   1. Pt will demonstrate independence with HEP  2. Pt will inc R 2nd toe strength by 1/2 grade in order to tolerate inc WB.  3. Pt will dec pain to 0/10 with walking for 15 minutes.      Plan     Cont PT as deemed per POC.    Betzy Mclain, PT

## 2019-09-12 NOTE — PROGRESS NOTES
Time in 1130  Time out 1230      Timed units  4 therex                              Time:5607-9556      S: feels good about progress, light shoulder level tasks getting easier    Pain:C5 ache, about 2/10 with some basic self care    O:    Manual tx: n/a      HEP: reviewed        stretches as tolerated-pain free: er 0, 45, 90 abd       education: scapula humeral ratio    Isometrics 2 x 20: n/a      A:  contractile and noncontractile tightness continues to decrease    P: test prom er x 3 and ir x 2, progress isometrics

## 2019-09-18 ENCOUNTER — CLINICAL SUPPORT (OUTPATIENT)
Dept: REHABILITATION | Facility: HOSPITAL | Age: 64
End: 2019-09-18
Attending: ORTHOPAEDIC SURGERY
Payer: MEDICAID

## 2019-09-18 DIAGNOSIS — M79.672 FOOT PAIN, BILATERAL: ICD-10-CM

## 2019-09-18 DIAGNOSIS — M79.671 FOOT PAIN, BILATERAL: ICD-10-CM

## 2019-09-18 DIAGNOSIS — M25.511 RIGHT SHOULDER PAIN, UNSPECIFIED CHRONICITY: Primary | ICD-10-CM

## 2019-09-18 DIAGNOSIS — R29.898 SHOULDER WEAKNESS: ICD-10-CM

## 2019-09-18 DIAGNOSIS — M25.611 SHOULDER STIFFNESS, RIGHT: ICD-10-CM

## 2019-09-18 PROCEDURE — 97530 THERAPEUTIC ACTIVITIES: CPT | Mod: PN

## 2019-09-18 PROCEDURE — 97110 THERAPEUTIC EXERCISES: CPT | Mod: PN

## 2019-09-18 NOTE — PROGRESS NOTES
Physical Therapy Daily Treatment Note     Name: Mane Dodd [facility] Number: [id number]    Therapy Diagnosis:   Encounter Diagnosis   Name Primary?    Foot pain, bilateral      Physician: Andrez Longoria*    Visit Date: 9/18/2019  Physician Orders: PT eval and treat  Medical Diagnosis: B plantar fascitis  Evaluation Date: 8/22/19  Authorization Period Expiration: 9/30/19  Plan of Care Certification Period: 9/27/19  Visit #/Visits authorized: 7 / 8       Time In: 500  Time Out: 555  Total Billable Time: 55 minutes    Precautions: Standard and Fall    Subjective     Pt reports: he feels pretty good except for initial standing. Would like to try just his HEP after the last POC visit.    He was compliant with home exercise program.  Response to previous treatment:   Functional change:     Pain: 3 on the R; 0 on the L  Location: bilateral plantar regions      Objective     Yousif received therapeutic exercises to develop strength and flexibility for 55 minutes including:    Heel slides 10 x 10 sec B  Seated gastroc stretch 6 x 15 sec B  Seated soleus stretch 3 x 30 sec B  Alphabet x 1 B  [place] extension/rolls x 30 B  SLS on floor x 15 sec B  Runners stretch x 30 sec B-gastroc/soleus  Eccentric HR x 40 B on stairs  Edge of step gastroc stretch x 30 sec B  Standing tennis ball self massage x 1 minute R/L each    Home Exercises Provided and Patient Education Provided     Education provided:  Start new HEP    Written Home Exercises Provided: yes  Exercises were reviewed and Yousif was able to demonstrate them prior to the end of the session.  Yousif demonstrated good  understanding of the education provided.     See EMR under media for exercises provided 9/18/19    Assessment     Pt able to perform eccentric HR on stairs without pn.  Added intrinsic foot strengthening progression and SLS to HEP.    Yousif is progressing well towards his goals.   Pt prognosis is Good.     Pt will continue to benefit from skilled  outpatient physical therapy to address the deficits listed in the problem list box on initial evaluation, provide pt/family education and to maximize pt's level of independence in the home and community environment.     Pt's spiritual, cultural and educational needs considered and pt agreeable to plan of care and goals.    Anticipated barriers to physical therapy: none    Short Term Goals: 2 weeks   1. Pt will demonstrate compliance with HEP  2. Pt will increase gastroc flexibility in order to increase heel contact while walking.  3. Pt will decrease pain while driving for 15 minutes to 2/10.     Long Term Goals: 4 weeks   1. Pt will demonstrate independence with HEP  2. Pt will inc R 2nd toe strength by 1/2 grade in order to tolerate inc WB.  3. Pt will dec pain to 0/10 with walking for 15 minutes.      Plan     Cont PT as deemed per POC. D/c next visit.    Betzy Mclain, PT

## 2019-09-18 NOTE — PROGRESS NOTES
Time in 230  Time out 330      Timed units  4 therex                              Time:230-330      S: able to brush all of hair with right arm now    Pain:C5 ache, about 2/10 with some basic self care    O:          stretches as tolerated-pain free: er 0, 45, 90 abd       education: need to fix gh stiffness    Isometrics 2 x 20: row, add, ir 0 abd      A: good effort in clinic, looks faithful with hep    P: stretching, HEP upgrades prn; manual tx +/-

## 2019-09-19 ENCOUNTER — CLINICAL SUPPORT (OUTPATIENT)
Dept: REHABILITATION | Facility: HOSPITAL | Age: 64
End: 2019-09-19
Attending: ORTHOPAEDIC SURGERY
Payer: MEDICAID

## 2019-09-19 DIAGNOSIS — R29.898 SHOULDER WEAKNESS: ICD-10-CM

## 2019-09-19 DIAGNOSIS — M25.511 RIGHT SHOULDER PAIN, UNSPECIFIED CHRONICITY: Primary | ICD-10-CM

## 2019-09-19 DIAGNOSIS — M25.611 SHOULDER STIFFNESS, RIGHT: ICD-10-CM

## 2019-09-19 PROCEDURE — 97530 THERAPEUTIC ACTIVITIES: CPT | Mod: PN

## 2019-09-19 NOTE — PROGRESS NOTES
Time [id number]  Time out 1230      Timed units  4 therex                              Time:230-330      S: no new issues    Pain:C5 ache, about 2/10 with some basic self care    O:      stretches as tolerated-pain free: er 0, 45, 90 abd       education: fady oliver progression    Isometrics 2 x 20: row, add, ir 0 abd    arom 2 x 20: supine protraction    A: gh capsular pattern has reduced significantly since day 1 of OT    P: test prom er x 3 and ir x 2

## 2019-09-20 ENCOUNTER — CLINICAL SUPPORT (OUTPATIENT)
Dept: REHABILITATION | Facility: HOSPITAL | Age: 64
End: 2019-09-20
Attending: ORTHOPAEDIC SURGERY
Payer: MEDICAID

## 2019-09-20 DIAGNOSIS — M79.671 FOOT PAIN, BILATERAL: ICD-10-CM

## 2019-09-20 DIAGNOSIS — M79.672 FOOT PAIN, BILATERAL: ICD-10-CM

## 2019-09-20 PROCEDURE — 97110 THERAPEUTIC EXERCISES: CPT | Mod: PN

## 2019-09-20 NOTE — PROGRESS NOTES
Physical Therapy Daily Treatment Note     Name: Mane Dodd [facility] Number: [id number]    Therapy Diagnosis:   Encounter Diagnosis   Name Primary?    Foot pain, bilateral      Physician: Andrez Longoria*    Visit Date: 9/20/2019  Physician Orders: PT eval and treat  Medical Diagnosis: B plantar fascitis  Evaluation Date: 8/22/19  Authorization Period Expiration: 9/30/19  Plan of Care Certification Period: 9/27/19  Visit #/Visits authorized: 8/ 8       Time In: 1159  Time Out: 1241  Total Billable Time: 42 minutes    Precautions: Standard and Fall    Subjective     Pt reports: doing better overall, wants today to be his last day of therapy  He was compliant with home exercise program.  Response to previous treatment:   Functional change:     Pain: 2/10  Location: bilateral feet      Objective     Yousif received therapeutic exercises to develop strength and flexibility for 42 minutes including:    Heel slides 10 x 15 sec B  Seated gastroc stretch 3 x 30 sec B  Seated soleus stretch 3 x 30 sec B  Toe curls x 30 B  Arch curls x 30 B  Alphabet x 1 B  Toe extension/rolls x 30 B    SLS on floor 3 x 15 sec B  Runners stretch x 30 sec B  Standing soleus stretch 2 x 30 sec B  Eccentric HR on edge of step x3 0 B  Edge of step gastroc stretch x 30 sec B  Standing tennis ball self massage x 1 minute R/L each    Home Exercises Provided and Patient Education Provided     Education provided:    - cont HEP    Written Home Exercises Provided: Patient instructed to cont prior HEP.  Exercises were reviewed and Yousif was able to demonstrate them prior to the end of the session.  Yousif demonstrated good  understanding of the education provided.     See EMR under Patient Instructions for exercises provided prior visit.    Assessment     Good tolerance for activity.  No increase in s/s reported.  Pain decreasing overall.    Yousif is progressing well towards his goals.   Pt prognosis is Good.     Pt will continue to benefit  from skilled outpatient physical therapy to address the deficits listed in the problem list box on initial evaluation, provide pt/family education and to maximize pt's level of independence in the home and community environment.     Pt's spiritual, cultural and educational needs considered and pt agreeable to plan of care and goals.    Anticipated barriers to physical therapy: none    Short Term Goals: 2 weeks   1. Pt will demonstrate compliance with HEP  2. Pt will increase gastroc flexibility in order to increase heel contact while walking.  3. Pt will decrease pain while driving for 15 minutes to 2/10.     Long Term Goals: 4 weeks   1. Pt will demonstrate independence with HEP  2. Pt will inc R 2nd toe strength by 1/2 grade in order to tolerate inc WB.  3. Pt will dec pain to 0/10 with walking for 15 minutes.      Plan     Cont per POC    Gayatri Sanders, PTA

## 2019-09-23 NOTE — PROGRESS NOTES
Outpatient Therapy Discharge Summary     Name: Mane Dodd [facility] Number: [id number]    Therapy Diagnosis:   Encounter Diagnosis   Name Primary?    Foot pain, bilateral      Physician: Andrez Longoria*    Physician Orders: PT eval and treat  Medical Diagnosis: B plantar fascitis  Evaluation Date: 8/22/19      Date of Last visit: 9/20/19  Total Visits Received: 8  Cancelled Visits: 0  No Show Visits: 0    Assessment    Short Term Goals: 2 weeks (%)  1. Pt will demonstrate compliance with HEP  2. Pt will increase gastroc flexibility in order to increase heel contact while walking.  3. Pt will decrease pain while driving for 15 minutes to 2/10.     Long Term Goals: 4 weeks   1. Pt will demonstrate independence with HEP (%)  2. Pt will inc ank strength by 1/2 grade in order to tolerate inc WB. (%)  3. Pt will dec pain to 0/10 with walking for 15 minutes. (PROGRESS 75%)    Discharge reason: Patient requested discharge and has reached current max potential    Plan   This patient is discharged from Physical Therapy.    Betzy Mclain DPT

## 2019-09-24 ENCOUNTER — OFFICE VISIT (OUTPATIENT)
Dept: FAMILY MEDICINE | Facility: CLINIC | Age: 64
End: 2019-09-24
Payer: MEDICAID

## 2019-09-24 VITALS
OXYGEN SATURATION: 98 % | WEIGHT: 169.63 LBS | HEART RATE: 86 BPM | TEMPERATURE: 98 F | SYSTOLIC BLOOD PRESSURE: 122 MMHG | DIASTOLIC BLOOD PRESSURE: 74 MMHG | HEIGHT: 64 IN | BODY MASS INDEX: 28.96 KG/M2

## 2019-09-24 DIAGNOSIS — F32.A DEPRESSION, UNSPECIFIED DEPRESSION TYPE: ICD-10-CM

## 2019-09-24 DIAGNOSIS — I10 HYPERTENSION, ESSENTIAL: Primary | ICD-10-CM

## 2019-09-24 PROCEDURE — 90686 FLU VACCINE (QUAD) GREATER THAN OR EQUAL TO 3YO PRESERVATIVE FREE IM: ICD-10-PCS | Mod: S$GLB,,, | Performed by: FAMILY MEDICINE

## 2019-09-24 PROCEDURE — 90686 IIV4 VACC NO PRSV 0.5 ML IM: CPT | Mod: S$GLB,,, | Performed by: FAMILY MEDICINE

## 2019-09-24 PROCEDURE — 99214 OFFICE O/P EST MOD 30 MIN: CPT | Mod: 25,S$GLB,, | Performed by: FAMILY MEDICINE

## 2019-09-24 PROCEDURE — 90471 FLU VACCINE (QUAD) GREATER THAN OR EQUAL TO 3YO PRESERVATIVE FREE IM: ICD-10-PCS | Mod: S$GLB,,, | Performed by: FAMILY MEDICINE

## 2019-09-24 PROCEDURE — 99214 PR OFFICE/OUTPT VISIT, EST, LEVL IV, 30-39 MIN: ICD-10-PCS | Mod: 25,S$GLB,, | Performed by: FAMILY MEDICINE

## 2019-09-24 PROCEDURE — 90471 IMMUNIZATION ADMIN: CPT | Mod: S$GLB,,, | Performed by: FAMILY MEDICINE

## 2019-09-24 RX ORDER — IBUPROFEN 800 MG/1
800 TABLET ORAL 3 TIMES DAILY PRN
COMMUNITY
End: 2021-04-21

## 2019-09-25 ENCOUNTER — CLINICAL SUPPORT (OUTPATIENT)
Dept: REHABILITATION | Facility: HOSPITAL | Age: 64
End: 2019-09-25
Attending: ORTHOPAEDIC SURGERY
Payer: MEDICAID

## 2019-09-25 DIAGNOSIS — M25.511 RIGHT SHOULDER PAIN, UNSPECIFIED CHRONICITY: Primary | ICD-10-CM

## 2019-09-25 DIAGNOSIS — R29.898 SHOULDER WEAKNESS: ICD-10-CM

## 2019-09-25 DIAGNOSIS — M25.611 SHOULDER STIFFNESS, RIGHT: ICD-10-CM

## 2019-09-25 PROCEDURE — 97530 THERAPEUTIC ACTIVITIES: CPT | Mod: PN

## 2019-09-25 NOTE — PROGRESS NOTES
Time in130  Time out 1230      Timed units  4 therex                              Time:130-230      S: understands fixing shoulder complex tightness essential for full and painless over shoulder use long term  Pain:C5 ache, about 2/10 with some basic self care    O:      stretches as tolerated-pain free: er 0, 45, 90 abd       education: fady tx progression    Isometrics 2 x 20: row, add, ir 0 abd    arom 2 x 20: supine protraction, supine depression    A: may benefit from end range elevation gh glides to improve passive elevation    P: test prom er x 3 and ir x 2, tx of sc and ac joints looks to not be needed to improve passive elevation sine this joints have good motion with joint specific testing

## 2019-09-26 NOTE — PROGRESS NOTES
Subjective:       Patient ID: Mane Ramires is a 63 y.o. male.    Chief Complaint: Follow-up (also c/o left ear pain)    HPI   The patient is a 63-year-old who is here today for chronic follow-up.  Today we discussed the followin)  Depression.  He is doing well with the Cymbalta.  He feels that this is fantastic.  He is doing counseling and processing  from his ex-wife.  He is making good progress with counseling and is finally moving past his ex-wife  2) hypertension.  He he has been following with the digital hypertension medicine program.  His recent readings have been good.  He is currently taking Diovan 320 mg and Toprol 50 mg (which is down from prior dose of 75 mg).  Here in the office today, his blood pressure is 122/74  3) left ear pain.  He has been having left ear pain for the past couple of weeks.  He denies any URI symptoms.  He denies any hearing changes      He is completing physical therapy for his shoulder and his plantar fasciitis.  The physical therapy has been very beneficial    Review of systems is remarkable for some low back pain. He wonders if this may be due to the compensation he has been doing for his plantar fasciitis.  He denies any sciatica symptoms    Review of Systems   Constitutional: Negative for appetite change, chills, diaphoresis, fatigue, fever and unexpected weight change.   HENT: Positive for ear pain. Negative for congestion, postnasal drip, rhinorrhea, sinus pressure, sneezing, sore throat and trouble swallowing.    Eyes: Negative for pain, discharge and visual disturbance.   Respiratory: Negative for cough, chest tightness, shortness of breath and wheezing.    Cardiovascular: Negative for chest pain, palpitations and leg swelling.   Gastrointestinal: Negative for abdominal distention, abdominal pain, blood in stool, constipation, diarrhea, nausea and vomiting.   Musculoskeletal: Positive for back pain.   Skin: Negative for rash.       Objective:       Physical Exam   Constitutional: He is oriented to person, place, and time. He appears well-developed and well-nourished. No distress.   HENT:   Head: Normocephalic and atraumatic.   Right Ear: Hearing, tympanic membrane, external ear and ear canal normal.   Left Ear: Hearing, tympanic membrane, external ear and ear canal normal.   Nose: Nose normal.   Mouth/Throat: Oropharynx is clear and moist and mucous membranes are normal. No oral lesions. No oropharyngeal exudate, posterior oropharyngeal edema or posterior oropharyngeal erythema.   Eyes: Pupils are equal, round, and reactive to light. Conjunctivae, EOM and lids are normal. No scleral icterus.   Neck: Normal range of motion. Neck supple. Carotid bruit is not present. No thyroid mass and no thyromegaly present.   Cardiovascular: Normal rate, regular rhythm and normal heart sounds.  No extrasystoles are present. PMI is not displaced. Exam reveals no gallop.   No murmur heard.  Pulmonary/Chest: Effort normal and breath sounds normal. No accessory muscle usage. No respiratory distress.   Clear to auscultation bilaterally.   Abdominal: Soft. Normal appearance and bowel sounds are normal. He exhibits no abdominal bruit. There is no hepatosplenomegaly. There is no tenderness. There is no rebound.   Lymphadenopathy:        Head (right side): No submental and no submandibular adenopathy present.        Head (left side): No submental and no submandibular adenopathy present.        Right cervical: No superficial cervical, no deep cervical and no posterior cervical adenopathy present.       Left cervical: No superficial cervical, no deep cervical and no posterior cervical adenopathy present.        Right: No supraclavicular adenopathy present.        Left: No supraclavicular adenopathy present.   Neurological: He is alert and oriented to person, place, and time.   Skin: Skin is warm, dry and intact.   Psychiatric: He has a normal mood and affect.     Blood pressure  "122/74, pulse 86, temperature 98.1 °F (36.7 °C), temperature source Oral, height 5' 4" (1.626 m), weight 76.9 kg (169 lb 9.6 oz), SpO2 98 %.Body mass index is 29.11 kg/m².            A/P:  1) depression.  Well controlled.  Continue with Cymbalta and counseling.  If he develops any new worsening symptoms, will let me know   2)  Hypertension.  Well controlled.  Continue with the digital hypertension medicine program.  Continue with Diovan and Toprol.   3)  Left otalgia with normal exam.  Persistent.  I did recommend he meet with ENT for further evaluation   4)  Low back pain. Mild and intermittent.  If this continues, he will let me know and will schedule physical therapy      As long as he does well, I will see him back in 6 months for follow-up or sooner if needed  "

## 2019-10-01 ENCOUNTER — CLINICAL SUPPORT (OUTPATIENT)
Dept: REHABILITATION | Facility: HOSPITAL | Age: 64
End: 2019-10-01
Attending: ORTHOPAEDIC SURGERY
Payer: MEDICAID

## 2019-10-01 DIAGNOSIS — R29.898 SHOULDER WEAKNESS: ICD-10-CM

## 2019-10-01 DIAGNOSIS — M25.511 RIGHT SHOULDER PAIN, UNSPECIFIED CHRONICITY: Primary | ICD-10-CM

## 2019-10-01 DIAGNOSIS — M25.611 SHOULDER STIFFNESS, RIGHT: ICD-10-CM

## 2019-10-01 PROCEDURE — 97530 THERAPEUTIC ACTIVITIES: CPT | Mod: PN

## 2019-10-01 NOTE — PROGRESS NOTES
"Time in 1145  Time out 12      Timed units  4 therex                              Time:0816-3198      S: says since last visit strained right shoulder using miter saw, pain decreasing consistently, understands proper ice use for pain  Pain:C5 ache, about 2/10 with some basic self care    O:  r prom er at 0 abd 72   at 45 abd 90 with tension    at 90 abd 90;       sidelying ir  70        ir behind back 15" lift off all capsular      l prom er at 0 abd 72    at 45 abd 90    at  90 abd 90;      sidelying ir 80         ir behind back 15"   lift off    stretches as tolerated-pain free: er 0, 45, 90 abd       education: fady oliver progression    Isometrics 2 x 20: row, add, ir 0 abd    arom 2 x 20: supine protraction, supine depression    A: progressing well towards all goals, decreased prom for sidelying ir and er 90 abd vs last time tested    P: fix gh stiffness  "

## 2019-10-03 ENCOUNTER — CLINICAL SUPPORT (OUTPATIENT)
Dept: REHABILITATION | Facility: HOSPITAL | Age: 64
End: 2019-10-03
Attending: ORTHOPAEDIC SURGERY
Payer: MEDICAID

## 2019-10-03 DIAGNOSIS — M25.511 RIGHT SHOULDER PAIN, UNSPECIFIED CHRONICITY: Primary | ICD-10-CM

## 2019-10-03 DIAGNOSIS — R29.898 SHOULDER WEAKNESS: ICD-10-CM

## 2019-10-03 DIAGNOSIS — M25.611 SHOULDER STIFFNESS, RIGHT: ICD-10-CM

## 2019-10-03 PROCEDURE — 97530 THERAPEUTIC ACTIVITIES: CPT | Mod: PN

## 2019-10-03 NOTE — PROGRESS NOTES
Time in 1030  Time out 1130      Timed units  4 therex                              Time:6001-5849      S: says pain has decrease nicely since the weekend  Pain:C5 ache, about 2/10 with some basic self care    O:    stretches as tolerated-pain free: er 0, 45, 90 abd       education: fady oliver progression    Isometrics 2 x 20: row, add, ir 0 abd    arom 2 x 20: supine protraction, supine depression    A: likely will need pec major sternal head and pec minor stretches to get full passive elevation; full flexibility thru right shoulder important for proper mechanics and full active elevation long term    P: test prom er x 3 and ir x 2

## 2019-10-08 ENCOUNTER — CLINICAL SUPPORT (OUTPATIENT)
Dept: REHABILITATION | Facility: HOSPITAL | Age: 64
End: 2019-10-08
Attending: ORTHOPAEDIC SURGERY
Payer: MEDICAID

## 2019-10-08 DIAGNOSIS — R29.898 SHOULDER WEAKNESS: ICD-10-CM

## 2019-10-08 DIAGNOSIS — M25.611 SHOULDER STIFFNESS, RIGHT: ICD-10-CM

## 2019-10-08 DIAGNOSIS — M25.511 RIGHT SHOULDER PAIN, UNSPECIFIED CHRONICITY: Primary | ICD-10-CM

## 2019-10-08 PROCEDURE — 97530 THERAPEUTIC ACTIVITIES: CPT | Mod: PN

## 2019-10-08 NOTE — PROGRESS NOTES
"Time in 1030  Time out 1130      Timed units  4 therex                              Time:3374-2444      S: understands likely timing for d2 stretch addition to HEP  Pain:C5 ache, about 2/10 with some basic self care    O:    stretches as tolerated-pain free: er 0, 45, 90 abd; d2       education: fady oliver progression      r prom er at 0 abd 72   at 45 abd 90    at 90 abd 90;       sidelying ir  80        ir behind back 15"    lift off  l prom er at 0 abd 72    at 45 abd 90    at  90 abd 90;      sidelying ir 80         ir behind back 15"   lift off      A:  Good technique noted with above stretches, once all shoulder complex stiffness resolved, scapula and cuff theraband PRE will be essential for strong and painless over shoulder level use long term    P: consider end range elevation gh estiven  "

## 2019-10-10 ENCOUNTER — CLINICAL SUPPORT (OUTPATIENT)
Dept: REHABILITATION | Facility: HOSPITAL | Age: 64
End: 2019-10-10
Attending: ORTHOPAEDIC SURGERY
Payer: MEDICAID

## 2019-10-10 DIAGNOSIS — M25.611 SHOULDER STIFFNESS, RIGHT: ICD-10-CM

## 2019-10-10 PROCEDURE — 97530 THERAPEUTIC ACTIVITIES: CPT | Mod: PN

## 2019-10-10 NOTE — PROGRESS NOTES
Time in 1030  Time out 1130      Timed units  4 therex                              Time:7058-1510      S: understands hands clasped behind head stretch will be added soon  Pain:C5 ache, about 2/10 with some basic self care    O:    stretches as tolerated-pain free: er 0, 45, 90 abd; d2       education: fady oliver progression          A:  Outstanding decrease in pain, stiffness, and dysfunctional movement patterns since 8 weeks ago      P: continue tx so strong and painless use as well as proper mechanics can be utilized with all required tasks

## 2019-10-14 RX ORDER — TRAZODONE HYDROCHLORIDE 50 MG/1
50 TABLET ORAL NIGHTLY
Qty: 30 TABLET | Refills: 2 | Status: SHIPPED | OUTPATIENT
Start: 2019-10-14 | End: 2020-01-08

## 2019-10-15 ENCOUNTER — CLINICAL SUPPORT (OUTPATIENT)
Dept: REHABILITATION | Facility: HOSPITAL | Age: 64
End: 2019-10-15
Attending: ORTHOPAEDIC SURGERY
Payer: MEDICAID

## 2019-10-15 ENCOUNTER — PATIENT OUTREACH (OUTPATIENT)
Dept: OTHER | Facility: OTHER | Age: 64
End: 2019-10-15

## 2019-10-15 DIAGNOSIS — R29.898 SHOULDER WEAKNESS: ICD-10-CM

## 2019-10-15 DIAGNOSIS — M25.611 SHOULDER STIFFNESS, RIGHT: Primary | ICD-10-CM

## 2019-10-15 DIAGNOSIS — M25.511 RIGHT SHOULDER PAIN, UNSPECIFIED CHRONICITY: ICD-10-CM

## 2019-10-15 DIAGNOSIS — I10 HYPERTENSION, ESSENTIAL: ICD-10-CM

## 2019-10-15 PROCEDURE — 97530 THERAPEUTIC ACTIVITIES: CPT | Mod: PN

## 2019-10-15 RX ORDER — METOPROLOL SUCCINATE 50 MG/1
50 TABLET, EXTENDED RELEASE ORAL DAILY
Qty: 30 TABLET | Refills: 3
Start: 2019-10-15 | End: 2020-05-08

## 2019-10-15 NOTE — PROGRESS NOTES
"Time in 1045  Time out 1130      Timed units  3 therex                              Time:5738-7223      S: pleased with progress, light use around face level with basic use much better than 1 month ago    Pain:C5 ache, about 2/10 with some basic self care    O:    stretches as tolerated-pain free: er 0, 45, 90 abd; d2, supine hands behind head        education: pec major and pec minor anatomy    r prom er at 0 abd 72   at 45 abd 90    at 90 abd 90;       sidelying ir  80        ir behind back 15"    lift off  l prom er at 0 abd 72    at 45 abd 90    at  90 abd 90;      sidelying ir 80         ir behind back 15"   lift off    prom right flex nt         d2  2"         olecranon to surface          abd 180  prom left flex 180         d2 0"         olecranon to surface         abd 180      A:  Needs to fix remaining tightness so long term use can be strong, painless, and full      P: slowly add scapula and cuff theraband PRE to routine      Updated POC: 2 x week for 6 weeks; 10-15-19 through 11-26-19; tong and tx  "

## 2019-10-15 NOTE — PROGRESS NOTES
Digital Medicine: Clinician Follow-Up        Follow Up  Follow-up reason(s): reading review    Mr. Ramires is doing well. He is feeling well and tolerating current medication regimen. He is now only taking metoprolol 50 mg QD vs 75 mg QD and states he feels better and has more energy.       Assessment/Plan        Per 30 day average, 126/80 mmHg, patient's BP is at goal.     I will continue to monitor regularly and will follow up in 4-6 months, sooner if BP begins to trend upward or downward.    Asked patient to call or message with questions or concerns.         Last 5 Patient Entered Readings                                      Current 30 Day Average: 126/80     Recent Readings 10/6/2019 9/28/2019 9/19/2019 9/14/2019 9/12/2019    SBP (mmHg) 113 142 122 105 112    DBP (mmHg) 74 83 83 65 70    Pulse 68 71 83 74 70             Hypertension Medications             metoprolol succinate (TOPROL-XL) 50 MG 24 hr tablet Take 1 tablet (50 mg total) by mouth once daily.    valsartan (DIOVAN) 320 MG tablet Take 1 tablet (320 mg total) by mouth once daily.

## 2019-10-17 ENCOUNTER — CLINICAL SUPPORT (OUTPATIENT)
Dept: REHABILITATION | Facility: HOSPITAL | Age: 64
End: 2019-10-17
Attending: ORTHOPAEDIC SURGERY
Payer: MEDICAID

## 2019-10-17 DIAGNOSIS — M25.511 RIGHT SHOULDER PAIN, UNSPECIFIED CHRONICITY: ICD-10-CM

## 2019-10-17 DIAGNOSIS — R29.898 SHOULDER WEAKNESS: ICD-10-CM

## 2019-10-17 DIAGNOSIS — M25.611 SHOULDER STIFFNESS, RIGHT: Primary | ICD-10-CM

## 2019-10-17 PROCEDURE — 97110 THERAPEUTIC EXERCISES: CPT | Mod: PN

## 2019-10-17 NOTE — PROGRESS NOTES
Time in 130  Time out 230      Timed units  4 therex                              Time:130-230      S: over shoulder level use limited due to tightness and weakness    Pain:C5 ache, about 2/10 with some basic self care    O:    stretches as tolerated-pain free: er 45 abd; d2, supine hands behind head     arom 2 x 20: supine protraction, depression    Isometrics 2 x 20: row, add, ir 0 abd    education: likely tx progression        A:  May benefit from end range gh elevation glides      P: test prom shoulder complex

## 2019-10-22 ENCOUNTER — CLINICAL SUPPORT (OUTPATIENT)
Dept: REHABILITATION | Facility: HOSPITAL | Age: 64
End: 2019-10-22
Attending: ORTHOPAEDIC SURGERY
Payer: MEDICAID

## 2019-10-22 DIAGNOSIS — R29.898 SHOULDER WEAKNESS: ICD-10-CM

## 2019-10-22 DIAGNOSIS — M25.611 SHOULDER STIFFNESS, RIGHT: Primary | ICD-10-CM

## 2019-10-22 DIAGNOSIS — M25.511 RIGHT SHOULDER PAIN, UNSPECIFIED CHRONICITY: ICD-10-CM

## 2019-10-22 PROCEDURE — 97530 THERAPEUTIC ACTIVITIES: CPT | Mod: PN

## 2019-10-22 NOTE — PROGRESS NOTES
"Time in 1030  Time out 1130      Timed units  4 therex                              Time:130-230      S: happy with progress    Pain:C5 ache, about 2/10 with some basic self care    O:    r prom er at 0 abd 72   at 45 abd 90 with tension    at 90 abd 90;       sidelying ir  80        ir behind back 15"    lift off  l prom er at 0 abd 72    at 45 abd 90    at  90 abd 90;      sidelying ir 80         ir behind back 15"   lift off    prom right flex nt         d2 1"         olecranon to surface          abd 180  prom left flex 180         d2 0"         olecranon to surface         abd 180    Hands behind head supine stretch 0" olecranon to surface bilaterally      stretches as tolerated-pain free: er 45 abd; d2, supine hands behind head     theraband 2 x 20: purple add, ir 0 abd, depression    education: likely tx progression        A:  fixing last bit of tightness imperative for proper mechanics long term      P: cont POC      "

## 2019-10-24 ENCOUNTER — CLINICAL SUPPORT (OUTPATIENT)
Dept: REHABILITATION | Facility: HOSPITAL | Age: 64
End: 2019-10-24
Attending: ORTHOPAEDIC SURGERY
Payer: MEDICAID

## 2019-10-24 DIAGNOSIS — M25.511 RIGHT SHOULDER PAIN, UNSPECIFIED CHRONICITY: ICD-10-CM

## 2019-10-24 DIAGNOSIS — M25.611 SHOULDER STIFFNESS, RIGHT: Primary | ICD-10-CM

## 2019-10-24 DIAGNOSIS — R29.898 SHOULDER WEAKNESS: ICD-10-CM

## 2019-10-24 PROCEDURE — 97530 THERAPEUTIC ACTIVITIES: CPT | Mod: PN

## 2019-10-24 NOTE — PROGRESS NOTES
Time in 1030  Time out 1130      Timed units  4 therex                              Time:130-230      S: understands tx rationale and likely tx progression    Pain:C5 ache, about 1-2/10 with some basic self care    O:        stretches as tolerated-pain free: er 45 abd; d2, supine hands behind head     theraband 2 x 20: purple add, ir 0 abd, depression, protraction    Isometrics: er 0 abd    education: likely tx progression        A:  Proper PRE routine imperative for painless use long term and correct scapula humeral ratio long term    P: test prom right shoulder complex

## 2019-10-29 ENCOUNTER — CLINICAL SUPPORT (OUTPATIENT)
Dept: REHABILITATION | Facility: HOSPITAL | Age: 64
End: 2019-10-29
Attending: ORTHOPAEDIC SURGERY
Payer: MEDICAID

## 2019-10-29 DIAGNOSIS — R29.898 SHOULDER WEAKNESS: ICD-10-CM

## 2019-10-29 DIAGNOSIS — M25.611 SHOULDER STIFFNESS, RIGHT: ICD-10-CM

## 2019-10-29 DIAGNOSIS — M25.511 RIGHT SHOULDER PAIN, UNSPECIFIED CHRONICITY: ICD-10-CM

## 2019-10-29 PROCEDURE — 97530 THERAPEUTIC ACTIVITIES: CPT | Mod: PN

## 2019-10-29 NOTE — PROGRESS NOTES
"Time in 1030  Time out 1130      Timed units  4 therex                              Time:130-230      S: doing HEP    Pain:C5 ache, about 1-2/10 with some basic self care    O:    r prom er at 0 abd 72   at 45 abd 90 with tension    at 90 abd 90;       sidelying ir  80        ir behind back 15"    lift off  l prom er at 0 abd 72    at 45 abd 90    at  90 abd 90;      sidelying ir 80         ir behind back 15"   lift off    prom right flex nt         d2 1"         olecranon to surface          abd 180  prom left flex 180         d2 0"         olecranon to surface         abd 180    stretches as tolerated-pain free: er 45 abd    theraband 2 x 20: purple add, ir 0 abd, depression, protraction    Isometrics: er 0 abd    education: likely tx progression        A:  Overall mechanics and light use continue to improve, more rehab needed to resolve last bit of tightness and properly transition to a d/c stretch weaning and continued PRE routine to facilitate strong, painless, and full use long term    P: continue POC      "

## 2019-10-31 ENCOUNTER — PATIENT OUTREACH (OUTPATIENT)
Dept: OTHER | Facility: OTHER | Age: 64
End: 2019-10-31

## 2019-10-31 ENCOUNTER — CLINICAL SUPPORT (OUTPATIENT)
Dept: REHABILITATION | Facility: HOSPITAL | Age: 64
End: 2019-10-31
Attending: ORTHOPAEDIC SURGERY
Payer: MEDICAID

## 2019-10-31 DIAGNOSIS — M25.611 SHOULDER STIFFNESS, RIGHT: ICD-10-CM

## 2019-10-31 DIAGNOSIS — R29.898 SHOULDER WEAKNESS: ICD-10-CM

## 2019-10-31 DIAGNOSIS — M25.511 RIGHT SHOULDER PAIN, UNSPECIFIED CHRONICITY: Primary | ICD-10-CM

## 2019-10-31 PROCEDURE — 97530 THERAPEUTIC ACTIVITIES: CPT | Mod: PN

## 2019-10-31 NOTE — PROGRESS NOTES
Time in 1030  Time out 1130      Timed units  4 therex                              Time:103-1130      S: doing HEP, increased soreness/ache since last visit which has resolved now, took antiinflammatory meds to manage    Pain:C5 ache, about 1-2/10 with some basic self care    O:      stretches as tolerated-pain free: er 45 abd    theraband 2 x 20: purple add, ir 0 abd, depression, protraction    Isometrics: er 0 abd    education: likely tx progression    Manual tx: gh resting position traction, bursal massage abd 1 and 2, coronal traction 90 abd    A:  good effort in clinic, looks faithful with HEP    P: manual tx, PRE, stretching, patient education, HEP

## 2019-11-05 ENCOUNTER — CLINICAL SUPPORT (OUTPATIENT)
Dept: REHABILITATION | Facility: HOSPITAL | Age: 64
End: 2019-11-05
Attending: ORTHOPAEDIC SURGERY
Payer: MEDICAID

## 2019-11-05 DIAGNOSIS — R29.898 SHOULDER WEAKNESS: ICD-10-CM

## 2019-11-05 DIAGNOSIS — M25.511 RIGHT SHOULDER PAIN, UNSPECIFIED CHRONICITY: Primary | ICD-10-CM

## 2019-11-05 DIAGNOSIS — M25.611 SHOULDER STIFFNESS, RIGHT: ICD-10-CM

## 2019-11-05 PROCEDURE — 97530 THERAPEUTIC ACTIVITIES: CPT | Mod: PN

## 2019-11-05 NOTE — PROGRESS NOTES
Time in 130  Time out 230      Timed units  4 therex                              Time:130-230      S: gave grandson heimlich over weekend therefore had some mild increased soreness today    Pain:C5 ache, about 2-3/10 with some basic self care    O:      stretches as tolerated-pain free: d2    theraband 2 x 20: purple add, ir 0 abd, depression, protraction, row    Isometrics: er 0 abd    education: likely tx progression        A:  Good performance of above exercises    P: manual tx, PRE, stretching, patient education, HEP

## 2019-11-06 ENCOUNTER — OFFICE VISIT (OUTPATIENT)
Dept: OTOLARYNGOLOGY | Facility: CLINIC | Age: 64
End: 2019-11-06
Payer: MEDICAID

## 2019-11-06 ENCOUNTER — PATIENT OUTREACH (OUTPATIENT)
Dept: ADMINISTRATIVE | Facility: OTHER | Age: 64
End: 2019-11-06

## 2019-11-06 VITALS
SYSTOLIC BLOOD PRESSURE: 120 MMHG | WEIGHT: 172.19 LBS | HEART RATE: 75 BPM | DIASTOLIC BLOOD PRESSURE: 77 MMHG | BODY MASS INDEX: 29.55 KG/M2

## 2019-11-06 DIAGNOSIS — Z98.890 POST-OPERATIVE STATE: ICD-10-CM

## 2019-11-06 DIAGNOSIS — M95.0 NASAL DEFORMITY, ACQUIRED: Primary | ICD-10-CM

## 2019-11-06 PROCEDURE — 99213 OFFICE O/P EST LOW 20 MIN: CPT | Mod: PBBFAC | Performed by: OTOLARYNGOLOGY

## 2019-11-06 PROCEDURE — 99024 POSTOP FOLLOW-UP VISIT: CPT | Mod: ,,, | Performed by: OTOLARYNGOLOGY

## 2019-11-06 PROCEDURE — 99999 PR PBB SHADOW E&M-EST. PATIENT-LVL III: CPT | Mod: PBBFAC,,, | Performed by: OTOLARYNGOLOGY

## 2019-11-06 PROCEDURE — 99999 PR PBB SHADOW E&M-EST. PATIENT-LVL III: ICD-10-PCS | Mod: PBBFAC,,, | Performed by: OTOLARYNGOLOGY

## 2019-11-06 PROCEDURE — 99024 PR POST-OP FOLLOW-UP VISIT: ICD-10-PCS | Mod: ,,, | Performed by: OTOLARYNGOLOGY

## 2019-11-06 NOTE — PROGRESS NOTES
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  Having some nasal congestion C/O.  Exam: septum and nose straight,valves well supported,airway clear.  Mucosa decongest's well with phenylethrine spray.  Plan: Sinus rinses  Try Flonase  Allergy consult  Photos  RTC prn

## 2019-11-07 ENCOUNTER — CLINICAL SUPPORT (OUTPATIENT)
Dept: REHABILITATION | Facility: HOSPITAL | Age: 64
End: 2019-11-07
Attending: ORTHOPAEDIC SURGERY
Payer: MEDICAID

## 2019-11-07 DIAGNOSIS — R29.898 SHOULDER WEAKNESS: ICD-10-CM

## 2019-11-07 DIAGNOSIS — M25.611 SHOULDER STIFFNESS, RIGHT: ICD-10-CM

## 2019-11-07 DIAGNOSIS — M25.511 RIGHT SHOULDER PAIN, UNSPECIFIED CHRONICITY: Primary | ICD-10-CM

## 2019-11-07 PROCEDURE — 97530 THERAPEUTIC ACTIVITIES: CPT | Mod: PN

## 2019-11-07 NOTE — PROGRESS NOTES
Time in 130  Time out 230      Timed units  4 therex                              Time:230-330      S: doing HEP    Pain:C5 ache, about 1-2/10 with some basic self care    O:      stretches as tolerated-pain free: d2, supine hands behind head, er 45 abd    theraband 2 x 20: purple add, ir 0 abd, depression, protraction, row    Isometrics: er 0 abd    education: likely tx progression        A:  Good performance of stretches noted with no pain or guarding    P: manual tx, PRE, stretching, patient education, HEP

## 2019-11-14 ENCOUNTER — CLINICAL SUPPORT (OUTPATIENT)
Dept: REHABILITATION | Facility: HOSPITAL | Age: 64
End: 2019-11-14
Attending: ORTHOPAEDIC SURGERY
Payer: MEDICAID

## 2019-11-14 DIAGNOSIS — M25.511 RIGHT SHOULDER PAIN, UNSPECIFIED CHRONICITY: Primary | ICD-10-CM

## 2019-11-14 DIAGNOSIS — R29.898 SHOULDER WEAKNESS: ICD-10-CM

## 2019-11-14 DIAGNOSIS — M25.611 SHOULDER STIFFNESS, RIGHT: ICD-10-CM

## 2019-11-14 PROCEDURE — 97530 THERAPEUTIC ACTIVITIES: CPT | Mod: PN

## 2019-11-14 NOTE — PROGRESS NOTES
"Time in 230  Time out 330      Timed units  4 therex                              Time:230-330      S: understands once tightness gone, d/c will be soon after    Pain:C5 ache, about 1-2/10 with some basic self care    O:    Full prom er x 3 and ir x 2    Prom d2 on right olecranon to surface with tension, on left 0" with no tension    Supine hands behind head stretch full motion to table passively bilaterally      stretches as tolerated-pain free: d2, supine hands behind head, er 45 abd    theraband 2 x 20: purple add, ir 0 abd, depression, protraction, row    Isometrics: er 0 abd    education: likely tx progression        A:  Overall mechanics and use continue to improve, strength with shoulder level tasks still limited    P: manual tx, PRE, stretching, patient education, HEP      "

## 2019-11-20 ENCOUNTER — CLINICAL SUPPORT (OUTPATIENT)
Dept: REHABILITATION | Facility: HOSPITAL | Age: 64
End: 2019-11-20
Attending: ORTHOPAEDIC SURGERY
Payer: MEDICAID

## 2019-11-20 DIAGNOSIS — R29.898 SHOULDER WEAKNESS: ICD-10-CM

## 2019-11-20 DIAGNOSIS — M25.611 SHOULDER STIFFNESS, RIGHT: ICD-10-CM

## 2019-11-20 DIAGNOSIS — M25.511 RIGHT SHOULDER PAIN, UNSPECIFIED CHRONICITY: Primary | ICD-10-CM

## 2019-11-20 PROCEDURE — 97530 THERAPEUTIC ACTIVITIES: CPT | Mod: PN

## 2019-11-20 NOTE — PROGRESS NOTES
Time in 1130  Time out 1230      Timed units  4 therex                              Time:9685-9139      S: no new issues    Pain:C5 ache, about 1-2/10 with some basic self care    O:          stretches as tolerated-pain free: d2, supine hands behind head, er 45 abd    theraband 2 x 20: purple add, ir 0 abd, depression, protraction, row    Place hold 2 x 20: er 0 abd    education: likely tx progression        A:  Fixing last bit of stiffness essential for strong, painless, and balanced use long term    P: manual tx, PRE, stretching, patient education, HEP

## 2019-11-23 ENCOUNTER — HOSPITAL ENCOUNTER (EMERGENCY)
Facility: HOSPITAL | Age: 64
Discharge: HOME | End: 2019-11-23
Attending: EMERGENCY MEDICINE
Payer: MEDICAID

## 2019-11-23 VITALS
SYSTOLIC BLOOD PRESSURE: 128 MMHG | HEART RATE: 80 BPM | OXYGEN SATURATION: 97 % | HEIGHT: 64 IN | DIASTOLIC BLOOD PRESSURE: 67 MMHG | TEMPERATURE: 98 F | RESPIRATION RATE: 18 BRPM | WEIGHT: 164 LBS | BODY MASS INDEX: 28 KG/M2

## 2019-11-23 DIAGNOSIS — M54.2 NECK PAIN: ICD-10-CM

## 2019-11-23 DIAGNOSIS — R20.2 NUMBNESS AND TINGLING IN LEFT ARM: ICD-10-CM

## 2019-11-23 DIAGNOSIS — M54.12 CERVICAL RADICULOPATHY: ICD-10-CM

## 2019-11-23 DIAGNOSIS — H81.10 BENIGN PAROXYSMAL POSITIONAL VERTIGO, UNSPECIFIED LATERALITY: Primary | ICD-10-CM

## 2019-11-23 DIAGNOSIS — R20.0 NUMBNESS AND TINGLING IN LEFT ARM: ICD-10-CM

## 2019-11-23 LAB
ALBUMIN SERPL BCP-MCNC: 4.4 G/DL (ref 3.5–5.2)
ALP SERPL-CCNC: 96 U/L (ref 55–135)
ALT SERPL W/O P-5'-P-CCNC: 20 U/L (ref 10–44)
ANION GAP SERPL CALC-SCNC: 7 MMOL/L (ref 8–16)
AST SERPL-CCNC: 16 U/L (ref 10–40)
BASOPHILS # BLD AUTO: 0.04 K/UL (ref 0–0.2)
BASOPHILS NFR BLD: 0.4 % (ref 0–1.9)
BILIRUB SERPL-MCNC: 0.8 MG/DL (ref 0.1–1)
BUN SERPL-MCNC: 23 MG/DL (ref 8–23)
CALCIUM SERPL-MCNC: 10 MG/DL (ref 8.7–10.5)
CHLORIDE SERPL-SCNC: 103 MMOL/L (ref 95–110)
CO2 SERPL-SCNC: 28 MMOL/L (ref 23–29)
CREAT SERPL-MCNC: 1.5 MG/DL (ref 0.5–1.4)
DIFFERENTIAL METHOD: ABNORMAL
EOSINOPHIL # BLD AUTO: 0.2 K/UL (ref 0–0.5)
EOSINOPHIL NFR BLD: 2.2 % (ref 0–8)
ERYTHROCYTE [DISTWIDTH] IN BLOOD BY AUTOMATED COUNT: 12.3 % (ref 11.5–14.5)
EST. GFR  (AFRICAN AMERICAN): 57 ML/MIN/1.73 M^2
EST. GFR  (NON AFRICAN AMERICAN): 49 ML/MIN/1.73 M^2
GLUCOSE SERPL-MCNC: 105 MG/DL (ref 70–110)
HCT VFR BLD AUTO: 47.4 % (ref 40–54)
HGB BLD-MCNC: 15.8 G/DL (ref 14–18)
IMM GRANULOCYTES # BLD AUTO: 0.02 K/UL (ref 0–0.04)
LYMPHOCYTES # BLD AUTO: 2.8 K/UL (ref 1–4.8)
LYMPHOCYTES NFR BLD: 30 % (ref 18–48)
MAGNESIUM SERPL-MCNC: 2 MG/DL (ref 1.6–2.6)
MCH RBC QN AUTO: 31.4 PG (ref 27–31)
MCHC RBC AUTO-ENTMCNC: 33.3 G/DL (ref 32–36)
MCV RBC AUTO: 94 FL (ref 82–98)
MONOCYTES # BLD AUTO: 0.6 K/UL (ref 0.3–1)
MONOCYTES NFR BLD: 6.3 % (ref 4–15)
NEUTROPHILS # BLD AUTO: 5.7 K/UL (ref 1.8–7.7)
NEUTROPHILS NFR BLD: 60.9 % (ref 38–73)
NRBC BLD-RTO: 0 /100 WBC
PLATELET # BLD AUTO: 288 K/UL (ref 150–350)
PMV BLD AUTO: 9 FL (ref 9.2–12.9)
POTASSIUM SERPL-SCNC: 5.2 MMOL/L (ref 3.5–5.1)
PROT SERPL-MCNC: 7.9 G/DL (ref 6–8.4)
RBC # BLD AUTO: 5.03 M/UL (ref 4.6–6.2)
SODIUM SERPL-SCNC: 138 MMOL/L (ref 136–145)
WBC # BLD AUTO: 9.28 K/UL (ref 3.9–12.7)

## 2019-11-23 PROCEDURE — 85025 COMPLETE CBC W/AUTO DIFF WBC: CPT

## 2019-11-23 PROCEDURE — 25000003 PHARM REV CODE 250: Performed by: EMERGENCY MEDICINE

## 2019-11-23 PROCEDURE — 93005 ELECTROCARDIOGRAM TRACING: CPT

## 2019-11-23 PROCEDURE — 36415 COLL VENOUS BLD VENIPUNCTURE: CPT

## 2019-11-23 PROCEDURE — 80053 COMPREHEN METABOLIC PANEL: CPT

## 2019-11-23 PROCEDURE — 83735 ASSAY OF MAGNESIUM: CPT

## 2019-11-23 PROCEDURE — 99285 EMERGENCY DEPT VISIT HI MDM: CPT | Mod: 25

## 2019-11-23 RX ORDER — MECLIZINE HYDROCHLORIDE 25 MG/1
25 TABLET ORAL
Status: COMPLETED | OUTPATIENT
Start: 2019-11-23 | End: 2019-11-23

## 2019-11-23 RX ORDER — GABAPENTIN 100 MG/1
100 CAPSULE ORAL 3 TIMES DAILY
Qty: 90 CAPSULE | Refills: 0 | Status: SHIPPED | OUTPATIENT
Start: 2019-11-23 | End: 2019-12-05

## 2019-11-23 RX ORDER — MECLIZINE HYDROCHLORIDE 25 MG/1
25 TABLET ORAL 3 TIMES DAILY PRN
Qty: 20 TABLET | Refills: 0 | Status: SHIPPED | OUTPATIENT
Start: 2019-11-23 | End: 2021-04-21

## 2019-11-23 RX ADMIN — MECLIZINE HYDROCHLORIDE 25 MG: 25 TABLET ORAL at 01:11

## 2019-11-23 NOTE — ED PROVIDER NOTES
"Encounter Date: 11/23/2019    SCRIBE #1 NOTE: I, Joce Calvin, am scribing for, and in the presence of, Federico Quesada MD.       History     Chief Complaint   Patient presents with    Dizziness     reports dizziness once a week for the past year, reports dizziness around 6 this morning    Tingling     to left arm for the past 2 weeks       Time seen by provider: 1:00 PM on 11/23/2019    Mane Ramires is a 64 y.o. male with PMHx of HTN, IGT, Herpes simplex type 2 infection, TBI, prediabetes, Myelomalacia, ASD, CTS, anxiety, depression, and Hx of atypical hyperplasia of breast who presents to the ED  with an onset of dizziness after waking up x4 hours PTA. His dizziness was described as the "room spinning" and worsens when moving. The patient explains his dizziness has usually been once a week, but usually resolves within x1 hour. The patient endorses having a previous doctor perform "manuenvers" that relieved these symptoms in the past. The patient adds his left arm has been tingling for a x3 weeks. The patient confirms he is right handed. Another associated symptom include neck pain that radiates down the left arm. The patient denies vomiting, diarrhea, fever, blood in urine, blood in stool, respiratory troubles, or any other symptoms at this time. No pertinent PSHx.      The history is provided by the patient.     Review of patient's allergies indicates:   Allergen Reactions    Sudafed [pseudoephedrine hcl] Other (See Comments)     Heart racing      Codeine Other (See Comments)     Heart racing    Pseudoephedrine     Cortisone Palpitations     Past Medical History:   Diagnosis Date    Accident     fell from roof with TBI (word finding issues personality changes, memory deficets), R rib fractures, clavicle fx    Allergy     Anxiety     ASD (atrial septal defect)     noted on ECHO 6/16    Cervical stenosis of spine     noted on 6/15 MRI    CTS (carpal tunnel syndrome)     mild-mod on 4/17 NCS    DDD " (degenerative disc disease), cervical 10/2014    C5-6 and C6-C7    Depression     JEEVAN (generalized anxiety disorder)     Gender identity disorder     H/O cardiovascular stress test     12/14 normal    Herpes simplex type 2 infection     History of atypical hyperplasia of breast     B precancer lesions    Hypertension     IGT (impaired glucose tolerance)     Myelomalacia     c-spine noted on 8/15 MRI    OA (osteoarthritis)     Prediabetes     TBI (traumatic brain injury)     after falling off a roof in 2003     Past Surgical History:   Procedure Laterality Date    AMPUTATION      L index finger    APPENDECTOMY  1969    BILATERAL SALPINGOOPHORECTOMY  02/2015    BIOPSY OF LIVER WITH ULTRASOUND GUIDANCE N/A 12/18/2018    Procedure: BIOPSY, LIVER, WITH US GUIDANCE;  Surgeon: Dosc Diagnostic Provider;  Location: [facility] OR [address] [place] [address];  Service: Radiology;  Laterality: N/A;  U/S GUIDED LIVER BIOPSY.  12/18/2018.  DOSC 7:30 AM  PROCEDURE 9 AM.  DR NITHIN GOLDEN / MARLON NUR.  DB 12/14/18 3:20P    CARPAL TUNNEL RELEASE  12/2017    right    CERVICAL FUSION  10/2014    C5-C6 and C6-C7    CHOLECYSTECTOMY      ENDOSCOPIC NASAL SEPTOPLASTY N/A 8/23/2018    Procedure: SEPTOPLASTY, NASAL, ENDOSCOPIC;  Surgeon: Reji Arce III, MD;  Location: [facility] OR [address] [place] [address];  Service: ENT;  Laterality: N/A;    FINGER AMPUTATION Left     FRACTURE SURGERY Right 2003    rib fractures    HYSTERECTOMY  1984    MIGUEL    MASTECTOMY  02/2015    RECONSTRUCTION OF NOSE N/A 8/23/2018    Procedure: RECONSTRUCTION, NOSE;  Surgeon: Reji Arce III, MD;  Location: [facility] OR [address] [place] [address];  Service: ENT;  Laterality: N/A;    ROTATOR CUFF REPAIR Right 2019    UPPER GASTROINTESTINAL ENDOSCOPY  09/06/2017    Dr. Ayon     Family History   Problem Relation Age of Onset    Diabetes Mother     Heart disease Mother     Gallbladder disease Mother     Coronary artery disease Father     Breast cancer Sister     Heart disease Sister      Lung cancer Paternal Grandfather     Heart disease Paternal Grandfather     Heart disease Maternal Grandmother     Gallbladder disease Maternal Grandmother     Heart attack Brother     Gallbladder disease Daughter     Craniosynostosis Daughter     Colon cancer Neg Hx     Colon polyps Neg Hx     Crohn's disease Neg Hx     Ulcerative colitis Neg Hx     Stomach cancer Neg Hx     Esophageal cancer Neg Hx      Social History     Tobacco Use    Smoking status: Former Smoker     Packs/day: 0.25     Years: 2.00     Pack years: 0.50     Types: Cigarettes     Start date:      Last attempt to quit: 2002     Years since quittin.0    Smokeless tobacco: Never Used    Tobacco comment: did not smoke regularly   Substance Use Topics    Alcohol use: Yes     Comment: rare     Drug use: No     Types: Hydrocodone     Review of Systems   Constitutional: Negative for fever.   HENT: Negative for sore throat.    Respiratory: Negative for apnea, cough, choking, chest tightness, shortness of breath, wheezing and stridor.    Cardiovascular: Negative for chest pain.   Gastrointestinal: Negative for blood in stool, diarrhea, nausea and vomiting.   Genitourinary: Negative for dysuria and hematuria.   Musculoskeletal: Positive for arthralgias (Radiates from neck; left arm) and neck pain. Negative for back pain.   Skin: Negative for rash.   Neurological: Positive for dizziness (Spinning room) and numbness (left arm). Negative for weakness.   Hematological: Does not bruise/bleed easily.       Physical Exam     Initial Vitals [19 1247]   BP Pulse Resp Temp SpO2   134/86 78 20 97.8 °F (36.6 °C) 96 %      MAP       --         Physical Exam    Nursing note and vitals reviewed.  Constitutional: He appears well-developed and well-nourished. He is not diaphoretic. No distress.   HENT:   Head: Normocephalic and atraumatic.   Eyes: EOM are normal. Pupils are equal, round, and reactive to light.   Neck: Normal range of  motion. Neck supple.   Cardiovascular: Normal rate, regular rhythm, normal heart sounds and intact distal pulses. Exam reveals no gallop and no friction rub.    No murmur heard.  Pulmonary/Chest: Breath sounds normal. No respiratory distress. He has no wheezes. He has no rhonchi. He has no rales.   Abdominal: Soft. Bowel sounds are normal. There is no tenderness. There is no rebound and no guarding.   Musculoskeletal: Normal range of motion.   Neurological: He is alert and oriented to person, place, and time.   Skin: Skin is warm.   Psychiatric: He has a normal mood and affect. His behavior is normal. Judgment and thought content normal.         ED Course   Procedures  Labs Reviewed - No data to display  EKG Readings: (Independently Interpreted)   Heart Rate: 66.   Sinus rhythm of 66 bpm. Normal axis and intervals. No acute ST segment changes. No STEMI.       Imaging Results    None          Medical Decision Making:   Initial Assessment:   64-year-old male presented with multiple complaints.  Differential Diagnosis:   My differential diagnosis includes benign positional vertigo, intracranial mass, cervical radiculopathy.    Clinical Tests:   Lab Tests: Ordered and Reviewed  Radiological Study: Ordered and Reviewed  ED Management:  The patient was emergently evaluated in the emergency department, his evaluation was significant for an elderly male with a normal neurologic exam.  The patient's CT scan of his head showed no acute processes.  The patient's x-ray showed no acute abnormalities per Radiology.  The patient's labs showed no acute processes.  The etiology of the patient's dizziness is likely his benign positional vertigo.  He was treated with p.o. meclizine here in the emergency department.  Additionally the patient's left arm tingling is likely from cervical radiculopathy.  The patient is stable for discharge to home.  He will be discharged home with p.o. meclizine and p.o. Neurontin.  He is referred to  Orthopedics and ENT as an outpatient for further care.            Scribe Attestation:   Scribe #1: I performed the above scribed service and the documentation accurately describes the services I performed. I attest to the accuracy of the note.                   I, Dr. Federico Quesada, personally performed the services described in this documentation. All medical record entries made by the scribe were at my direction and in my presence.  I have reviewed the chart and agree that the record reflects my personal performance and is accurate and complete. Federico Quesada MD.  4:00 PM 11/23/2019          Clinical Impression:       ICD-10-CM ICD-9-CM   1. Benign paroxysmal positional vertigo, unspecified laterality H81.10 386.11   2. Neck pain M54.2 723.1   3. Numbness and tingling in left arm R20.0 782.0    R20.2    4. Cervical radiculopathy M54.12 723.4         Disposition:   Disposition: Discharged  Condition: Stable                     Federico Quesada MD  11/23/19 [id number]

## 2019-11-26 ENCOUNTER — TELEPHONE (OUTPATIENT)
Dept: FAMILY MEDICINE | Facility: CLINIC | Age: 64
End: 2019-11-26

## 2019-11-26 NOTE — TELEPHONE ENCOUNTER
Received fax from pharmacy stating that valsartan is on back order.  They are asking for an alternative.  Please advise.  Thanks.

## 2019-11-27 RX ORDER — LOSARTAN POTASSIUM 100 MG/1
100 TABLET ORAL DAILY
Qty: 30 TABLET | Refills: 1 | Status: SHIPPED | OUTPATIENT
Start: 2019-11-27 | End: 2019-12-04 | Stop reason: ALTCHOICE

## 2019-11-29 NOTE — TELEPHONE ENCOUNTER
Notified patient of provider message, verbalized understanding and will continue to upload BP readings through digital HTN program.

## 2019-12-02 ENCOUNTER — LAB VISIT (OUTPATIENT)
Dept: LAB | Facility: HOSPITAL | Age: 64
End: 2019-12-02
Attending: INTERNAL MEDICINE
Payer: MEDICAID

## 2019-12-02 DIAGNOSIS — I10 ESSENTIAL HYPERTENSION: ICD-10-CM

## 2019-12-02 DIAGNOSIS — R73.02 IGT (IMPAIRED GLUCOSE TOLERANCE): ICD-10-CM

## 2019-12-02 LAB
25(OH)D3+25(OH)D2 SERPL-MCNC: 38 NG/ML (ref 30–96)
ALBUMIN SERPL BCP-MCNC: 4 G/DL (ref 3.5–5.2)
ALP SERPL-CCNC: 107 U/L (ref 55–135)
ALT SERPL W/O P-5'-P-CCNC: 27 U/L (ref 10–44)
ANION GAP SERPL CALC-SCNC: 10 MMOL/L (ref 8–16)
AST SERPL-CCNC: 17 U/L (ref 10–40)
BASOPHILS # BLD AUTO: 0.05 K/UL (ref 0–0.2)
BASOPHILS NFR BLD: 0.5 % (ref 0–1.9)
BILIRUB SERPL-MCNC: 0.6 MG/DL (ref 0.1–1)
BUN SERPL-MCNC: 19 MG/DL (ref 8–23)
CALCIUM SERPL-MCNC: 9.7 MG/DL (ref 8.7–10.5)
CHLORIDE SERPL-SCNC: 105 MMOL/L (ref 95–110)
CHOLEST SERPL-MCNC: 261 MG/DL (ref 120–199)
CHOLEST/HDLC SERPL: 5.8 {RATIO} (ref 2–5)
CO2 SERPL-SCNC: 26 MMOL/L (ref 23–29)
CREAT SERPL-MCNC: 1.5 MG/DL (ref 0.5–1.4)
DIFFERENTIAL METHOD: ABNORMAL
EOSINOPHIL # BLD AUTO: 0.2 K/UL (ref 0–0.5)
EOSINOPHIL NFR BLD: 2.4 % (ref 0–8)
ERYTHROCYTE [DISTWIDTH] IN BLOOD BY AUTOMATED COUNT: 12.1 % (ref 11.5–14.5)
EST. GFR  (AFRICAN AMERICAN): 56.9 ML/MIN/1.73 M^2
EST. GFR  (NON AFRICAN AMERICAN): 49.2 ML/MIN/1.73 M^2
ESTIMATED AVG GLUCOSE: 137 MG/DL (ref 68–131)
GLUCOSE SERPL-MCNC: 138 MG/DL (ref 70–110)
HBA1C MFR BLD HPLC: 6.4 % (ref 4–5.6)
HCT VFR BLD AUTO: 50.4 % (ref 40–54)
HDLC SERPL-MCNC: 45 MG/DL (ref 40–75)
HDLC SERPL: 17.2 % (ref 20–50)
HGB BLD-MCNC: 16 G/DL (ref 14–18)
IMM GRANULOCYTES # BLD AUTO: 0.02 K/UL (ref 0–0.04)
IMM GRANULOCYTES NFR BLD AUTO: 0.2 % (ref 0–0.5)
LDLC SERPL CALC-MCNC: 185.2 MG/DL (ref 63–159)
LYMPHOCYTES # BLD AUTO: 3.1 K/UL (ref 1–4.8)
LYMPHOCYTES NFR BLD: 31.5 % (ref 18–48)
MCH RBC QN AUTO: 30.7 PG (ref 27–31)
MCHC RBC AUTO-ENTMCNC: 31.7 G/DL (ref 32–36)
MCV RBC AUTO: 97 FL (ref 82–98)
MONOCYTES # BLD AUTO: 0.4 K/UL (ref 0.3–1)
MONOCYTES NFR BLD: 4.5 % (ref 4–15)
NEUTROPHILS # BLD AUTO: 6 K/UL (ref 1.8–7.7)
NEUTROPHILS NFR BLD: 60.9 % (ref 38–73)
NONHDLC SERPL-MCNC: 216 MG/DL
NRBC BLD-RTO: 0 /100 WBC
PLATELET # BLD AUTO: 335 K/UL (ref 150–350)
PMV BLD AUTO: 9.5 FL (ref 9.2–12.9)
POTASSIUM SERPL-SCNC: 4.6 MMOL/L (ref 3.5–5.1)
PROT SERPL-MCNC: 7.5 G/DL (ref 6–8.4)
RBC # BLD AUTO: 5.22 M/UL (ref 4.6–6.2)
SODIUM SERPL-SCNC: 141 MMOL/L (ref 136–145)
TRIGL SERPL-MCNC: 154 MG/DL (ref 30–150)
WBC # BLD AUTO: 9.84 K/UL (ref 3.9–12.7)

## 2019-12-02 PROCEDURE — 83036 HEMOGLOBIN GLYCOSYLATED A1C: CPT

## 2019-12-02 PROCEDURE — 82306 VITAMIN D 25 HYDROXY: CPT

## 2019-12-02 PROCEDURE — 85025 COMPLETE CBC W/AUTO DIFF WBC: CPT

## 2019-12-02 PROCEDURE — 80053 COMPREHEN METABOLIC PANEL: CPT

## 2019-12-02 PROCEDURE — 80061 LIPID PANEL: CPT

## 2019-12-02 PROCEDURE — 36415 COLL VENOUS BLD VENIPUNCTURE: CPT | Mod: PO

## 2019-12-03 ENCOUNTER — CLINICAL SUPPORT (OUTPATIENT)
Dept: REHABILITATION | Facility: HOSPITAL | Age: 64
End: 2019-12-03
Attending: ORTHOPAEDIC SURGERY
Payer: MEDICAID

## 2019-12-03 DIAGNOSIS — M25.611 SHOULDER STIFFNESS, RIGHT: ICD-10-CM

## 2019-12-03 DIAGNOSIS — M25.511 RIGHT SHOULDER PAIN, UNSPECIFIED CHRONICITY: Primary | ICD-10-CM

## 2019-12-03 DIAGNOSIS — R29.898 SHOULDER WEAKNESS: ICD-10-CM

## 2019-12-03 PROCEDURE — 97110 THERAPEUTIC EXERCISES: CPT | Mod: PN

## 2019-12-03 NOTE — PROGRESS NOTES
Time in 330  Time out 430      Timed units  4 therex                              Time:5366-6200      S: no new issues    Pain:C5 ache, about 1-2/10 with some basic self care    O:          stretches as tolerated-pain free: d2, supine hands behind head, er 45 abd    theraband 2 x 20: purple add, ir 0 abd, depression, protraction, row; yellow er 0 abd        education: likely tx progression        A:  Full prom er x 3 and ir x 2 with no tension at end ranges    P: manual tx, PRE, stretching, patient education, HEP      Outpatient Therapy Updated Plan of Care     Visit Date: 12/3/2019  Name: M[facility] Number: [id number]    Therapy Diagnosis:   Encounter Diagnoses   Name Primary?    Right shoulder pain, unspecified chronicity Yes    Shoulder stiffness, right     Shoulder weakness      Physician: Andrez Longoria/dr. gomez    Physician Orders: eval and tx  Medical Diagnosis: see eval  Evaluation Date: 4-23-19    Total Visits Received: see epic  Cancelled Visits: see epic  No Show Visits: see epic    Precautions:  universal  Visits from Evaluation Date:  See epic  Functional Level Prior to Evaluation:  Decreased rom, use, and strength; pre symptom onset had no deficits with functional use    Subjective     Update: happy with progress    Objective     Update: see above for measurments    Assessment     Update: all stiffness all planes almost gone    Previous Short Term Goals Status:   ongoing  New Short Term Goals Status:   n/a  Long Term Goal Status:   continue per initial plan of care.  Reasons for Recertification of Therapy:   Continued skilled and structured rehab imperative to properly promote full rom, balanced strength, and good mechanics long term with ADL    Plan     Updated Certification Period: 12/3/2019 to 12-31-19  Recommended Treatment Plan: 2 times per week for 4 weeks: eval and tx  Other Recommendations: fix remaining tightness and transition to d/c HEP    Kali Spears  OT/CHT  12/3/2019      I CERTIFY THE NEED FOR THESE SERVICES FURNISHED UNDER THIS PLAN OF TREATMENT AND WHILE UNDER MY CARE    Physician's comments:        Physician's Signature: ___________________________________________________

## 2019-12-03 NOTE — TELEPHONE ENCOUNTER
Valsartan is on back order.  Pharmacy asking if you can send in alternative.  Please advise.  Thanks.

## 2019-12-04 ENCOUNTER — PATIENT OUTREACH (OUTPATIENT)
Dept: OTHER | Facility: OTHER | Age: 64
End: 2019-12-04

## 2019-12-04 ENCOUNTER — PATIENT OUTREACH (OUTPATIENT)
Dept: ADMINISTRATIVE | Facility: OTHER | Age: 64
End: 2019-12-04

## 2019-12-04 DIAGNOSIS — Z12.11 ENCOUNTER FOR FIT (FECAL IMMUNOCHEMICAL TEST) SCREENING: Primary | ICD-10-CM

## 2019-12-04 RX ORDER — VALSARTAN 320 MG/1
320 TABLET ORAL DAILY
Qty: 90 TABLET | Refills: 1
Start: 2019-12-04 | End: 2019-12-31

## 2019-12-05 ENCOUNTER — OFFICE VISIT (OUTPATIENT)
Dept: ENDOCRINOLOGY | Facility: CLINIC | Age: 64
End: 2019-12-05
Payer: MEDICAID

## 2019-12-05 VITALS
HEART RATE: 80 BPM | HEIGHT: 65 IN | BODY MASS INDEX: 28.43 KG/M2 | WEIGHT: 170.63 LBS | SYSTOLIC BLOOD PRESSURE: 98 MMHG | DIASTOLIC BLOOD PRESSURE: 68 MMHG

## 2019-12-05 DIAGNOSIS — R73.02 IGT (IMPAIRED GLUCOSE TOLERANCE): Primary | ICD-10-CM

## 2019-12-05 DIAGNOSIS — I10 ESSENTIAL HYPERTENSION: ICD-10-CM

## 2019-12-05 DIAGNOSIS — E55.9 VITAMIN D DEFICIENCY: ICD-10-CM

## 2019-12-05 DIAGNOSIS — N18.30 STAGE 3 CHRONIC KIDNEY DISEASE: ICD-10-CM

## 2019-12-05 PROCEDURE — 99214 OFFICE O/P EST MOD 30 MIN: CPT | Mod: S$PBB,,, | Performed by: INTERNAL MEDICINE

## 2019-12-05 PROCEDURE — 99214 OFFICE O/P EST MOD 30 MIN: CPT | Mod: PBBFAC | Performed by: INTERNAL MEDICINE

## 2019-12-05 PROCEDURE — 99214 PR OFFICE/OUTPT VISIT, EST, LEVL IV, 30-39 MIN: ICD-10-PCS | Mod: S$PBB,,, | Performed by: INTERNAL MEDICINE

## 2019-12-05 PROCEDURE — 99999 PR PBB SHADOW E&M-EST. PATIENT-LVL IV: CPT | Mod: PBBFAC,,, | Performed by: INTERNAL MEDICINE

## 2019-12-05 PROCEDURE — 99999 PR PBB SHADOW E&M-EST. PATIENT-LVL IV: ICD-10-PCS | Mod: PBBFAC,,, | Performed by: INTERNAL MEDICINE

## 2019-12-05 RX ORDER — TESTOSTERONE CYPIONATE 200 MG/ML
200 INJECTION, SOLUTION INTRAMUSCULAR
Qty: 10 ML | Refills: 5 | Status: SHIPPED | OUTPATIENT
Start: 2019-12-05 | End: 2021-04-27 | Stop reason: SDUPTHER

## 2019-12-05 RX ORDER — DULOXETIN HYDROCHLORIDE 60 MG/1
1 CAPSULE, DELAYED RELEASE ORAL DAILY
COMMUNITY
Start: 2019-05-21 | End: 2019-12-05 | Stop reason: SDUPTHER

## 2019-12-05 NOTE — ASSESSMENT & PLAN NOTE
Transman: gender incongruence         Will continue Sq Testosterone replacement therapy 200 mg q 2  weeks          Healthy lifestyle stressed        Reviewed risks and benefits.

## 2019-12-05 NOTE — PROGRESS NOTES
Subjective:      Patient ID: Mane Ramires is a 64 y.o. male.    Chief Complaint:  Impaired glucose tolerance and Gender dysphoria      History of Present Illness  Here for his  trans care  He and his wife are    Living with daughter and GORGE      Signed up for a senior living place   Once he moves in he will be able to focus on his diet and health better      pt had b/l total complete mastectomy (2/16/15) with Dr Yi, and oophorectomy/removal of fallopian tubes with lysis of adhesions by Dr Carbajal (on 2/16/15)        Legally changed 2007   Started cross hormone therapy 1992      current dose 200 mg SQ every 14 days        Relationships  - woman    Has 3 biological children    Wife has 2 daughters of her own  +2 grandchildren      He is an artist       Drugs - none       With regards to the vitamin d deficiency:  taking otc 2000 iu a day      No recent fractures   Did fall and had rotator cuff tear        With regards to  igt  Denies symptoms of hyperglycemia such as polyuria, polydipsia, nocturia, unexplained weight loss or blurred vision    As above         Review of Systems   Constitutional: Negative for unexpected weight change.   Eyes: Negative for visual disturbance.   Respiratory: Negative for shortness of breath.    Cardiovascular: Negative for chest pain.   Gastrointestinal: Negative for abdominal pain.   Musculoskeletal: Positive for arthralgias and myalgias.   Skin: Negative for wound.   Neurological: Negative for headaches.   Hematological: Does not bruise/bleed easily.   Psychiatric/Behavioral: Negative for sleep disturbance.       Objective:   Physical Exam   Neck: No thyromegaly present.   Cardiovascular: Normal rate.   Pulmonary/Chest: Effort normal.   Abdominal: Soft.   Musculoskeletal: He exhibits no edema.   Vitals reviewed.      Body mass index is 28.4 kg/m².    Lab Review:   Lab Results   Component Value Date    HGBA1C 6.4 (H) 12/02/2019     Lab Results   Component Value Date     CHOL 261 (H) 12/02/2019    HDL 45 12/02/2019    LDLCALC 185.2 (H) 12/02/2019    TRIG 154 (H) 12/02/2019    CHOLHDL 17.2 (L) 12/02/2019     Lab Results   Component Value Date     12/02/2019    K 4.6 12/02/2019     12/02/2019    CO2 26 12/02/2019     (H) 12/02/2019    BUN 19 12/02/2019    CREATININE 1.5 (H) 12/02/2019    CALCIUM 9.7 12/02/2019    PROT 7.5 12/02/2019    ALBUMIN 4.0 12/02/2019    BILITOT 0.6 12/02/2019    ALKPHOS 107 12/02/2019    AST 17 12/02/2019    ALT 27 12/02/2019    ANIONGAP 10 12/02/2019    ESTGFRAFRICA 56.9 (A) 12/02/2019    EGFRNONAA 49.2 (A) 12/02/2019    TSH 1.338 11/26/2018       Nl h/h     Assessment and Plan     Endocrine disorder in female-to-male transgender person  Transman: gender incongruence         Will continue Sq Testosterone replacement therapy 200 mg q 2  weeks          Healthy lifestyle stressed        Reviewed risks and benefits.       IGT (impaired glucose tolerance)    alerted as to the increased risk for t2DM  Stressed diet and exercise  Goal 5-7% weight loss    Periodic hba1c levels      HTN (hypertension)  Controlled     Vitamin D deficiency   over the counter vitamin D 2000 iu a day       ckd - refer to renal

## 2019-12-10 ENCOUNTER — CLINICAL SUPPORT (OUTPATIENT)
Dept: REHABILITATION | Facility: HOSPITAL | Age: 64
End: 2019-12-10
Attending: ORTHOPAEDIC SURGERY
Payer: MEDICAID

## 2019-12-10 DIAGNOSIS — M25.511 RIGHT SHOULDER PAIN, UNSPECIFIED CHRONICITY: Primary | ICD-10-CM

## 2019-12-10 DIAGNOSIS — M25.611 SHOULDER STIFFNESS, RIGHT: ICD-10-CM

## 2019-12-10 DIAGNOSIS — R29.898 SHOULDER WEAKNESS: ICD-10-CM

## 2019-12-10 PROCEDURE — 97530 THERAPEUTIC ACTIVITIES: CPT | Mod: PN

## 2019-12-10 NOTE — PROGRESS NOTES
"Time in 345  Time out 430      Timed units  3 therex                              Time:345-430      S: no new issues    Pain:C5 ache, about 1/10 with some basic self care    O:    prom right flex nt         d2 0" with mild tension         olecranon to surface          abd 180  prom left flex 180         d2 0"         olecranon to surface         abd 180    full prom supine hands behind the head vs left side    stretches as tolerated-pain free: d2, supine hands behind head, er 45 abd    theraband 2 x 20: purple add, ir 0 abd, depression, protraction, row; yellow er 0 abd        education: likely tx progression        A:  Fixing last bit of stiffness imperative for full overhead use and painless mechanics    P: manual tx, PRE, stretching, patient education, HEP      "

## 2019-12-16 DIAGNOSIS — J30.2 SEASONAL ALLERGIES: ICD-10-CM

## 2019-12-16 RX ORDER — LEVOCETIRIZINE DIHYDROCHLORIDE 5 MG/1
5 TABLET, FILM COATED ORAL NIGHTLY PRN
Qty: 30 TABLET | Refills: 11 | Status: SHIPPED | OUTPATIENT
Start: 2019-12-16 | End: 2021-06-28 | Stop reason: SDUPTHER

## 2019-12-16 RX ORDER — DULOXETIN HYDROCHLORIDE 60 MG/1
CAPSULE, DELAYED RELEASE ORAL
Qty: 30 CAPSULE | Refills: 3 | Status: SHIPPED | OUTPATIENT
Start: 2019-12-16 | End: 2020-04-08

## 2019-12-18 ENCOUNTER — CLINICAL SUPPORT (OUTPATIENT)
Dept: REHABILITATION | Facility: HOSPITAL | Age: 64
End: 2019-12-18
Attending: ORTHOPAEDIC SURGERY
Payer: MEDICAID

## 2019-12-18 DIAGNOSIS — R29.898 SHOULDER WEAKNESS: ICD-10-CM

## 2019-12-18 DIAGNOSIS — M25.511 RIGHT SHOULDER PAIN, UNSPECIFIED CHRONICITY: Primary | ICD-10-CM

## 2019-12-18 DIAGNOSIS — M25.611 SHOULDER STIFFNESS, RIGHT: ICD-10-CM

## 2019-12-18 PROCEDURE — 97530 THERAPEUTIC ACTIVITIES: CPT | Mod: PN

## 2019-12-18 NOTE — PROGRESS NOTES
"Time in 330  Time out 430      Timed units  4 therex                              Time:330-430      S: doing all required tasks    Pain:C5 ache, about 1/10 with some basic self care    O:    prom right flex 170         d2 0" with mild tension         olecranon to surface          abd 180  prom left flex 180         d2 0"         olecranon to surface         abd 180        stretches as tolerated-pain free: d2, supine hands behind head, er 45 abd    theraband 2 x 20: purple add, ir 0 abd, depression, protraction, row; yellow er 0 abd        education: issued band routine as above to be done for 6 months or so and told to focus on er 45 abd and d2 stretch for about 3 months; told to progress use as tolerated-pain free and to avoid  press and upright row exercises; explained as end range tension with d2 and er 45 abd resolves, mild tightness into flexion on right should resolve; told to ask ortho if any restrictions apply long term with use    A:  all goals met except prom goal    P: d/c      "

## 2019-12-20 ENCOUNTER — PATIENT MESSAGE (OUTPATIENT)
Dept: ADMINISTRATIVE | Facility: OTHER | Age: 64
End: 2019-12-20

## 2019-12-20 ENCOUNTER — PATIENT MESSAGE (OUTPATIENT)
Dept: FAMILY MEDICINE | Facility: CLINIC | Age: 64
End: 2019-12-20

## 2019-12-20 NOTE — PROGRESS NOTES
"Digital Medicine: Health  Follow-Up    Patient reports he has been dealing with some congestion.          Follow Up  Follow-up reason(s): reading review      Addressed elevated reading of 156/95 mmHg on 12/16, patient is unsure as to why it was high.  Patient reports maybe it has been his congestion.  Patient reports his congestion has been making him "dizzy".       INTERVENTION(S)  encouragement/support and denied further coaching    PLAN  additional monitoring needed and continue monitoring      There are no preventive care reminders to display for this patient.    Last 5 Patient Entered Readings                                      Current 30 Day Average: 129/80     Recent Readings 12/20/2019 12/18/2019 12/16/2019 12/16/2019 12/9/2019    SBP (mmHg) 133 133 156 155 126    DBP (mmHg) 80 86 87 95 81    Pulse 77 85 92 94 74        Patient asked: Patient is unsure as to what he can take for his congestion.  Notified patient that his digital medicine clinician could give some recommendations if he wished to speak with her.  Patient reports he was going to message his doctor.  Referred to physician for clarification.    Patient asked:     Patient asked:     Patient reports he was finished and has been cleared from his occupational therapy.  Patient states he has continued therapy at his home and continues to get better.   "

## 2019-12-31 RX ORDER — VALSARTAN 320 MG/1
320 TABLET ORAL DAILY
Qty: 30 TABLET | Refills: 3 | Status: SHIPPED | OUTPATIENT
Start: 2019-12-31 | End: 2020-05-08

## 2020-01-08 RX ORDER — TRAZODONE HYDROCHLORIDE 50 MG/1
50 TABLET ORAL NIGHTLY
Qty: 30 TABLET | Refills: 2 | Status: SHIPPED | OUTPATIENT
Start: 2020-01-08 | End: 2020-04-08

## 2020-01-27 ENCOUNTER — PATIENT MESSAGE (OUTPATIENT)
Dept: FAMILY MEDICINE | Facility: CLINIC | Age: 65
End: 2020-01-27

## 2020-02-14 ENCOUNTER — PATIENT OUTREACH (OUTPATIENT)
Dept: OTHER | Facility: OTHER | Age: 65
End: 2020-02-14

## 2020-02-14 NOTE — PROGRESS NOTES
Digital Medicine: Health  Follow-Up    Patient reports he has been dealing with some family stress.  Patient denies any other concerns at this time.           Follow Up  Follow-up reason(s): reading review      Readings are trending up   Patient reports his pressure is elevated due to dealing with family stress.  Patient reports he is moving and expects everything to get back to normal.       INTERVENTION(S)  encouragement/support    PLAN  continue monitoring      There are no preventive care reminders to display for this patient.    Last 5 Patient Entered Readings                                      Current 30 Day Average: 147/88     Recent Readings 2/10/2020 2/6/2020 2/6/2020 2/6/2020 1/30/2020    SBP (mmHg) 144 146 159 160 144    DBP (mmHg) 80 91 92 100 89    Pulse 88 70 79 84 84

## 2020-03-11 ENCOUNTER — PATIENT OUTREACH (OUTPATIENT)
Dept: ADMINISTRATIVE | Facility: HOSPITAL | Age: 65
End: 2020-03-11

## 2020-03-11 DIAGNOSIS — Z12.11 SCREENING FOR COLON CANCER: Primary | ICD-10-CM

## 2020-03-11 NOTE — PROGRESS NOTES
Chart review completed 03/11/2020.  Care Everywhere updates requested and reviewed.  Immunizations reconciled. Media reviewed. Portal message sent    WOG orders placed.      Health Maintenance Due   Topic Date Due    Colonoscopy  10/08/1975    Pneumococcal Vaccine (Highest Risk) (1 of 3 - PCV13) 10/08/1976    Shingles Vaccine (1 of 2) 10/08/2007

## 2020-03-19 ENCOUNTER — PATIENT MESSAGE (OUTPATIENT)
Dept: FAMILY MEDICINE | Facility: CLINIC | Age: 65
End: 2020-03-19

## 2020-03-24 ENCOUNTER — OFFICE VISIT (OUTPATIENT)
Dept: FAMILY MEDICINE | Facility: CLINIC | Age: 65
End: 2020-03-24
Payer: MEDICAID

## 2020-03-24 DIAGNOSIS — B02.8 HERPES ZOSTER WITH COMPLICATION: ICD-10-CM

## 2020-03-24 DIAGNOSIS — F32.A DEPRESSION, UNSPECIFIED DEPRESSION TYPE: ICD-10-CM

## 2020-03-24 DIAGNOSIS — I10 HYPERTENSION, ESSENTIAL: Primary | ICD-10-CM

## 2020-03-24 PROCEDURE — 99213 PR OFFICE/OUTPT VISIT, EST, LEVL III, 20-29 MIN: ICD-10-PCS | Mod: 95,,, | Performed by: FAMILY MEDICINE

## 2020-03-24 PROCEDURE — 99213 OFFICE O/P EST LOW 20 MIN: CPT | Mod: 95,,, | Performed by: FAMILY MEDICINE

## 2020-03-24 RX ORDER — FAMOTIDINE 20 MG/1
20 TABLET, FILM COATED ORAL NIGHTLY PRN
Qty: 30 TABLET | Refills: 1 | Status: SHIPPED | OUTPATIENT
Start: 2020-03-24 | End: 2020-06-12

## 2020-03-24 RX ORDER — FLUTICASONE PROPIONATE 50 MCG
2 SPRAY, SUSPENSION (ML) NASAL DAILY
Qty: 16 G | Refills: 3 | Status: SHIPPED | OUTPATIENT
Start: 2020-03-24 | End: 2020-07-11

## 2020-03-24 RX ORDER — FAMCICLOVIR 500 MG/1
500 TABLET ORAL 3 TIMES DAILY
Qty: 21 TABLET | Refills: 0 | Status: SHIPPED | OUTPATIENT
Start: 2020-03-24 | End: 2021-11-16 | Stop reason: SDUPTHER

## 2020-03-26 ENCOUNTER — TELEPHONE (OUTPATIENT)
Dept: FAMILY MEDICINE | Facility: CLINIC | Age: 65
End: 2020-03-26

## 2020-03-26 ENCOUNTER — PATIENT MESSAGE (OUTPATIENT)
Dept: FAMILY MEDICINE | Facility: CLINIC | Age: 65
End: 2020-03-26

## 2020-03-26 NOTE — TELEPHONE ENCOUNTER
Received from [facility]: Famotidine 20 mg Needs a Prior Auth. Original script sent on 3/24/20    Reason for Request: Alternative Requested    Pharmacy Comments:    Alternative requested medication requires a PA.

## 2020-03-26 NOTE — PROGRESS NOTES
Subjective:       Patient ID: Mane Ramires is a 64 y.o. male.    Chief Complaint: No chief complaint on file.    HPI   The patient is a 64-year-old who is here today via video visit for chronic follow-up.  Overall, he is doing fairly well.  He is self isolating at home.  He is eating better and has lost 6 lb.  Given the covid 19, he has not been as physically active as he was in the past.  Today we discussed the followin) shingles.  He recently started with another bout of shingles.  He has the shingles rash present on his leg.  He believes that this is from stress.  He would be interested in an antiviral medicine as this was helpful before for this condition .   2)  GERD.  Without his Zantac, he is having more reflux symptoms.  He wonders if there something else he can take besides the Zantac.  He denies any trouble swallowing.  He denies any abdominal pain, nausea or vomiting  3) hypertension.  He is taking the Diovan and Toprol.  He is participating in the digital hypertension medicine program.  His recent blood pressure readings have been good including his reading this morning which was 112/73    4) depression and anxiety.  He is doing well emotionally despite the recent stressors.  He is currently taking Cymbalta which he finds to be helpful      Review of systems is remarkable for:  1) back pain.  He has had some increasing low back pain.  He did complete physical therapy before covid 19 outbreak.  He has not been doing the home exercises and knows that that is something he needs to start doing again  2)  sinus issues.  He is bothered by allergies this time of year.  He has been having some sinus congestion rhinorrhea and left ear issues.      Review of Systems   Constitutional: Negative for appetite change, chills, diaphoresis, fatigue, fever and unexpected weight change.   HENT: Positive for congestion, postnasal drip and rhinorrhea. Negative for ear pain, sinus pressure, sneezing, sore throat  and trouble swallowing.    Eyes: Negative for pain, discharge and visual disturbance.   Respiratory: Negative for cough, chest tightness, shortness of breath and wheezing.    Cardiovascular: Negative for chest pain, palpitations and leg swelling.   Gastrointestinal: Negative for abdominal distention, abdominal pain, blood in stool, constipation, diarrhea, nausea and vomiting.   Musculoskeletal: Positive for back pain.   Skin: Positive for rash.       Objective:      Physical Exam   Constitutional: He appears well-developed and well-nourished.     There were no vitals taken for this visit.There is no height or weight on file to calculate BMI.        A/P:  1) zoster.  Acute.  I am going to treat him with a course of Famvir.  If his symptoms do not improve or if they worsen, he will let me know  2)  GERD.  Persistent.  We will try Pepcid 20 mg at night.  If this is not effective, he will let me know   3)  Hypertension.  Well controlled.  Continue with the digital hypertension medicine program  4) depression and anxiety.  Well controlled.  Continue with Cymbalta.  If he develops any new or worsening symptoms, he will let me know  5) back pain.  Recurrent.  He will resume his home physical therapy exercises.  If this is not helpful, he will let me know  6) allergic rhinitis.  Persistent.  We will try Flonase.  He will continue with the Xyzal.              The patient location is:  home  The chief complaint leading to consultation is:  Chronic follow-up including hypertension  Visit type: Virtual visit with synchronous audio and video  Total time spent with patient:  20 min    Each patient to whom he or she provides medical services by telemedicine is:  (1) informed of the relationship between the physician and patient and the respective role of any other health care provider with respect to management of the patient; and (2) notified that he or she may decline to receive medical services by telemedicine and may withdraw  from such care at any time.

## 2020-03-31 PROBLEM — M79.672 FOOT PAIN, BILATERAL: Status: RESOLVED | Noted: 2019-08-22 | Resolved: 2020-03-31

## 2020-03-31 PROBLEM — M79.671 FOOT PAIN, BILATERAL: Status: RESOLVED | Noted: 2019-08-22 | Resolved: 2020-03-31

## 2020-04-08 RX ORDER — TRAZODONE HYDROCHLORIDE 50 MG/1
50 TABLET ORAL NIGHTLY
Qty: 30 TABLET | Refills: 3 | Status: SHIPPED | OUTPATIENT
Start: 2020-04-08 | End: 2020-08-06

## 2020-04-08 RX ORDER — DULOXETIN HYDROCHLORIDE 60 MG/1
CAPSULE, DELAYED RELEASE ORAL
Qty: 30 CAPSULE | Refills: 3 | Status: SHIPPED | OUTPATIENT
Start: 2020-04-08 | End: 2020-08-05

## 2020-04-15 ENCOUNTER — PATIENT MESSAGE (OUTPATIENT)
Dept: FAMILY MEDICINE | Facility: CLINIC | Age: 65
End: 2020-04-15

## 2020-04-21 ENCOUNTER — PATIENT MESSAGE (OUTPATIENT)
Dept: FAMILY MEDICINE | Facility: CLINIC | Age: 65
End: 2020-04-21

## 2020-04-22 ENCOUNTER — PATIENT MESSAGE (OUTPATIENT)
Dept: FAMILY MEDICINE | Facility: CLINIC | Age: 65
End: 2020-04-22

## 2020-04-23 ENCOUNTER — OFFICE VISIT (OUTPATIENT)
Dept: FAMILY MEDICINE | Facility: CLINIC | Age: 65
End: 2020-04-23
Payer: MEDICAID

## 2020-04-23 DIAGNOSIS — J32.9 SINUSITIS, UNSPECIFIED CHRONICITY, UNSPECIFIED LOCATION: Primary | ICD-10-CM

## 2020-04-23 PROCEDURE — 99213 OFFICE O/P EST LOW 20 MIN: CPT | Mod: 95,,, | Performed by: FAMILY MEDICINE

## 2020-04-23 PROCEDURE — 99213 PR OFFICE/OUTPT VISIT, EST, LEVL III, 20-29 MIN: ICD-10-PCS | Mod: 95,,, | Performed by: FAMILY MEDICINE

## 2020-04-23 RX ORDER — AMOXICILLIN AND CLAVULANATE POTASSIUM 875; 125 MG/1; MG/1
1 TABLET, FILM COATED ORAL 2 TIMES DAILY
Qty: 20 TABLET | Refills: 0 | Status: SHIPPED | OUTPATIENT
Start: 2020-04-23 | End: 2020-05-03

## 2020-04-26 NOTE — PROGRESS NOTES
Subjective:       Patient ID: Mane Ramires is a 64 y.o. male.    Chief Complaint: Sinus Problem    HPI   The patient is a 64-year-old who is here today with a possible sinus infection.  He has been having sinus issues for the past month.  He scheduled this appointment because his sinus symptoms persist and his right cheek is puffy tender and swollen.  In addition to his right cheek being tender and swollen, he has also had green rhinorrhea and postnasal drainage.  He occasionally has a cough which is nonproductive aside from the phlegm he is clearing out of his throat.  He has had intermittent left ear pain.  He denies any fevers or chills.  He is using Flonase which is helping.    Review of Systems   Constitutional: Negative for appetite change, chills, diaphoresis, fatigue, fever and unexpected weight change.   HENT: Positive for congestion, ear pain, rhinorrhea, sinus pressure and sinus pain. Negative for postnasal drip, sneezing, sore throat and trouble swallowing.    Eyes: Negative for pain, discharge and visual disturbance.   Respiratory: Positive for cough. Negative for chest tightness, shortness of breath and wheezing.    Cardiovascular: Negative for chest pain, palpitations and leg swelling.   Gastrointestinal: Negative for abdominal distention, abdominal pain, blood in stool, constipation, diarrhea, nausea and vomiting.   Skin: Negative for rash.       Objective:      Physical Exam   Constitutional: He appears well-developed and well-nourished.   HENT:   Right maxillary pain swelling and redness is noted     There were no vitals taken for this visit.There is no height or weight on file to calculate BMI.          A/P:  1) sinusitis.  Acute.  I am going to treat him with a course of Augmentin.  He will continue with the Flonase.  If his symptoms do not improve or if they worsen, he will let me know        The patient location is: home  The chief complaint leading to consultation is: sinus pain and  pressure and drainage  Visit type: Virtual visit with synchronous audio and video  Total time spent with patient: 15 min    Each patient to whom he or she provides medical services by telemedicine is:  (1) informed of the relationship between the physician and patient and the respective role of any other health care provider with respect to management of the patient; and (2) notified that he or she may decline to receive medical services by telemedicine and may withdraw from such care at any time.

## 2020-04-30 ENCOUNTER — PATIENT OUTREACH (OUTPATIENT)
Dept: ADMINISTRATIVE | Facility: HOSPITAL | Age: 65
End: 2020-04-30

## 2020-04-30 NOTE — PROGRESS NOTES
COVID-19 Workflow non-compliant chart audits. Chart review completed 04/30/2020    Care Everywhere and media, updates requested and reviewed.  Lab [facility], [facility], and DIS reviewed if applies.      Colon cancer screening report

## 2020-05-05 ENCOUNTER — PATIENT MESSAGE (OUTPATIENT)
Dept: ADMINISTRATIVE | Facility: HOSPITAL | Age: 65
End: 2020-05-05

## 2020-05-08 DIAGNOSIS — I10 HYPERTENSION, ESSENTIAL: ICD-10-CM

## 2020-05-08 RX ORDER — METOPROLOL SUCCINATE 50 MG/1
TABLET, EXTENDED RELEASE ORAL
Qty: 30 TABLET | Refills: 2 | Status: SHIPPED | OUTPATIENT
Start: 2020-05-08 | End: 2020-08-05

## 2020-05-08 RX ORDER — VALSARTAN 320 MG/1
320 TABLET ORAL DAILY
Qty: 30 TABLET | Refills: 2 | Status: SHIPPED | OUTPATIENT
Start: 2020-05-08 | End: 2020-05-14

## 2020-05-13 ENCOUNTER — TELEPHONE (OUTPATIENT)
Dept: FAMILY MEDICINE | Facility: CLINIC | Age: 65
End: 2020-05-13

## 2020-05-14 RX ORDER — VALSARTAN 160 MG/1
320 TABLET ORAL DAILY
Qty: 180 TABLET | Refills: 1 | Status: SHIPPED | OUTPATIENT
Start: 2020-05-14 | End: 2020-12-01

## 2020-05-14 NOTE — TELEPHONE ENCOUNTER
Which pharmacy has the 160?  (All pharmacies should accept medicaid but i'm good with doing 2 of the 160s)

## 2020-05-14 NOTE — TELEPHONE ENCOUNTER
Spoke to pharmacy. They do have Valsartan 160.  We can search a private pharmacy but I am not sure they will accept his Medicaid.

## 2020-05-18 ENCOUNTER — PATIENT OUTREACH (OUTPATIENT)
Dept: OTHER | Facility: OTHER | Age: 65
End: 2020-05-18

## 2020-05-18 NOTE — PROGRESS NOTES
"Digital Medicine: Health  Follow-Up    Patient denies any other concerns at this time.           Follow Up  Follow-up reason(s): reading review      Patient reports he is moving in two weeks "so we may see a fluctuation".  Patient reports he is doing well.       INTERVENTION(S)  encouragement/support and denied further coaching    PLAN  continue monitoring      There are no preventive care reminders to display for this patient.    Last 5 Patient Entered Readings                                      Current 30 Day Average: 122/78     Recent Readings 5/11/2020 5/3/2020 4/25/2020 4/23/2020 4/22/2020    SBP (mmHg) 127 116 116 141 118    DBP (mmHg) 82 72 71 86 78    Pulse 83 73 73 82 74            Diet Screening   No change to diet.      Physical Activity Screening   No change to exercise routine.        "

## 2020-05-19 ENCOUNTER — PATIENT MESSAGE (OUTPATIENT)
Dept: FAMILY MEDICINE | Facility: CLINIC | Age: 65
End: 2020-05-19

## 2020-05-19 DIAGNOSIS — M25.50 ARTHRALGIA, UNSPECIFIED JOINT: Primary | ICD-10-CM

## 2020-05-21 ENCOUNTER — PATIENT MESSAGE (OUTPATIENT)
Dept: FAMILY MEDICINE | Facility: CLINIC | Age: 65
End: 2020-05-21

## 2020-05-21 ENCOUNTER — OFFICE VISIT (OUTPATIENT)
Dept: FAMILY MEDICINE | Facility: CLINIC | Age: 65
End: 2020-05-21
Payer: MEDICAID

## 2020-05-21 DIAGNOSIS — M54.9 BACK PAIN, UNSPECIFIED BACK LOCATION, UNSPECIFIED BACK PAIN LATERALITY, UNSPECIFIED CHRONICITY: ICD-10-CM

## 2020-05-21 DIAGNOSIS — M19.90 ARTHRITIS: Primary | ICD-10-CM

## 2020-05-21 PROCEDURE — 99213 OFFICE O/P EST LOW 20 MIN: CPT | Mod: 95,,, | Performed by: FAMILY MEDICINE

## 2020-05-21 PROCEDURE — 99213 PR OFFICE/OUTPT VISIT, EST, LEVL III, 20-29 MIN: ICD-10-PCS | Mod: 95,,, | Performed by: FAMILY MEDICINE

## 2020-05-21 RX ORDER — HYDROCODONE BITARTRATE AND ACETAMINOPHEN 5; 325 MG/1; MG/1
1 TABLET ORAL DAILY PRN
Qty: 30 TABLET | Refills: 0 | Status: SHIPPED | OUTPATIENT
Start: 2020-05-21 | End: 2021-04-21

## 2020-05-22 ENCOUNTER — HOSPITAL ENCOUNTER (OUTPATIENT)
Dept: RADIOLOGY | Facility: CLINIC | Age: 65
Discharge: HOME | End: 2020-05-22
Attending: FAMILY MEDICINE
Payer: MEDICAID

## 2020-05-22 DIAGNOSIS — M19.90 ARTHRITIS: ICD-10-CM

## 2020-05-22 DIAGNOSIS — M54.9 BACK PAIN, UNSPECIFIED BACK LOCATION, UNSPECIFIED BACK PAIN LATERALITY, UNSPECIFIED CHRONICITY: ICD-10-CM

## 2020-05-22 PROCEDURE — 72202 X-RAY EXAM SI JOINTS 3/> VWS: CPT | Mod: 26,,, | Performed by: RADIOLOGY

## 2020-05-22 PROCEDURE — 72202 XR SACROILIAC JOINTS COMPLETE: ICD-10-PCS | Mod: 26,,, | Performed by: RADIOLOGY

## 2020-05-22 PROCEDURE — 73130 X-RAY EXAM OF HAND: CPT | Mod: 26,50,S$GLB, | Performed by: RADIOLOGY

## 2020-05-22 PROCEDURE — 73130 XR HAND COMPLETE 3 VIEWS BILATERAL: ICD-10-PCS | Mod: 26,50,S$GLB, | Performed by: RADIOLOGY

## 2020-05-22 PROCEDURE — 73130 X-RAY EXAM OF HAND: CPT | Mod: TC,50,FY,PO

## 2020-05-22 PROCEDURE — 72202 X-RAY EXAM SI JOINTS 3/> VWS: CPT | Mod: TC,FY,PO

## 2020-05-28 ENCOUNTER — PATIENT MESSAGE (OUTPATIENT)
Dept: FAMILY MEDICINE | Facility: CLINIC | Age: 65
End: 2020-05-28

## 2020-05-28 DIAGNOSIS — M79.642 PAIN IN BOTH HANDS: Primary | ICD-10-CM

## 2020-05-28 DIAGNOSIS — M79.641 PAIN IN BOTH HANDS: Primary | ICD-10-CM

## 2020-05-30 NOTE — PROGRESS NOTES
Subjective:       Patient ID: Mane Ramires is a 64 y.o. male.    Chief Complaint: Joint Pain    HPI   The patient is a 64-year-old who is here today to discuss his joint pain.  He is especially having pain in the knuckles (particularly the MCPs) of his fingers especially his left thumb, knee pain, and back pain in the SI area.  This joint pain has been coming on for the past few weeks.  He has joint stiffness.  His joints hurt to move but he finds that with time he feels better with movement.  He has tried NSAIDs and tramadol but his pain is becoming intolerable.  His grandmother had some type of arthritis and his daughter has some autoimmune issues    Review of Systems   Constitutional: Negative for activity change, appetite change, chills, diaphoresis, fatigue, fever and unexpected weight change.   HENT: Negative for congestion, ear pain, hearing loss, postnasal drip, rhinorrhea, sinus pressure, sneezing, sore throat and trouble swallowing.    Eyes: Negative for pain, discharge and visual disturbance.   Respiratory: Negative for cough, chest tightness, shortness of breath and wheezing.    Cardiovascular: Negative for chest pain, palpitations and leg swelling.   Gastrointestinal: Negative for abdominal distention, abdominal pain, blood in stool, constipation, diarrhea, nausea and vomiting.   Endocrine: Negative for polydipsia and polyuria.   Genitourinary: Negative for difficulty urinating, hematuria and urgency.   Musculoskeletal: Positive for arthralgias and joint swelling. Negative for neck pain.   Skin: Negative for rash.   Neurological: Positive for headaches. Negative for weakness.   Psychiatric/Behavioral: Negative for confusion and dysphoric mood.       Objective:      Physical Exam   Constitutional: He appears well-nourished.     There were no vitals taken for this visit.There is no height or weight on file to calculate BMI.            A/P:  1) arthralgias particularly involving the hands and  fingers and SI area is.  Progressive. We are going to check x-rays of the hands and SI joints.  I am going to take autoimmune labs.  We are going to check x-rays of his hands.  For his pain, I am going to treat him with a course of Norco.  He understands that this is a narcotic with the potential for addiction and tolerance.  The patient also understands that all narcotics impair reflexes and cognition and so the patient should not drive, operate heavy machinery or make significant decisions while taking the narcotic.  The patient should also not take the benzodiazepines with alcohol  Of note, he did see rheumatologist in 2017 at the time his diagnosis was OA      The patient location is: home  The chief complaint leading to consultation is: joint pain  Visit type: Virtual visit with synchronous audio and video  Total time spent with patient: 15 min    Each patient to whom he or she provides medical services by telemedicine is:  (1) informed of the relationship between the physician and patient and the respective role of any other health care provider with respect to management of the patient; and (2) notified that he or she may decline to receive medical services by telemedicine and may withdraw from such care at any time.

## 2020-06-02 ENCOUNTER — PATIENT MESSAGE (OUTPATIENT)
Dept: FAMILY MEDICINE | Facility: CLINIC | Age: 65
End: 2020-06-02

## 2020-06-04 ENCOUNTER — LAB VISIT (OUTPATIENT)
Dept: LAB | Facility: HOSPITAL | Age: 65
End: 2020-06-04
Attending: FAMILY MEDICINE
Payer: MEDICAID

## 2020-06-04 DIAGNOSIS — M19.90 ARTHRITIS: ICD-10-CM

## 2020-06-04 LAB — ERYTHROCYTE [SEDIMENTATION RATE] IN BLOOD BY WESTERGREN METHOD: 3 MM/HR (ref 0–10)

## 2020-06-04 PROCEDURE — 86140 C-REACTIVE PROTEIN: CPT

## 2020-06-04 PROCEDURE — 36415 COLL VENOUS BLD VENIPUNCTURE: CPT | Mod: PO

## 2020-06-04 PROCEDURE — 86431 RHEUMATOID FACTOR QUANT: CPT

## 2020-06-04 PROCEDURE — 85651 RBC SED RATE NONAUTOMATED: CPT | Mod: PO

## 2020-06-04 PROCEDURE — 86038 ANTINUCLEAR ANTIBODIES: CPT

## 2020-06-05 LAB
CRP SERPL-MCNC: 2.1 MG/L (ref 0–8.2)
RHEUMATOID FACT SERPL-ACNC: <10 IU/ML (ref 0–15)

## 2020-06-08 ENCOUNTER — PATIENT MESSAGE (OUTPATIENT)
Dept: FAMILY MEDICINE | Facility: CLINIC | Age: 65
End: 2020-06-08

## 2020-06-08 LAB — ANA SER QL IF: NORMAL

## 2020-08-10 ENCOUNTER — CLINICAL SUPPORT (OUTPATIENT)
Dept: REHABILITATION | Facility: HOSPITAL | Age: 65
End: 2020-08-10
Attending: FAMILY MEDICINE
Payer: MEDICAID

## 2020-08-10 DIAGNOSIS — M25.512 LEFT SHOULDER PAIN, UNSPECIFIED CHRONICITY: Primary | ICD-10-CM

## 2020-08-10 DIAGNOSIS — M25.612 SHOULDER STIFFNESS, LEFT: ICD-10-CM

## 2020-08-10 DIAGNOSIS — M25.519 SHOULDER PAIN, UNSPECIFIED CHRONICITY, UNSPECIFIED LATERALITY: ICD-10-CM

## 2020-08-10 DIAGNOSIS — R29.898 SHOULDER WEAKNESS: ICD-10-CM

## 2020-08-10 PROCEDURE — 97165 OT EVAL LOW COMPLEX 30 MIN: CPT | Mod: PN

## 2020-08-10 NOTE — PATIENT INSTRUCTIONS
current home program 8-10-20  -apply ice (towel on shirt, ice pack on towel) x 15 minutes 3-5 x day  -take the pain meds we talked about if pain prevents sleep, try to avoid taking during the day so you dont mask symptoms with light use  -very light painless nonrepetitive use only

## 2020-08-10 NOTE — PLAN OF CARE
[facility] Occupational Therapy  Initial Evaluation     Date: 8/10/2020  Name: Mane Ramires  Clinic Number: [id number]    Therapy Diagnosis:   Encounter Diagnoses   Name Primary?    Shoulder pain, unspecified chronicity, unspecified laterality     Left shoulder pain, unspecified chronicity Yes    Shoulder stiffness, left     Shoulder weakness      Physician: Patience Gordon MD    Physician Orders: evaluate and tx  Medical Diagnosis: shoulder pain unspecified laterality  Surgical Procedure and Date: n/a, n/a  Evaluation Date: 8/10/2020  Insurance Authorization Period Expiration: referral # [id number] 8-10-20 thru 9-10-20  Plan of Care Certification Period: 8-10-20 thru 9-21-20  Date of Return to MD: see epic    Visit # / Visits authorized: 1 / 1  Time In:215  Time Out: 245  Total Billable Time: 0 minutes    Precautions:  universal  Subjective     Involved Side: left  Dominant Side: right  Date of Onset: about 3 weeks pre 7-30-20  History of Current Condition/Mechanism of Injury: reports tried opening a locked car door, symptoms persisted, got order from his md but no physical exam or scans done  Imaging: n/a  Previous Therapy: n/a    Past Medical History/Physical Systems Review:   Mane Ramires  has a past medical history of Accident, Allergy, Anxiety, ASD (atrial septal defect), Cervical stenosis of spine, CTS (carpal tunnel syndrome), DDD (degenerative disc disease), cervical, Depression, JEEVAN (generalized anxiety disorder), Gender identity disorder, H/O cardiovascular stress test, Herpes simplex type 2 infection, History of atypical hyperplasia of breast, Hypertension, IGT (impaired glucose tolerance), Myelomalacia, OA (osteoarthritis), Prediabetes, and TBI (traumatic brain injury).    Mane Ramires  has a past surgical history that includes Hysterectomy (1984); Appendectomy (1969); Cervical fusion (10/2014); Mastectomy (02/2015); Bilateral salpingoophorectomy (02/2015);  Amputation; Upper gastrointestinal endoscopy (09/06/2017); Finger amputation (Left); Fracture surgery (Right, 2003); Cholecystectomy; Carpal tunnel release (12/2017); Reconstruction of nose (N/A, 8/23/2018); Endoscopic nasal septoplasty (N/A, 8/23/2018); Biopsy of liver with ultrasound guidance (N/A, 12/18/2018); and Rotator cuff repair (Right, 2019).    Mane has a current medication list which includes the following prescription(s): carboxymethylcellulose, cholecalciferol (vitamin d3), duloxetine, famotidine, fluticasone propionate, hydrocodone-acetaminophen, ibuprofen, ketoconazole, levocetirizine, meclizine, metoprolol succinate, ranitidine, testosterone cypionate, trazodone, and valsartan.    Review of patient's allergies indicates:   Allergen Reactions    Sudafed [pseudoephedrine hcl] Other (See Comments)     Heart racing      Codeine Other (See Comments)     Heart racing    Gabapentin      Caused memory loss    Pseudoephedrine     Cortisone Palpitations        Patient's Goals for Therapy: full painless use    Pain:  Functional Pain Scale Rating 0-10:   7/10 on average  2/10 at best  10/10 at worst  Location: lateral deltoid with some distal referral  Description: ache, sharp  Aggravating Factors: use, sidelying  Easing Factors: rest  Occupation:  disabled  Working presently: no  Duties: Normal self and home care tasks    Functional Limitations/Social History:    Previous functional status includes: Independent with all ADLs.     Current Functional Status   Home/Living environment : lives with daughter    Limitation of Functional Status as follows: elevation, functional ir, HADD with all required tasks limited   ADLs/IADLs:               All self care and home care with significant limitations   Leisure: not tested  pain meds: taking for left shoulder pain yet rx was for old sinus infection  nicotine: denies  caffeine: 1 cup of tea daily    Objective   Posture:bj deferred    Pmhx: rotator cuff repair  "on right with post op rehab/seeepic      Palpation:nt  Sensation:wnl  ROM:  r prom er at 0 abd 80   at 45 abd 90    at 90 abd 90;       sidelying ir  80        ir behind back 15"    lift off  l prom er at 0 abd 60    at 45 abd 50    at  90 abd nt since abd to 90 yet tension to table;      sidelying ir 60         ir behind back low bacl (+) with empty end feel          CMS Impairment/Limitation/Restriction for FOTO  Survey    Therapist reviewed FOTO scores for Mane Ramires on 8/10/2020.   FOTO documents entered into [employer] - see Media section.    Limitation Score: 64%  Category: Carrying    Current : CL = least 60% but < 80% impaired, limited or restricted  Goal: CK = at least 40% but < 60% impaired, limited or restricted             Treatment       Issued HEP as below: see above        Home Exercise Program/Education:  Issued HEP (see patient instructions in EMR) . Exercises were reviewed and Yousif was able to demonstrate them prior to the end of the session.   Pt received a written copy of exercises to perform at home. Yousif demonstrated good understanding of the education provided.  Pt was advised to perform these exercises free of pain, and to stop performing them if pain occurs.    Patient/Family Education: role of OT, goals for OT, scheduling/cancellations - pt verbalized understanding. Discussed insurance limitations with patient.    Additional Education provided: likely tx progression, expectations of rehab    Assessment     Mane Ramires is a 64 y.o. male referred to outpatient occupational therapy and presents with a medical diagnosis of see above, resulting in Decreased ROM, Decreased muscle strength and Increased pain and demonstrates limitations as described in the chart below. Following medical record review it is determined that pt will benefit from occupational therapy services in order to maximize pain free and/or functional use of right arm. The following goals were discussed with the " patient and patient is in agreement with them as to be addressed in the treatment plan. The patient's rehab potential is good.     Anticipated barriers to occupational therapy: pain, stiffness, duration of symptoms, weakness  Pt has no cultural, educational or language barriers to learning provided.    Profile and History Assessment of Occupational Performance Level of Clinical Decision Making Complexity Score   Occupational Profile:   Mane Ramires is a 64 y.o. male who lives with their daughter and is on disability Mane Ramires has difficulty with  ADLs and IADLs as listed previously, which  affecting his/her daily functional abilities.      Comorbidities:    has a past medical history of Accident, Allergy, Anxiety, ASD (atrial septal defect), Cervical stenosis of spine, CTS (carpal tunnel syndrome), DDD (degenerative disc disease), cervical, Depression, JEEVAN (generalized anxiety disorder), Gender identity disorder, H/O cardiovascular stress test, Herpes simplex type 2 infection, History of atypical hyperplasia of breast, Hypertension, IGT (impaired glucose tolerance), Myelomalacia, OA (osteoarthritis), Prediabetes, and TBI (traumatic brain injury).    Medical and Therapy History Review:   Brief               Performance Deficits    Physical:  Joint Mobility  Muscle Power/Strength  Pain    Cognitive:  No Deficits    Psychosocial:    No Deficits     Clinical Decision Making:  low    Assessment Process:  Problem-Focused Assessments    Modification/Need for Assistance:  Not Necessary    Intervention Selection:  Limited Treatment Options       low  Based on PMHX, co morbidities , data from assessments and functional level of assistance required with task and clinical presentation directly impacting function.           The following goals were discussed with the patient and patient is in agreement with them as to be addressed in the treatment plan.     Goals:   stg to be met in 3 weeks 1. Patient will be I  with HEP 2. Patient will have 2/10 pain with light use 3. Patient will have = prom of bilateral shoulders to enhance affected arm use with ADL      ltg to be met by d/c 1. Patient will be I with d/c HEP 2. Patient will have 1/10 pain with all use 3. Patient will have about 3+/5 MMT for elevation to promote full functional use                                                                  4. Patient will be I with all ADL      all goals ongoing unless otherwise noted      Plan   Certification Period/Plan of care expiration: 8/10/2020 to 9-21-20.    Outpatient Occupational Therapy 2 times weekly for 6 weeks to include the following interventions: eval and tx    Above frequency and duration in above dates may change based on patient progress and need for therapy    Kali BURNS CHT

## 2020-08-18 ENCOUNTER — CLINICAL SUPPORT (OUTPATIENT)
Dept: REHABILITATION | Facility: HOSPITAL | Age: 65
End: 2020-08-18
Attending: FAMILY MEDICINE
Payer: MEDICAID

## 2020-08-18 DIAGNOSIS — M25.512 LEFT SHOULDER PAIN, UNSPECIFIED CHRONICITY: ICD-10-CM

## 2020-08-18 DIAGNOSIS — M25.612 SHOULDER STIFFNESS, LEFT: ICD-10-CM

## 2020-08-18 PROCEDURE — 97530 THERAPEUTIC ACTIVITIES: CPT | Mod: PN

## 2020-08-18 NOTE — PROGRESS NOTES
Occupational Therapy Daily Treatment Note     Date: 8/18/2020  Name: Mane Dodd [facility] Number: [id number]    Therapy Diagnosis: No diagnosis found.  Physician: Patience Gordon MD    Physician Orders: evaluate and tx  Medical Diagnosis: shoulder pain unspecified laterality  Surgical Procedure and Date: n/a, n/a  Evaluation Date: 8/10/2020  Insurance Authorization Period Expiration: referral # [id number] 8-18-20 thru 9-29-20  Plan of Care Certification Period: 8-10-20 thru 9-21-20  Date of Return to MD: see epic    Visit # / Visits authorized: 1 / 12 referral # [id number] 8-18-20 thru 9-29-20  Time In:1  Time Out: 145  Total Billable Time: 45 minutes    Precautions:  universal    Subjective     Pt reports: no new issues  he is compliant with home exercise program.  Response to previous treatment:good  Functional change: slight increase in use with self care    Pain: 0/10 rest; 7/10 light use  Location: diffuse left shoulder ache        Objective       Timed units:  1 manual                            Time:1-115  2 therapeutic exercise       time: 115-145      Measurements: n/a        Fluido: n/a  U/s:n/a      UBE: n/a    Scar massage: n/a    Stretches as tolerated-pain free: er 0, 45 abd      Manual:   gh resting position traction, axial traction 90 abd, static inferior slides in MLPP    ROM: n/a    PRE: n/a      HEP: see below    Home Exercises and Education Provided     Education provided:     progress towards goals   likely tx progression  rationale of rehab interventions    Written Home Exercises Provided: yes. er 0, 45 abd stretch          Previously issued exercises were reviewed if still part of current tx plan as well as any issued today and Yousif was able to demonstrate them prior to the end of the session.  Yousif demonstrated good understanding of the HEP provided.     See EMR under patient instructions and/or media for HEP issued today or in past    Assessment     Pt would continue to benefit from  skilled OT in order to resolve gh capsular pattern, fix all stiffness, promote proper mechanics, etc.    Yousif is progressing well towards his goals and there are no updates to goals at this time. Pt prognosis is good.  Pt will continue to benefit from skilled outpatient occupational therapy to address the deficits listed in the problem list on initial evaluation to provide pt/family education and to maximize pt's level of independence in the home and community environment.     Anticipated barriers to occupational therapy: pain, stiffness, tightness, weakness    Pt's spiritual, cultural and educational needs considered and pt agreeable to plan of care and goals.    Goals:  stg to be met in 3 weeks 1. Patient will be I with HEP 2. Patient will have 2/10 pain with light use 3. Patient will have = prom of bilateral shoulders to enhance affected arm use with ADL        ltg to be met by d/c 1. Patient will be I with d/c HEP 2. Patient will have 1/10 pain with all use 3. Patient will have about 3+/5 MMT for elevation to promote full functional use                                                                  4. Patient will be I with all ADL       all goals ongoing unless otherwise noted              Any goals met today ?  (if any met see above)    Updates/Grading for next session: prn    Plan: manual tx, ROM, PRE, patient education, prn tx      Kali Spears, OT/CHT

## 2020-08-21 ENCOUNTER — CLINICAL SUPPORT (OUTPATIENT)
Dept: REHABILITATION | Facility: HOSPITAL | Age: 65
End: 2020-08-21
Attending: FAMILY MEDICINE
Payer: MEDICAID

## 2020-08-21 DIAGNOSIS — M25.512 LEFT SHOULDER PAIN, UNSPECIFIED CHRONICITY: Primary | ICD-10-CM

## 2020-08-21 DIAGNOSIS — M25.612 SHOULDER STIFFNESS, LEFT: ICD-10-CM

## 2020-08-21 DIAGNOSIS — M25.519 SHOULDER PAIN, UNSPECIFIED CHRONICITY, UNSPECIFIED LATERALITY: ICD-10-CM

## 2020-08-21 PROCEDURE — 97530 THERAPEUTIC ACTIVITIES: CPT | Mod: PN

## 2020-08-21 NOTE — PROGRESS NOTES
"  Occupational Therapy Daily Treatment Note     Date: 8/21/2020  Name: Mane Dodd [facility] Number: [id number]    Therapy Diagnosis:   Encounter Diagnoses   Name Primary?    Left shoulder pain, unspecified chronicity Yes    Shoulder stiffness, left     Shoulder pain, unspecified chronicity, unspecified laterality      Physician: Patience Gordon MD    Physician Orders: evaluate and tx  Medical Diagnosis: shoulder pain unspecified laterality  Surgical Procedure and Date: n/a, n/a  Evaluation Date: 8/10/2020  Insurance Authorization Period Expiration: referral # [id number] 8-18-20 thru 9-29-20  Plan of Care Certification Period: 8-10-20 thru 9-21-20  Date of Return to MD: see epic    Visit # / Visits authorized: 2 / 12 referral # [id number] 8-18-20 thru 9-29-20  Time In:215  Time Out: 3  Total Billable Time: 45 minutes    Precautions:  universal    Subjective     Pt reports: no new issues, understands rehab rationale  he is compliant with home exercise program.  Response to previous treatment:good  Functional change: increase in all light use continues to improve    Pain: 0/10 rest; 7/10 light use  Location: diffuse left shoulder ache        Objective       Timed units:  3 therapeutic exercise       time: 215-3      Measurements:     r prom er at 0 abd 80  at 45 abd 90    at 90 abd 90;       sidelying ir  80        ir behind back 15"    lift off  l prom er at 0 abd 60    at 45 abd 64    at  90 abd 40;      sidelying ir 64         ir behind back 7"   lift off        Fluido: n/a  U/s:n/a      UBE: n/a    Scar massage: n/a    Stretches as tolerated-pain free: er 0, 45 abd; sleeper      Manual:   n/a    ROM: n/a    PRE: n/a      HEP: see below    Home Exercises and Education Provided     Education provided:     progress towards goals   likely tx progression  rationale of rehab interventions    Written Home Exercises Provided: yes/sleeper          Previously issued exercises were reviewed if still part of current tx " plan as well as any issued today and Yousif was able to demonstrate them prior to the end of the session.  Yousif demonstrated good understanding of the HEP provided.     See EMR under patient instructions and/or media for HEP issued today or in past    Assessment       Reactivity with good reduction since day 1 of OT          Pt would continue to benefit from skilled OT in order to resolve gh capsular pattern, fix all stiffness, promote proper mechanics, etc.    Yousif is progressing well towards his goals and there are no updates to goals at this time. Pt prognosis is good.  Pt will continue to benefit from skilled outpatient occupational therapy to address the deficits listed in the problem list on initial evaluation to provide pt/family education and to maximize pt's level of independence in the home and community environment.     Anticipated barriers to occupational therapy: pain, stiffness, tightness, weakness    Pt's spiritual, cultural and educational needs considered and pt agreeable to plan of care and goals.    Goals:  stg to be met in 3 weeks 1. Patient will be I with HEP 2. Patient will have 2/10 pain with light use 3. Patient will have = prom of bilateral shoulders to enhance affected arm use with ADL        ltg to be met by d/c 1. Patient will be I with d/c HEP 2. Patient will have 1/10 pain with all use 3. Patient will have about 3+/5 MMT for elevation to promote full functional use                                                                  4. Patient will be I with all ADL       all goals ongoing unless otherwise noted              Any goals met today ?  (if any met see above)    Updates/Grading for next session: prn    Plan: manual tx, ROM, PRE, patient education, prn tx      Kali Spears, OT/CHT

## 2020-08-28 ENCOUNTER — CLINICAL SUPPORT (OUTPATIENT)
Dept: REHABILITATION | Facility: HOSPITAL | Age: 65
End: 2020-08-28
Attending: FAMILY MEDICINE
Payer: MEDICAID

## 2020-08-28 DIAGNOSIS — M25.612 SHOULDER STIFFNESS, LEFT: ICD-10-CM

## 2020-08-28 DIAGNOSIS — M25.512 LEFT SHOULDER PAIN, UNSPECIFIED CHRONICITY: Primary | ICD-10-CM

## 2020-08-28 DIAGNOSIS — M25.519 SHOULDER PAIN, UNSPECIFIED CHRONICITY, UNSPECIFIED LATERALITY: ICD-10-CM

## 2020-08-28 PROCEDURE — 97530 THERAPEUTIC ACTIVITIES: CPT | Mod: PN

## 2020-08-28 NOTE — PROGRESS NOTES
Occupational Therapy Daily Treatment Note     Date: 8/28/2020  Name: [facility] Number: [id number]    Therapy Diagnosis:   Encounter Diagnoses   Name Primary?    Left shoulder pain, unspecified chronicity Yes    Shoulder stiffness, left     Shoulder pain, unspecified chronicity, unspecified laterality      Physician: Patience Gordon MD    Physician Orders: evaluate and tx  Medical Diagnosis: shoulder pain unspecified laterality  Surgical Procedure and Date: n/a, n/a  Evaluation Date: 8/10/2020  Insurance Authorization Period Expiration: referral # [id number] 8-18-20 thru 9-29-20  Plan of Care Certification Period: 8-10-20 thru 9-21-20  Date of Return to MD: see epic    Visit # / Visits authorized: 3 / 12 referral # [id number] 8-18-20 thru 9-29-20  Time In:215  Time Out: 3  Total Billable Time: 45 minutes    Precautions:  universal    Subjective     Pt reports: no new issues, lifting milk jug with left arom into icebox still not possible although sleeping on left side not as bad as day 1 of OT  he is compliant with home exercise program.  Response to previous treatment:good  Functional change: increase in all light use continues to improve day to day    Pain: 0/10 rest; 4/10 light use  Location: diffuse left shoulder ache        Objective       Timed units:  3 therapeutic exercise       time: 215-3      Measurements:     n/a        Fluido: n/a  U/s:n/a      UBE: n/a    Scar massage: n/a    Stretches as tolerated-pain free: er 0, 45, 90 abd; sleeper      Manual:   n/a    ROM: n/a    PRE: n/a      HEP: see below    Home Exercises and Education Provided     Education provided:     progress towards goals   likely tx progression  rationale of rehab interventions    Written Home Exercises Provided: yes/er 90 abd stretch          Previously issued exercises were reviewed if still part of current tx plan as well as any issued today and Yousif was able to demonstrate them prior to the end of the session.   Yousif demonstrated good understanding of the HEP provided.     See EMR under patient instructions and/or media for HEP issued today or in past    Assessment       Good effort in clinic, gh hypomobility with good decrease since day 1 of OT, full prom all planes essential for proper mechanics long term          Pt would continue to benefit from skilled OT in order to resolve gh capsular pattern, fix all stiffness, promote proper mechanics, etc.    Yousif is progressing well towards his goals and there are no updates to goals at this time. Pt prognosis is good.  Pt will continue to benefit from skilled outpatient occupational therapy to address the deficits listed in the problem list on initial evaluation to provide pt/family education and to maximize pt's level of independence in the home and community environment.     Anticipated barriers to occupational therapy: pain, stiffness, tightness, weakness    Pt's spiritual, cultural and educational needs considered and pt agreeable to plan of care and goals.    Goals:  stg to be met in 3 weeks 1. Patient will be I with HEP 2. Patient will have 2/10 pain with light use 3. Patient will have = prom of bilateral shoulders to enhance affected arm use with ADL        ltg to be met by d/c 1. Patient will be I with d/c HEP 2. Patient will have 1/10 pain with all use 3. Patient will have about 3+/5 MMT for elevation to promote full functional use                                                                  4. Patient will be I with all ADL       all goals ongoing unless otherwise noted              Any goals met today ?  (if any met see above)    Updates/Grading for next session: prn    Plan: manual tx, ROM, PRE, patient education, prn tx      Kali Spears, OT/CHT

## 2020-08-31 ENCOUNTER — CLINICAL SUPPORT (OUTPATIENT)
Dept: REHABILITATION | Facility: HOSPITAL | Age: 65
End: 2020-08-31
Attending: FAMILY MEDICINE
Payer: MEDICAID

## 2020-08-31 DIAGNOSIS — M25.512 LEFT SHOULDER PAIN, UNSPECIFIED CHRONICITY: Primary | ICD-10-CM

## 2020-08-31 DIAGNOSIS — M25.612 SHOULDER STIFFNESS, LEFT: ICD-10-CM

## 2020-08-31 DIAGNOSIS — M25.519 SHOULDER PAIN, UNSPECIFIED CHRONICITY, UNSPECIFIED LATERALITY: ICD-10-CM

## 2020-08-31 DIAGNOSIS — R29.898 SHOULDER WEAKNESS: ICD-10-CM

## 2020-08-31 PROCEDURE — 97530 THERAPEUTIC ACTIVITIES: CPT | Mod: PN

## 2020-08-31 NOTE — PROGRESS NOTES
Occupational Therapy Daily Treatment Note     Date: 8/31/2020  Name: Mane Dodd [facility] Number: [id number]    Therapy Diagnosis:   Encounter Diagnoses   Name Primary?    Left shoulder pain, unspecified chronicity Yes    Shoulder stiffness, left     Shoulder pain, unspecified chronicity, unspecified laterality     Shoulder weakness      Physician: Patience Gordon MD    Physician Orders: evaluate and tx  Medical Diagnosis: shoulder pain unspecified laterality  Surgical Procedure and Date: n/a, n/a  Evaluation Date: 8/10/2020  Insurance Authorization Period Expiration: referral # [id number] 8-18-20 thru 9-29-20  Plan of Care Certification Period: 8-10-20 thru 9-21-20  Date of Return to MD: see epic    Visit # / Visits authorized: 4 / 12 referral # [id number] 8-18-20 thru 9-29-20  Time In:215  Time Out: 3  Total Billable Time: 45 minutes    Precautions:  universal    Subjective     Pt reports: no new issues  he is compliant with home exercise program.  Response to previous treatment:good  Functional change: cleaning self with bathing much easier than day 1 of OT    Pain: 0/10 rest; 2/10 light use  Location: diffuse left shoulder ache        Objective       Timed units:  1 manual                          Time:215-230  2 therapeutic exercise       time: 230-3      Measurements:     n/a        Fluido: n/a  U/s:n/a      UBE: n/a    Scar massage: n/a    Stretches as tolerated-pain free: er 0, 45, 90 abd; sleeper      Manual:  gh resting position traction, coronal traction 90 abd      ROM: n/a    PRE: n/a      HEP: see below    Home Exercises and Education Provided     Education provided:     progress towards goals   likely tx progression  rationale of rehab interventions    Written Home Exercises Provided: no          Previously issued exercises were reviewed if still part of current tx plan as well as any issued today and Yousif was able to demonstrate them prior to the end of the session.  Yousif demonstrated good  understanding of the HEP provided.     See EMR under patient instructions and/or media for HEP issued today or in past    Assessment       Gh hypomobility with good reduction since day 1 of OT, may benefit from d2 and supine hands behind head stretch once gh stiffness gone          Pt would continue to benefit from skilled OT in order to resolve gh capsular pattern, fix all stiffness, promote proper mechanics, etc.    Yousif is progressing well towards his goals and there are no updates to goals at this time. Pt prognosis is good.  Pt will continue to benefit from skilled outpatient occupational therapy to address the deficits listed in the problem list on initial evaluation to provide pt/family education and to maximize pt's level of independence in the home and community environment.     Anticipated barriers to occupational therapy: pain, stiffness, tightness, weakness    Pt's spiritual, cultural and educational needs considered and pt agreeable to plan of care and goals.    Goals:  stg to be met in 3 weeks 1. Patient will be I with HEP 2. Patient will have 2/10 pain with light use 3. Patient will have = prom of bilateral shoulders to enhance affected arm use with ADL        ltg to be met by d/c 1. Patient will be I with d/c HEP 2. Patient will have 1/10 pain with all use 3. Patient will have about 3+/5 MMT for elevation to promote full functional use                                                                  4. Patient will be I with all ADL       all goals ongoing unless otherwise noted              Any goals met today ?  (if any met see above)    Updates/Grading for next session: prn, consider gh prom testing    Plan: manual tx, ROM, PRE, patient education, prn tx      Kali Spears, OT/CHT

## 2020-09-03 ENCOUNTER — CLINICAL SUPPORT (OUTPATIENT)
Dept: REHABILITATION | Facility: HOSPITAL | Age: 65
End: 2020-09-03
Attending: FAMILY MEDICINE
Payer: MEDICAID

## 2020-09-03 DIAGNOSIS — M25.512 LEFT SHOULDER PAIN, UNSPECIFIED CHRONICITY: Primary | ICD-10-CM

## 2020-09-03 DIAGNOSIS — M25.612 SHOULDER STIFFNESS, LEFT: ICD-10-CM

## 2020-09-03 DIAGNOSIS — M25.519 SHOULDER PAIN, UNSPECIFIED CHRONICITY, UNSPECIFIED LATERALITY: ICD-10-CM

## 2020-09-03 PROCEDURE — 97530 THERAPEUTIC ACTIVITIES: CPT | Mod: PN

## 2020-09-03 NOTE — PROGRESS NOTES
"  Occupational Therapy Daily Treatment Note     Date: 9/3/2020  Name: Mane Dodd [facility] Number: [id number]    Therapy Diagnosis:   Encounter Diagnoses   Name Primary?    Left shoulder pain, unspecified chronicity Yes    Shoulder stiffness, left     Shoulder pain, unspecified chronicity, unspecified laterality      Physician: Patience Gordon MD    Physician Orders: evaluate and tx  Medical Diagnosis: shoulder pain unspecified laterality  Surgical Procedure and Date: n/a, n/a  Evaluation Date: 8/10/2020  Insurance Authorization Period Expiration: referral # [id number] 8-18-20 thru 9-29-20  Plan of Care Certification Period: 8-10-20 thru 9-21-20  Date of Return to MD: see epic    Visit # / Visits authorized: 5 / 12 referral # [id number] 8-18-20 thru 9-29-20  Time In:215  Time Out: 3  Total Billable Time: 45 minutes    Precautions:  universal    Subjective     Pt reports: no new issues, tweaked left shoulder while sleeping last night  he is compliant with home exercise program.  Response to previous treatment:good  Functional change: no real change since last OT visit    Pain: 0/10 rest; 3/10 light use  Location: diffuse left shoulder ache        Objective       Timed units:  3 therapeutic exercise       time: 215-3      Measurements:     r prom er at 0 abd 80   at 45 abd 90    at 90 abd 90;       sidelying ir  80        ir behind back 15"    lift off  l prom er at 0 abd 70    at 45 abd 72    at  90 abd 64;      sidelying ir 70         ir behind back 15"   lift off        Fluido: n/a  U/s:n/a      UBE: n/a    Scar massage: n/a    Stretches as tolerated-pain free: er 0, 45, 90 abd; sleeper      Manual:  n/a      ROM: n/a    PRE: n/a      HEP: see below    Home Exercises and Education Provided     Education provided:     progress towards goals   likely tx progression  rationale of rehab interventions    Written Home Exercises Provided: no          Previously issued exercises were reviewed if still part of current " tx plan as well as any issued today and Yousif was able to demonstrate them prior to the end of the session.  Yousif demonstrated good understanding of the HEP provided.     See EMR under patient instructions and/or media for HEP issued today or in past    Assessment       Once full prom noted all planes, may benefit from painful arc test followed by remaining special tests to pinpoint pain generator    Proper PRE routine will be needed to facilitate good mechanics once tightness resolved          Pt would continue to benefit from skilled OT in order to resolve gh capsular pattern, fix all stiffness, promote proper mechanics, etc.    Yousif is progressing well towards his goals and there are no updates to goals at this time. Pt prognosis is good.  Pt will continue to benefit from skilled outpatient occupational therapy to address the deficits listed in the problem list on initial evaluation to provide pt/family education and to maximize pt's level of independence in the home and community environment.     Anticipated barriers to occupational therapy: pain, stiffness, tightness, weakness    Pt's spiritual, cultural and educational needs considered and pt agreeable to plan of care and goals.    Goals:  stg to be met in 3 weeks 1. Patient will be I with HEP 2. Patient will have 2/10 pain with light use 3. Patient will have = prom of bilateral shoulders to enhance affected arm use with ADL        ltg to be met by d/c 1. Patient will be I with d/c HEP 2. Patient will have 1/10 pain with all use 3. Patient will have about 3+/5 MMT for elevation to promote full functional use                                                                  4. Patient will be I with all ADL       all goals ongoing unless otherwise noted              Any goals met today ?  (if any met see above)    Updates/Grading for next session: prn    Plan: manual tx, ROM, PRE, patient education, prn tx      Kali Spears, OT/CHT

## 2020-09-08 ENCOUNTER — CLINICAL SUPPORT (OUTPATIENT)
Dept: REHABILITATION | Facility: HOSPITAL | Age: 65
End: 2020-09-08
Attending: FAMILY MEDICINE
Payer: MEDICAID

## 2020-09-08 DIAGNOSIS — M25.512 LEFT SHOULDER PAIN, UNSPECIFIED CHRONICITY: Primary | ICD-10-CM

## 2020-09-08 DIAGNOSIS — M25.519 SHOULDER PAIN, UNSPECIFIED CHRONICITY, UNSPECIFIED LATERALITY: ICD-10-CM

## 2020-09-08 DIAGNOSIS — R29.898 SHOULDER WEAKNESS: ICD-10-CM

## 2020-09-08 DIAGNOSIS — M25.612 SHOULDER STIFFNESS, LEFT: ICD-10-CM

## 2020-09-08 PROCEDURE — 97530 THERAPEUTIC ACTIVITIES: CPT | Mod: PN

## 2020-09-08 NOTE — PROGRESS NOTES
Occupational Therapy Daily Treatment Note     Date: 9/8/2020  Name: Mane Dodd [facility] Number: [id number]    Therapy Diagnosis:   Encounter Diagnoses   Name Primary?    Left shoulder pain, unspecified chronicity Yes    Shoulder stiffness, left     Shoulder pain, unspecified chronicity, unspecified laterality     Shoulder weakness      Physician: Patience Gordon MD    Physician Orders: evaluate and tx  Medical Diagnosis: shoulder pain unspecified laterality  Surgical Procedure and Date: n/a, n/a  Evaluation Date: 8/10/2020  Insurance Authorization Period Expiration: referral # [id number] 8-18-20 thru 9-29-20  Plan of Care Certification Period: 8-10-20 thru 9-21-20  Date of Return to MD: see epic    Visit # / Visits authorized: 6/ 12 referral # [id number] 8-18-20 thru 9-29-20  Time In:315  Time Out: 4  Total Billable Time: 30 minutes    Precautions:  universal    Subjective     Pt reports: no new issues, reports slept deep last night and slept on left shoulder which has increased pain today  he is compliant with home exercise program.  Response to previous treatment:good  Functional change: no real change since last OT visit    Pain: 0/10 rest; 3/10 light use  Location: diffuse left shoulder ache        Objective       Timed units:  3 therapeutic exercise       time: 315-4      Measurements:     n/a    Fluido: n/a  U/s:n/a      UBE: n/a    Scar massage: n/a    Stretches as tolerated-pain free: er 0, 45, 90 abd; sleeper      Manual:  n/a      ROM: prom er 45, 90 abd, gh circumduction    PRE: n/a      HEP: see below    Home Exercises and Education Provided     Education provided:     progress towards goals   likely tx progression  rationale of rehab interventions    Written Home Exercises Provided: no          Previously issued exercises were reviewed if still part of current tx plan as well as any issued today and Yousif was able to demonstrate them prior to the end of the session.  Yousif demonstrated good  understanding of the HEP provided.     See EMR under patient instructions and/or media for HEP issued today or in past    Assessment       Joint play continues to improve        Pt would continue to benefit from skilled OT in order to resolve gh capsular pattern, fix all stiffness, promote proper mechanics, etc.    Yousif is progressing well towards his goals and there are no updates to goals at this time. Pt prognosis is good.  Pt will continue to benefit from skilled outpatient occupational therapy to address the deficits listed in the problem list on initial evaluation to provide pt/family education and to maximize pt's level of independence in the home and community environment.     Anticipated barriers to occupational therapy: pain, stiffness, tightness, weakness    Pt's spiritual, cultural and educational needs considered and pt agreeable to plan of care and goals.    Goals:  stg to be met in 3 weeks 1. Patient will be I with HEP 2. Patient will have 2/10 pain with light use 3. Patient will have = prom of bilateral shoulders to enhance affected arm use with ADL        ltg to be met by d/c 1. Patient will be I with d/c HEP 2. Patient will have 1/10 pain with all use 3. Patient will have about 3+/5 MMT for elevation to promote full functional use                                                                  4. Patient will be I with all ADL       all goals ongoing unless otherwise noted              Any goals met today ?  (if any met see above)    Updates/Grading for next session: prn    Plan: manual tx, ROM, PRE, patient education, prn tx      Kali Spears, OT/CHT

## 2020-09-10 ENCOUNTER — CLINICAL SUPPORT (OUTPATIENT)
Dept: REHABILITATION | Facility: HOSPITAL | Age: 65
End: 2020-09-10
Attending: FAMILY MEDICINE
Payer: MEDICAID

## 2020-09-10 DIAGNOSIS — M25.612 SHOULDER STIFFNESS, LEFT: ICD-10-CM

## 2020-09-10 DIAGNOSIS — M25.519 SHOULDER PAIN, UNSPECIFIED CHRONICITY, UNSPECIFIED LATERALITY: ICD-10-CM

## 2020-09-10 DIAGNOSIS — M25.512 LEFT SHOULDER PAIN, UNSPECIFIED CHRONICITY: Primary | ICD-10-CM

## 2020-09-10 PROCEDURE — 97530 THERAPEUTIC ACTIVITIES: CPT | Mod: PN

## 2020-09-10 NOTE — PROGRESS NOTES
Occupational Therapy Daily Treatment Note     Date: 9/10/2020  Name: Mane Dodd [facility] Number: [id number]    Therapy Diagnosis:   Encounter Diagnoses   Name Primary?    Left shoulder pain, unspecified chronicity Yes    Shoulder stiffness, left     Shoulder pain, unspecified chronicity, unspecified laterality      Physician: Patience Gordon MD    Physician Orders: evaluate and tx  Medical Diagnosis: shoulder pain unspecified laterality  Surgical Procedure and Date: n/a, n/a  Evaluation Date: 8/10/2020  Insurance Authorization Period Expiration: referral # [id number] 8-18-20 thru 9-29-20  Plan of Care Certification Period: 8-10-20 thru 9-21-20  Date of Return to MD: see epic    Visit # / Visits authorized: 7/ 12 referral # [id number] 8-18-20 thru 9-29-20  Time In:215  Time Out: 3  Total Billable Time: 45 minutes    Precautions:  universal    Subjective     Pt reports: no new issues  he is compliant with home exercise program.  Response to previous treatment:good  Functional change: no real change since last OT visit    Pain: 0/10 rest; 3/10 light use  Location: diffuse left shoulder ache        Objective       Timed units:  1 manual                          Time: 215-230  2 therapeutic exercise       time: 230-3      Measurements:     n/a    Fluido: n/a  U/s:n/a      UBE: n/a    Scar massage: n/a    Stretches as tolerated-pain free: er  45, 90 abd; sleeper      Manual:   gh resting position traction, bursal massage abd 1 and 2, coronal traction 90 abd  abd pain relief gh resting position traction, slide; scapulothoracic tx and subscapularis stretch not done; warm up rhythmic inf slides, abd with inf slides    ROM: prom er 45, 90 abd, gh circumduction    PRE: n/a      HEP: see below    Home Exercises and Education Provided     Education provided:     progress towards goals   likely tx progression  rationale of rehab interventions    Written Home Exercises Provided: no          Previously issued exercises were  reviewed if still part of current tx plan as well as any issued today and Yousif was able to demonstrate them prior to the end of the session.  Yousif demonstrated good understanding of the HEP provided.     See EMR under patient instructions and/or media for HEP issued today or in past    Assessment       Prom of left shoulder complex continues to improve        Pt would continue to benefit from skilled OT in order to resolve gh capsular pattern, fix all stiffness, promote proper mechanics, etc.    Yousif is progressing well towards his goals and there are no updates to goals at this time. Pt prognosis is good.  Pt will continue to benefit from skilled outpatient occupational therapy to address the deficits listed in the problem list on initial evaluation to provide pt/family education and to maximize pt's level of independence in the home and community environment.     Anticipated barriers to occupational therapy: pain, stiffness, tightness, weakness    Pt's spiritual, cultural and educational needs considered and pt agreeable to plan of care and goals.    Goals:  stg to be met in 3 weeks 1. Patient will be I with HEP 2. Patient will have 2/10 pain with light use 3. Patient will have = prom of bilateral shoulders to enhance affected arm use with ADL        ltg to be met by d/c 1. Patient will be I with d/c HEP 2. Patient will have 1/10 pain with all use 3. Patient will have about 3+/5 MMT for elevation to promote full functional use                                                                  4. Patient will be I with all ADL       all goals ongoing unless otherwise noted              Any goals met today ?  (if any met see above)    Updates/Grading for next session: prn    Plan: manual tx, ROM, PRE, patient education, prn tx      Kali Spears, OT/CHT

## 2020-09-14 ENCOUNTER — CLINICAL SUPPORT (OUTPATIENT)
Dept: REHABILITATION | Facility: HOSPITAL | Age: 65
End: 2020-09-14
Attending: FAMILY MEDICINE
Payer: MEDICAID

## 2020-09-14 DIAGNOSIS — M25.512 LEFT SHOULDER PAIN, UNSPECIFIED CHRONICITY: Primary | ICD-10-CM

## 2020-09-14 DIAGNOSIS — M25.519 SHOULDER PAIN, UNSPECIFIED CHRONICITY, UNSPECIFIED LATERALITY: ICD-10-CM

## 2020-09-14 DIAGNOSIS — M25.612 SHOULDER STIFFNESS, LEFT: ICD-10-CM

## 2020-09-14 PROCEDURE — 97530 THERAPEUTIC ACTIVITIES: CPT | Mod: PN

## 2020-09-14 NOTE — PROGRESS NOTES
"  Occupational Therapy Daily Treatment Note     Date: 9/14/2020  Name: Mane Dodd [facility] Number: [id number]    Therapy Diagnosis:   Encounter Diagnoses   Name Primary?    Left shoulder pain, unspecified chronicity Yes    Shoulder stiffness, left     Shoulder pain, unspecified chronicity, unspecified laterality      Physician: Patience Gordon MD    Physician Orders: evaluate and tx  Medical Diagnosis: shoulder pain unspecified laterality  Surgical Procedure and Date: n/a, n/a  Evaluation Date: 8/10/2020  Insurance Authorization Period Expiration: referral # [id number] 8-18-20 thru 9-29-20  Plan of Care Certification Period: 8-10-20 thru 9-21-20  Date of Return to MD: see epic    Visit # / Visits authorized: 8/ 12 referral # [id number] 8-18-20 thru 9-29-20  Time In:215  Time Out: 3  Total Billable Time: 45 minutes    Precautions:  universal    Subjective     Pt reports: no new issues, understands rationale of rehab progression  he is compliant with home exercise program.  Response to previous treatment:good  Functional change: no real change since last OT visit    Pain: 0/10 rest; 3/10 light use  Location: diffuse left shoulder ache        Objective       Timed units:    3 therapeutic exercise       time: 215-3      Measurements:     r prom er at 0 abd 80   at 45 abd 90    at 90 abd 90;       sidelying ir  80        ir behind back 15"    lift off  l prom er at 0 abd 70    at 45 abd 72    at  90 abd 70;      sidelying ir 74         ir behind back 15"   lift off          Fluido: n/a  U/s:n/a      UBE: n/a    Scar massage: n/a    Stretches as tolerated-pain free: er  0, 45, 90 abd; sleeper      Manual:     n/a    ROM: n/a    PRE: n/a      HEP: see below    Home Exercises and Education Provided     Education provided:     progress towards goals   likely tx progression  rationale of rehab interventions    Written Home Exercises Provided: er 90 abd stretch/patient does not remember being issued this in the " past          Previously issued exercises were reviewed if still part of current tx plan as well as any issued today and Yousif was able to demonstrate them prior to the end of the session.  Yousif demonstrated good understanding of the HEP provided.     See EMR under patient instructions and/or media for HEP issued today or in past    Assessment       contractile and noncontractile tissue continue to loosen, elevation stretches/end range elevation manual tx may be indicated once above planes = to contralateral side        Pt would continue to benefit from skilled OT in order to resolve gh capsular pattern, fix all stiffness, promote proper mechanics, etc.    Yousif is progressing well towards his goals and there are no updates to goals at this time. Pt prognosis is good.  Pt will continue to benefit from skilled outpatient occupational therapy to address the deficits listed in the problem list on initial evaluation to provide pt/family education and to maximize pt's level of independence in the home and community environment.     Anticipated barriers to occupational therapy: pain, stiffness, tightness, weakness    Pt's spiritual, cultural and educational needs considered and pt agreeable to plan of care and goals.    Goals:  stg to be met in 3 weeks 1. Patient will be I with HEP 2. Patient will have 2/10 pain with light use 3. Patient will have = prom of bilateral shoulders to enhance affected arm use with ADL        ltg to be met by d/c 1. Patient will be I with d/c HEP 2. Patient will have 1/10 pain with all use 3. Patient will have about 3+/5 MMT for elevation to promote full functional use                                                                  4. Patient will be I with all ADL       all goals ongoing unless otherwise noted              Any goals met today ?  (if any met see above)    Updates/Grading for next session: prn    Plan: manual tx, ROM, PRE, patient education, prn tx      Kali Spears  OT/CHT

## 2020-09-17 ENCOUNTER — CLINICAL SUPPORT (OUTPATIENT)
Dept: REHABILITATION | Facility: HOSPITAL | Age: 65
End: 2020-09-17
Attending: FAMILY MEDICINE
Payer: MEDICAID

## 2020-09-17 DIAGNOSIS — M25.512 LEFT SHOULDER PAIN, UNSPECIFIED CHRONICITY: Primary | ICD-10-CM

## 2020-09-17 DIAGNOSIS — M25.519 SHOULDER PAIN, UNSPECIFIED CHRONICITY, UNSPECIFIED LATERALITY: ICD-10-CM

## 2020-09-17 DIAGNOSIS — M25.612 SHOULDER STIFFNESS, LEFT: ICD-10-CM

## 2020-09-17 PROCEDURE — 97530 THERAPEUTIC ACTIVITIES: CPT | Mod: PN

## 2020-09-17 NOTE — PROGRESS NOTES
Occupational Therapy Daily Treatment Note     Date: 9/17/2020  Name: Mane Dodd [facility] Number: [id number]    Therapy Diagnosis:   Encounter Diagnoses   Name Primary?    Left shoulder pain, unspecified chronicity Yes    Shoulder stiffness, left     Shoulder pain, unspecified chronicity, unspecified laterality      Physician: Patience Gordon MD    Physician Orders: evaluate and tx  Medical Diagnosis: shoulder pain unspecified laterality  Surgical Procedure and Date: n/a, n/a  Evaluation Date: 8/10/2020  Insurance Authorization Period Expiration: referral # [id number] 8-18-20 thru 9-29-20  Plan of Care Certification Period: 8-10-20 thru 9-21-20  Date of Return to MD: see epic    Visit # / Visits authorized: 9/ 12 referral # [id number] 8-18-20 thru 9-29-20  Time In:215  Time Out: 3  Total Billable Time: 45 minutes    Precautions:  universal    Subjective     Pt reports: no new issues  he is compliant with home exercise program.  Response to previous treatment:good  Functional change: reaching across and behind body with good improvement since 2 weeks ago  Pain: 0/10 rest; 3/10 light use  Location: diffuse left shoulder ache        Objective       Timed units:    3 therapeutic exercise       time: 215-3      Measurements:     n/a        Fluido: n/a  U/s:n/a      UBE: n/a    Scar massage: n/a    Stretches as tolerated-pain free: er  0, 45, 90 abd; sleeper      Manual:     n/a    ROM: n/a    PRE: n/a      HEP: see below    Home Exercises and Education Provided     Education provided:     progress towards goals   likely tx progression  rationale of rehab interventions    Written Home Exercises Provided: no          Previously issued exercises were reviewed if still part of current tx plan as well as any issued today and Yousif was able to demonstrate them prior to the end of the session.  Yousif demonstrated good understanding of the HEP provided.     See EMR under patient instructions and/or media for HEP issued  today or in past    Assessment       Reactivity with max decrease since day 1 of OT      Pt would continue to benefit from skilled OT in order to resolve gh capsular pattern, fix all stiffness, promote proper mechanics, etc.    Yousif is progressing well towards his goals and there are no updates to goals at this time. Pt prognosis is good.  Pt will continue to benefit from skilled outpatient occupational therapy to address the deficits listed in the problem list on initial evaluation to provide pt/family education and to maximize pt's level of independence in the home and community environment.     Anticipated barriers to occupational therapy: pain, stiffness, tightness, weakness    Pt's spiritual, cultural and educational needs considered and pt agreeable to plan of care and goals.    Goals:  stg to be met in 3 weeks 1. Patient will be I with HEP 2. Patient will have 2/10 pain with light use 3. Patient will have = prom of bilateral shoulders to enhance affected arm use with ADL        ltg to be met by d/c 1. Patient will be I with d/c HEP 2. Patient will have 1/10 pain with all use 3. Patient will have about 3+/5 MMT for elevation to promote full functional use                                                                  4. Patient will be I with all ADL       all goals ongoing unless otherwise noted              Any goals met today ?  (if any met see above)    Updates/Grading for next session: prn, consider manual subscapularis stretches in clinic    Plan: manual tx, ROM, PRE, patient education, prn tx      Kali Spears, OT/CHT

## 2020-09-21 ENCOUNTER — CLINICAL SUPPORT (OUTPATIENT)
Dept: REHABILITATION | Facility: HOSPITAL | Age: 65
End: 2020-09-21
Attending: FAMILY MEDICINE
Payer: MEDICAID

## 2020-09-21 DIAGNOSIS — M25.519 SHOULDER PAIN, UNSPECIFIED CHRONICITY, UNSPECIFIED LATERALITY: ICD-10-CM

## 2020-09-21 DIAGNOSIS — M25.612 SHOULDER STIFFNESS, LEFT: ICD-10-CM

## 2020-09-21 DIAGNOSIS — M25.512 LEFT SHOULDER PAIN, UNSPECIFIED CHRONICITY: Primary | ICD-10-CM

## 2020-09-21 PROCEDURE — 97530 THERAPEUTIC ACTIVITIES: CPT | Mod: PN

## 2020-09-21 NOTE — PLAN OF CARE
Outpatient Therapy Updated Plan of Care     Visit Date: 9/21/2020  Name: Mane Dodd [facility] Number: [id number]    Therapy Diagnosis:   Encounter Diagnoses   Name Primary?    Left shoulder pain, unspecified chronicity Yes    Shoulder stiffness, left     Shoulder pain, unspecified chronicity, unspecified laterality      Physician: Patience Gordon MD    Physician Orders: evaluate and tx  Medical Diagnosis: see evaluation  Evaluation Date: 8-10-20    Total Visits Received: 11  Cancelled Visits: see epic  No Show Visits: see epic    Current Certification Period:  8-10-20 to 9-21-20  Precautions:  universal  Visits from Evaluation Date:  11  Functional Level Prior to Evaluation:  Decreased rom, use, and strength; pre symptom onset had no deficits with functional use    Subjective     Update: happy with progress    Objective     Update: see prior notes for measurements    Assessment     Update: stiffness and pain slowly decreasing, light functional use continues to improve    Previous Short Term Goals Status:   See today's note  New Short Term Goals Status:   n/a  Long Term Goal Status:   continue per initial plan of care.  Continue any LTGs added in notes post initial eval  Reasons for Recertification of Therapy:   Continued skilled and structured rehab imperative to properly promote full rom, balanced strength, and good mechanics long term with ADL    Plan     Updated Certification Period: 9/21/2020 to 11-2-20  Recommended Treatment Plan: 2 times per week for 6 weeks: evaluation and tx    Kali Spears OT/CHT  9/21/2020    frequency per week may change based on patient progress and need for therapy

## 2020-09-21 NOTE — PROGRESS NOTES
"  Occupational Therapy Daily Treatment Note     Date: 9/21/2020  [id number]    Therapy Diagnosis:   Encounter Diagnoses   Name Primary?    Left shoulder pain, unspecified chronicity Yes    Shoulder stiffness, left     Shoulder pain, unspecified chronicity, unspecified laterality      Physician: Patience Gordon MD    Physician Orders: evaluate and tx  Medical Diagnosis: shoulder pain unspecified laterality  Surgical Procedure and Date: n/a, n/a  Evaluation Date: 8/10/2020  Insurance Authorization Period Expiration: referral # [id number] 8-18-20 thru 9-29-20  Plan of Care Certification Period: 9-21-20 thru 10-19-20  Date of Return to MD: see epic    Visit # / Visits authorized: 10/ 12 referral # [id number] 8-18-20 thru 9-29-20  Time In:215  Time Out: 3  Total Billable Time: 45 minutes    Precautions:  universal    Subjective     Pt reports: no new issues  he is compliant with home exercise program.  Response to previous treatment:good  Functional change: slow but steady use with ADL increasing although elevation past shoulder level not feasible due to tightness  Pain: 0/10 rest; 3/10 light use  Location: diffuse left shoulder ache        Objective       Timed units:    3 therapeutic exercise       time: 215-3      Measurements:     r prom er at 0 abd 80   at 45 abd 90    at 90 abd 90;       sidelying ir  80        ir behind back 15"    lift off  l prom er at 0 abd 70    at 45 abd 74    at  90 abd 72;      sidelying ir 74         ir behind back 15"   lift off        Fluido: n/a  U/s:n/a      UBE: n/a    Scar massage: n/a    Stretches as tolerated-pain free: er  0, 45, 90 abd; sleeper      Manual:     n/a    ROM: n/a    PRE: n/a      HEP: see below    Home Exercises and Education Provided     Education provided:     progress towards goals   likely tx progression  rationale of rehab interventions    Written Home Exercises Provided: no          Previously issued exercises were " reviewed if still part of current tx plan as well as any issued today and Yousif was able to demonstrate them prior to the end of the session.  Yousif demonstrated good understanding of the HEP provided.     See EMR under patient instructions and/or media for HEP issued today or in past    Assessment       May benefit from end range elevation glides once above planes = to right side        Pt would continue to benefit from skilled OT in order to resolve gh capsular pattern, fix all stiffness, promote proper mechanics, etc.    Yousif is progressing well towards his goals and there are no updates to goals at this time. Pt prognosis is good.  Pt will continue to benefit from skilled outpatient occupational therapy to address the deficits listed in the problem list on initial evaluation to provide pt/family education and to maximize pt's level of independence in the home and community environment.     Anticipated barriers to occupational therapy: pain, stiffness, tightness, weakness    Pt's spiritual, cultural and educational needs considered and pt agreeable to plan of care and goals.    Goals:  stg to be met in 3 weeks 1. Patient will be I with HEP 2. Patient will have 2/10 pain with light use 3. Patient will have = prom of bilateral shoulders to enhance affected arm use with ADL        ltg to be met by d/c 1. Patient will be I with d/c HEP 2. Patient will have 1/10 pain with all use 3. Patient will have about 3+/5 MMT for elevation to promote full functional use                                                                  4. Patient will be I with all ADL       all goals ongoing unless otherwise noted              Any goals met today ?  (if any met see above)    Updates/Grading for next session: prn    Plan: manual tx, ROM, PRE, patient education, prn tx      Kali Spears, OT/CHT

## 2020-09-24 ENCOUNTER — CLINICAL SUPPORT (OUTPATIENT)
Dept: REHABILITATION | Facility: HOSPITAL | Age: 65
End: 2020-09-24
Attending: FAMILY MEDICINE
Payer: MEDICAID

## 2020-09-24 DIAGNOSIS — M25.519 SHOULDER PAIN, UNSPECIFIED CHRONICITY, UNSPECIFIED LATERALITY: ICD-10-CM

## 2020-09-24 DIAGNOSIS — M25.612 SHOULDER STIFFNESS, LEFT: ICD-10-CM

## 2020-09-24 DIAGNOSIS — M25.512 LEFT SHOULDER PAIN, UNSPECIFIED CHRONICITY: Primary | ICD-10-CM

## 2020-09-24 PROCEDURE — 97530 THERAPEUTIC ACTIVITIES: CPT | Mod: PN

## 2020-09-24 NOTE — PROGRESS NOTES
Occupational Therapy Daily Treatment Note     Date: 9/24/2020  Name: Mane Dodd [facility] Number: [id number]    Therapy Diagnosis:   Encounter Diagnoses   Name Primary?    Left shoulder pain, unspecified chronicity Yes    Shoulder stiffness, left     Shoulder pain, unspecified chronicity, unspecified laterality      Physician: Patience Gordon MD    Physician Orders: evaluate and tx  Medical Diagnosis: shoulder pain unspecified laterality  Surgical Procedure and Date: n/a, n/a  Evaluation Date: 8/10/2020  Insurance Authorization Period Expiration: referral # [id number] 8-18-20 thru 9-29-20  Plan of Care Certification Period: 9-21-20 thru 10-19-20  Date of Return to MD: see epic    Visit # / Visits authorized: 11/ 12 referral # [id number] 8-18-20 thru 9-29-20  Time In:215  Time Out: 3  Total Billable Time: 45 minutes    Precautions:  universal    Subjective     Pt reports: no new issues  he is compliant with home exercise program.  Response to previous treatment:good  Functional change: nothing significant since last visit  Pain: 0/10 rest; 3/10 light use  Location: diffuse left shoulder ache        Objective       Timed units:  1 manual                          Time: 215-230  2 therapeutic exercise       time: 230-3      Measurements:           Fluido: n/a  U/s:n/a      UBE: n/a    Scar massage: n/a    Stretches as tolerated-pain free: er  0, 45, 90 abd; sleeper      Manual: gh resting position traction, coronal plane traction abd 90      ROM: n/a    PRE: n/a      HEP: see below    Home Exercises and Education Provided     Education provided:     progress towards goals   likely tx progression  rationale of rehab interventions    Written Home Exercises Provided: no          Previously issued exercises were reviewed if still part of current tx plan as well as any issued today and Yousif was able to demonstrate them prior to the end of the session.  Yousif demonstrated good understanding of the HEP provided.      See EMR under patient instructions and/or media for HEP issued today or in past    Assessment       Full prom at shoulder complex imperative for painless elevation long term        Pt would continue to benefit from skilled OT in order to resolve gh capsular pattern, fix all stiffness, promote proper mechanics, etc.    Yousif is progressing well towards his goals and there are no updates to goals at this time. Pt prognosis is good.  Pt will continue to benefit from skilled outpatient occupational therapy to address the deficits listed in the problem list on initial evaluation to provide pt/family education and to maximize pt's level of independence in the home and community environment.     Anticipated barriers to occupational therapy: pain, stiffness, tightness, weakness    Pt's spiritual, cultural and educational needs considered and pt agreeable to plan of care and goals.    Goals:  stg to be met in 3 weeks 1. Patient will be I with HEP 2. Patient will have 2/10 pain with light use 3. Patient will have = prom of bilateral shoulders to enhance affected arm use with ADL        ltg to be met by d/c 1. Patient will be I with d/c HEP 2. Patient will have 1/10 pain with all use 3. Patient will have about 3+/5 MMT for elevation to promote full functional use                                                                  4. Patient will be I with all ADL       all goals ongoing unless otherwise noted              Any goals met today ?  (if any met see above)    Updates/Grading for next session: prn    Plan: manual tx, ROM, PRE, patient education, prn tx      Kali Spears, OT/CHT

## 2020-10-01 ENCOUNTER — CLINICAL SUPPORT (OUTPATIENT)
Dept: REHABILITATION | Facility: HOSPITAL | Age: 65
End: 2020-10-01
Attending: FAMILY MEDICINE
Payer: MEDICAID

## 2020-10-01 DIAGNOSIS — M25.512 LEFT SHOULDER PAIN, UNSPECIFIED CHRONICITY: Primary | ICD-10-CM

## 2020-10-01 DIAGNOSIS — M25.519 SHOULDER PAIN, UNSPECIFIED CHRONICITY, UNSPECIFIED LATERALITY: ICD-10-CM

## 2020-10-01 DIAGNOSIS — M25.612 SHOULDER STIFFNESS, LEFT: ICD-10-CM

## 2020-10-01 PROCEDURE — 97530 THERAPEUTIC ACTIVITIES: CPT | Mod: PN

## 2020-10-01 NOTE — PROGRESS NOTES
Occupational Therapy Daily Treatment Note     Date: 10/1/2020  Name: Mane Dodd [facility] Number: [id number]    Therapy Diagnosis:   Encounter Diagnoses   Name Primary?    Left shoulder pain, unspecified chronicity Yes    Shoulder stiffness, left     Shoulder pain, unspecified chronicity, unspecified laterality      Physician: Patience Gordon MD    Physician Orders: evaluate and tx  Medical Diagnosis: shoulder pain unspecified laterality  Surgical Procedure and Date: n/a, n/a  Evaluation Date: 8/10/2020  Insurance Authorization Period Expiration: referral # [id number] 9-24-20 thru 10-29-20  Plan of Care Certification Period: 9-21-20 thru 10-19-20  Date of Return to MD: see epic    Visit # / Visits authorized: 1/ 12 referral # [id number] 9-24-20 thru 10-29-20  Time In:215  Time Out: 3  Total Billable Time: 45 minutes    Precautions:  universal    Subjective     Pt reports: no new issues, reports pain with sleep last night  he is compliant with home exercise program.  Response to previous treatment:good  Functional change: nothing significant since last visit  Pain: 0/10 rest; 3/10 light use  Location: diffuse left shoulder ache        Objective       Timed units:  1 manual                          Time: 215-230  2 therapeutic exercise       time: 230-3      Measurements:     n/a      Fluido: n/a  U/s:n/a      UBE: n/a    Scar massage: n/a    Stretches as tolerated-pain free: er  45 abd      Manual: gh resting position traction, coronal plane traction abd 90, bursal massage abd type 1      ROM: n/a    PRE: n/a      HEP: see below    Home Exercises and Education Provided     Education provided:     progress towards goals   likely tx progression  rationale of rehab interventions    Written Home Exercises Provided: no          Previously issued exercises were reviewed if still part of current tx plan as well as any issued today and Yousif was able to demonstrate them prior to the end of the session.  Yousif  demonstrated good understanding of the HEP provided.     See EMR under patient instructions and/or media for HEP issued today or in past    Assessment       Increased reactivity noted      Pt would continue to benefit from skilled OT in order to resolve gh capsular pattern, fix all stiffness, promote proper mechanics, etc.    Yousif is progressing well towards his goals and there are no updates to goals at this time. Pt prognosis is good.  Pt will continue to benefit from skilled outpatient occupational therapy to address the deficits listed in the problem list on initial evaluation to provide pt/family education and to maximize pt's level of independence in the home and community environment.     Anticipated barriers to occupational therapy: pain, stiffness, tightness, weakness    Pt's spiritual, cultural and educational needs considered and pt agreeable to plan of care and goals.    Goals:  stg to be met in 3 weeks 1. Patient will be I with HEP 2. Patient will have 2/10 pain with light use 3. Patient will have = prom of bilateral shoulders to enhance affected arm use with ADL        ltg to be met by d/c 1. Patient will be I with d/c HEP 2. Patient will have 1/10 pain with all use 3. Patient will have about 3+/5 MMT for elevation to promote full functional use                                                                  4. Patient will be I with all ADL       all goals ongoing unless otherwise noted              Any goals met today ?  (if any met see above)    Updates/Grading for next session: prn, consider prom testing    Plan: manual tx, ROM, PRE, patient education, prn tx      Kali Spears, OT/CHT

## 2020-10-08 ENCOUNTER — CLINICAL SUPPORT (OUTPATIENT)
Dept: REHABILITATION | Facility: HOSPITAL | Age: 65
End: 2020-10-08
Attending: FAMILY MEDICINE
Payer: MEDICAID

## 2020-10-08 DIAGNOSIS — M25.512 LEFT SHOULDER PAIN, UNSPECIFIED CHRONICITY: Primary | ICD-10-CM

## 2020-10-08 DIAGNOSIS — M25.612 SHOULDER STIFFNESS, LEFT: ICD-10-CM

## 2020-10-08 DIAGNOSIS — M25.519 SHOULDER PAIN, UNSPECIFIED CHRONICITY, UNSPECIFIED LATERALITY: ICD-10-CM

## 2020-10-08 PROCEDURE — 97530 THERAPEUTIC ACTIVITIES: CPT | Mod: PN

## 2020-10-08 NOTE — PROGRESS NOTES
"  Occupational Therapy Daily Treatment Note     Date: 10/8/2020  Name: Mane Dodd [facility] Number: [id number]    Therapy Diagnosis:   Encounter Diagnoses   Name Primary?    Left shoulder pain, unspecified chronicity Yes    Shoulder stiffness, left     Shoulder pain, unspecified chronicity, unspecified laterality      Physician: Patience Gordon MD    Physician Orders: evaluate and tx  Medical Diagnosis: shoulder pain unspecified laterality  Surgical Procedure and Date: n/a, n/a  Evaluation Date: 8/10/2020  Insurance Authorization Period Expiration: referral # [id number] 9-24-20 thru 10-29-20  Plan of Care Certification Period: 9-21-20 thru 10-19-20  Date of Return to MD: see epic    Visit # / Visits authorized: 2/ 12 referral # [id number] 9-24-20 thru 10-29-20  Time In:130  Time Out: 215  Total Billable Time: 45 minutes    Precautions:  universal    Subjective     Pt reports: no new issues  he is compliant with home exercise program.  Response to previous treatment:good  Functional change: nothing significant since last visit  Pain: 0/10 rest; 3/10 light use  Location: diffuse left shoulder ache        Objective       Timed units:  3 therapeutic exercise       time: 130-215      Measurements:     r prom er at 0 abd 80   at 45 abd 90    at 90 abd 90;       sidelying ir  80        ir behind back 15"    lift off  l prom er at 0 abd 64    at 45 abd 70    at  90 abd 60;      sidelying ir 70         ir behind back 15"   lift off      Fluido: n/a  U/s:n/a      UBE: n/a    Scar massage: n/a    Stretches as tolerated-pain free: er  0, 45 abd; sleeper      Manual: n/a      ROM: n/a    PRE: n/a      HEP: see below    Home Exercises and Education Provided     Education provided:     progress towards goals   likely tx progression  rationale of rehab interventions    Written Home Exercises Provided:told proper ice use for pain and told to stop functional ir stretch for now          Previously issued exercises were reviewed " if still part of current tx plan as well as any issued today and Yousif was able to demonstrate them prior to the end of the session.  Yousif demonstrated good understanding of the HEP provided.     See EMR under patient instructions and/or media for HEP issued today or in past    Assessment       May benefit from special tests to localize pain generator once stiffness gone if pain persists      Pt would continue to benefit from skilled OT in order to resolve gh capsular pattern, fix all stiffness, promote proper mechanics, etc.    Yousif is progressing well towards his goals and there are no updates to goals at this time. Pt prognosis is good.  Pt will continue to benefit from skilled outpatient occupational therapy to address the deficits listed in the problem list on initial evaluation to provide pt/family education and to maximize pt's level of independence in the home and community environment.     Anticipated barriers to occupational therapy: pain, stiffness, tightness, weakness    Pt's spiritual, cultural and educational needs considered and pt agreeable to plan of care and goals.    Goals:  stg to be met in 3 weeks 1. Patient will be I with HEP 2. Patient will have 2/10 pain with light use 3. Patient will have = prom of bilateral shoulders to enhance affected arm use with ADL        ltg to be met by d/c 1. Patient will be I with d/c HEP 2. Patient will have 1/10 pain with all use 3. Patient will have about 3+/5 MMT for elevation to promote full functional use                                                                  4. Patient will be I with all ADL       all goals ongoing unless otherwise noted              Any goals met today ?  (if any met see above)    Updates/Grading for next session: prn    Plan: manual tx, ROM, PRE, patient education, prn tx      Kali Spears, OT/CHT

## 2020-10-15 ENCOUNTER — CLINICAL SUPPORT (OUTPATIENT)
Dept: REHABILITATION | Facility: HOSPITAL | Age: 65
End: 2020-10-15
Attending: FAMILY MEDICINE
Payer: MEDICAID

## 2020-10-15 ENCOUNTER — PATIENT MESSAGE (OUTPATIENT)
Dept: ADMINISTRATIVE | Facility: OTHER | Age: 65
End: 2020-10-15

## 2020-10-15 DIAGNOSIS — M25.519 SHOULDER PAIN, UNSPECIFIED CHRONICITY, UNSPECIFIED LATERALITY: ICD-10-CM

## 2020-10-15 DIAGNOSIS — M25.612 SHOULDER STIFFNESS, LEFT: ICD-10-CM

## 2020-10-15 DIAGNOSIS — M25.512 LEFT SHOULDER PAIN, UNSPECIFIED CHRONICITY: Primary | ICD-10-CM

## 2020-10-15 PROCEDURE — 97530 THERAPEUTIC ACTIVITIES: CPT | Mod: PN

## 2020-10-15 NOTE — PROGRESS NOTES
Occupational Therapy Daily Treatment Note     Date: 10/15/2020  Name: Mane Dodd [facility] Number: [id number]    Therapy Diagnosis:   Encounter Diagnoses   Name Primary?    Left shoulder pain, unspecified chronicity Yes    Shoulder stiffness, left     Shoulder pain, unspecified chronicity, unspecified laterality      Physician: Patience Gordon MD    Physician Orders: evaluate and tx  Medical Diagnosis: shoulder pain unspecified laterality  Surgical Procedure and Date: n/a, n/a  Evaluation Date: 8/10/2020  Insurance Authorization Period Expiration: referral # [id number] 9-24-20 thru 10-29-20  Plan of Care Certification Period: 9-21-20 thru 10-19-20  Date of Return to MD: see epic    Visit # / Visits authorized: 3/ 12 referral # [id number] 9-24-20 thru 10-29-20  Time In:3  Time Out: 345  Total Billable Time: 45 minutes    Precautions:  universal    Subjective     Pt reports: no new issues, hit left elbow on refrigerator since last OT visit with some increased pain in left shoulder t roopa  he is compliant with home exercise program.  Response to previous treatment:good  Functional change: nothing significant since last visit  Pain: 0/10 rest; 4/10 light use  Location: diffuse left shoulder ache        Objective       Timed units:  3 therapeutic exercise       time: 3-345      Measurements:     n/a    Fluido: n/a  U/s:n/a      UBE: n/a    Scar massage: n/a    Stretches as tolerated-pain free: er  0, 45, 90 abd; sleeper      Manual: n/a      ROM: n/a    PRE: n/a      HEP: see below    Home Exercises and Education Provided     Education provided:     Explained if symptoms persist consult with ortho may be indicated, he reports sees ortho next month for an unrelated issue and will mention to ortho about left shoulder issues        progress towards goals   likely tx progression  rationale of rehab interventions    Written Home Exercises Provided: no          Previously issued exercises were reviewed if still part of  current tx plan as well as any issued today and Yousif was able to demonstrate them prior to the end of the session.  Yousif demonstrated good understanding of the HEP provided.     See EMR under patient instructions and/or media for HEP issued today or in past    Assessment       Good technique with stretches as above      Pt would continue to benefit from skilled OT in order to resolve gh capsular pattern, fix all stiffness, promote proper mechanics, etc.    Yousif is progressing well towards his goals and there are no updates to goals at this time. Pt prognosis is good.  Pt will continue to benefit from skilled outpatient occupational therapy to address the deficits listed in the problem list on initial evaluation to provide pt/family education and to maximize pt's level of independence in the home and community environment.     Anticipated barriers to occupational therapy: pain, stiffness, tightness, weakness    Pt's spiritual, cultural and educational needs considered and pt agreeable to plan of care and goals.    Goals:  stg to be met in 3 weeks 1. Patient will be I with HEP 2. Patient will have 2/10 pain with light use 3. Patient will have = prom of bilateral shoulders to enhance affected arm use with ADL        ltg to be met by d/c 1. Patient will be I with d/c HEP 2. Patient will have 1/10 pain with all use 3. Patient will have about 3+/5 MMT for elevation to promote full functional use                                                                  4. Patient will be I with all ADL       all goals ongoing unless otherwise noted              Any goals met today ?  (if any met see above)    Updates/Grading for next session: prn, consider prom testing    Plan: manual tx, ROM, PRE, patient education, prn tx      Kali Spears, OT/CHT

## 2020-10-20 ENCOUNTER — CLINICAL SUPPORT (OUTPATIENT)
Dept: REHABILITATION | Facility: HOSPITAL | Age: 65
End: 2020-10-20
Attending: FAMILY MEDICINE
Payer: MEDICAID

## 2020-10-20 DIAGNOSIS — M25.612 SHOULDER STIFFNESS, LEFT: ICD-10-CM

## 2020-10-20 DIAGNOSIS — M25.512 LEFT SHOULDER PAIN, UNSPECIFIED CHRONICITY: Primary | ICD-10-CM

## 2020-10-20 DIAGNOSIS — R29.898 SHOULDER WEAKNESS: ICD-10-CM

## 2020-10-20 DIAGNOSIS — M25.519 SHOULDER PAIN, UNSPECIFIED CHRONICITY, UNSPECIFIED LATERALITY: ICD-10-CM

## 2020-10-20 PROCEDURE — 97530 THERAPEUTIC ACTIVITIES: CPT | Mod: PN

## 2020-10-20 NOTE — PROGRESS NOTES
"  Occupational Therapy Daily Treatment Note     Date: 10/20/2020  Name: Mane Dodd [facility] Number: [id number]    Therapy Diagnosis:   Encounter Diagnoses   Name Primary?    Left shoulder pain, unspecified chronicity Yes    Shoulder stiffness, left     Shoulder pain, unspecified chronicity, unspecified laterality     Shoulder weakness      Physician: Patience Gordon MD    Physician Orders: evaluate and tx  Medical Diagnosis: shoulder pain unspecified laterality  Surgical Procedure and Date: n/a, n/a  Evaluation Date: 8/10/2020  Insurance Authorization Period Expiration: referral # [id number] 9-24-20 thru 10-29-20  Plan of Care Certification Period: 9-21-20 thru 11-2-20  Date of Return to MD: see epic    Visit # / Visits authorized: 4/ 12 referral # [id number] 9-24-20 thru 10-29-20  Time In:230  Time Out: 315  Total Billable Time: 45 minutes    Precautions:  universal    Subjective     Pt reports: no new issues, using some ointment he got from rheumatology years ago on left shoulder to relieve pain  he is compliant with home exercise program.  Response to previous treatment:good  Functional change: nothing significant since last visit  Pain: 0/10 rest; 4/10 light use  Location: diffuse left shoulder ache        Objective       Timed units:  3 therapeutic exercise       time: 230-315      Measurements:     r prom er at 0 abd 80   at 45 abd 90    at 90 abd 90;       sidelying ir  80        ir behind back 15"    lift off  l prom er at 0 abd 60    at 45 abd 70    at  90 abd 60;      sidelying ir 70         ir behind back 15"   lift off            Fluido: n/a  U/s:n/a      UBE: n/a    Scar massage: n/a    Stretches as tolerated-pain free: er  0, 45, 90 abd; sleeper      Manual: n/a      ROM: n/a    PRE: n/a      HEP: see below    Home Exercises and Education Provided     Education provided:             progress towards goals   likely tx progression  rationale of rehab interventions    Written Home Exercises " Provided: no          Previously issued exercises were reviewed if still part of current tx plan as well as any issued today and Yousif was able to demonstrate them prior to the end of the session.  Yousif demonstrated good understanding of the HEP provided.     See EMR under patient instructions and/or media for HEP issued today or in past    Assessment       Fixing stiffness in above planes followed by d2 and supine hands behind head stretch should help to resolve left shoulder complex tightness      Pt would continue to benefit from skilled OT in order to resolve gh capsular pattern, fix all stiffness, promote proper mechanics, etc.    Yousif is progressing well towards his goals and there are no updates to goals at this time. Pt prognosis is good.  Pt will continue to benefit from skilled outpatient occupational therapy to address the deficits listed in the problem list on initial evaluation to provide pt/family education and to maximize pt's level of independence in the home and community environment.     Anticipated barriers to occupational therapy: pain, stiffness, tightness, weakness    Pt's spiritual, cultural and educational needs considered and pt agreeable to plan of care and goals.    Goals:  stg to be met in 3 weeks 1. Patient will be I with HEP 2. Patient will have 2/10 pain with light use 3. Patient will have = prom of bilateral shoulders to enhance affected arm use with ADL        ltg to be met by d/c 1. Patient will be I with d/c HEP 2. Patient will have 1/10 pain with all use 3. Patient will have about 3+/5 MMT for elevation to promote full functional use                                                                  4. Patient will be I with all ADL       all goals ongoing unless otherwise noted              Any goals met today ?  (if any met see above)    Updates/Grading for next session: prn    Plan: manual tx, ROM, PRE, patient education, prn tx      Kali Spears  OT/CHT

## 2020-10-22 ENCOUNTER — PATIENT MESSAGE (OUTPATIENT)
Dept: FAMILY MEDICINE | Facility: CLINIC | Age: 65
End: 2020-10-22

## 2020-10-22 ENCOUNTER — CLINICAL SUPPORT (OUTPATIENT)
Dept: REHABILITATION | Facility: HOSPITAL | Age: 65
End: 2020-10-22
Attending: FAMILY MEDICINE
Payer: MEDICAID

## 2020-10-22 DIAGNOSIS — M25.512 LEFT SHOULDER PAIN, UNSPECIFIED CHRONICITY: Primary | ICD-10-CM

## 2020-10-22 DIAGNOSIS — M25.519 SHOULDER PAIN, UNSPECIFIED CHRONICITY, UNSPECIFIED LATERALITY: ICD-10-CM

## 2020-10-22 DIAGNOSIS — M25.612 SHOULDER STIFFNESS, LEFT: ICD-10-CM

## 2020-10-22 PROCEDURE — 97530 THERAPEUTIC ACTIVITIES: CPT | Mod: PN

## 2020-10-22 NOTE — PROGRESS NOTES
Occupational Therapy Daily Treatment Note     Date: 10/22/2020  Name: Mane Dodd [facility] Number: [id number]    Therapy Diagnosis:   Encounter Diagnoses   Name Primary?    Left shoulder pain, unspecified chronicity Yes    Shoulder stiffness, left     Shoulder pain, unspecified chronicity, unspecified laterality      Physician: Patience Gordon MD    Physician Orders: evaluate and tx  Medical Diagnosis: shoulder pain unspecified laterality  Surgical Procedure and Date: n/a, n/a  Evaluation Date: 8/10/2020  Insurance Authorization Period Expiration: referral # [id number] 9-24-20 thru 10-29-20  Plan of Care Certification Period: 9-21-20 thru 11-2-20  Date of Return to MD: see epic    Visit # / Visits authorized: 5/ 12 referral # [id number] 9-24-20 thru 10-29-20  Time In:215  Time Out: 3  Total Billable Time: 45 minutes    Precautions:  universal    Subjective     Pt reports: no new issues, understands low load prolonged stretch concept  he is compliant with home exercise program.  Response to previous treatment:good  Functional change: nothing significant since last visit  Pain: 0/10 rest; 4/10 light use  Location: diffuse left shoulder ache        Objective       Timed units:  3 therapeutic exercise       time: 215-3      Measurements:     n/a          Fluido: n/a  U/s:n/a      UBE: n/a    Scar massage: n/a    Stretches as tolerated-pain free: er  0, 45, 90 abd; sleeper      Manual: n/a      ROM: n/a    PRE: n/a      HEP: see below    Home Exercises and Education Provided     Education provided:             progress towards goals   likely tx progression  rationale of rehab interventions    Written Home Exercises Provided: no          Previously issued exercises were reviewed if still part of current tx plan as well as any issued today and Yousif was able to demonstrate them prior to the end of the session.  Yousif demonstrated good understanding of the HEP provided.     See EMR under patient instructions and/or  media for HEP issued today or in past    Assessment       Reactivity continues to decrease, gh capsular pattern resolving, pain relief and warm up gh joint manual tx may be of value      Pt would continue to benefit from skilled OT in order to resolve gh capsular pattern, fix all stiffness, promote proper mechanics, etc.    Yousif is progressing well towards his goals and there are no updates to goals at this time. Pt prognosis is good.  Pt will continue to benefit from skilled outpatient occupational therapy to address the deficits listed in the problem list on initial evaluation to provide pt/family education and to maximize pt's level of independence in the home and community environment.     Anticipated barriers to occupational therapy: pain, stiffness, tightness, weakness    Pt's spiritual, cultural and educational needs considered and pt agreeable to plan of care and goals.    Goals:  stg to be met in 3 weeks 1. Patient will be I with HEP 2. Patient will have 2/10 pain with light use 3. Patient will have = prom of bilateral shoulders to enhance affected arm use with ADL        ltg to be met by d/c 1. Patient will be I with d/c HEP 2. Patient will have 1/10 pain with all use 3. Patient will have about 3+/5 MMT for elevation to promote full functional use                                                                  4. Patient will be I with all ADL       all goals ongoing unless otherwise noted              Any goals met today ?  (if any met see above)    Updates/Grading for next session: prn    Plan: manual tx, ROM, PRE, patient education, prn tx      Kali Spears, OT/CHT

## 2020-11-05 ENCOUNTER — CLINICAL SUPPORT (OUTPATIENT)
Dept: REHABILITATION | Facility: HOSPITAL | Age: 65
End: 2020-11-05
Attending: FAMILY MEDICINE
Payer: MEDICAID

## 2020-11-05 DIAGNOSIS — M25.519 SHOULDER PAIN, UNSPECIFIED CHRONICITY, UNSPECIFIED LATERALITY: ICD-10-CM

## 2020-11-05 DIAGNOSIS — M25.512 LEFT SHOULDER PAIN, UNSPECIFIED CHRONICITY: Primary | ICD-10-CM

## 2020-11-05 DIAGNOSIS — M25.612 SHOULDER STIFFNESS, LEFT: ICD-10-CM

## 2020-11-05 PROCEDURE — 97140 MANUAL THERAPY 1/> REGIONS: CPT | Mod: PN

## 2020-11-05 NOTE — PATIENT INSTRUCTIONS
current home program 11-5-20  -do both stick exercises you do on your back and the sidelying stretch on a flat surface  -try to do all stretches at least 2 x day, at least a 10 minute hold each, mild discomfort only

## 2020-11-05 NOTE — PROGRESS NOTES
"  Occupational Therapy Daily Treatment Note     Date: 11/5/2020  Name: Mane Dodd [facility] Number: [id number]    Therapy Diagnosis:   Encounter Diagnoses   Name Primary?    Left shoulder pain, unspecified chronicity Yes    Shoulder stiffness, left     Shoulder pain, unspecified chronicity, unspecified laterality      Physician: Patience Gordon MD    Physician Orders: evaluate and tx  Medical Diagnosis: shoulder pain unspecified laterality  Surgical Procedure and Date: n/a, n/a  Evaluation Date: 8/10/2020  Insurance Authorization Period Expiration: see epic  Plan of Care Certification Period: 11-5-20   Date of Return to MD: see [facility]    Visit # / Visits authorized: see epic  Time In:215  Time Out: 3  Total Billable Time: 45 minutes    Precautions:  universal    Subjective     Pt reports: no new issues; reports doing er 45 and 90 abd stretch and sidelying stretch on bed with head slightly elevated, understands to do these stretches where trunk and head are at same level and head is not inclined at all  he is compliant with home exercise program.  Response to previous treatment:good  Functional change: nothing significant since last visit  Pain: 0/10 rest; 4/10 light use  Location: diffuse left shoulder ache        Objective       Timed units:  2 therapeutic exercise       Time: 215-245  1 manual                           Time: 245-3      Measurements:     r prom er at 0 abd 80   at 45 abd 90    at 90 abd 90;       sidelying ir  80        ir behind back 15"    lift off  l prom er at 0 abd 60    at 45 abd 72    at  90 abd 64;      sidelying ir 70         ir behind back 15"   lift off          Fluido: n/a  U/s:n/a      UBE: n/a    Scar massage: n/a    Stretches as tolerated-pain free: er   45, 90 abd    Manual:     Gh coronal traction at 90 abd    ROM: n/a    PRE: n/a      HEP: see below    Home Exercises and Education Provided     Education provided:       Explained to mention to primary care who patient " reports referred him to ortho for hand arthritis that a referral for left shoulder pain may be helpful in ruling out any internal issue/structural compromise in left shoulder    Tightness and pain decrease with not much improvement in last month or so      progress towards goals   likely tx progression  rationale of rehab interventions    Written Home Exercises Provided: yes           Previously issued exercises were reviewed if still part of current tx plan as well as any issued today and Yousif was able to demonstrate them prior to the end of the session.  Yousif demonstrated good understanding of the HEP provided.     See EMR under patient instructions and/or media for HEP issued today or in past    Assessment       End feel at gh joint wnl; no left shoulder popping or clunking felt, heard, or reported      Pt would continue to benefit from skilled OT in order to resolve gh capsular pattern, fix all stiffness, promote proper mechanics, etc.    Yousif is progressing well towards his goals and there are no updates to goals at this time. Pt prognosis is good.  Pt will continue to benefit from skilled outpatient occupational therapy to address the deficits listed in the problem list on initial evaluation to provide pt/family education and to maximize pt's level of independence in the home and community environment.     Anticipated barriers to occupational therapy: pain, stiffness, tightness, weakness    Pt's spiritual, cultural and educational needs considered and pt agreeable to plan of care and goals.    Goals:  stg to be met in 3 weeks 1. Patient will be I with HEP 2. Patient will have 2/10 pain with light use 3. Patient will have = prom of bilateral shoulders to enhance affected arm use with ADL        ltg to be met by d/c 1. Patient will be I with d/c HEP 2. Patient will have 1/10 pain with all use 3. Patient will have about 3+/5 MMT for elevation to promote full functional use                                                                   4. Patient will be I with all ADL       all goals ongoing unless otherwise noted              Any goals met today ?  (if any met see above)    Updates/Grading for next session: prn    Plan: manual tx, ROM, PRE, patient education, prn tx      Kali Spears, OT/CHT

## 2020-11-05 NOTE — PLAN OF CARE
Outpatient Therapy Updated Plan of Care     Visit Date: 11/5/2020  [id number]    Therapy Diagnosis:   Encounter Diagnoses   Name Primary?    Left shoulder pain, unspecified chronicity Yes    Shoulder stiffness, left     Shoulder pain, unspecified chronicity, unspecified laterality      Physician: Patience Gordon MD    Physician Orders: evaluate and tx  Medical Diagnosis: see evaluation  Evaluation Date: 8-10-20    Total Visits Received: 18  Cancelled Visits: see epic  No Show Visits: see epic    Current Certification Period:  9-21-20 to 11-2-20  Precautions:  universal  Visits from Evaluation Date:  18  Functional Level Prior to Evaluation:  Decreased rom, use, and strength; pre symptom onset had no deficits with functional use    Subjective     Update: understands to mention to primary care md who he reports referred him to ortho for arthritis to see if primary md can also have ortho look at left shoulder since pain and stiffness has been persistent, patient did state today that he has been doing er 45 and 90 abd stretch as well as sidelying ir stretch on a bed which has an elevated head and has been elevating head with stretches, understands to do these stretches on a flat surface    Objective     Update: see notes for measurements    Assessment     Update: gh capsular pattern slowly decreasing, good technique with stretches done today in clinic    Previous Short Term Goals Status:   See today's note  New Short Term Goals Status:   n/a  Long Term Goal Status:   continue per initial plan of care.  Continue any LTGs added in notes post initial eval  Reasons for Recertification of Therapy:   Continued skilled and structured rehab imperative to properly promote full rom, balanced strength, and good mechanics long term with ADL    Plan     Updated Certification Period: 11/5/2020 to 12-17-20  Recommended Treatment Plan: 2 times per week for 6 weeks: evaluate and tx    Kali  Regan OT/CHT  11/5/2020    frequency per week may change based on patient progress and need for therapy

## 2020-11-06 ENCOUNTER — PATIENT MESSAGE (OUTPATIENT)
Dept: FAMILY MEDICINE | Facility: CLINIC | Age: 65
End: 2020-11-06

## 2020-11-06 DIAGNOSIS — M79.642 PAIN IN BOTH HANDS: Primary | ICD-10-CM

## 2020-11-06 DIAGNOSIS — M79.641 PAIN IN BOTH HANDS: Primary | ICD-10-CM

## 2020-11-06 DIAGNOSIS — M25.512 LEFT SHOULDER PAIN, UNSPECIFIED CHRONICITY: ICD-10-CM

## 2020-11-10 ENCOUNTER — CLINICAL SUPPORT (OUTPATIENT)
Dept: REHABILITATION | Facility: HOSPITAL | Age: 65
End: 2020-11-10
Attending: FAMILY MEDICINE
Payer: MEDICAID

## 2020-11-10 DIAGNOSIS — R29.898 SHOULDER WEAKNESS: ICD-10-CM

## 2020-11-10 DIAGNOSIS — M25.612 SHOULDER STIFFNESS, LEFT: ICD-10-CM

## 2020-11-10 DIAGNOSIS — M25.512 LEFT SHOULDER PAIN, UNSPECIFIED CHRONICITY: Primary | ICD-10-CM

## 2020-11-10 PROCEDURE — 97530 THERAPEUTIC ACTIVITIES: CPT | Mod: PN

## 2020-11-10 NOTE — PROGRESS NOTES
Occupational Therapy Daily Treatment Note     Date: 11/10/2020  Name: Mane Dodd [facility] Number: [id number]    Therapy Diagnosis:   Encounter Diagnoses   Name Primary?    Left shoulder pain, unspecified chronicity Yes    Shoulder stiffness, left     Shoulder weakness      Physician: Patience Gordon MD    Physician Orders: evaluate and tx  Medical Diagnosis: shoulder pain unspecified laterality  Surgical Procedure and Date: n/a, n/a  Evaluation Date: 8/10/2020  Insurance Authorization Period Expiration: see epic  Plan of Care Certification Period: 11-5-20   Date of Return to MD: see epic    Visit # / Visits authorized: see epic  Time In:215  Time Out: 3  Total Billable Time: 45 minutes    Precautions:  universal    Subjective     Pt reports: no new issues;  he is compliant with home exercise program. Reports bed incline issue actually was not a factor upon looking at bed when he got home after last visit.  Response to previous treatment:good  Functional change: nothing significant since last visit  Pain: 0/10 rest; 4/10 light use  Location: diffuse left shoulder ache        Objective       Timed units:  2 therapeutic exercise       Time: 330-4  1 manual                           Time: 315-330      Measurements:     n/a          Fluido: n/a  U/s:n/a      UBE: n/a    Scar massage: n/a    Stretches as tolerated-pain free: er  45, 90 abd    Manual:     gh coronal traction at 90 abd    ROM: gh circumduction    PRE: n/a      HEP: see below    Home Exercises and Education Provided     Education provided:             progress towards goals   likely tx progression  rationale of rehab interventions    Written Home Exercises Provided: no          Previously issued exercises were reviewed if still part of current tx plan as well as any issued today and Yousif was able to demonstrate them prior to the end of the session.  Yousif demonstrated good understanding of the HEP provided.     See EMR under patient instructions  and/or media for HEP issued today or in past    Assessment       Gh end feel wnl      Pt would continue to benefit from skilled OT in order to resolve gh capsular pattern, fix all stiffness, promote proper mechanics, etc.    Yousif is progressing well towards his goals and there are no updates to goals at this time. Pt prognosis is good.  Pt will continue to benefit from skilled outpatient occupational therapy to address the deficits listed in the problem list on initial evaluation to provide pt/family education and to maximize pt's level of independence in the home and community environment.     Anticipated barriers to occupational therapy: pain, stiffness, tightness, weakness    Pt's spiritual, cultural and educational needs considered and pt agreeable to plan of care and goals.    Goals:  stg to be met in 3 weeks 1. Patient will be I with HEP 2. Patient will have 2/10 pain with light use 3. Patient will have = prom of bilateral shoulders to enhance affected arm use with ADL        ltg to be met by d/c 1. Patient will be I with d/c HEP 2. Patient will have 1/10 pain with all use 3. Patient will have about 3+/5 MMT for elevation to promote full functional use                                                                  4. Patient will be I with all ADL       all goals ongoing unless otherwise noted              Any goals met today ?  (if any met see above)    Updates/Grading for next session: prn    Plan: manual tx, ROM, PRE, patient education, prn tx      Kali Spears, OT/CHT

## 2020-11-12 DIAGNOSIS — M79.642 PAIN IN BOTH HANDS: Primary | ICD-10-CM

## 2020-11-12 DIAGNOSIS — M79.641 PAIN IN BOTH HANDS: Primary | ICD-10-CM

## 2020-11-13 ENCOUNTER — PATIENT OUTREACH (OUTPATIENT)
Dept: OTHER | Facility: OTHER | Age: 65
End: 2020-11-13

## 2020-11-13 ENCOUNTER — CLINICAL SUPPORT (OUTPATIENT)
Dept: REHABILITATION | Facility: HOSPITAL | Age: 65
End: 2020-11-13
Attending: FAMILY MEDICINE
Payer: MEDICAID

## 2020-11-13 DIAGNOSIS — M25.512 LEFT SHOULDER PAIN, UNSPECIFIED CHRONICITY: Primary | ICD-10-CM

## 2020-11-13 DIAGNOSIS — M25.612 SHOULDER STIFFNESS, LEFT: ICD-10-CM

## 2020-11-13 DIAGNOSIS — R29.898 SHOULDER WEAKNESS: ICD-10-CM

## 2020-11-13 PROCEDURE — 97530 THERAPEUTIC ACTIVITIES: CPT | Mod: PN

## 2020-11-13 NOTE — PROGRESS NOTES
Digital Medicine: Health  Follow-Up    The history is provided by the patient.             Reason for review: Blood pressure at goal      Additional Follow-up details: Called to introduce myself as new health . Patient states that he does not have time to talk now as he is getting ready to walk into his physical therapy appointment. Will follow up to discuss health goals in a few weeks.             Diet-Not assessed          Physical Activity-Not assessed    Medication Adherence-Medication Adherence not addressed.      Substance, Sleep, Stress-Not assessed         Addressed patient questions and patient has my contact information if needed prior to next outreach. Patient verbalizes understanding.             There are no preventive care reminders to display for this patient.      Last 5 Patient Entered Readings                                      Current 30 Day Average: 121/77     Recent Readings 11/5/2020 10/28/2020 10/20/2020 10/12/2020 10/4/2020    SBP (mmHg) 135 108 120 127 131    DBP (mmHg) 85 70 77 82 80    Pulse 93 81 86 99 88

## 2020-11-13 NOTE — PROGRESS NOTES
Occupational Therapy Daily Treatment Note     Date: 11/13/2020  Name: Mane Dodd [facility] Number: [id number]    Therapy Diagnosis:   Encounter Diagnoses   Name Primary?    Left shoulder pain, unspecified chronicity Yes    Shoulder stiffness, left     Shoulder weakness      Physician: Patience Gordon MD    Physician Orders: evaluate and tx  Medical Diagnosis: shoulder pain unspecified laterality  Surgical Procedure and Date: n/a, n/a  Evaluation Date: 8/10/2020  Insurance Authorization Period Expiration: referral # [id number] 9-24-20 thru 1-31-21  Plan of Care Certification Period: 11-5-20 thru 12-17-20  Date of Return to MD: see epic    Visit # / Visits authorized:  8/38       referral # [id number] 9-24-20 thru 1-31-21   Time In:130  Time Out: 215  Total Billable Time: 45 minutes    Precautions:  universal    Subjective     Pt reports: no new issues;  he is compliant with home exercise program.   Response to previous treatment:good  Functional change: nothing significant since last visit  Pain: 0/10 rest; up to 4/10 light use  Location: diffuse left shoulder ache        Objective       Timed units:  2 manual                               Time: 130-2  1 therapeutic exercise          Time: 2-215    Measurements:     n/a          Fluido: n/a  U/s:n/a      UBE: n/a    Scar massage: n/a    Stretches as tolerated-pain free: er  0, 45, 90 abd    Manual:     gh coronal traction at 90 abd, open pack traction, bursal massage type 1 and 2 for abd    ROM: gh circumduction    PRE: n/a      HEP: see below    Home Exercises and Education Provided     Education provided:             progress towards goals   likely tx progression  rationale of rehab interventions    Written Home Exercises Provided: no          Previously issued exercises were reviewed if still part of current tx plan as well as any issued today and Yousif was able to demonstrate them prior to the end of the session.  Yousif demonstrated good understanding of  the HEP provided.     See EMR under patient instructions and/or media for HEP issued today or in past    Assessment       Needs low load prolonged stretch to restore shoulder complex mobility      Pt would continue to benefit from skilled OT in order to resolve gh capsular pattern, fix all stiffness, promote proper mechanics, etc.    Yousif is progressing well towards his goals and there are no updates to goals at this time. Pt prognosis is good.  Pt will continue to benefit from skilled outpatient occupational therapy to address the deficits listed in the problem list on initial evaluation to provide pt/family education and to maximize pt's level of independence in the home and community environment.     Anticipated barriers to occupational therapy: pain, stiffness, tightness, weakness    Pt's spiritual, cultural and educational needs considered and pt agreeable to plan of care and goals.    Goals:  stg to be met in 3 weeks 1. Patient will be I with HEP 2. Patient will have 2/10 pain with light use 3. Patient will have = prom of bilateral shoulders to enhance affected arm use with ADL        ltg to be met by d/c 1. Patient will be I with d/c HEP 2. Patient will have 1/10 pain with all use 3. Patient will have about 3+/5 MMT for elevation to promote full functional use                                                                  4. Patient will be I with all ADL       all goals ongoing unless otherwise noted              Any goals met today ?  (if any met see above)    Updates/Grading for next session: prn, consider prom testing    Plan: manual tx, ROM, PRE, patient education, prn tx      Kali Spears, OT/CHT

## 2020-11-14 ENCOUNTER — IMMUNIZATION (OUTPATIENT)
Dept: FAMILY MEDICINE | Facility: CLINIC | Age: 65
End: 2020-11-14
Payer: MEDICAID

## 2020-11-14 PROCEDURE — 90686 IIV4 VACC NO PRSV 0.5 ML IM: CPT | Mod: PBBFAC,PO

## 2020-11-16 ENCOUNTER — PATIENT OUTREACH (OUTPATIENT)
Dept: ADMINISTRATIVE | Facility: OTHER | Age: 65
End: 2020-11-16

## 2020-11-16 NOTE — PROGRESS NOTES
Chart was reviewed for overdue Proactive [facility] Encounters (DOMINIC)  topics  Updates were requested from care everywhere  Health Maintenance was unable to be updated  [place] immunization registry triggered  Fit kit previously ordered

## 2020-11-17 ENCOUNTER — OFFICE VISIT (OUTPATIENT)
Dept: ORTHOPEDICS | Facility: CLINIC | Age: 65
End: 2020-11-17
Payer: MEDICAID

## 2020-11-17 ENCOUNTER — CLINICAL SUPPORT (OUTPATIENT)
Dept: REHABILITATION | Facility: HOSPITAL | Age: 65
End: 2020-11-17
Attending: FAMILY MEDICINE
Payer: MEDICAID

## 2020-11-17 ENCOUNTER — HOSPITAL ENCOUNTER (OUTPATIENT)
Dept: RADIOLOGY | Facility: HOSPITAL | Age: 65
Discharge: HOME | End: 2020-11-17
Attending: ORTHOPAEDIC SURGERY
Payer: MEDICAID

## 2020-11-17 VITALS — BODY MASS INDEX: 28.32 KG/M2 | WEIGHT: 170 LBS | HEIGHT: 65 IN

## 2020-11-17 DIAGNOSIS — M79.641 PAIN IN BOTH HANDS: ICD-10-CM

## 2020-11-17 DIAGNOSIS — R29.898 SHOULDER WEAKNESS: ICD-10-CM

## 2020-11-17 DIAGNOSIS — M25.512 LEFT SHOULDER PAIN, UNSPECIFIED CHRONICITY: ICD-10-CM

## 2020-11-17 DIAGNOSIS — M25.612 SHOULDER STIFFNESS, LEFT: ICD-10-CM

## 2020-11-17 DIAGNOSIS — M79.642 PAIN IN BOTH HANDS: ICD-10-CM

## 2020-11-17 DIAGNOSIS — M25.512 LEFT SHOULDER PAIN, UNSPECIFIED CHRONICITY: Primary | ICD-10-CM

## 2020-11-17 PROCEDURE — 97530 THERAPEUTIC ACTIVITIES: CPT | Mod: PN

## 2020-11-17 PROCEDURE — 99213 PR OFFICE/OUTPT VISIT, EST, LEVL III, 20-29 MIN: ICD-10-PCS | Mod: S$PBB,,, | Performed by: ORTHOPAEDIC SURGERY

## 2020-11-17 PROCEDURE — 73130 XR HAND COMPLETE 3 VIEWS BILATERAL: ICD-10-PCS | Mod: 26,50,, | Performed by: RADIOLOGY

## 2020-11-17 PROCEDURE — 73130 X-RAY EXAM OF HAND: CPT | Mod: TC,50,PN

## 2020-11-17 PROCEDURE — 73130 X-RAY EXAM OF HAND: CPT | Mod: 26,50,, | Performed by: RADIOLOGY

## 2020-11-17 PROCEDURE — 99214 OFFICE O/P EST MOD 30 MIN: CPT | Mod: PBBFAC,25,PN | Performed by: ORTHOPAEDIC SURGERY

## 2020-11-17 PROCEDURE — 99999 PR PBB SHADOW E&M-EST. PATIENT-LVL IV: CPT | Mod: PBBFAC,,, | Performed by: ORTHOPAEDIC SURGERY

## 2020-11-17 PROCEDURE — 99213 OFFICE O/P EST LOW 20 MIN: CPT | Mod: S$PBB,,, | Performed by: ORTHOPAEDIC SURGERY

## 2020-11-17 PROCEDURE — 99999 PR PBB SHADOW E&M-EST. PATIENT-LVL IV: ICD-10-PCS | Mod: PBBFAC,,, | Performed by: ORTHOPAEDIC SURGERY

## 2020-11-17 NOTE — PROGRESS NOTES
11/17/2020    Past Medical History:   Diagnosis Date    Accident     fell from roof with TBI (word finding issues personality changes, memory deficets), R rib fractures, clavicle fx    Allergy     Anxiety     ASD (atrial septal defect)     noted on ECHO 6/16    Cervical stenosis of spine     noted on 6/15 MRI    CTS (carpal tunnel syndrome)     mild-mod on 4/17 NCS    DDD (degenerative disc disease), cervical 10/2014    C5-6 and C6-C7    Depression     JEEVAN (generalized anxiety disorder)     Gender identity disorder     H/O cardiovascular stress test     12/14 normal    Herpes simplex type 2 infection     History of atypical hyperplasia of breast     B precancer lesions    Hypertension     IGT (impaired glucose tolerance)     Myelomalacia     c-spine noted on 8/15 MRI    OA (osteoarthritis)     Prediabetes     TBI (traumatic brain injury)     after falling off a roof in 2003       Past Surgical History:   Procedure Laterality Date    AMPUTATION      L index finger    APPENDECTOMY  1969    BILATERAL SALPINGOOPHORECTOMY  02/2015    BIOPSY OF LIVER WITH ULTRASOUND GUIDANCE N/A 12/18/2018    Procedure: BIOPSY, LIVER, WITH US GUIDANCE;  Surgeon: [facility] Diagnostic Provider;  Location: [facility] OR [address] [place] [address];  Service: Radiology;  Laterality: N/A;  U/S GUIDED LIVER BIOPSY.  12/18/2018.  DOSC 7:30 AM  PROCEDURE 9 AM.  DR NITHIN GOLDEN / MARLON NUR.  DB 12/14/18 3:20P    CARPAL TUNNEL RELEASE  12/2017    right    CERVICAL FUSION  10/2014    C5-C6 and C6-C7    CHOLECYSTECTOMY      ENDOSCOPIC NASAL SEPTOPLASTY N/A 8/23/2018    Procedure: SEPTOPLASTY, NASAL, ENDOSCOPIC;  Surgeon: Reji Arce III, MD;  Location: [facility] OR [address] [place] [address];  Service: ENT;  Laterality: N/A;    FINGER AMPUTATION Left     FRACTURE SURGERY Right 2003    rib fractures    HYSTERECTOMY  1984    MIGUEL    MASTECTOMY  02/2015    RECONSTRUCTION OF NOSE N/A 8/23/2018    Procedure: RECONSTRUCTION, NOSE;  Surgeon: Reji Arce III,  MD;  Location: [address];  Service: ENT;  Laterality: N/A;    ROTATOR CUFF REPAIR Right 2019    UPPER GASTROINTESTINAL ENDOSCOPY  09/06/2017    Dr. Ayon       Current Outpatient Medications   Medication Sig    carboxymethylcellulose (REFRESH PLUS) 0.5 % Dpet 1 drop nightly.    cholecalciferol, vitamin D3, (VITAMIN D3) 2,000 unit Cap Take 1 capsule (2,000 Units total) by mouth once daily.    DULoxetine (CYMBALTA) 60 MG capsule TAKE 1 CAPSULE BY MOUTH EVERY DAY    famotidine (PEPCID) 20 MG tablet TAKE 1 TABLET (20 MG TOTAL) BY MOUTH NIGHTLY AS NEEDED FOR HEARTBURN.    fluticasone propionate (FLONASE) 50 mcg/actuation nasal spray 2 SPRAYS (100 MCG TOTAL) BY EACH NOSTRIL ROUTE ONCE DAILY.    HYDROcodone-acetaminophen (NORCO) 5-325 mg per tablet Take 1 tablet by mouth daily as needed for Pain.    ibuprofen (ADVIL,MOTRIN) 800 MG tablet Take 800 mg by mouth 3 (three) times daily as needed for Pain.    ketoconazole (NIZORAL) 2 % cream Apply topically once daily.    levocetirizine (XYZAL) 5 MG tablet TAKE 1 TABLET (5 MG TOTAL) BY MOUTH NIGHTLY AS NEEDED FOR ALLERGIES.    meclizine (ANTIVERT) 25 mg tablet Take 1 tablet (25 mg total) by mouth 3 (three) times daily as needed for Dizziness.    metoprolol succinate (TOPROL-XL) 50 MG 24 hr tablet TAKE 1 TABLET BY MOUTH ONCE DAILY. TAKE WITH METOPROLOL SUCCINATE 25MG    traZODone (DESYREL) 50 MG tablet TAKE 1 TABLET (50 MG TOTAL) BY MOUTH EVERY EVENING.    valsartan (DIOVAN) 160 MG tablet Take 2 tablets (320 mg total) by mouth once daily.    ranitidine (ZANTAC) 300 MG tablet Take 1 tablet (300 mg total) by mouth every evening.    testosterone cypionate (DEPOTESTOTERONE CYPIONATE) 200 mg/mL injection Inject 1 mL (200 mg total) into the muscle every 14 (fourteen) days. 3 ml syringe with 18 g needle  to draw  X 10   25 g 1/2 inch needle to inject x 10     No current facility-administered medications for this visit.        Review of patient's allergies  indicates:   Allergen Reactions    Sudafed [pseudoephedrine hcl] Other (See Comments)     Heart racing      Codeine Other (See Comments)     Heart racing    Gabapentin      Caused memory loss    Pseudoephedrine     Cortisone Palpitations       Family History   Problem Relation Age of Onset    Diabetes Mother     Heart disease Mother     Gallbladder disease Mother     Coronary artery disease Father     Breast cancer Sister     Heart disease Sister     Lung cancer Paternal Grandfather     Heart disease Paternal Grandfather     Heart disease Maternal Grandmother     Gallbladder disease Maternal Grandmother     Heart attack Brother     Gallbladder disease Daughter     Craniosynostosis Daughter     Colon cancer Neg Hx     Colon polyps Neg Hx     Crohn's disease Neg Hx     Ulcerative colitis Neg Hx     Stomach cancer Neg Hx     Esophageal cancer Neg Hx        Social History     Socioeconomic History    Marital status: Single     Spouse name: Not on file    Number of children: Not on file    Years of education: Not on file    Highest education level: Not on file   Occupational History    Occupation: Artist   Social Needs    Financial resource strain: Not on file    Food insecurity     Worry: Not on file     Inability: Not on file    Transportation needs     Medical: Not on file     Non-medical: Not on file   Tobacco Use    Smoking status: Former Smoker     Packs/day: 0.25     Years: 2.00     Pack years: 0.50     Types: Cigarettes     Start date:      Quit date: 2002     Years since quittin.0    Smokeless tobacco: Never Used    Tobacco comment: did not smoke regularly   Substance and Sexual Activity    Alcohol use: Yes     Comment: rare     Drug use: Yes     Types: Hydrocodone    Sexual activity: Never   Lifestyle    Physical activity     Days per week: Not on file     Minutes per session: Not on file    Stress: Only a little   Relationships    Social connections      "Talks on phone: Not on file     Gets together: Not on file     Attends Synagogue service: Not on file     Active member of club or organization: Not on file     Attends meetings of clubs or organizations: Not on file     Relationship status: Not on file   Other Topics Concern    Patient feels they ought to cut down on drinking/drug use Not Asked    Patient annoyed by others criticizing their drinking/drug use Not Asked    Patient has felt bad or guilty about drinking/drug use Not Asked    Patient has had a drink/used drugs as an eye opener in the AM Not Asked   Social History Narrative    Not on file       Chief Complaint:   Chief Complaint   Patient presents with    Left Hand - Pain, Initial Visit     Bilateral hand pain. Referred by PCP Dr. Gordon. Pain in knuckles (particularly MCPs) of fingers, especially left thumb that is not relieved with NSAIDs or tramadol. Pain to touch at the 5th finger is severe.     Right Hand - Pain, Initial Visit         History of present illness:    This is a 65 y.o. year old male who complains of patient is being seen for a history of bilateral hand pain    Review of Systems:    Constitution: Denies chills, fever, and sweats.  HENT: Denies headaches or blurry vision.  Cardiovascular: Denies chest pain or irregular heart beat.  Respiratory: Denies cough or shortness of breath.  Gastrointestinal: Denies abdominal pain, nausea, or vomiting.  Musculoskeletal:  Denies muscle cramps.  Neurological: Denies dizziness or focal weakness.  Psychiatric/Behavioral: Normal mental status.  Hematologic/Lymphatic: Denies bleeding problem or easy bruising/bleeding.  Skin: Denies rash or suspicious lesions.    Examination:    Vital Signs:    Vitals:    11/17/20 1409   Weight: 77.1 kg (170 lb)   Height: 5' 5" (1.651 m)   PainSc:   3   PainLoc: Finger       Body mass index is 28.29 kg/m².    This a well-developed, well nourished patient in no acute distress.    Alert and oriented x 3 and " cooperative to examination.       Physical Exam:  Left hand-patient has some degenerative changes of the DIP joint of all of the fingers he has some mi information present he seems to move the joints fairly well        Right hand-again in the right hand he does have inflammation and some arthritic changes of all of his DIP joints of the fingers they do not appear to be severe he is able make a good fist    Imaging:  X-rays of the left hand show amputation through the base of the middle phalanx of the index finger no other abnormalities x-ray of the the left and right hand show some degenerative changes of the DIP joints they do not appear to be very severe.       Assessment: Pain in both hands  -     Ambulatory referral/consult to Orthopedics    Left shoulder pain, unspecified chronicity  -     Ambulatory referral/consult to Orthopedics      Surgery in the form of a fusion at this time.  He is going to take Voltaren gel and used to his hand he states he gets good relief with this.  Plan:  At this time I do not have any surgical recommendations the only recommendations that could be may would be a fusion of the DIP joints.  Patient is not interested in having      DISCLAIMER: This note may have been dictated using voice recognition software and may contain grammatical errors.     NOTE: Consult report sent to referring provider via [employer].

## 2020-11-17 NOTE — PROGRESS NOTES
"  Occupational Therapy Daily Treatment Note     Date: 11/17/2020  Name: Mane Dodd [facility] Number: [id number]    Therapy Diagnosis:   Encounter Diagnoses   Name Primary?    Left shoulder pain, unspecified chronicity Yes    Shoulder stiffness, left     Shoulder weakness      Physician: Patience Gordon MD    Physician Orders: evaluate and tx  Medical Diagnosis: shoulder pain unspecified laterality  Surgical Procedure and Date: n/a, n/a  Evaluation Date: 8/10/2020  Insurance Authorization Period Expiration: referral # [id number] 9-24-20 thru 1-31-21  Plan of Care Certification Period: 11-5-20 thru 12-17-20  Date of Return to MD: see epic    Visit # / Visits authorized:  9/38       referral # [id number] 9-24-20 thru 1-31-21   Time In:11  Time Out: 1145  Total Billable Time: 45 minutes    Precautions:  universal    Subjective     Pt reports: no new issues;  he is compliant with home exercise program.   Response to previous treatment:good  Functional change: elevation and functional ir with noticeable increase since 1 month ago  Pain: 0/10 rest; up to 4/10 light use  Location: diffuse left shoulder ache        Objective       Timed units:  2 manual                               Time:   1 therapeutic exercise          Time: 6975-3453    Measurements:     r prom er at 0 abd 80   at 45 abd 90    at 90 abd 90;       sidelying ir  80        ir behind back 15"    lift off  l prom er at 0 abd 68    at 45 abd 76    at  90 abd 72;      sidelying ir 70         ir behind back 15"   lift off          Fluido: n/a  U/s:n/a      UBE: n/a    Scar massage: n/a    Stretches as tolerated-pain free: er  0, 45, 90 abd    Manual:     gh coronal traction at 90 abd, open pack traction, bursal massage type 1 and 2 for abd, static inferior slides gh open pack     ROM: gh circumduction    PRE: n/a      HEP: see below    Home Exercises and Education Provided     Education provided:       Progress made with flexibility in last month or " so      progress towards goals   likely tx progression  rationale of rehab interventions    Written Home Exercises Provided: no          Previously issued exercises were reviewed if still part of current tx plan as well as any issued today and Yousif was able to demonstrate them prior to the end of the session.  Yousif demonstrated good understanding of the HEP provided.     See EMR under patient instructions and/or media for HEP issued today or in past    Assessment       Overall reactivity with good reduction in last month or so      Pt would continue to benefit from skilled OT in order to resolve gh capsular pattern, fix all stiffness, promote proper mechanics, etc.    Yousif is progressing well towards his goals and there are no updates to goals at this time. Pt prognosis is good.  Pt will continue to benefit from skilled outpatient occupational therapy to address the deficits listed in the problem list on initial evaluation to provide pt/family education and to maximize pt's level of independence in the home and community environment.     Anticipated barriers to occupational therapy: pain, stiffness, tightness, weakness    Pt's spiritual, cultural and educational needs considered and pt agreeable to plan of care and goals.    Goals:  stg to be met in 3 weeks 1. Patient will be I with HEP 2. Patient will have 2/10 pain with light use 3. Patient will have = prom of bilateral shoulders to enhance affected arm use with ADL        ltg to be met by d/c 1. Patient will be I with d/c HEP 2. Patient will have 1/10 pain with all use 3. Patient will have about 3+/5 MMT for elevation to promote full functional use                                                                  4. Patient will be I with all ADL       all goals ongoing unless otherwise noted              Any goals met today ?  (if any met see above)    Updates/Grading for next session: prn, consider manual end range elevation tx in future    Plan: manual tx,  ROM, PRE, patient education, prn tx      Kali Spears, OT/CHT

## 2020-11-17 NOTE — LETTER
November 17, 2020      Patience Gordon MD  [id number] [address] [address]           [address]  [facility] [address]  Phone: [phone]          Patient: Mane Ramires   MR Number: [id number]   YOB: 1955   Date of Visit: 11/17/2020       Dear Dr. Patience Gordon:    Thank you for referring Mane Ramires to me for evaluation. Attached you will find relevant portions of my assessment and plan of care.    If you have questions, please do not hesitate to call me. I look forward to following Mane Ramires along with you.    Sincerely,    Lg Ortega MD    Enclosure  CC:  No Recipients    If you would like to receive this communication electronically, please contact [email or web address] or [phone] to request more information on [facility] Link access.    For providers and/or their staff who would like to refer a patient to [facility], please contact us through our one-stop-shop provider referral line, Lei Dailey, at [phone].    If you feel you have received this communication in error or would no longer like to receive these types of communications, please e-mail [email or web address]

## 2020-11-18 ENCOUNTER — OFFICE VISIT (OUTPATIENT)
Dept: ORTHOPEDICS | Facility: CLINIC | Age: 65
End: 2020-11-18
Payer: MEDICAID

## 2020-11-18 ENCOUNTER — HOSPITAL ENCOUNTER (OUTPATIENT)
Dept: RADIOLOGY | Facility: HOSPITAL | Age: 65
Discharge: HOME | End: 2020-11-18
Attending: ORTHOPAEDIC SURGERY
Payer: MEDICAID

## 2020-11-18 VITALS — WEIGHT: 170 LBS | BODY MASS INDEX: 28.32 KG/M2 | HEIGHT: 65 IN

## 2020-11-18 DIAGNOSIS — M25.512 LEFT SHOULDER PAIN, UNSPECIFIED CHRONICITY: Primary | ICD-10-CM

## 2020-11-18 DIAGNOSIS — M25.512 LEFT SHOULDER PAIN, UNSPECIFIED CHRONICITY: ICD-10-CM

## 2020-11-18 DIAGNOSIS — M25.512 ACUTE PAIN OF LEFT SHOULDER: Primary | ICD-10-CM

## 2020-11-18 PROCEDURE — 99999 PR PBB SHADOW E&M-EST. PATIENT-LVL III: CPT | Mod: PBBFAC,,, | Performed by: ORTHOPAEDIC SURGERY

## 2020-11-18 PROCEDURE — 99213 OFFICE O/P EST LOW 20 MIN: CPT | Mod: S$PBB,,, | Performed by: ORTHOPAEDIC SURGERY

## 2020-11-18 PROCEDURE — 73030 XR SHOULDER COMPLETE 2 OR MORE VIEWS LEFT: ICD-10-PCS | Mod: 26,LT,, | Performed by: RADIOLOGY

## 2020-11-18 PROCEDURE — 73030 X-RAY EXAM OF SHOULDER: CPT | Mod: 26,LT,, | Performed by: RADIOLOGY

## 2020-11-18 PROCEDURE — 99999 PR PBB SHADOW E&M-EST. PATIENT-LVL III: ICD-10-PCS | Mod: PBBFAC,,, | Performed by: ORTHOPAEDIC SURGERY

## 2020-11-18 PROCEDURE — 73030 X-RAY EXAM OF SHOULDER: CPT | Mod: TC,PN,LT

## 2020-11-18 PROCEDURE — 99213 OFFICE O/P EST LOW 20 MIN: CPT | Mod: PBBFAC,25,PN | Performed by: ORTHOPAEDIC SURGERY

## 2020-11-18 PROCEDURE — 99213 PR OFFICE/OUTPT VISIT, EST, LEVL III, 20-29 MIN: ICD-10-PCS | Mod: S$PBB,,, | Performed by: ORTHOPAEDIC SURGERY

## 2020-11-18 NOTE — PROGRESS NOTES
11/18/2020      Past Medical History:   Diagnosis Date    Accident     fell from roof with TBI (word finding issues personality changes, memory deficets), R rib fractures, clavicle fx    Allergy     Anxiety     ASD (atrial septal defect)     noted on ECHO 6/16    Cervical stenosis of spine     noted on 6/15 MRI    CTS (carpal tunnel syndrome)     mild-mod on 4/17 NCS    DDD (degenerative disc disease), cervical 10/2014    C5-6 and C6-C7    Depression     JEEVAN (generalized anxiety disorder)     Gender identity disorder     H/O cardiovascular stress test     12/14 normal    Herpes simplex type 2 infection     History of atypical hyperplasia of breast     B precancer lesions    Hypertension     IGT (impaired glucose tolerance)     Myelomalacia     c-spine noted on 8/15 MRI    OA (osteoarthritis)     Prediabetes     TBI (traumatic brain injury)     after falling off a roof in 2003       Past Surgical History:   Procedure Laterality Date    AMPUTATION      L index finger    APPENDECTOMY  1969    BILATERAL SALPINGOOPHORECTOMY  02/2015    BIOPSY OF LIVER WITH ULTRASOUND GUIDANCE N/A 12/18/2018    Procedure: BIOPSY, LIVER, WITH US GUIDANCE;  Surgeon: [place] Diagnostic Provider;  Location: [facility] OR [address] [place] [address];  Service: Radiology;  Laterality: N/A;  U/S GUIDED LIVER BIOPSY.  12/18/2018.  DOSC 7:30 AM  PROCEDURE 9 AM.  DR NITHIN GOLDEN / MARLON NUR.  DB 12/14/18 3:20P    CARPAL TUNNEL RELEASE  12/2017    right    CERVICAL FUSION  10/2014    C5-C6 and C6-C7    CHOLECYSTECTOMY      ENDOSCOPIC NASAL SEPTOPLASTY N/A 8/23/2018    Procedure: SEPTOPLASTY, NASAL, ENDOSCOPIC;  Surgeon: Reji Arce III, MD;  Location: [facility] OR [address] [place] [address];  Service: ENT;  Laterality: N/A;    FINGER AMPUTATION Left     FRACTURE SURGERY Right 2003    rib fractures    HYSTERECTOMY  1984    MIGUEL    MASTECTOMY  02/2015    RECONSTRUCTION OF NOSE N/A 8/23/2018    Procedure: RECONSTRUCTION, NOSE;  Surgeon: Reji Arce III,  MD;  Location: [facility] OR [address] [place] [address];  Service: ENT;  Laterality: N/A;    ROTATOR CUFF REPAIR Right 2019    UPPER GASTROINTESTINAL ENDOSCOPY  09/06/2017    Dr. Ayon         Current Outpatient Medications:     carboxymethylcellulose (REFRESH PLUS) 0.5 % Dpet, 1 drop nightly., Disp: , Rfl:     cholecalciferol, vitamin D3, (VITAMIN D3) 2,000 unit Cap, Take 1 capsule (2,000 Units total) by mouth once daily., Disp: , Rfl:     DULoxetine (CYMBALTA) 60 MG capsule, TAKE 1 CAPSULE BY MOUTH EVERY DAY, Disp: 30 capsule, Rfl: 3    famotidine (PEPCID) 20 MG tablet, TAKE 1 TABLET (20 MG TOTAL) BY MOUTH NIGHTLY AS NEEDED FOR HEARTBURN., Disp: 30 tablet, Rfl: 5    fluticasone propionate (FLONASE) 50 mcg/actuation nasal spray, 2 SPRAYS (100 MCG TOTAL) BY EACH NOSTRIL ROUTE ONCE DAILY., Disp: 16 mL, Rfl: 3    HYDROcodone-acetaminophen (NORCO) 5-325 mg per tablet, Take 1 tablet by mouth daily as needed for Pain., Disp: 30 tablet, Rfl: 0    ibuprofen (ADVIL,MOTRIN) 800 MG tablet, Take 800 mg by mouth 3 (three) times daily as needed for Pain., Disp: , Rfl:     ketoconazole (NIZORAL) 2 % cream, Apply topically once daily., Disp: 60 g, Rfl: 1    levocetirizine (XYZAL) 5 MG tablet, TAKE 1 TABLET (5 MG TOTAL) BY MOUTH NIGHTLY AS NEEDED FOR ALLERGIES., Disp: 30 tablet, Rfl: 11    meclizine (ANTIVERT) 25 mg tablet, Take 1 tablet (25 mg total) by mouth 3 (three) times daily as needed for Dizziness., Disp: 20 tablet, Rfl: 0    metoprolol succinate (TOPROL-XL) 50 MG 24 hr tablet, TAKE 1 TABLET BY MOUTH ONCE DAILY. TAKE WITH METOPROLOL SUCCINATE 25MG, Disp: 30 tablet, Rfl: 3    traZODone (DESYREL) 50 MG tablet, TAKE 1 TABLET (50 MG TOTAL) BY MOUTH EVERY EVENING., Disp: 30 tablet, Rfl: 3    valsartan (DIOVAN) 160 MG tablet, Take 2 tablets (320 mg total) by mouth once daily., Disp: 180 tablet, Rfl: 1    ranitidine (ZANTAC) 300 MG tablet, Take 1 tablet (300 mg total) by mouth every evening., Disp: 30 tablet, Rfl: 2    testosterone  cypionate (DEPOTESTOTERONE CYPIONATE) 200 mg/mL injection, Inject 1 mL (200 mg total) into the muscle every 14 (fourteen) days. 3 ml syringe with 18 g needle  to draw  X 10  25 g 1/2 inch needle to inject x 10, Disp: 10 mL, Rfl: 5    Allergies as of 2020 - Reviewed 2020   Allergen Reaction Noted    Sudafed [pseudoephedrine hcl] Other (See Comments) 2014    Codeine Other (See Comments) 2014    Gabapentin  2019    Pseudoephedrine      Cortisone Palpitations 2016       Family History   Problem Relation Age of Onset    Diabetes Mother     Heart disease Mother     Gallbladder disease Mother     Coronary artery disease Father     Breast cancer Sister     Heart disease Sister     Lung cancer Paternal Grandfather     Heart disease Paternal Grandfather     Heart disease Maternal Grandmother     Gallbladder disease Maternal Grandmother     Heart attack Brother     Gallbladder disease Daughter     Craniosynostosis Daughter     Colon cancer Neg Hx     Colon polyps Neg Hx     Crohn's disease Neg Hx     Ulcerative colitis Neg Hx     Stomach cancer Neg Hx     Esophageal cancer Neg Hx        Social History     Socioeconomic History    Marital status: Single     Spouse name: Not on file    Number of children: Not on file    Years of education: Not on file    Highest education level: Not on file   Occupational History    Occupation: Artist   Social Needs    Financial resource strain: Not on file    Food insecurity     Worry: Not on file     Inability: Not on file    Transportation needs     Medical: Not on file     Non-medical: Not on file   Tobacco Use    Smoking status: Former Smoker     Packs/day: 0.25     Years: 2.00     Pack years: 0.50     Types: Cigarettes     Start date:      Quit date: 2002     Years since quittin.0    Smokeless tobacco: Never Used    Tobacco comment: did not smoke regularly   Substance and Sexual Activity    Alcohol use:  Yes     Comment: rare     Drug use: Yes     Types: Hydrocodone    Sexual activity: Never   Lifestyle    Physical activity     Days per week: Not on file     Minutes per session: Not on file    Stress: Only a little   Relationships    Social connections     Talks on phone: Not on file     Gets together: Not on file     Attends Taoism service: Not on file     Active member of club or organization: Not on file     Attends meetings of clubs or organizations: Not on file     Relationship status: Not on file   Other Topics Concern    Patient feels they ought to cut down on drinking/drug use Not Asked    Patient annoyed by others criticizing their drinking/drug use Not Asked    Patient has felt bad or guilty about drinking/drug use Not Asked    Patient has had a drink/used drugs as an eye opener in the AM Not Asked   Social History Narrative    Not on file             HPI:  Patient presents with about a 2 month history of left shoulder pain he says he opened up a car door felt something pull in the shoulder he states that the pain radiates into his neck on further questioning the patient states that he has been having pain for now may be close to 2 months and he has also been going to physical therapy for his left shoulder with no improvement of his symptoms.      Review of Systems   Constitution: Negative for chills and fever.   HENT: Negative for headaches and blurry vision.   Cardiovascular: Negative for chest pain, irregular heartbeat, leg swelling and palpitations.   Respiratory: Negative for cough and shortness of breath.   Endocrine: Negative for polyuria.   Hematologic/Lymphatic: Negative for bleeding problem. Does not bruise/bleed easily.   Skin: Negative for poor wound healing and rash.   Musculoskeletal: Negative for joint pain. Negative for arthritis, joint swelling, muscle weakness and myalgias.   Gastrointestinal: Negative for abdominal pain, heartburn, melena, nausea and vomiting.    Genitourinary: Negative for bladder incontinence and dysuria.   Neurological: Negative for numbness.   Psychiatric/Behavioral: Negative for depression. The patient is not nervous/anxious.     PE:  [unfilled]    A&Ox3, NAD  Neck-patient has some restricted range of motion of the neck but no pain today  RUE no swelling or deformity  LUE examination of left shoulder patient has weak abduction with lateral and anterior shoulder pain is no crepitance or instability  Pelvis no pelvic pain hip pain  Back no back pain straight leg raise negative  RLE no swelling or deformity  LLE swelling or deformity    Xrays:  X-ray of the left shoulder is negative I also see that he does have a cervical fusion with plates present in the cervical spine area        Labs:    No results found for this or any previous visit (from the past 24 hour(s)).    Assessment/Plan:   Rotator cuff tear left shoulder patient has chronic left shoulder pain no improvement with physical therapy and conservative therapy recommend MRI of the left shoulder

## 2020-11-19 ENCOUNTER — CLINICAL SUPPORT (OUTPATIENT)
Dept: REHABILITATION | Facility: HOSPITAL | Age: 65
End: 2020-11-19
Attending: FAMILY MEDICINE
Payer: MEDICAID

## 2020-11-19 DIAGNOSIS — R29.898 SHOULDER WEAKNESS: ICD-10-CM

## 2020-11-19 DIAGNOSIS — M25.512 LEFT SHOULDER PAIN, UNSPECIFIED CHRONICITY: Primary | ICD-10-CM

## 2020-11-19 DIAGNOSIS — M25.612 SHOULDER STIFFNESS, LEFT: ICD-10-CM

## 2020-11-19 PROCEDURE — 97530 THERAPEUTIC ACTIVITIES: CPT | Mod: PN

## 2020-11-19 NOTE — PROGRESS NOTES
Occupational Therapy Daily Treatment Note     Date: 11/19/2020  Name: Mane Dodd [facility] Number: [id number]    Therapy Diagnosis:   Encounter Diagnoses   Name Primary?    Left shoulder pain, unspecified chronicity Yes    Shoulder stiffness, left     Shoulder weakness      Physician: Patience Gordon MD    Physician Orders: evaluate and tx  Medical Diagnosis: shoulder pain unspecified laterality  Surgical Procedure and Date: n/a, n/a  Evaluation Date: 8/10/2020  Insurance Authorization Period Expiration: referral # [id number] 9-24-20 thru 1-31-21  Plan of Care Certification Period: 11-5-20 thru 12-17-20  Date of Return to MD: see epic    Visit # / Visits authorized:  10/38       referral # [id number] 9-24-20 thru 1-31-21   Time In:215  Time Out: 3  Total Billable Time: 45 minutes    Precautions:  universal    Subjective     Pt reports: no new issues;  he is compliant with home exercise program.   Response to previous treatment:good  Functional change: nothing significant since last visit  Pain: 0/10 rest; up to 4/10 light use  Location: diffuse left shoulder ache        Objective       Timed units:  2 manual                               Time: 215-245  1 therapeutic exercise          Time: 245-3    Measurements:     n/a        Fluido: n/a  U/s:n/a      UBE: n/a    Scar massage: n/a    Stretches as tolerated-pain free: er  0, 45, 90 abd    Manual:     gh coronal traction at 90 abd, open pack traction    ROM: prom gh circumduction    PRE: n/a      HEP: see below    Home Exercises and Education Provided     Education provided:     General progression of anticipated care      progress towards goals   likely tx progression  rationale of rehab interventions    Written Home Exercises Provided: no          Previously issued exercises were reviewed if still part of current tx plan as well as any issued today and Yousif was able to demonstrate them prior to the end of the session.  Yousif demonstrated good understanding  of the HEP provided.     See EMR under patient instructions and/or media for HEP issued today or in past    Assessment       Guarding of left arm with noticeable decrease since 1st day of OT      Pt would continue to benefit from skilled OT in order to resolve gh capsular pattern, fix all stiffness, promote proper mechanics, etc.    Yousif is progressing well towards his goals and there are no updates to goals at this time. Pt prognosis is good.  Pt will continue to benefit from skilled outpatient occupational therapy to address the deficits listed in the problem list on initial evaluation to provide pt/family education and to maximize pt's level of independence in the home and community environment.     Anticipated barriers to occupational therapy: pain, stiffness, tightness, weakness    Pt's spiritual, cultural and educational needs considered and pt agreeable to plan of care and goals.    Goals:  stg to be met in 3 weeks 1. Patient will be I with HEP 2. Patient will have 2/10 pain with light use 3. Patient will have = prom of bilateral shoulders to enhance affected arm use with ADL        ltg to be met by d/c 1. Patient will be I with d/c HEP 2. Patient will have 1/10 pain with all use 3. Patient will have about 3+/5 MMT for elevation to promote full functional use                                                                  4. Patient will be I with all ADL       all goals ongoing unless otherwise noted              Any goals met today ?  (if any met see above)    Updates/Grading for next session: prn    Plan: manual tx, ROM, PRE, patient education, prn tx      Kali Spears, OT/CHT

## 2020-11-23 ENCOUNTER — HOSPITAL ENCOUNTER (OUTPATIENT)
Dept: RADIOLOGY | Facility: HOSPITAL | Age: 65
Discharge: HOME | End: 2020-11-23
Attending: ORTHOPAEDIC SURGERY
Payer: MEDICAID

## 2020-11-23 DIAGNOSIS — M25.512 ACUTE PAIN OF LEFT SHOULDER: ICD-10-CM

## 2020-11-23 PROCEDURE — 73221 MRI JOINT UPR EXTREM W/O DYE: CPT | Mod: 26,LT,, | Performed by: RADIOLOGY

## 2020-11-23 PROCEDURE — 73221 MRI SHOULDER WITHOUT CONTRAST LEFT: ICD-10-PCS | Mod: 26,LT,, | Performed by: RADIOLOGY

## 2020-11-23 PROCEDURE — 73221 MRI JOINT UPR EXTREM W/O DYE: CPT | Mod: TC,LT

## 2020-11-24 ENCOUNTER — CLINICAL SUPPORT (OUTPATIENT)
Dept: REHABILITATION | Facility: HOSPITAL | Age: 65
End: 2020-11-24
Attending: FAMILY MEDICINE
Payer: MEDICAID

## 2020-11-24 DIAGNOSIS — M25.612 SHOULDER STIFFNESS, LEFT: ICD-10-CM

## 2020-11-24 DIAGNOSIS — R29.898 SHOULDER WEAKNESS: ICD-10-CM

## 2020-11-24 DIAGNOSIS — M25.512 LEFT SHOULDER PAIN, UNSPECIFIED CHRONICITY: Primary | ICD-10-CM

## 2020-11-24 PROCEDURE — 97530 THERAPEUTIC ACTIVITIES: CPT | Mod: PN

## 2020-11-24 NOTE — PROGRESS NOTES
Occupational Therapy Daily Treatment Note     Date: 11/24/2020  Name: Mane Dodd [facility] Number: [id number]    Therapy Diagnosis:   Encounter Diagnoses   Name Primary?    Left shoulder pain, unspecified chronicity Yes    Shoulder stiffness, left     Shoulder weakness      Physician: Patience Gordon MD    Physician Orders: evaluate and tx  Medical Diagnosis: shoulder pain unspecified laterality  Surgical Procedure and Date: n/a, n/a  Evaluation Date: 8/10/2020  Insurance Authorization Period Expiration: referral # [id number] 9-24-20 thru 1-31-21  Plan of Care Certification Period: 11-5-20 thru 12-17-20  Date of Return to MD: see epic    Visit # / Visits authorized:  10/38       referral # [id number] 9-24-20 thru 1-31-21   Time In:215  Time Out: 3  Total Billable Time: 45 minutes    Precautions:  universal    Subjective     Pt reports: no new issues;  he is compliant with home exercise program.   Response to previous treatment:good  Functional change: nothing significant since last visit  Pain: 0/10 rest; up to 4/10 light use  Location: diffuse left shoulder ache        Objective       Timed units:  2 manual                               Time: 230-3  1 therapeutic exercise          Time: 3-315    Measurements:     n/a        Fluido: n/a  U/s:n/a      UBE: n/a    Scar massage: n/a    Stretches as tolerated-pain free: er  0, 45, 90 abd    Manual:     gh coronal traction at 90 abd, open pack traction, bursal massage type 1 and 2 for abd    ROM: prom gh circumduction    PRE: n/a      HEP: see below    Home Exercises and Education Provided     Education provided:     diablo effect      progress towards goals   likely tx progression  rationale of rehab interventions    Written Home Exercises Provided: no          Previously issued exercises were reviewed if still part of current tx plan as well as any issued today and Yousif was able to demonstrate them prior to the end of the session.  Yousif demonstrated good  understanding of the HEP provided.     See EMR under patient instructions and/or media for HEP issued today or in past    Assessment       Full prom all planes imperative for proper mechanics      Pt would continue to benefit from skilled OT in order to resolve gh capsular pattern, fix all stiffness, promote proper mechanics, etc.    Yousfi is progressing well towards his goals and there are no updates to goals at this time. Pt prognosis is good.  Pt will continue to benefit from skilled outpatient occupational therapy to address the deficits listed in the problem list on initial evaluation to provide pt/family education and to maximize pt's level of independence in the home and community environment.     Anticipated barriers to occupational therapy: pain, stiffness, tightness, weakness    Pt's spiritual, cultural and educational needs considered and pt agreeable to plan of care and goals.    Goals:  stg to be met in 3 weeks 1. Patient will be I with HEP 2. Patient will have 2/10 pain with light use 3. Patient will have = prom of bilateral shoulders to enhance affected arm use with ADL        ltg to be met by d/c 1. Patient will be I with d/c HEP 2. Patient will have 1/10 pain with all use 3. Patient will have about 3+/5 MMT for elevation to promote full functional use                                                                  4. Patient will be I with all ADL       all goals ongoing unless otherwise noted              Any goals met today ?  (if any met see above)    Updates/Grading for next session: prn, consider prom testing    Plan: manual tx, ROM, PRE, patient education, prn tx      Kali Spears, OT/CHT

## 2020-11-27 ENCOUNTER — CLINICAL SUPPORT (OUTPATIENT)
Dept: REHABILITATION | Facility: HOSPITAL | Age: 65
End: 2020-11-27
Payer: MEDICAID

## 2020-11-27 DIAGNOSIS — M25.512 LEFT SHOULDER PAIN, UNSPECIFIED CHRONICITY: ICD-10-CM

## 2020-11-27 DIAGNOSIS — M25.612 SHOULDER STIFFNESS, LEFT: Primary | ICD-10-CM

## 2020-11-27 DIAGNOSIS — R29.898 SHOULDER WEAKNESS: ICD-10-CM

## 2020-11-27 PROCEDURE — 97530 THERAPEUTIC ACTIVITIES: CPT | Mod: PN

## 2020-11-27 NOTE — PROGRESS NOTES
Occupational Therapy Daily Treatment Note     Date: 11/27/2020  Name: Mane Dodd [facility] Number: [id number]    Therapy Diagnosis:   Encounter Diagnoses   Name Primary?    Shoulder stiffness, left Yes    Shoulder weakness      Physician: Patience Gordon MD    Physician Orders: evaluate and tx  Medical Diagnosis: shoulder pain unspecified laterality  Surgical Procedure and Date: n/a, n/a  Evaluation Date: 8/10/2020  Insurance Authorization Period Expiration: referral # [id number] 9-24-20 thru 1-31-21  Plan of Care Certification Period: 11-5-20 thru 12-17-20  Date of Return to MD: see epic    Visit # / Visits authorized:  11/38       referral # [id number] 9-24-20 thru 1-31-21   Time In:130  Time Out: 215  Total Billable Time: 45 minutes    Precautions:  universal    Subjective     Pt reports: no new issues;  he is compliant with home exercise program. Saw mri results in his medical record but has not heard anything from mri ordering md about results.  Response to previous treatment:good  Functional change: light use at shoulder level slowly improving  Pain: 0/10 rest; up to 4/10 light use  Location: diffuse left shoulder ache        Objective       Timed units:  2 manual                               Time: 130-2  1 therapeutic exercise          Time: 2-215    Measurements:     n/a        Fluido: n/a  U/s:n/a      UBE: n/a    Scar massage: n/a    Stretches as tolerated-pain free: er  0, 45, 90 abd    Manual:     gh coronal traction at 90 abd, open pack traction, bursal massage type 1 and 2 for abd    ROM: prom gh circumduction    PRE: n/a      HEP: see below    Home Exercises and Education Provided     Education provided:           progress towards goals   likely tx progression  rationale of rehab interventions    Written Home Exercises Provided: no          Previously issued exercises were reviewed if still part of current tx plan as well as any issued today and Yousif was able to demonstrate them prior to the  end of the session.  Yousif demonstrated good understanding of the HEP provided.     See EMR under patient instructions and/or media for HEP issued today or in past    Assessment       Gh capsular pattern continues to resolve      Pt would continue to benefit from skilled OT in order to resolve gh capsular pattern, fix all stiffness, promote proper mechanics, etc.    Yousif is progressing well towards his goals and there are no updates to goals at this time. Pt prognosis is good.  Pt will continue to benefit from skilled outpatient occupational therapy to address the deficits listed in the problem list on initial evaluation to provide pt/family education and to maximize pt's level of independence in the home and community environment.     Anticipated barriers to occupational therapy: pain, stiffness, tightness, weakness    Pt's spiritual, cultural and educational needs considered and pt agreeable to plan of care and goals.    Goals:  stg to be met in 3 weeks 1. Patient will be I with HEP 2. Patient will have 2/10 pain with light use 3. Patient will have = prom of bilateral shoulders to enhance affected arm use with ADL        ltg to be met by d/c 1. Patient will be I with d/c HEP 2. Patient will have 1/10 pain with all use 3. Patient will have about 3+/5 MMT for elevation to promote full functional use                                                                  4. Patient will be I with all ADL       all goals ongoing unless otherwise noted              Any goals met today ?  (if any met see above)    Updates/Grading for next session: prn, consider prom testing    Plan: manual tx, ROM, PRE, patient education, prn tx      Kali Spears, OT/CHT

## 2020-12-01 ENCOUNTER — CLINICAL SUPPORT (OUTPATIENT)
Dept: REHABILITATION | Facility: HOSPITAL | Age: 65
End: 2020-12-01
Payer: MEDICAID

## 2020-12-01 DIAGNOSIS — M25.612 SHOULDER STIFFNESS, LEFT: ICD-10-CM

## 2020-12-01 DIAGNOSIS — M25.512 LEFT SHOULDER PAIN, UNSPECIFIED CHRONICITY: ICD-10-CM

## 2020-12-01 DIAGNOSIS — R29.898 SHOULDER WEAKNESS: Primary | ICD-10-CM

## 2020-12-01 PROCEDURE — 97530 THERAPEUTIC ACTIVITIES: CPT | Mod: PN

## 2020-12-01 NOTE — PROGRESS NOTES
"  Occupational Therapy Daily Treatment Note     Date: 12/1/2020  Name: Mane Dodd [facility] Number: [id number]    Therapy Diagnosis:   Encounter Diagnoses   Name Primary?    Shoulder weakness Yes    Left shoulder pain, unspecified chronicity     Shoulder stiffness, left      Physician: Patience Gordon MD    Physician Orders: evaluate and tx  Medical Diagnosis: shoulder pain unspecified laterality  Surgical Procedure and Date: n/a, n/a  Evaluation Date: 8/10/2020  Insurance Authorization Period Expiration: referral # [id number] 9-24-20 thru 1-31-21  Plan of Care Certification Period: 11-5-20 thru 12-17-20  Date of Return to MD: see epic    Visit # / Visits authorized:  12/38       referral # [id number] 9-24-20 thru 1-31-21   Time In:145  Time Out: 230  Total Billable Time: 45 minutes    Precautions:  universal    Subjective     Pt reports: no new issues;  he is compliant with home exercise program.   Response to previous treatment:good  Functional change: light use at shoulder level slowly improving day to day  Pain: 0/10 rest; up to 4/10 light use  Location: diffuse left shoulder ache        Objective       Timed units:  2 manual                               Time: 145-215  1 therapeutic exercise          Time: 215-230    Measurements:     r prom er at 0 abd 80   at 45 abd 90    at 90 abd 90;       sidelying ir  80        ir behind back 15"    lift off  l prom er at 0 abd 70    at 45 abd 78    at  90 abd 80;      sidelying ir 76         ir behind back 15"   lift off        Fluido: n/a  U/s:n/a      UBE: n/a    Scar massage: n/a    Stretches as tolerated-pain free: er  0, 45, 90 abd    Manual:     gh coronal traction at 90 abd, open pack traction, bursal massage type 1 and 2 for abd    ROM: prom gh circumduction    PRE: n/a      HEP: see below    Home Exercises and Education Provided     Education provided:     Need to adhere to HEP to resolve stifffness      progress towards goals   likely tx " progression  rationale of rehab interventions    Written Home Exercises Provided: no          Previously issued exercises were reviewed if still part of current tx plan as well as any issued today and Yousif was able to demonstrate them prior to the end of the session.  Yousif demonstrated good understanding of the HEP provided.     See EMR under patient instructions and/or media for HEP issued today or in past    Assessment       Full flexibility at left shoulder complex essential for strong, painless, and full use with ADL long term      Pt would continue to benefit from skilled OT in order to resolve gh capsular pattern, fix all stiffness, promote proper mechanics, etc.    Yousif is progressing well towards his goals and there are no updates to goals at this time. Pt prognosis is good.  Pt will continue to benefit from skilled outpatient occupational therapy to address the deficits listed in the problem list on initial evaluation to provide pt/family education and to maximize pt's level of independence in the home and community environment.     Anticipated barriers to occupational therapy: pain, stiffness, tightness, weakness    Pt's spiritual, cultural and educational needs considered and pt agreeable to plan of care and goals.    Goals:  stg to be met in 3 weeks 1. Patient will be I with HEP 2. Patient will have 2/10 pain with light use 3. Patient will have = prom of bilateral shoulders to enhance affected arm use with ADL        ltg to be met by d/c 1. Patient will be I with d/c HEP 2. Patient will have 1/10 pain with all use 3. Patient will have about 3+/5 MMT for elevation to promote full functional use                                                                  4. Patient will be I with all ADL       all goals ongoing unless otherwise noted              Any goals met today ?  (if any met see above)    Updates/Grading for next session: prn    Plan: manual tx, ROM, PRE, patient education, prn tx      Kali  Regan, OT/CHT

## 2020-12-03 ENCOUNTER — CLINICAL SUPPORT (OUTPATIENT)
Dept: REHABILITATION | Facility: HOSPITAL | Age: 65
End: 2020-12-03
Payer: MEDICAID

## 2020-12-03 DIAGNOSIS — M25.612 SHOULDER STIFFNESS, LEFT: ICD-10-CM

## 2020-12-03 DIAGNOSIS — M25.519 SHOULDER PAIN, UNSPECIFIED CHRONICITY, UNSPECIFIED LATERALITY: ICD-10-CM

## 2020-12-03 DIAGNOSIS — M25.512 LEFT SHOULDER PAIN, UNSPECIFIED CHRONICITY: Primary | ICD-10-CM

## 2020-12-03 PROCEDURE — 97530 THERAPEUTIC ACTIVITIES: CPT | Mod: PN

## 2020-12-03 NOTE — PROGRESS NOTES
Occupational Therapy Daily Treatment Note     Date: 12/3/2020  Name: Mane Dodd [facility] Number: [id number]    Therapy Diagnosis:   Encounter Diagnoses   Name Primary?    Left shoulder pain, unspecified chronicity Yes    Shoulder stiffness, left     Shoulder pain, unspecified chronicity, unspecified laterality      Physician: Patience Gordon MD    Physician Orders: evaluate and tx  Medical Diagnosis: shoulder pain unspecified laterality  Surgical Procedure and Date: n/a, n/a  Evaluation Date: 8/10/2020  Insurance Authorization Period Expiration: referral # [id number] 9-24-20 thru 1-31-21  Plan of Care Certification Period: 11-5-20 thru 12-17-20  Date of Return to MD: see epic    Visit # / Visits authorized:  13/38       referral # [id number] 9-24-20 thru 1-31-21   Time In:145  Time Out: 230  Total Billable Time: 45 minutes    Precautions:  universal    Subjective     Pt reports: no new issues;  he is compliant with home exercise program.   Response to previous treatment:good  Functional change: hygiene above shoulder level continues to improve  Pain: 0/10 rest; up to 4/10 light use  Location: diffuse left shoulder ache        Objective       Timed units:  2 manual                               Time: 145-215  2 therapeutic exercise          Time: 215-245    Measurements:     n/a        Fluido: n/a  U/s:n/a      UBE: n/a    Scar massage: n/a    Stretches as tolerated-pain free: er  0, 45, 90 abd; sleeper    Manual:     gh coronal traction at 90 abd, open pack traction, bursal massage type 1 and 2 for abd    ROM: prom gh circumduction    PRE: n/a      HEP: see below      Prom abd 180 bilaterally  Home Exercises and Education Provided     Education provided:     Soft tissue reorientation with low load prolonged stretches      progress towards goals   likely tx progression  rationale of rehab interventions    Written Home Exercises Provided: no          Previously issued exercises were reviewed if still part of  current tx plan as well as any issued today and Yousif was able to demonstrate them prior to the end of the session.  Yuosif demonstrated good understanding of the HEP provided.     See EMR under patient instructions and/or media for HEP issued today or in past    Assessment       Gh end feel wnl      Pt would continue to benefit from skilled OT in order to resolve gh capsular pattern, fix all stiffness, promote proper mechanics, etc.    Yousif is progressing well towards his goals and there are no updates to goals at this time. Pt prognosis is good.  Pt will continue to benefit from skilled outpatient occupational therapy to address the deficits listed in the problem list on initial evaluation to provide pt/family education and to maximize pt's level of independence in the home and community environment.     Anticipated barriers to occupational therapy: pain, stiffness, tightness, weakness    Pt's spiritual, cultural and educational needs considered and pt agreeable to plan of care and goals.    Goals:  stg to be met in 3 weeks 1. Patient will be I with HEP 2. Patient will have 2/10 pain with light use 3. Patient will have = prom of bilateral shoulders to enhance affected arm use with ADL        ltg to be met by d/c 1. Patient will be I with d/c HEP 2. Patient will have 1/10 pain with all use 3. Patient will have about 3+/5 MMT for elevation to promote full functional use                                                                  4. Patient will be I with all ADL       all goals ongoing unless otherwise noted              Any goals met today ?  (if any met see above)    Updates/Grading for next session: prn    Plan: manual tx, ROM, PRE, patient education, prn tx      Kali Spears, OT/CHT

## 2020-12-07 ENCOUNTER — CLINICAL SUPPORT (OUTPATIENT)
Dept: REHABILITATION | Facility: HOSPITAL | Age: 65
End: 2020-12-07
Payer: MEDICAID

## 2020-12-07 DIAGNOSIS — M25.519 SHOULDER PAIN, UNSPECIFIED CHRONICITY, UNSPECIFIED LATERALITY: ICD-10-CM

## 2020-12-07 DIAGNOSIS — M25.512 LEFT SHOULDER PAIN, UNSPECIFIED CHRONICITY: Primary | ICD-10-CM

## 2020-12-07 DIAGNOSIS — M25.612 SHOULDER STIFFNESS, LEFT: ICD-10-CM

## 2020-12-07 PROCEDURE — 97530 THERAPEUTIC ACTIVITIES: CPT | Mod: PN

## 2020-12-07 NOTE — PROGRESS NOTES
Occupational Therapy Daily Treatment Note     Date: 12/7/2020  Name: Mane Dodd [facility] Number: [id number]    Therapy Diagnosis:   Encounter Diagnoses   Name Primary?    Left shoulder pain, unspecified chronicity Yes    Shoulder stiffness, left     Shoulder pain, unspecified chronicity, unspecified laterality      Physician: Patience Gordon MD    Physician Orders: evaluate and tx  Medical Diagnosis: shoulder pain unspecified laterality  Surgical Procedure and Date: n/a, n/a  Evaluation Date: 8/10/2020  Insurance Authorization Period Expiration: referral # [id number] 9-24-20 thru 1-31-21  Plan of Care Certification Period: 11-5-20 thru 12-17-20  Date of Return to MD: see epic    Visit # / Visits authorized:  14/38       referral # [id number] 9-24-20 thru 1-31-21   Time In:130  Time Out: 215  Total Billable Time: 45 minutes    Precautions:  universal    Subjective     Pt reports: no new issues;  he is compliant with home exercise program.   Response to previous treatment:good  Functional change: nothing significant since last visit  Pain: 0/10 rest; up to 2/10 light use  Location: diffuse left shoulder ache        Objective       Timed units:  1 manual                               Time: 130-145  2 therapeutic exercise          Time: 145-215    Measurements:     n/a        Fluido: n/a  U/s:n/a      UBE: n/a    Scar massage: n/a    Stretches as tolerated-pain free: er 0, 45, 90 abd    Manual:     gh coronal traction at 90 abd, open pack traction, bursal massage type 1 and 2 for abd    ROM: prom gh circumduction    PRE: n/a      HEP: see below      Prom abd 180 bilaterally  Home Exercises and Education Provided     Education provided:     likelihood for d2 and supine hands behind head stretch      progress towards goals   likely tx progression  rationale of rehab interventions    Written Home Exercises Provided: no          Previously issued exercises were reviewed if still part of current tx plan as well as  any issued today and Yousif was able to demonstrate them prior to the end of the session.  Yousif demonstrated good understanding of the HEP provided.     See EMR under patient instructions and/or media for HEP issued today or in past    Assessment       Shoulder pain and tightness continue to decrease      Pt would continue to benefit from skilled OT in order to resolve gh capsular pattern, fix all stiffness, promote proper mechanics, etc.    Yousif is progressing well towards his goals and there are no updates to goals at this time. Pt prognosis is good.  Pt will continue to benefit from skilled outpatient occupational therapy to address the deficits listed in the problem list on initial evaluation to provide pt/family education and to maximize pt's level of independence in the home and community environment.     Anticipated barriers to occupational therapy: pain, stiffness, tightness, weakness    Pt's spiritual, cultural and educational needs considered and pt agreeable to plan of care and goals.    Goals:  stg to be met in 3 weeks 1. Patient will be I with HEP 2. Patient will have 2/10 pain with light use- met 12-7-20 3. Patient will have = prom of bilateral shoulders to enhance affected arm use with ADL        ltg to be met by d/c 1. Patient will be I with d/c HEP 2. Patient will have 1/10 pain with all use 3. Patient will have about 3+/5 MMT for elevation to promote full functional use                                                                  4. Patient will be I with all ADL       all goals ongoing unless otherwise noted              Any goals met today ?  (if any met see above)    Updates/Grading for next session: prn    Plan: manual tx, ROM, PRE, patient education, prn tx      Kali Spears, OT/CHT

## 2020-12-09 ENCOUNTER — PATIENT MESSAGE (OUTPATIENT)
Dept: ORTHOPEDICS | Facility: CLINIC | Age: 65
End: 2020-12-09

## 2020-12-10 ENCOUNTER — CLINICAL SUPPORT (OUTPATIENT)
Dept: REHABILITATION | Facility: HOSPITAL | Age: 65
End: 2020-12-10
Payer: MEDICAID

## 2020-12-10 DIAGNOSIS — R29.898 SHOULDER WEAKNESS: ICD-10-CM

## 2020-12-10 DIAGNOSIS — M25.612 SHOULDER STIFFNESS, LEFT: ICD-10-CM

## 2020-12-10 DIAGNOSIS — M25.512 LEFT SHOULDER PAIN, UNSPECIFIED CHRONICITY: Primary | ICD-10-CM

## 2020-12-10 PROCEDURE — 97530 THERAPEUTIC ACTIVITIES: CPT | Mod: PN

## 2020-12-10 NOTE — PROGRESS NOTES
Occupational Therapy Daily Treatment Note     Date: 12/10/2020  Name: Mane Ramires  [facility] Number: [id number]    Therapy Diagnosis:   Encounter Diagnoses   Name Primary?    Left shoulder pain, unspecified chronicity Yes    Shoulder stiffness, left     Shoulder weakness      Physician: Patience Gordon MD    Physician Orders: evaluate and tx  Medical Diagnosis: shoulder pain unspecified laterality  Surgical Procedure and Date: n/a, n/a  Evaluation Date: 8/10/2020  Insurance Authorization Period Expiration: referral # [id number] 9-24-20 thru 1-31-21  Plan of Care Certification Period: 11-5-20 thru 12-17-20  Date of Return to MD: see epic    Visit # / Visits authorized:  15/38       referral # [id number] 9-24-20 thru 1-31-21   Time In:130  Time Out: 215  Total Billable Time: 45 minutes    Precautions:  universal    Subjective     Pt reports: no new issues;  he is compliant with home exercise program. Increased pain while sleeping 2 nights ago but resolving.  Response to previous treatment:good  Functional change: nothing significant since last visit  Pain: 0/10 rest; up to 2/10 light use  Location: diffuse left shoulder ache        Objective       Timed units:  1 manual                               Time: 130-145  2 therapeutic exercise          Time: 145-215    Measurements:     n/a        Fluido: n/a  U/s:n/a      UBE: n/a    Scar massage: n/a    Stretches as tolerated-pain free: er 0, 45, 90 abd    Manual:     gh coronal traction at 90 abd, open pack traction, bursal massage type 1 and 2 for abd    ROM: prom gh circumduction    PRE: n/a      HEP: see below      Prom abd 180 bilaterally  Home Exercises and Education Provided     Education provided:     Gh hypomobility and impingement    progress towards goals   likely tx progression  rationale of rehab interventions    Written Home Exercises Provided: no          Previously issued exercises were reviewed if still part of current tx plan as well as any issued  today and Yousif was able to demonstrate them prior to the end of the session.  Yousif demonstrated good understanding of the HEP provided.     See EMR under patient instructions and/or media for HEP issued today or in past    Assessment       Needs to fix all shoulder complex tightness for proper mechanics        Pt would continue to benefit from skilled OT in order to resolve gh capsular pattern, fix all stiffness, promote proper mechanics, etc.    Yousif is progressing well towards his goals and there are no updates to goals at this time. Pt prognosis is good.  Pt will continue to benefit from skilled outpatient occupational therapy to address the deficits listed in the problem list on initial evaluation to provide pt/family education and to maximize pt's level of independence in the home and community environment.     Anticipated barriers to occupational therapy: pain, stiffness, tightness, weakness    Pt's spiritual, cultural and educational needs considered and pt agreeable to plan of care and goals.    Goals:  stg to be met in 3 weeks 1. Patient will be I with HEP 2. Patient will have 2/10 pain with light use- met 12-7-20 3. Patient will have = prom of bilateral shoulders to enhance affected arm use with ADL        ltg to be met by d/c 1. Patient will be I with d/c HEP 2. Patient will have 1/10 pain with all use 3. Patient will have about 3+/5 MMT for elevation to promote full functional use                                                                  4. Patient will be I with all ADL       all goals ongoing unless otherwise noted              Any goals met today ?  (if any met see above)    Updates/Grading for next session: prn    Plan: manual tx, ROM, PRE, patient education, prn tx      Kali Spears, OT/CHT

## 2020-12-16 ENCOUNTER — PATIENT MESSAGE (OUTPATIENT)
Dept: ORTHOPEDICS | Facility: CLINIC | Age: 65
End: 2020-12-16

## 2020-12-17 ENCOUNTER — CLINICAL SUPPORT (OUTPATIENT)
Dept: REHABILITATION | Facility: HOSPITAL | Age: 65
End: 2020-12-17
Payer: MEDICAID

## 2020-12-17 DIAGNOSIS — M25.612 SHOULDER STIFFNESS, LEFT: ICD-10-CM

## 2020-12-17 DIAGNOSIS — R29.898 SHOULDER WEAKNESS: ICD-10-CM

## 2020-12-17 DIAGNOSIS — M25.512 LEFT SHOULDER PAIN, UNSPECIFIED CHRONICITY: Primary | ICD-10-CM

## 2020-12-17 PROCEDURE — 97530 THERAPEUTIC ACTIVITIES: CPT | Mod: PN

## 2020-12-17 NOTE — PROGRESS NOTES
"  Occupational Therapy Daily Treatment Note     Date: 12/17/2020  Name: Mane Ramires  [facility] Number: [id number]    Therapy Diagnosis:   Encounter Diagnoses   Name Primary?    Left shoulder pain, unspecified chronicity Yes    Shoulder stiffness, left     Shoulder weakness      Physician: Patience Gordon MD    Physician Orders: evaluate and tx  Medical Diagnosis: shoulder pain unspecified laterality  Surgical Procedure and Date: n/a, n/a  Evaluation Date: 8/10/2020  Insurance Authorization Period Expiration: referral # [id number] 9-24-20 thru 1-31-21  Plan of Care Certification Period: 11-5-20 thru 12-17-20  Date of Return to MD: see epic    Visit # / Visits authorized:  16/38       referral # [id number] 9-24-20 thru 1-31-21   Time In:130  Time Out: 215  Total Billable Time: 45 minutes    Precautions:  universal    Subjective     Pt reports: no new issues;  he is compliant with home exercise program.Response to previous treatment:good  Functional change: elevation with light use continues to improve   Pain: 0/10 rest; up to 2/10 light use  Location: diffuse left shoulder ache        Objective       Timed units:  1 manual                               Time: 130-145  2 therapeutic exercise          Time: 145-215    Measurements:     r prom er at 0 abd 80   at 45 abd 90    at 90 abd 90;       sidelying ir  80        ir behind back 15"    lift off  l prom er at 0 abd 70    at 45 abd 80    at  90 abd 80;      sidelying ir 80         ir behind back 15'   lift off        Fluido: n/a  U/s:n/a      UBE: n/a    Scar massage: n/a    Stretches as tolerated-pain free: er 0, 45, 90 abd    Manual:     gh coronal traction at 90 abd, open pack traction, bursal massage type 1 and 2 for abd    ROM: prom gh circumduction    PRE: n/a      HEP: see below        Home Exercises and Education Provided     Education provided:     Progress with prom testing since last time measured        progress towards goals   likely tx " progression  rationale of rehab interventions    Written Home Exercises Provided: no          Previously issued exercises were reviewed if still part of current tx plan as well as any issued today and Yousif was able to demonstrate them prior to the end of the session.  Yousif demonstrated good understanding of the HEP provided.     See EMR under patient instructions and/or media for HEP issued today or in past    Assessment       Posterior gh joint stiffness resolved        Pt would continue to benefit from skilled OT in order to resolve gh capsular pattern, fix all stiffness, promote proper mechanics, etc.    Yousif is progressing well towards his goals and there are no updates to goals at this time. Pt prognosis is good.  Pt will continue to benefit from skilled outpatient occupational therapy to address the deficits listed in the problem list on initial evaluation to provide pt/family education and to maximize pt's level of independence in the home and community environment.     Anticipated barriers to occupational therapy: pain, stiffness, tightness, weakness    Pt's spiritual, cultural and educational needs considered and pt agreeable to plan of care and goals.    Goals:  stg to be met in 3 weeks 1. Patient will be I with HEP 2. Patient will have 2/10 pain with light use- met 12-7-20 3. Patient will have = prom of bilateral shoulders to enhance affected arm use with ADL        ltg to be met by d/c 1. Patient will be I with d/c HEP 2. Patient will have 1/10 pain with all use 3. Patient will have about 3+/5 MMT for elevation to promote full functional use                                                                  4. Patient will be I with all ADL       all goals ongoing unless otherwise noted              Any goals met today ?  (if any met see above)    Updates/Grading for next session: prn    Plan: manual tx, ROM, PRE, patient education, prn tx      Kali Spears  OT/CHT

## 2020-12-17 NOTE — PLAN OF CARE
Outpatient Therapy Updated Plan of Care     Visit Date: 12/17/2020  Name: Mane Dodd [facility] Number: [id number]    Therapy Diagnosis:   Encounter Diagnoses   Name Primary?    Left shoulder pain, unspecified chronicity Yes    Shoulder stiffness, left     Shoulder weakness      Physician: Patience Gordon MD    Physician Orders: evaluate and tx  Medical Diagnosis: see evaluation  Evaluation Date: 8-10-20    Total Visits Received: 29  Cancelled Visits: see epic  No Show Visits: see epic    Current Certification Period:  11-5-20 to 12-17-20  Precautions:  universal  Visits from Evaluation Date:  28  Functional Level Prior to Evaluation:  Decreased rom, use, and strength; pre symptom onset had no deficits with functional use    Subjective     Update: no new issues, happy with progress    Objective     Update: see measurements in notes    Assessment     Update: good reduction in pain, stiffness, and limited use since 4 weeks ago    Previous Short Term Goals Status:   See notes  New Short Term Goals Status:   n/a  Long Term Goal Status:   continue per initial plan of care.  Continue any LTGs added in notes post initial eval  Reasons for Recertification of Therapy:   Continued skilled and structured rehab imperative to properly promote full rom, balanced strength, and good mechanics long term with ADL    Plan     Updated Certification Period: 12/17/2020 to 1-14-21  Recommended Treatment Plan: 2 times per week for 4 weeks: evaluate and tx  Other Recommendations: visit frequency in above dates may change based on need and patient progress    Kali Spears OT/SEBASTIAN  12/17/2020    frequency per week may change based on patient progress and need for therapy      I CERTIFY THE NEED FOR THESE SERVICES FURNISHED UNDER THIS PLAN OF TREATMENT AND WHILE UNDER MY CARE    Physician's comments:        Physician's Signature: ___________________________________________________

## 2020-12-21 ENCOUNTER — CLINICAL SUPPORT (OUTPATIENT)
Dept: REHABILITATION | Facility: HOSPITAL | Age: 65
End: 2020-12-21
Payer: MEDICAID

## 2020-12-21 DIAGNOSIS — R29.898 SHOULDER WEAKNESS: ICD-10-CM

## 2020-12-21 DIAGNOSIS — M25.512 LEFT SHOULDER PAIN, UNSPECIFIED CHRONICITY: Primary | ICD-10-CM

## 2020-12-21 DIAGNOSIS — M25.612 SHOULDER STIFFNESS, LEFT: ICD-10-CM

## 2020-12-21 PROCEDURE — 97530 THERAPEUTIC ACTIVITIES: CPT | Mod: PN

## 2020-12-21 NOTE — PROGRESS NOTES
Occupational Therapy Daily Treatment Note     Date: 12/21/2020  [id number]    Therapy Diagnosis:   Encounter Diagnoses   Name Primary?    Left shoulder pain, unspecified chronicity Yes    Shoulder stiffness, left     Shoulder weakness      Physician: Patience Gordon MD    Physician Orders: evaluate and tx  Medical Diagnosis: shoulder pain unspecified laterality  Surgical Procedure and Date: n/a, n/a  Evaluation Date: 8/10/2020  Insurance Authorization Period Expiration: referral # [id number] 9-24-20 thru 1-31-21  Plan of Care Certification Period:  12-17-20 thru 1-14-21  Date of Return to MD: see epic    Visit # / Visits authorized:  17/38       referral # [id number] 9-24-20 thru 1-31-21   Time In:130  Time Out: 215  Total Billable Time: 45 minutes    Precautions:  universal    Subjective     Pt reports: no new issues;  he is compliant with home exercise program.Response to previous treatment:good  Functional change: nothing major since last OT visit  Pain: 0/10 rest; up to 2/10 light use  Location: diffuse left shoulder ache        Objective       Timed units:  1 manual                               Time: 130-145  2 therapeutic exercise          Time: 145-215    Measurements:     n/a      Fluido: n/a  U/s:n/a      UBE: n/a    Scar massage: n/a    Stretches as tolerated-pain free: er 0, 45, 90 abd    Manual:     gh coronal traction at 90 abd, open pack traction, bursal massage type 1 and 2 for abd    ROM: prom gh circumduction    PRE: n/a      HEP: see below    Post tx: prom for er 45 abd 90, er 90 abd 90    Home Exercises and Education Provided     Education provided:       Possible need for d2 and supine hands behind head stretch to get full passive flex and d2 in future      progress towards goals   likely tx progression  rationale of rehab interventions    Written Home Exercises Provided: no          Previously issued exercises were reviewed if still part of current tx  plan as well as any issued today and Yousif was able to demonstrate them prior to the end of the session.  Yousif demonstrated good understanding of the HEP provided.     See EMR under patient instructions and/or media for HEP issued today or in past    Assessment       May benefit from end range flexion manual tx       Pt would continue to benefit from skilled OT in order to resolve gh capsular pattern, fix all stiffness, promote proper mechanics, etc.    Yousif is progressing well towards his goals and there are no updates to goals at this time. Pt prognosis is good.  Pt will continue to benefit from skilled outpatient occupational therapy to address the deficits listed in the problem list on initial evaluation to provide pt/family education and to maximize pt's level of independence in the home and community environment.     Anticipated barriers to occupational therapy: pain, stiffness, tightness, weakness    Pt's spiritual, cultural and educational needs considered and pt agreeable to plan of care and goals.    Goals:  stg to be met in 3 weeks 1. Patient will be I with HEP 2. Patient will have 2/10 pain with light use- met 12-7-20 3. Patient will have = prom of bilateral shoulders to enhance affected arm use with ADL        ltg to be met by d/c 1. Patient will be I with d/c HEP 2. Patient will have 1/10 pain with all use 3. Patient will have about 3+/5 MMT for elevation to promote full functional use                                                                  4. Patient will be I with all ADL       all goals ongoing unless otherwise noted              Any goals met today ?  (if any met see above)    Updates/Grading for next session: prn    Plan: manual tx, ROM, PRE, patient education, prn tx      Kali Spears, OT/CHT

## 2020-12-29 ENCOUNTER — CLINICAL SUPPORT (OUTPATIENT)
Dept: REHABILITATION | Facility: HOSPITAL | Age: 65
End: 2020-12-29
Payer: MEDICAID

## 2020-12-29 DIAGNOSIS — R29.898 SHOULDER WEAKNESS: ICD-10-CM

## 2020-12-29 DIAGNOSIS — M25.512 LEFT SHOULDER PAIN, UNSPECIFIED CHRONICITY: Primary | ICD-10-CM

## 2020-12-29 DIAGNOSIS — M25.612 SHOULDER STIFFNESS, LEFT: ICD-10-CM

## 2020-12-29 PROCEDURE — 97530 THERAPEUTIC ACTIVITIES: CPT | Mod: PN

## 2020-12-29 NOTE — PROGRESS NOTES
"  Occupational Therapy Daily Treatment Note     Date: 12/29/2020  Name: Mane Dodd [facility] Number: [id number]    Therapy Diagnosis:   Encounter Diagnoses   Name Primary?    Left shoulder pain, unspecified chronicity Yes    Shoulder stiffness, left     Shoulder weakness      Physician: Patience Gordon MD    Physician Orders: evaluate and tx  Medical Diagnosis: shoulder pain unspecified laterality  Surgical Procedure and Date: n/a, n/a  Evaluation Date: 8/10/2020  Insurance Authorization Period Expiration: referral # [id number] 9-24-20 thru 1-31-21  Plan of Care Certification Period:  12-17-20 thru 1-14-21  Date of Return to MD: see epic    Visit # / Visits authorized:  18/38       referral # [id number] 9-24-20 thru 1-31-21   Time In:145  Time Out: 230  Total Billable Time: 45 minutes    Precautions:  universal    Subjective     Pt reports: no new issues;  he is compliant with home exercise program.Response to previous treatment:good  Functional change: reaching behind body, across body, and up over shoulder level continue to improve with self care  Pain: 0/10 rest; up to 2/10 light use  Location: diffuse left shoulder ache        Objective       Timed units:    3 therapeutic exercise          Time: 145-230    Measurements:     r prom er at 0 abd 80   at 45 abd 90    at 90 abd 90;       sidelying ir  80        ir behind back 15"    lift off  l prom er at 0 abd  80  at 45 abd 90    at  90 abd 90;      sidelying ir 80         ir behind back 15"   lift off    all er on left with mild tension at end range      Fluido: n/a  U/s:n/a      UBE: n/a    Scar massage: n/a    Stretches as tolerated-pain free: er 0, 45, 90 abd    Manual:     n/a    ROM: arom 4 x 10 supine protraction, supine depression    PRE: isometrics 2 x 20 adduction, row, ir 0 abd      HEP: see below          Home Exercises and Education Provided     Education provided:       Need to resolve tension noted above      progress towards goals   likely tx " progression  rationale of rehab interventions    Written Home Exercises Provided: no          Previously issued exercises were reviewed if still part of current tx plan as well as any issued today and Yousif was able to demonstrate them prior to the end of the session.  Yousif demonstrated good understanding of the HEP provided.     See EMR under patient instructions and/or media for HEP issued today or in past    Assessment       Contractile and noncontractile tightness, pain, and abnormal movement patterns continue to decrease           Pt would continue to benefit from skilled OT in order to resolve gh capsular pattern, fix all stiffness, promote proper mechanics, etc.    Yousif is progressing well towards his goals and there are no updates to goals at this time. Pt prognosis is good.  Pt will continue to benefit from skilled outpatient occupational therapy to address the deficits listed in the problem list on initial evaluation to provide pt/family education and to maximize pt's level of independence in the home and community environment.     Anticipated barriers to occupational therapy: pain, stiffness, tightness, weakness    Pt's spiritual, cultural and educational needs considered and pt agreeable to plan of care and goals.    Goals:  stg to be met in 3 weeks 1. Patient will be I with HEP 2. Patient will have 2/10 pain with light use- met 12-7-20 3. Patient will have = prom of bilateral shoulders to enhance affected arm use with ADL        ltg to be met by d/c 1. Patient will be I with d/c HEP 2. Patient will have 1/10 pain with all use 3. Patient will have about 3+/5 MMT for elevation to promote full functional use                                                                  4. Patient will be I with all ADL       all goals ongoing unless otherwise noted              Any goals met today ?  (if any met see above)    Updates/Grading for next session: prn; fix all shoulder complex stiffness then transition to  shoulder complex PRE to facilitate proper mechanics, strong and painless use, and ADL independence    Plan: manual tx, ROM, PRE, patient education, prn tx      Kali Spears, OT/CHT

## 2020-12-30 ENCOUNTER — OFFICE VISIT (OUTPATIENT)
Dept: ORTHOPEDICS | Facility: CLINIC | Age: 65
End: 2020-12-30
Payer: MEDICAID

## 2020-12-30 VITALS — BODY MASS INDEX: 28.32 KG/M2 | HEIGHT: 65 IN | WEIGHT: 170 LBS

## 2020-12-30 DIAGNOSIS — M25.512 LEFT SHOULDER PAIN, UNSPECIFIED CHRONICITY: Primary | ICD-10-CM

## 2020-12-30 PROCEDURE — 99999 PR PBB SHADOW E&M-EST. PATIENT-LVL III: CPT | Mod: PBBFAC,,, | Performed by: ORTHOPAEDIC SURGERY

## 2020-12-30 PROCEDURE — 99213 OFFICE O/P EST LOW 20 MIN: CPT | Mod: S$PBB,,, | Performed by: ORTHOPAEDIC SURGERY

## 2020-12-30 PROCEDURE — 99213 PR OFFICE/OUTPT VISIT, EST, LEVL III, 20-29 MIN: ICD-10-PCS | Mod: S$PBB,,, | Performed by: ORTHOPAEDIC SURGERY

## 2020-12-30 PROCEDURE — 99999 PR PBB SHADOW E&M-EST. PATIENT-LVL III: ICD-10-PCS | Mod: PBBFAC,,, | Performed by: ORTHOPAEDIC SURGERY

## 2020-12-30 PROCEDURE — 99213 OFFICE O/P EST LOW 20 MIN: CPT | Mod: PBBFAC,PN | Performed by: ORTHOPAEDIC SURGERY
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
## 2021-01-05 ENCOUNTER — CLINICAL SUPPORT (OUTPATIENT)
Dept: REHABILITATION | Facility: HOSPITAL | Age: 66
End: 2021-01-05
Attending: FAMILY MEDICINE
Payer: MEDICAID

## 2021-01-05 ENCOUNTER — PATIENT MESSAGE (OUTPATIENT)
Dept: ADMINISTRATIVE | Facility: OTHER | Age: 66
End: 2021-01-05

## 2021-01-05 DIAGNOSIS — M25.612 SHOULDER STIFFNESS, LEFT: ICD-10-CM

## 2021-01-05 DIAGNOSIS — R29.898 SHOULDER WEAKNESS: ICD-10-CM

## 2021-01-05 DIAGNOSIS — M25.512 LEFT SHOULDER PAIN, UNSPECIFIED CHRONICITY: Primary | ICD-10-CM

## 2021-01-05 PROCEDURE — 97530 THERAPEUTIC ACTIVITIES: CPT | Mod: PN

## 2021-01-12 ENCOUNTER — CLINICAL SUPPORT (OUTPATIENT)
Dept: REHABILITATION | Facility: HOSPITAL | Age: 66
End: 2021-01-12
Attending: FAMILY MEDICINE
Payer: MEDICAID

## 2021-01-12 DIAGNOSIS — M25.612 SHOULDER STIFFNESS, LEFT: ICD-10-CM

## 2021-01-12 DIAGNOSIS — M25.512 LEFT SHOULDER PAIN, UNSPECIFIED CHRONICITY: Primary | ICD-10-CM

## 2021-01-12 DIAGNOSIS — R29.898 SHOULDER WEAKNESS: ICD-10-CM

## 2021-01-12 PROCEDURE — 97530 THERAPEUTIC ACTIVITIES: CPT | Mod: PN

## 2021-01-21 ENCOUNTER — CLINICAL SUPPORT (OUTPATIENT)
Dept: REHABILITATION | Facility: HOSPITAL | Age: 66
End: 2021-01-21
Attending: FAMILY MEDICINE
Payer: MEDICAID

## 2021-01-21 DIAGNOSIS — M25.512 LEFT SHOULDER PAIN, UNSPECIFIED CHRONICITY: Primary | ICD-10-CM

## 2021-01-21 DIAGNOSIS — R29.898 SHOULDER WEAKNESS: ICD-10-CM

## 2021-01-21 DIAGNOSIS — M25.612 SHOULDER STIFFNESS, LEFT: ICD-10-CM

## 2021-01-21 PROCEDURE — 97530 THERAPEUTIC ACTIVITIES: CPT | Mod: PN

## 2021-01-26 ENCOUNTER — CLINICAL SUPPORT (OUTPATIENT)
Dept: REHABILITATION | Facility: HOSPITAL | Age: 66
End: 2021-01-26
Attending: FAMILY MEDICINE
Payer: MEDICAID

## 2021-01-26 DIAGNOSIS — M25.612 SHOULDER STIFFNESS, LEFT: ICD-10-CM

## 2021-01-26 DIAGNOSIS — M25.512 LEFT SHOULDER PAIN, UNSPECIFIED CHRONICITY: Primary | ICD-10-CM

## 2021-01-26 DIAGNOSIS — R29.898 SHOULDER WEAKNESS: ICD-10-CM

## 2021-01-26 PROCEDURE — 97530 THERAPEUTIC ACTIVITIES: CPT | Mod: PN

## 2021-01-27 ENCOUNTER — OFFICE VISIT (OUTPATIENT)
Dept: ORTHOPEDICS | Facility: CLINIC | Age: 66
End: 2021-01-27
Payer: MEDICAID

## 2021-01-27 VITALS — RESPIRATION RATE: 16 BRPM | HEIGHT: 65 IN | WEIGHT: 170 LBS | BODY MASS INDEX: 28.32 KG/M2

## 2021-01-27 DIAGNOSIS — M77.8 TENDINITIS OF LEFT SHOULDER: Primary | ICD-10-CM

## 2021-01-27 PROCEDURE — 99214 OFFICE O/P EST MOD 30 MIN: CPT | Mod: PBBFAC,PN | Performed by: ORTHOPAEDIC SURGERY

## 2021-01-27 PROCEDURE — 99999 PR PBB SHADOW E&M-EST. PATIENT-LVL IV: ICD-10-PCS | Mod: PBBFAC,,, | Performed by: ORTHOPAEDIC SURGERY

## 2021-01-27 PROCEDURE — 99213 OFFICE O/P EST LOW 20 MIN: CPT | Mod: S$PBB,,, | Performed by: ORTHOPAEDIC SURGERY

## 2021-01-27 PROCEDURE — 99999 PR PBB SHADOW E&M-EST. PATIENT-LVL IV: CPT | Mod: PBBFAC,,, | Performed by: ORTHOPAEDIC SURGERY

## 2021-01-27 PROCEDURE — 99213 PR OFFICE/OUTPT VISIT, EST, LEVL III, 20-29 MIN: ICD-10-PCS | Mod: S$PBB,,, | Performed by: ORTHOPAEDIC SURGERY

## 2021-02-04 ENCOUNTER — CLINICAL SUPPORT (OUTPATIENT)
Dept: REHABILITATION | Facility: HOSPITAL | Age: 66
End: 2021-02-04
Attending: FAMILY MEDICINE
Payer: MEDICAID

## 2021-02-04 DIAGNOSIS — M25.512 LEFT SHOULDER PAIN, UNSPECIFIED CHRONICITY: Primary | ICD-10-CM

## 2021-02-04 DIAGNOSIS — R29.898 SHOULDER WEAKNESS: ICD-10-CM

## 2021-02-04 DIAGNOSIS — M25.612 SHOULDER STIFFNESS, LEFT: ICD-10-CM

## 2021-02-04 DIAGNOSIS — M77.8 TENDINITIS OF LEFT SHOULDER: ICD-10-CM

## 2021-02-04 PROCEDURE — 97530 THERAPEUTIC ACTIVITIES: CPT | Mod: PN

## 2021-02-09 ENCOUNTER — CLINICAL SUPPORT (OUTPATIENT)
Dept: REHABILITATION | Facility: HOSPITAL | Age: 66
End: 2021-02-09
Attending: FAMILY MEDICINE
Payer: MEDICAID

## 2021-02-09 DIAGNOSIS — M25.512 LEFT SHOULDER PAIN, UNSPECIFIED CHRONICITY: Primary | ICD-10-CM

## 2021-02-09 DIAGNOSIS — M25.612 SHOULDER STIFFNESS, LEFT: ICD-10-CM

## 2021-02-09 DIAGNOSIS — R29.898 SHOULDER WEAKNESS: ICD-10-CM

## 2021-02-09 PROCEDURE — 97530 THERAPEUTIC ACTIVITIES: CPT | Mod: PN

## 2021-02-18 ENCOUNTER — CLINICAL SUPPORT (OUTPATIENT)
Dept: REHABILITATION | Facility: HOSPITAL | Age: 66
End: 2021-02-18
Attending: FAMILY MEDICINE
Payer: MEDICAID

## 2021-02-18 DIAGNOSIS — M25.612 SHOULDER STIFFNESS, LEFT: ICD-10-CM

## 2021-02-18 DIAGNOSIS — R29.898 SHOULDER WEAKNESS: ICD-10-CM

## 2021-02-18 DIAGNOSIS — M25.512 LEFT SHOULDER PAIN, UNSPECIFIED CHRONICITY: Primary | ICD-10-CM

## 2021-02-18 PROCEDURE — 97530 THERAPEUTIC ACTIVITIES: CPT | Mod: PN

## 2021-02-23 ENCOUNTER — CLINICAL SUPPORT (OUTPATIENT)
Dept: REHABILITATION | Facility: HOSPITAL | Age: 66
End: 2021-02-23
Attending: FAMILY MEDICINE
Payer: MEDICAID

## 2021-02-23 DIAGNOSIS — M25.612 SHOULDER STIFFNESS, LEFT: ICD-10-CM

## 2021-02-23 DIAGNOSIS — M25.512 LEFT SHOULDER PAIN, UNSPECIFIED CHRONICITY: Primary | ICD-10-CM

## 2021-02-23 DIAGNOSIS — R29.898 SHOULDER WEAKNESS: ICD-10-CM

## 2021-02-23 PROCEDURE — 97530 THERAPEUTIC ACTIVITIES: CPT | Mod: PN

## 2021-03-02 ENCOUNTER — CLINICAL SUPPORT (OUTPATIENT)
Dept: REHABILITATION | Facility: HOSPITAL | Age: 66
End: 2021-03-02
Attending: FAMILY MEDICINE
Payer: MEDICAID

## 2021-03-02 DIAGNOSIS — M25.612 SHOULDER STIFFNESS, LEFT: ICD-10-CM

## 2021-03-02 DIAGNOSIS — M25.512 LEFT SHOULDER PAIN, UNSPECIFIED CHRONICITY: Primary | ICD-10-CM

## 2021-03-02 DIAGNOSIS — R29.898 SHOULDER WEAKNESS: ICD-10-CM

## 2021-03-02 PROCEDURE — 97530 THERAPEUTIC ACTIVITIES: CPT | Mod: PN

## 2021-03-10 ENCOUNTER — OFFICE VISIT (OUTPATIENT)
Dept: ORTHOPEDICS | Facility: CLINIC | Age: 66
End: 2021-03-10
Payer: MEDICAID

## 2021-03-10 VITALS — RESPIRATION RATE: 16 BRPM | BODY MASS INDEX: 28.32 KG/M2 | WEIGHT: 170 LBS | HEIGHT: 65 IN

## 2021-03-10 DIAGNOSIS — M77.8 TENDINITIS OF LEFT SHOULDER: Primary | ICD-10-CM

## 2021-03-10 PROCEDURE — 99213 OFFICE O/P EST LOW 20 MIN: CPT | Mod: PBBFAC,PN | Performed by: ORTHOPAEDIC SURGERY

## 2021-03-10 PROCEDURE — 99213 PR OFFICE/OUTPT VISIT, EST, LEVL III, 20-29 MIN: ICD-10-PCS | Mod: S$PBB,,, | Performed by: ORTHOPAEDIC SURGERY

## 2021-03-10 PROCEDURE — 99213 OFFICE O/P EST LOW 20 MIN: CPT | Mod: S$PBB,,, | Performed by: ORTHOPAEDIC SURGERY

## 2021-03-10 PROCEDURE — 99999 PR PBB SHADOW E&M-EST. PATIENT-LVL III: ICD-10-PCS | Mod: PBBFAC,,, | Performed by: ORTHOPAEDIC SURGERY

## 2021-03-10 PROCEDURE — 99999 PR PBB SHADOW E&M-EST. PATIENT-LVL III: CPT | Mod: PBBFAC,,, | Performed by: ORTHOPAEDIC SURGERY

## 2021-03-10 RX ORDER — NAPROXEN 500 MG/1
500 TABLET ORAL 2 TIMES DAILY
Qty: 60 TABLET | Refills: 1 | Status: SHIPPED | OUTPATIENT
Start: 2021-03-10 | End: 2021-04-21

## 2021-03-11 ENCOUNTER — CLINICAL SUPPORT (OUTPATIENT)
Dept: REHABILITATION | Facility: HOSPITAL | Age: 66
End: 2021-03-11
Attending: FAMILY MEDICINE
Payer: MEDICAID

## 2021-03-11 DIAGNOSIS — M25.512 LEFT SHOULDER PAIN, UNSPECIFIED CHRONICITY: Primary | ICD-10-CM

## 2021-03-11 DIAGNOSIS — M25.612 SHOULDER STIFFNESS, LEFT: ICD-10-CM

## 2021-03-11 DIAGNOSIS — R29.898 SHOULDER WEAKNESS: ICD-10-CM

## 2021-03-11 PROCEDURE — 97530 THERAPEUTIC ACTIVITIES: CPT | Mod: PN

## 2021-03-28 DIAGNOSIS — I10 HYPERTENSION, ESSENTIAL: ICD-10-CM

## 2021-03-30 ENCOUNTER — CLINICAL SUPPORT (OUTPATIENT)
Dept: REHABILITATION | Facility: HOSPITAL | Age: 66
End: 2021-03-30
Attending: FAMILY MEDICINE
Payer: MEDICAID

## 2021-03-30 DIAGNOSIS — M25.612 SHOULDER STIFFNESS, LEFT: ICD-10-CM

## 2021-03-30 DIAGNOSIS — R29.898 SHOULDER WEAKNESS: ICD-10-CM

## 2021-03-30 DIAGNOSIS — M25.512 LEFT SHOULDER PAIN, UNSPECIFIED CHRONICITY: Primary | ICD-10-CM

## 2021-03-30 PROCEDURE — 97530 THERAPEUTIC ACTIVITIES: CPT | Mod: PN

## 2021-03-30 RX ORDER — METOPROLOL SUCCINATE 50 MG/1
TABLET, EXTENDED RELEASE ORAL
Qty: 30 TABLET | Refills: 1 | Status: SHIPPED | OUTPATIENT
Start: 2021-03-30 | End: 2021-05-30

## 2021-03-30 RX ORDER — DULOXETIN HYDROCHLORIDE 60 MG/1
CAPSULE, DELAYED RELEASE ORAL
Qty: 30 CAPSULE | Refills: 1 | Status: SHIPPED | OUTPATIENT
Start: 2021-03-30 | End: 2021-04-21

## 2021-04-01 ENCOUNTER — DOCUMENTATION ONLY (OUTPATIENT)
Dept: REHABILITATION | Facility: HOSPITAL | Age: 66
End: 2021-04-01

## 2021-04-07 ENCOUNTER — PATIENT MESSAGE (OUTPATIENT)
Dept: FAMILY MEDICINE | Facility: CLINIC | Age: 66
End: 2021-04-07

## 2021-04-08 ENCOUNTER — HOSPITAL ENCOUNTER (INPATIENT)
Facility: HOSPITAL | Age: 66
LOS: 3 days | Discharge: HOME | DRG: 064 | End: 2021-04-11
Attending: EMERGENCY MEDICINE | Admitting: PSYCHIATRY & NEUROLOGY
Payer: MEDICAID

## 2021-04-08 DIAGNOSIS — H53.9 VISION ABNORMALITIES: ICD-10-CM

## 2021-04-08 DIAGNOSIS — R07.9 CHEST PAIN: ICD-10-CM

## 2021-04-08 DIAGNOSIS — G44.52 NEW DAILY PERSISTENT HEADACHE: ICD-10-CM

## 2021-04-08 DIAGNOSIS — I10 HYPERTENSION, ESSENTIAL: ICD-10-CM

## 2021-04-08 DIAGNOSIS — R74.8 ELEVATED LIVER ENZYMES: ICD-10-CM

## 2021-04-08 DIAGNOSIS — I63.531 ACUTE ISCHEMIC RIGHT POSTERIOR CEREBRAL ARTERY (PCA) STROKE: Primary | ICD-10-CM

## 2021-04-08 DIAGNOSIS — E11.65 UNCONTROLLED TYPE 2 DIABETES MELLITUS WITH HYPERGLYCEMIA: ICD-10-CM

## 2021-04-08 LAB
ALBUMIN SERPL BCP-MCNC: 3.6 G/DL (ref 3.5–5.2)
ALP SERPL-CCNC: 116 U/L (ref 55–135)
ALT SERPL W/O P-5'-P-CCNC: 57 U/L (ref 10–44)
ANION GAP SERPL CALC-SCNC: 7 MMOL/L (ref 8–16)
AST SERPL-CCNC: 30 U/L (ref 10–40)
BASOPHILS # BLD AUTO: 0.04 K/UL (ref 0–0.2)
BASOPHILS NFR BLD: 0.4 % (ref 0–1.9)
BILIRUB SERPL-MCNC: 0.4 MG/DL (ref 0.1–1)
BUN SERPL-MCNC: 16 MG/DL (ref 8–23)
CALCIUM SERPL-MCNC: 9.3 MG/DL (ref 8.7–10.5)
CHLORIDE SERPL-SCNC: 102 MMOL/L (ref 95–110)
CHOLEST SERPL-MCNC: 258 MG/DL (ref 120–199)
CHOLEST/HDLC SERPL: 5.4 {RATIO} (ref 2–5)
CO2 SERPL-SCNC: 29 MMOL/L (ref 23–29)
CREAT SERPL-MCNC: 1.2 MG/DL (ref 0.5–1.4)
CRP SERPL-MCNC: 2.3 MG/L (ref 0–8.2)
CTP QC/QA: YES
DIFFERENTIAL METHOD: ABNORMAL
EOSINOPHIL # BLD AUTO: 0.2 K/UL (ref 0–0.5)
EOSINOPHIL NFR BLD: 2 % (ref 0–8)
ERYTHROCYTE [DISTWIDTH] IN BLOOD BY AUTOMATED COUNT: 11.8 % (ref 11.5–14.5)
ERYTHROCYTE [SEDIMENTATION RATE] IN BLOOD BY WESTERGREN METHOD: 19 MM/HR (ref 0–23)
EST. GFR  (AFRICAN AMERICAN): >60 ML/MIN/1.73 M^2
EST. GFR  (NON AFRICAN AMERICAN): >60 ML/MIN/1.73 M^2
ESTIMATED AVG GLUCOSE: 192 MG/DL (ref 68–131)
GLUCOSE SERPL-MCNC: 273 MG/DL (ref 70–110)
HBA1C MFR BLD: 8.3 % (ref 4–5.6)
HCT VFR BLD AUTO: 44.9 % (ref 40–54)
HDLC SERPL-MCNC: 48 MG/DL (ref 40–75)
HDLC SERPL: 18.6 % (ref 20–50)
HGB BLD-MCNC: 15.4 G/DL (ref 14–18)
IMM GRANULOCYTES # BLD AUTO: 0.02 K/UL (ref 0–0.04)
IMM GRANULOCYTES NFR BLD AUTO: 0.2 % (ref 0–0.5)
INR PPP: 0.9 (ref 0.8–1.2)
LDLC SERPL CALC-MCNC: 154.6 MG/DL (ref 63–159)
LYMPHOCYTES # BLD AUTO: 3.2 K/UL (ref 1–4.8)
LYMPHOCYTES NFR BLD: 33.5 % (ref 18–48)
MCH RBC QN AUTO: 31.6 PG (ref 27–31)
MCHC RBC AUTO-ENTMCNC: 34.3 G/DL (ref 32–36)
MCV RBC AUTO: 92 FL (ref 82–98)
MONOCYTES # BLD AUTO: 0.4 K/UL (ref 0.3–1)
MONOCYTES NFR BLD: 4.1 % (ref 4–15)
NEUTROPHILS # BLD AUTO: 5.8 K/UL (ref 1.8–7.7)
NEUTROPHILS NFR BLD: 59.8 % (ref 38–73)
NONHDLC SERPL-MCNC: 210 MG/DL
NRBC BLD-RTO: 0 /100 WBC
PLATELET # BLD AUTO: 239 K/UL (ref 150–450)
PMV BLD AUTO: 9.2 FL (ref 9.2–12.9)
POTASSIUM SERPL-SCNC: 4.5 MMOL/L (ref 3.5–5.1)
PROT SERPL-MCNC: 7.2 G/DL (ref 6–8.4)
PROTHROMBIN TIME: 9.8 SEC (ref 9–12.5)
RBC # BLD AUTO: 4.87 M/UL (ref 4.6–6.2)
SARS-COV-2 RDRP RESP QL NAA+PROBE: NEGATIVE
SODIUM SERPL-SCNC: 138 MMOL/L (ref 136–145)
TRIGL SERPL-MCNC: 277 MG/DL (ref 30–150)
TROPONIN I SERPL DL<=0.01 NG/ML-MCNC: 0.01 NG/ML (ref 0–0.03)
TSH SERPL DL<=0.005 MIU/L-ACNC: 0.89 UIU/ML (ref 0.4–4)
WBC # BLD AUTO: 9.66 K/UL (ref 3.9–12.7)

## 2021-04-08 PROCEDURE — 80053 COMPREHEN METABOLIC PANEL: CPT | Performed by: NURSE PRACTITIONER

## 2021-04-08 PROCEDURE — 12000002 HC ACUTE/MED SURGE SEMI-PRIVATE ROOM

## 2021-04-08 PROCEDURE — 86140 C-REACTIVE PROTEIN: CPT | Performed by: NURSE PRACTITIONER

## 2021-04-08 PROCEDURE — 99285 EMERGENCY DEPT VISIT HI MDM: CPT | Mod: 25

## 2021-04-08 PROCEDURE — 99223 1ST HOSP IP/OBS HIGH 75: CPT | Mod: ,,, | Performed by: FAMILY MEDICINE

## 2021-04-08 PROCEDURE — 85610 PROTHROMBIN TIME: CPT | Performed by: NURSE PRACTITIONER

## 2021-04-08 PROCEDURE — U0002 COVID-19 LAB TEST NON-CDC: HCPCS | Performed by: STUDENT IN AN ORGANIZED HEALTH CARE EDUCATION/TRAINING PROGRAM

## 2021-04-08 PROCEDURE — 93010 ELECTROCARDIOGRAM REPORT: CPT | Mod: ,,, | Performed by: INTERNAL MEDICINE

## 2021-04-08 PROCEDURE — 96374 THER/PROPH/DIAG INJ IV PUSH: CPT

## 2021-04-08 PROCEDURE — 93010 EKG 12-LEAD: ICD-10-PCS | Mod: ,,, | Performed by: INTERNAL MEDICINE

## 2021-04-08 PROCEDURE — 84443 ASSAY THYROID STIM HORMONE: CPT | Performed by: NURSE PRACTITIONER

## 2021-04-08 PROCEDURE — 80061 LIPID PANEL: CPT | Performed by: NURSE PRACTITIONER

## 2021-04-08 PROCEDURE — 85652 RBC SED RATE AUTOMATED: CPT | Performed by: NURSE PRACTITIONER

## 2021-04-08 PROCEDURE — 83036 HEMOGLOBIN GLYCOSYLATED A1C: CPT | Performed by: FAMILY MEDICINE

## 2021-04-08 PROCEDURE — 99285 EMERGENCY DEPT VISIT HI MDM: CPT | Mod: CR,CS,, | Performed by: EMERGENCY MEDICINE

## 2021-04-08 PROCEDURE — 99285 PR EMERGENCY DEPT VISIT,LEVEL V: ICD-10-PCS | Mod: CR,CS,, | Performed by: EMERGENCY MEDICINE

## 2021-04-08 PROCEDURE — 96375 TX/PRO/DX INJ NEW DRUG ADDON: CPT

## 2021-04-08 PROCEDURE — 85025 COMPLETE CBC W/AUTO DIFF WBC: CPT | Performed by: NURSE PRACTITIONER

## 2021-04-08 PROCEDURE — 99223 PR INITIAL HOSPITAL CARE,LEVL III: ICD-10-PCS | Mod: ,,, | Performed by: FAMILY MEDICINE

## 2021-04-08 PROCEDURE — 63600175 PHARM REV CODE 636 W HCPCS: Performed by: EMERGENCY MEDICINE

## 2021-04-08 PROCEDURE — 84484 ASSAY OF TROPONIN QUANT: CPT | Performed by: STUDENT IN AN ORGANIZED HEALTH CARE EDUCATION/TRAINING PROGRAM

## 2021-04-08 PROCEDURE — 25000003 PHARM REV CODE 250: Performed by: FAMILY MEDICINE

## 2021-04-08 PROCEDURE — 93005 ELECTROCARDIOGRAM TRACING: CPT

## 2021-04-08 RX ORDER — LABETALOL HCL 20 MG/4 ML
10 SYRINGE (ML) INTRAVENOUS
Status: DISCONTINUED | OUTPATIENT
Start: 2021-04-08 | End: 2021-04-11 | Stop reason: HOSPADM

## 2021-04-08 RX ORDER — FAMOTIDINE 20 MG/1
20 TABLET, FILM COATED ORAL NIGHTLY PRN
Status: DISCONTINUED | OUTPATIENT
Start: 2021-04-08 | End: 2021-04-11 | Stop reason: HOSPADM

## 2021-04-08 RX ORDER — MORPHINE SULFATE 4 MG/ML
4 INJECTION, SOLUTION INTRAMUSCULAR; INTRAVENOUS
Status: COMPLETED | OUTPATIENT
Start: 2021-04-08 | End: 2021-04-08

## 2021-04-08 RX ORDER — ATORVASTATIN CALCIUM 20 MG/1
40 TABLET, FILM COATED ORAL DAILY
Status: DISCONTINUED | OUTPATIENT
Start: 2021-04-09 | End: 2021-04-09

## 2021-04-08 RX ORDER — CLOPIDOGREL BISULFATE 75 MG/1
75 TABLET ORAL DAILY
Status: DISCONTINUED | OUTPATIENT
Start: 2021-04-09 | End: 2021-04-11 | Stop reason: HOSPADM

## 2021-04-08 RX ORDER — ASPIRIN 81 MG/1
81 TABLET ORAL DAILY
Status: DISCONTINUED | OUTPATIENT
Start: 2021-04-09 | End: 2021-04-11 | Stop reason: HOSPADM

## 2021-04-08 RX ORDER — SODIUM CHLORIDE 0.9 % (FLUSH) 0.9 %
10 SYRINGE (ML) INJECTION
Status: DISCONTINUED | OUTPATIENT
Start: 2021-04-08 | End: 2021-04-11 | Stop reason: HOSPADM

## 2021-04-08 RX ORDER — HEPARIN SODIUM 5000 [USP'U]/ML
5000 INJECTION, SOLUTION INTRAVENOUS; SUBCUTANEOUS EVERY 8 HOURS
Status: DISCONTINUED | OUTPATIENT
Start: 2021-04-09 | End: 2021-04-11 | Stop reason: HOSPADM

## 2021-04-08 RX ADMIN — FAMOTIDINE 20 MG: 20 TABLET, FILM COATED ORAL at 10:04

## 2021-04-08 RX ADMIN — Medication 10 MG: at 10:04

## 2021-04-08 RX ADMIN — MORPHINE SULFATE 4 MG: 4 INJECTION INTRAVENOUS at 08:04

## 2021-04-08 RX ADMIN — IOHEXOL 75 ML: 350 INJECTION, SOLUTION INTRAVENOUS at 11:04

## 2021-04-09 PROBLEM — E11.65 UNCONTROLLED TYPE 2 DIABETES MELLITUS WITH HYPERGLYCEMIA: Status: ACTIVE | Noted: 2021-04-09

## 2021-04-09 PROBLEM — G93.6 CYTOTOXIC CEREBRAL EDEMA: Status: ACTIVE | Noted: 2021-04-09

## 2021-04-09 LAB
ALBUMIN SERPL BCP-MCNC: 3.2 G/DL (ref 3.5–5.2)
ALBUMIN SERPL BCP-MCNC: 3.5 G/DL (ref 3.5–5.2)
ALP SERPL-CCNC: 135 U/L (ref 55–135)
ALP SERPL-CCNC: 168 U/L (ref 55–135)
ALT SERPL W/O P-5'-P-CCNC: 262 U/L (ref 10–44)
ALT SERPL W/O P-5'-P-CCNC: 508 U/L (ref 10–44)
ANION GAP SERPL CALC-SCNC: 9 MMOL/L (ref 8–16)
ANION GAP SERPL CALC-SCNC: 9 MMOL/L (ref 8–16)
APTT BLDCRRT: 22.8 SEC (ref 21–32)
ASCENDING AORTA: 3.11 CM
AST SERPL-CCNC: 245 U/L (ref 10–40)
AST SERPL-CCNC: 380 U/L (ref 10–40)
AV INDEX (PROSTH): 0.82
AV MEAN GRADIENT: 2 MMHG
AV PEAK GRADIENT: 4 MMHG
AV VALVE AREA: 2.37 CM2
AV VELOCITY RATIO: 0.75
BASOPHILS # BLD AUTO: 0.03 K/UL (ref 0–0.2)
BASOPHILS NFR BLD: 0.3 % (ref 0–1.9)
BILIRUB SERPL-MCNC: 0.4 MG/DL (ref 0.1–1)
BILIRUB SERPL-MCNC: 0.6 MG/DL (ref 0.1–1)
BSA FOR ECHO PROCEDURE: 1.83 M2
BUN SERPL-MCNC: 15 MG/DL (ref 8–23)
BUN SERPL-MCNC: 15 MG/DL (ref 8–23)
CALCIUM SERPL-MCNC: 8.9 MG/DL (ref 8.7–10.5)
CALCIUM SERPL-MCNC: 9.6 MG/DL (ref 8.7–10.5)
CHLORIDE SERPL-SCNC: 102 MMOL/L (ref 95–110)
CHLORIDE SERPL-SCNC: 103 MMOL/L (ref 95–110)
CK MB SERPL-MCNC: 0.7 NG/ML (ref 0.1–6.5)
CK MB SERPL-RTO: 1.8 % (ref 0–5)
CK SERPL-CCNC: 40 U/L (ref 20–200)
CO2 SERPL-SCNC: 25 MMOL/L (ref 23–29)
CO2 SERPL-SCNC: 28 MMOL/L (ref 23–29)
CREAT SERPL-MCNC: 1.2 MG/DL (ref 0.5–1.4)
CREAT SERPL-MCNC: 1.3 MG/DL (ref 0.5–1.4)
CV ECHO LV RWT: 0.39 CM
DIFFERENTIAL METHOD: NORMAL
DOP CALC AO PEAK VEL: 0.96 M/S
DOP CALC AO VTI: 16.97 CM
DOP CALC LVOT AREA: 2.9 CM2
DOP CALC LVOT DIAMETER: 1.92 CM
DOP CALC LVOT PEAK VEL: 0.72 M/S
DOP CALC LVOT STROKE VOLUME: 40.14 CM3
DOP CALCLVOT PEAK VEL VTI: 13.87 CM
E WAVE DECELERATION TIME: 318.93 MSEC
E/A RATIO: 0.79
E/E' RATIO: 7.5 M/S
ECHO LV POSTERIOR WALL: 0.9 CM (ref 0.6–1.1)
EJECTION FRACTION: 63 %
EOSINOPHIL # BLD AUTO: 0.1 K/UL (ref 0–0.5)
EOSINOPHIL NFR BLD: 1.3 % (ref 0–8)
ERYTHROCYTE [DISTWIDTH] IN BLOOD BY AUTOMATED COUNT: 11.9 % (ref 11.5–14.5)
EST. GFR  (AFRICAN AMERICAN): >60 ML/MIN/1.73 M^2
EST. GFR  (AFRICAN AMERICAN): >60 ML/MIN/1.73 M^2
EST. GFR  (NON AFRICAN AMERICAN): 58.1 ML/MIN/1.73 M^2
EST. GFR  (NON AFRICAN AMERICAN): >60 ML/MIN/1.73 M^2
FRACTIONAL SHORTENING: 37 % (ref 28–44)
GLUCOSE SERPL-MCNC: 211 MG/DL (ref 70–110)
GLUCOSE SERPL-MCNC: 309 MG/DL (ref 70–110)
HCT VFR BLD AUTO: 42.5 % (ref 40–54)
HGB BLD-MCNC: 14.2 G/DL (ref 14–18)
IMM GRANULOCYTES # BLD AUTO: 0.01 K/UL (ref 0–0.04)
IMM GRANULOCYTES NFR BLD AUTO: 0.1 % (ref 0–0.5)
INR PPP: 0.9 (ref 0.8–1.2)
INTERVENTRICULAR SEPTUM: 0.9 CM (ref 0.6–1.1)
LEFT ATRIUM SIZE: 3.1 CM
LEFT INTERNAL DIMENSION IN SYSTOLE: 2.88 CM (ref 2.1–4)
LEFT VENTRICLE DIASTOLIC VOLUME INDEX: 45.83 ML/M2
LEFT VENTRICLE DIASTOLIC VOLUME: 82.5 ML
LEFT VENTRICLE MASS INDEX: 77 G/M2
LEFT VENTRICLE SYSTOLIC VOLUME INDEX: 17.6 ML/M2
LEFT VENTRICLE SYSTOLIC VOLUME: 31.59 ML
LEFT VENTRICULAR INTERNAL DIMENSION IN DIASTOLE: 4.6 CM (ref 3.5–6)
LEFT VENTRICULAR MASS: 137.72 G
LV LATERAL E/E' RATIO: 6.67 M/S
LV SEPTAL E/E' RATIO: 8.57 M/S
LYMPHOCYTES # BLD AUTO: 3 K/UL (ref 1–4.8)
LYMPHOCYTES NFR BLD: 31.6 % (ref 18–48)
MAGNESIUM SERPL-MCNC: 1.8 MG/DL (ref 1.6–2.6)
MCH RBC QN AUTO: 30.4 PG (ref 27–31)
MCHC RBC AUTO-ENTMCNC: 33.4 G/DL (ref 32–36)
MCV RBC AUTO: 91 FL (ref 82–98)
MONOCYTES # BLD AUTO: 0.5 K/UL (ref 0.3–1)
MONOCYTES NFR BLD: 5.5 % (ref 4–15)
MV PEAK A VEL: 0.76 M/S
MV PEAK E VEL: 0.6 M/S
MV STENOSIS PRESSURE HALF TIME: 92.49 MS
MV VALVE AREA P 1/2 METHOD: 2.38 CM2
NEUTROPHILS # BLD AUTO: 5.8 K/UL (ref 1.8–7.7)
NEUTROPHILS NFR BLD: 61.2 % (ref 38–73)
NRBC BLD-RTO: 0 /100 WBC
PHOSPHATE SERPL-MCNC: 3.9 MG/DL (ref 2.7–4.5)
PLATELET # BLD AUTO: 211 K/UL (ref 150–450)
PMV BLD AUTO: 9.5 FL (ref 9.2–12.9)
POCT GLUCOSE: 146 MG/DL (ref 70–110)
POCT GLUCOSE: 161 MG/DL (ref 70–110)
POCT GLUCOSE: 183 MG/DL (ref 70–110)
POCT GLUCOSE: 188 MG/DL (ref 70–110)
POCT GLUCOSE: 244 MG/DL (ref 70–110)
POCT GLUCOSE: 283 MG/DL (ref 70–110)
POTASSIUM SERPL-SCNC: 4.3 MMOL/L (ref 3.5–5.1)
POTASSIUM SERPL-SCNC: 4.9 MMOL/L (ref 3.5–5.1)
PROT SERPL-MCNC: 6.4 G/DL (ref 6–8.4)
PROT SERPL-MCNC: 7 G/DL (ref 6–8.4)
PROTHROMBIN TIME: 9.8 SEC (ref 9–12.5)
RA PRESSURE: 3 MMHG
RBC # BLD AUTO: 4.67 M/UL (ref 4.6–6.2)
RV TISSUE DOPPLER FREE WALL SYSTOLIC VELOCITY 1 (APICAL 4 CHAMBER VIEW): 12 CM/S
SINUS: 2.8 CM
SODIUM SERPL-SCNC: 137 MMOL/L (ref 136–145)
SODIUM SERPL-SCNC: 139 MMOL/L (ref 136–145)
STJ: 2.7 CM
TDI LATERAL: 0.09 M/S
TDI SEPTAL: 0.07 M/S
TDI: 0.08 M/S
TRICUSPID ANNULAR PLANE SYSTOLIC EXCURSION: 1.95 CM
TROPONIN I SERPL DL<=0.01 NG/ML-MCNC: 0.01 NG/ML (ref 0–0.03)
WBC # BLD AUTO: 9.49 K/UL (ref 3.9–12.7)

## 2021-04-09 PROCEDURE — 82550 ASSAY OF CK (CPK): CPT | Performed by: FAMILY MEDICINE

## 2021-04-09 PROCEDURE — 99233 SBSQ HOSP IP/OBS HIGH 50: CPT | Mod: ,,, | Performed by: PSYCHIATRY & NEUROLOGY

## 2021-04-09 PROCEDURE — 25500020 PHARM REV CODE 255: Performed by: EMERGENCY MEDICINE

## 2021-04-09 PROCEDURE — 85730 THROMBOPLASTIN TIME PARTIAL: CPT | Performed by: FAMILY MEDICINE

## 2021-04-09 PROCEDURE — 25000003 PHARM REV CODE 250: Performed by: PHYSICIAN ASSISTANT

## 2021-04-09 PROCEDURE — 80053 COMPREHEN METABOLIC PANEL: CPT | Performed by: FAMILY MEDICINE

## 2021-04-09 PROCEDURE — 63600175 PHARM REV CODE 636 W HCPCS: Performed by: FAMILY MEDICINE

## 2021-04-09 PROCEDURE — 36415 COLL VENOUS BLD VENIPUNCTURE: CPT | Performed by: PHYSICIAN ASSISTANT

## 2021-04-09 PROCEDURE — 85610 PROTHROMBIN TIME: CPT | Performed by: FAMILY MEDICINE

## 2021-04-09 PROCEDURE — 82553 CREATINE MB FRACTION: CPT | Performed by: FAMILY MEDICINE

## 2021-04-09 PROCEDURE — 99233 PR SUBSEQUENT HOSPITAL CARE,LEVL III: ICD-10-PCS | Mod: ,,, | Performed by: PSYCHIATRY & NEUROLOGY

## 2021-04-09 PROCEDURE — 84100 ASSAY OF PHOSPHORUS: CPT | Performed by: FAMILY MEDICINE

## 2021-04-09 PROCEDURE — 63600175 PHARM REV CODE 636 W HCPCS: Performed by: PHYSICIAN ASSISTANT

## 2021-04-09 PROCEDURE — C9399 UNCLASSIFIED DRUGS OR BIOLOG: HCPCS | Performed by: STUDENT IN AN ORGANIZED HEALTH CARE EDUCATION/TRAINING PROGRAM

## 2021-04-09 PROCEDURE — 36415 COLL VENOUS BLD VENIPUNCTURE: CPT | Performed by: FAMILY MEDICINE

## 2021-04-09 PROCEDURE — 25000003 PHARM REV CODE 250: Performed by: NURSE PRACTITIONER

## 2021-04-09 PROCEDURE — 85025 COMPLETE CBC W/AUTO DIFF WBC: CPT | Performed by: FAMILY MEDICINE

## 2021-04-09 PROCEDURE — 36415 COLL VENOUS BLD VENIPUNCTURE: CPT | Performed by: NURSE PRACTITIONER

## 2021-04-09 PROCEDURE — 11000001 HC ACUTE MED/SURG PRIVATE ROOM

## 2021-04-09 PROCEDURE — 84484 ASSAY OF TROPONIN QUANT: CPT | Performed by: FAMILY MEDICINE

## 2021-04-09 PROCEDURE — 25000003 PHARM REV CODE 250: Performed by: STUDENT IN AN ORGANIZED HEALTH CARE EDUCATION/TRAINING PROGRAM

## 2021-04-09 PROCEDURE — 25000003 PHARM REV CODE 250: Performed by: FAMILY MEDICINE

## 2021-04-09 PROCEDURE — 80053 COMPREHEN METABOLIC PANEL: CPT | Mod: 91 | Performed by: PHYSICIAN ASSISTANT

## 2021-04-09 PROCEDURE — 83735 ASSAY OF MAGNESIUM: CPT | Performed by: FAMILY MEDICINE

## 2021-04-09 PROCEDURE — 63600175 PHARM REV CODE 636 W HCPCS: Performed by: STUDENT IN AN ORGANIZED HEALTH CARE EDUCATION/TRAINING PROGRAM

## 2021-04-09 PROCEDURE — 82140 ASSAY OF AMMONIA: CPT | Performed by: NURSE PRACTITIONER

## 2021-04-09 RX ORDER — KETOROLAC TROMETHAMINE 30 MG/ML
15 INJECTION, SOLUTION INTRAMUSCULAR; INTRAVENOUS ONCE
Status: COMPLETED | OUTPATIENT
Start: 2021-04-09 | End: 2021-04-09

## 2021-04-09 RX ORDER — IBUPROFEN 200 MG
16 TABLET ORAL
Status: DISCONTINUED | OUTPATIENT
Start: 2021-04-09 | End: 2021-04-11 | Stop reason: HOSPADM

## 2021-04-09 RX ORDER — METFORMIN HYDROCHLORIDE 500 MG/1
500 TABLET ORAL 2 TIMES DAILY WITH MEALS
Qty: 180 TABLET | Refills: 0 | Status: SHIPPED | OUTPATIENT
Start: 2021-04-09 | End: 2021-07-30 | Stop reason: SDUPTHER

## 2021-04-09 RX ORDER — GLUCAGON 1 MG
1 KIT INJECTION
Status: DISCONTINUED | OUTPATIENT
Start: 2021-04-09 | End: 2021-04-11 | Stop reason: HOSPADM

## 2021-04-09 RX ORDER — DULOXETIN HYDROCHLORIDE 30 MG/1
60 CAPSULE, DELAYED RELEASE ORAL 2 TIMES DAILY
Status: DISCONTINUED | OUTPATIENT
Start: 2021-04-09 | End: 2021-04-10

## 2021-04-09 RX ORDER — ACETAMINOPHEN 500 MG
1000 TABLET ORAL EVERY 6 HOURS PRN
Status: DISCONTINUED | OUTPATIENT
Start: 2021-04-09 | End: 2021-04-11 | Stop reason: HOSPADM

## 2021-04-09 RX ORDER — INSULIN ASPART 100 [IU]/ML
0-5 INJECTION, SOLUTION INTRAVENOUS; SUBCUTANEOUS
Status: DISCONTINUED | OUTPATIENT
Start: 2021-04-09 | End: 2021-04-09

## 2021-04-09 RX ORDER — CETIRIZINE HYDROCHLORIDE 5 MG/1
5 TABLET ORAL DAILY
Status: DISCONTINUED | OUTPATIENT
Start: 2021-04-09 | End: 2021-04-11 | Stop reason: HOSPADM

## 2021-04-09 RX ORDER — KETOROLAC TROMETHAMINE 30 MG/ML
30 INJECTION, SOLUTION INTRAMUSCULAR; INTRAVENOUS ONCE
Status: COMPLETED | OUTPATIENT
Start: 2021-04-09 | End: 2021-04-09

## 2021-04-09 RX ORDER — VALSARTAN 160 MG/1
320 TABLET ORAL DAILY
Qty: 180 TABLET | Refills: 1 | Status: SHIPPED | OUTPATIENT
Start: 2021-04-13 | End: 2021-05-14

## 2021-04-09 RX ORDER — INSULIN ASPART 100 [IU]/ML
1-10 INJECTION, SOLUTION INTRAVENOUS; SUBCUTANEOUS
Status: DISCONTINUED | OUTPATIENT
Start: 2021-04-09 | End: 2021-04-11 | Stop reason: HOSPADM

## 2021-04-09 RX ORDER — ACETAMINOPHEN 325 MG/1
650 TABLET ORAL EVERY 6 HOURS PRN
Status: DISCONTINUED | OUTPATIENT
Start: 2021-04-09 | End: 2021-04-09

## 2021-04-09 RX ORDER — TALC
6 POWDER (GRAM) TOPICAL NIGHTLY PRN
Status: DISCONTINUED | OUTPATIENT
Start: 2021-04-09 | End: 2021-04-11 | Stop reason: HOSPADM

## 2021-04-09 RX ORDER — IBUPROFEN 200 MG
24 TABLET ORAL
Status: DISCONTINUED | OUTPATIENT
Start: 2021-04-09 | End: 2021-04-11 | Stop reason: HOSPADM

## 2021-04-09 RX ADMIN — INSULIN ASPART 2 UNITS: 100 INJECTION, SOLUTION INTRAVENOUS; SUBCUTANEOUS at 12:04

## 2021-04-09 RX ADMIN — KETOROLAC TROMETHAMINE 30 MG: 30 INJECTION, SOLUTION INTRAMUSCULAR; INTRAVENOUS at 06:04

## 2021-04-09 RX ADMIN — KETOROLAC TROMETHAMINE 15 MG: 30 INJECTION, SOLUTION INTRAMUSCULAR; INTRAVENOUS at 02:04

## 2021-04-09 RX ADMIN — ACETAMINOPHEN 650 MG: 325 TABLET ORAL at 02:04

## 2021-04-09 RX ADMIN — DULOXETINE HYDROCHLORIDE 60 MG: 30 CAPSULE, DELAYED RELEASE ORAL at 09:04

## 2021-04-09 RX ADMIN — MELATONIN TAB 3 MG 6 MG: 3 TAB at 09:04

## 2021-04-09 RX ADMIN — HEPARIN SODIUM 5000 UNITS: 5000 INJECTION INTRAVENOUS; SUBCUTANEOUS at 02:04

## 2021-04-09 RX ADMIN — INSULIN DETEMIR 5 UNITS: 100 INJECTION, SOLUTION SUBCUTANEOUS at 08:04

## 2021-04-09 RX ADMIN — INSULIN ASPART 2 UNITS: 100 INJECTION, SOLUTION INTRAVENOUS; SUBCUTANEOUS at 06:04

## 2021-04-09 RX ADMIN — CLOPIDOGREL 75 MG: 75 TABLET, FILM COATED ORAL at 08:04

## 2021-04-09 RX ADMIN — CETIRIZINE HYDROCHLORIDE 5 MG: 5 TABLET ORAL at 03:04

## 2021-04-09 RX ADMIN — INSULIN ASPART 1 UNITS: 100 INJECTION, SOLUTION INTRAVENOUS; SUBCUTANEOUS at 08:04

## 2021-04-09 RX ADMIN — HEPARIN SODIUM 5000 UNITS: 5000 INJECTION INTRAVENOUS; SUBCUTANEOUS at 09:04

## 2021-04-09 RX ADMIN — HEPARIN SODIUM 5000 UNITS: 5000 INJECTION INTRAVENOUS; SUBCUTANEOUS at 05:04

## 2021-04-09 RX ADMIN — ASPIRIN 81 MG: 81 TABLET, FILM COATED ORAL at 08:04

## 2021-04-09 RX ADMIN — ACETAMINOPHEN 1000 MG: 500 TABLET, FILM COATED ORAL at 07:04

## 2021-04-10 PROBLEM — R74.8 ELEVATED LIVER ENZYMES: Status: ACTIVE | Noted: 2021-04-10

## 2021-04-10 PROBLEM — G44.52 NEW DAILY PERSISTENT HEADACHE: Status: ACTIVE | Noted: 2021-04-10

## 2021-04-10 LAB
ALBUMIN SERPL BCP-MCNC: 3.5 G/DL (ref 3.5–5.2)
ALP SERPL-CCNC: 166 U/L (ref 55–135)
ALT SERPL W/O P-5'-P-CCNC: 455 U/L (ref 10–44)
AMMONIA PLAS-SCNC: 67 UMOL/L (ref 10–50)
AMYLASE SERPL-CCNC: 64 U/L (ref 20–110)
ANION GAP SERPL CALC-SCNC: 13 MMOL/L (ref 8–16)
AST SERPL-CCNC: 229 U/L (ref 10–40)
BASOPHILS # BLD AUTO: 0.03 K/UL (ref 0–0.2)
BASOPHILS NFR BLD: 0.4 % (ref 0–1.9)
BILIRUB SERPL-MCNC: 0.6 MG/DL (ref 0.1–1)
BUN SERPL-MCNC: 14 MG/DL (ref 8–23)
CALCIUM SERPL-MCNC: 9.3 MG/DL (ref 8.7–10.5)
CHLORIDE SERPL-SCNC: 103 MMOL/L (ref 95–110)
CO2 SERPL-SCNC: 20 MMOL/L (ref 23–29)
CREAT SERPL-MCNC: 1.1 MG/DL (ref 0.5–1.4)
DIFFERENTIAL METHOD: NORMAL
EOSINOPHIL # BLD AUTO: 0.3 K/UL (ref 0–0.5)
EOSINOPHIL NFR BLD: 3.9 % (ref 0–8)
ERYTHROCYTE [DISTWIDTH] IN BLOOD BY AUTOMATED COUNT: 11.8 % (ref 11.5–14.5)
ERYTHROCYTE [SEDIMENTATION RATE] IN BLOOD BY WESTERGREN METHOD: 28 MM/HR (ref 0–23)
EST. GFR  (AFRICAN AMERICAN): >60 ML/MIN/1.73 M^2
EST. GFR  (NON AFRICAN AMERICAN): >60 ML/MIN/1.73 M^2
GLUCOSE SERPL-MCNC: 132 MG/DL (ref 70–110)
HCT VFR BLD AUTO: 46.4 % (ref 40–54)
HGB BLD-MCNC: 15.5 G/DL (ref 14–18)
IMM GRANULOCYTES # BLD AUTO: 0.02 K/UL (ref 0–0.04)
IMM GRANULOCYTES NFR BLD AUTO: 0.3 % (ref 0–0.5)
LIPASE SERPL-CCNC: 41 U/L (ref 4–60)
LYMPHOCYTES # BLD AUTO: 3 K/UL (ref 1–4.8)
LYMPHOCYTES NFR BLD: 43.3 % (ref 18–48)
MCH RBC QN AUTO: 30.8 PG (ref 27–31)
MCHC RBC AUTO-ENTMCNC: 33.4 G/DL (ref 32–36)
MCV RBC AUTO: 92 FL (ref 82–98)
MONOCYTES # BLD AUTO: 0.4 K/UL (ref 0.3–1)
MONOCYTES NFR BLD: 5.1 % (ref 4–15)
NEUTROPHILS # BLD AUTO: 3.2 K/UL (ref 1.8–7.7)
NEUTROPHILS NFR BLD: 47 % (ref 38–73)
NRBC BLD-RTO: 0 /100 WBC
PLATELET # BLD AUTO: 228 K/UL (ref 150–450)
PMV BLD AUTO: 9.2 FL (ref 9.2–12.9)
POCT GLUCOSE: 126 MG/DL (ref 70–110)
POCT GLUCOSE: 132 MG/DL (ref 70–110)
POCT GLUCOSE: 163 MG/DL (ref 70–110)
POCT GLUCOSE: 201 MG/DL (ref 70–110)
POTASSIUM SERPL-SCNC: 4.6 MMOL/L (ref 3.5–5.1)
PROT SERPL-MCNC: 6.9 G/DL (ref 6–8.4)
RBC # BLD AUTO: 5.03 M/UL (ref 4.6–6.2)
SODIUM SERPL-SCNC: 136 MMOL/L (ref 136–145)
WBC # BLD AUTO: 6.84 K/UL (ref 3.9–12.7)

## 2021-04-10 PROCEDURE — 85025 COMPLETE CBC W/AUTO DIFF WBC: CPT | Performed by: FAMILY MEDICINE

## 2021-04-10 PROCEDURE — 80053 COMPREHEN METABOLIC PANEL: CPT | Performed by: FAMILY MEDICINE

## 2021-04-10 PROCEDURE — 99233 SBSQ HOSP IP/OBS HIGH 50: CPT | Mod: ,,, | Performed by: PSYCHIATRY & NEUROLOGY

## 2021-04-10 PROCEDURE — 63600175 PHARM REV CODE 636 W HCPCS: Performed by: FAMILY MEDICINE

## 2021-04-10 PROCEDURE — 99233 PR SUBSEQUENT HOSPITAL CARE,LEVL III: ICD-10-PCS | Mod: ,,, | Performed by: PSYCHIATRY & NEUROLOGY

## 2021-04-10 PROCEDURE — 25000003 PHARM REV CODE 250: Performed by: PHYSICIAN ASSISTANT

## 2021-04-10 PROCEDURE — 36415 COLL VENOUS BLD VENIPUNCTURE: CPT | Performed by: FAMILY MEDICINE

## 2021-04-10 PROCEDURE — 63600175 PHARM REV CODE 636 W HCPCS: Performed by: NURSE PRACTITIONER

## 2021-04-10 PROCEDURE — 85652 RBC SED RATE AUTOMATED: CPT | Performed by: NURSE PRACTITIONER

## 2021-04-10 PROCEDURE — 11000001 HC ACUTE MED/SURG PRIVATE ROOM

## 2021-04-10 PROCEDURE — 25000003 PHARM REV CODE 250: Performed by: FAMILY MEDICINE

## 2021-04-10 PROCEDURE — 83690 ASSAY OF LIPASE: CPT | Performed by: NURSE PRACTITIONER

## 2021-04-10 PROCEDURE — 36415 COLL VENOUS BLD VENIPUNCTURE: CPT | Performed by: NURSE PRACTITIONER

## 2021-04-10 PROCEDURE — 82150 ASSAY OF AMYLASE: CPT | Performed by: NURSE PRACTITIONER

## 2021-04-10 RX ORDER — MAGNESIUM SULFATE HEPTAHYDRATE 40 MG/ML
2 INJECTION, SOLUTION INTRAVENOUS ONCE
Status: COMPLETED | OUTPATIENT
Start: 2021-04-10 | End: 2021-04-10

## 2021-04-10 RX ADMIN — MELATONIN TAB 3 MG 6 MG: 3 TAB at 09:04

## 2021-04-10 RX ADMIN — CETIRIZINE HYDROCHLORIDE 5 MG: 5 TABLET ORAL at 08:04

## 2021-04-10 RX ADMIN — INSULIN DETEMIR 5 UNITS: 100 INJECTION, SOLUTION SUBCUTANEOUS at 08:04

## 2021-04-10 RX ADMIN — HEPARIN SODIUM 5000 UNITS: 5000 INJECTION INTRAVENOUS; SUBCUTANEOUS at 09:04

## 2021-04-10 RX ADMIN — ACETAMINOPHEN 1000 MG: 500 TABLET, FILM COATED ORAL at 11:04

## 2021-04-10 RX ADMIN — INSULIN ASPART 4 UNITS: 100 INJECTION, SOLUTION INTRAVENOUS; SUBCUTANEOUS at 04:04

## 2021-04-10 RX ADMIN — HEPARIN SODIUM 5000 UNITS: 5000 INJECTION INTRAVENOUS; SUBCUTANEOUS at 02:04

## 2021-04-10 RX ADMIN — ASPIRIN 81 MG: 81 TABLET, FILM COATED ORAL at 08:04

## 2021-04-10 RX ADMIN — HEPARIN SODIUM 5000 UNITS: 5000 INJECTION INTRAVENOUS; SUBCUTANEOUS at 06:04

## 2021-04-10 RX ADMIN — CLOPIDOGREL 75 MG: 75 TABLET, FILM COATED ORAL at 08:04

## 2021-04-10 RX ADMIN — ACETAMINOPHEN 1000 MG: 500 TABLET, FILM COATED ORAL at 09:04

## 2021-04-10 RX ADMIN — ACETAMINOPHEN 1000 MG: 500 TABLET, FILM COATED ORAL at 06:04

## 2021-04-10 RX ADMIN — MAGNESIUM SULFATE 2 G: 2 INJECTION INTRAVENOUS at 02:04

## 2021-04-11 VITALS
BODY MASS INDEX: 28 KG/M2 | SYSTOLIC BLOOD PRESSURE: 138 MMHG | OXYGEN SATURATION: 96 % | TEMPERATURE: 96 F | RESPIRATION RATE: 18 BRPM | WEIGHT: 164 LBS | HEART RATE: 132 BPM | DIASTOLIC BLOOD PRESSURE: 89 MMHG | HEIGHT: 64 IN

## 2021-04-11 PROBLEM — K80.50 BILIARY COLIC: Status: RESOLVED | Noted: 2017-11-09 | Resolved: 2021-04-11

## 2021-04-11 PROBLEM — M54.2 CERVICALGIA: Status: RESOLVED | Noted: 2017-03-21 | Resolved: 2021-04-11

## 2021-04-11 PROBLEM — G56.01 RIGHT CARPAL TUNNEL SYNDROME: Status: RESOLVED | Noted: 2017-12-04 | Resolved: 2021-04-11

## 2021-04-11 PROBLEM — G56.00 CARPAL TUNNEL SYNDROME: Status: RESOLVED | Noted: 2017-08-22 | Resolved: 2021-04-11

## 2021-04-11 PROBLEM — M47.22 OSTEOARTHRITIS OF SPINE WITH RADICULOPATHY, CERVICAL REGION: Status: RESOLVED | Noted: 2017-03-21 | Resolved: 2021-04-11

## 2021-04-11 LAB
ALBUMIN SERPL BCP-MCNC: 3.7 G/DL (ref 3.5–5.2)
ALP SERPL-CCNC: 171 U/L (ref 55–135)
ALT SERPL W/O P-5'-P-CCNC: 283 U/L (ref 10–44)
ANION GAP SERPL CALC-SCNC: 9 MMOL/L (ref 8–16)
AST SERPL-CCNC: 74 U/L (ref 10–40)
BILIRUB SERPL-MCNC: 0.5 MG/DL (ref 0.1–1)
BUN SERPL-MCNC: 14 MG/DL (ref 8–23)
CALCIUM SERPL-MCNC: 9.4 MG/DL (ref 8.7–10.5)
CHLORIDE SERPL-SCNC: 101 MMOL/L (ref 95–110)
CO2 SERPL-SCNC: 25 MMOL/L (ref 23–29)
CREAT SERPL-MCNC: 1.2 MG/DL (ref 0.5–1.4)
EST. GFR  (AFRICAN AMERICAN): >60 ML/MIN/1.73 M^2
EST. GFR  (NON AFRICAN AMERICAN): >60 ML/MIN/1.73 M^2
GLUCOSE SERPL-MCNC: 200 MG/DL (ref 70–110)
POCT GLUCOSE: 126 MG/DL (ref 70–110)
POCT GLUCOSE: 178 MG/DL (ref 70–110)
POTASSIUM SERPL-SCNC: 4.2 MMOL/L (ref 3.5–5.1)
PROT SERPL-MCNC: 7.5 G/DL (ref 6–8.4)
SODIUM SERPL-SCNC: 135 MMOL/L (ref 136–145)

## 2021-04-11 PROCEDURE — 80053 COMPREHEN METABOLIC PANEL: CPT | Performed by: NURSE PRACTITIONER

## 2021-04-11 PROCEDURE — 25000003 PHARM REV CODE 250: Performed by: FAMILY MEDICINE

## 2021-04-11 PROCEDURE — 99233 PR SUBSEQUENT HOSPITAL CARE,LEVL III: ICD-10-PCS | Mod: ,,, | Performed by: PSYCHIATRY & NEUROLOGY

## 2021-04-11 PROCEDURE — 99233 SBSQ HOSP IP/OBS HIGH 50: CPT | Mod: ,,, | Performed by: PSYCHIATRY & NEUROLOGY

## 2021-04-11 PROCEDURE — 63600175 PHARM REV CODE 636 W HCPCS: Performed by: FAMILY MEDICINE

## 2021-04-11 PROCEDURE — 25000003 PHARM REV CODE 250: Performed by: PHYSICIAN ASSISTANT

## 2021-04-11 PROCEDURE — 36415 COLL VENOUS BLD VENIPUNCTURE: CPT | Performed by: NURSE PRACTITIONER

## 2021-04-11 RX ORDER — ISOPROPYL ALCOHOL 70 ML/100ML
1 SWAB TOPICAL 2 TIMES DAILY
Qty: 60 EACH | Refills: 0 | Status: SHIPPED | OUTPATIENT
Start: 2021-04-11 | End: 2021-05-11

## 2021-04-11 RX ORDER — MAGNESIUM CHLORIDE 71.5 G/G
2 TABLET ORAL DAILY
Qty: 180 TABLET | Refills: 0
Start: 2021-04-11 | End: 2021-04-21

## 2021-04-11 RX ORDER — INSULIN PUMP SYRINGE, 3 ML
EACH MISCELLANEOUS
Qty: 1 EACH | Refills: 0 | Status: SHIPPED | OUTPATIENT
Start: 2021-04-11 | End: 2021-06-28

## 2021-04-11 RX ORDER — CLOPIDOGREL BISULFATE 75 MG/1
75 TABLET ORAL DAILY
Qty: 30 TABLET | Refills: 11 | Status: SHIPPED | OUTPATIENT
Start: 2021-04-12 | End: 2021-06-28

## 2021-04-11 RX ORDER — LANCETS
1 EACH MISCELLANEOUS 2 TIMES DAILY
Qty: 60 EACH | Refills: 6 | Status: SHIPPED | OUTPATIENT
Start: 2021-04-11 | End: 2021-05-11

## 2021-04-11 RX ORDER — ASPIRIN 81 MG/1
81 TABLET ORAL DAILY
Refills: 0
Start: 2021-04-11 | End: 2022-08-15

## 2021-04-11 RX ADMIN — INSULIN ASPART 2 UNITS: 100 INJECTION, SOLUTION INTRAVENOUS; SUBCUTANEOUS at 11:04

## 2021-04-11 RX ADMIN — CLOPIDOGREL 75 MG: 75 TABLET, FILM COATED ORAL at 08:04

## 2021-04-11 RX ADMIN — INSULIN DETEMIR 5 UNITS: 100 INJECTION, SOLUTION SUBCUTANEOUS at 08:04

## 2021-04-11 RX ADMIN — HEPARIN SODIUM 5000 UNITS: 5000 INJECTION INTRAVENOUS; SUBCUTANEOUS at 05:04

## 2021-04-11 RX ADMIN — ASPIRIN 81 MG: 81 TABLET, FILM COATED ORAL at 08:04

## 2021-04-11 RX ADMIN — CETIRIZINE HYDROCHLORIDE 5 MG: 5 TABLET ORAL at 08:04

## 2021-04-14 ENCOUNTER — PATIENT MESSAGE (OUTPATIENT)
Dept: FAMILY MEDICINE | Facility: CLINIC | Age: 66
End: 2021-04-14

## 2021-04-14 DIAGNOSIS — R79.89 ELEVATED LFTS: ICD-10-CM

## 2021-04-14 DIAGNOSIS — E11.8 DIABETES MELLITUS TYPE 2 WITH COMPLICATIONS: Primary | ICD-10-CM

## 2021-04-16 ENCOUNTER — PATIENT MESSAGE (OUTPATIENT)
Dept: FAMILY MEDICINE | Facility: CLINIC | Age: 66
End: 2021-04-16

## 2021-04-17 RX ORDER — FAMCICLOVIR 500 MG/1
500 TABLET ORAL 3 TIMES DAILY
Qty: 21 TABLET | Refills: 0 | Status: SHIPPED | OUTPATIENT
Start: 2021-04-17 | End: 2022-11-07 | Stop reason: SDUPTHER

## 2021-04-19 ENCOUNTER — PATIENT OUTREACH (OUTPATIENT)
Dept: ADMINISTRATIVE | Facility: OTHER | Age: 66
End: 2021-04-19

## 2021-04-20 ENCOUNTER — TELEPHONE (OUTPATIENT)
Dept: CARDIOLOGY | Facility: CLINIC | Age: 66
End: 2021-04-20

## 2021-04-20 ENCOUNTER — CLINICAL SUPPORT (OUTPATIENT)
Dept: DIABETES | Facility: CLINIC | Age: 66
End: 2021-04-20
Payer: MEDICAID

## 2021-04-20 ENCOUNTER — PATIENT MESSAGE (OUTPATIENT)
Dept: ORTHOPEDICS | Facility: CLINIC | Age: 66
End: 2021-04-20

## 2021-04-20 DIAGNOSIS — E11.65 UNCONTROLLED TYPE 2 DIABETES MELLITUS WITH HYPERGLYCEMIA: ICD-10-CM

## 2021-04-20 PROCEDURE — 99999 PR PBB SHADOW E&M-EST. PATIENT-LVL I: ICD-10-PCS | Mod: PBBFAC,,,

## 2021-04-20 PROCEDURE — 99211 OFF/OP EST MAY X REQ PHY/QHP: CPT | Mod: PBBFAC | Performed by: DIETITIAN, REGISTERED

## 2021-04-20 PROCEDURE — 99999 PR PBB SHADOW E&M-EST. PATIENT-LVL I: CPT | Mod: PBBFAC,,,

## 2021-04-20 PROCEDURE — G0108 DIAB MANAGE TRN  PER INDIV: HCPCS | Mod: PBBFAC | Performed by: DIETITIAN, REGISTERED

## 2021-04-21 ENCOUNTER — OFFICE VISIT (OUTPATIENT)
Dept: FAMILY MEDICINE | Facility: CLINIC | Age: 66
End: 2021-04-21
Payer: MEDICAID

## 2021-04-21 VITALS
HEIGHT: 64 IN | WEIGHT: 157.75 LBS | TEMPERATURE: 98 F | BODY MASS INDEX: 26.93 KG/M2 | DIASTOLIC BLOOD PRESSURE: 76 MMHG | OXYGEN SATURATION: 98 % | HEART RATE: 84 BPM | SYSTOLIC BLOOD PRESSURE: 120 MMHG

## 2021-04-21 DIAGNOSIS — E11.8 DIABETES MELLITUS TYPE 2 WITH COMPLICATIONS: ICD-10-CM

## 2021-04-21 DIAGNOSIS — R79.89 ELEVATED LFTS: ICD-10-CM

## 2021-04-21 DIAGNOSIS — E78.5 HYPERLIPIDEMIA, UNSPECIFIED HYPERLIPIDEMIA TYPE: ICD-10-CM

## 2021-04-21 DIAGNOSIS — E11.59 HYPERTENSION ASSOCIATED WITH DIABETES: ICD-10-CM

## 2021-04-21 DIAGNOSIS — I15.2 HYPERTENSION ASSOCIATED WITH DIABETES: ICD-10-CM

## 2021-04-21 DIAGNOSIS — I63.9 CEREBROVASCULAR ACCIDENT (CVA), UNSPECIFIED MECHANISM: Primary | ICD-10-CM

## 2021-04-21 PROCEDURE — 36415 COLL VENOUS BLD VENIPUNCTURE: CPT | Mod: S$GLB,,, | Performed by: FAMILY MEDICINE

## 2021-04-21 PROCEDURE — 36415 PR COLLECTION VENOUS BLOOD,VENIPUNCTURE: ICD-10-PCS | Mod: S$GLB,,, | Performed by: FAMILY MEDICINE

## 2021-04-21 PROCEDURE — 80053 COMPREHEN METABOLIC PANEL: CPT | Performed by: FAMILY MEDICINE

## 2021-04-21 PROCEDURE — 99215 PR OFFICE/OUTPT VISIT, EST, LEVL V, 40-54 MIN: ICD-10-PCS | Mod: S$GLB,,, | Performed by: FAMILY MEDICINE

## 2021-04-21 PROCEDURE — 99215 OFFICE O/P EST HI 40 MIN: CPT | Mod: S$GLB,,, | Performed by: FAMILY MEDICINE

## 2021-04-21 RX ORDER — INSULIN PUMP SYRINGE, 3 ML
EACH MISCELLANEOUS
Qty: 1 EACH | Refills: 0 | Status: SHIPPED | OUTPATIENT
Start: 2021-04-21

## 2021-04-21 RX ORDER — LANCETS
EACH MISCELLANEOUS
Qty: 100 EACH | Refills: 5 | Status: SHIPPED | OUTPATIENT
Start: 2021-04-21

## 2021-04-22 ENCOUNTER — TELEPHONE (OUTPATIENT)
Dept: HEPATOLOGY | Facility: CLINIC | Age: 66
End: 2021-04-22

## 2021-04-22 ENCOUNTER — OFFICE VISIT (OUTPATIENT)
Dept: CARDIOLOGY | Facility: CLINIC | Age: 66
End: 2021-04-22
Payer: MEDICAID

## 2021-04-22 ENCOUNTER — PATIENT MESSAGE (OUTPATIENT)
Dept: ADMINISTRATIVE | Facility: OTHER | Age: 66
End: 2021-04-22

## 2021-04-22 ENCOUNTER — PATIENT MESSAGE (OUTPATIENT)
Dept: FAMILY MEDICINE | Facility: CLINIC | Age: 66
End: 2021-04-22

## 2021-04-22 VITALS
BODY MASS INDEX: 27.28 KG/M2 | HEART RATE: 84 BPM | WEIGHT: 159.81 LBS | DIASTOLIC BLOOD PRESSURE: 74 MMHG | SYSTOLIC BLOOD PRESSURE: 131 MMHG | HEIGHT: 64 IN

## 2021-04-22 DIAGNOSIS — I63.9 CEREBROVASCULAR ACCIDENT (CVA), UNSPECIFIED MECHANISM: ICD-10-CM

## 2021-04-22 DIAGNOSIS — E11.8 DIABETES MELLITUS TYPE 2 WITH COMPLICATIONS: Primary | ICD-10-CM

## 2021-04-22 DIAGNOSIS — R79.89 ABNORMAL LFTS (LIVER FUNCTION TESTS): ICD-10-CM

## 2021-04-22 DIAGNOSIS — I10 ESSENTIAL HYPERTENSION: Primary | ICD-10-CM

## 2021-04-22 LAB
ALBUMIN SERPL BCP-MCNC: 4 G/DL (ref 3.5–5.2)
ALP SERPL-CCNC: 118 U/L (ref 55–135)
ALT SERPL W/O P-5'-P-CCNC: 47 U/L (ref 10–44)
ANION GAP SERPL CALC-SCNC: 12 MMOL/L (ref 8–16)
AST SERPL-CCNC: 26 U/L (ref 10–40)
BILIRUB SERPL-MCNC: 0.4 MG/DL (ref 0.1–1)
BUN SERPL-MCNC: 19 MG/DL (ref 8–23)
CALCIUM SERPL-MCNC: 10 MG/DL (ref 8.7–10.5)
CHLORIDE SERPL-SCNC: 105 MMOL/L (ref 95–110)
CO2 SERPL-SCNC: 24 MMOL/L (ref 23–29)
CREAT SERPL-MCNC: 1.1 MG/DL (ref 0.5–1.4)
EST. GFR  (AFRICAN AMERICAN): >60 ML/MIN/1.73 M^2
EST. GFR  (NON AFRICAN AMERICAN): >60 ML/MIN/1.73 M^2
GLUCOSE SERPL-MCNC: 94 MG/DL (ref 70–110)
POTASSIUM SERPL-SCNC: 4.8 MMOL/L (ref 3.5–5.1)
PROT SERPL-MCNC: 8 G/DL (ref 6–8.4)
SODIUM SERPL-SCNC: 141 MMOL/L (ref 136–145)

## 2021-04-22 PROCEDURE — 99215 OFFICE O/P EST HI 40 MIN: CPT | Mod: PBBFAC,PN | Performed by: INTERNAL MEDICINE

## 2021-04-22 PROCEDURE — 99214 PR OFFICE/OUTPT VISIT, EST, LEVL IV, 30-39 MIN: ICD-10-PCS | Mod: S$PBB,,, | Performed by: INTERNAL MEDICINE

## 2021-04-22 PROCEDURE — 99999 PR PBB SHADOW E&M-EST. PATIENT-LVL V: ICD-10-PCS | Mod: PBBFAC,,, | Performed by: INTERNAL MEDICINE

## 2021-04-22 PROCEDURE — 99214 OFFICE O/P EST MOD 30 MIN: CPT | Mod: S$PBB,,, | Performed by: INTERNAL MEDICINE

## 2021-04-22 PROCEDURE — 99999 PR PBB SHADOW E&M-EST. PATIENT-LVL V: CPT | Mod: PBBFAC,,, | Performed by: INTERNAL MEDICINE

## 2021-04-22 RX ORDER — ATORVASTATIN CALCIUM 20 MG/1
20 TABLET, FILM COATED ORAL DAILY
Qty: 90 TABLET | Refills: 0 | Status: SHIPPED | OUTPATIENT
Start: 2021-04-22 | End: 2021-08-04 | Stop reason: SDUPTHER

## 2021-04-22 RX ORDER — DULOXETIN HYDROCHLORIDE 30 MG/1
30 CAPSULE, DELAYED RELEASE ORAL DAILY
Qty: 30 CAPSULE | Refills: 1 | Status: SHIPPED | OUTPATIENT
Start: 2021-04-22 | End: 2021-05-31 | Stop reason: SDUPTHER

## 2021-04-22 RX ORDER — CLONAZEPAM 0.5 MG/1
0.5 TABLET ORAL DAILY PRN
Qty: 30 TABLET | Refills: 0 | Status: SHIPPED | OUTPATIENT
Start: 2021-04-22 | End: 2021-05-30

## 2021-04-23 ENCOUNTER — PATIENT MESSAGE (OUTPATIENT)
Dept: FAMILY MEDICINE | Facility: CLINIC | Age: 66
End: 2021-04-23

## 2021-04-27 ENCOUNTER — OFFICE VISIT (OUTPATIENT)
Dept: ENDOCRINOLOGY | Facility: CLINIC | Age: 66
End: 2021-04-27
Payer: MEDICAID

## 2021-04-27 ENCOUNTER — OFFICE VISIT (OUTPATIENT)
Dept: HEPATOLOGY | Facility: CLINIC | Age: 66
End: 2021-04-27
Payer: MEDICAID

## 2021-04-27 VITALS
BODY MASS INDEX: 27.1 KG/M2 | DIASTOLIC BLOOD PRESSURE: 64 MMHG | RESPIRATION RATE: 18 BRPM | HEIGHT: 64 IN | SYSTOLIC BLOOD PRESSURE: 110 MMHG | WEIGHT: 158.75 LBS

## 2021-04-27 VITALS
HEIGHT: 64 IN | OXYGEN SATURATION: 97 % | RESPIRATION RATE: 16 BRPM | HEART RATE: 82 BPM | SYSTOLIC BLOOD PRESSURE: 124 MMHG | DIASTOLIC BLOOD PRESSURE: 68 MMHG | BODY MASS INDEX: 27.1 KG/M2 | WEIGHT: 158.75 LBS

## 2021-04-27 DIAGNOSIS — I10 ESSENTIAL HYPERTENSION: ICD-10-CM

## 2021-04-27 DIAGNOSIS — R74.8 ELEVATED LIVER ENZYMES: ICD-10-CM

## 2021-04-27 DIAGNOSIS — E11.65 UNCONTROLLED TYPE 2 DIABETES MELLITUS WITH HYPERGLYCEMIA: Primary | ICD-10-CM

## 2021-04-27 DIAGNOSIS — E78.5 DYSLIPIDEMIA: ICD-10-CM

## 2021-04-27 DIAGNOSIS — E55.9 VITAMIN D DEFICIENCY: ICD-10-CM

## 2021-04-27 PROCEDURE — 99999 PR PBB SHADOW E&M-EST. PATIENT-LVL V: ICD-10-PCS | Mod: PBBFAC,,, | Performed by: INTERNAL MEDICINE

## 2021-04-27 PROCEDURE — 99999 PR PBB SHADOW E&M-EST. PATIENT-LVL V: ICD-10-PCS | Mod: PBBFAC,,, | Performed by: NURSE PRACTITIONER

## 2021-04-27 PROCEDURE — 99214 OFFICE O/P EST MOD 30 MIN: CPT | Mod: S$PBB,,, | Performed by: INTERNAL MEDICINE

## 2021-04-27 PROCEDURE — 99215 OFFICE O/P EST HI 40 MIN: CPT | Mod: PBBFAC,27 | Performed by: NURSE PRACTITIONER

## 2021-04-27 PROCEDURE — 99214 OFFICE O/P EST MOD 30 MIN: CPT | Mod: S$PBB,,, | Performed by: NURSE PRACTITIONER

## 2021-04-27 PROCEDURE — 99214 PR OFFICE/OUTPT VISIT, EST, LEVL IV, 30-39 MIN: ICD-10-PCS | Mod: S$PBB,,, | Performed by: NURSE PRACTITIONER

## 2021-04-27 PROCEDURE — 99214 PR OFFICE/OUTPT VISIT, EST, LEVL IV, 30-39 MIN: ICD-10-PCS | Mod: S$PBB,,, | Performed by: INTERNAL MEDICINE

## 2021-04-27 PROCEDURE — 99999 PR PBB SHADOW E&M-EST. PATIENT-LVL V: CPT | Mod: PBBFAC,,, | Performed by: INTERNAL MEDICINE

## 2021-04-27 PROCEDURE — 99215 OFFICE O/P EST HI 40 MIN: CPT | Mod: PBBFAC | Performed by: INTERNAL MEDICINE

## 2021-04-27 PROCEDURE — 99999 PR PBB SHADOW E&M-EST. PATIENT-LVL V: CPT | Mod: PBBFAC,,, | Performed by: NURSE PRACTITIONER

## 2021-04-27 RX ORDER — URSODIOL 250 MG/1
500 TABLET, FILM COATED ORAL 2 TIMES DAILY WITH MEALS
Qty: 120 TABLET | Refills: 11 | Status: SHIPPED | OUTPATIENT
Start: 2021-04-27 | End: 2022-08-15

## 2021-04-27 RX ORDER — TESTOSTERONE CYPIONATE 200 MG/ML
50 INJECTION, SOLUTION INTRAMUSCULAR
Qty: 10 ML | Refills: 3 | Status: SHIPPED | OUTPATIENT
Start: 2021-04-27 | End: 2021-07-30

## 2021-04-28 ENCOUNTER — CLINICAL SUPPORT (OUTPATIENT)
Dept: REHABILITATION | Facility: HOSPITAL | Age: 66
End: 2021-04-28
Payer: MEDICAID

## 2021-04-28 DIAGNOSIS — Z74.09 IMPAIRED FUNCTIONAL MOBILITY, BALANCE, AND ENDURANCE: ICD-10-CM

## 2021-04-28 DIAGNOSIS — R47.89 FLUENCY DISORDER: ICD-10-CM

## 2021-04-28 DIAGNOSIS — R53.1 RIGHT SIDED WEAKNESS: ICD-10-CM

## 2021-04-28 DIAGNOSIS — I63.9 CEREBROVASCULAR ACCIDENT (CVA), UNSPECIFIED MECHANISM: ICD-10-CM

## 2021-04-28 PROCEDURE — 97162 PT EVAL MOD COMPLEX 30 MIN: CPT | Mod: PN

## 2021-04-28 PROCEDURE — 92523 SPEECH SOUND LANG COMPREHEN: CPT | Mod: PN

## 2021-04-28 PROCEDURE — 92521 EVALUATION OF SPEECH FLUENCY: CPT | Mod: PN

## 2021-04-29 PROBLEM — Z74.09 IMPAIRED FUNCTIONAL MOBILITY, BALANCE, AND ENDURANCE: Status: ACTIVE | Noted: 2021-04-29

## 2021-04-29 PROBLEM — R53.1 RIGHT SIDED WEAKNESS: Status: ACTIVE | Noted: 2021-04-29

## 2021-04-29 LAB
LEFT EYE DM RETINOPATHY: POSITIVE
RIGHT EYE DM RETINOPATHY: POSITIVE

## 2021-04-30 ENCOUNTER — TELEPHONE (OUTPATIENT)
Dept: NEUROLOGY | Facility: CLINIC | Age: 66
End: 2021-04-30

## 2021-05-03 ENCOUNTER — TELEPHONE (OUTPATIENT)
Dept: CARDIOLOGY | Facility: CLINIC | Age: 66
End: 2021-05-03

## 2021-05-03 ENCOUNTER — CLINICAL SUPPORT (OUTPATIENT)
Dept: CARDIOLOGY | Facility: HOSPITAL | Age: 66
End: 2021-05-03
Attending: NURSE PRACTITIONER
Payer: MEDICAID

## 2021-05-03 DIAGNOSIS — I63.531 ACUTE ISCHEMIC RIGHT POSTERIOR CEREBRAL ARTERY (PCA) STROKE: ICD-10-CM

## 2021-05-03 PROCEDURE — 93272 ECG/REVIEW INTERPRET ONLY: CPT | Mod: ,,, | Performed by: INTERNAL MEDICINE

## 2021-05-03 PROCEDURE — 93272 CARDIAC EVENT MONITOR (CUPID ONLY): ICD-10-PCS | Mod: ,,, | Performed by: INTERNAL MEDICINE

## 2021-05-03 PROCEDURE — 93271 ECG/MONITORING AND ANALYSIS: CPT

## 2021-05-06 ENCOUNTER — PATIENT MESSAGE (OUTPATIENT)
Dept: FAMILY MEDICINE | Facility: CLINIC | Age: 66
End: 2021-05-06

## 2021-05-10 ENCOUNTER — PATIENT MESSAGE (OUTPATIENT)
Dept: FAMILY MEDICINE | Facility: CLINIC | Age: 66
End: 2021-05-10

## 2021-05-11 ENCOUNTER — PATIENT MESSAGE (OUTPATIENT)
Dept: ADMINISTRATIVE | Facility: OTHER | Age: 66
End: 2021-05-11

## 2021-05-11 ENCOUNTER — PATIENT OUTREACH (OUTPATIENT)
Dept: ADMINISTRATIVE | Facility: HOSPITAL | Age: 66
End: 2021-05-11

## 2021-05-12 ENCOUNTER — CLINICAL SUPPORT (OUTPATIENT)
Dept: REHABILITATION | Facility: HOSPITAL | Age: 66
End: 2021-05-12
Payer: MEDICAID

## 2021-05-12 DIAGNOSIS — R47.89 FLUENCY DISORDER: ICD-10-CM

## 2021-05-12 DIAGNOSIS — Z74.09 IMPAIRED FUNCTIONAL MOBILITY, BALANCE, AND ENDURANCE: ICD-10-CM

## 2021-05-12 DIAGNOSIS — R53.1 RIGHT SIDED WEAKNESS: ICD-10-CM

## 2021-05-12 PROCEDURE — 97110 THERAPEUTIC EXERCISES: CPT | Mod: PN

## 2021-05-12 PROCEDURE — 92507 TX SP LANG VOICE COMM INDIV: CPT | Mod: PN

## 2021-05-13 ENCOUNTER — PATIENT MESSAGE (OUTPATIENT)
Dept: FAMILY MEDICINE | Facility: CLINIC | Age: 66
End: 2021-05-13

## 2021-05-13 ENCOUNTER — PATIENT MESSAGE (OUTPATIENT)
Dept: ORTHOPEDICS | Facility: CLINIC | Age: 66
End: 2021-05-13

## 2021-05-25 RX ORDER — VALSARTAN 160 MG/1
TABLET ORAL
Qty: 180 TABLET | Refills: 0 | Status: SHIPPED | OUTPATIENT
Start: 2021-05-25 | End: 2021-08-29

## 2021-05-31 ENCOUNTER — CLINICAL SUPPORT (OUTPATIENT)
Dept: REHABILITATION | Facility: HOSPITAL | Age: 66
End: 2021-05-31
Payer: MEDICAID

## 2021-05-31 DIAGNOSIS — R53.1 RIGHT SIDED WEAKNESS: ICD-10-CM

## 2021-05-31 DIAGNOSIS — R47.89 FLUENCY DISORDER: ICD-10-CM

## 2021-05-31 DIAGNOSIS — F32.A DEPRESSION, UNSPECIFIED DEPRESSION TYPE: Primary | ICD-10-CM

## 2021-05-31 DIAGNOSIS — Z74.09 IMPAIRED FUNCTIONAL MOBILITY, BALANCE, AND ENDURANCE: Primary | ICD-10-CM

## 2021-05-31 PROCEDURE — 92507 TX SP LANG VOICE COMM INDIV: CPT | Mod: PN

## 2021-05-31 PROCEDURE — 97110 THERAPEUTIC EXERCISES: CPT | Mod: PN,59

## 2021-05-31 RX ORDER — DULOXETIN HYDROCHLORIDE 30 MG/1
30 CAPSULE, DELAYED RELEASE ORAL DAILY
Qty: 30 CAPSULE | Refills: 2 | Status: SHIPPED | OUTPATIENT
Start: 2021-05-31 | End: 2021-06-16

## 2021-06-01 ENCOUNTER — TELEPHONE (OUTPATIENT)
Dept: FAMILY MEDICINE | Facility: CLINIC | Age: 66
End: 2021-06-01

## 2021-06-02 ENCOUNTER — LAB VISIT (OUTPATIENT)
Dept: LAB | Facility: HOSPITAL | Age: 66
End: 2021-06-02
Payer: MEDICAID

## 2021-06-02 ENCOUNTER — PATIENT MESSAGE (OUTPATIENT)
Dept: FAMILY MEDICINE | Facility: CLINIC | Age: 66
End: 2021-06-02

## 2021-06-02 DIAGNOSIS — E11.8 DIABETES MELLITUS TYPE 2 WITH COMPLICATIONS: ICD-10-CM

## 2021-06-02 DIAGNOSIS — R74.8 ELEVATED LIVER ENZYMES: ICD-10-CM

## 2021-06-02 DIAGNOSIS — R79.89 ELEVATED LFTS: ICD-10-CM

## 2021-06-02 LAB
ALBUMIN SERPL BCP-MCNC: 4 G/DL (ref 3.5–5.2)
ALP SERPL-CCNC: 138 U/L (ref 55–135)
ALT SERPL W/O P-5'-P-CCNC: 25 U/L (ref 10–44)
ANION GAP SERPL CALC-SCNC: 9 MMOL/L (ref 8–16)
AST SERPL-CCNC: 17 U/L (ref 10–40)
BILIRUB SERPL-MCNC: 0.5 MG/DL (ref 0.1–1)
BUN SERPL-MCNC: 19 MG/DL (ref 8–23)
CALCIUM SERPL-MCNC: 10.2 MG/DL (ref 8.7–10.5)
CHLORIDE SERPL-SCNC: 104 MMOL/L (ref 95–110)
CO2 SERPL-SCNC: 25 MMOL/L (ref 23–29)
CREAT SERPL-MCNC: 1.2 MG/DL (ref 0.5–1.4)
EST. GFR  (AFRICAN AMERICAN): >60 ML/MIN/1.73 M^2
EST. GFR  (NON AFRICAN AMERICAN): >60 ML/MIN/1.73 M^2
ESTIMATED AVG GLUCOSE: 151 MG/DL (ref 68–131)
GLUCOSE SERPL-MCNC: 120 MG/DL (ref 70–110)
HBA1C MFR BLD: 6.9 % (ref 4–5.6)
POTASSIUM SERPL-SCNC: 4.9 MMOL/L (ref 3.5–5.1)
PROT SERPL-MCNC: 7.5 G/DL (ref 6–8.4)
SODIUM SERPL-SCNC: 138 MMOL/L (ref 136–145)

## 2021-06-02 PROCEDURE — 36415 COLL VENOUS BLD VENIPUNCTURE: CPT | Performed by: NURSE PRACTITIONER

## 2021-06-02 PROCEDURE — 80053 COMPREHEN METABOLIC PANEL: CPT | Performed by: NURSE PRACTITIONER

## 2021-06-02 PROCEDURE — 83036 HEMOGLOBIN GLYCOSYLATED A1C: CPT | Performed by: FAMILY MEDICINE

## 2021-06-03 ENCOUNTER — OFFICE VISIT (OUTPATIENT)
Dept: NEUROLOGY | Facility: CLINIC | Age: 66
End: 2021-06-03
Payer: MEDICAID

## 2021-06-03 ENCOUNTER — PATIENT MESSAGE (OUTPATIENT)
Dept: FAMILY MEDICINE | Facility: CLINIC | Age: 66
End: 2021-06-03

## 2021-06-03 VITALS
HEIGHT: 64 IN | BODY MASS INDEX: 27.16 KG/M2 | SYSTOLIC BLOOD PRESSURE: 120 MMHG | WEIGHT: 159.06 LBS | HEART RATE: 73 BPM | DIASTOLIC BLOOD PRESSURE: 74 MMHG

## 2021-06-03 DIAGNOSIS — I63.331 CEREBRAL INFARCTION DUE TO THROMBOSIS OF RIGHT POSTERIOR CEREBRAL ARTERY: ICD-10-CM

## 2021-06-03 DIAGNOSIS — R53.1 RIGHT SIDED WEAKNESS: Primary | ICD-10-CM

## 2021-06-03 DIAGNOSIS — G93.6 CYTOTOXIC CEREBRAL EDEMA: ICD-10-CM

## 2021-06-03 PROBLEM — R07.9 CHEST PAIN: Status: RESOLVED | Noted: 2018-12-11 | Resolved: 2021-06-03

## 2021-06-03 PROCEDURE — 99999 PR PBB SHADOW E&M-EST. PATIENT-LVL IV: ICD-10-PCS | Mod: PBBFAC,,, | Performed by: STUDENT IN AN ORGANIZED HEALTH CARE EDUCATION/TRAINING PROGRAM

## 2021-06-03 PROCEDURE — 99213 PR OFFICE/OUTPT VISIT, EST, LEVL III, 20-29 MIN: ICD-10-PCS | Mod: S$PBB,,, | Performed by: PSYCHIATRY & NEUROLOGY

## 2021-06-03 PROCEDURE — 99213 OFFICE O/P EST LOW 20 MIN: CPT | Mod: S$PBB,,, | Performed by: PSYCHIATRY & NEUROLOGY

## 2021-06-03 PROCEDURE — 99999 PR PBB SHADOW E&M-EST. PATIENT-LVL IV: CPT | Mod: PBBFAC,,, | Performed by: STUDENT IN AN ORGANIZED HEALTH CARE EDUCATION/TRAINING PROGRAM

## 2021-06-03 PROCEDURE — 99214 OFFICE O/P EST MOD 30 MIN: CPT | Mod: PBBFAC | Performed by: STUDENT IN AN ORGANIZED HEALTH CARE EDUCATION/TRAINING PROGRAM

## 2021-06-07 DIAGNOSIS — E11.8 DIABETES MELLITUS TYPE 2 WITH COMPLICATIONS: Primary | ICD-10-CM

## 2021-06-24 DIAGNOSIS — E11.8 DIABETES MELLITUS TYPE 2 WITH COMPLICATIONS: ICD-10-CM

## 2021-06-28 ENCOUNTER — OFFICE VISIT (OUTPATIENT)
Dept: FAMILY MEDICINE | Facility: CLINIC | Age: 66
End: 2021-06-28
Payer: MEDICAID

## 2021-06-28 VITALS
HEART RATE: 69 BPM | WEIGHT: 161.19 LBS | BODY MASS INDEX: 27.66 KG/M2 | RESPIRATION RATE: 18 BRPM | OXYGEN SATURATION: 98 % | SYSTOLIC BLOOD PRESSURE: 110 MMHG | TEMPERATURE: 98 F | DIASTOLIC BLOOD PRESSURE: 72 MMHG

## 2021-06-28 DIAGNOSIS — I63.9 CEREBROVASCULAR ACCIDENT (CVA), UNSPECIFIED MECHANISM: Primary | ICD-10-CM

## 2021-06-28 DIAGNOSIS — E78.5 HYPERLIPIDEMIA ASSOCIATED WITH TYPE 2 DIABETES MELLITUS: ICD-10-CM

## 2021-06-28 DIAGNOSIS — E11.69 HYPERLIPIDEMIA ASSOCIATED WITH TYPE 2 DIABETES MELLITUS: ICD-10-CM

## 2021-06-28 DIAGNOSIS — E11.8 DIABETES MELLITUS TYPE 2 WITH COMPLICATIONS: ICD-10-CM

## 2021-06-28 DIAGNOSIS — J30.2 SEASONAL ALLERGIES: ICD-10-CM

## 2021-06-28 DIAGNOSIS — I15.2 HYPERTENSION ASSOCIATED WITH DIABETES: ICD-10-CM

## 2021-06-28 DIAGNOSIS — E11.59 HYPERTENSION ASSOCIATED WITH DIABETES: ICD-10-CM

## 2021-06-28 PROCEDURE — 90471 PNEUMOCOCCAL POLYSACCHARIDE VACCINE 23-VALENT =>2YO SQ IM: ICD-10-PCS | Mod: S$GLB,,, | Performed by: FAMILY MEDICINE

## 2021-06-28 PROCEDURE — 99214 PR OFFICE/OUTPT VISIT, EST, LEVL IV, 30-39 MIN: ICD-10-PCS | Mod: 25,S$GLB,, | Performed by: FAMILY MEDICINE

## 2021-06-28 PROCEDURE — 99214 OFFICE O/P EST MOD 30 MIN: CPT | Mod: 25,S$GLB,, | Performed by: FAMILY MEDICINE

## 2021-06-28 PROCEDURE — 90471 IMMUNIZATION ADMIN: CPT | Mod: S$GLB,,, | Performed by: FAMILY MEDICINE

## 2021-06-28 PROCEDURE — 90732 PNEUMOCOCCAL POLYSACCHARIDE VACCINE 23-VALENT =>2YO SQ IM: ICD-10-PCS | Mod: S$GLB,,, | Performed by: FAMILY MEDICINE

## 2021-06-28 PROCEDURE — 90732 PPSV23 VACC 2 YRS+ SUBQ/IM: CPT | Mod: S$GLB,,, | Performed by: FAMILY MEDICINE

## 2021-06-28 RX ORDER — DICLOFENAC SODIUM 10 MG/G
GEL TOPICAL
COMMUNITY
Start: 2021-04-17 | End: 2023-04-24 | Stop reason: ALTCHOICE

## 2021-06-28 RX ORDER — KETOCONAZOLE 20 MG/G
CREAM TOPICAL DAILY
Qty: 60 G | Refills: 2 | Status: SHIPPED | OUTPATIENT
Start: 2021-06-28 | End: 2023-02-09

## 2021-06-28 RX ORDER — LEVOCETIRIZINE DIHYDROCHLORIDE 5 MG/1
5 TABLET, FILM COATED ORAL NIGHTLY PRN
Qty: 30 TABLET | Refills: 11 | Status: SHIPPED | OUTPATIENT
Start: 2021-06-28 | End: 2022-07-25

## 2021-07-03 ENCOUNTER — PATIENT OUTREACH (OUTPATIENT)
Dept: ADMINISTRATIVE | Facility: OTHER | Age: 66
End: 2021-07-03

## 2021-07-06 ENCOUNTER — OFFICE VISIT (OUTPATIENT)
Dept: ORTHOPEDICS | Facility: CLINIC | Age: 66
End: 2021-07-06
Payer: MEDICAID

## 2021-07-06 VITALS — HEIGHT: 64 IN | WEIGHT: 161 LBS | BODY MASS INDEX: 27.49 KG/M2 | RESPIRATION RATE: 18 BRPM

## 2021-07-06 DIAGNOSIS — M77.8 TENDINITIS OF LEFT SHOULDER: Primary | ICD-10-CM

## 2021-07-06 PROCEDURE — 99999 PR PBB SHADOW E&M-EST. PATIENT-LVL III: CPT | Mod: PBBFAC,,, | Performed by: ORTHOPAEDIC SURGERY

## 2021-07-06 PROCEDURE — 99213 OFFICE O/P EST LOW 20 MIN: CPT | Mod: S$PBB,,, | Performed by: ORTHOPAEDIC SURGERY

## 2021-07-06 PROCEDURE — 99999 PR PBB SHADOW E&M-EST. PATIENT-LVL III: ICD-10-PCS | Mod: PBBFAC,,, | Performed by: ORTHOPAEDIC SURGERY

## 2021-07-06 PROCEDURE — 99213 OFFICE O/P EST LOW 20 MIN: CPT | Mod: PBBFAC,PN | Performed by: ORTHOPAEDIC SURGERY

## 2021-07-06 PROCEDURE — 99213 PR OFFICE/OUTPT VISIT, EST, LEVL III, 20-29 MIN: ICD-10-PCS | Mod: S$PBB,,, | Performed by: ORTHOPAEDIC SURGERY

## 2021-07-27 ENCOUNTER — LAB VISIT (OUTPATIENT)
Dept: LAB | Facility: HOSPITAL | Age: 66
End: 2021-07-27
Attending: INTERNAL MEDICINE
Payer: MEDICAID

## 2021-07-27 ENCOUNTER — PATIENT MESSAGE (OUTPATIENT)
Dept: FAMILY MEDICINE | Facility: CLINIC | Age: 66
End: 2021-07-27

## 2021-07-27 DIAGNOSIS — E11.65 UNCONTROLLED TYPE 2 DIABETES MELLITUS WITH HYPERGLYCEMIA: ICD-10-CM

## 2021-07-27 DIAGNOSIS — E78.5 HYPERLIPIDEMIA ASSOCIATED WITH TYPE 2 DIABETES MELLITUS: ICD-10-CM

## 2021-07-27 DIAGNOSIS — E11.69 HYPERLIPIDEMIA ASSOCIATED WITH TYPE 2 DIABETES MELLITUS: ICD-10-CM

## 2021-07-27 DIAGNOSIS — R74.8 ELEVATED LIVER ENZYMES: ICD-10-CM

## 2021-07-27 LAB
ALBUMIN SERPL BCP-MCNC: 3.8 G/DL (ref 3.5–5.2)
ALBUMIN SERPL BCP-MCNC: 3.8 G/DL (ref 3.5–5.2)
ALP SERPL-CCNC: 137 U/L (ref 55–135)
ALP SERPL-CCNC: 137 U/L (ref 55–135)
ALT SERPL W/O P-5'-P-CCNC: 19 U/L (ref 10–44)
ALT SERPL W/O P-5'-P-CCNC: 19 U/L (ref 10–44)
ANION GAP SERPL CALC-SCNC: 6 MMOL/L (ref 8–16)
AST SERPL-CCNC: 16 U/L (ref 10–40)
AST SERPL-CCNC: 16 U/L (ref 10–40)
BASOPHILS # BLD AUTO: 0.03 K/UL (ref 0–0.2)
BASOPHILS NFR BLD: 0.3 % (ref 0–1.9)
BILIRUB DIRECT SERPL-MCNC: 0.1 MG/DL (ref 0.1–0.3)
BILIRUB SERPL-MCNC: 0.4 MG/DL (ref 0.1–1)
BILIRUB SERPL-MCNC: 0.4 MG/DL (ref 0.1–1)
BUN SERPL-MCNC: 17 MG/DL (ref 8–23)
CALCIUM SERPL-MCNC: 9.8 MG/DL (ref 8.7–10.5)
CHLORIDE SERPL-SCNC: 107 MMOL/L (ref 95–110)
CHOLEST SERPL-MCNC: 157 MG/DL (ref 120–199)
CHOLEST/HDLC SERPL: 2.8 {RATIO} (ref 2–5)
CO2 SERPL-SCNC: 25 MMOL/L (ref 23–29)
CREAT SERPL-MCNC: 1.1 MG/DL (ref 0.5–1.4)
DIFFERENTIAL METHOD: ABNORMAL
EOSINOPHIL # BLD AUTO: 0.1 K/UL (ref 0–0.5)
EOSINOPHIL NFR BLD: 0.9 % (ref 0–8)
ERYTHROCYTE [DISTWIDTH] IN BLOOD BY AUTOMATED COUNT: 12 % (ref 11.5–14.5)
EST. GFR  (AFRICAN AMERICAN): >60 ML/MIN/1.73 M^2
EST. GFR  (NON AFRICAN AMERICAN): >60 ML/MIN/1.73 M^2
ESTIMATED AVG GLUCOSE: 134 MG/DL (ref 68–131)
GLUCOSE SERPL-MCNC: 118 MG/DL (ref 70–110)
HBA1C MFR BLD: 6.3 % (ref 4–5.6)
HCT VFR BLD AUTO: 38.7 % (ref 40–54)
HDLC SERPL-MCNC: 57 MG/DL (ref 40–75)
HDLC SERPL: 36.3 % (ref 20–50)
HGB BLD-MCNC: 13.2 G/DL (ref 14–18)
IMM GRANULOCYTES # BLD AUTO: 0.05 K/UL (ref 0–0.04)
IMM GRANULOCYTES NFR BLD AUTO: 0.4 % (ref 0–0.5)
LDLC SERPL CALC-MCNC: 61.4 MG/DL (ref 63–159)
LYMPHOCYTES # BLD AUTO: 3 K/UL (ref 1–4.8)
LYMPHOCYTES NFR BLD: 26.7 % (ref 18–48)
MCH RBC QN AUTO: 31.8 PG (ref 27–31)
MCHC RBC AUTO-ENTMCNC: 34.1 G/DL (ref 32–36)
MCV RBC AUTO: 93 FL (ref 82–98)
MONOCYTES # BLD AUTO: 0.5 K/UL (ref 0.3–1)
MONOCYTES NFR BLD: 4.6 % (ref 4–15)
NEUTROPHILS # BLD AUTO: 7.5 K/UL (ref 1.8–7.7)
NEUTROPHILS NFR BLD: 67.1 % (ref 38–73)
NONHDLC SERPL-MCNC: 100 MG/DL
NRBC BLD-RTO: 0 /100 WBC
PLATELET # BLD AUTO: 279 K/UL (ref 150–450)
PMV BLD AUTO: 9.6 FL (ref 9.2–12.9)
POTASSIUM SERPL-SCNC: 4.3 MMOL/L (ref 3.5–5.1)
PROT SERPL-MCNC: 7.4 G/DL (ref 6–8.4)
PROT SERPL-MCNC: 7.4 G/DL (ref 6–8.4)
RBC # BLD AUTO: 4.15 M/UL (ref 4.6–6.2)
SODIUM SERPL-SCNC: 138 MMOL/L (ref 136–145)
TRIGL SERPL-MCNC: 193 MG/DL (ref 30–150)
WBC # BLD AUTO: 11.12 K/UL (ref 3.9–12.7)

## 2021-07-27 PROCEDURE — 83036 HEMOGLOBIN GLYCOSYLATED A1C: CPT | Performed by: NURSE PRACTITIONER

## 2021-07-27 PROCEDURE — 80061 LIPID PANEL: CPT | Performed by: FAMILY MEDICINE

## 2021-07-27 PROCEDURE — 80053 COMPREHEN METABOLIC PANEL: CPT | Performed by: NURSE PRACTITIONER

## 2021-07-27 PROCEDURE — 85025 COMPLETE CBC W/AUTO DIFF WBC: CPT | Performed by: NURSE PRACTITIONER

## 2021-07-27 PROCEDURE — 36415 COLL VENOUS BLD VENIPUNCTURE: CPT | Performed by: INTERNAL MEDICINE

## 2021-07-27 PROCEDURE — 80076 HEPATIC FUNCTION PANEL: CPT | Performed by: INTERNAL MEDICINE

## 2021-07-27 RX ORDER — FAMCICLOVIR 500 MG/1
500 TABLET ORAL 3 TIMES DAILY
Qty: 21 TABLET | Refills: 0 | Status: SHIPPED | OUTPATIENT
Start: 2021-07-27 | End: 2021-08-03

## 2021-07-30 ENCOUNTER — HOSPITAL ENCOUNTER (OUTPATIENT)
Dept: RADIOLOGY | Facility: HOSPITAL | Age: 66
Discharge: HOME | End: 2021-07-30
Attending: ORTHOPAEDIC SURGERY
Payer: MEDICAID

## 2021-07-30 ENCOUNTER — PATIENT MESSAGE (OUTPATIENT)
Dept: FAMILY MEDICINE | Facility: CLINIC | Age: 66
End: 2021-07-30

## 2021-07-30 ENCOUNTER — OFFICE VISIT (OUTPATIENT)
Dept: ENDOCRINOLOGY | Facility: CLINIC | Age: 66
End: 2021-07-30
Payer: MEDICAID

## 2021-07-30 ENCOUNTER — OFFICE VISIT (OUTPATIENT)
Dept: HEPATOLOGY | Facility: CLINIC | Age: 66
End: 2021-07-30
Payer: MEDICAID

## 2021-07-30 VITALS
TEMPERATURE: 97 F | HEART RATE: 70 BPM | SYSTOLIC BLOOD PRESSURE: 118 MMHG | BODY MASS INDEX: 27.48 KG/M2 | WEIGHT: 160.94 LBS | DIASTOLIC BLOOD PRESSURE: 70 MMHG | HEIGHT: 64 IN | OXYGEN SATURATION: 97 % | RESPIRATION RATE: 18 BRPM

## 2021-07-30 VITALS
BODY MASS INDEX: 26.89 KG/M2 | DIASTOLIC BLOOD PRESSURE: 78 MMHG | HEART RATE: 78 BPM | WEIGHT: 161.38 LBS | SYSTOLIC BLOOD PRESSURE: 125 MMHG | HEIGHT: 65 IN

## 2021-07-30 DIAGNOSIS — E78.5 DYSLIPIDEMIA: ICD-10-CM

## 2021-07-30 DIAGNOSIS — R74.8 ELEVATED LIVER ENZYMES: Primary | ICD-10-CM

## 2021-07-30 DIAGNOSIS — I10 ESSENTIAL HYPERTENSION: ICD-10-CM

## 2021-07-30 DIAGNOSIS — E11.65 UNCONTROLLED TYPE 2 DIABETES MELLITUS WITH HYPERGLYCEMIA: Primary | ICD-10-CM

## 2021-07-30 DIAGNOSIS — E55.9 VITAMIN D DEFICIENCY: ICD-10-CM

## 2021-07-30 DIAGNOSIS — M77.8 TENDINITIS OF LEFT SHOULDER: ICD-10-CM

## 2021-07-30 PROCEDURE — 99999 PR PBB SHADOW E&M-EST. PATIENT-LVL IV: CPT | Mod: PBBFAC,,, | Performed by: NURSE PRACTITIONER

## 2021-07-30 PROCEDURE — 99214 PR OFFICE/OUTPT VISIT, EST, LEVL IV, 30-39 MIN: ICD-10-PCS | Mod: S$PBB,,, | Performed by: NURSE PRACTITIONER

## 2021-07-30 PROCEDURE — 99213 OFFICE O/P EST LOW 20 MIN: CPT | Mod: S$PBB,,, | Performed by: INTERNAL MEDICINE

## 2021-07-30 PROCEDURE — 73221 MRI SHOULDER WITHOUT CONTRAST LEFT: ICD-10-PCS | Mod: 26,LT,, | Performed by: RADIOLOGY

## 2021-07-30 PROCEDURE — 99999 PR PBB SHADOW E&M-EST. PATIENT-LVL IV: ICD-10-PCS | Mod: PBBFAC,,, | Performed by: NURSE PRACTITIONER

## 2021-07-30 PROCEDURE — 73221 MRI JOINT UPR EXTREM W/O DYE: CPT | Mod: TC,LT

## 2021-07-30 PROCEDURE — 73221 MRI JOINT UPR EXTREM W/O DYE: CPT | Mod: 26,LT,, | Performed by: RADIOLOGY

## 2021-07-30 PROCEDURE — 99214 OFFICE O/P EST MOD 30 MIN: CPT | Mod: PBBFAC | Performed by: NURSE PRACTITIONER

## 2021-07-30 PROCEDURE — 99999 PR PBB SHADOW E&M-EST. PATIENT-LVL V: ICD-10-PCS | Mod: PBBFAC,,, | Performed by: INTERNAL MEDICINE

## 2021-07-30 PROCEDURE — 99999 PR PBB SHADOW E&M-EST. PATIENT-LVL V: CPT | Mod: PBBFAC,,, | Performed by: INTERNAL MEDICINE

## 2021-07-30 PROCEDURE — 99214 OFFICE O/P EST MOD 30 MIN: CPT | Mod: S$PBB,,, | Performed by: NURSE PRACTITIONER

## 2021-07-30 PROCEDURE — 99213 PR OFFICE/OUTPT VISIT, EST, LEVL III, 20-29 MIN: ICD-10-PCS | Mod: S$PBB,,, | Performed by: INTERNAL MEDICINE

## 2021-07-30 PROCEDURE — 99215 OFFICE O/P EST HI 40 MIN: CPT | Mod: PBBFAC,27,25 | Performed by: INTERNAL MEDICINE

## 2021-07-30 RX ORDER — CLONAZEPAM 0.5 MG/1
TABLET ORAL
Qty: 30 TABLET | Refills: 0 | OUTPATIENT
Start: 2021-07-30

## 2021-07-30 RX ORDER — TESTOSTERONE CYPIONATE 200 MG/ML
100 INJECTION, SOLUTION INTRAMUSCULAR
Qty: 10 ML | Refills: 1 | Status: SHIPPED | OUTPATIENT
Start: 2021-07-30 | End: 2021-11-18 | Stop reason: SDUPTHER

## 2021-07-30 RX ORDER — METFORMIN HYDROCHLORIDE 500 MG/1
500 TABLET ORAL 2 TIMES DAILY WITH MEALS
Qty: 180 TABLET | Refills: 3 | Status: SHIPPED | OUTPATIENT
Start: 2021-07-30 | End: 2022-08-15

## 2021-08-03 ENCOUNTER — PATIENT OUTREACH (OUTPATIENT)
Dept: ADMINISTRATIVE | Facility: OTHER | Age: 66
End: 2021-08-03

## 2021-08-04 ENCOUNTER — PATIENT MESSAGE (OUTPATIENT)
Dept: FAMILY MEDICINE | Facility: CLINIC | Age: 66
End: 2021-08-04

## 2021-08-04 ENCOUNTER — OFFICE VISIT (OUTPATIENT)
Dept: ORTHOPEDICS | Facility: CLINIC | Age: 66
End: 2021-08-04
Payer: MEDICAID

## 2021-08-04 VITALS — BODY MASS INDEX: 27.48 KG/M2 | WEIGHT: 160.94 LBS | RESPIRATION RATE: 16 BRPM | HEIGHT: 64 IN

## 2021-08-04 DIAGNOSIS — M75.112 NONTRAUMATIC INCOMPLETE TEAR OF LEFT ROTATOR CUFF: Primary | ICD-10-CM

## 2021-08-04 PROCEDURE — 99213 PR OFFICE/OUTPT VISIT, EST, LEVL III, 20-29 MIN: ICD-10-PCS | Mod: S$PBB,,, | Performed by: ORTHOPAEDIC SURGERY

## 2021-08-04 PROCEDURE — 99999 PR PBB SHADOW E&M-EST. PATIENT-LVL IV: CPT | Mod: PBBFAC,,, | Performed by: ORTHOPAEDIC SURGERY

## 2021-08-04 PROCEDURE — 99214 OFFICE O/P EST MOD 30 MIN: CPT | Mod: PBBFAC,PN | Performed by: ORTHOPAEDIC SURGERY

## 2021-08-04 PROCEDURE — 99213 OFFICE O/P EST LOW 20 MIN: CPT | Mod: S$PBB,,, | Performed by: ORTHOPAEDIC SURGERY

## 2021-08-04 PROCEDURE — 99999 PR PBB SHADOW E&M-EST. PATIENT-LVL IV: ICD-10-PCS | Mod: PBBFAC,,, | Performed by: ORTHOPAEDIC SURGERY

## 2021-08-04 RX ORDER — ATORVASTATIN CALCIUM 20 MG/1
20 TABLET, FILM COATED ORAL DAILY
Qty: 90 TABLET | Refills: 3 | Status: SHIPPED | OUTPATIENT
Start: 2021-08-04 | End: 2022-07-22

## 2021-08-29 RX ORDER — VALSARTAN 160 MG/1
160 TABLET ORAL DAILY
Qty: 90 TABLET | Refills: 1 | Status: SHIPPED | OUTPATIENT
Start: 2021-08-29 | End: 2022-08-01 | Stop reason: SDUPTHER

## 2021-09-14 ENCOUNTER — PATIENT MESSAGE (OUTPATIENT)
Dept: ADMINISTRATIVE | Facility: OTHER | Age: 66
End: 2021-09-14

## 2021-09-15 ENCOUNTER — IMMUNIZATION (OUTPATIENT)
Dept: INTERNAL MEDICINE | Facility: CLINIC | Age: 66
End: 2021-09-15
Payer: MEDICAID

## 2021-09-15 DIAGNOSIS — Z23 NEED FOR VACCINATION: Primary | ICD-10-CM

## 2021-09-15 PROCEDURE — 0031A COVID-19,VECTOR-NR,RS-AD26,PF,0.5 ML DOSE VACCINE (JANSSEN): CPT | Mod: CV19,,, | Performed by: INTERNAL MEDICINE

## 2021-09-15 PROCEDURE — 91303 COVID-19,VECTOR-NR,RS-AD26,PF,0.5 ML DOSE VACCINE (JANSSEN): ICD-10-PCS | Mod: ,,, | Performed by: INTERNAL MEDICINE

## 2021-09-15 PROCEDURE — 91303 COVID-19,VECTOR-NR,RS-AD26,PF,0.5 ML DOSE VACCINE (JANSSEN): CPT | Mod: ,,, | Performed by: INTERNAL MEDICINE

## 2021-09-15 PROCEDURE — 0031A COVID-19,VECTOR-NR,RS-AD26,PF,0.5 ML DOSE VACCINE (JANSSEN): ICD-10-PCS | Mod: CV19,,, | Performed by: INTERNAL MEDICINE

## 2021-09-20 ENCOUNTER — HOSPITAL ENCOUNTER (EMERGENCY)
Facility: HOSPITAL | Age: 66
Discharge: HOME | End: 2021-09-20
Attending: EMERGENCY MEDICINE
Payer: MEDICAID

## 2021-09-20 VITALS
HEIGHT: 64 IN | TEMPERATURE: 98 F | SYSTOLIC BLOOD PRESSURE: 150 MMHG | DIASTOLIC BLOOD PRESSURE: 74 MMHG | RESPIRATION RATE: 18 BRPM | OXYGEN SATURATION: 97 % | WEIGHT: 153 LBS | HEART RATE: 84 BPM | BODY MASS INDEX: 26.12 KG/M2

## 2021-09-20 DIAGNOSIS — M25.539 WRIST PAIN: Primary | ICD-10-CM

## 2021-09-20 PROCEDURE — 99283 PR EMERGENCY DEPT VISIT,LEVEL III: ICD-10-PCS | Mod: ,,, | Performed by: PHYSICIAN ASSISTANT

## 2021-09-20 PROCEDURE — 99283 EMERGENCY DEPT VISIT LOW MDM: CPT | Mod: 25

## 2021-09-20 PROCEDURE — 99283 EMERGENCY DEPT VISIT LOW MDM: CPT | Mod: ,,, | Performed by: PHYSICIAN ASSISTANT

## 2021-09-20 PROCEDURE — 29125 APPL SHORT ARM SPLINT STATIC: CPT | Mod: LT

## 2021-09-29 NOTE — TELEPHONE ENCOUNTER
Can you also [facility] lab   Followed up with patient re: CLL clinical trial. At present he is not eligible fort study, however informed him that it could become an option if any progression of disease is noted at future visits. For now, next steps are to follow-up with Dr. Dong Holt in 3 months. I informed him I will have schedulers reach out to him as no new visit is yet scheduled.

## 2021-10-26 ENCOUNTER — LAB VISIT (OUTPATIENT)
Dept: LAB | Facility: HOSPITAL | Age: 66
End: 2021-10-26
Attending: INTERNAL MEDICINE
Payer: MEDICAID

## 2021-10-26 DIAGNOSIS — R74.8 ELEVATED LIVER ENZYMES: ICD-10-CM

## 2021-10-26 LAB
ALBUMIN SERPL BCP-MCNC: 3.7 G/DL (ref 3.5–5.2)
ALP SERPL-CCNC: 140 U/L (ref 55–135)
ALT SERPL W/O P-5'-P-CCNC: 18 U/L (ref 10–44)
AST SERPL-CCNC: 17 U/L (ref 10–40)
BILIRUB DIRECT SERPL-MCNC: 0.2 MG/DL (ref 0.1–0.3)
BILIRUB SERPL-MCNC: 0.4 MG/DL (ref 0.1–1)
PROT SERPL-MCNC: 7 G/DL (ref 6–8.4)

## 2021-10-26 PROCEDURE — 36415 COLL VENOUS BLD VENIPUNCTURE: CPT | Performed by: INTERNAL MEDICINE

## 2021-10-26 PROCEDURE — 80076 HEPATIC FUNCTION PANEL: CPT | Performed by: INTERNAL MEDICINE

## 2021-10-27 ENCOUNTER — PATIENT MESSAGE (OUTPATIENT)
Dept: FAMILY MEDICINE | Facility: CLINIC | Age: 66
End: 2021-10-27
Payer: MEDICAID

## 2021-10-27 ENCOUNTER — PATIENT MESSAGE (OUTPATIENT)
Dept: HEPATOLOGY | Facility: CLINIC | Age: 66
End: 2021-10-27
Payer: MEDICAID

## 2021-10-29 ENCOUNTER — OFFICE VISIT (OUTPATIENT)
Dept: FAMILY MEDICINE | Facility: CLINIC | Age: 66
End: 2021-10-29
Payer: MEDICAID

## 2021-10-29 VITALS
TEMPERATURE: 98 F | WEIGHT: 149.69 LBS | HEART RATE: 75 BPM | HEIGHT: 65 IN | DIASTOLIC BLOOD PRESSURE: 72 MMHG | RESPIRATION RATE: 16 BRPM | BODY MASS INDEX: 24.94 KG/M2 | SYSTOLIC BLOOD PRESSURE: 124 MMHG | OXYGEN SATURATION: 98 %

## 2021-10-29 DIAGNOSIS — E11.69 HYPERLIPIDEMIA ASSOCIATED WITH TYPE 2 DIABETES MELLITUS: ICD-10-CM

## 2021-10-29 DIAGNOSIS — E11.8 DIABETES MELLITUS TYPE 2 WITH COMPLICATIONS: Primary | ICD-10-CM

## 2021-10-29 DIAGNOSIS — E78.5 HYPERLIPIDEMIA ASSOCIATED WITH TYPE 2 DIABETES MELLITUS: ICD-10-CM

## 2021-10-29 DIAGNOSIS — I15.2 HYPERTENSION ASSOCIATED WITH DIABETES: ICD-10-CM

## 2021-10-29 DIAGNOSIS — E11.59 HYPERTENSION ASSOCIATED WITH DIABETES: ICD-10-CM

## 2021-10-29 PROCEDURE — 99214 PR OFFICE/OUTPT VISIT, EST, LEVL IV, 30-39 MIN: ICD-10-PCS | Mod: S$GLB,,, | Performed by: FAMILY MEDICINE

## 2021-10-29 PROCEDURE — 99214 OFFICE O/P EST MOD 30 MIN: CPT | Mod: S$GLB,,, | Performed by: FAMILY MEDICINE

## 2021-10-29 RX ORDER — CHOLESTYRAMINE 4 G/9G
4 POWDER, FOR SUSPENSION ORAL DAILY
Qty: 90 PACKET | Refills: 0 | Status: SHIPPED | OUTPATIENT
Start: 2021-10-29 | End: 2022-02-10

## 2021-11-01 ENCOUNTER — TELEPHONE (OUTPATIENT)
Dept: FAMILY MEDICINE | Facility: CLINIC | Age: 66
End: 2021-11-01
Payer: MEDICAID

## 2021-11-01 DIAGNOSIS — Z12.11 COLON CANCER SCREENING: Primary | ICD-10-CM

## 2021-11-02 ENCOUNTER — PATIENT MESSAGE (OUTPATIENT)
Dept: FAMILY MEDICINE | Facility: CLINIC | Age: 66
End: 2021-11-02
Payer: MEDICAID

## 2021-11-08 ENCOUNTER — PATIENT MESSAGE (OUTPATIENT)
Dept: OTHER | Facility: OTHER | Age: 66
End: 2021-11-08
Payer: MEDICAID

## 2021-11-15 ENCOUNTER — PATIENT MESSAGE (OUTPATIENT)
Dept: FAMILY MEDICINE | Facility: CLINIC | Age: 66
End: 2021-11-15
Payer: MEDICAID

## 2021-11-16 LAB
LEFT EYE DM RETINOPATHY: NEGATIVE
RIGHT EYE DM RETINOPATHY: NEGATIVE

## 2021-11-16 RX ORDER — FAMCICLOVIR 500 MG/1
500 TABLET ORAL 3 TIMES DAILY
Qty: 21 TABLET | Refills: 0 | Status: SHIPPED | OUTPATIENT
Start: 2021-11-16 | End: 2023-10-12 | Stop reason: SDUPTHER

## 2021-11-18 RX ORDER — TESTOSTERONE CYPIONATE 200 MG/ML
100 INJECTION, SOLUTION INTRAMUSCULAR
Qty: 10 ML | Refills: 1 | Status: SHIPPED | OUTPATIENT
Start: 2021-11-18 | End: 2022-12-01 | Stop reason: SDUPTHER

## 2021-11-19 ENCOUNTER — TELEPHONE (OUTPATIENT)
Dept: FAMILY MEDICINE | Facility: CLINIC | Age: 66
End: 2021-11-19
Payer: MEDICAID

## 2021-11-19 ENCOUNTER — PATIENT MESSAGE (OUTPATIENT)
Dept: FAMILY MEDICINE | Facility: CLINIC | Age: 66
End: 2021-11-19
Payer: MEDICAID

## 2021-11-21 ENCOUNTER — TELEPHONE (OUTPATIENT)
Dept: HEPATOLOGY | Facility: CLINIC | Age: 66
End: 2021-11-21
Payer: MEDICAID

## 2021-11-21 ENCOUNTER — PATIENT MESSAGE (OUTPATIENT)
Dept: HEPATOLOGY | Facility: CLINIC | Age: 66
End: 2021-11-21
Payer: MEDICAID

## 2021-11-29 ENCOUNTER — PATIENT MESSAGE (OUTPATIENT)
Dept: ADMINISTRATIVE | Facility: OTHER | Age: 66
End: 2021-11-29
Payer: MEDICAID

## 2021-12-02 ENCOUNTER — PATIENT OUTREACH (OUTPATIENT)
Dept: ADMINISTRATIVE | Facility: HOSPITAL | Age: 66
End: 2021-12-02
Payer: MEDICAID

## 2021-12-30 ENCOUNTER — PATIENT MESSAGE (OUTPATIENT)
Dept: OTHER | Facility: OTHER | Age: 66
End: 2021-12-30
Payer: MEDICAID

## 2022-01-21 ENCOUNTER — PATIENT MESSAGE (OUTPATIENT)
Dept: OTHER | Facility: OTHER | Age: 67
End: 2022-01-21
Payer: MEDICAID

## 2022-01-28 ENCOUNTER — LAB VISIT (OUTPATIENT)
Dept: LAB | Facility: HOSPITAL | Age: 67
End: 2022-01-28
Attending: FAMILY MEDICINE
Payer: MEDICAID

## 2022-01-28 DIAGNOSIS — E11.8 DIABETES MELLITUS TYPE 2 WITH COMPLICATIONS: ICD-10-CM

## 2022-01-28 DIAGNOSIS — R74.8 ELEVATED LIVER ENZYMES: ICD-10-CM

## 2022-01-28 LAB
ALBUMIN SERPL BCP-MCNC: 4.1 G/DL (ref 3.5–5.2)
ALBUMIN SERPL BCP-MCNC: 4.1 G/DL (ref 3.5–5.2)
ALP SERPL-CCNC: 140 U/L (ref 55–135)
ALP SERPL-CCNC: 140 U/L (ref 55–135)
ALT SERPL W/O P-5'-P-CCNC: 18 U/L (ref 10–44)
ALT SERPL W/O P-5'-P-CCNC: 18 U/L (ref 10–44)
ANION GAP SERPL CALC-SCNC: 6 MMOL/L (ref 8–16)
AST SERPL-CCNC: 18 U/L (ref 10–40)
AST SERPL-CCNC: 18 U/L (ref 10–40)
BILIRUB DIRECT SERPL-MCNC: 0.2 MG/DL (ref 0.1–0.3)
BILIRUB SERPL-MCNC: 0.6 MG/DL (ref 0.1–1)
BILIRUB SERPL-MCNC: 0.6 MG/DL (ref 0.1–1)
BUN SERPL-MCNC: 21 MG/DL (ref 8–23)
CALCIUM SERPL-MCNC: 10.3 MG/DL (ref 8.7–10.5)
CHLORIDE SERPL-SCNC: 104 MMOL/L (ref 95–110)
CO2 SERPL-SCNC: 30 MMOL/L (ref 23–29)
CREAT SERPL-MCNC: 1.3 MG/DL (ref 0.5–1.4)
EST. GFR  (AFRICAN AMERICAN): >60 ML/MIN/1.73 M^2
EST. GFR  (NON AFRICAN AMERICAN): 57.7 ML/MIN/1.73 M^2
ESTIMATED AVG GLUCOSE: 134 MG/DL (ref 68–131)
GLUCOSE SERPL-MCNC: 182 MG/DL (ref 70–110)
HBA1C MFR BLD: 6.3 % (ref 4–5.6)
POTASSIUM SERPL-SCNC: 5.1 MMOL/L (ref 3.5–5.1)
PROT SERPL-MCNC: 7.9 G/DL (ref 6–8.4)
PROT SERPL-MCNC: 7.9 G/DL (ref 6–8.4)
SODIUM SERPL-SCNC: 140 MMOL/L (ref 136–145)

## 2022-01-28 PROCEDURE — 80076 HEPATIC FUNCTION PANEL: CPT | Mod: XB | Performed by: INTERNAL MEDICINE

## 2022-01-28 PROCEDURE — 36415 COLL VENOUS BLD VENIPUNCTURE: CPT | Performed by: INTERNAL MEDICINE

## 2022-01-28 PROCEDURE — 83036 HEMOGLOBIN GLYCOSYLATED A1C: CPT | Performed by: FAMILY MEDICINE

## 2022-01-28 PROCEDURE — 80053 COMPREHEN METABOLIC PANEL: CPT | Performed by: FAMILY MEDICINE

## 2022-01-30 ENCOUNTER — PATIENT MESSAGE (OUTPATIENT)
Dept: FAMILY MEDICINE | Facility: CLINIC | Age: 67
End: 2022-01-30
Payer: MEDICAID

## 2022-01-31 ENCOUNTER — PATIENT OUTREACH (OUTPATIENT)
Dept: ADMINISTRATIVE | Facility: OTHER | Age: 67
End: 2022-01-31
Payer: MEDICAID

## 2022-02-01 ENCOUNTER — OFFICE VISIT (OUTPATIENT)
Dept: HEPATOLOGY | Facility: CLINIC | Age: 67
End: 2022-02-01
Payer: MEDICAID

## 2022-02-01 ENCOUNTER — LAB VISIT (OUTPATIENT)
Dept: LAB | Facility: HOSPITAL | Age: 67
End: 2022-02-01
Attending: INTERNAL MEDICINE
Payer: MEDICAID

## 2022-02-01 VITALS
OXYGEN SATURATION: 94 % | TEMPERATURE: 97 F | WEIGHT: 156.94 LBS | BODY MASS INDEX: 26.15 KG/M2 | SYSTOLIC BLOOD PRESSURE: 111 MMHG | HEART RATE: 78 BPM | RESPIRATION RATE: 18 BRPM | HEIGHT: 65 IN | DIASTOLIC BLOOD PRESSURE: 61 MMHG

## 2022-02-01 DIAGNOSIS — D64.9 ANEMIA, UNSPECIFIED TYPE: ICD-10-CM

## 2022-02-01 DIAGNOSIS — D64.9 ANEMIA, UNSPECIFIED TYPE: Primary | ICD-10-CM

## 2022-02-01 LAB
BASOPHILS # BLD AUTO: 0.03 K/UL (ref 0–0.2)
BASOPHILS NFR BLD: 0.3 % (ref 0–1.9)
DIFFERENTIAL METHOD: ABNORMAL
EOSINOPHIL # BLD AUTO: 0.2 K/UL (ref 0–0.5)
EOSINOPHIL NFR BLD: 1.8 % (ref 0–8)
ERYTHROCYTE [DISTWIDTH] IN BLOOD BY AUTOMATED COUNT: 11.6 % (ref 11.5–14.5)
HCT VFR BLD AUTO: 43 % (ref 40–54)
HGB BLD-MCNC: 14.2 G/DL (ref 14–18)
IMM GRANULOCYTES # BLD AUTO: 0.02 K/UL (ref 0–0.04)
IMM GRANULOCYTES NFR BLD AUTO: 0.2 % (ref 0–0.5)
LYMPHOCYTES # BLD AUTO: 3.4 K/UL (ref 1–4.8)
LYMPHOCYTES NFR BLD: 34 % (ref 18–48)
MCH RBC QN AUTO: 31.1 PG (ref 27–31)
MCHC RBC AUTO-ENTMCNC: 33 G/DL (ref 32–36)
MCV RBC AUTO: 94 FL (ref 82–98)
MONOCYTES # BLD AUTO: 0.5 K/UL (ref 0.3–1)
MONOCYTES NFR BLD: 5.1 % (ref 4–15)
NEUTROPHILS # BLD AUTO: 5.8 K/UL (ref 1.8–7.7)
NEUTROPHILS NFR BLD: 58.6 % (ref 38–73)
NRBC BLD-RTO: 0 /100 WBC
PLATELET # BLD AUTO: 262 K/UL (ref 150–450)
PMV BLD AUTO: 9.8 FL (ref 9.2–12.9)
RBC # BLD AUTO: 4.56 M/UL (ref 4.6–6.2)
WBC # BLD AUTO: 9.87 K/UL (ref 3.9–12.7)

## 2022-02-01 PROCEDURE — 3078F PR MOST RECENT DIASTOLIC BLOOD PRESSURE < 80 MM HG: ICD-10-PCS | Mod: CPTII,,, | Performed by: INTERNAL MEDICINE

## 2022-02-01 PROCEDURE — 3074F PR MOST RECENT SYSTOLIC BLOOD PRESSURE < 130 MM HG: ICD-10-PCS | Mod: CPTII,,, | Performed by: INTERNAL MEDICINE

## 2022-02-01 PROCEDURE — 99999 PR PBB SHADOW E&M-EST. PATIENT-LVL V: ICD-10-PCS | Mod: PBBFAC,,, | Performed by: INTERNAL MEDICINE

## 2022-02-01 PROCEDURE — 1159F PR MEDICATION LIST DOCUMENTED IN MEDICAL RECORD: ICD-10-PCS | Mod: CPTII,,, | Performed by: INTERNAL MEDICINE

## 2022-02-01 PROCEDURE — 36415 COLL VENOUS BLD VENIPUNCTURE: CPT | Performed by: INTERNAL MEDICINE

## 2022-02-01 PROCEDURE — 99999 PR PBB SHADOW E&M-EST. PATIENT-LVL V: CPT | Mod: PBBFAC,,, | Performed by: INTERNAL MEDICINE

## 2022-02-01 PROCEDURE — 3074F SYST BP LT 130 MM HG: CPT | Mod: CPTII,,, | Performed by: INTERNAL MEDICINE

## 2022-02-01 PROCEDURE — 99213 OFFICE O/P EST LOW 20 MIN: CPT | Mod: S$PBB,,, | Performed by: INTERNAL MEDICINE

## 2022-02-01 PROCEDURE — 1159F MED LIST DOCD IN RCRD: CPT | Mod: CPTII,,, | Performed by: INTERNAL MEDICINE

## 2022-02-01 PROCEDURE — 85025 COMPLETE CBC W/AUTO DIFF WBC: CPT | Performed by: INTERNAL MEDICINE

## 2022-02-01 PROCEDURE — 3044F PR MOST RECENT HEMOGLOBIN A1C LEVEL <7.0%: ICD-10-PCS | Mod: CPTII,,, | Performed by: INTERNAL MEDICINE

## 2022-02-01 PROCEDURE — 99213 PR OFFICE/OUTPT VISIT, EST, LEVL III, 20-29 MIN: ICD-10-PCS | Mod: S$PBB,,, | Performed by: INTERNAL MEDICINE

## 2022-02-01 PROCEDURE — 3044F HG A1C LEVEL LT 7.0%: CPT | Mod: CPTII,,, | Performed by: INTERNAL MEDICINE

## 2022-02-01 PROCEDURE — 3078F DIAST BP <80 MM HG: CPT | Mod: CPTII,,, | Performed by: INTERNAL MEDICINE

## 2022-02-01 PROCEDURE — 99215 OFFICE O/P EST HI 40 MIN: CPT | Mod: PBBFAC | Performed by: INTERNAL MEDICINE

## 2022-02-01 PROCEDURE — 3008F PR BODY MASS INDEX (BMI) DOCUMENTED: ICD-10-PCS | Mod: CPTII,,, | Performed by: INTERNAL MEDICINE

## 2022-02-01 PROCEDURE — 3008F BODY MASS INDEX DOCD: CPT | Mod: CPTII,,, | Performed by: INTERNAL MEDICINE

## 2022-02-01 NOTE — Clinical Note
PLAN: -  CBC to check Hgb (last was 13.2),  -  Patient to check with PCP, Dr. Gordon, about screening colonoscopy, especially if Hgb low today.  -  Continue ursodiol 250 mg tablets, take 2 tablets (500 mg) with meals twice a day.  -  Liver profile every 3 months, next in April 2022 -  Return in 6 months.
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
## 2022-02-10 ENCOUNTER — OFFICE VISIT (OUTPATIENT)
Dept: FAMILY MEDICINE | Facility: CLINIC | Age: 67
End: 2022-02-10
Payer: MEDICAID

## 2022-02-10 VITALS
DIASTOLIC BLOOD PRESSURE: 78 MMHG | TEMPERATURE: 98 F | RESPIRATION RATE: 16 BRPM | HEART RATE: 79 BPM | HEIGHT: 65 IN | SYSTOLIC BLOOD PRESSURE: 118 MMHG | BODY MASS INDEX: 26.1 KG/M2 | OXYGEN SATURATION: 97 % | WEIGHT: 156.63 LBS

## 2022-02-10 DIAGNOSIS — I15.2 HYPERTENSION ASSOCIATED WITH DIABETES: ICD-10-CM

## 2022-02-10 DIAGNOSIS — E11.8 DIABETES MELLITUS TYPE 2 WITH COMPLICATIONS: Primary | ICD-10-CM

## 2022-02-10 DIAGNOSIS — E11.69 HYPERLIPIDEMIA ASSOCIATED WITH TYPE 2 DIABETES MELLITUS: ICD-10-CM

## 2022-02-10 DIAGNOSIS — E11.59 HYPERTENSION ASSOCIATED WITH DIABETES: ICD-10-CM

## 2022-02-10 DIAGNOSIS — Z12.11 COLON CANCER SCREENING: ICD-10-CM

## 2022-02-10 DIAGNOSIS — E78.5 HYPERLIPIDEMIA ASSOCIATED WITH TYPE 2 DIABETES MELLITUS: ICD-10-CM

## 2022-02-10 PROCEDURE — 1160F PR REVIEW ALL MEDS BY PRESCRIBER/CLIN PHARMACIST DOCUMENTED: ICD-10-PCS | Mod: CPTII,S$GLB,, | Performed by: FAMILY MEDICINE

## 2022-02-10 PROCEDURE — 1159F MED LIST DOCD IN RCRD: CPT | Mod: CPTII,S$GLB,, | Performed by: FAMILY MEDICINE

## 2022-02-10 PROCEDURE — 3074F SYST BP LT 130 MM HG: CPT | Mod: CPTII,S$GLB,, | Performed by: FAMILY MEDICINE

## 2022-02-10 PROCEDURE — 3008F BODY MASS INDEX DOCD: CPT | Mod: CPTII,S$GLB,, | Performed by: FAMILY MEDICINE

## 2022-02-10 PROCEDURE — 1159F PR MEDICATION LIST DOCUMENTED IN MEDICAL RECORD: ICD-10-PCS | Mod: CPTII,S$GLB,, | Performed by: FAMILY MEDICINE

## 2022-02-10 PROCEDURE — 3008F PR BODY MASS INDEX (BMI) DOCUMENTED: ICD-10-PCS | Mod: CPTII,S$GLB,, | Performed by: FAMILY MEDICINE

## 2022-02-10 PROCEDURE — 99214 OFFICE O/P EST MOD 30 MIN: CPT | Mod: S$GLB,,, | Performed by: FAMILY MEDICINE

## 2022-02-10 PROCEDURE — 3044F HG A1C LEVEL LT 7.0%: CPT | Mod: CPTII,S$GLB,, | Performed by: FAMILY MEDICINE

## 2022-02-10 PROCEDURE — 3074F PR MOST RECENT SYSTOLIC BLOOD PRESSURE < 130 MM HG: ICD-10-PCS | Mod: CPTII,S$GLB,, | Performed by: FAMILY MEDICINE

## 2022-02-10 PROCEDURE — 1160F RVW MEDS BY RX/DR IN RCRD: CPT | Mod: CPTII,S$GLB,, | Performed by: FAMILY MEDICINE

## 2022-02-10 PROCEDURE — 3044F PR MOST RECENT HEMOGLOBIN A1C LEVEL <7.0%: ICD-10-PCS | Mod: CPTII,S$GLB,, | Performed by: FAMILY MEDICINE

## 2022-02-10 PROCEDURE — 99214 PR OFFICE/OUTPT VISIT, EST, LEVL IV, 30-39 MIN: ICD-10-PCS | Mod: S$GLB,,, | Performed by: FAMILY MEDICINE

## 2022-02-10 PROCEDURE — 3078F DIAST BP <80 MM HG: CPT | Mod: CPTII,S$GLB,, | Performed by: FAMILY MEDICINE

## 2022-02-10 PROCEDURE — 3078F PR MOST RECENT DIASTOLIC BLOOD PRESSURE < 80 MM HG: ICD-10-PCS | Mod: CPTII,S$GLB,, | Performed by: FAMILY MEDICINE

## 2022-02-11 ENCOUNTER — TELEPHONE (OUTPATIENT)
Dept: HEPATOLOGY | Facility: CLINIC | Age: 67
End: 2022-02-11
Payer: MEDICAID

## 2022-02-11 NOTE — TELEPHONE ENCOUNTER
----- Message from Mariama Rocha MA sent at 2/3/2022  2:50 PM CST -----    ----- Message -----  From: Cherie Hilario MD  Sent: 2/3/2022   7:32 AM CST  To: [facility]resp[facility] Staff    Please inform patient:   Hemoglobin has come up to 14.2.   Recommend colonoscopy in any case.

## 2022-02-11 NOTE — TELEPHONE ENCOUNTER
----- Message from Dr ZAC Hilario -----  Please inform patient:   Hemoglobin has come up to 14.2.   Recommend colonoscopy in any case.     Will send message to patient.
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
## 2022-02-15 ENCOUNTER — PATIENT MESSAGE (OUTPATIENT)
Dept: OTHER | Facility: OTHER | Age: 67
End: 2022-02-15
Payer: MEDICAID

## 2022-03-07 ENCOUNTER — PATIENT MESSAGE (OUTPATIENT)
Dept: ADMINISTRATIVE | Facility: OTHER | Age: 67
End: 2022-03-07
Payer: MEDICAID

## 2022-03-07 ENCOUNTER — PATIENT MESSAGE (OUTPATIENT)
Dept: OTHER | Facility: OTHER | Age: 67
End: 2022-03-07
Payer: MEDICAID

## 2022-03-07 ENCOUNTER — TELEPHONE (OUTPATIENT)
Dept: ENDOSCOPY | Facility: HOSPITAL | Age: 67
End: 2022-03-07
Payer: MEDICAID

## 2022-03-07 NOTE — TELEPHONE ENCOUNTER
Returned patient's phone call in regards to scheduling a colonoscopy. Patient is followed by cardiology. Patient's last cardiology clinic appointment was on 12/11/18 with recommendation of 6 month follow. Patient informed he will need to make a cardiology appointment before being scheduled for colonoscopy.

## 2022-03-10 ENCOUNTER — TELEPHONE (OUTPATIENT)
Dept: CARDIOLOGY | Facility: CLINIC | Age: 67
End: 2022-03-10
Payer: MEDICAID

## 2022-03-10 NOTE — TELEPHONE ENCOUNTER
Talked with Mr. Ramires in detail.  All questions answered.    ----- Message from Kiana Dixon sent at 3/7/2022  4:55 PM CST -----  Contact: Pt  Pt calling to speak with staff regarding scheduling.. Pt was a previous pt of provider in 2018.. Pt also has medicaid..      Confirmed contact info below:  Contact Name: Mane Ramires  Phone Number: [phone]

## 2022-04-07 ENCOUNTER — LAB VISIT (OUTPATIENT)
Dept: LAB | Facility: HOSPITAL | Age: 67
End: 2022-04-07
Payer: MEDICAID

## 2022-04-07 DIAGNOSIS — R74.8 ELEVATED LIVER ENZYMES: ICD-10-CM

## 2022-04-07 LAB
ALBUMIN SERPL BCP-MCNC: 4.4 G/DL (ref 3.5–5.2)
ALP SERPL-CCNC: 104 U/L (ref 55–135)
ALT SERPL W/O P-5'-P-CCNC: 14 U/L (ref 10–44)
AST SERPL-CCNC: 16 U/L (ref 10–40)
BILIRUB DIRECT SERPL-MCNC: 0.3 MG/DL (ref 0.1–0.3)
BILIRUB SERPL-MCNC: 0.7 MG/DL (ref 0.1–1)
PROT SERPL-MCNC: 7.4 G/DL (ref 6–8.4)

## 2022-04-07 PROCEDURE — 80076 HEPATIC FUNCTION PANEL: CPT | Performed by: INTERNAL MEDICINE

## 2022-04-07 PROCEDURE — 36415 COLL VENOUS BLD VENIPUNCTURE: CPT | Performed by: INTERNAL MEDICINE

## 2022-04-08 ENCOUNTER — TELEPHONE (OUTPATIENT)
Dept: HEPATOLOGY | Facility: CLINIC | Age: 67
End: 2022-04-08
Payer: MEDICAID

## 2022-04-08 ENCOUNTER — PATIENT MESSAGE (OUTPATIENT)
Dept: HEPATOLOGY | Facility: CLINIC | Age: 67
End: 2022-04-08
Payer: MEDICAID

## 2022-04-08 NOTE — TELEPHONE ENCOUNTER
Patient was informed that results are OK, via my Chart.  Continue routine labs.          ----- Message from Saida Sands sent at 4/8/2022  9:26 AM CDT -----    ----- Message -----  From: Cherie Hilario MD  Sent: 4/8/2022   3:53 AM CDT  To: [facility]    Please inform patient results are OK. Continue routine labs.

## 2022-05-26 ENCOUNTER — PATIENT MESSAGE (OUTPATIENT)
Dept: FAMILY MEDICINE | Facility: CLINIC | Age: 67
End: 2022-05-26
Payer: MEDICAID

## 2022-05-26 DIAGNOSIS — M25.50 ARTHRALGIA, UNSPECIFIED JOINT: ICD-10-CM

## 2022-05-26 DIAGNOSIS — B02.8 HERPES ZOSTER WITH COMPLICATION: Primary | ICD-10-CM

## 2022-05-27 RX ORDER — FAMCICLOVIR 500 MG/1
500 TABLET ORAL 3 TIMES DAILY
Qty: 21 TABLET | Refills: 0 | Status: SHIPPED | OUTPATIENT
Start: 2022-05-27 | End: 2022-05-27 | Stop reason: SDUPTHER

## 2022-05-27 RX ORDER — FAMCICLOVIR 500 MG/1
500 TABLET ORAL 3 TIMES DAILY
Qty: 21 TABLET | Refills: 0 | Status: SHIPPED | OUTPATIENT
Start: 2022-05-27 | End: 2022-06-03

## 2022-07-08 ENCOUNTER — PATIENT MESSAGE (OUTPATIENT)
Dept: HEPATOLOGY | Facility: CLINIC | Age: 67
End: 2022-07-08
Payer: MEDICAID

## 2022-07-08 ENCOUNTER — LAB VISIT (OUTPATIENT)
Dept: LAB | Facility: HOSPITAL | Age: 67
End: 2022-07-08
Attending: INTERNAL MEDICINE
Payer: MEDICAID

## 2022-07-08 ENCOUNTER — TELEPHONE (OUTPATIENT)
Dept: HEPATOLOGY | Facility: CLINIC | Age: 67
End: 2022-07-08
Payer: MEDICAID

## 2022-07-08 DIAGNOSIS — M25.50 ARTHRALGIA, UNSPECIFIED JOINT: ICD-10-CM

## 2022-07-08 DIAGNOSIS — R74.8 ELEVATED LIVER ENZYMES: ICD-10-CM

## 2022-07-08 LAB
ALBUMIN SERPL BCP-MCNC: 4.3 G/DL (ref 3.5–5.2)
ALP SERPL-CCNC: 99 U/L (ref 55–135)
ALT SERPL W/O P-5'-P-CCNC: 29 U/L (ref 10–44)
AST SERPL-CCNC: 19 U/L (ref 10–40)
BILIRUB DIRECT SERPL-MCNC: 0.3 MG/DL (ref 0.1–0.3)
BILIRUB SERPL-MCNC: 0.8 MG/DL (ref 0.1–1)
CK SERPL-CCNC: 57 U/L (ref 20–200)
CRP SERPL-MCNC: 0.6 MG/L (ref 0–8.2)
ERYTHROCYTE [SEDIMENTATION RATE] IN BLOOD BY PHOTOMETRIC METHOD: 3 MM/HR (ref 0–23)
PROT SERPL-MCNC: 7.6 G/DL (ref 6–8.4)
RHEUMATOID FACT SERPL-ACNC: <13 IU/ML (ref 0–15)

## 2022-07-08 PROCEDURE — 36415 COLL VENOUS BLD VENIPUNCTURE: CPT | Performed by: FAMILY MEDICINE

## 2022-07-08 PROCEDURE — 86431 RHEUMATOID FACTOR QUANT: CPT | Performed by: FAMILY MEDICINE

## 2022-07-08 PROCEDURE — 82550 ASSAY OF CK (CPK): CPT | Performed by: FAMILY MEDICINE

## 2022-07-08 PROCEDURE — 83036 HEMOGLOBIN GLYCOSYLATED A1C: CPT | Performed by: FAMILY MEDICINE

## 2022-07-08 PROCEDURE — 85652 RBC SED RATE AUTOMATED: CPT | Performed by: FAMILY MEDICINE

## 2022-07-08 PROCEDURE — 86038 ANTINUCLEAR ANTIBODIES: CPT | Performed by: FAMILY MEDICINE

## 2022-07-08 PROCEDURE — 86140 C-REACTIVE PROTEIN: CPT | Performed by: FAMILY MEDICINE

## 2022-07-08 PROCEDURE — 80076 HEPATIC FUNCTION PANEL: CPT | Performed by: INTERNAL MEDICINE

## 2022-07-10 ENCOUNTER — PATIENT MESSAGE (OUTPATIENT)
Dept: FAMILY MEDICINE | Facility: CLINIC | Age: 67
End: 2022-07-10
Payer: MEDICAID

## 2022-07-11 LAB
ANA SER QL IF: NORMAL
ESTIMATED AVG GLUCOSE: 140 MG/DL (ref 68–131)
HBA1C MFR BLD: 6.5 % (ref 4–5.6)

## 2022-07-22 RX ORDER — ATORVASTATIN CALCIUM 20 MG/1
TABLET, FILM COATED ORAL
Qty: 90 TABLET | Refills: 0 | Status: SHIPPED | OUTPATIENT
Start: 2022-07-22 | End: 2022-08-23

## 2022-07-22 NOTE — TELEPHONE ENCOUNTER
Care Due:                  Date            Visit Type   Department     Provider  --------------------------------------------------------------------------------                                EP -                              PRIMARY      [facility]    Patience Davisondebi  Last Visit: 02-      CARE ([facility])   MEDICINE       Anger                              EP -                              PRIMARY      [facility]    Patience Davisondebi  Next Visit: 08-      CARE ([facility])   MEDICINE       Anger                                                            Last  Test          Frequency    Reason                     Performed    Due Date  --------------------------------------------------------------------------------    Lipid Panel.  12 months..  atorvastatin.............  07- 07-    [employer] Embedded Care Gaps. Reference number: [id number]. 7/22/2022   3:34:25 AM CDT

## 2022-07-22 NOTE — TELEPHONE ENCOUNTER
Refill Decision Note   Mane Ramires  is requesting a refill authorization.  Brief Assessment and Rationale for Refill:  Approve     Medication Therapy Plan:       Medication Reconciliation Completed: No   Comments:     No Care Gaps recommended.     Note composed:9:44 AM 07/22/2022

## 2022-07-30 ENCOUNTER — PATIENT MESSAGE (OUTPATIENT)
Dept: FAMILY MEDICINE | Facility: CLINIC | Age: 67
End: 2022-07-30
Payer: MEDICAID

## 2022-07-30 DIAGNOSIS — I10 HYPERTENSION, ESSENTIAL: Primary | ICD-10-CM

## 2022-08-01 NOTE — TELEPHONE ENCOUNTER
No new care gaps identified.  [facility] Embedded Care Gaps. Reference number: [id number]. 8/01/2022   1:42:27 PM CDT

## 2022-08-02 ENCOUNTER — PATIENT MESSAGE (OUTPATIENT)
Dept: FAMILY MEDICINE | Facility: CLINIC | Age: 67
End: 2022-08-02
Payer: MEDICAID

## 2022-08-03 RX ORDER — VALSARTAN 160 MG/1
160 TABLET ORAL DAILY
Qty: 90 TABLET | Refills: 0 | Status: SHIPPED | OUTPATIENT
Start: 2022-08-03 | End: 2022-08-23

## 2022-08-10 ENCOUNTER — PATIENT MESSAGE (OUTPATIENT)
Dept: FAMILY MEDICINE | Facility: CLINIC | Age: 67
End: 2022-08-10
Payer: MEDICAID

## 2022-08-10 ENCOUNTER — LAB VISIT (OUTPATIENT)
Dept: LAB | Facility: HOSPITAL | Age: 67
End: 2022-08-10
Attending: FAMILY MEDICINE
Payer: MEDICAID

## 2022-08-10 DIAGNOSIS — I15.2 HYPERTENSION ASSOCIATED WITH DIABETES: ICD-10-CM

## 2022-08-10 DIAGNOSIS — E11.69 HYPERLIPIDEMIA ASSOCIATED WITH TYPE 2 DIABETES MELLITUS: ICD-10-CM

## 2022-08-10 DIAGNOSIS — E78.5 HYPERLIPIDEMIA ASSOCIATED WITH TYPE 2 DIABETES MELLITUS: ICD-10-CM

## 2022-08-10 DIAGNOSIS — E11.59 HYPERTENSION ASSOCIATED WITH DIABETES: ICD-10-CM

## 2022-08-10 DIAGNOSIS — E11.8 DIABETES MELLITUS TYPE 2 WITH COMPLICATIONS: ICD-10-CM

## 2022-08-10 LAB
ALBUMIN SERPL BCP-MCNC: 4 G/DL (ref 3.5–5.2)
ALP SERPL-CCNC: 73 U/L (ref 55–135)
ALT SERPL W/O P-5'-P-CCNC: 15 U/L (ref 10–44)
ANION GAP SERPL CALC-SCNC: 9 MMOL/L (ref 8–16)
AST SERPL-CCNC: 12 U/L (ref 10–40)
BASOPHILS # BLD AUTO: 0.04 K/UL (ref 0–0.2)
BASOPHILS NFR BLD: 0.4 % (ref 0–1.9)
BILIRUB SERPL-MCNC: 0.9 MG/DL (ref 0.1–1)
BUN SERPL-MCNC: 10 MG/DL (ref 8–23)
CALCIUM SERPL-MCNC: 9.9 MG/DL (ref 8.7–10.5)
CHLORIDE SERPL-SCNC: 104 MMOL/L (ref 95–110)
CHOLEST SERPL-MCNC: 162 MG/DL (ref 120–199)
CHOLEST/HDLC SERPL: 3.4 {RATIO} (ref 2–5)
CO2 SERPL-SCNC: 26 MMOL/L (ref 23–29)
CREAT SERPL-MCNC: 1.3 MG/DL (ref 0.5–1.4)
DIFFERENTIAL METHOD: ABNORMAL
EOSINOPHIL # BLD AUTO: 0.1 K/UL (ref 0–0.5)
EOSINOPHIL NFR BLD: 1.1 % (ref 0–8)
ERYTHROCYTE [DISTWIDTH] IN BLOOD BY AUTOMATED COUNT: 13.4 % (ref 11.5–14.5)
EST. GFR  (NO RACE VARIABLE): >60 ML/MIN/1.73 M^2
ESTIMATED AVG GLUCOSE: 120 MG/DL (ref 68–131)
GLUCOSE SERPL-MCNC: 113 MG/DL (ref 70–110)
HBA1C MFR BLD: 5.8 % (ref 4–5.6)
HCT VFR BLD AUTO: 45.4 % (ref 40–54)
HDLC SERPL-MCNC: 47 MG/DL (ref 40–75)
HDLC SERPL: 29 % (ref 20–50)
HGB BLD-MCNC: 14.8 G/DL (ref 14–18)
IMM GRANULOCYTES # BLD AUTO: 0.03 K/UL (ref 0–0.04)
IMM GRANULOCYTES NFR BLD AUTO: 0.3 % (ref 0–0.5)
LDLC SERPL CALC-MCNC: 92 MG/DL (ref 63–159)
LYMPHOCYTES # BLD AUTO: 3 K/UL (ref 1–4.8)
LYMPHOCYTES NFR BLD: 28 % (ref 18–48)
MCH RBC QN AUTO: 31.7 PG (ref 27–31)
MCHC RBC AUTO-ENTMCNC: 32.6 G/DL (ref 32–36)
MCV RBC AUTO: 97 FL (ref 82–98)
MONOCYTES # BLD AUTO: 0.6 K/UL (ref 0.3–1)
MONOCYTES NFR BLD: 5.4 % (ref 4–15)
NEUTROPHILS # BLD AUTO: 6.9 K/UL (ref 1.8–7.7)
NEUTROPHILS NFR BLD: 64.8 % (ref 38–73)
NONHDLC SERPL-MCNC: 115 MG/DL
NRBC BLD-RTO: 0 /100 WBC
PLATELET # BLD AUTO: 293 K/UL (ref 150–450)
PMV BLD AUTO: 9.8 FL (ref 9.2–12.9)
POTASSIUM SERPL-SCNC: 4.6 MMOL/L (ref 3.5–5.1)
PROT SERPL-MCNC: 7.1 G/DL (ref 6–8.4)
RBC # BLD AUTO: 4.67 M/UL (ref 4.6–6.2)
SODIUM SERPL-SCNC: 139 MMOL/L (ref 136–145)
TRIGL SERPL-MCNC: 115 MG/DL (ref 30–150)
WBC # BLD AUTO: 10.6 K/UL (ref 3.9–12.7)

## 2022-08-10 PROCEDURE — 83036 HEMOGLOBIN GLYCOSYLATED A1C: CPT | Performed by: FAMILY MEDICINE

## 2022-08-10 PROCEDURE — 80053 COMPREHEN METABOLIC PANEL: CPT | Performed by: FAMILY MEDICINE

## 2022-08-10 PROCEDURE — 85025 COMPLETE CBC W/AUTO DIFF WBC: CPT | Performed by: FAMILY MEDICINE

## 2022-08-10 PROCEDURE — 36415 COLL VENOUS BLD VENIPUNCTURE: CPT | Performed by: FAMILY MEDICINE

## 2022-08-10 PROCEDURE — 80061 LIPID PANEL: CPT | Performed by: FAMILY MEDICINE

## 2022-08-15 ENCOUNTER — OFFICE VISIT (OUTPATIENT)
Dept: FAMILY MEDICINE | Facility: CLINIC | Age: 67
End: 2022-08-15
Payer: MEDICAID

## 2022-08-15 VITALS
TEMPERATURE: 99 F | HEART RATE: 69 BPM | BODY MASS INDEX: 26.84 KG/M2 | WEIGHT: 161.06 LBS | SYSTOLIC BLOOD PRESSURE: 118 MMHG | HEIGHT: 65 IN | DIASTOLIC BLOOD PRESSURE: 72 MMHG | OXYGEN SATURATION: 97 % | RESPIRATION RATE: 20 BRPM

## 2022-08-15 DIAGNOSIS — E11.8 DIABETES MELLITUS TYPE 2 WITH COMPLICATIONS: Primary | ICD-10-CM

## 2022-08-15 DIAGNOSIS — I15.2 HYPERTENSION ASSOCIATED WITH DIABETES: ICD-10-CM

## 2022-08-15 DIAGNOSIS — R53.83 FATIGUE, UNSPECIFIED TYPE: ICD-10-CM

## 2022-08-15 DIAGNOSIS — E11.59 HYPERTENSION ASSOCIATED WITH DIABETES: ICD-10-CM

## 2022-08-15 DIAGNOSIS — E78.5 HYPERLIPIDEMIA ASSOCIATED WITH TYPE 2 DIABETES MELLITUS: ICD-10-CM

## 2022-08-15 DIAGNOSIS — E11.69 HYPERLIPIDEMIA ASSOCIATED WITH TYPE 2 DIABETES MELLITUS: ICD-10-CM

## 2022-08-15 DIAGNOSIS — J32.0 MAXILLARY SINUSITIS, UNSPECIFIED CHRONICITY: ICD-10-CM

## 2022-08-15 PROCEDURE — 3066F PR DOCUMENTATION OF TREATMENT FOR NEPHROPATHY: ICD-10-PCS | Mod: CPTII,S$GLB,, | Performed by: FAMILY MEDICINE

## 2022-08-15 PROCEDURE — 84443 ASSAY THYROID STIM HORMONE: CPT | Performed by: FAMILY MEDICINE

## 2022-08-15 PROCEDURE — 3078F PR MOST RECENT DIASTOLIC BLOOD PRESSURE < 80 MM HG: ICD-10-PCS | Mod: CPTII,S$GLB,, | Performed by: FAMILY MEDICINE

## 2022-08-15 PROCEDURE — 3044F PR MOST RECENT HEMOGLOBIN A1C LEVEL <7.0%: ICD-10-PCS | Mod: CPTII,S$GLB,, | Performed by: FAMILY MEDICINE

## 2022-08-15 PROCEDURE — 4010F ACE/ARB THERAPY RXD/TAKEN: CPT | Mod: CPTII,S$GLB,, | Performed by: FAMILY MEDICINE

## 2022-08-15 PROCEDURE — 82570 ASSAY OF URINE CREATININE: CPT | Performed by: FAMILY MEDICINE

## 2022-08-15 PROCEDURE — 36415 COLL VENOUS BLD VENIPUNCTURE: CPT | Mod: S$GLB,,, | Performed by: FAMILY MEDICINE

## 2022-08-15 PROCEDURE — 1160F RVW MEDS BY RX/DR IN RCRD: CPT | Mod: CPTII,S$GLB,, | Performed by: FAMILY MEDICINE

## 2022-08-15 PROCEDURE — 1159F PR MEDICATION LIST DOCUMENTED IN MEDICAL RECORD: ICD-10-PCS | Mod: CPTII,S$GLB,, | Performed by: FAMILY MEDICINE

## 2022-08-15 PROCEDURE — 3078F DIAST BP <80 MM HG: CPT | Mod: CPTII,S$GLB,, | Performed by: FAMILY MEDICINE

## 2022-08-15 PROCEDURE — 4010F PR ACE/ARB THEARPY RXD/TAKEN: ICD-10-PCS | Mod: CPTII,S$GLB,, | Performed by: FAMILY MEDICINE

## 2022-08-15 PROCEDURE — 3061F PR NEG MICROALBUMINURIA RESULT DOCUMENTED/REVIEW: ICD-10-PCS | Mod: CPTII,S$GLB,, | Performed by: FAMILY MEDICINE

## 2022-08-15 PROCEDURE — 3074F PR MOST RECENT SYSTOLIC BLOOD PRESSURE < 130 MM HG: ICD-10-PCS | Mod: CPTII,S$GLB,, | Performed by: FAMILY MEDICINE

## 2022-08-15 PROCEDURE — 3074F SYST BP LT 130 MM HG: CPT | Mod: CPTII,S$GLB,, | Performed by: FAMILY MEDICINE

## 2022-08-15 PROCEDURE — 36415 PR COLLECTION VENOUS BLOOD,VENIPUNCTURE: ICD-10-PCS | Mod: S$GLB,,, | Performed by: FAMILY MEDICINE

## 2022-08-15 PROCEDURE — 99214 PR OFFICE/OUTPT VISIT, EST, LEVL IV, 30-39 MIN: ICD-10-PCS | Mod: S$GLB,,, | Performed by: FAMILY MEDICINE

## 2022-08-15 PROCEDURE — 99214 OFFICE O/P EST MOD 30 MIN: CPT | Mod: S$GLB,,, | Performed by: FAMILY MEDICINE

## 2022-08-15 PROCEDURE — 1159F MED LIST DOCD IN RCRD: CPT | Mod: CPTII,S$GLB,, | Performed by: FAMILY MEDICINE

## 2022-08-15 PROCEDURE — 3044F HG A1C LEVEL LT 7.0%: CPT | Mod: CPTII,S$GLB,, | Performed by: FAMILY MEDICINE

## 2022-08-15 PROCEDURE — 3008F PR BODY MASS INDEX (BMI) DOCUMENTED: ICD-10-PCS | Mod: CPTII,S$GLB,, | Performed by: FAMILY MEDICINE

## 2022-08-15 PROCEDURE — 3066F NEPHROPATHY DOC TX: CPT | Mod: CPTII,S$GLB,, | Performed by: FAMILY MEDICINE

## 2022-08-15 PROCEDURE — 3008F BODY MASS INDEX DOCD: CPT | Mod: CPTII,S$GLB,, | Performed by: FAMILY MEDICINE

## 2022-08-15 PROCEDURE — 1160F PR REVIEW ALL MEDS BY PRESCRIBER/CLIN PHARMACIST DOCUMENTED: ICD-10-PCS | Mod: CPTII,S$GLB,, | Performed by: FAMILY MEDICINE

## 2022-08-15 PROCEDURE — 3061F NEG MICROALBUMINURIA REV: CPT | Mod: CPTII,S$GLB,, | Performed by: FAMILY MEDICINE

## 2022-08-16 LAB
ALBUMIN/CREAT UR: 3.7 UG/MG (ref 0–30)
CREAT UR-MCNC: 161 MG/DL (ref 23–375)
MICROALBUMIN UR DL<=1MG/L-MCNC: 6 UG/ML
TSH SERPL DL<=0.005 MIU/L-ACNC: 2.46 UIU/ML (ref 0.4–4)

## 2022-08-19 ENCOUNTER — HOSPITAL ENCOUNTER (OUTPATIENT)
Dept: RADIOLOGY | Facility: HOSPITAL | Age: 67
Discharge: HOME | End: 2022-08-19
Attending: FAMILY MEDICINE
Payer: MEDICAID

## 2022-08-19 DIAGNOSIS — J32.0 MAXILLARY SINUSITIS, UNSPECIFIED CHRONICITY: ICD-10-CM

## 2022-08-19 PROCEDURE — 70486 CT MEDTRONIC SINUSES WITHOUT: ICD-10-PCS | Mod: 26,,, | Performed by: RADIOLOGY

## 2022-08-19 PROCEDURE — 70486 CT MAXILLOFACIAL W/O DYE: CPT | Mod: 26,,, | Performed by: RADIOLOGY

## 2022-08-19 PROCEDURE — 70486 CT MAXILLOFACIAL W/O DYE: CPT | Mod: TC

## 2022-08-21 ENCOUNTER — PATIENT MESSAGE (OUTPATIENT)
Dept: FAMILY MEDICINE | Facility: CLINIC | Age: 67
End: 2022-08-21
Payer: MEDICAID

## 2022-08-21 RX ORDER — ASPIRIN 81 MG/1
81 TABLET ORAL DAILY
COMMUNITY

## 2022-08-21 NOTE — PROGRESS NOTES
Subjective:       Patient ID: Mane Ramires is a 66 y.o. male.    Chief Complaint: Follow-up    HPI   The patient is a 66-year-old who is here today for follow-up.  Overall, he feels good.  He has been focusing on living for now which has really helped.  He does try to stay physically active and he is getting about 2000 steps a day.    Today we discussed the followin) hypertension.  Today's blood pressure is 118/72.  He is taking Diovan and Toprol  2) diabetes.  His recent A1c was 5.8 down from prior value of 6.5.  His fasting sugars are usually between 112 and 118 although he has seen it as low as 96.  He is currently taking Glucophage 500 mg once a day  3) hyperlipidemia.  He is doing well with Lipitor.  His recent total cholesterol was 162, his triglycerides 115 and his LDL was 92  4) history of elevated LFTs.  His recent LFTs were normal    Review of systems is remarkable for the followin) fatigue.  He does note that he a does still feel tired at times despite 8-9 hours of sleep.  We did discuss checking a TSH which she would be interested in doing          2) sinus issues.  His sinuses feel clogged all of the time.  He has had this issue since his sinus surgery in 2017.  Sometimes his right maxillary area swells and it is swollen today.  He has tried Xyzal and Flonase with no significant improvement    Review of Systems   Constitutional: Negative for appetite change, chills, diaphoresis, fatigue, fever and unexpected weight change.   HENT: Positive for congestion. Negative for ear pain, postnasal drip, rhinorrhea, sinus pressure, sneezing, sore throat and trouble swallowing.    Eyes: Negative for pain, discharge and visual disturbance.   Respiratory: Negative for cough, chest tightness, shortness of breath and wheezing.    Cardiovascular: Negative for chest pain, palpitations and leg swelling.   Gastrointestinal: Negative for abdominal distention, abdominal pain, blood in stool, constipation,  diarrhea, nausea and vomiting.   Skin: Negative for rash.       Objective:      Physical Exam  Constitutional:       General: He is not in acute distress.     Appearance: Normal appearance. He is well-developed.   HENT:      Head: Normocephalic and atraumatic.      Comments: He has swelling in the right maxillary sinus region.  He is tender to palpation over the frontal and maxillary sinus regions     Right Ear: Hearing, tympanic membrane, ear canal and external ear normal.      Left Ear: Hearing, tympanic membrane, ear canal and external ear normal.      Nose: Nose normal.      Mouth/Throat:      Mouth: No oral lesions.      Pharynx: No oropharyngeal exudate or posterior oropharyngeal erythema.   Eyes:      General: Lids are normal. No scleral icterus.     Extraocular Movements: Extraocular movements intact.      Conjunctiva/sclera: Conjunctivae normal.      Pupils: Pupils are equal, round, and reactive to light.   Neck:      Thyroid: No thyroid mass or thyromegaly.      Vascular: No carotid bruit.   Cardiovascular:      Rate and Rhythm: Normal rate and regular rhythm.  No extrasystoles are present.     Chest Wall: PMI is not displaced.      Pulses:           Dorsalis pedis pulses are 2+ on the right side and 2+ on the left side.        Posterior tibial pulses are 2+ on the right side and 2+ on the left side.      Heart sounds: Normal heart sounds. No murmur heard.    No gallop.   Pulmonary:      Effort: Pulmonary effort is normal. No accessory muscle usage or respiratory distress.      Breath sounds: Normal breath sounds.   Chest:   Breasts:      Right: No supraclavicular adenopathy.      Left: No supraclavicular adenopathy.       Abdominal:      General: Bowel sounds are normal. There is no abdominal bruit.      Palpations: Abdomen is soft.      Tenderness: There is no abdominal tenderness. There is no rebound.   Musculoskeletal:      Cervical back: Normal range of motion and neck supple.      Right foot: No  "deformity.      Left foot: No deformity.   Feet:      Right foot:      Protective Sensation: 10 sites tested. 10 sites sensed.      Skin integrity: Warmth present. No ulcer or callus.      Left foot:      Protective Sensation: 10 sites tested. 10 sites sensed.      Skin integrity: Warmth present. No ulcer or callus.   Lymphadenopathy:      Head:      Right side of head: No submental or submandibular adenopathy.      Left side of head: No submental or submandibular adenopathy.      Cervical:      Right cervical: No superficial, deep or posterior cervical adenopathy.     Left cervical: No superficial, deep or posterior cervical adenopathy.      Upper Body:      Right upper body: No supraclavicular adenopathy.      Left upper body: No supraclavicular adenopathy.   Skin:     General: Skin is warm and dry.   Neurological:      Mental Status: He is alert and oriented to person, place, and time.       Blood pressure 118/72, pulse 69, temperature 98.7 °F (37.1 °C), resp. rate 20, height 5' 4.5" (1.638 m), weight 73.1 kg (161 lb 0.7 oz), SpO2 97 %.Body mass index is 27.22 kg/m².          A/P:  1) hypertension.  Well controlled.  Continue with Diovan and Toprol  2) diabetes.  Well controlled and possibly over controlled.  We are going to stop the Glucophage.  We will check an A1c again in 3 months to see how he is doing with lifestyle interventions control control his diabetes.  We will check a urine microalbumin today  3) hyperlipidemia.  Well controlled.  Continue with Lipitor.  Recheck labs in 1 year  4) history of elevated LFTs.  Resolved.  We will continue to monitor this  5) fatigue.  Persistent.  We will check a TSH          6) chronic sinus congestion.  Persistent.  We will do a sinus CT    As long as he does well, I will see him back in 6 months or sooner if needed with an A1c in 3 months  "

## 2022-08-30 ENCOUNTER — PATIENT MESSAGE (OUTPATIENT)
Dept: FAMILY MEDICINE | Facility: CLINIC | Age: 67
End: 2022-08-30
Payer: MEDICAID

## 2022-08-30 DIAGNOSIS — Z86.39 HISTORY OF PINEAL CYST: Primary | ICD-10-CM

## 2022-09-16 ENCOUNTER — HOSPITAL ENCOUNTER (OUTPATIENT)
Dept: RADIOLOGY | Facility: HOSPITAL | Age: 67
Discharge: HOME | End: 2022-09-16
Attending: FAMILY MEDICINE
Payer: MEDICAID

## 2022-09-16 DIAGNOSIS — Z86.39 HISTORY OF PINEAL CYST: ICD-10-CM

## 2022-09-16 PROCEDURE — 25500020 PHARM REV CODE 255: Performed by: FAMILY MEDICINE

## 2022-09-16 PROCEDURE — 70553 MRI BRAIN STEM W/O & W/DYE: CPT | Mod: TC

## 2022-09-16 PROCEDURE — 70553 MRI BRAIN W WO CONTRAST: ICD-10-PCS | Mod: 26,,, | Performed by: RADIOLOGY

## 2022-09-16 PROCEDURE — A9585 GADOBUTROL INJECTION: HCPCS | Performed by: FAMILY MEDICINE

## 2022-09-16 PROCEDURE — 70553 MRI BRAIN STEM W/O & W/DYE: CPT | Mod: 26,,, | Performed by: RADIOLOGY

## 2022-09-16 RX ORDER — GADOBUTROL 604.72 MG/ML
7 INJECTION INTRAVENOUS
Status: COMPLETED | OUTPATIENT
Start: 2022-09-16 | End: 2022-09-16

## 2022-09-16 RX ADMIN — GADOBUTROL 7 ML: 604.72 INJECTION INTRAVENOUS at 05:09

## 2022-09-25 ENCOUNTER — PATIENT MESSAGE (OUTPATIENT)
Dept: FAMILY MEDICINE | Facility: CLINIC | Age: 67
End: 2022-09-25
Payer: MEDICAID

## 2022-10-19 ENCOUNTER — LAB VISIT (OUTPATIENT)
Dept: LAB | Facility: HOSPITAL | Age: 67
End: 2022-10-19
Attending: INTERNAL MEDICINE
Payer: MEDICARE

## 2022-10-19 DIAGNOSIS — R74.8 ELEVATED LIVER ENZYMES: ICD-10-CM

## 2022-10-19 LAB
ALBUMIN SERPL BCP-MCNC: 4.4 G/DL (ref 3.5–5.2)
ALP SERPL-CCNC: 92 U/L (ref 55–135)
ALT SERPL W/O P-5'-P-CCNC: 31 U/L (ref 10–44)
AST SERPL-CCNC: 22 U/L (ref 10–40)
BILIRUB DIRECT SERPL-MCNC: 0.2 MG/DL (ref 0.1–0.3)
BILIRUB SERPL-MCNC: 0.8 MG/DL (ref 0.1–1)
PROT SERPL-MCNC: 7.9 G/DL (ref 6–8.4)

## 2022-10-19 PROCEDURE — 80076 HEPATIC FUNCTION PANEL: CPT | Performed by: INTERNAL MEDICINE

## 2022-10-19 PROCEDURE — 36415 COLL VENOUS BLD VENIPUNCTURE: CPT | Performed by: INTERNAL MEDICINE

## 2022-10-24 ENCOUNTER — PATIENT MESSAGE (OUTPATIENT)
Dept: HEPATOLOGY | Facility: CLINIC | Age: 67
End: 2022-10-24
Payer: MEDICARE

## 2022-11-07 ENCOUNTER — PATIENT MESSAGE (OUTPATIENT)
Dept: FAMILY MEDICINE | Facility: CLINIC | Age: 67
End: 2022-11-07
Payer: MEDICARE

## 2022-11-07 RX ORDER — FAMCICLOVIR 500 MG/1
500 TABLET ORAL 3 TIMES DAILY
Qty: 21 TABLET | Refills: 0 | Status: SHIPPED | OUTPATIENT
Start: 2022-11-07 | End: 2022-11-14

## 2022-12-08 ENCOUNTER — PATIENT MESSAGE (OUTPATIENT)
Dept: OTHER | Facility: OTHER | Age: 67
End: 2022-12-08
Payer: MEDICARE

## 2022-12-20 ENCOUNTER — LAB VISIT (OUTPATIENT)
Dept: LAB | Facility: HOSPITAL | Age: 67
End: 2022-12-20
Attending: FAMILY MEDICINE
Payer: MEDICARE

## 2022-12-20 DIAGNOSIS — E11.65 UNCONTROLLED TYPE 2 DIABETES MELLITUS WITH HYPERGLYCEMIA: ICD-10-CM

## 2022-12-20 LAB
ESTIMATED AVG GLUCOSE: 148 MG/DL (ref 68–131)
HBA1C MFR BLD: 6.8 % (ref 4–5.6)

## 2022-12-20 PROCEDURE — 83036 HEMOGLOBIN GLYCOSYLATED A1C: CPT | Mod: HCNC | Performed by: FAMILY MEDICINE

## 2022-12-20 PROCEDURE — 36415 COLL VENOUS BLD VENIPUNCTURE: CPT | Mod: HCNC | Performed by: FAMILY MEDICINE

## 2022-12-22 ENCOUNTER — PATIENT MESSAGE (OUTPATIENT)
Dept: FAMILY MEDICINE | Facility: CLINIC | Age: 67
End: 2022-12-22
Payer: MEDICARE

## 2023-01-30 NOTE — PROGRESS NOTES
Subjective:      Patient ID: Mane Ramires is a 67 y.o. male.    Chief Complaint:  No chief complaint on file.    History of Present Illness  Mane Ramires is here for follow up of DM.  Previously seen by Dr. Ellsworth for gender dysphoria.  Last seen by me 7/2021.    Illness, injury, steroids: Denies    With regards to diabetes:    Diagnosed: 2020  Known complications:  DKA -   RN -  PN -  Nephropathy -  CAD -    + FH of DM - sibling, mother     Current regimen:  None     Other medications tried:  Metformin     Glucose Monitor:   2-3 times a day testing  Log reviewed: log reviewed  Date Blood Sugar   1/29/2023 100   1/11/2023 151   12/25/2022 117   12/11/2022 152     Hypoglycemia:  None.   Knows how to correct with 15 grams of carbs- juice, coke, or a peppermint.     Diet/Exercise:   Eats 2 meals a day.   Snacks : occasionally - avocado.   Drinks : water, tea, sugar free soft drinks.   Exercise: walks daily.      Education - last visit: 4/20/2021  Eye Exam: within the last year   Podiatry: 2016    Denies history of pancreatitis & personal/family history of medullary thyroid cancer.     Diabetes Management Status    Hemoglobin A1C   Date Value Ref Range Status   12/20/2022 6.8 (H) 4.0 - 5.6 % Final     Comment:     ADA Screening Guidelines:  5.7-6.4%  Consistent with prediabetes  >or=6.5%  Consistent with diabetes    High levels of fetal hemoglobin interfere with the HbA1C  assay. Heterozygous hemoglobin variants (HbS, HgC, etc)do  not significantly interfere with this assay.   However, presence of multiple variants may affect accuracy.     08/10/2022 5.8 (H) 4.0 - 5.6 % Final     Comment:     ADA Screening Guidelines:  5.7-6.4%  Consistent with prediabetes  >or=6.5%  Consistent with diabetes    High levels of fetal hemoglobin interfere with the HbA1C  assay. Heterozygous hemoglobin variants (HbS, HgC, etc)do  not significantly interfere with this assay.   However, presence of multiple variants may affect  accuracy.     07/08/2022 6.5 (H) 4.0 - 5.6 % Final     Comment:     ADA Screening Guidelines:  5.7-6.4%  Consistent with prediabetes  >or=6.5%  Consistent with diabetes    High levels of fetal hemoglobin interfere with the HbA1C  assay. Heterozygous hemoglobin variants (HbS, HgC, etc)do  not significantly interfere with this assay.   However, presence of multiple variants may affect accuracy.         Statin: Taking  ACE/ARB: Taking  Screening or Prevention Patient's value Goal Complete/Controlled?   HgA1C Testing and Control   Lab Results   Component Value Date    HGBA1C 6.8 (H) 12/20/2022      Annually/Less than 8% No   Lipid profile : 08/10/2022 Annually Yes   LDL control Lab Results   Component Value Date    LDLCALC 92.0 08/10/2022    Annually/Less than 100 mg/dl  No   Nephropathy screening Lab Results   Component Value Date    LABMICR 6.0 08/15/2022     Lab Results   Component Value Date    PROTEINUA Negative 11/30/2017    Annually Yes   Blood pressure BP Readings from Last 1 Encounters:   02/01/23 120/73    Less than 140/90 Yes   Dilated retinal exam : 11/16/2021 Annually No   Foot exam   : 08/15/2022 Annually No     With regards to Vitamin D Deficiency:    Vit D, 25-Hydroxy   Date Value Ref Range Status   12/02/2019 38 30 - 96 ng/mL Final     Comment:     Vitamin D deficiency.........<10 ng/mL                              Vitamin D insufficiency......10-29 ng/mL       Vitamin D sufficiency........> or equal to 30 ng/mL  Vitamin D toxicity............>100 ng/mL       Current Meds: None.       Follows with Dr. Ellsworth for gender dysphoria:    Started cross hormone therapy in 1992  Legally changed in 2007 2/2015  s/p b/l total complete mastectomy with Dr Yi, and oophorectomy/removal of fallopian tubes with lysis of adhesions by Dr Carbajal      Current Medication:  Restarted 4/2021  Testosterone 100mg every 3 weeks         Reports increase in fatigue, decreased energy level.     Relationships  - women  "  Has 3 biological children      Review of Systems  As above.    Objective:   Physical Exam  Vitals reviewed.   Neck:      Thyroid: No thyromegaly.   Cardiovascular:      Rate and Rhythm: Normal rate.      Comments: No edema present  Pulmonary:      Effort: Pulmonary effort is normal.   Abdominal:      Palpations: Abdomen is soft.         Visit Vitals  /73   Pulse 74   Ht 5' 4" (1.626 m)   Wt 74.7 kg (164 lb 12.7 oz)   SpO2 97%   BMI 28.29 kg/m²         Body mass index is 28.29 kg/m².    Lab Review:   Lab Results   Component Value Date    HGBA1C 6.8 (H) 12/20/2022    HGBA1C 5.8 (H) 08/10/2022    HGBA1C 6.5 (H) 07/08/2022       Lab Results   Component Value Date    CHOL 162 08/10/2022    HDL 47 08/10/2022    LDLCALC 92.0 08/10/2022    TRIG 115 08/10/2022    CHOLHDL 29.0 08/10/2022     Lab Results   Component Value Date     08/10/2022    K 4.6 08/10/2022     08/10/2022    CO2 26 08/10/2022     (H) 08/10/2022    BUN 10 08/10/2022    CREATININE 1.3 08/10/2022    CALCIUM 9.9 08/10/2022    PROT 7.9 10/19/2022    ALBUMIN 4.4 10/19/2022    BILITOT 0.8 10/19/2022    ALKPHOS 92 10/19/2022    AST 22 10/19/2022    ALT 31 10/19/2022    ANIONGAP 9 08/10/2022    ESTGFRAFRICA >60.0 01/28/2022    EGFRNONAA 57.7 (A) 01/28/2022    TSH 2.463 08/15/2022     Vit D, 25-Hydroxy   Date Value Ref Range Status   12/02/2019 38 30 - 96 ng/mL Final     Comment:     Vitamin D deficiency.........<10 ng/mL                              Vitamin D insufficiency......10-29 ng/mL       Vitamin D sufficiency........> or equal to 30 ng/mL  Vitamin D toxicity............>100 ng/mL       Assessment and Plan     1. Uncontrolled type 2 diabetes mellitus with hyperglycemia  CBC Auto Differential    VITAMIN B12    TSH    Comprehensive Metabolic Panel      2. Female-to-male transgender person  testosterone cypionate (DEPOTESTOTERONE CYPIONATE) 200 mg/mL injection      3. Vitamin D deficiency  Vitamin D      4. Other long term (current) " drug therapy  VITAMIN B12      5. Thyromegaly  US Soft Tissue Head Neck Thyroid          Uncontrolled type 2 diabetes mellitus with hyperglycemia  -- Labs in 3 months.  -- A1c goal <7%.  -- Medications discussed:  MFM   -- Reviewed logs/CGM:  Increase in A1c - per patient, dietary/lifestyle. Would like to continue to work on modifications before starting medication.   Reach out to me sooner for any glucose <70 or consistently >160.  -- Medication Changes:   Diet controlled  -- Reviewed goals of therapy are to get the best control we can without hypoglycemia.  -- Reviewed patient's current insulin regimen. Clarified proper insulin dose and timing in relation to meals, etc. Insulin injection sites and proper rotation instructed.    -- Advised frequent self blood glucose monitoring.  Patient encouraged to document glucose results and bring them to every clinic visit.  -- Hypoglycemia precautions discussed. Instructed on precautions before driving.    -- Call for Bg repeatedly < 90 or > 180.   -- Close adherence to lifestyle changes recommended.   -- Periodic follow ups for eye evaluations, foot care and dental care suggested.    Female-to-male transgender person  -- We discussed extensively about benefits and risks of testosterone replacement including benefits as improvement in QOL, etc, and risks as erythrocytosis, abnormal liver function.  -- We also discussed about recent FDA black-box warning of thromboembolic events while on T replacement, which is independent of erythrocytosis.   -- Does report a better QOL when on this. Follow labs (CBC, CMP) and symptoms.   -- CHANGE:  Testosterone 100mg every 14 days    Vitamin D deficiency  -- Check vitamin D.       Follow up in about 6 months (around 8/1/2023).

## 2023-02-01 ENCOUNTER — LAB VISIT (OUTPATIENT)
Dept: LAB | Facility: HOSPITAL | Age: 68
End: 2023-02-01
Payer: MEDICARE

## 2023-02-01 ENCOUNTER — OFFICE VISIT (OUTPATIENT)
Dept: ENDOCRINOLOGY | Facility: CLINIC | Age: 68
End: 2023-02-01
Payer: MEDICARE

## 2023-02-01 VITALS
BODY MASS INDEX: 28.14 KG/M2 | HEART RATE: 74 BPM | WEIGHT: 164.81 LBS | SYSTOLIC BLOOD PRESSURE: 120 MMHG | DIASTOLIC BLOOD PRESSURE: 73 MMHG | HEIGHT: 64 IN | OXYGEN SATURATION: 97 %

## 2023-02-01 DIAGNOSIS — Z78.9 FEMALE-TO-MALE TRANSGENDER PERSON: ICD-10-CM

## 2023-02-01 DIAGNOSIS — E11.65 UNCONTROLLED TYPE 2 DIABETES MELLITUS WITH HYPERGLYCEMIA: Primary | ICD-10-CM

## 2023-02-01 DIAGNOSIS — Z79.899 OTHER LONG TERM (CURRENT) DRUG THERAPY: ICD-10-CM

## 2023-02-01 DIAGNOSIS — E55.9 VITAMIN D DEFICIENCY: ICD-10-CM

## 2023-02-01 DIAGNOSIS — E11.65 UNCONTROLLED TYPE 2 DIABETES MELLITUS WITH HYPERGLYCEMIA: ICD-10-CM

## 2023-02-01 DIAGNOSIS — E01.0 THYROMEGALY: ICD-10-CM

## 2023-02-01 LAB
25(OH)D3+25(OH)D2 SERPL-MCNC: 26 NG/ML (ref 30–96)
ALBUMIN SERPL BCP-MCNC: 4.1 G/DL (ref 3.5–5.2)
ALP SERPL-CCNC: 89 U/L (ref 55–135)
ALT SERPL W/O P-5'-P-CCNC: 16 U/L (ref 10–44)
ANION GAP SERPL CALC-SCNC: 8 MMOL/L (ref 8–16)
AST SERPL-CCNC: 14 U/L (ref 10–40)
BASOPHILS # BLD AUTO: 0.04 K/UL (ref 0–0.2)
BASOPHILS NFR BLD: 0.4 % (ref 0–1.9)
BILIRUB SERPL-MCNC: 0.3 MG/DL (ref 0.1–1)
BUN SERPL-MCNC: 21 MG/DL (ref 8–23)
CALCIUM SERPL-MCNC: 10.3 MG/DL (ref 8.7–10.5)
CHLORIDE SERPL-SCNC: 104 MMOL/L (ref 95–110)
CO2 SERPL-SCNC: 24 MMOL/L (ref 23–29)
CREAT SERPL-MCNC: 1.3 MG/DL (ref 0.5–1.4)
DIFFERENTIAL METHOD: ABNORMAL
EOSINOPHIL # BLD AUTO: 0.2 K/UL (ref 0–0.5)
EOSINOPHIL NFR BLD: 1.7 % (ref 0–8)
ERYTHROCYTE [DISTWIDTH] IN BLOOD BY AUTOMATED COUNT: 12.4 % (ref 11.5–14.5)
EST. GFR  (NO RACE VARIABLE): >60 ML/MIN/1.73 M^2
GLUCOSE SERPL-MCNC: 145 MG/DL (ref 70–110)
HCT VFR BLD AUTO: 49.3 % (ref 40–54)
HGB BLD-MCNC: 16 G/DL (ref 14–18)
IMM GRANULOCYTES # BLD AUTO: 0.02 K/UL (ref 0–0.04)
IMM GRANULOCYTES NFR BLD AUTO: 0.2 % (ref 0–0.5)
LYMPHOCYTES # BLD AUTO: 2.7 K/UL (ref 1–4.8)
LYMPHOCYTES NFR BLD: 27.9 % (ref 18–48)
MCH RBC QN AUTO: 31.3 PG (ref 27–31)
MCHC RBC AUTO-ENTMCNC: 32.5 G/DL (ref 32–36)
MCV RBC AUTO: 97 FL (ref 82–98)
MONOCYTES # BLD AUTO: 0.5 K/UL (ref 0.3–1)
MONOCYTES NFR BLD: 4.9 % (ref 4–15)
NEUTROPHILS # BLD AUTO: 6.2 K/UL (ref 1.8–7.7)
NEUTROPHILS NFR BLD: 64.9 % (ref 38–73)
NRBC BLD-RTO: 0 /100 WBC
PLATELET # BLD AUTO: 295 K/UL (ref 150–450)
PMV BLD AUTO: 9.5 FL (ref 9.2–12.9)
POTASSIUM SERPL-SCNC: 4.6 MMOL/L (ref 3.5–5.1)
PROT SERPL-MCNC: 7.8 G/DL (ref 6–8.4)
RBC # BLD AUTO: 5.11 M/UL (ref 4.6–6.2)
SODIUM SERPL-SCNC: 136 MMOL/L (ref 136–145)
TSH SERPL DL<=0.005 MIU/L-ACNC: 1.63 UIU/ML (ref 0.4–4)
VIT B12 SERPL-MCNC: 322 PG/ML (ref 210–950)
WBC # BLD AUTO: 9.53 K/UL (ref 3.9–12.7)

## 2023-02-01 PROCEDURE — 3008F BODY MASS INDEX DOCD: CPT | Mod: HCNC,CPTII,S$GLB, | Performed by: NURSE PRACTITIONER

## 2023-02-01 PROCEDURE — 84443 ASSAY THYROID STIM HORMONE: CPT | Mod: HCNC | Performed by: NURSE PRACTITIONER

## 2023-02-01 PROCEDURE — 1125F PR PAIN SEVERITY QUANTIFIED, PAIN PRESENT: ICD-10-PCS | Mod: HCNC,CPTII,S$GLB, | Performed by: NURSE PRACTITIONER

## 2023-02-01 PROCEDURE — 3074F SYST BP LT 130 MM HG: CPT | Mod: HCNC,CPTII,S$GLB, | Performed by: NURSE PRACTITIONER

## 2023-02-01 PROCEDURE — 3008F PR BODY MASS INDEX (BMI) DOCUMENTED: ICD-10-PCS | Mod: HCNC,CPTII,S$GLB, | Performed by: NURSE PRACTITIONER

## 2023-02-01 PROCEDURE — 3078F DIAST BP <80 MM HG: CPT | Mod: HCNC,CPTII,S$GLB, | Performed by: NURSE PRACTITIONER

## 2023-02-01 PROCEDURE — 3078F PR MOST RECENT DIASTOLIC BLOOD PRESSURE < 80 MM HG: ICD-10-PCS | Mod: HCNC,CPTII,S$GLB, | Performed by: NURSE PRACTITIONER

## 2023-02-01 PROCEDURE — 99999 PR PBB SHADOW E&M-EST. PATIENT-LVL IV: CPT | Mod: PBBFAC,HCNC,, | Performed by: NURSE PRACTITIONER

## 2023-02-01 PROCEDURE — 99214 PR OFFICE/OUTPT VISIT, EST, LEVL IV, 30-39 MIN: ICD-10-PCS | Mod: HCNC,S$GLB,, | Performed by: NURSE PRACTITIONER

## 2023-02-01 PROCEDURE — 99999 PR PBB SHADOW E&M-EST. PATIENT-LVL IV: ICD-10-PCS | Mod: PBBFAC,HCNC,, | Performed by: NURSE PRACTITIONER

## 2023-02-01 PROCEDURE — 3288F FALL RISK ASSESSMENT DOCD: CPT | Mod: HCNC,CPTII,S$GLB, | Performed by: NURSE PRACTITIONER

## 2023-02-01 PROCEDURE — 36415 COLL VENOUS BLD VENIPUNCTURE: CPT | Mod: HCNC | Performed by: NURSE PRACTITIONER

## 2023-02-01 PROCEDURE — 3074F PR MOST RECENT SYSTOLIC BLOOD PRESSURE < 130 MM HG: ICD-10-PCS | Mod: HCNC,CPTII,S$GLB, | Performed by: NURSE PRACTITIONER

## 2023-02-01 PROCEDURE — 3288F PR FALLS RISK ASSESSMENT DOCUMENTED: ICD-10-PCS | Mod: HCNC,CPTII,S$GLB, | Performed by: NURSE PRACTITIONER

## 2023-02-01 PROCEDURE — 82607 VITAMIN B-12: CPT | Mod: HCNC | Performed by: NURSE PRACTITIONER

## 2023-02-01 PROCEDURE — 1157F ADVNC CARE PLAN IN RCRD: CPT | Mod: HCNC,CPTII,S$GLB, | Performed by: NURSE PRACTITIONER

## 2023-02-01 PROCEDURE — 1159F MED LIST DOCD IN RCRD: CPT | Mod: HCNC,CPTII,S$GLB, | Performed by: NURSE PRACTITIONER

## 2023-02-01 PROCEDURE — 1101F PR PT FALLS ASSESS DOC 0-1 FALLS W/OUT INJ PAST YR: ICD-10-PCS | Mod: HCNC,CPTII,S$GLB, | Performed by: NURSE PRACTITIONER

## 2023-02-01 PROCEDURE — 85025 COMPLETE CBC W/AUTO DIFF WBC: CPT | Mod: HCNC | Performed by: NURSE PRACTITIONER

## 2023-02-01 PROCEDURE — 82306 VITAMIN D 25 HYDROXY: CPT | Mod: HCNC | Performed by: NURSE PRACTITIONER

## 2023-02-01 PROCEDURE — 1160F RVW MEDS BY RX/DR IN RCRD: CPT | Mod: HCNC,CPTII,S$GLB, | Performed by: NURSE PRACTITIONER

## 2023-02-01 PROCEDURE — 99214 OFFICE O/P EST MOD 30 MIN: CPT | Mod: HCNC,S$GLB,, | Performed by: NURSE PRACTITIONER

## 2023-02-01 PROCEDURE — 1157F PR ADVANCE CARE PLAN OR EQUIV PRESENT IN MEDICAL RECORD: ICD-10-PCS | Mod: HCNC,CPTII,S$GLB, | Performed by: NURSE PRACTITIONER

## 2023-02-01 PROCEDURE — 1125F AMNT PAIN NOTED PAIN PRSNT: CPT | Mod: HCNC,CPTII,S$GLB, | Performed by: NURSE PRACTITIONER

## 2023-02-01 PROCEDURE — 1101F PT FALLS ASSESS-DOCD LE1/YR: CPT | Mod: HCNC,CPTII,S$GLB, | Performed by: NURSE PRACTITIONER

## 2023-02-01 PROCEDURE — 1160F PR REVIEW ALL MEDS BY PRESCRIBER/CLIN PHARMACIST DOCUMENTED: ICD-10-PCS | Mod: HCNC,CPTII,S$GLB, | Performed by: NURSE PRACTITIONER

## 2023-02-01 PROCEDURE — 80053 COMPREHEN METABOLIC PANEL: CPT | Mod: HCNC | Performed by: NURSE PRACTITIONER

## 2023-02-01 PROCEDURE — 1159F PR MEDICATION LIST DOCUMENTED IN MEDICAL RECORD: ICD-10-PCS | Mod: HCNC,CPTII,S$GLB, | Performed by: NURSE PRACTITIONER

## 2023-02-01 RX ORDER — TESTOSTERONE CYPIONATE 200 MG/ML
100 INJECTION, SOLUTION INTRAMUSCULAR
Qty: 10 ML | Refills: 3 | Status: SHIPPED | OUTPATIENT
Start: 2023-02-01 | End: 2023-02-08

## 2023-02-01 NOTE — ASSESSMENT & PLAN NOTE
-- We discussed extensively about benefits and risks of testosterone replacement including benefits as improvement in QOL, etc, and risks as erythrocytosis, abnormal liver function.  -- We also discussed about recent FDA black-box warning of thromboembolic events while on T replacement, which is independent of erythrocytosis.   -- Does report a better QOL when on this. Follow labs (CBC, CMP) and symptoms.   -- CHANGE:  Testosterone 100mg every 14 days

## 2023-02-01 NOTE — ASSESSMENT & PLAN NOTE
-- Labs in 3 months.  -- A1c goal <7%.  -- Medications discussed:  MFM   -- Reviewed logs/CGM:  Increase in A1c - per patient, dietary/lifestyle. Would like to continue to work on modifications before starting medication.   Reach out to me sooner for any glucose <70 or consistently >160.  -- Medication Changes:   Diet controlled  -- Reviewed goals of therapy are to get the best control we can without hypoglycemia.  -- Reviewed patient's current insulin regimen. Clarified proper insulin dose and timing in relation to meals, etc. Insulin injection sites and proper rotation instructed.    -- Advised frequent self blood glucose monitoring.  Patient encouraged to document glucose results and bring them to every clinic visit.  -- Hypoglycemia precautions discussed. Instructed on precautions before driving.    -- Call for Bg repeatedly < 90 or > 180.   -- Close adherence to lifestyle changes recommended.   -- Periodic follow ups for eye evaluations, foot care and dental care suggested.

## 2023-02-02 ENCOUNTER — PATIENT MESSAGE (OUTPATIENT)
Dept: ENDOCRINOLOGY | Facility: CLINIC | Age: 68
End: 2023-02-02
Payer: MEDICARE

## 2023-02-03 ENCOUNTER — HOSPITAL ENCOUNTER (OUTPATIENT)
Dept: RADIOLOGY | Facility: HOSPITAL | Age: 68
Discharge: HOME | End: 2023-02-03
Attending: NURSE PRACTITIONER
Payer: MEDICARE

## 2023-02-03 DIAGNOSIS — E01.0 THYROMEGALY: ICD-10-CM

## 2023-02-03 PROCEDURE — 76536 US SOFT TISSUE HEAD NECK THYROID: ICD-10-PCS | Mod: 26,,, | Performed by: RADIOLOGY

## 2023-02-03 PROCEDURE — 76536 US EXAM OF HEAD AND NECK: CPT | Mod: 26,,, | Performed by: RADIOLOGY

## 2023-02-03 PROCEDURE — 76536 US EXAM OF HEAD AND NECK: CPT | Mod: TC

## 2023-02-06 ENCOUNTER — PATIENT MESSAGE (OUTPATIENT)
Dept: ADMINISTRATIVE | Facility: OTHER | Age: 68
End: 2023-02-06
Payer: MEDICARE

## 2023-02-06 ENCOUNTER — PATIENT MESSAGE (OUTPATIENT)
Dept: ENDOCRINOLOGY | Facility: CLINIC | Age: 68
End: 2023-02-06
Payer: MEDICARE

## 2023-02-06 DIAGNOSIS — Z78.9 FEMALE-TO-MALE TRANSGENDER PERSON: ICD-10-CM

## 2023-02-07 DIAGNOSIS — Z00.00 ENCOUNTER FOR MEDICARE ANNUAL WELLNESS EXAM: ICD-10-CM

## 2023-02-08 RX ORDER — TESTOSTERONE CYPIONATE 200 MG/ML
200 INJECTION, SOLUTION INTRAMUSCULAR
Qty: 10 ML | Refills: 3 | Status: SHIPPED | OUTPATIENT
Start: 2023-02-08 | End: 2023-03-30

## 2023-02-09 ENCOUNTER — OFFICE VISIT (OUTPATIENT)
Dept: FAMILY MEDICINE | Facility: CLINIC | Age: 68
End: 2023-02-09
Payer: MEDICARE

## 2023-02-09 DIAGNOSIS — Z00.00 ENCOUNTER FOR MEDICARE ANNUAL WELLNESS EXAM: ICD-10-CM

## 2023-02-09 DIAGNOSIS — E11.8 DIABETES MELLITUS TYPE 2 WITH COMPLICATIONS: Primary | ICD-10-CM

## 2023-02-09 DIAGNOSIS — E55.9 VITAMIN D DEFICIENCY: ICD-10-CM

## 2023-02-09 DIAGNOSIS — E53.8 B12 DEFICIENCY: ICD-10-CM

## 2023-02-09 PROCEDURE — 4010F ACE/ARB THERAPY RXD/TAKEN: CPT | Mod: CPTII,95,, | Performed by: FAMILY MEDICINE

## 2023-02-09 PROCEDURE — 1157F ADVNC CARE PLAN IN RCRD: CPT | Mod: CPTII,95,, | Performed by: FAMILY MEDICINE

## 2023-02-09 PROCEDURE — 1159F PR MEDICATION LIST DOCUMENTED IN MEDICAL RECORD: ICD-10-PCS | Mod: CPTII,95,, | Performed by: FAMILY MEDICINE

## 2023-02-09 PROCEDURE — 1159F MED LIST DOCD IN RCRD: CPT | Mod: CPTII,95,, | Performed by: FAMILY MEDICINE

## 2023-02-09 PROCEDURE — 99213 PR OFFICE/OUTPT VISIT, EST, LEVL III, 20-29 MIN: ICD-10-PCS | Mod: 95,,, | Performed by: FAMILY MEDICINE

## 2023-02-09 PROCEDURE — 4010F PR ACE/ARB THEARPY RXD/TAKEN: ICD-10-PCS | Mod: CPTII,95,, | Performed by: FAMILY MEDICINE

## 2023-02-09 PROCEDURE — 1160F RVW MEDS BY RX/DR IN RCRD: CPT | Mod: CPTII,95,, | Performed by: FAMILY MEDICINE

## 2023-02-09 PROCEDURE — 99213 OFFICE O/P EST LOW 20 MIN: CPT | Mod: 95,,, | Performed by: FAMILY MEDICINE

## 2023-02-09 PROCEDURE — 1157F PR ADVANCE CARE PLAN OR EQUIV PRESENT IN MEDICAL RECORD: ICD-10-PCS | Mod: CPTII,95,, | Performed by: FAMILY MEDICINE

## 2023-02-09 PROCEDURE — 1160F PR REVIEW ALL MEDS BY PRESCRIBER/CLIN PHARMACIST DOCUMENTED: ICD-10-PCS | Mod: CPTII,95,, | Performed by: FAMILY MEDICINE

## 2023-02-09 RX ORDER — ROSUVASTATIN CALCIUM 5 MG/1
5 TABLET, COATED ORAL DAILY
Qty: 90 TABLET | Refills: 0 | Status: SHIPPED | OUTPATIENT
Start: 2023-02-09 | End: 2023-04-27

## 2023-02-09 RX ORDER — CYCLOBENZAPRINE HCL 10 MG
10 TABLET ORAL NIGHTLY PRN
Qty: 90 TABLET | Refills: 1 | Status: SHIPPED | OUTPATIENT
Start: 2023-02-09 | End: 2023-08-14

## 2023-02-10 ENCOUNTER — PATIENT MESSAGE (OUTPATIENT)
Dept: ADMINISTRATIVE | Facility: OTHER | Age: 68
End: 2023-02-10
Payer: MEDICARE

## 2023-02-10 ENCOUNTER — PATIENT MESSAGE (OUTPATIENT)
Dept: FAMILY MEDICINE | Facility: CLINIC | Age: 68
End: 2023-02-10
Payer: MEDICARE

## 2023-02-10 ENCOUNTER — TELEPHONE (OUTPATIENT)
Dept: FAMILY MEDICINE | Facility: CLINIC | Age: 68
End: 2023-02-10
Payer: MEDICARE

## 2023-02-10 NOTE — TELEPHONE ENCOUNTER
----- Message from Patience Gordon MD sent at 2/9/2023  4:26 PM CST -----  This pt wants to have his advanced directives removed as he need to replace his medical power of .    Thank you!  Patience

## 2023-03-10 ENCOUNTER — TELEPHONE (OUTPATIENT)
Dept: FAMILY MEDICINE | Facility: CLINIC | Age: 68
End: 2023-03-10
Payer: MEDICARE

## 2023-03-10 ENCOUNTER — PATIENT MESSAGE (OUTPATIENT)
Dept: FAMILY MEDICINE | Facility: CLINIC | Age: 68
End: 2023-03-10
Payer: MEDICARE

## 2023-03-10 DIAGNOSIS — E11.8 DIABETES MELLITUS TYPE 2 WITH COMPLICATIONS: Primary | ICD-10-CM

## 2023-03-10 PROBLEM — N18.30 STAGE 3 CHRONIC KIDNEY DISEASE: Status: RESOLVED | Noted: 2019-12-05 | Resolved: 2023-03-10

## 2023-03-10 NOTE — TELEPHONE ENCOUNTER
Please see patient's message.  Advised urgent care since he is in [place].  Will need a referral for eye exam.

## 2023-03-10 NOTE — PROGRESS NOTES
Subjective:       Patient ID: Mane Ramires is a 67 y.o. male.    Chief Complaint: Follow-up    HPI  The patient is a 67-year-old who is seen virtually today for follow-up.  Overall, he is doing well and he tells me that everything is great.      Today we discussed the followin) diabetes.  He is involved in the digital diabetes program.  100% of his readings are at goal.  His A1c in December was 6.8.  His blood sugar today was 119.  He will schedule his eye exam soon  2) hypertension.  He is taking Diovan and Toprol.  He is involved in the digital hypertension program.  His readings are usually in the 110s over 70s.  He is currently taking Toprol and Diovan consistently   3) vitamin-D deficiency.  His recent vitamin-D level was slightly low at 26.   He is going to be taking 2000 international is a vitamin-D consistently  4)  borderline B12 deficiency.  His recent B12 level was 322.  We did discuss taking B12 1000 mcg once a day which she will start    He does request refill Flexeril which she uses occasionally    Review of Systems   Constitutional:  Negative for activity change, appetite change, chills, diaphoresis, fatigue, fever and unexpected weight change.   HENT:  Negative for congestion, dental problem, ear pain, hearing loss, postnasal drip, rhinorrhea, sinus pressure, sneezing, sore throat and trouble swallowing.    Eyes:  Positive for discharge. Negative for photophobia, pain and visual disturbance.   Respiratory:  Negative for cough, chest tightness, shortness of breath and wheezing.    Cardiovascular:  Negative for chest pain, palpitations and leg swelling.   Gastrointestinal:  Negative for abdominal distention, abdominal pain, blood in stool, constipation, diarrhea, nausea and vomiting.   Endocrine: Negative for polydipsia and polyuria.   Genitourinary:  Negative for difficulty urinating, dysuria, flank pain, frequency, genital sores, hematuria, penile discharge, testicular pain and urgency.    Musculoskeletal:  Positive for arthralgias, joint swelling and neck pain. Negative for myalgias.   Skin:  Negative for rash.   Neurological:  Negative for dizziness, syncope, weakness, light-headedness and headaches.   Hematological:  Negative for adenopathy. Does not bruise/bleed easily.   Psychiatric/Behavioral:  Negative for confusion, dysphoric mood, self-injury, sleep disturbance and suicidal ideas. The patient is not nervous/anxious.        Objective:      Physical Exam  Constitutional:       General: He is not in acute distress.     Appearance: Normal appearance.   Neurological:      Mental Status: He is alert.   Psychiatric:         Attention and Perception: Attention and perception normal.         Mood and Affect: Mood and affect normal.         Speech: Speech normal.         Behavior: Behavior normal. Behavior is cooperative.         Thought Content: Thought content normal.         Cognition and Memory: Cognition and memory normal.         Judgment: Judgment normal.     There were no vitals taken for this visit.There is no height or weight on file to calculate BMI.            A/P:  1)  diabetes.  Well controlled based on home readings.  Continue with the digital diabetes program.  We will check an A1c in 3 months.  He will schedule his eye exam.    2) hypertension.  Well controlled based on home readings.  Continue with  Toprol and Diovan   3) vitamin-D deficiency.  Borderline.  He will take vitamin-D 2000 IUs once a day   4)  borderline B12 deficiency.  He will start B12 1000 mcg by mouth once a day.   5)  Hyperlipidemia.  Previously well controlled.  Continue with Crestor.  We will check labs in 3 months    As long as he does well, I will see him back in 3 months or sooner if needed      The patient location is: home  The chief complaint leading to consultation is Follow-up     Visit type: Virtual visit with synchronous audio and video    Each patient to whom he or she provides medical services by  telemedicine is:  (1) informed of the relationship between the physician and patient and the respective role of any other health care provider with respect to management of the patient; and (2) notified that he or she may decline to receive medical services by telemedicine and may withdraw from such care at any time.    I spent 21 minutes on this encounter.  This time includes face-to-face time and non-face to face time including previsit chart review, obtaining and/or reviewing separately obtained history, documenting clinical information in the electronic or other health record, independently interpreting results and communicating results to the patient/family/caregiver, or care coordinator.

## 2023-03-11 ENCOUNTER — PATIENT MESSAGE (OUTPATIENT)
Dept: FAMILY MEDICINE | Facility: CLINIC | Age: 68
End: 2023-03-11
Payer: MEDICARE

## 2023-03-12 RX ORDER — AMOXICILLIN AND CLAVULANATE POTASSIUM 875; 125 MG/1; MG/1
1 TABLET, FILM COATED ORAL 2 TIMES DAILY
Qty: 20 TABLET | Refills: 0 | Status: SHIPPED | OUTPATIENT
Start: 2023-03-12 | End: 2023-03-22

## 2023-03-29 DIAGNOSIS — Z78.9 FEMALE-TO-MALE TRANSGENDER PERSON: ICD-10-CM

## 2023-03-30 ENCOUNTER — OFFICE VISIT (OUTPATIENT)
Dept: INTERNAL MEDICINE | Facility: CLINIC | Age: 68
End: 2023-03-30
Payer: MEDICARE

## 2023-03-30 VITALS
HEIGHT: 65 IN | WEIGHT: 162.5 LBS | DIASTOLIC BLOOD PRESSURE: 72 MMHG | RESPIRATION RATE: 14 BRPM | BODY MASS INDEX: 27.07 KG/M2 | SYSTOLIC BLOOD PRESSURE: 138 MMHG | HEART RATE: 97 BPM

## 2023-03-30 DIAGNOSIS — E66.3 OVERWEIGHT (BMI 25.0-29.9): ICD-10-CM

## 2023-03-30 DIAGNOSIS — Q21.10 ASD (ATRIAL SEPTAL DEFECT): ICD-10-CM

## 2023-03-30 DIAGNOSIS — Z12.83 SKIN CANCER SCREENING: ICD-10-CM

## 2023-03-30 DIAGNOSIS — Z00.00 ENCOUNTER FOR PREVENTIVE HEALTH EXAMINATION: ICD-10-CM

## 2023-03-30 DIAGNOSIS — R07.89 CHEST WALL TENDERNESS: ICD-10-CM

## 2023-03-30 DIAGNOSIS — I10 HYPERTENSION, UNSPECIFIED TYPE: ICD-10-CM

## 2023-03-30 DIAGNOSIS — E11.9 CONTROLLED TYPE 2 DIABETES MELLITUS WITHOUT COMPLICATION, WITHOUT LONG-TERM CURRENT USE OF INSULIN: ICD-10-CM

## 2023-03-30 DIAGNOSIS — F32.5 MAJOR DEPRESSIVE DISORDER WITH SINGLE EPISODE, IN REMISSION: ICD-10-CM

## 2023-03-30 DIAGNOSIS — J34.89 NASAL OBSTRUCTION: ICD-10-CM

## 2023-03-30 DIAGNOSIS — Z78.9 FEMALE-TO-MALE TRANSGENDER PERSON: ICD-10-CM

## 2023-03-30 DIAGNOSIS — F07.81 POST CONCUSSION SYNDROME: ICD-10-CM

## 2023-03-30 DIAGNOSIS — Z00.00 ENCOUNTER FOR MEDICARE ANNUAL WELLNESS EXAM: Primary | ICD-10-CM

## 2023-03-30 DIAGNOSIS — G95.89 MYELOMALACIA: ICD-10-CM

## 2023-03-30 DIAGNOSIS — F41.1 GENERALIZED ANXIETY DISORDER: ICD-10-CM

## 2023-03-30 DIAGNOSIS — E55.9 VITAMIN D DEFICIENCY: ICD-10-CM

## 2023-03-30 DIAGNOSIS — E78.5 DYSLIPIDEMIA: ICD-10-CM

## 2023-03-30 DIAGNOSIS — Z86.73 H/O: CVA (CEREBROVASCULAR ACCIDENT): ICD-10-CM

## 2023-03-30 PROBLEM — G93.6 CYTOTOXIC CEREBRAL EDEMA: Status: RESOLVED | Noted: 2021-04-09 | Resolved: 2023-03-30

## 2023-03-30 PROBLEM — G44.52 NEW DAILY PERSISTENT HEADACHE: Status: RESOLVED | Noted: 2021-04-10 | Resolved: 2023-03-30

## 2023-03-30 PROBLEM — I63.331 CEREBRAL INFARCTION DUE TO THROMBOSIS OF RIGHT POSTERIOR CEREBRAL ARTERY: Status: RESOLVED | Noted: 2021-04-08 | Resolved: 2023-03-30

## 2023-03-30 PROBLEM — E11.65 UNCONTROLLED TYPE 2 DIABETES MELLITUS WITH HYPERGLYCEMIA: Status: RESOLVED | Noted: 2021-04-09 | Resolved: 2023-03-30

## 2023-03-30 PROBLEM — M54.41 ACUTE BILATERAL LOW BACK PAIN WITH RIGHT-SIDED SCIATICA: Status: RESOLVED | Noted: 2017-07-13 | Resolved: 2023-03-30

## 2023-03-30 PROBLEM — R47.89 FLUENCY DISORDER: Status: RESOLVED | Noted: 2021-04-28 | Resolved: 2023-03-30

## 2023-03-30 PROBLEM — G43.719 INTRACTABLE CHRONIC MIGRAINE WITHOUT AURA AND WITHOUT STATUS MIGRAINOSUS: Status: RESOLVED | Noted: 2018-07-10 | Resolved: 2023-03-30

## 2023-03-30 PROBLEM — R74.8 ELEVATED LIVER ENZYMES: Status: RESOLVED | Noted: 2021-04-10 | Resolved: 2023-03-30

## 2023-03-30 PROCEDURE — 3075F SYST BP GE 130 - 139MM HG: CPT | Mod: HCNC,CPTII,S$GLB, | Performed by: INTERNAL MEDICINE

## 2023-03-30 PROCEDURE — 1101F PR PT FALLS ASSESS DOC 0-1 FALLS W/OUT INJ PAST YR: ICD-10-PCS | Mod: HCNC,CPTII,S$GLB, | Performed by: INTERNAL MEDICINE

## 2023-03-30 PROCEDURE — 4010F PR ACE/ARB THEARPY RXD/TAKEN: ICD-10-PCS | Mod: HCNC,CPTII,S$GLB, | Performed by: INTERNAL MEDICINE

## 2023-03-30 PROCEDURE — 3008F PR BODY MASS INDEX (BMI) DOCUMENTED: ICD-10-PCS | Mod: HCNC,CPTII,S$GLB, | Performed by: INTERNAL MEDICINE

## 2023-03-30 PROCEDURE — 1170F PR FUNCTIONAL STATUS ASSESSED: ICD-10-PCS | Mod: HCNC,CPTII,S$GLB, | Performed by: INTERNAL MEDICINE

## 2023-03-30 PROCEDURE — 3078F DIAST BP <80 MM HG: CPT | Mod: HCNC,CPTII,S$GLB, | Performed by: INTERNAL MEDICINE

## 2023-03-30 PROCEDURE — 3288F FALL RISK ASSESSMENT DOCD: CPT | Mod: HCNC,CPTII,S$GLB, | Performed by: INTERNAL MEDICINE

## 2023-03-30 PROCEDURE — 1160F PR REVIEW ALL MEDS BY PRESCRIBER/CLIN PHARMACIST DOCUMENTED: ICD-10-PCS | Mod: HCNC,CPTII,S$GLB, | Performed by: INTERNAL MEDICINE

## 2023-03-30 PROCEDURE — 3008F BODY MASS INDEX DOCD: CPT | Mod: HCNC,CPTII,S$GLB, | Performed by: INTERNAL MEDICINE

## 2023-03-30 PROCEDURE — 1160F RVW MEDS BY RX/DR IN RCRD: CPT | Mod: HCNC,CPTII,S$GLB, | Performed by: INTERNAL MEDICINE

## 2023-03-30 PROCEDURE — 1125F AMNT PAIN NOTED PAIN PRSNT: CPT | Mod: HCNC,CPTII,S$GLB, | Performed by: INTERNAL MEDICINE

## 2023-03-30 PROCEDURE — 1159F PR MEDICATION LIST DOCUMENTED IN MEDICAL RECORD: ICD-10-PCS | Mod: HCNC,CPTII,S$GLB, | Performed by: INTERNAL MEDICINE

## 2023-03-30 PROCEDURE — 3288F PR FALLS RISK ASSESSMENT DOCUMENTED: ICD-10-PCS | Mod: HCNC,CPTII,S$GLB, | Performed by: INTERNAL MEDICINE

## 2023-03-30 PROCEDURE — G9919 SCRN ND POS ND PROV OF REC: HCPCS | Mod: HCNC,CPTII,S$GLB, | Performed by: INTERNAL MEDICINE

## 2023-03-30 PROCEDURE — G0402 INITIAL PREVENTIVE EXAM: HCPCS | Mod: HCNC,S$GLB,, | Performed by: INTERNAL MEDICINE

## 2023-03-30 PROCEDURE — G9919 PR SCREENING AND POSITIVE: ICD-10-PCS | Mod: HCNC,CPTII,S$GLB, | Performed by: INTERNAL MEDICINE

## 2023-03-30 PROCEDURE — 99999 PR PBB SHADOW E&M-EST. PATIENT-LVL V: ICD-10-PCS | Mod: PBBFAC,HCNC,,

## 2023-03-30 PROCEDURE — 1157F ADVNC CARE PLAN IN RCRD: CPT | Mod: HCNC,CPTII,S$GLB, | Performed by: INTERNAL MEDICINE

## 2023-03-30 PROCEDURE — 3075F PR MOST RECENT SYSTOLIC BLOOD PRESS GE 130-139MM HG: ICD-10-PCS | Mod: HCNC,CPTII,S$GLB, | Performed by: INTERNAL MEDICINE

## 2023-03-30 PROCEDURE — 1159F MED LIST DOCD IN RCRD: CPT | Mod: HCNC,CPTII,S$GLB, | Performed by: INTERNAL MEDICINE

## 2023-03-30 PROCEDURE — 99999 PR PBB SHADOW E&M-EST. PATIENT-LVL V: CPT | Mod: PBBFAC,HCNC,,

## 2023-03-30 PROCEDURE — 1101F PT FALLS ASSESS-DOCD LE1/YR: CPT | Mod: HCNC,CPTII,S$GLB, | Performed by: INTERNAL MEDICINE

## 2023-03-30 PROCEDURE — 1157F PR ADVANCE CARE PLAN OR EQUIV PRESENT IN MEDICAL RECORD: ICD-10-PCS | Mod: HCNC,CPTII,S$GLB, | Performed by: INTERNAL MEDICINE

## 2023-03-30 PROCEDURE — 1170F FXNL STATUS ASSESSED: CPT | Mod: HCNC,CPTII,S$GLB, | Performed by: INTERNAL MEDICINE

## 2023-03-30 PROCEDURE — 4010F ACE/ARB THERAPY RXD/TAKEN: CPT | Mod: HCNC,CPTII,S$GLB, | Performed by: INTERNAL MEDICINE

## 2023-03-30 PROCEDURE — 3078F PR MOST RECENT DIASTOLIC BLOOD PRESSURE < 80 MM HG: ICD-10-PCS | Mod: HCNC,CPTII,S$GLB, | Performed by: INTERNAL MEDICINE

## 2023-03-30 PROCEDURE — G0402 PR WELCOME MEDICARE PREVENTIVE VISIT NEW ENROLLEE: ICD-10-PCS | Mod: HCNC,S$GLB,, | Performed by: INTERNAL MEDICINE

## 2023-03-30 PROCEDURE — 1125F PR PAIN SEVERITY QUANTIFIED, PAIN PRESENT: ICD-10-PCS | Mod: HCNC,CPTII,S$GLB, | Performed by: INTERNAL MEDICINE

## 2023-03-30 RX ORDER — TESTOSTERONE CYPIONATE 200 MG/ML
200 INJECTION, SOLUTION INTRAMUSCULAR
Qty: 10 ML | Refills: 3 | Status: SHIPPED | OUTPATIENT
Start: 2023-03-30 | End: 2023-12-25

## 2023-03-30 NOTE — PATIENT INSTRUCTIONS
Counseling and Referral of Other Preventative  (Italic type indicates deductible and co-insurance are waived)    Patient Name: Mane Ramires  Today's Date: 3/30/2023    Health Maintenance         Date Due Completion Date    Shingles Vaccine (1 of 2) Due-considering   ---    COVID-19 Vaccine (2 - Booster for Marco A series) Due   9/15/2021    Pneumococcal Vaccines (Age 65+) (2 - PCV) Due   6/28/2021    Influenza Vaccine (1) Due   10/29/2021    Eye Exam Due-scheduled   11/16/2021    Hemoglobin A1c 06/20/2023 12/20/2022    Lipid Panel 08/10/2023 8/10/2022    Diabetes Urine Screening 08/15/2023 8/15/2022    Foot Exam 08/15/2023 8/15/2022    Aspirin/Antiplatelet Therapy 02/01/2024 2/1/2023    High Dose Statin 02/09/2024 2/9/2023    TETANUS VACCINE    Referral to breast surgery    Colonoscopy- due- cardiology referral given for clearance    ENT- referral given             05/17/2027 5/17/2017          No orders of the defined types were placed in this encounter.      The following information is provided to all patients.  This information is to help you find resources for any of the problems found today that may be affecting your health:                Living healthy guide: www.[facility].[place].gov      Understanding Diabetes: www.diabetes.org      Eating healthy: www.cdc.gov/healthyweight      CDC home safety checklist: www.cdc.gov/steadi/patient.html      Agency on Aging: www.goea.[place].gov      Alcoholics anonymous (AA): www.aa.org      Physical Activity: www.laurent.nih.gov/zw5siqg      Tobacco use: www.quitwithusla.org

## 2023-03-31 ENCOUNTER — OFFICE VISIT (OUTPATIENT)
Dept: OTOLARYNGOLOGY | Facility: CLINIC | Age: 68
End: 2023-03-31
Payer: MEDICARE

## 2023-03-31 VITALS
BODY MASS INDEX: 27.29 KG/M2 | HEART RATE: 72 BPM | SYSTOLIC BLOOD PRESSURE: 118 MMHG | WEIGHT: 163.81 LBS | DIASTOLIC BLOOD PRESSURE: 79 MMHG | HEIGHT: 65 IN

## 2023-03-31 DIAGNOSIS — J34.89 NASAL OBSTRUCTION: ICD-10-CM

## 2023-03-31 DIAGNOSIS — J31.0 CHRONIC RHINITIS: Primary | ICD-10-CM

## 2023-03-31 PROCEDURE — 1125F PR PAIN SEVERITY QUANTIFIED, PAIN PRESENT: ICD-10-PCS | Mod: HCNC,CPTII,S$GLB, | Performed by: STUDENT IN AN ORGANIZED HEALTH CARE EDUCATION/TRAINING PROGRAM

## 2023-03-31 PROCEDURE — 1160F PR REVIEW ALL MEDS BY PRESCRIBER/CLIN PHARMACIST DOCUMENTED: ICD-10-PCS | Mod: HCNC,CPTII,S$GLB, | Performed by: STUDENT IN AN ORGANIZED HEALTH CARE EDUCATION/TRAINING PROGRAM

## 2023-03-31 PROCEDURE — 1125F AMNT PAIN NOTED PAIN PRSNT: CPT | Mod: HCNC,CPTII,S$GLB, | Performed by: STUDENT IN AN ORGANIZED HEALTH CARE EDUCATION/TRAINING PROGRAM

## 2023-03-31 PROCEDURE — 4010F ACE/ARB THERAPY RXD/TAKEN: CPT | Mod: HCNC,CPTII,S$GLB, | Performed by: STUDENT IN AN ORGANIZED HEALTH CARE EDUCATION/TRAINING PROGRAM

## 2023-03-31 PROCEDURE — 99999 PR PBB SHADOW E&M-EST. PATIENT-LVL V: CPT | Mod: PBBFAC,HCNC,, | Performed by: STUDENT IN AN ORGANIZED HEALTH CARE EDUCATION/TRAINING PROGRAM

## 2023-03-31 PROCEDURE — 3008F PR BODY MASS INDEX (BMI) DOCUMENTED: ICD-10-PCS | Mod: HCNC,CPTII,S$GLB, | Performed by: STUDENT IN AN ORGANIZED HEALTH CARE EDUCATION/TRAINING PROGRAM

## 2023-03-31 PROCEDURE — 1160F RVW MEDS BY RX/DR IN RCRD: CPT | Mod: HCNC,CPTII,S$GLB, | Performed by: STUDENT IN AN ORGANIZED HEALTH CARE EDUCATION/TRAINING PROGRAM

## 2023-03-31 PROCEDURE — 4010F PR ACE/ARB THEARPY RXD/TAKEN: ICD-10-PCS | Mod: HCNC,CPTII,S$GLB, | Performed by: STUDENT IN AN ORGANIZED HEALTH CARE EDUCATION/TRAINING PROGRAM

## 2023-03-31 PROCEDURE — 3078F DIAST BP <80 MM HG: CPT | Mod: HCNC,CPTII,S$GLB, | Performed by: STUDENT IN AN ORGANIZED HEALTH CARE EDUCATION/TRAINING PROGRAM

## 2023-03-31 PROCEDURE — 3074F PR MOST RECENT SYSTOLIC BLOOD PRESSURE < 130 MM HG: ICD-10-PCS | Mod: HCNC,CPTII,S$GLB, | Performed by: STUDENT IN AN ORGANIZED HEALTH CARE EDUCATION/TRAINING PROGRAM

## 2023-03-31 PROCEDURE — 1159F MED LIST DOCD IN RCRD: CPT | Mod: HCNC,CPTII,S$GLB, | Performed by: STUDENT IN AN ORGANIZED HEALTH CARE EDUCATION/TRAINING PROGRAM

## 2023-03-31 PROCEDURE — 1159F PR MEDICATION LIST DOCUMENTED IN MEDICAL RECORD: ICD-10-PCS | Mod: HCNC,CPTII,S$GLB, | Performed by: STUDENT IN AN ORGANIZED HEALTH CARE EDUCATION/TRAINING PROGRAM

## 2023-03-31 PROCEDURE — 3074F SYST BP LT 130 MM HG: CPT | Mod: HCNC,CPTII,S$GLB, | Performed by: STUDENT IN AN ORGANIZED HEALTH CARE EDUCATION/TRAINING PROGRAM

## 2023-03-31 PROCEDURE — 3008F BODY MASS INDEX DOCD: CPT | Mod: HCNC,CPTII,S$GLB, | Performed by: STUDENT IN AN ORGANIZED HEALTH CARE EDUCATION/TRAINING PROGRAM

## 2023-03-31 PROCEDURE — 1157F ADVNC CARE PLAN IN RCRD: CPT | Mod: HCNC,CPTII,S$GLB, | Performed by: STUDENT IN AN ORGANIZED HEALTH CARE EDUCATION/TRAINING PROGRAM

## 2023-03-31 PROCEDURE — 99203 OFFICE O/P NEW LOW 30 MIN: CPT | Mod: 25,HCNC,S$GLB, | Performed by: STUDENT IN AN ORGANIZED HEALTH CARE EDUCATION/TRAINING PROGRAM

## 2023-03-31 PROCEDURE — 99999 PR PBB SHADOW E&M-EST. PATIENT-LVL V: ICD-10-PCS | Mod: PBBFAC,HCNC,, | Performed by: STUDENT IN AN ORGANIZED HEALTH CARE EDUCATION/TRAINING PROGRAM

## 2023-03-31 PROCEDURE — 3078F PR MOST RECENT DIASTOLIC BLOOD PRESSURE < 80 MM HG: ICD-10-PCS | Mod: HCNC,CPTII,S$GLB, | Performed by: STUDENT IN AN ORGANIZED HEALTH CARE EDUCATION/TRAINING PROGRAM

## 2023-03-31 PROCEDURE — 31231 PR NASAL ENDOSCOPY, DX: ICD-10-PCS | Mod: HCNC,S$GLB,, | Performed by: STUDENT IN AN ORGANIZED HEALTH CARE EDUCATION/TRAINING PROGRAM

## 2023-03-31 PROCEDURE — 1157F PR ADVANCE CARE PLAN OR EQUIV PRESENT IN MEDICAL RECORD: ICD-10-PCS | Mod: HCNC,CPTII,S$GLB, | Performed by: STUDENT IN AN ORGANIZED HEALTH CARE EDUCATION/TRAINING PROGRAM

## 2023-03-31 PROCEDURE — 99203 PR OFFICE/OUTPT VISIT, NEW, LEVL III, 30-44 MIN: ICD-10-PCS | Mod: 25,HCNC,S$GLB, | Performed by: STUDENT IN AN ORGANIZED HEALTH CARE EDUCATION/TRAINING PROGRAM

## 2023-03-31 PROCEDURE — 31231 NASAL ENDOSCOPY DX: CPT | Mod: HCNC,S$GLB,, | Performed by: STUDENT IN AN ORGANIZED HEALTH CARE EDUCATION/TRAINING PROGRAM

## 2023-04-04 ENCOUNTER — OFFICE VISIT (OUTPATIENT)
Dept: SURGERY | Facility: CLINIC | Age: 68
End: 2023-04-04
Payer: MEDICARE

## 2023-04-04 VITALS
HEIGHT: 65 IN | WEIGHT: 160 LBS | DIASTOLIC BLOOD PRESSURE: 70 MMHG | SYSTOLIC BLOOD PRESSURE: 122 MMHG | BODY MASS INDEX: 26.66 KG/M2 | HEART RATE: 75 BPM

## 2023-04-04 DIAGNOSIS — Z12.39 ENCOUNTER FOR SCREENING BREAST EXAMINATION: ICD-10-CM

## 2023-04-04 DIAGNOSIS — R07.89 CHEST WALL TENDERNESS: ICD-10-CM

## 2023-04-04 DIAGNOSIS — Z90.13 S/P BILATERAL MASTECTOMY: Primary | ICD-10-CM

## 2023-04-04 PROCEDURE — 99204 OFFICE O/P NEW MOD 45 MIN: CPT | Mod: HCNC,S$GLB,, | Performed by: NURSE PRACTITIONER

## 2023-04-04 PROCEDURE — 1157F PR ADVANCE CARE PLAN OR EQUIV PRESENT IN MEDICAL RECORD: ICD-10-PCS | Mod: HCNC,CPTII,S$GLB, | Performed by: NURSE PRACTITIONER

## 2023-04-04 PROCEDURE — 4010F PR ACE/ARB THEARPY RXD/TAKEN: ICD-10-PCS | Mod: HCNC,CPTII,S$GLB, | Performed by: NURSE PRACTITIONER

## 2023-04-04 PROCEDURE — 3078F PR MOST RECENT DIASTOLIC BLOOD PRESSURE < 80 MM HG: ICD-10-PCS | Mod: HCNC,CPTII,S$GLB, | Performed by: NURSE PRACTITIONER

## 2023-04-04 PROCEDURE — 4010F ACE/ARB THERAPY RXD/TAKEN: CPT | Mod: HCNC,CPTII,S$GLB, | Performed by: NURSE PRACTITIONER

## 2023-04-04 PROCEDURE — 1125F PR PAIN SEVERITY QUANTIFIED, PAIN PRESENT: ICD-10-PCS | Mod: HCNC,CPTII,S$GLB, | Performed by: NURSE PRACTITIONER

## 2023-04-04 PROCEDURE — 1125F AMNT PAIN NOTED PAIN PRSNT: CPT | Mod: HCNC,CPTII,S$GLB, | Performed by: NURSE PRACTITIONER

## 2023-04-04 PROCEDURE — 3008F PR BODY MASS INDEX (BMI) DOCUMENTED: ICD-10-PCS | Mod: HCNC,CPTII,S$GLB, | Performed by: NURSE PRACTITIONER

## 2023-04-04 PROCEDURE — 99999 PR PBB SHADOW E&M-EST. PATIENT-LVL III: ICD-10-PCS | Mod: PBBFAC,HCNC,, | Performed by: NURSE PRACTITIONER

## 2023-04-04 PROCEDURE — 3288F PR FALLS RISK ASSESSMENT DOCUMENTED: ICD-10-PCS | Mod: HCNC,CPTII,S$GLB, | Performed by: NURSE PRACTITIONER

## 2023-04-04 PROCEDURE — 1101F PT FALLS ASSESS-DOCD LE1/YR: CPT | Mod: HCNC,CPTII,S$GLB, | Performed by: NURSE PRACTITIONER

## 2023-04-04 PROCEDURE — 1157F ADVNC CARE PLAN IN RCRD: CPT | Mod: HCNC,CPTII,S$GLB, | Performed by: NURSE PRACTITIONER

## 2023-04-04 PROCEDURE — 3288F FALL RISK ASSESSMENT DOCD: CPT | Mod: HCNC,CPTII,S$GLB, | Performed by: NURSE PRACTITIONER

## 2023-04-04 PROCEDURE — 99204 PR OFFICE/OUTPT VISIT, NEW, LEVL IV, 45-59 MIN: ICD-10-PCS | Mod: HCNC,S$GLB,, | Performed by: NURSE PRACTITIONER

## 2023-04-04 PROCEDURE — 3078F DIAST BP <80 MM HG: CPT | Mod: HCNC,CPTII,S$GLB, | Performed by: NURSE PRACTITIONER

## 2023-04-04 PROCEDURE — 1101F PR PT FALLS ASSESS DOC 0-1 FALLS W/OUT INJ PAST YR: ICD-10-PCS | Mod: HCNC,CPTII,S$GLB, | Performed by: NURSE PRACTITIONER

## 2023-04-04 PROCEDURE — 99999 PR PBB SHADOW E&M-EST. PATIENT-LVL III: CPT | Mod: PBBFAC,HCNC,, | Performed by: NURSE PRACTITIONER

## 2023-04-04 PROCEDURE — 3074F SYST BP LT 130 MM HG: CPT | Mod: HCNC,CPTII,S$GLB, | Performed by: NURSE PRACTITIONER

## 2023-04-04 PROCEDURE — 3074F PR MOST RECENT SYSTOLIC BLOOD PRESSURE < 130 MM HG: ICD-10-PCS | Mod: HCNC,CPTII,S$GLB, | Performed by: NURSE PRACTITIONER

## 2023-04-04 PROCEDURE — 3008F BODY MASS INDEX DOCD: CPT | Mod: HCNC,CPTII,S$GLB, | Performed by: NURSE PRACTITIONER

## 2023-04-04 NOTE — PROGRESS NOTES
[facility] [place]  Department of Surgery      REFERRING PROVIDER: Sharon Peralta NP  [address] [facility] [place] [address]    Chief Complaint: Annual Exam (Np L breast eval/ cbe)      Subjective:      Patient ID: Mane Ramires is a 67 y.o. male who presents with left breast tenderness. Patient was very sick about 2 months ago. At that time he noticed left chest tenderness. Denies trauma to the chest wall. Denies skin changes or palpating a mass. Patient reports the tenderness has continued without significant improvement. Denies any alleviating factors. Patient does have a history of bilateral mastectomy with Dr. Yi on 2/16/2015. Final pathology revealed bilateral ADH. Patient denies a personal history of breast cancer. Patient does have significant family history of breast cancer so is concerned due to new chest complaints.     Family History:   Sister - Breast Cancer   Maternal 2nd cousin - Breast Cancer (diagnosed at 27)  Paternal 2nd Cousin - Breast Cancer (diagnosed at 27)  Paternal Cousin - Breast Cancer (diagnosed in her 60's)  Paternal Cousin - Breast Cancer (diagnosed in her late 40's early 50's)    Past Medical History:   Diagnosis Date    Accident     fell from roof with TBI (word finding issues personality changes, memory deficets), R rib fractures, clavicle fx    Allergy     Anxiety     ASD (atrial septal defect)     noted on ECHO 6/16    Cervical stenosis of spine     noted on 6/15 MRI    CTS (carpal tunnel syndrome)     mild-mod on 4/17 NCS    DDD (degenerative disc disease), cervical 10/2014    C5-6 and C6-C7    Depression     Diabetes     JEEVAN (generalized anxiety disorder)     Gender identity disorder     H/O cardiovascular stress test     12/14 normal    Herpes simplex type 2 infection     History of atypical hyperplasia of breast     B precancer lesions    Hypertension     Hypertensive retinopathy     IGT (impaired glucose tolerance)     Myelomalacia     c-spine  noted on 8/15 MRI    OA (osteoarthritis)     Pineal gland cyst     TBI (traumatic brain injury)     after falling off a roof in 2003     Past Surgical History:   Procedure Laterality Date    AMPUTATION      L index finger    APPENDECTOMY  1969    BILATERAL SALPINGOOPHORECTOMY  02/2015    BIOPSY OF LIVER WITH ULTRASOUND GUIDANCE N/A 12/18/2018    Procedure: BIOPSY, LIVER, WITH US GUIDANCE;  Surgeon: [facility] Diagnostic Provider;  Location: [facility] OR [address] [place] [address];  Service: Radiology;  Laterality: N/A;  U/S GUIDED LIVER BIOPSY.  12/18/2018.  DOSC 7:30 AM  PROCEDURE 9 AM.  DR NITHIN GOLEDN / MARLON NUR.  DB 12/14/18 3:20P    CARPAL TUNNEL RELEASE  12/2017    right    CERVICAL FUSION  10/2014    C5-C6 and C6-C7    CHOLECYSTECTOMY      ENDOSCOPIC NASAL SEPTOPLASTY N/A 8/23/2018    Procedure: SEPTOPLASTY, NASAL, ENDOSCOPIC;  Surgeon: Reji Arce III, MD;  Location: [facility] OR [address] [place] [address];  Service: ENT;  Laterality: N/A;    FINGER AMPUTATION Left     FRACTURE SURGERY Right 2003    rib fractures    HYSTERECTOMY  1984    MIGUEL    MASTECTOMY  02/2015    RECONSTRUCTION OF NOSE N/A 8/23/2018    Procedure: RECONSTRUCTION, NOSE;  Surgeon: Reji Arce III, MD;  Location: [facility] OR [address] [place] [address];  Service: ENT;  Laterality: N/A;    ROTATOR CUFF REPAIR Right 2019    UPPER GASTROINTESTINAL ENDOSCOPY  09/06/2017    Dr. Ayon     Current Outpatient Medications on File Prior to Visit   Medication Sig Dispense Refill    aspirin (ECOTRIN) 81 MG EC tablet Take 81 mg by mouth once daily.      blood sugar diagnostic Strp To check BG QD times daily, to use with insurance preferred meter 100 each 5    blood-glucose meter kit As directed 1 each 0    carboxymethylcellulose (REFRESH PLUS) 0.5 % Dpet 1 drop nightly.      diclofenac sodium (VOLTAREN) 1 % Gel SMARTSIG:Gram(s) Topical As Directed      DULoxetine (CYMBALTA) 30 MG capsule TAKE 1 CAPSULE BY MOUTH EVERY DAY 90 capsule 1    fluticasone propionate (FLONASE) 50 mcg/actuation nasal spray USE 2  "SPRAYS IN EACH NOSTRIL ONCE DAILY 16 mL 3    lancets Misc To check BG QD times daily, to use with insurance preferred meter 100 each 5    levocetirizine (XYZAL) 5 MG tablet TAKE 1 TABLET BY MOUTH EVERY NIGHT AS NEEDED FOR ALLERGIES 90 tablet 2    metoprolol succinate (TOPROL-XL) 25 MG 24 hr tablet Take 1 tablet (25 mg total) by mouth once daily. 90 tablet 1    naproxen (NAPROSYN) 500 MG tablet TAKE 1 TABLET BY MOUTH TWICE A DAY 60 tablet 1    needle, disp, 18 G (BD REGULAR BEVEL NEEDLES) 18 gauge x 1" Ndle USE TO INJECT 0.5ML OF TESTOSTERONE INTO THE MUCLE EVERY 14 DAYS 10 each 3    needle, disp, 25 gauge (BD PRECISIONGLIDE) 25 gauge x 1" Ndle USE TO DRAW UP 0.5ML OF TESTERONE EVERY 14 DAYS 10 each 3    rosuvastatin (CRESTOR) 5 MG tablet Take 1 tablet (5 mg total) by mouth once daily. 90 tablet 0    testosterone cypionate (DEPOTESTOTERONE CYPIONATE) 200 mg/mL injection Inject 1 mL (200 mg total) into the muscle every 14 (fourteen) days. 10 mL 3    valsartan (DIOVAN) 160 MG tablet TAKE 1 TABLET(160 MG) BY MOUTH EVERY DAY. 90 tablet 1     No current facility-administered medications on file prior to visit.     Social History     Socioeconomic History    Marital status: Single   Occupational History    Occupation: Artist   Tobacco Use    Smoking status: Former     Packs/day: 0.25     Years: 2.00     Pack years: 0.50     Types: Cigarettes     Start date:      Quit date: 2002     Years since quittin.4     Passive exposure: Past    Smokeless tobacco: Never    Tobacco comments:     did not smoke regularly   Substance and Sexual Activity    Alcohol use: Yes     Comment: rare     Drug use: Yes     Types: Hydrocodone    Sexual activity: Never     Social Determinants of Health     Financial Resource Strain: Low Risk     Difficulty of Paying Living Expenses: Not hard at all   Food Insecurity: No Food Insecurity    Worried About Running Out of Food in the Last Year: Never true    Ran Out of Food in the Last Year: " "Never true   Transportation Needs: No Transportation Needs    Lack of Transportation (Medical): No    Lack of Transportation (Non-Medical): No   Stress: No Stress Concern Present    Feeling of Stress : Not at all   Social Connections: Unknown    Marital Status:    Housing Stability: Unknown    Unable to Pay for Housing in the Last Year: No    Unstable Housing in the Last Year: No     Family History   Problem Relation Age of Onset    Diabetes Mother     Heart disease Mother     Gallbladder disease Mother     Coronary artery disease Father     Breast cancer Sister     Heart disease Sister     Heart attack Brother     Gallbladder disease Daughter     Craniosynostosis Daughter     Heart disease Maternal Grandmother     Gallbladder disease Maternal Grandmother     Lung cancer Paternal Grandfather     Heart disease Paternal Grandfather         Review of Systems   Constitutional:  Negative for chills, fatigue and fever.   Respiratory:  Negative for cough, shortness of breath and wheezing.    Skin:  Negative for color change, pallor, rash and wound.   Neurological:  Negative for headaches.   Objective:   /70 (BP Location: Left arm, Patient Position: Sitting, BP Method: Medium (Automatic))   Pulse 75   Ht 5' 4.5" (1.638 m)   Wt 72.6 kg (160 lb)   BMI 27.04 kg/m²     Physical Exam   Vitals reviewed.  Constitutional: He is oriented to person, place, and time.   Eyes: Right eye exhibits no discharge. Left eye exhibits no discharge.   Pulmonary/Chest: Effort normal. No respiratory distress. He has no wheezes. Right breast exhibits no mass, no skin change and no tenderness. Left breast exhibits no mass, no skin change and no tenderness.   Bilateral mastectomy incision (well healed)     Focal TTP at the 1 o'clock of the left chest  No discrete mass appreciated  No overlying skin changes   No LAD   Abdominal: Normal appearance.   Musculoskeletal: Normal range of motion. Lymphadenopathy:      Cervical: No cervical " adenopathy.     Neurological: He is alert and oriented to person, place, and time.   Skin: Skin is warm and dry. No erythema. No pallor.       Radiology review: No recent imaging obtained for review during this visit     Assessment:       1. S/P bilateral mastectomy    2. Chest wall tenderness    3. Encounter for screening breast examination        Plan:   On exam, there is focal area that in TTP without skin changes or palpable abnormality. Given family history and, hx of ADH and new focal pain will proceed with Left limited US of area of concern.   Screening mammogram is not recommended in the setting of bilateral mastectomy   Patient can follow up with me as needed for new or worsening concerns or complaints if the chest    The patient is in agreement with the plan. Questions were encouraged and answered to patient's satisfaction. Mane will call our office with any questions or concerns.

## 2023-04-04 NOTE — PROGRESS NOTES
"  Otolaryngology - Head and Neck Surgery New Patient Visit    3/31/2023    Referring Provider: Sharon Peralta NP    No chief complaint on file.    History of Present Illness, Otolaryngology Specialty-Specific Exam, and Assessment and Plan:     Mane Ramires is a 67 y.o. male who presents for evaluation of nasal obstruction and facial pressure. He complains of right sided facial pressure and tenderness over his right nose. He denies significant facial swelling or nasal swelling, purulent nasal drainage, epistaxis, previous nasal trauma, anosmia, or vision changes. He has been treated with zyrtec in the past. He has had allergy testing done in the past and was told he is allergic to unable to recall . He denies previous skullbase surgery but has previous septoplasty and bilateral cal grafts with Dr Arce. He denies snoring. The assessment of quality and severity of symptoms as measured by the SNOT-22 score is 38.    He had a CT of the sinuses done on 8/19/22 which I reviewed along with the associated radiology report.    On exam today, the ears are normal. The oral cavity is clear. The neck is clear. The nasopharynx, hypopharynx, and larynx are normal. Nasal endoscopy reveals midline septum. Hypertrophic inferior turbinates bilaterally. No nasal masses or nasal polyposis. No purulent drainage in the middle meatus. Nasopharynx clear without lesions. Eustachian tubes WNL.     Impression today is chronic rhinitis. I do not appreciate a sinus infection on today's evaluation or on nasal endoscopy. I have recommended that he be evaluated by Dr Arce for his concerns with his batten grafts. We will set up an appointment for him to be evaluated.     Thank you for allowing us to participate in the care of your patient. We will continue to keep you informed of his progress.    Sincerely yours,    Carlos Sloan MD      Objective     Physical Examination  Vitals -  height is 5' 4.5" (1.638 m) and weight is 74.3 " "kg (163 lb 12.8 oz). His blood pressure is 118/79 and his pulse is 72.   Constitutional - General appearance: Normal. Ability to communicate: Normal.  Head & Face - Overall appearance, scars, masses: Normal. Palpation &/or percussion of face: Normal. Salivary glands: Normal. Facial strength: Normal  ENMT - Otoscopic exam: Normal. Assessment of hearing: Normal. External inspection: Normal. Nasal mucosa, septum, turbinates: Abnormal see exam details. Lips, teeth, gums: Normal. Oropharynx: Normal. Pharyngeal walls/pyriform sinus: Normal. Larynx: Normal. Nasopharynx: Normal  Neck - Neck: Normal. Thyroid: Normal  Lymphatic - Palpation of lymph nodes: Normal  Eyes - Ocular mobility: Normal  Respiratory - Inspection of Chest: Normal  Cardiovascular - Peripheral vascular system: Normal  Neurological/Psychiatric - Orientation: Normal    Review of Systems  Review of Systems   Constitutional: Negative.    HENT:  Positive for ear pain.    Eyes:  Positive for photophobia.   Respiratory: Negative.     Cardiovascular: Negative.    Gastrointestinal: Negative.    Genitourinary: Negative.    Musculoskeletal:  Positive for neck pain.   Skin: Negative.    Neurological:  Positive for headaches.   Psychiatric/Behavioral: Negative.        Answers submitted by the patient for this visit:  Review of Symptoms Questionnaire  (Submitted on 3/31/2023)  facial swelling: Yes  sinus pressure : Yes  Sinus infection(s)?: Yes  postnasal drip: Yes  Seasonal Allergies?: Yes  None of these : Yes  None of these: Yes    A complete review of systems was obtained 04/04/2023 and reviewed.  The review of systems is negative for symptoms except as described above.    /79 (BP Location: Left arm, Patient Position: Sitting, BP Method: Large (Automatic))   Pulse 72   Ht 5' 4.5" (1.638 m)   Wt 74.3 kg (163 lb 12.8 oz)   BMI 27.68 kg/m²      Nasal Endoscopy:  3/31/2023    The use of diagnostic nasal endoscopy was considered medically necessary for the " evaluation and visualization of the nasal anatomy for symptoms suggestive of nasal or sinus origin. Physical examination (including a nasal speculum evaluation) did not provide sufficient clinical information to establish a diagnosis, or symptoms did not improve or worsened following treatment.     The nasal cavity was decongested with topical 1% phenylephrine and anesthetized with 4% lidocaine.  A rigid 0-degree endoscope was introduced into the nasal cavity.    The patient was seated in the examination chair. After discussion of risks and benefits, a nasal endoscope was inserted into the nose the endoscope was passed along the left nasal floor to the nasopharynx. It was then passed between the middle and superior meatus, nasal turbinates, nasal septum, nasopharynx and sphenoethmoid region. The nasal endoscope was withdrawn and there was no complications. An identical procedure was performed on the right side. I was present for the entire procedure.The patient tolerated the above procedure well. The findings of this procedure can be found in the dictated note from 3/31/2023 visit.                                Data Reviewed    WBC (K/uL)   Date Value   02/01/2023 9.53     Eosinophil % (%)   Date Value   02/01/2023 1.7     Eos # (K/uL)   Date Value   02/01/2023 0.2     Platelets (K/uL)   Date Value   02/01/2023 295     Glucose (mg/dL)   Date Value   02/01/2023 145 (H)     No results found for: IGE    I independently reviewed the images of the CT sinuses dated 8/19/22. Pertinent findings include midline nasal septum without significant deviation. Well pneumatized maxillary and frontal sinuses. Very sligh right ethmoid opacification. Well pneumatized sphenoid bilaterally.

## 2023-04-05 ENCOUNTER — OFFICE VISIT (OUTPATIENT)
Dept: OTOLARYNGOLOGY | Facility: CLINIC | Age: 68
End: 2023-04-05
Payer: MEDICARE

## 2023-04-05 ENCOUNTER — OFFICE VISIT (OUTPATIENT)
Dept: SURGERY | Facility: CLINIC | Age: 68
End: 2023-04-05
Payer: MEDICARE

## 2023-04-05 ENCOUNTER — HOSPITAL ENCOUNTER (OUTPATIENT)
Dept: RADIOLOGY | Facility: HOSPITAL | Age: 68
Discharge: HOME | End: 2023-04-05
Attending: NURSE PRACTITIONER
Payer: MEDICARE

## 2023-04-05 VITALS — HEIGHT: 65 IN | WEIGHT: 160 LBS | BODY MASS INDEX: 26.66 KG/M2

## 2023-04-05 VITALS
WEIGHT: 162.94 LBS | BODY MASS INDEX: 27.53 KG/M2 | DIASTOLIC BLOOD PRESSURE: 76 MMHG | HEART RATE: 71 BPM | SYSTOLIC BLOOD PRESSURE: 115 MMHG

## 2023-04-05 DIAGNOSIS — Z42.8 ENCOUNTER FOR OTHER PLASTIC AND RECONSTRUCTIVE SURGERY FOLLOWING MEDICAL PROCEDURE OR HEALED INJURY: Primary | ICD-10-CM

## 2023-04-05 DIAGNOSIS — J01.90 ACUTE NON-RECURRENT SINUSITIS, UNSPECIFIED LOCATION: Primary | ICD-10-CM

## 2023-04-05 DIAGNOSIS — R07.89 CHEST WALL TENDERNESS: ICD-10-CM

## 2023-04-05 DIAGNOSIS — Z90.13 S/P MASTECTOMY, BILATERAL: ICD-10-CM

## 2023-04-05 PROCEDURE — 76642 ULTRASOUND BREAST LIMITED: CPT | Mod: 26,HCNC,LT, | Performed by: RADIOLOGY

## 2023-04-05 PROCEDURE — 76642 ULTRASOUND BREAST LIMITED: CPT | Mod: TC,HCNC,LT

## 2023-04-05 PROCEDURE — 1157F PR ADVANCE CARE PLAN OR EQUIV PRESENT IN MEDICAL RECORD: ICD-10-PCS | Mod: CPTII,S$GLB,, | Performed by: OTOLARYNGOLOGY

## 2023-04-05 PROCEDURE — 1157F PR ADVANCE CARE PLAN OR EQUIV PRESENT IN MEDICAL RECORD: ICD-10-PCS | Mod: HCNC,CPTII,S$GLB, | Performed by: PHYSICIAN ASSISTANT

## 2023-04-05 PROCEDURE — 4010F PR ACE/ARB THEARPY RXD/TAKEN: ICD-10-PCS | Mod: HCNC,CPTII,S$GLB, | Performed by: PHYSICIAN ASSISTANT

## 2023-04-05 PROCEDURE — 1160F PR REVIEW ALL MEDS BY PRESCRIBER/CLIN PHARMACIST DOCUMENTED: ICD-10-PCS | Mod: HCNC,CPTII,S$GLB, | Performed by: PHYSICIAN ASSISTANT

## 2023-04-05 PROCEDURE — 99999 PR PBB SHADOW E&M-EST. PATIENT-LVL III: CPT | Mod: PBBFAC,HCNC,, | Performed by: PHYSICIAN ASSISTANT

## 2023-04-05 PROCEDURE — 3008F BODY MASS INDEX DOCD: CPT | Mod: HCNC,CPTII,S$GLB, | Performed by: PHYSICIAN ASSISTANT

## 2023-04-05 PROCEDURE — 76642 US BREAST LEFT LIMITED: ICD-10-PCS | Mod: 26,HCNC,LT, | Performed by: RADIOLOGY

## 2023-04-05 PROCEDURE — 99999 PR PBB SHADOW E&M-EST. PATIENT-LVL III: CPT | Mod: PBBFAC,,, | Performed by: OTOLARYNGOLOGY

## 2023-04-05 PROCEDURE — 1160F PR REVIEW ALL MEDS BY PRESCRIBER/CLIN PHARMACIST DOCUMENTED: ICD-10-PCS | Mod: CPTII,S$GLB,, | Performed by: OTOLARYNGOLOGY

## 2023-04-05 PROCEDURE — 4010F ACE/ARB THERAPY RXD/TAKEN: CPT | Mod: HCNC,CPTII,S$GLB, | Performed by: PHYSICIAN ASSISTANT

## 2023-04-05 PROCEDURE — 1159F MED LIST DOCD IN RCRD: CPT | Mod: HCNC,CPTII,S$GLB, | Performed by: PHYSICIAN ASSISTANT

## 2023-04-05 PROCEDURE — 99999 PR PBB SHADOW E&M-EST. PATIENT-LVL III: ICD-10-PCS | Mod: PBBFAC,,, | Performed by: OTOLARYNGOLOGY

## 2023-04-05 PROCEDURE — 1159F MED LIST DOCD IN RCRD: CPT | Mod: CPTII,S$GLB,, | Performed by: OTOLARYNGOLOGY

## 2023-04-05 PROCEDURE — 99214 OFFICE O/P EST MOD 30 MIN: CPT | Mod: HCNC,S$GLB,, | Performed by: PHYSICIAN ASSISTANT

## 2023-04-05 PROCEDURE — 99214 OFFICE O/P EST MOD 30 MIN: CPT | Mod: S$GLB,,, | Performed by: OTOLARYNGOLOGY

## 2023-04-05 PROCEDURE — 4010F PR ACE/ARB THEARPY RXD/TAKEN: ICD-10-PCS | Mod: CPTII,S$GLB,, | Performed by: OTOLARYNGOLOGY

## 2023-04-05 PROCEDURE — 1159F PR MEDICATION LIST DOCUMENTED IN MEDICAL RECORD: ICD-10-PCS | Mod: HCNC,CPTII,S$GLB, | Performed by: PHYSICIAN ASSISTANT

## 2023-04-05 PROCEDURE — 3074F SYST BP LT 130 MM HG: CPT | Mod: CPTII,S$GLB,, | Performed by: OTOLARYNGOLOGY

## 2023-04-05 PROCEDURE — 3008F PR BODY MASS INDEX (BMI) DOCUMENTED: ICD-10-PCS | Mod: HCNC,CPTII,S$GLB, | Performed by: PHYSICIAN ASSISTANT

## 2023-04-05 PROCEDURE — 3074F PR MOST RECENT SYSTOLIC BLOOD PRESSURE < 130 MM HG: ICD-10-PCS | Mod: CPTII,S$GLB,, | Performed by: OTOLARYNGOLOGY

## 2023-04-05 PROCEDURE — 3078F DIAST BP <80 MM HG: CPT | Mod: CPTII,S$GLB,, | Performed by: OTOLARYNGOLOGY

## 2023-04-05 PROCEDURE — 1126F PR PAIN SEVERITY QUANTIFIED, NO PAIN PRESENT: ICD-10-PCS | Mod: CPTII,S$GLB,, | Performed by: OTOLARYNGOLOGY

## 2023-04-05 PROCEDURE — 1160F RVW MEDS BY RX/DR IN RCRD: CPT | Mod: HCNC,CPTII,S$GLB, | Performed by: PHYSICIAN ASSISTANT

## 2023-04-05 PROCEDURE — 1126F AMNT PAIN NOTED NONE PRSNT: CPT | Mod: HCNC,CPTII,S$GLB, | Performed by: PHYSICIAN ASSISTANT

## 2023-04-05 PROCEDURE — 99999 PR PBB SHADOW E&M-EST. PATIENT-LVL III: ICD-10-PCS | Mod: PBBFAC,HCNC,, | Performed by: PHYSICIAN ASSISTANT

## 2023-04-05 PROCEDURE — 3078F PR MOST RECENT DIASTOLIC BLOOD PRESSURE < 80 MM HG: ICD-10-PCS | Mod: CPTII,S$GLB,, | Performed by: OTOLARYNGOLOGY

## 2023-04-05 PROCEDURE — 1101F PT FALLS ASSESS-DOCD LE1/YR: CPT | Mod: CPTII,S$GLB,, | Performed by: OTOLARYNGOLOGY

## 2023-04-05 PROCEDURE — 1160F RVW MEDS BY RX/DR IN RCRD: CPT | Mod: CPTII,S$GLB,, | Performed by: OTOLARYNGOLOGY

## 2023-04-05 PROCEDURE — 1101F PR PT FALLS ASSESS DOC 0-1 FALLS W/OUT INJ PAST YR: ICD-10-PCS | Mod: CPTII,S$GLB,, | Performed by: OTOLARYNGOLOGY

## 2023-04-05 PROCEDURE — 1126F PR PAIN SEVERITY QUANTIFIED, NO PAIN PRESENT: ICD-10-PCS | Mod: HCNC,CPTII,S$GLB, | Performed by: PHYSICIAN ASSISTANT

## 2023-04-05 PROCEDURE — 4010F ACE/ARB THERAPY RXD/TAKEN: CPT | Mod: CPTII,S$GLB,, | Performed by: OTOLARYNGOLOGY

## 2023-04-05 PROCEDURE — 3288F FALL RISK ASSESSMENT DOCD: CPT | Mod: CPTII,S$GLB,, | Performed by: OTOLARYNGOLOGY

## 2023-04-05 PROCEDURE — 1157F ADVNC CARE PLAN IN RCRD: CPT | Mod: CPTII,S$GLB,, | Performed by: OTOLARYNGOLOGY

## 2023-04-05 PROCEDURE — 3288F PR FALLS RISK ASSESSMENT DOCUMENTED: ICD-10-PCS | Mod: CPTII,S$GLB,, | Performed by: OTOLARYNGOLOGY

## 2023-04-05 PROCEDURE — 3008F PR BODY MASS INDEX (BMI) DOCUMENTED: ICD-10-PCS | Mod: CPTII,S$GLB,, | Performed by: OTOLARYNGOLOGY

## 2023-04-05 PROCEDURE — 1126F AMNT PAIN NOTED NONE PRSNT: CPT | Mod: CPTII,S$GLB,, | Performed by: OTOLARYNGOLOGY

## 2023-04-05 PROCEDURE — 1157F ADVNC CARE PLAN IN RCRD: CPT | Mod: HCNC,CPTII,S$GLB, | Performed by: PHYSICIAN ASSISTANT

## 2023-04-05 PROCEDURE — 99214 PR OFFICE/OUTPT VISIT, EST, LEVL IV, 30-39 MIN: ICD-10-PCS | Mod: HCNC,S$GLB,, | Performed by: PHYSICIAN ASSISTANT

## 2023-04-05 PROCEDURE — 3008F BODY MASS INDEX DOCD: CPT | Mod: CPTII,S$GLB,, | Performed by: OTOLARYNGOLOGY

## 2023-04-05 PROCEDURE — 99214 PR OFFICE/OUTPT VISIT, EST, LEVL IV, 30-39 MIN: ICD-10-PCS | Mod: S$GLB,,, | Performed by: OTOLARYNGOLOGY

## 2023-04-05 PROCEDURE — 1159F PR MEDICATION LIST DOCUMENTED IN MEDICAL RECORD: ICD-10-PCS | Mod: CPTII,S$GLB,, | Performed by: OTOLARYNGOLOGY

## 2023-04-05 NOTE — PROGRESS NOTES
"[facility] [place]  Department of Surgery      Subjective:     Mane Ramires presents the [facility] on 4/5/2023 for consultation regarding 3 dimensional nipple tattoo after top surgery. He is status post bilateral mastectomy with Dr. Yi on 2/16/2015. She has a nice result and is pleased with the results of her breast reconstruction. She denies fever, chills, nausea, vomiting, chest pain or shortness of breath.     Review of patient's allergies indicates:   Allergen Reactions    Sudafed [pseudoephedrine hcl] Other (See Comments)     Heart racing      Codeine Other (See Comments)     Heart racing    Gabapentin      Caused memory loss    Pseudoephedrine     Cortisone Palpitations     Current Outpatient Medications on File Prior to Visit   Medication Sig Dispense Refill    aspirin (ECOTRIN) 81 MG EC tablet Take 81 mg by mouth once daily.      blood sugar diagnostic Strp To check BG QD times daily, to use with insurance preferred meter 100 each 5    blood-glucose meter kit As directed 1 each 0    carboxymethylcellulose (REFRESH PLUS) 0.5 % Dpet 1 drop nightly.      diclofenac sodium (VOLTAREN) 1 % Gel SMARTSIG:Gram(s) Topical As Directed      DULoxetine (CYMBALTA) 30 MG capsule TAKE 1 CAPSULE BY MOUTH EVERY DAY 90 capsule 1    fluticasone propionate (FLONASE) 50 mcg/actuation nasal spray USE 2 SPRAYS IN EACH NOSTRIL ONCE DAILY 16 mL 3    lancets Misc To check BG QD times daily, to use with insurance preferred meter 100 each 5    levocetirizine (XYZAL) 5 MG tablet TAKE 1 TABLET BY MOUTH EVERY NIGHT AS NEEDED FOR ALLERGIES 90 tablet 2    metoprolol succinate (TOPROL-XL) 25 MG 24 hr tablet Take 1 tablet (25 mg total) by mouth once daily. 90 tablet 1    naproxen (NAPROSYN) 500 MG tablet TAKE 1 TABLET BY MOUTH TWICE A DAY 60 tablet 1    needle, disp, 18 G (BD REGULAR BEVEL NEEDLES) 18 gauge x 1" Ndle USE TO INJECT 0.5ML OF TESTOSTERONE INTO THE MUCLE EVERY 14 DAYS 10 each 3    needle, disp, 25 gauge " "(BD PRECISIONGLIDE) 25 gauge x 1" Ndle USE TO DRAW UP 0.5ML OF TESTERONE EVERY 14 DAYS 10 each 3    rosuvastatin (CRESTOR) 5 MG tablet Take 1 tablet (5 mg total) by mouth once daily. 90 tablet 0    testosterone cypionate (DEPOTESTOTERONE CYPIONATE) 200 mg/mL injection Inject 1 mL (200 mg total) into the muscle every 14 (fourteen) days. 10 mL 3    valsartan (DIOVAN) 160 MG tablet TAKE 1 TABLET(160 MG) BY MOUTH EVERY DAY. 90 tablet 1     No current facility-administered medications on file prior to visit.     Patient Active Problem List   Diagnosis    HTN (hypertension)    Vitamin D deficiency    Dyslipidemia    Chronic right-sided low back pain    Generalized anxiety disorder    Major depressive disorder with single episode, in remission    Post concussion syndrome    Nasal obstruction    Shoulder pain    Family history of ASCVD (arteriosclerotic cardiovascular disease)    Risk for coronary artery disease less than 10% in next 10 years    Left shoulder pain    Shoulder stiffness, right    Shoulder weakness    Shoulder stiffness, left    Impaired functional mobility, balance, and endurance    Right sided weakness    Female-to-male transgender person    H/O: CVA (cerebrovascular accident)    Overweight (BMI 25.0-29.9)    Encounter for Medicare annual wellness exam    ASD (atrial septal defect)    Myelomalacia-cervical spine-H/O cervical fusion    Controlled type 2 diabetes mellitus, without long-term current use of insulin    Encounter for preventive health examination     Past Surgical History:   Procedure Laterality Date    AMPUTATION      L index finger    APPENDECTOMY  1969    BILATERAL SALPINGOOPHORECTOMY  02/2015    BIOPSY OF LIVER WITH ULTRASOUND GUIDANCE N/A 12/18/2018    Procedure: BIOPSY, LIVER, WITH US GUIDANCE;  Surgeon: [facility] Diagnostic Provider;  Location: [facility] OR [address] [place] [address];  Service: Radiology;  Laterality: N/A;  U/S GUIDED LIVER BIOPSY.  12/18/2018.  [facility] 7:30 AM  PROCEDURE 9 AM.  DR NITHIN GOLDEN / " MARLON ANTONIO 18 3:20P    CARPAL TUNNEL RELEASE  2017    right    CERVICAL FUSION  10/2014    C5-C6 and C6-C7    CHOLECYSTECTOMY      ENDOSCOPIC NASAL SEPTOPLASTY N/A 2018    Procedure: SEPTOPLASTY, NASAL, ENDOSCOPIC;  Surgeon: Reji Arce III, MD;  Location: [facility] OR [address] [place] [address];  Service: ENT;  Laterality: N/A;    FINGER AMPUTATION Left     FRACTURE SURGERY Right 2003    rib fractures    HYSTERECTOMY  1984    MIGUEL    MASTECTOMY  2015    RECONSTRUCTION OF NOSE N/A 2018    Procedure: RECONSTRUCTION, NOSE;  Surgeon: Reji Arce III, MD;  Location: [facility] OR [address] [place] [address];  Service: ENT;  Laterality: N/A;    ROTATOR CUFF REPAIR Right 2019    UPPER GASTROINTESTINAL ENDOSCOPY  2017    Dr. Ayon     Social History     Socioeconomic History    Marital status: Single   Occupational History    Occupation: Artist   Tobacco Use    Smoking status: Former     Packs/day: 0.25     Years: 2.00     Pack years: 0.50     Types: Cigarettes     Start date:      Quit date: 2002     Years since quittin.4     Passive exposure: Past    Smokeless tobacco: Never    Tobacco comments:     did not smoke regularly   Substance and Sexual Activity    Alcohol use: Yes     Comment: rare     Drug use: Yes     Types: Hydrocodone    Sexual activity: Never     Social Determinants of Health     Financial Resource Strain: Low Risk     Difficulty of Paying Living Expenses: Not hard at all   Food Insecurity: No Food Insecurity    Worried About Running Out of Food in the Last Year: Never true    Ran Out of Food in the Last Year: Never true   Transportation Needs: No Transportation Needs    Lack of Transportation (Medical): No    Lack of Transportation (Non-Medical): No   Stress: No Stress Concern Present    Feeling of Stress : Not at all   Social Connections: Unknown    Marital Status:    Housing Stability: Unknown    Unable to Pay for Housing in the Last Year: No    Unstable Housing in the Last Year:  No       ROS: status post breast reconstruction      Objective:   There were no vitals filed for this visit.    PHYSICAL EXAMINATION:   Physical Exam   Vitals reviewed.  Constitutional: He is oriented to person, place, and time. He appears well-developed. No distress.   HENT:   Head: Normocephalic and atraumatic.   Nose: Nose normal.   Eyes: Right eye exhibits no discharge. Left eye exhibits no discharge. No scleral icterus.   Neck: No tracheal deviation present. No thyromegaly present.   Cardiovascular:  Normal rate, regular rhythm and normal heart sounds.            Pulmonary/Chest: Effort normal and breath sounds normal. No respiratory distress. He exhibits no mass and no edema. Right breast exhibits no mass, no skin change and no tenderness. Left breast exhibits no mass, no skin change and no tenderness. Breasts are symmetrical.   Abdominal: Soft. Normal appearance. He exhibits no distension. There is no abdominal tenderness.   Musculoskeletal: No deformity. Lymphadenopathy:      Cervical: No cervical adenopathy.     Neurological: He is alert and oriented to person, place, and time.   Skin: Skin is warm and dry.     Psychiatric: His behavior is normal. Mood, judgment and thought content normal.      Assessment:     67 y.o. male status post bilateral mastectomy here to discuss options for 3D nipple tattoo.     Plan:     - Patient is pleased with the outcome of his top surgery and is now interested in 3D nipple tattooing.   - Patient is a good candidate for medical tattooing. He is well healed from his last surgical procedure.   - Procedure discussed in detail with the patient as were the risks and possible complications. He understands that the risks include but are not limited to bleeding, scarring, infection, pain, numbness, asymmetry, deformity, allergic reaction, failure to take, infection, skin necrosis, wound dehiscence and need for second stage tattoo.   - Post op instructions were reviewed with verbal  understanding. Print out provided.   - Patient would like to proceed, office staff to coordinate tattoo procedure date at patients convenience. All questions were answered. The patient was advised to call the clinic with any questions or concerns prior to their next visit.       Total time spent with the patient: 30.  20 minutes of face to face consultation and 10 minutes of chart review and coordination of care.

## 2023-04-05 NOTE — PROGRESS NOTES
Five years S/P nasal reconstruction with bilateral  grafts and osteotomies,septo,turbs, right adelita.  He has been doing well overall with good nasal airway though 2 months ago contracted a URI which apparently developed into a sinus infection with right-sided cheek swelling and pain.  He was finally placed on antibiotics which by his description sounds to be Bactrim DS.  He states that after for 5 days his symptoms did begin to merary and have mostly resolved.  Exam: septum and nose straight,valves well supported,airway clear.  Plan:  I have given him literature on saline sinus rinses and described how this to be used in detail.  I have encouraged him to use this daily.  He is also a wood worker and I have encouraged him to continue to wear a respirator which he does regularly.  He will return to clinic p.r.n.

## 2023-04-11 ENCOUNTER — PATIENT MESSAGE (OUTPATIENT)
Dept: ADMINISTRATIVE | Facility: HOSPITAL | Age: 68
End: 2023-04-11
Payer: MEDICARE

## 2023-04-14 ENCOUNTER — OFFICE VISIT (OUTPATIENT)
Dept: SURGERY | Facility: CLINIC | Age: 68
End: 2023-04-14
Payer: MEDICARE

## 2023-04-14 VITALS — WEIGHT: 162 LBS | HEIGHT: 65 IN | BODY MASS INDEX: 26.99 KG/M2

## 2023-04-14 DIAGNOSIS — Z42.8 ENCOUNTER FOR OTHER PLASTIC AND RECONSTRUCTIVE SURGERY FOLLOWING MEDICAL PROCEDURE OR HEALED INJURY: ICD-10-CM

## 2023-04-14 DIAGNOSIS — L81.8 TATTOOS: Primary | ICD-10-CM

## 2023-04-14 DIAGNOSIS — Z90.13 S/P MASTECTOMY, BILATERAL: ICD-10-CM

## 2023-04-14 PROCEDURE — 1157F ADVNC CARE PLAN IN RCRD: CPT | Mod: HCNC,CPTII,S$GLB, | Performed by: PHYSICIAN ASSISTANT

## 2023-04-14 PROCEDURE — 3008F PR BODY MASS INDEX (BMI) DOCUMENTED: ICD-10-PCS | Mod: HCNC,CPTII,S$GLB, | Performed by: PHYSICIAN ASSISTANT

## 2023-04-14 PROCEDURE — 11920 PR CORRECT SKIN COLR DEFCT <6 SQCM: ICD-10-PCS | Mod: HCNC,S$GLB,, | Performed by: PHYSICIAN ASSISTANT

## 2023-04-14 PROCEDURE — 11920 CORRECT SKIN COLOR 6.0 CM/<: CPT | Mod: HCNC,S$GLB,, | Performed by: PHYSICIAN ASSISTANT

## 2023-04-14 PROCEDURE — 99999 PR PBB SHADOW E&M-EST. PATIENT-LVL III: CPT | Mod: PBBFAC,HCNC,, | Performed by: PHYSICIAN ASSISTANT

## 2023-04-14 PROCEDURE — 4010F PR ACE/ARB THEARPY RXD/TAKEN: ICD-10-PCS | Mod: HCNC,CPTII,S$GLB, | Performed by: PHYSICIAN ASSISTANT

## 2023-04-14 PROCEDURE — 1126F AMNT PAIN NOTED NONE PRSNT: CPT | Mod: HCNC,CPTII,S$GLB, | Performed by: PHYSICIAN ASSISTANT

## 2023-04-14 PROCEDURE — 99499 UNLISTED E&M SERVICE: CPT | Mod: HCNC,S$GLB,, | Performed by: PHYSICIAN ASSISTANT

## 2023-04-14 PROCEDURE — 99999 PR PBB SHADOW E&M-EST. PATIENT-LVL III: ICD-10-PCS | Mod: PBBFAC,HCNC,, | Performed by: PHYSICIAN ASSISTANT

## 2023-04-14 PROCEDURE — 1159F MED LIST DOCD IN RCRD: CPT | Mod: HCNC,CPTII,S$GLB, | Performed by: PHYSICIAN ASSISTANT

## 2023-04-14 PROCEDURE — 4010F ACE/ARB THERAPY RXD/TAKEN: CPT | Mod: HCNC,CPTII,S$GLB, | Performed by: PHYSICIAN ASSISTANT

## 2023-04-14 PROCEDURE — 99499 NO LOS: ICD-10-PCS | Mod: HCNC,S$GLB,, | Performed by: PHYSICIAN ASSISTANT

## 2023-04-14 PROCEDURE — 3008F BODY MASS INDEX DOCD: CPT | Mod: HCNC,CPTII,S$GLB, | Performed by: PHYSICIAN ASSISTANT

## 2023-04-14 PROCEDURE — 1160F PR REVIEW ALL MEDS BY PRESCRIBER/CLIN PHARMACIST DOCUMENTED: ICD-10-PCS | Mod: HCNC,CPTII,S$GLB, | Performed by: PHYSICIAN ASSISTANT

## 2023-04-14 PROCEDURE — 1159F PR MEDICATION LIST DOCUMENTED IN MEDICAL RECORD: ICD-10-PCS | Mod: HCNC,CPTII,S$GLB, | Performed by: PHYSICIAN ASSISTANT

## 2023-04-14 PROCEDURE — 1160F RVW MEDS BY RX/DR IN RCRD: CPT | Mod: HCNC,CPTII,S$GLB, | Performed by: PHYSICIAN ASSISTANT

## 2023-04-14 PROCEDURE — 1157F PR ADVANCE CARE PLAN OR EQUIV PRESENT IN MEDICAL RECORD: ICD-10-PCS | Mod: HCNC,CPTII,S$GLB, | Performed by: PHYSICIAN ASSISTANT

## 2023-04-14 PROCEDURE — 1126F PR PAIN SEVERITY QUANTIFIED, NO PAIN PRESENT: ICD-10-PCS | Mod: HCNC,CPTII,S$GLB, | Performed by: PHYSICIAN ASSISTANT

## 2023-04-14 NOTE — PROGRESS NOTES
"[facility]  Department of Surgery    REFERRING PROVIDER: No referring provider defined for this encounter.    Chief Complaint: No chief complaint on file.    Mane Ramires presents to Plastic Surgery Clinic on 4/14/2023 for bilateral 3 dimensional nipple tattoo for chest reconstruction. All incisions well healed. He denies pain, fever, chills, nausea, vomiting, or other systemic signs of infection. He is pleased with the outcome of his chest reconstruction and would like to proceed with tattoos.     Review of patient's allergies indicates:   Allergen Reactions    Sudafed [pseudoephedrine hcl] Other (See Comments)     Heart racing      Codeine Other (See Comments)     Heart racing    Gabapentin      Caused memory loss    Pseudoephedrine     Cortisone Palpitations     Current Outpatient Medications on File Prior to Visit   Medication Sig Dispense Refill    aspirin (ECOTRIN) 81 MG EC tablet Take 81 mg by mouth once daily.      blood sugar diagnostic Strp To check BG QD times daily, to use with insurance preferred meter 100 each 5    blood-glucose meter kit As directed 1 each 0    carboxymethylcellulose (REFRESH PLUS) 0.5 % Dpet 1 drop nightly.      diclofenac sodium (VOLTAREN) 1 % Gel SMARTSIG:Gram(s) Topical As Directed      DULoxetine (CYMBALTA) 30 MG capsule TAKE 1 CAPSULE BY MOUTH EVERY DAY 90 capsule 1    fluticasone propionate (FLONASE) 50 mcg/actuation nasal spray USE 2 SPRAYS IN EACH NOSTRIL ONCE DAILY 16 mL 3    lancets Misc To check BG QD times daily, to use with insurance preferred meter 100 each 5    levocetirizine (XYZAL) 5 MG tablet TAKE 1 TABLET BY MOUTH EVERY NIGHT AS NEEDED FOR ALLERGIES 90 tablet 2    metoprolol succinate (TOPROL-XL) 25 MG 24 hr tablet Take 1 tablet (25 mg total) by mouth once daily. 90 tablet 1    naproxen (NAPROSYN) 500 MG tablet TAKE 1 TABLET BY MOUTH TWICE A DAY 60 tablet 1    needle, disp, 18 G (BD REGULAR BEVEL NEEDLES) 18 gauge x 1" Ndle USE TO INJECT 0.5ML OF " "TESTOSTERONE INTO THE MUCLE EVERY 14 DAYS 10 each 3    needle, disp, 25 gauge (BD PRECISIONGLIDE) 25 gauge x 1" Ndle USE TO DRAW UP 0.5ML OF TESTERONE EVERY 14 DAYS 10 each 3    rosuvastatin (CRESTOR) 5 MG tablet Take 1 tablet (5 mg total) by mouth once daily. 90 tablet 0    testosterone cypionate (DEPOTESTOTERONE CYPIONATE) 200 mg/mL injection Inject 1 mL (200 mg total) into the muscle every 14 (fourteen) days. 10 mL 3    valsartan (DIOVAN) 160 MG tablet TAKE 1 TABLET(160 MG) BY MOUTH EVERY DAY. 90 tablet 1     No current facility-administered medications on file prior to visit.     Patient Active Problem List   Diagnosis    HTN (hypertension)    Vitamin D deficiency    Dyslipidemia    Chronic right-sided low back pain    Generalized anxiety disorder    Major depressive disorder with single episode, in remission    Post concussion syndrome    Nasal obstruction    Shoulder pain    Family history of ASCVD (arteriosclerotic cardiovascular disease)    Risk for coronary artery disease less than 10% in next 10 years    Left shoulder pain    Shoulder stiffness, right    Shoulder weakness    Shoulder stiffness, left    Impaired functional mobility, balance, and endurance    Right sided weakness    Female-to-male transgender person    H/O: CVA (cerebrovascular accident)    Overweight (BMI 25.0-29.9)    Encounter for Medicare annual wellness exam    ASD (atrial septal defect)    Myelomalacia-cervical spine-H/O cervical fusion    Controlled type 2 diabetes mellitus, without long-term current use of insulin    Encounter for preventive health examination     [unfilled]  Social History     Socioeconomic History    Marital status: Single   Occupational History    Occupation: Artist   Tobacco Use    Smoking status: Former     Packs/day: 0.25     Years: 2.00     Pack years: 0.50     Types: Cigarettes     Start date:      Quit date: 2002     Years since quittin.4     Passive exposure: Past    Smokeless tobacco: Never    " Tobacco comments:     did not smoke regularly   Substance and Sexual Activity    Alcohol use: Yes     Comment: rare     Drug use: Yes     Types: Hydrocodone    Sexual activity: Never     Social Determinants of Health     Financial Resource Strain: Low Risk     Difficulty of Paying Living Expenses: Not hard at all   Food Insecurity: No Food Insecurity    Worried About Running Out of Food in the Last Year: Never true    Ran Out of Food in the Last Year: Never true   Transportation Needs: No Transportation Needs    Lack of Transportation (Medical): No    Lack of Transportation (Non-Medical): No   Stress: No Stress Concern Present    Feeling of Stress : Not at all   Social Connections: Unknown    Marital Status:    Housing Stability: Unknown    Unable to Pay for Housing in the Last Year: No    Unstable Housing in the Last Year: No         PROCEDURE NOTE     Procedure for 3 dimensional nipple tattooing was discussed in detail with the patient as were the risks and possible complications. He understands that the risks include but are not limited to bleeding, scarring, infection, pain, asymmetry, allergic reaction, failure to take and need for second stage tattoo. He understands that greater than 50% of patients require a second stage tattoo procedure for better saturation and 3 dimensional shading. After all questions were answered, the patient signed the consent form and that will be scanned into her medical record.     .06 cm circular guide was used for nipple areolar complex placement (1.2 cm total). Patient visualized placement in exam room mirror and agreed to proceed with placement. Colors for nipple and areola were mixed and tested on skin of breast, patient agreed to proceed with colors tested. His chest was then prepped with chlora prep. Bilateral nipple areola complex were then tattooed using 3 dimensional technique. There was positive punctate bleeding noted. Tattoos were cleaned and a tegaderm was  applied to tattoo.   Patient tolerated the procedure well. There were no complications.     Post op instructions and care were discussed in detail with the patient. Tegaderm to remain in place for 3 days then removed and washed with soap and water then apply AquaPhor or Eucerin to tattoos as needed for moisturizer twice daily. Patient voiced understanding. A written copy of post op care was also provided. All questions were answered. He will return to clinic in 4 weeks.     The patient was advised to call the clinic with any questions or concerns prior to their next visit.         Varsha Abel PA-C  Breast Surgery

## 2023-04-24 ENCOUNTER — HOSPITAL ENCOUNTER (EMERGENCY)
Facility: HOSPITAL | Age: 68
Discharge: HOME | End: 2023-04-24
Attending: EMERGENCY MEDICINE
Payer: MEDICARE

## 2023-04-24 VITALS
BODY MASS INDEX: 26.82 KG/M2 | SYSTOLIC BLOOD PRESSURE: 150 MMHG | HEART RATE: 82 BPM | HEIGHT: 65 IN | WEIGHT: 161 LBS | DIASTOLIC BLOOD PRESSURE: 78 MMHG | RESPIRATION RATE: 16 BRPM | TEMPERATURE: 98 F | OXYGEN SATURATION: 95 %

## 2023-04-24 DIAGNOSIS — M47.22 RADICULOPATHY DUE TO CERVICAL SPONDYLOSIS: ICD-10-CM

## 2023-04-24 DIAGNOSIS — M54.2 NECK PAIN: Primary | ICD-10-CM

## 2023-04-24 PROCEDURE — 99285 EMERGENCY DEPT VISIT HI MDM: CPT | Mod: 25,HCNC

## 2023-04-24 PROCEDURE — 63600175 PHARM REV CODE 636 W HCPCS: Mod: HCNC | Performed by: PHYSICIAN ASSISTANT

## 2023-04-24 PROCEDURE — 25000003 PHARM REV CODE 250: Mod: HCNC | Performed by: PHYSICIAN ASSISTANT

## 2023-04-24 PROCEDURE — 96372 THER/PROPH/DIAG INJ SC/IM: CPT | Performed by: PHYSICIAN ASSISTANT

## 2023-04-24 PROCEDURE — 99285 PR EMERGENCY DEPT VISIT,LEVEL V: ICD-10-PCS | Mod: HCNC,,, | Performed by: EMERGENCY MEDICINE

## 2023-04-24 PROCEDURE — 99285 EMERGENCY DEPT VISIT HI MDM: CPT | Mod: HCNC,,, | Performed by: EMERGENCY MEDICINE

## 2023-04-24 RX ORDER — ACETAMINOPHEN 500 MG
1000 TABLET ORAL
Status: COMPLETED | OUTPATIENT
Start: 2023-04-24 | End: 2023-04-24

## 2023-04-24 RX ORDER — IBUPROFEN 800 MG/1
800 TABLET ORAL EVERY 6 HOURS PRN
Qty: 20 TABLET | Refills: 0 | Status: CANCELLED | OUTPATIENT
Start: 2023-04-24

## 2023-04-24 RX ORDER — BACLOFEN 10 MG/1
10 TABLET ORAL 3 TIMES DAILY
Status: DISCONTINUED | OUTPATIENT
Start: 2023-04-24 | End: 2023-04-24

## 2023-04-24 RX ORDER — HYDROMORPHONE HYDROCHLORIDE 1 MG/ML
0.25 INJECTION, SOLUTION INTRAMUSCULAR; INTRAVENOUS; SUBCUTANEOUS
Status: COMPLETED | OUTPATIENT
Start: 2023-04-24 | End: 2023-04-24

## 2023-04-24 RX ORDER — KETOROLAC TROMETHAMINE 10 MG/1
10 TABLET, FILM COATED ORAL
Status: COMPLETED | OUTPATIENT
Start: 2023-04-24 | End: 2023-04-24

## 2023-04-24 RX ORDER — BACLOFEN 10 MG/1
10 TABLET ORAL
Status: COMPLETED | OUTPATIENT
Start: 2023-04-24 | End: 2023-04-24

## 2023-04-24 RX ORDER — IBUPROFEN 800 MG/1
800 TABLET ORAL EVERY 6 HOURS PRN
Qty: 20 TABLET | Refills: 0 | Status: SHIPPED | OUTPATIENT
Start: 2023-04-24 | End: 2023-10-16

## 2023-04-24 RX ORDER — DEXAMETHASONE SODIUM PHOSPHATE 4 MG/ML
8 INJECTION, SOLUTION INTRA-ARTICULAR; INTRALESIONAL; INTRAMUSCULAR; INTRAVENOUS; SOFT TISSUE
Status: DISCONTINUED | OUTPATIENT
Start: 2023-04-24 | End: 2023-04-24

## 2023-04-24 RX ORDER — BACLOFEN 5 MG/1
5 TABLET ORAL
Status: DISCONTINUED | OUTPATIENT
Start: 2023-04-24 | End: 2023-04-24

## 2023-04-24 RX ADMIN — KETOROLAC TROMETHAMINE 10 MG: 10 TABLET, FILM COATED ORAL at 04:04

## 2023-04-24 RX ADMIN — HYDROMORPHONE HYDROCHLORIDE 0.25 MG: 1 INJECTION, SOLUTION INTRAMUSCULAR; INTRAVENOUS; SUBCUTANEOUS at 08:04

## 2023-04-24 RX ADMIN — ACETAMINOPHEN 1000 MG: 500 TABLET ORAL at 08:04

## 2023-04-24 RX ADMIN — BACLOFEN 10 MG: 10 TABLET ORAL at 04:04

## 2023-04-24 NOTE — ED PROVIDER NOTES
Encounter Date: 4/24/2023       History     Chief Complaint   Patient presents with    Numbness     Both hands numb , hx cervical surgery     67-year-old male with pmhx of HTN, HLD, CVA,  T2DM, chronic neck pain, cervical myelomalacia presents to the ED for numbness in bilateral extremities x5 days. He has associated tingling sensation that extends to the level of his elbows. He also notes midline neck pain. Numbness was intermittent, but is persistent as of this morning. He is concerned because he has been dropping things. He is not currently taking anything for symptoms. Denies injury or fever.    The history is provided by the patient.   Review of patient's allergies indicates:   Allergen Reactions    Sudafed [pseudoephedrine hcl] Other (See Comments)     Heart racing      Codeine Other (See Comments)     Heart racing    Gabapentin      Caused memory loss    Pseudoephedrine     Adhesive Rash     Specifically clear tegaderm dressing     Cortisone Palpitations     Past Medical History:   Diagnosis Date    Accident     fell from roof with TBI (word finding issues personality changes, memory deficets), R rib fractures, clavicle fx    Allergy     Anxiety     ASD (atrial septal defect)     noted on ECHO 6/16    Cervical stenosis of spine     noted on 6/15 MRI    CTS (carpal tunnel syndrome)     mild-mod on 4/17 NCS    DDD (degenerative disc disease), cervical 10/2014    C5-6 and C6-C7    Depression     Diabetes     JEEVAN (generalized anxiety disorder)     Gender identity disorder     H/O cardiovascular stress test     12/14 normal    Herpes simplex type 2 infection     History of atypical hyperplasia of breast     B precancer lesions    Hypertension     Hypertensive retinopathy     IGT (impaired glucose tolerance)     Myelomalacia     c-spine noted on 8/15 MRI    OA (osteoarthritis)     Pineal gland cyst     TBI (traumatic brain injury)     after falling off a roof in 2003     Past Surgical History:   Procedure  Laterality Date    AMPUTATION      L index finger    APPENDECTOMY  1969    BILATERAL SALPINGOOPHORECTOMY  2015    BIOPSY OF LIVER WITH ULTRASOUND GUIDANCE N/A 2018    Procedure: BIOPSY, LIVER, WITH US GUIDANCE;  Surgeon: [place] Diagnostic Provider;  Location: [facility] OR 2ND FLR;  Service: Radiology;  Laterality: N/A;  U/S GUIDED LIVER BIOPSY.  2018.  DOSC 7:30 AM  PROCEDURE 9 AM.  DR NITHIN GOLDEN / MARLON NUR.  DB 18 3:20P    CARPAL TUNNEL RELEASE  2017    right    CERVICAL FUSION  10/2014    C5-C6 and C6-C7    CHOLECYSTECTOMY      ENDOSCOPIC NASAL SEPTOPLASTY N/A 2018    Procedure: SEPTOPLASTY, NASAL, ENDOSCOPIC;  Surgeon: Reji Arce III, MD;  Location: [facility] OR 2ND FLR;  Service: ENT;  Laterality: N/A;    FINGER AMPUTATION Left     FRACTURE SURGERY Right 2003    rib fractures    HYSTERECTOMY  1984    MIGUEL    MASTECTOMY  2015    RECONSTRUCTION OF NOSE N/A 2018    Procedure: RECONSTRUCTION, NOSE;  Surgeon: Reji Arce III, MD;  Location: [facility] OR [facility] FLR;  Service: ENT;  Laterality: N/A;    ROTATOR CUFF REPAIR Right 2019    UPPER GASTROINTESTINAL ENDOSCOPY  2017    Dr. Ayon     Family History   Problem Relation Age of Onset    Diabetes Mother     Heart disease Mother     Gallbladder disease Mother     Coronary artery disease Father     Breast cancer Sister     Heart disease Sister     Gallbladder disease Daughter     Craniosynostosis Daughter     Ovarian cancer Maternal Aunt     Heart disease Maternal Grandmother     Gallbladder disease Maternal Grandmother     Lung cancer Paternal Grandfather     Heart disease Paternal Grandfather     Heart attack Brother     Breast cancer Cousin     Breast cancer Cousin      Social History     Tobacco Use    Smoking status: Former     Packs/day: 0.25     Years: 2.00     Pack years: 0.50     Types: Cigarettes     Start date:      Quit date: 2002     Years since quittin.4     Passive exposure: Past     Smokeless tobacco: Never    Tobacco comments:     did not smoke regularly   Substance Use Topics    Alcohol use: Yes     Comment: rare     Drug use: Yes     Types: Hydrocodone     Review of Systems   Constitutional:  Negative for fever.   Eyes:  Negative for visual disturbance.   Cardiovascular:  Negative for chest pain.   Musculoskeletal:  Positive for neck pain.   Skin:  Negative for rash and wound.   Neurological:  Positive for weakness and numbness. Negative for dizziness and tremors.     Physical Exam     Initial Vitals [04/24/23 1522]   BP Pulse Resp Temp SpO2   (!) 146/79 92 18 97.8 °F (36.6 °C) 98 %      MAP       --         Physical Exam    Nursing note and vitals reviewed.  Constitutional: He appears well-developed and well-nourished. He is not diaphoretic. No distress.   HENT:   Head: Normocephalic and atraumatic.   Eyes: Conjunctivae and EOM are normal. Pupils are equal, round, and reactive to light.   Neck: Neck supple.   Normal range of motion.   Full passive range of motion without pain.     Cardiovascular:  Normal rate and regular rhythm.           Pulmonary/Chest: Breath sounds normal. No respiratory distress.   Musculoskeletal:      Cervical back: Full passive range of motion without pain, normal range of motion and neck supple. No edema or erythema. Muscular tenderness present. No spinous process tenderness. Normal range of motion.      Comments: Normal sensation and skin temperature noted in BUE  Strength intact in BUE  Distal pulses intact BUE     Neurological: He is alert and oriented to person, place, and time. He has normal strength. He displays no tremor. No sensory deficit. He exhibits normal muscle tone.   Skin: No rash noted.   Psychiatric: He has a normal mood and affect. Thought content normal.       ED Course   Procedures  Labs Reviewed - No data to display         Imaging Results              MRI Cervical Spine Without Contrast (Final result)  Result time 04/24/23 20:40:32      Final  result by Michael Matute MD (04/24/23 20:40:32)                   Impression:      Postoperative changes of ACDF at C5 through C7.    Multilevel cervical spondylosis, most prominent at the level of C3-C4 through C6-7 levels, as above.    Additional findings, as above.    Electronically signed by resident: Saniya Dewey  Date:    04/24/2023  Time:    19:53    Electronically signed by: Michael Matute MD  Date:    04/24/2023  Time:    20:40               Narrative:    EXAMINATION:  MRI CERVICAL SPINE WITHOUT CONTRAST    CLINICAL HISTORY:  Spinal stenosis, cervical;Myelopathy, acute, cervical spine;    TECHNIQUE:  Multiplanar, multisequence MR images of the cervical spine were performed without the administration of contrast.    COMPARISON:  CTA head and neck: 04/08/2021.    FINDINGS:  Skull base and craniocervical junction: Prominent suspected arachnoid cyst at the paramedian left occiput.    C1-C2: Dens is intact.  Pre dens space is maintained.    Alignment: Loss of normal cervical lordosis.    Vertebrae: Postsurgical change of ACDF at the level of C5 through C7.  Vertebral heights are well maintained with no evidence of infiltrative process or acute fractures.    Discs: Normal height and signal elsewhere.    Cord: No signal abnormality is identified.    Degenerative findings:    C2-C3: Left-sided facet joint arthropathy, resulting in mild left-sided neural foraminal narrowing.  No significant spinal canal stenosis.    C3-C4: Posterior disc osteophyte complex with ligamentum flavum buckling resulting in mild spinal canal stenosis.  Mild right and minimal left neural foraminal narrowing.    C4-C5: Posterior disc osteophyte complex with ligamentum flavum buckling and left-sided facet joint arthropathy, resulting in mild spinal canal stenosis and mild to moderate right and moderate to severe left neural foraminal narrowing.    C5-C6: Bilateral uncovertebral spurring and bilateral facet joint arthropathy, resulting in  mild right and moderate left neural foraminal narrowing.  No significant spinal canal stenosis.    C6-C7: Posterior disc osteophyte complex and bilateral uncovertebral spurring, resulting in moderate bilateral neural foraminal narrowing.  No significant spinal canal stenosis.    C7-T1: Posterior disc osteophyte complex resulting in minimal acquired canal stenosis.  Minimal right and mild-to-moderate left neural foraminal narrowing.    Paraspinal muscles & soft tissues: Unremarkable.                                       Medications   ketorolac tablet 10 mg (10 mg Oral Given 4/24/23 1637)   baclofen tablet 10 mg (10 mg Oral Given 4/24/23 1637)   acetaminophen tablet 1,000 mg (1,000 mg Oral Given 4/24/23 2011)   HYDROmorphone injection 0.25 mg (0.25 mg Intramuscular Given 4/24/23 2011)     Medical Decision Making:   Initial Assessment:   67-year-old male with pmhx of HTN, HLD, CVA,  T2DM, chronic neck pain, cervical myelomalacia presents to the ED for numbness in bilateral extremities x5 days. Nontoxic. Hemodynamically stable. Afebrile. He has normal and equal strength BUE. Normal sensation in and skin temperature noted in BUE. No midline cervical tenderness to palpation. He has full active ROM of cervical spine. Normal pulses present in BUE. Will intiate work up.   Differential Diagnosis:   Cervical stenosis, cervical radiculopathy, cervical disc herniation, neuropathy  Clinical Tests:   Radiological Study: Ordered and Reviewed  ED Management:  Multilevel cervical spondylosis and stenosis noted on MRI. Moderate to severe left neural foraminal narrowing noted at the C4-C5 level. Pain improved during stay in the ED. Offered additional dexamethasone injection, though patient declined at this time.  Patient is neurovascularly intact on examination.  I believe he is stable for discharge with stat follow-up with Neurosurgery.  Discussed incidental finding of arachnoid cyst.  Discussed pain control at home.  Strict ED  precautions were given to return for new or worsening symptoms.  Other:   I discussed test(s) with the performing physician.                        Clinical Impression:   Final diagnoses:  [M54.2] Neck pain (Primary)  [M47.22] Radiculopathy due to cervical spondylosis        ED Disposition Condition    Discharge Stable          ED Prescriptions       Medication Sig Dispense Start Date End Date Auth. Provider    ibuprofen (ADVIL,MOTRIN) 800 MG tablet Take 1 tablet (800 mg total) by mouth every 6 (six) hours as needed for Pain. 20 tablet 4/24/2023 -- Isabel Chapman PA-C          Follow-up Information       Follow up With Specialties Details Why Contact Info Additional Information    [facility] - Neurosurgery 8th Fl Neurosurgery In 3 days  [address] [facility] [id number] [phone] 8th Floor Clinic [place] Please park in [facility]. Check in desk is located in the lobby. Please take the C elevator to 8th floor which opens to the lobby.    [facility] - Emergency Dept Emergency Medicine  If symptoms worsen [address] [facility] [id number] [phone]              Isabel Chapman PA-C  04/25/23 2183

## 2023-04-24 NOTE — ED NOTES
"PA notified pt refusing steroid injection. Pt states he is "hesitant" due to him having previous reactions to steroids before.   "

## 2023-04-24 NOTE — ED TRIAGE NOTES
Patient is a 67 year old male that presents to the ED with c/o bilateral numbness and tingling to his upper extremities.

## 2023-04-24 NOTE — FIRST PROVIDER EVALUATION
"Medical screening examination initiated.  I have conducted a focused provider triage encounter, findings are as follows:    Brief history of present illness:  68yo male hx of neuropathy s/p fusion of C5-6 and C6-7 presents complaining of bilateral hand numbness. No weakness. Has been intermittent for past 5 days, but numbness persistent since this am.     Vitals:    04/24/23 1522   BP: (!) 146/79   Pulse: 92   Resp: 18   Temp: 97.8 °F (36.6 °C)   TempSrc: Oral   SpO2: 98%   Weight: 73 kg (161 lb)   Height: 5' 4.5" (1.638 m)       Pertinent physical exam:  Awake, alert, oriented.     Brief workup plan:  Repeat exam    Preliminary workup initiated; this workup will be continued and followed by the physician or advanced practice provider that is assigned to the patient when roomed.  "

## 2023-04-25 ENCOUNTER — PATIENT MESSAGE (OUTPATIENT)
Dept: FAMILY MEDICINE | Facility: CLINIC | Age: 68
End: 2023-04-25
Payer: MEDICARE

## 2023-04-25 DIAGNOSIS — R20.2 PARESTHESIA: Primary | ICD-10-CM

## 2023-04-25 NOTE — DISCHARGE INSTRUCTIONS
Post surgical changes noted on MRI resulting in cervical neck stenosis. Possible arachnoid cyst noted. It is recommended that you follow up with neurosurgery. Referral placed today   Recommend ibuprofen for pain. Take as directed and needed. Make sure to take with food to prevent gastritis/ulceration   Return to the ED for new or worsening symptoms

## 2023-04-26 ENCOUNTER — TELEPHONE (OUTPATIENT)
Dept: ORTHOPEDICS | Facility: CLINIC | Age: 68
End: 2023-04-26
Payer: MEDICARE

## 2023-04-26 DIAGNOSIS — M50.30 DDD (DEGENERATIVE DISC DISEASE), CERVICAL: Primary | ICD-10-CM

## 2023-04-26 NOTE — PROGRESS NOTES
DATE: 4/27/2023  PATIENT: Mane Ramires    Supervising Physician: Jose Tran M.D.    CHIEF COMPLAINT: neck pain    HISTORY:  Mane Ramires is a 67 y.o. male wih pmhx of CVA, HTN, C5-6 ACDF in 2014 for myelomalacia, and DM2 (A1c:6.8) here for initial evaluation of neck and bilateral arm pain (Neck - 7, Arm - 8). The pain has been present for about 1 week. He states the pain increased after painting his house. The patient went to the ED on 4/24/23 for neck and bilateral arm pain x1week. He was referred to me for follow up. The patient describes the pain as sharp and electric. The pain is worse with any movement and improved by nothing. There is associated numbness and tingling. There is subjective weakness. Prior treatments have included advil, but no PT, RITA or recent surgery.     The patient denies myelopathic symptoms such as handwriting changes or difficulty with buttons/coins/keys. Denies perineal paresthesias, bowel/bladder dysfunction.    PAST MEDICAL/SURGICAL HISTORY:  Past Medical History:   Diagnosis Date    Accident     fell from roof with TBI (word finding issues personality changes, memory deficets), R rib fractures, clavicle fx    Allergy     Anxiety     ASD (atrial septal defect)     noted on ECHO 6/16    Cervical stenosis of spine     noted on 6/15 MRI    CTS (carpal tunnel syndrome)     mild-mod on 4/17 NCS    DDD (degenerative disc disease), cervical 10/2014    C5-6 and C6-C7    Depression     Diabetes     JEEVAN (generalized anxiety disorder)     Gender identity disorder     H/O cardiovascular stress test     12/14 normal    Herpes simplex type 2 infection     History of atypical hyperplasia of breast     B precancer lesions    Hypertension     Hypertensive retinopathy     IGT (impaired glucose tolerance)     Myelomalacia     c-spine noted on 8/15 MRI    OA (osteoarthritis)     Pineal gland cyst     TBI (traumatic brain injury)     after falling off a roof in 2003     Past Surgical  History:   Procedure Laterality Date    AMPUTATION      L index finger    APPENDECTOMY  1969    BILATERAL SALPINGOOPHORECTOMY  02/2015    BIOPSY OF LIVER WITH ULTRASOUND GUIDANCE N/A 12/18/2018    Procedure: BIOPSY, LIVER, WITH US GUIDANCE;  Surgeon: [place] Diagnostic Provider;  Location: [facility] OR [address] [place] [address];  Service: Radiology;  Laterality: N/A;  U/S GUIDED LIVER BIOPSY.  12/18/2018.  DOSC 7:30 AM  PROCEDURE 9 AM.  DR NITHIN GOLDEN / MARLON NUR.  DB 12/14/18 3:20P    CARPAL TUNNEL RELEASE  12/2017    right    CERVICAL FUSION  10/2014    C5-C6 and C6-C7    CHOLECYSTECTOMY      ENDOSCOPIC NASAL SEPTOPLASTY N/A 8/23/2018    Procedure: SEPTOPLASTY, NASAL, ENDOSCOPIC;  Surgeon: Reji Arce III, MD;  Location: [facility] OR [address] [place] [address];  Service: ENT;  Laterality: N/A;    FINGER AMPUTATION Left     FRACTURE SURGERY Right 2003    rib fractures    HYSTERECTOMY  1984    MIGUEL    MASTECTOMY  02/2015    RECONSTRUCTION OF NOSE N/A 8/23/2018    Procedure: RECONSTRUCTION, NOSE;  Surgeon: Reji Arce III, MD;  Location: [facility] OR [address] [place] [address];  Service: ENT;  Laterality: N/A;    ROTATOR CUFF REPAIR Right 2019    UPPER GASTROINTESTINAL ENDOSCOPY  09/06/2017    Dr. Ayon       Medications:  Current Outpatient Medications on File Prior to Visit   Medication Sig Dispense Refill    aspirin (ECOTRIN) 81 MG EC tablet Take 81 mg by mouth once daily.      blood sugar diagnostic Strp To check BG QD times daily, to use with insurance preferred meter 100 each 5    blood-glucose meter kit As directed 1 each 0    carboxymethylcellulose (REFRESH PLUS) 0.5 % Dpet 1 drop nightly.      DULoxetine (CYMBALTA) 30 MG capsule TAKE 1 CAPSULE BY MOUTH EVERY DAY 90 capsule 1    fluticasone propionate (FLONASE) 50 mcg/actuation nasal spray USE 2 SPRAYS IN EACH NOSTRIL ONCE DAILY 16 mL 3    ibuprofen (ADVIL,MOTRIN) 800 MG tablet Take 1 tablet (800 mg total) by mouth every 6 (six) hours as needed for Pain. 20 tablet 0    lancets Misc To check BG QD times  "daily, to use with insurance preferred meter 100 each 5    levocetirizine (XYZAL) 5 MG tablet TAKE 1 TABLET BY MOUTH EVERY NIGHT AS NEEDED FOR ALLERGIES 90 tablet 2    metoprolol succinate (TOPROL-XL) 25 MG 24 hr tablet Take 1 tablet (25 mg total) by mouth once daily. 90 tablet 1    needle, disp, 18 G (BD REGULAR BEVEL NEEDLES) 18 gauge x 1" Ndle USE TO INJECT 0.5ML OF TESTOSTERONE INTO THE MUCLE EVERY 14 DAYS 10 each 3    needle, disp, 25 gauge (BD PRECISIONGLIDE) 25 gauge x 1" Ndle USE TO DRAW UP 0.5ML OF TESTERONE EVERY 14 DAYS 10 each 3    rosuvastatin (CRESTOR) 5 MG tablet Take 1 tablet (5 mg total) by mouth once daily. 90 tablet 0    testosterone cypionate (DEPOTESTOTERONE CYPIONATE) 200 mg/mL injection Inject 1 mL (200 mg total) into the muscle every 14 (fourteen) days. 10 mL 3    valsartan (DIOVAN) 160 MG tablet TAKE 1 TABLET(160 MG) BY MOUTH EVERY DAY. 90 tablet 1     No current facility-administered medications on file prior to visit.       Social History:   Social History     Socioeconomic History    Marital status: Single   Occupational History    Occupation: Artist   Tobacco Use    Smoking status: Former     Packs/day: 0.25     Years: 2.00     Pack years: 0.50     Types: Cigarettes     Start date:      Quit date: 2002     Years since quittin.4     Passive exposure: Past    Smokeless tobacco: Never    Tobacco comments:     did not smoke regularly   Substance and Sexual Activity    Alcohol use: Yes     Comment: rare     Drug use: Yes     Types: Hydrocodone    Sexual activity: Never     Social Determinants of Health     Financial Resource Strain: Low Risk     Difficulty of Paying Living Expenses: Not hard at all   Food Insecurity: No Food Insecurity    Worried About Running Out of Food in the Last Year: Never true    Ran Out of Food in the Last Year: Never true   Transportation Needs: No Transportation Needs    Lack of Transportation (Medical): No    Lack of Transportation (Non-Medical): No " "  Stress: No Stress Concern Present    Feeling of Stress : Not at all   Social Connections: Unknown    Marital Status:    Housing Stability: Unknown    Unable to Pay for Housing in the Last Year: No    Unstable Housing in the Last Year: No       REVIEW OF SYSTEMS:  Constitution: Negative. Negative for chills, fever and night sweats.   Cardiovascular: Negative for chest pain and syncope.   Respiratory: Negative for cough and shortness of breath.   Gastrointestinal: See HPI. Negative for nausea/vomiting. Negative for abdominal pain.  Genitourinary: See HPI. Negative for discoloration or dysuria.  Skin: Negative for dry skin, itching and rash.   Hematologic/Lymphatic: Negative for bleeding problem. Does not bruise/bleed easily.   Musculoskeletal: Negative for falls and muscle weakness.   Neurological: See HPI. No seizures.   Endocrine: Negative for polydipsia, polyphagia and polyuria.   Allergic/Immunologic: Negative for hives and persistent infections.  Psychiatric/Behavioral: Negative for depression and insomnia.         EXAM:  Ht 5' 4.5" (1.638 m)   Wt 72.2 kg (159 lb 2.8 oz)   BMI 26.90 kg/m²     General: The patient is a very pleasant 67 y.o. male in no apparent distress, the patient is oriented to person, place and time.  Psych: Normal mood and affect  HEENT: Vision grossly intact, hearing intact to the spoken word.  Lungs: Respirations unlabored.  Gait: Normal station and gait, no difficulty with toe or heel walk.   Skin: Cervical skin negative for rashes, lesions, hairy patches and surgical scars.  Range of motion: Cervical range of motion is acceptable. There is mild tenderness to palpation.  Spinal Balance: Global saggital and coronal spinal balance acceptable, no significant for scoliosis and kyphosis.  Musculoskeletal: No pain with the range of motion of the bilateral shoulders and elbows. Normal bulk and contour of the bilateral hands.  Vascular: Bilateral hands warm and well perfused, radial " pulses 2+ bilaterally.  Neurological: Normal strength and tone in all major motor groups in the bilateral upper and lower extremities. Normal sensation to light touch in the C5-T1 and L2-S1 dermatomes bilaterally.  Deep tendon reflexes symmetric 2+ in the bilateral upper and lower extremities.  Negative Inverted Radial Reflex and Al's bilaterally. Negative Babinski bilaterally.     IMAGING:   Today I personally reviewed AP, Lat and Flex/Ex  upright C-spine films that demonstrate hardware in place at C5-7 without failure.     Cervical MRI shows mild stenosis at C4/5 with bilateral foraminal narrowing.      Body mass index is 26.9 kg/m².    Hemoglobin A1C   Date Value Ref Range Status   12/20/2022 6.8 (H) 4.0 - 5.6 % Final     Comment:     ADA Screening Guidelines:  5.7-6.4%  Consistent with prediabetes  >or=6.5%  Consistent with diabetes    High levels of fetal hemoglobin interfere with the HbA1C  assay. Heterozygous hemoglobin variants (HbS, HgC, etc)do  not significantly interfere with this assay.   However, presence of multiple variants may affect accuracy.     08/10/2022 5.8 (H) 4.0 - 5.6 % Final     Comment:     ADA Screening Guidelines:  5.7-6.4%  Consistent with prediabetes  >or=6.5%  Consistent with diabetes    High levels of fetal hemoglobin interfere with the HbA1C  assay. Heterozygous hemoglobin variants (HbS, HgC, etc)do  not significantly interfere with this assay.   However, presence of multiple variants may affect accuracy.     07/08/2022 6.5 (H) 4.0 - 5.6 % Final     Comment:     ADA Screening Guidelines:  5.7-6.4%  Consistent with prediabetes  >or=6.5%  Consistent with diabetes    High levels of fetal hemoglobin interfere with the HbA1C  assay. Heterozygous hemoglobin variants (HbS, HgC, etc)do  not significantly interfere with this assay.   However, presence of multiple variants may affect accuracy.             ASSESSMENT/PLAN:    Mane was seen today for neck pain and shoulder  pain.    Diagnoses and all orders for this visit:    Cervical radiculopathy  -     Ambulatory referral/consult to Neurology  -     meloxicam (MOBIC) 15 MG tablet; Take 1 tablet (15 mg total) by mouth once daily.  -     pregabalin (LYRICA) 50 MG capsule; Take 1 capsule (50 mg total) by mouth 2 (two) times daily.  -     Ambulatory referral/consult to Physical/Occupational Therapy; Future      Today we discussed at length all of the different treatment options including anti-inflammatories, acetaminophen, rest, ice, heat, physical therapy including strengthening and stretching exercises, home exercises, ROM, aerobic conditioning, aqua therapy, other modalities including ultrasound, massage, and dry needling, epidural steroid injections and finally surgical intervention.  Due to the patient's reaction to steroid injections, we will hold off on an RITA. Lyrica and mobic sent to the pharmacy. He will follow up in 6 weeks via virtual/audio visit.

## 2023-04-27 ENCOUNTER — HOSPITAL ENCOUNTER (OUTPATIENT)
Dept: RADIOLOGY | Facility: HOSPITAL | Age: 68
Discharge: HOME | End: 2023-04-27
Attending: REGISTERED NURSE
Payer: MEDICARE

## 2023-04-27 ENCOUNTER — OFFICE VISIT (OUTPATIENT)
Dept: ORTHOPEDICS | Facility: CLINIC | Age: 68
End: 2023-04-27
Payer: MEDICARE

## 2023-04-27 VITALS — BODY MASS INDEX: 26.52 KG/M2 | HEIGHT: 65 IN | WEIGHT: 159.19 LBS

## 2023-04-27 DIAGNOSIS — M54.12 CERVICAL RADICULOPATHY: ICD-10-CM

## 2023-04-27 DIAGNOSIS — M50.30 DDD (DEGENERATIVE DISC DISEASE), CERVICAL: ICD-10-CM

## 2023-04-27 PROCEDURE — 4010F PR ACE/ARB THEARPY RXD/TAKEN: ICD-10-PCS | Mod: HCNC,CPTII,S$GLB, | Performed by: REGISTERED NURSE

## 2023-04-27 PROCEDURE — 99999 PR PBB SHADOW E&M-EST. PATIENT-LVL IV: CPT | Mod: PBBFAC,HCNC,, | Performed by: REGISTERED NURSE

## 2023-04-27 PROCEDURE — 72050 X-RAY EXAM NECK SPINE 4/5VWS: CPT | Mod: 26,HCNC,, | Performed by: RADIOLOGY

## 2023-04-27 PROCEDURE — 3008F BODY MASS INDEX DOCD: CPT | Mod: HCNC,CPTII,S$GLB, | Performed by: REGISTERED NURSE

## 2023-04-27 PROCEDURE — 99999 PR PBB SHADOW E&M-EST. PATIENT-LVL IV: ICD-10-PCS | Mod: PBBFAC,HCNC,, | Performed by: REGISTERED NURSE

## 2023-04-27 PROCEDURE — 99214 OFFICE O/P EST MOD 30 MIN: CPT | Mod: HCNC,S$GLB,, | Performed by: REGISTERED NURSE

## 2023-04-27 PROCEDURE — 3288F PR FALLS RISK ASSESSMENT DOCUMENTED: ICD-10-PCS | Mod: HCNC,CPTII,S$GLB, | Performed by: REGISTERED NURSE

## 2023-04-27 PROCEDURE — 1157F ADVNC CARE PLAN IN RCRD: CPT | Mod: HCNC,CPTII,S$GLB, | Performed by: REGISTERED NURSE

## 2023-04-27 PROCEDURE — 1101F PR PT FALLS ASSESS DOC 0-1 FALLS W/OUT INJ PAST YR: ICD-10-PCS | Mod: HCNC,CPTII,S$GLB, | Performed by: REGISTERED NURSE

## 2023-04-27 PROCEDURE — 1159F PR MEDICATION LIST DOCUMENTED IN MEDICAL RECORD: ICD-10-PCS | Mod: HCNC,CPTII,S$GLB, | Performed by: REGISTERED NURSE

## 2023-04-27 PROCEDURE — 1159F MED LIST DOCD IN RCRD: CPT | Mod: HCNC,CPTII,S$GLB, | Performed by: REGISTERED NURSE

## 2023-04-27 PROCEDURE — 1125F PR PAIN SEVERITY QUANTIFIED, PAIN PRESENT: ICD-10-PCS | Mod: HCNC,CPTII,S$GLB, | Performed by: REGISTERED NURSE

## 2023-04-27 PROCEDURE — 3288F FALL RISK ASSESSMENT DOCD: CPT | Mod: HCNC,CPTII,S$GLB, | Performed by: REGISTERED NURSE

## 2023-04-27 PROCEDURE — 3008F PR BODY MASS INDEX (BMI) DOCUMENTED: ICD-10-PCS | Mod: HCNC,CPTII,S$GLB, | Performed by: REGISTERED NURSE

## 2023-04-27 PROCEDURE — 72050 X-RAY EXAM NECK SPINE 4/5VWS: CPT | Mod: TC,HCNC

## 2023-04-27 PROCEDURE — 4010F ACE/ARB THERAPY RXD/TAKEN: CPT | Mod: HCNC,CPTII,S$GLB, | Performed by: REGISTERED NURSE

## 2023-04-27 PROCEDURE — 1101F PT FALLS ASSESS-DOCD LE1/YR: CPT | Mod: HCNC,CPTII,S$GLB, | Performed by: REGISTERED NURSE

## 2023-04-27 PROCEDURE — 72050 XR CERVICAL SPINE AP LAT WITH FLEX EXTEN: ICD-10-PCS | Mod: 26,HCNC,, | Performed by: RADIOLOGY

## 2023-04-27 PROCEDURE — 99214 PR OFFICE/OUTPT VISIT, EST, LEVL IV, 30-39 MIN: ICD-10-PCS | Mod: HCNC,S$GLB,, | Performed by: REGISTERED NURSE

## 2023-04-27 PROCEDURE — 1125F AMNT PAIN NOTED PAIN PRSNT: CPT | Mod: HCNC,CPTII,S$GLB, | Performed by: REGISTERED NURSE

## 2023-04-27 PROCEDURE — 1157F PR ADVANCE CARE PLAN OR EQUIV PRESENT IN MEDICAL RECORD: ICD-10-PCS | Mod: HCNC,CPTII,S$GLB, | Performed by: REGISTERED NURSE

## 2023-04-27 RX ORDER — MELOXICAM 15 MG/1
15 TABLET ORAL DAILY
Qty: 30 TABLET | Refills: 0 | Status: SHIPPED | OUTPATIENT
Start: 2023-04-27 | End: 2023-05-18

## 2023-04-27 RX ORDER — PREGABALIN 50 MG/1
50 CAPSULE ORAL 2 TIMES DAILY
Qty: 60 CAPSULE | Refills: 2 | Status: SHIPPED | OUTPATIENT
Start: 2023-04-27 | End: 2023-05-18

## 2023-04-28 ENCOUNTER — CLINICAL SUPPORT (OUTPATIENT)
Dept: REHABILITATION | Facility: HOSPITAL | Age: 68
End: 2023-04-28
Payer: MEDICARE

## 2023-04-28 DIAGNOSIS — M54.6 MIDLINE THORACIC BACK PAIN, UNSPECIFIED CHRONICITY: ICD-10-CM

## 2023-04-28 DIAGNOSIS — M54.12 CERVICAL RADICULOPATHY: ICD-10-CM

## 2023-04-28 DIAGNOSIS — R29.3 POSTURE ABNORMALITY: ICD-10-CM

## 2023-04-28 PROCEDURE — 97162 PT EVAL MOD COMPLEX 30 MIN: CPT | Mod: HCNC

## 2023-04-28 PROCEDURE — 97110 THERAPEUTIC EXERCISES: CPT | Mod: HCNC

## 2023-04-28 NOTE — PLAN OF CARE
[facility]   Physical Therapy Initial Evaluation     Date: 4/28/2023   Name: Mane Dodd [facility] Number: [id number]    Therapy Diagnosis:   Encounter Diagnoses   Name Primary?    Cervical radiculopathy     Posture abnormality     Midline thoracic back pain, unspecified chronicity      Physician: ROHIT Delgado NP    Physician Orders: PT Eval and Treat   Medical Diagnosis from Referral: M54.12 (ICD-10-CM) - Cervical radiculopathy  Evaluation Date: 4/28/2023  Authorization Period Expiration: 4/26/24  Plan of Care Expiration: 7/28/23  Progress Note Due: 5/28/23  Visit # / Visits authorized: 1/ 1   FOTO: 1/5    Precautions:  h/o CVA, h/o cervical fusion       Time In: 12:15pm  Time Out: 1:15pm  Total Appointment Time (timed & untimed codes): 60 minutes      SUBJECTIVE   Date of onset: 3 weeks ago    History of current condition - Yousif reports: He had an accident falling off a house in 2003 and suffered for years with neck pain. He jody to ER in 2014 and had a cervical fusion. He fell again in 2018 resulting in a concussion. A cyst was found at the base of his skull, causing headaches He was told that he has a pinched nerve in his neck bilaterally. He reports a R RTC repair in 2019. He was moving some furniture a few weeks ago and attributes this recent flare-up to a sexual encounter. He was waking up with numbness along B ulnar distrubution up his elbows. This is worst when he wakes and relieves some during the day. The numbness has been waking him at night.     Falls: none  recently     Imaging, Xray: Reversal of normal cervical lordosis.  No acute fractures.  Unremarkable predental space.  No widening prevertebral soft tissues.  Reconfirmed and stable findings of C5-C7 ACDF procedure with inter body fusion.  No findings of hardware malfunction.  Stable very minimal grade 1 anterolisthesis C4 on C5.  The flexion and extension views demonstrate no definite instability.  Stable mild  disc narrowing C4-C5.  Other cervical disc levels preserved.  Some stable scattered facet arthropathic changes.  Intact odontoid tip and unremarkable C1-C2 articulation.  MRI studies: Postoperative changes of ACDF at C5 through C7.     Multilevel cervical spondylosis, most prominent at the level of C3-C4 through C6-7 levels, as above.    Prior Therapy: PT prior to 2014 surgery   Social History:  lives alone  Occupation: retired/artist   Prior Level of Function: (I)  Current Level of Function: (I)    Pain:  Current 6/10, worst 10/10, best 0/10 (mid-thoracic)   Current 0/10, worst 9/10, best 0/10 (neck)   Location: bilateral cervical paraspinals, mid thoracic (T 4/5)  Description: Tight and BUE numbness (ulnar nerve distribution)  Aggravating Factors: Morning and extending arms behind back   Easing Factors:  CBD oil, ice, hot shower    Patients goals: to reduce pain and build some strength to support his neck/spine      Medical History:   Past Medical History:   Diagnosis Date    Accident     fell from roof with TBI (word finding issues personality changes, memory deficets), R rib fractures, clavicle fx    Allergy     Anxiety     ASD (atrial septal defect)     noted on ECHO 6/16    Cervical stenosis of spine     noted on 6/15 MRI    CTS (carpal tunnel syndrome)     mild-mod on 4/17 NCS    DDD (degenerative disc disease), cervical 10/2014    C5-6 and C6-C7    Depression     Diabetes     JEEVAN (generalized anxiety disorder)     Gender identity disorder     H/O cardiovascular stress test     12/14 normal    Herpes simplex type 2 infection     History of atypical hyperplasia of breast     B precancer lesions    Hypertension     Hypertensive retinopathy     IGT (impaired glucose tolerance)     Myelomalacia     c-spine noted on 8/15 MRI    OA (osteoarthritis)     Pineal gland cyst     TBI (traumatic brain injury)     after falling off a roof in 2003       Surgical History:   Mane Ramires  has a past surgical history  "that includes Hysterectomy (1984); Appendectomy (1969); Cervical fusion (10/2014); Mastectomy (02/2015); Bilateral salpingoophorectomy (02/2015); Amputation; Upper gastrointestinal endoscopy (09/06/2017); Finger amputation (Left); Fracture surgery (Right, 2003); Cholecystectomy; Carpal tunnel release (12/2017); Reconstruction of nose (N/A, 8/23/2018); Endoscopic nasal septoplasty (N/A, 8/23/2018); Biopsy of liver with ultrasound guidance (N/A, 12/18/2018); and Rotator cuff repair (Right, 2019).    Medications:   Mane has a current medication list which includes the following prescription(s): aspirin, blood sugar diagnostic, blood-glucose meter, carboxymethylcellulose, duloxetine, fluticasone propionate, ibuprofen, lancets, levocetirizine, meloxicam, metoprolol succinate, needle (disp) 18 g, bd precisionglide, pregabalin, rosuvastatin, testosterone cypionate, and valsartan.    Allergies:   Review of patient's allergies indicates:   Allergen Reactions    Corticosteroids (glucocorticoids) Palpitations    Sudafed [pseudoephedrine hcl] Other (See Comments)     Heart racing      Codeine Other (See Comments)     Heart racing    Gabapentin      Caused memory loss    Pseudoephedrine     Adhesive Rash     Specifically clear tegaderm dressing     Cortisone Palpitations        Objective     POSTURE    Postural examination/scapula alignment: Rounded shoulder and Head forward          NEUROLOGICAL SCREENING     Sensory deficit: WFL light touch BUE         Palpation: tender to palpation T4/5    Range of Motion/Strength:     CERVICAL AROM Pain/Dysfunction with Movement   Flexion 60 deg L cervical "pulling"   Extension 30 deg L cervical "pulling", T 4/5             Right rotation 60 deg T4/5 pain    Left rotation 60 deg L cervical "pulling"     Thoracolumbar AROM Pain/Dysfunction with Movement                       Right rotation 50% limited    Left rotation 50 % limited       Shoulder Right MMT Left MMT Pain/Dysfunction with " Movement (pain=!)   AROM        flexion  WFL 4-/5  WFL 4/5 Mid-thoracic pain    extension  WFL   WFL     abduction  WFL 4-/5  WFL 4-/5 Mid-thoracic pain       Internal rotation NT  4-/5  NT 4-/5    ER at 0° abd  NT 4-/5  NT 4-/5    rhomboids  3+/5  3+/5    Mid trap  3+/5  3+/5    Lower trap   3+/5  3+/5      Elbow Right MMT Left MMT Pain/Dysfunction with Movement (pain=!)   AROM        flexion  WFL 4-/5  WFL 4-/5    extension  WFL 4/5  WFL 4/5          CMS Impairment/Limitation/Restriction for FOTO Neck Survey    Therapist reviewed FOTO scores for Mane Ramires on 4/28/2023.   FOTO documents entered into [employer] - see Media section.    Limitation Score: 49%  Category: Mobility         TREATMENT     Total Treatment time (time-based codes) separate from Evaluation: 10 minutes      Yousif received the treatments listed below:      therapeutic exercises to develop strength, endurance, ROM, flexibility, posture, and core stabilization for 10 minutes including:    Cervical retractions- attempted then held due to left upper extremity radicular numbness    HEP instruction & return demonstration:  Scap sets  Supine pec stretch     Next: Spurlings test, hand  dynamometer      PATIENT EDUCATION AND HOME EXERCISES     Education provided:   - HEP    Written Home Exercises Provided: yes. Exercises were reviewed and Yousif was able to demonstrate them prior to the end of the session.  Yousif demonstrated good  understanding of the education provided. See EMR under Patient Instructions for exercises provided during therapy sessions.    ASSESSMENT     Mane is a 67 y.o. male referred to outpatient Physical Therapy with a medical diagnosis of cervical radiculopathy. Patient presents with limited cervical and thoracic mobility with mid-cervical and mid-thoracic pain and BUE radicular symptoms affecting his functional mobility. Evaluation was limited due to c/o mid-thoracic pain with MMT.     Patient prognosis is Fair.   Patientt  will benefit from skilled outpatient Physical Therapy to address the deficits stated above and in the chart below, provide patient /family education, and to maximize patientt's level of independence.     Plan of care discussed with patient: Yes  Patient's spiritual, cultural and educational needs considered and patient is agreeable to the plan of care and goals as stated below:     Anticipated Barriers for therapy: co-morbidities     Medical Necessity is demonstrated by the following  History  Co-morbidities and personal factors that may impact the plan of care Co-morbidities:   See EMR    Personal Factors:   no deficits     moderate   Examination  Body Structures and Functions, activity limitations and participation restrictions that may impact the plan of care Body Regions:   head  neck  back  upper extremities  trunk    Body Systems:    ROM  strength  motor control  motor learning    Participation Restrictions:   none    Activity limitations:   Learning and applying knowledge  no deficits    General Tasks and Commands  no deficits    Communication  no deficits    Mobility  lifting and carrying objects  fine hand use (grasping/picking up)  driving (bike, car, motorcycle)    Self care  washing oneself (bathing, drying, washing hands)  caring for body parts (brushing teeth, shaving, grooming)  looking after one's health    Domestic Life  shopping  cooking  doing house work (cleaning house, washing dishes, laundry)    Interactions/Relationships  intimate relationships    Life Areas  no deficits    Community and Social Life  recreation and leisure         moderate   Clinical Presentation evolving clinical presentation with changing clinical characteristics moderate   Decision Making/ Complexity Score: moderate     Goals:  Short Term Goals: 6 visits  1. Pt will be compliant /c HEP to supplement PT in order to improve functional tasks  2. Pt will improve B ravi-scap MMTs to 4-/5 grossly to improve strength for  functional activities  3. Pt will improve B thoracic rotation range of motion to </= 25% limited painfree for improved mobility with ADLs    Long Term Goals: 12 visits   1. Pt will improve B ravi-scap  MMTs to 4/5 grossly to improve strength for functional activities  2. Pt will improve FOTO score to </= 39% limitation to reduce perceived pain with mobility   3. Pt will improve cervical Ext to >/= 50 deg for increased range of motion with household chores       PLAN   Plan of care Certification: 4/28/2023 to 7/28/23.    Outpatient Physical Therapy 2 times weekly for 12 visits to include the following interventions: Manual Therapy, Moist Heat/ Ice, Neuromuscular Re-ed, Patient Education, Therapeutic Activities, and Therapeutic Exercise, E-stim & Dry Needling as needed.     Shannon Greene, PT      I CERTIFY THE NEED FOR THESE SERVICES FURNISHED UNDER THIS PLAN OF TREATMENT AND WHILE UNDER MY CARE   Physician's comments:     Physician's Signature: ___________________________________________________

## 2023-05-01 ENCOUNTER — CLINICAL SUPPORT (OUTPATIENT)
Dept: REHABILITATION | Facility: HOSPITAL | Age: 68
End: 2023-05-01
Payer: MEDICARE

## 2023-05-01 DIAGNOSIS — M54.6 MIDLINE THORACIC BACK PAIN, UNSPECIFIED CHRONICITY: ICD-10-CM

## 2023-05-01 DIAGNOSIS — R29.3 POSTURE ABNORMALITY: Primary | ICD-10-CM

## 2023-05-01 PROCEDURE — 97140 MANUAL THERAPY 1/> REGIONS: CPT | Mod: HCNC,CQ

## 2023-05-01 PROCEDURE — 97110 THERAPEUTIC EXERCISES: CPT | Mod: HCNC,CQ

## 2023-05-01 NOTE — PROGRESS NOTES
"[facility]   Physical Therapy Treatment Note      Name: Mane Dodd [facility] Number: [id number]    Therapy Diagnosis:   Encounter Diagnoses   Name Primary?    Posture abnormality Yes    Midline thoracic back pain, unspecified chronicity      Physician: ROHIT Delgado NP    Visit Date: 5/1/2023       Physician: ROHIT Delgado NP     Physician Orders: PT Eval and Treat   Medical Diagnosis from Referral: M54.12 (ICD-10-CM) - Cervical radiculopathy  Evaluation Date: 4/28/2023  Authorization Period Expiration: 4/26/24  Plan of Care Expiration: 7/28/23  Progress Note Due: 5/28/23  Visit # / Visits authorized: 1/ 1, 1/20  FOTO: 1/5     Precautions:  h/o CVA, h/o cervical fusion       PTA Visit #: 1/5     Time In: 1:45 pm  Time Out: 2:30 pm  Total Billable Time: 45 minutes    Subjective     Pt reports: he does get a little pain (tolerable) in the middle of his shoulder blades when performing the scapular retractions.  He was compliant with home exercise program.  Response to previous treatment: last session was initial evaluation  Functional change: in progress    Pain: 4/10  Location: left neck      Objective      Hand held Dynameter: L:  32.3             R: 39.6  Spurlings test: Neg B  Treatment     Yousif received the treatments listed below:      therapeutic exercises to develop strength, endurance, ROM, flexibility, posture, and core stabilization for 35 minutes including:  Scap sets 2x10  Supine pec stretch NP  B UT stretch 3x30"  B LS stretch 3x30"   Rhomboid stretch 10/10"       manual therapy techniques: Joint mobilizations, Manual traction, and Soft tissue Mobilization were applied to the:  for 10 minutes, including:  STM to B UT and cervical paraspinals    neuromuscular re-education activities to improve: Balance, Coordination, Kinesthetic, Sense, Proprioception, and Posture for 00 minutes. The following activities were included:      therapeutic activities to improve " functional performance for 00  minutes, including:                  Patient Education and Home Exercises       Education provided:   - pain free exercises    Written Home Exercises Provided: yes. Exercises were reviewed and Yousif was able to demonstrate them prior to the end of the session.  Yousif demonstrated good  understanding of the education provided. See EMR under Patient Instructions for exercises provided during therapy sessions    Assessment     Pt tolerated first session after initial evaluation w/o issues. Mane presents with L side cervical and UT pain which is hindering ADL's. He exhibits B UT and cervical paraspinal hypertonicity which was improved with STM. Issued cervical stretches for home use with instructions and frequency. Objective data collected (see objective section above). Plan to monitor and progress per patient presentation and tolerance.    Yousif Is progressing well towards his goals.   Pt prognosis is Good.     Pt will continue to benefit from skilled outpatient physical therapy to address the deficits listed in the problem list box on initial evaluation, provide pt/family education and to maximize pt's level of independence in the home and community environment.     Pt's spiritual, cultural and educational needs considered and pt agreeable to plan of care and goals.     Anticipated barriers to physical therapy: co-morbidities      Goals:   Short Term Goals: 6 visits  1. Pt will be compliant /c HEP to supplement PT in order to improve functional tasks  2. Pt will improve B ravi-scap MMTs to 4-/5 grossly to improve strength for functional activities  3. Pt will improve B thoracic rotation range of motion to </= 25% limited painfree for improved mobility with ADLs     Long Term Goals: 12 visits   1. Pt will improve B ravi-scap  MMTs to 4/5 grossly to improve strength for functional activities  2. Pt will improve FOTO score to </= 39% limitation to reduce perceived pain with mobility   3.  Pt will improve cervical Ext to >/= 50 deg for increased range of motion with household chores   Plan     Plan of care Certification: 4/28/2023 to 7/28/23.     Outpatient Physical Therapy 2 times weekly for 12 visits to include the following interventions: Manual Therapy, Moist Heat/ Ice, Neuromuscular Re-ed, Patient Education, Therapeutic Activities, and Therapeutic Exercise, E-stim & Dry Needling as needed.     Ari Olmos, PTA

## 2023-05-05 ENCOUNTER — CLINICAL SUPPORT (OUTPATIENT)
Dept: REHABILITATION | Facility: HOSPITAL | Age: 68
End: 2023-05-05
Payer: MEDICARE

## 2023-05-05 DIAGNOSIS — R29.3 POSTURE ABNORMALITY: Primary | ICD-10-CM

## 2023-05-05 DIAGNOSIS — M54.6 MIDLINE THORACIC BACK PAIN, UNSPECIFIED CHRONICITY: ICD-10-CM

## 2023-05-05 PROCEDURE — 97140 MANUAL THERAPY 1/> REGIONS: CPT | Mod: HCNC,CQ

## 2023-05-05 PROCEDURE — 97110 THERAPEUTIC EXERCISES: CPT | Mod: HCNC,CQ

## 2023-05-05 NOTE — PROGRESS NOTES
"[facility]   Physical Therapy Treatment Note      Name: Mane Dodd [facility] Number: [id number]    Therapy Diagnosis:   Encounter Diagnoses   Name Primary?    Posture abnormality Yes    Midline thoracic back pain, unspecified chronicity      Physician: ROHIT Delgado NP    Visit Date: 5/5/2023       Physician: ROHIT Delgado NP     Physician Orders: PT Eval and Treat   Medical Diagnosis from Referral: M54.12 (ICD-10-CM) - Cervical radiculopathy  Evaluation Date: 4/28/2023  Authorization Period Expiration: 4/26/24  Plan of Care Expiration: 7/28/23  Progress Note Due: 5/28/23  Visit # / Visits authorized: 1/ 1, 1/20  FOTO: 1/5     Precautions:  h/o CVA, h/o cervical fusion       PTA Visit #: 2/5     Time In: 1:40 pm  Time Out: 2:30 pm  Total Billable Time: 50 minutes    Subjective     Pt reports: the neck feels a little better. Less numbness in his hands, but he noticed a numbness going into the R leg.  He was compliant with home exercise program.  Response to previous treatment: last session was initial evaluation  Functional change: in progress    Pain: 4/10  Location: left neck      Objective        Treatment     Yousif received the treatments listed below:      therapeutic exercises to develop strength, endurance, ROM, flexibility, posture, and core stabilization for 37 minutes including:  UBE 3 min <> 1/2 foam  Scap sets 2x10 (held, pain)  Supine pec stretch 1/2 foam  B UT stretch 3x30"  B LS stretch 3x30" (caused N/T post)  Rhomboid stretch 10/10"  Open books B x 15  Serratus punches B x 15    manual therapy techniques: Joint mobilizations, Manual traction, and Soft tissue Mobilization were applied to the:  for 08 minutes, including:  STM to B UT and cervical paraspinals NP  Manual traction (L cervical gapping)    neuromuscular re-education activities to improve: Balance, Coordination, Kinesthetic, Sense, Proprioception, and Posture for 00 minutes. The following activities " were included:      therapeutic activities to improve functional performance for 00  minutes, including:        Patient Education and Home Exercises       Education provided:   - pain free exercises    Written Home Exercises Provided: yes. Exercises were reviewed and Yousif was able to demonstrate them prior to the end of the session.  Yousif demonstrated good  understanding of the education provided. See EMR under Patient Instructions for exercises provided during therapy sessions    Assessment     Pt tolerated session fairly well. He did have pain with scapular retractions therefore held. Reported increased numbness and tingling post LS stretch. Good response to manual traction. Added open books to improve thoracic mobility with good tolerance.    Yousif Is progressing well towards his goals.   Pt prognosis is Good.     Pt will continue to benefit from skilled outpatient physical therapy to address the deficits listed in the problem list box on initial evaluation, provide pt/family education and to maximize pt's level of independence in the home and community environment.     Pt's spiritual, cultural and educational needs considered and pt agreeable to plan of care and goals.     Anticipated barriers to physical therapy: co-morbidities      Goals:   Short Term Goals: 6 visits  1. Pt will be compliant /c HEP to supplement PT in order to improve functional tasks  2. Pt will improve B ravi-scap MMTs to 4-/5 grossly to improve strength for functional activities  3. Pt will improve B thoracic rotation range of motion to </= 25% limited painfree for improved mobility with ADLs     Long Term Goals: 12 visits   1. Pt will improve B ravi-scap  MMTs to 4/5 grossly to improve strength for functional activities  2. Pt will improve FOTO score to </= 39% limitation to reduce perceived pain with mobility   3. Pt will improve cervical Ext to >/= 50 deg for increased range of motion with household chores   Plan     Plan of care  Certification: 4/28/2023 to 7/28/23.     Outpatient Physical Therapy 2 times weekly for 12 visits to include the following interventions: Manual Therapy, Moist Heat/ Ice, Neuromuscular Re-ed, Patient Education, Therapeutic Activities, and Therapeutic Exercise, E-stim & Dry Needling as needed.     Ari Olmos, PTA

## 2023-05-08 ENCOUNTER — TELEPHONE (OUTPATIENT)
Dept: NEUROLOGY | Facility: CLINIC | Age: 68
End: 2023-05-08
Payer: MEDICARE

## 2023-05-08 NOTE — TELEPHONE ENCOUNTER
Called pt to let him know his appt was scheduled incorrectly, the appt is canceled and he should call back to reschedule correctly. I was unable to speak with him but left a vm.

## 2023-05-08 NOTE — PROGRESS NOTES
"[facility]   Physical Therapy Treatment Note      Name: Mane Dodd [facility] Number: [id number]    Therapy Diagnosis:   Encounter Diagnoses   Name Primary?    Posture abnormality Yes    Midline thoracic back pain, unspecified chronicity        Physician: ROHIT Delgado NP    Visit Date: 5/9/2023       Physician: ROHIT Delgado NP     Physician Orders: PT Eval and Treat   Medical Diagnosis from Referral: M54.12 (ICD-10-CM) - Cervical radiculopathy  Evaluation Date: 4/28/2023  Authorization Period Expiration: 4/26/24  Plan of Care Expiration: 7/28/23  Progress Note Due: 5/28/23  Visit # / Visits authorized: 1/ 1, 2/20  FOTO: 3/5     Precautions:  h/o CVA, h/o cervical fusion       PTA Visit #: 0/5     Time In: 1:45 pm  Time Out: 2:30 pm  Total Billable Time: 45 minutes    Subjective     Pt reports: he stopped taking his meloxicam & lyrica due to facial swelling. His arm tingling has been reducing  He was compliant with home exercise program.  Response to previous treatment: neck pain reducing   Functional change: in progress    Pain: 3/10  Location: left neck & between shoulder blades     Objective        Treatment     Yousif received the treatments listed below:      therapeutic exercises to develop strength, endurance, ROM, flexibility, posture, and core stabilization for 23 minutes including:    UBE 3 min fwd/3' bkwd 1/2 foam  Supine pec stretch on 1/2 foam roll   -snow angels on 1/2 foam roll x10     Scap sets 2x10 (held, pain) not performed    (L) UT stretch 3x30"  (L) LS stretch 3x30" (caused N/T post)  Rhomboid stretch 10/10"  Serratus punches B x 15 not performed      manual therapy techniques: Joint mobilizations, Manual traction, and Soft tissue Mobilization were applied to the:  for 12 minutes, including:    STM to B UT and cervical paraspinals NP  Manual traction (L cervical gapping)  T 4-7 AP joint mobs Gr3    neuromuscular re-education activities to improve: Balance, " Coordination, Kinesthetic, Sense, Proprioception, and Posture for 10 minutes. The following activities were included:    Supine chin retractions x10, seated x10, x10 with manual overpressure   Open books B x 15    therapeutic activities to improve functional performance for 00  minutes, including:    Patient Education and Home Exercises       Education provided:   - pain free exercises    Written Home Exercises Provided: yes. Exercises were reviewed and Yousif was able to demonstrate them prior to the end of the session.  Yousif demonstrated good  understanding of the education provided. See EMR under Patient Instructions for exercises provided during therapy sessions    Assessment     Pt tolerated session fairly well. He c/o L neck pain and UE numbness with closer of L cervical facets during R upper trap stretch, therefore stretches were performed for L upper trap/levator scap only. Progressed cervical retractions and performed open books for improved posture with functional tasks. Pt reported thoracic pain was 50% better after joint mobilizations.     Yousif Is progressing well towards his goals.   Pt prognosis is Good.     Pt will continue to benefit from skilled outpatient physical therapy to address the deficits listed in the problem list box on initial evaluation, provide pt/family education and to maximize pt's level of independence in the home and community environment.     Pt's spiritual, cultural and educational needs considered and pt agreeable to plan of care and goals.     Anticipated barriers to physical therapy: co-morbidities      Goals:   Short Term Goals: 6 visits  1. Pt will be compliant /c HEP to supplement PT in order to improve functional tasks  2. Pt will improve B ravi-scap MMTs to 4-/5 grossly to improve strength for functional activities  3. Pt will improve B thoracic rotation range of motion to </= 25% limited painfree for improved mobility with ADLs     Long Term Goals: 12 visits   1. Pt  will improve B ravi-scap  MMTs to 4/5 grossly to improve strength for functional activities  2. Pt will improve FOTO score to </= 39% limitation to reduce perceived pain with mobility   3. Pt will improve cervical Ext to >/= 50 deg for increased range of motion with household chores   Plan     Plan of care Certification: 4/28/2023 to 7/28/23.     Outpatient Physical Therapy 2 times weekly for 12 visits to include the following interventions: Manual Therapy, Moist Heat/ Ice, Neuromuscular Re-ed, Patient Education, Therapeutic Activities, and Therapeutic Exercise, E-stim & Dry Needling as needed.     Shannon Greene, PT

## 2023-05-08 NOTE — TELEPHONE ENCOUNTER
Called Pt to reschedule his appt that scheduled incorrectly. Pt is now rescheduled for May 16.       ----- Message from Betty La sent at 5/8/2023 12:49 PM CDT -----  Pt returning call to office       Confirmed patient's contact info below:  Contact Name: Mane Ramires  Phone Number: [phone]

## 2023-05-09 ENCOUNTER — CLINICAL SUPPORT (OUTPATIENT)
Dept: REHABILITATION | Facility: HOSPITAL | Age: 68
End: 2023-05-09
Payer: MEDICARE

## 2023-05-09 DIAGNOSIS — R29.3 POSTURE ABNORMALITY: Primary | ICD-10-CM

## 2023-05-09 DIAGNOSIS — M54.6 MIDLINE THORACIC BACK PAIN, UNSPECIFIED CHRONICITY: ICD-10-CM

## 2023-05-09 PROCEDURE — 97140 MANUAL THERAPY 1/> REGIONS: CPT | Mod: HCNC

## 2023-05-09 PROCEDURE — 97110 THERAPEUTIC EXERCISES: CPT | Mod: HCNC

## 2023-05-09 PROCEDURE — 97112 NEUROMUSCULAR REEDUCATION: CPT | Mod: HCNC

## 2023-05-10 DIAGNOSIS — S06.0XAS CONCUSSION WITH UNKNOWN LOSS OF CONSCIOUSNESS STATUS, SEQUELA: Primary | ICD-10-CM

## 2023-05-11 ENCOUNTER — LAB VISIT (OUTPATIENT)
Dept: LAB | Facility: HOSPITAL | Age: 68
End: 2023-05-11
Attending: FAMILY MEDICINE
Payer: MEDICARE

## 2023-05-11 DIAGNOSIS — E11.8 DIABETES MELLITUS TYPE 2 WITH COMPLICATIONS: ICD-10-CM

## 2023-05-11 DIAGNOSIS — E55.9 VITAMIN D DEFICIENCY: ICD-10-CM

## 2023-05-11 DIAGNOSIS — E53.8 B12 DEFICIENCY: ICD-10-CM

## 2023-05-11 LAB
25(OH)D3+25(OH)D2 SERPL-MCNC: 39 NG/ML (ref 30–96)
CHOLEST SERPL-MCNC: 256 MG/DL (ref 120–199)
CHOLEST/HDLC SERPL: 6.2 {RATIO} (ref 2–5)
ESTIMATED AVG GLUCOSE: 137 MG/DL (ref 68–131)
HBA1C MFR BLD: 6.4 % (ref 4–5.6)
HDLC SERPL-MCNC: 41 MG/DL (ref 40–75)
HDLC SERPL: 16 % (ref 20–50)
LDLC SERPL CALC-MCNC: 179.4 MG/DL (ref 63–159)
NONHDLC SERPL-MCNC: 215 MG/DL
TRIGL SERPL-MCNC: 178 MG/DL (ref 30–150)
VIT B12 SERPL-MCNC: 838 PG/ML (ref 210–950)

## 2023-05-11 PROCEDURE — 82306 VITAMIN D 25 HYDROXY: CPT | Performed by: FAMILY MEDICINE

## 2023-05-11 PROCEDURE — 80061 LIPID PANEL: CPT | Performed by: FAMILY MEDICINE

## 2023-05-11 PROCEDURE — 82607 VITAMIN B-12: CPT | Performed by: FAMILY MEDICINE

## 2023-05-11 PROCEDURE — 36415 COLL VENOUS BLD VENIPUNCTURE: CPT | Performed by: FAMILY MEDICINE

## 2023-05-11 PROCEDURE — 83036 HEMOGLOBIN GLYCOSYLATED A1C: CPT | Performed by: FAMILY MEDICINE

## 2023-05-12 ENCOUNTER — CLINICAL SUPPORT (OUTPATIENT)
Dept: REHABILITATION | Facility: HOSPITAL | Age: 68
End: 2023-05-12
Payer: MEDICARE

## 2023-05-12 DIAGNOSIS — R29.3 POSTURE ABNORMALITY: Primary | ICD-10-CM

## 2023-05-12 DIAGNOSIS — M54.6 MIDLINE THORACIC BACK PAIN, UNSPECIFIED CHRONICITY: ICD-10-CM

## 2023-05-12 PROCEDURE — 97112 NEUROMUSCULAR REEDUCATION: CPT | Mod: CQ

## 2023-05-12 PROCEDURE — 97110 THERAPEUTIC EXERCISES: CPT | Mod: CQ

## 2023-05-12 NOTE — PROGRESS NOTES
Detail Level: Zone Last 5 Patient Entered Readings                                      Current 30 Day Average: 111/75     Recent Readings 3/10/2018 3/10/2018 3/2/2018 2/21/2018 2/11/2018    SBP (mmHg) 99 93 111 122 126    DBP (mmHg) 75 47 71 78 84    Pulse 81 86 68 61 61          Hypertension Digital Medicine Program (HDMP): Health  Follow Up    Lifestyle Modifications:    1.Low sodium diet: yes Reports he is consistent with low sodium diet and that there have not been any changes over past 6 weeks.  Expresses concern over recent elevated liver enzyme test and states that depending on how the labwork comes back he may want coaching in different areas. Encouraged patient to contact either myself or his pharmacist at any time should he have further questions or wish to change any goals.     2.Physical activity: yes Continues to report active lifestyle between physical therapy and walking.  States he feels confident in current lifestyle regimen and declines further coaching at this time.     3.Hypotension/Hypertension symptoms: yes Reporting continued fatigue with lower numbers. MD and PharmD are aware.     4.Patient has been compliant with the medication regimen.     Follow up with Mr. Mane Ramires completed. No further questions or concerns. I will follow up in 4-6 weeks to assess progress. Bp is well controlled.

## 2023-05-12 NOTE — PROGRESS NOTES
"[facility]   Physical Therapy Treatment Note      Name: Mane Dodd [facility] Number: [id number]    Therapy Diagnosis:   Encounter Diagnoses   Name Primary?    Posture abnormality Yes    Midline thoracic back pain, unspecified chronicity        Physician: ROHIT Delgado NP    Visit Date: 5/12/2023       Physician: ROHIT Delgado NP     Physician Orders: PT Eval and Treat   Medical Diagnosis from Referral: M54.12 (ICD-10-CM) - Cervical radiculopathy  Evaluation Date: 4/28/2023  Authorization Period Expiration: 4/26/24  Plan of Care Expiration: 7/28/23  Progress Note Due: 5/28/23  Visit # / Visits authorized: 1/ 1, 2/20  FOTO: 3/5     Precautions:  h/o CVA, h/o cervical fusion       PTA Visit #: 1/5     Time In: 1:45 pm  Time Out: 2:30 pm  Total Billable Time: 45 minutes    Subjective     Pt reports: he will see a neurologist regarding a cyst at the base of his neck which is most likely causing headaches.  He was compliant with home exercise program.  Response to previous treatment: neck pain reducing   Functional change: in progress    Pain: 2/10  Location: left neck & between shoulder blades     Objective        Treatment     Yousif received the treatments listed below:      therapeutic exercises to develop strength, endurance, ROM, flexibility, posture, and core stabilization for 23 minutes including:    UBE 3 min fwd/3' bkwd 1/2 foam  Supine pec stretch on 1/2 foam roll   -snow angels on 1/2 foam roll x10   Rowing BTB 2x10  Shoulder ext GTB 2x10  Scap sets 2x10 (held, pain) not performed    (L) UT stretch 3x30"  (L) LS stretch 3x30" (caused N/T post)  Rhomboid stretch 10/10"  Serratus punches B x 15 not performed      manual therapy techniques: Joint mobilizations, Manual traction, and Soft tissue Mobilization were applied to the:  for 12 minutes, including:    STM to B UT and cervical paraspinals NP  Manual traction (L cervical gapping)  T 4-7 AP joint mobs " Gr3    neuromuscular re-education activities to improve: Balance, Coordination, Kinesthetic, Sense, Proprioception, and Posture for 10 minutes. The following activities were included:    Supine chin retractions x10, seated x10, x10 with manual overpressure   Open books B x 15    therapeutic activities to improve functional performance for 00  minutes, including:    Patient Education and Home Exercises       Education provided:   - pain free exercises    Written Home Exercises Provided: yes. Exercises were reviewed and Yousif was able to demonstrate them prior to the end of the session.  Yousif demonstrated good  understanding of the education provided. See EMR under Patient Instructions for exercises provided during therapy sessions    Assessment     Pt tolerated session fairly well. Thoracic hypomobility noted with T spine mobs. Periscapular strengthening added for improved standing posture. Plan to monitor patients progress and advance per tolerance.    Yousif Is progressing well towards his goals.   Pt prognosis is Good.     Pt will continue to benefit from skilled outpatient physical therapy to address the deficits listed in the problem list box on initial evaluation, provide pt/family education and to maximize pt's level of independence in the home and community environment.     Pt's spiritual, cultural and educational needs considered and pt agreeable to plan of care and goals.     Anticipated barriers to physical therapy: co-morbidities      Goals:   Short Term Goals: 6 visits  1. Pt will be compliant /c HEP to supplement PT in order to improve functional tasks  2. Pt will improve B ravi-scap MMTs to 4-/5 grossly to improve strength for functional activities  3. Pt will improve B thoracic rotation range of motion to </= 25% limited painfree for improved mobility with ADLs     Long Term Goals: 12 visits   1. Pt will improve B ravi-scap  MMTs to 4/5 grossly to improve strength for functional activities  2. Pt will  improve FOTO score to </= 39% limitation to reduce perceived pain with mobility   3. Pt will improve cervical Ext to >/= 50 deg for increased range of motion with household chores   Plan     Plan of care Certification: 4/28/2023 to 7/28/23.     Outpatient Physical Therapy 2 times weekly for 12 visits to include the following interventions: Manual Therapy, Moist Heat/ Ice, Neuromuscular Re-ed, Patient Education, Therapeutic Activities, and Therapeutic Exercise, E-stim & Dry Needling as needed.     Ari Olmos, PTA

## 2023-05-15 ENCOUNTER — CLINICAL SUPPORT (OUTPATIENT)
Dept: REHABILITATION | Facility: HOSPITAL | Age: 68
End: 2023-05-15
Payer: MEDICARE

## 2023-05-15 DIAGNOSIS — R29.3 POSTURE ABNORMALITY: Primary | ICD-10-CM

## 2023-05-15 DIAGNOSIS — M54.6 MIDLINE THORACIC BACK PAIN, UNSPECIFIED CHRONICITY: ICD-10-CM

## 2023-05-15 PROCEDURE — 97110 THERAPEUTIC EXERCISES: CPT | Mod: CQ

## 2023-05-15 PROCEDURE — 97112 NEUROMUSCULAR REEDUCATION: CPT | Mod: CQ

## 2023-05-15 NOTE — PROGRESS NOTES
"[facility]   Physical Therapy Treatment Note      Name: Mane Dodd [facility] Number: [id number]    Therapy Diagnosis:   Encounter Diagnoses   Name Primary?    Posture abnormality Yes    Midline thoracic back pain, unspecified chronicity        Physician: ROHIT Delgado NP    Visit Date: 5/15/2023       Physician: ROHIT Delgado NP     Physician Orders: PT Eval and Treat   Medical Diagnosis from Referral: M54.12 (ICD-10-CM) - Cervical radiculopathy  Evaluation Date: 4/28/2023  Authorization Period Expiration: 4/26/24  Plan of Care Expiration: 7/28/23  Progress Note Due: 5/28/23  Visit # / Visits authorized: 1/ 1, 2/20  FOTO: 3/5     Precautions:  h/o CVA, h/o cervical fusion       PTA Visit #: 1/5     Time In: 1:45 pm  Time Out: 2:30 pm  Total Billable Time: 45 minutes    Subjective     Pt reports: he is seeing improvement.   He was compliant with home exercise program.  Response to previous treatment: neck pain reducing   Functional change: in progress    Pain: 2/10  Location: left neck & between shoulder blades     Objective        Treatment     Yousif received the treatments listed below:      therapeutic exercises to develop strength, endurance, ROM, flexibility, posture, and core stabilization for 30 minutes including:    UBE 3 min fwd/3' bkwd 1/2 foam  Supine pec stretch on 1/2 foam roll   -snow angels on 1/2 foam roll 2x10   Rowing CC 10# 3x10  Shoulder ext CC 10# 3x10  (L) UT stretch 3x30"  (L) LS stretch 3x30"   Rhomboid stretch 10/10"      manual therapy techniques: Joint mobilizations, Manual traction, and Soft tissue Mobilization were applied to the:  for 00 minutes, including:    STM to B UT and cervical paraspinals NP  Manual traction (L cervical gapping)  T 4-7 AP joint mobs Gr3    neuromuscular re-education activities to improve: Balance, Coordination, Kinesthetic, Sense, Proprioception, and Posture for 15 minutes. The following activities were included:    Supine " chin retractions x10, seated x10, x10 with manual overpressure   Open books B x 15    therapeutic activities to improve functional performance for 00  minutes, including:        Not Performed:  Rowing BTB 2x10  Shoulder ext GTB 2x10  Scap sets 2x10 (held, pain)  Serratus punches B x 15 not performed    Patient Education and Home Exercises       Education provided:   - pain free exercises    Written Home Exercises Provided: yes. Exercises were reviewed and Yousif was able to demonstrate them prior to the end of the session.  Yosuif demonstrated good  understanding of the education provided. See EMR under Patient Instructions for exercises provided during therapy sessions    Assessment     Pt tolerated session fairly well. Attempted scapular retractions again, held due to discomfort in mid thoracic. Pt tolerated increased resistance with rowing and shoulder ext on cable cross. Plan to monitor patients progress and advance per tolerance.    Yousif Is progressing well towards his goals.   Pt prognosis is Good.     Pt will continue to benefit from skilled outpatient physical therapy to address the deficits listed in the problem list box on initial evaluation, provide pt/family education and to maximize pt's level of independence in the home and community environment.     Pt's spiritual, cultural and educational needs considered and pt agreeable to plan of care and goals.     Anticipated barriers to physical therapy: co-morbidities      Goals:   Short Term Goals: 6 visits  1. Pt will be compliant /c HEP to supplement PT in order to improve functional tasks  2. Pt will improve B ravi-scap MMTs to 4-/5 grossly to improve strength for functional activities  3. Pt will improve B thoracic rotation range of motion to </= 25% limited painfree for improved mobility with ADLs     Long Term Goals: 12 visits   1. Pt will improve B ravi-scap  MMTs to 4/5 grossly to improve strength for functional activities  2. Pt will improve FOTO  score to </= 39% limitation to reduce perceived pain with mobility   3. Pt will improve cervical Ext to >/= 50 deg for increased range of motion with household chores   Plan     Plan of care Certification: 4/28/2023 to 7/28/23.     Outpatient Physical Therapy 2 times weekly for 12 visits to include the following interventions: Manual Therapy, Moist Heat/ Ice, Neuromuscular Re-ed, Patient Education, Therapeutic Activities, and Therapeutic Exercise, E-stim & Dry Needling as needed.     Ari Olmos, PTA

## 2023-05-17 NOTE — PROGRESS NOTES
Is This A New Presentation, Or A Follow-Up?: Skin Lesion
Last 5 Patient Entered Redings Current 30 Day Average: 127/80     Recent Readings 9/5/2017 9/5/2017 9/4/2017 9/3/2017 9/1/2017    Systolic BP (mmHg) 137 129 124 139 125    Diastolic BP (mmHg) 83 85 80 85 85    Pulse 59 59 73 64 82        Hypertension Digital Medicine Program (HDMP): Health  Follow Up    Lifestyle Modifications:    1. Low sodium diet: yes    2.Physical activity: yes  - continuing Pt, still has pain, but managed to get weight down to 1523 lbs currently.     3.Hypotension/Hypertension symptoms: no   Frequency/Alleviating factors/Precipitating factors, etc.     4. Patient  has been been compliant with the medication regimen     Follow up with Mr. Mane Ramires completed. No further questions or concerns. I will follow up in a few weeks to assess progress.       
Additional History: Pt had a TB test years ago in this area skin came off a few days ago.

## 2023-05-18 ENCOUNTER — CLINICAL SUPPORT (OUTPATIENT)
Dept: REHABILITATION | Facility: HOSPITAL | Age: 68
End: 2023-05-18
Attending: PSYCHIATRY & NEUROLOGY
Payer: MEDICARE

## 2023-05-18 ENCOUNTER — OFFICE VISIT (OUTPATIENT)
Dept: NEUROLOGY | Facility: CLINIC | Age: 68
End: 2023-05-18
Payer: MEDICARE

## 2023-05-18 ENCOUNTER — CLINICAL SUPPORT (OUTPATIENT)
Dept: REHABILITATION | Facility: HOSPITAL | Age: 68
End: 2023-05-18
Payer: MEDICARE

## 2023-05-18 ENCOUNTER — TELEPHONE (OUTPATIENT)
Dept: NEUROSURGERY | Facility: CLINIC | Age: 68
End: 2023-05-18
Payer: MEDICARE

## 2023-05-18 VITALS
HEART RATE: 80 BPM | BODY MASS INDEX: 27.92 KG/M2 | SYSTOLIC BLOOD PRESSURE: 120 MMHG | DIASTOLIC BLOOD PRESSURE: 80 MMHG | HEIGHT: 65 IN | WEIGHT: 167.56 LBS

## 2023-05-18 DIAGNOSIS — G95.89 MYELOMALACIA: ICD-10-CM

## 2023-05-18 DIAGNOSIS — M54.12 C6 RADICULOPATHY: ICD-10-CM

## 2023-05-18 DIAGNOSIS — M48.02 SPINAL STENOSIS, CERVICAL REGION: Primary | ICD-10-CM

## 2023-05-18 DIAGNOSIS — H51.11 CONVERGENCE INSUFFICIENCY: ICD-10-CM

## 2023-05-18 DIAGNOSIS — F32.A DEPRESSION, UNSPECIFIED DEPRESSION TYPE: ICD-10-CM

## 2023-05-18 DIAGNOSIS — R42 DIZZINESS: ICD-10-CM

## 2023-05-18 DIAGNOSIS — E11.9 CONTROLLED TYPE 2 DIABETES MELLITUS WITHOUT COMPLICATION, WITHOUT LONG-TERM CURRENT USE OF INSULIN: ICD-10-CM

## 2023-05-18 DIAGNOSIS — F07.81 POST CONCUSSION SYNDROME: Primary | ICD-10-CM

## 2023-05-18 DIAGNOSIS — H81.8X9 DEFICIT OF VESTIBULO-OCULAR REFLEX: ICD-10-CM

## 2023-05-18 DIAGNOSIS — M54.6 MIDLINE THORACIC BACK PAIN, UNSPECIFIED CHRONICITY: ICD-10-CM

## 2023-05-18 DIAGNOSIS — E78.5 DYSLIPIDEMIA: ICD-10-CM

## 2023-05-18 DIAGNOSIS — Z86.73 H/O: CVA (CEREBROVASCULAR ACCIDENT): ICD-10-CM

## 2023-05-18 DIAGNOSIS — R51.9 NONINTRACTABLE HEADACHE, UNSPECIFIED CHRONICITY PATTERN, UNSPECIFIED HEADACHE TYPE: ICD-10-CM

## 2023-05-18 DIAGNOSIS — R29.3 POSTURE ABNORMALITY: Primary | ICD-10-CM

## 2023-05-18 DIAGNOSIS — H55.81 SACCADIC EYE MOVEMENT DEFICIENCY: ICD-10-CM

## 2023-05-18 DIAGNOSIS — S06.0XAS CONCUSSION WITH UNKNOWN LOSS OF CONSCIOUSNESS STATUS, SEQUELA: ICD-10-CM

## 2023-05-18 DIAGNOSIS — M50.30 DDD (DEGENERATIVE DISC DISEASE), CERVICAL: ICD-10-CM

## 2023-05-18 DIAGNOSIS — H53.8 BLURRED VISION: ICD-10-CM

## 2023-05-18 DIAGNOSIS — H81.90 VESTIBULOPATHY, UNSPECIFIED LATERALITY: ICD-10-CM

## 2023-05-18 DIAGNOSIS — Z87.820 HISTORY OF CONCUSSION: Primary | ICD-10-CM

## 2023-05-18 DIAGNOSIS — M48.02 CERVICAL STENOSIS OF SPINE: Primary | ICD-10-CM

## 2023-05-18 DIAGNOSIS — I10 PRIMARY HYPERTENSION: ICD-10-CM

## 2023-05-18 PROCEDURE — 1125F AMNT PAIN NOTED PAIN PRSNT: CPT | Mod: CPTII,,, | Performed by: PSYCHIATRY & NEUROLOGY

## 2023-05-18 PROCEDURE — 1160F PR REVIEW ALL MEDS BY PRESCRIBER/CLIN PHARMACIST DOCUMENTED: ICD-10-PCS | Mod: CPTII,,, | Performed by: PSYCHIATRY & NEUROLOGY

## 2023-05-18 PROCEDURE — 3074F PR MOST RECENT SYSTOLIC BLOOD PRESSURE < 130 MM HG: ICD-10-PCS | Mod: CPTII,,, | Performed by: PSYCHIATRY & NEUROLOGY

## 2023-05-18 PROCEDURE — 1159F MED LIST DOCD IN RCRD: CPT | Mod: CPTII,,, | Performed by: PSYCHIATRY & NEUROLOGY

## 2023-05-18 PROCEDURE — 99215 PR OFFICE/OUTPT VISIT, EST, LEVL V, 40-54 MIN: ICD-10-PCS | Mod: S$GLB,,, | Performed by: PSYCHIATRY & NEUROLOGY

## 2023-05-18 PROCEDURE — 3008F BODY MASS INDEX DOCD: CPT | Mod: CPTII,,, | Performed by: PSYCHIATRY & NEUROLOGY

## 2023-05-18 PROCEDURE — 3288F PR FALLS RISK ASSESSMENT DOCUMENTED: ICD-10-PCS | Mod: CPTII,,, | Performed by: PSYCHIATRY & NEUROLOGY

## 2023-05-18 PROCEDURE — 4010F ACE/ARB THERAPY RXD/TAKEN: CPT | Mod: CPTII,,, | Performed by: PSYCHIATRY & NEUROLOGY

## 2023-05-18 PROCEDURE — 3079F DIAST BP 80-89 MM HG: CPT | Mod: CPTII,,, | Performed by: PSYCHIATRY & NEUROLOGY

## 2023-05-18 PROCEDURE — 3044F PR MOST RECENT HEMOGLOBIN A1C LEVEL <7.0%: ICD-10-PCS | Mod: CPTII,,, | Performed by: PSYCHIATRY & NEUROLOGY

## 2023-05-18 PROCEDURE — 1101F PT FALLS ASSESS-DOCD LE1/YR: CPT | Mod: CPTII,,, | Performed by: PSYCHIATRY & NEUROLOGY

## 2023-05-18 PROCEDURE — 3288F FALL RISK ASSESSMENT DOCD: CPT | Mod: CPTII,,, | Performed by: PSYCHIATRY & NEUROLOGY

## 2023-05-18 PROCEDURE — 97110 THERAPEUTIC EXERCISES: CPT | Mod: CQ

## 2023-05-18 PROCEDURE — 3079F PR MOST RECENT DIASTOLIC BLOOD PRESSURE 80-89 MM HG: ICD-10-PCS | Mod: CPTII,,, | Performed by: PSYCHIATRY & NEUROLOGY

## 2023-05-18 PROCEDURE — 96125 COGNITIVE TEST BY HC PRO: CPT | Performed by: SPEECH-LANGUAGE PATHOLOGIST

## 2023-05-18 PROCEDURE — 97140 MANUAL THERAPY 1/> REGIONS: CPT | Mod: CQ

## 2023-05-18 PROCEDURE — 3044F HG A1C LEVEL LT 7.0%: CPT | Mod: CPTII,,, | Performed by: PSYCHIATRY & NEUROLOGY

## 2023-05-18 PROCEDURE — 97165 OT EVAL LOW COMPLEX 30 MIN: CPT

## 2023-05-18 PROCEDURE — 99999 PR PBB SHADOW E&M-EST. PATIENT-LVL IV: CPT | Mod: PBBFAC,,,

## 2023-05-18 PROCEDURE — 1159F PR MEDICATION LIST DOCUMENTED IN MEDICAL RECORD: ICD-10-PCS | Mod: CPTII,,, | Performed by: PSYCHIATRY & NEUROLOGY

## 2023-05-18 PROCEDURE — 1157F PR ADVANCE CARE PLAN OR EQUIV PRESENT IN MEDICAL RECORD: ICD-10-PCS | Mod: CPTII,,, | Performed by: PSYCHIATRY & NEUROLOGY

## 2023-05-18 PROCEDURE — 1157F ADVNC CARE PLAN IN RCRD: CPT | Mod: CPTII,,, | Performed by: PSYCHIATRY & NEUROLOGY

## 2023-05-18 PROCEDURE — 97164 PT RE-EVAL EST PLAN CARE: CPT | Performed by: PHYSICAL THERAPIST

## 2023-05-18 PROCEDURE — 97161 PT EVAL LOW COMPLEX 20 MIN: CPT | Performed by: PHYSICAL THERAPIST

## 2023-05-18 PROCEDURE — 97112 NEUROMUSCULAR REEDUCATION: CPT | Mod: CQ

## 2023-05-18 PROCEDURE — 1125F PR PAIN SEVERITY QUANTIFIED, PAIN PRESENT: ICD-10-PCS | Mod: CPTII,,, | Performed by: PSYCHIATRY & NEUROLOGY

## 2023-05-18 PROCEDURE — 99214 OFFICE O/P EST MOD 30 MIN: CPT

## 2023-05-18 PROCEDURE — 1101F PR PT FALLS ASSESS DOC 0-1 FALLS W/OUT INJ PAST YR: ICD-10-PCS | Mod: CPTII,,, | Performed by: PSYCHIATRY & NEUROLOGY

## 2023-05-18 PROCEDURE — 99215 OFFICE O/P EST HI 40 MIN: CPT | Mod: S$GLB,,, | Performed by: PSYCHIATRY & NEUROLOGY

## 2023-05-18 PROCEDURE — 95992 CANALITH REPOSITIONING PROC: CPT | Performed by: PHYSICAL THERAPIST

## 2023-05-18 PROCEDURE — 99999 PR PBB SHADOW E&M-EST. PATIENT-LVL IV: ICD-10-PCS | Mod: PBBFAC,,,

## 2023-05-18 PROCEDURE — 1160F RVW MEDS BY RX/DR IN RCRD: CPT | Mod: CPTII,,, | Performed by: PSYCHIATRY & NEUROLOGY

## 2023-05-18 PROCEDURE — 3008F PR BODY MASS INDEX (BMI) DOCUMENTED: ICD-10-PCS | Mod: CPTII,,, | Performed by: PSYCHIATRY & NEUROLOGY

## 2023-05-18 PROCEDURE — 3074F SYST BP LT 130 MM HG: CPT | Mod: CPTII,,, | Performed by: PSYCHIATRY & NEUROLOGY

## 2023-05-18 PROCEDURE — 4010F PR ACE/ARB THEARPY RXD/TAKEN: ICD-10-PCS | Mod: CPTII,,, | Performed by: PSYCHIATRY & NEUROLOGY

## 2023-05-18 RX ORDER — DULOXETIN HYDROCHLORIDE 60 MG/1
60 CAPSULE, DELAYED RELEASE ORAL DAILY
Qty: 90 CAPSULE | Refills: 3 | Status: SHIPPED | OUTPATIENT
Start: 2023-05-18

## 2023-05-18 NOTE — PROGRESS NOTES
"[facility]   Physical Therapy Treatment Note      Name: Mane Dodd [facility] Number: [id number]    Therapy Diagnosis:   Encounter Diagnoses   Name Primary?    Posture abnormality Yes    Midline thoracic back pain, unspecified chronicity        Physician: ROHIT Delgado NP    Visit Date: 5/18/2023       Physician: ROHIT Delgado NP     Physician Orders: PT Eval and Treat   Medical Diagnosis from Referral: M54.12 (ICD-10-CM) - Cervical radiculopathy  Evaluation Date: 4/28/2023  Authorization Period Expiration: 4/26/24  Plan of Care Expiration: 7/28/23  Progress Note Due: 5/28/23  Visit # / Visits authorized: 1/ 1, 6/20  FOTO: 6/5     Precautions:  h/o CVA, h/o cervical fusion       PTA Visit #: 3/5     Time In: 1:00 pm  Time Out: 1:45 pm  Total Billable Time: 45 minutes    Subjective     Pt reports: he won't be able to lie on his back today b/c of vertigo symptoms.  He was compliant with home exercise program.  Response to previous treatment: neck pain reducing   Functional change: in progress    Pain: 2/10  Location: left neck & between shoulder blades     Objective        Treatment     Yousif received the treatments listed below:      therapeutic exercises to develop strength, endurance, ROM, flexibility, posture, and core stabilization for 25 minutes including:    UBE 3 min fwd/3' bkwd 1/2 foam  Supine pec stretch on 1/2 foam roll NP   -snow angels on 1/2 foam roll 2x10 NP  Rowing CC 10# 3x10 (7#)  Shoulder ext CC 10# 3x10 (7#)  (L) UT stretch 3x30"  (L) LS stretch 3x30"   Rhomboid stretch 10/10"      manual therapy techniques: Joint mobilizations, Manual traction, and Soft tissue Mobilization were applied to the:  for 10 minutes, including:    STM to B UT and cervical paraspinals (IASTM)  Manual traction (L cervical gapping) NP  T 4-7 AP joint mobs Gr3 NP    neuromuscular re-education activities to improve: Balance, Coordination, Kinesthetic, Sense, Proprioception, and Posture " for 10 minutes. The following activities were included:  Seated chin tucks x 10, w/ overpressure x 10  Supine chin retractions x10, seated x10, x10 with manual overpressure NP  Open books B x 15 NP    therapeutic activities to improve functional performance for 00  minutes, including:        Not Performed:  Rowing BTB 2x10  Shoulder ext GTB 2x10  Scap sets 2x10 (held, pain)  Serratus punches B x 15 not performed    Patient Education and Home Exercises       Education provided:   - pain free exercises    Written Home Exercises Provided: yes. Exercises were reviewed and Yousif was able to demonstrate them prior to the end of the session.  Yousif demonstrated good  understanding of the education provided. See EMR under Patient Instructions for exercises provided during therapy sessions    Assessment     Pt tolerated session fairly well. He arrived to clinic with reports of neck pain which radiates from the base of his skull to C-5. IASTM was applied to B UT and cervical paraspinals. Supine and side exercises held d/t doctors orders. Patient stated his pain was a 10/10 prior to therapy and 0/10 post IASTM session. Plan to monitor patients progress and advance per tolerance.    Yousif Is progressing well towards his goals.   Pt prognosis is Good.       Pt will continue to benefit from skilled outpatient physical therapy to address the deficits listed in the problem list box on initial evaluation, provide pt/family education and to maximize pt's level of independence in the home and community environment.     Pt's spiritual, cultural and educational needs considered and pt agreeable to plan of care and goals.     Anticipated barriers to physical therapy: co-morbidities      Goals:   Short Term Goals: 6 visits  1. Pt will be compliant /c HEP to supplement PT in order to improve functional tasks  2. Pt will improve B ravi-scap MMTs to 4-/5 grossly to improve strength for functional activities  3. Pt will improve B thoracic  rotation range of motion to </= 25% limited painfree for improved mobility with ADLs     Long Term Goals: 12 visits   1. Pt will improve B ravi-scap  MMTs to 4/5 grossly to improve strength for functional activities  2. Pt will improve FOTO score to </= 39% limitation to reduce perceived pain with mobility   3. Pt will improve cervical Ext to >/= 50 deg for increased range of motion with household chores   Plan     Plan of care Certification: 4/28/2023 to 7/28/23.     Outpatient Physical Therapy 2 times weekly for 12 visits to include the following interventions: Manual Therapy, Moist Heat/ Ice, Neuromuscular Re-ed, Patient Education, Therapeutic Activities, and Therapeutic Exercise, E-stim & Dry Needling as needed.     Ari Olmos, PTA

## 2023-05-18 NOTE — PROGRESS NOTES
Please see initial evaluation in plan of care.    SWAPNA Samson, L-SLP, CCC-SLP  Speech Language Pathologist   5/18/2023

## 2023-05-18 NOTE — PROGRESS NOTES
See plan of care for physical therapy re-evaluation (for vestibular sx)    Estrella Christianson, PT, DPT,   Board-Certified Clinical Specialist in Neurologic Physical Therapy   Certified Brain Injury Specialist

## 2023-05-18 NOTE — PROGRESS NOTES
Subjective:       Patient ID: Mane Ramires is a 67 y.o. male.    Chief Complaint:  Consult      Consultation Requested by:   Aaareferral Self  No address on file    History of Present Illness  67-year-old male presents for evaluation of postconcussion symptoms that persist since a head injury in 2018.  The patient notes that he was trying to pull a cord on a ceiling fan and notes that he became dizzy and fell down.  He does have a complicated history including a fall off of a roof more than a decade ago status post ACDF C5-C6 C7.  He also had a small right parieto-occipital stroke in 2021 and had dual antiplatelet therapy for the appropriate amount of time.  The patient does have diabetes dyslipidemia and hypertension managed by his PCP and seem to be well controlled.  He went to the ER on 04/24/2023 with severe neck pain.  The patient notes that he has been working on [employer] canvas as a  and he has had sexual activity that has seemed to trigger some of his neck pain.  He notes his main complaint is headaches, having headaches since 2018.  He was not a headache sufferer before his head injury in 2018.  He notes that since January of this year his headaches have been daily.  He notes his headaches are usually occipital and nuchal starting on the right side but sometimes bilateral on the occiput moving up to the right orbital frontal area.  He notes he can be somewhat light sensitive but this is not frequent.  He also notes that he has had a previous plantar fasciitis injection and could not tolerate the cortisone.  He notes his 2nd complaint today would be neck pain having bilateral neck pain especially in certain neck movements.  He notes he had a neurosurgeon on the [place] that he was seen previously who lasted his carpal tunnel surgery but he is not sure he should continue moving forward with that surgeon.  He notes his 3rd complaint is sleeping issues, noting that he is to wake up every 2  hours to find any position.  He notes additionally that he had some communication issues in the past after his stroke with some mild aphasia, however he notes that this has resolved.  On today's interview the patient is fluent and able to convey information including historical information and answer questions appropriately without any deficits.    Past Medical History:   Diagnosis Date    Accident     fell from roof with TBI (word finding issues personality changes, memory deficets), R rib fractures, clavicle fx    Allergy     Anxiety     ASD (atrial septal defect)     noted on ECHO 6/16    Cervical stenosis of spine     noted on 6/15 MRI    CTS (carpal tunnel syndrome)     mild-mod on 4/17 NCS    DDD (degenerative disc disease), cervical 10/2014    C5-6 and C6-C7    Depression     Diabetes     JEEVAN (generalized anxiety disorder)     Gender identity disorder     H/O cardiovascular stress test     12/14 normal    Herpes simplex type 2 infection     History of atypical hyperplasia of breast     B precancer lesions    Hypertension     Hypertensive retinopathy     IGT (impaired glucose tolerance)     Myelomalacia     c-spine noted on 8/15 MRI    OA (osteoarthritis)     Pineal gland cyst     TBI (traumatic brain injury)     after falling off a roof in 2003       Past Surgical History:   Procedure Laterality Date    AMPUTATION      L index finger    APPENDECTOMY  1969    BILATERAL SALPINGOOPHORECTOMY  02/2015    BIOPSY OF LIVER WITH ULTRASOUND GUIDANCE N/A 12/18/2018    Procedure: BIOPSY, LIVER, WITH US GUIDANCE;  [facility];  Location: [facility] OR [address] [place] [address];  Service: Radiology;  Laterality: N/A;  U/S GUIDED LIVER BIOPSY.  12/18/2018.  DOSC 7:30 AM  PROCEDURE 9 AM.  DR NITHIN GOLDEN / MARLON NUR.  DB 12/14/18 3:20P    CARPAL TUNNEL RELEASE  12/2017    right    CERVICAL FUSION  10/2014    C5-C6 and C6-C7    CHOLECYSTECTOMY      ENDOSCOPIC NASAL SEPTOPLASTY N/A 8/23/2018    Procedure:  SEPTOPLASTY, NASAL, ENDOSCOPIC;  Surgeon: Reji Arce III, MD;  Location: [facility] OR [address] [place] [address];  Service: ENT;  Laterality: N/A;    FINGER AMPUTATION Left     FRACTURE SURGERY Right 2003    rib fractures    HYSTERECTOMY  1984    MIGUEL    MASTECTOMY  2015    RECONSTRUCTION OF NOSE N/A 2018    Procedure: RECONSTRUCTION, NOSE;  Surgeon: Reji Arce III, MD;  Location: [facility] OR [address] [place] [address];  Service: ENT;  Laterality: N/A;    ROTATOR CUFF REPAIR Right 2019    UPPER GASTROINTESTINAL ENDOSCOPY  2017    Dr. Ayon       Family History   Problem Relation Age of Onset    Diabetes Mother     Heart disease Mother     Gallbladder disease Mother     Coronary artery disease Father     Breast cancer Sister     Heart disease Sister     Gallbladder disease Daughter     Craniosynostosis Daughter     Ovarian cancer Maternal Aunt     Heart disease Maternal Grandmother     Gallbladder disease Maternal Grandmother     Lung cancer Paternal Grandfather     Heart disease Paternal Grandfather     Heart attack Brother     Breast cancer Cousin     Breast cancer Cousin        Social History     Socioeconomic History    Marital status: Single   Occupational History    Occupation: Artist   Tobacco Use    Smoking status: Former     Packs/day: 0.25     Years: 2.00     Pack years: 0.50     Types: Cigarettes     Start date:      Quit date: 2002     Years since quittin.5     Passive exposure: Past    Smokeless tobacco: Never    Tobacco comments:     did not smoke regularly   Substance and Sexual Activity    Alcohol use: Yes     Comment: rare     Drug use: Yes     Types: Hydrocodone    Sexual activity: Never     Social Determinants of Health     Financial Resource Strain: Low Risk     Difficulty of Paying Living Expenses: Not hard at all   Food Insecurity: No Food Insecurity    Worried About Running Out of Food in the Last Year: Never true    Ran Out of Food in the Last Year: Never true   Transportation Needs: No  Transportation Needs    Lack of Transportation (Medical): No    Lack of Transportation (Non-Medical): No   Stress: No Stress Concern Present    Feeling of Stress : Not at all   Social Connections: Unknown    Marital Status:    Housing Stability: Unknown    Unable to Pay for Housing in the Last Year: No    Unstable Housing in the Last Year: No       Review of Systems  Review of Systems   Eyes:  Positive for visual disturbance.   Musculoskeletal:  Positive for neck pain and neck stiffness.   Psychiatric/Behavioral:  Positive for sleep disturbance.    All other systems reviewed and are negative.    Objective:     Vitals:    05/18/23 0758   BP: 120/80   Pulse: 80      Physical Exam  Vitals reviewed.   HENT:      Head: Normocephalic and atraumatic.   Neck:     Pulmonary:      Effort: Pulmonary effort is normal.   Musculoskeletal:      Cervical back: Muscular tenderness present.   Neurological:      Mental Status: He is alert and oriented to person, place, and time.      Motor: Motor strength is normal.      Gait: Gait is intact.      Deep Tendon Reflexes:      Reflex Scores:       Tricep reflexes are 3+ on the right side and 3+ on the left side.       Bicep reflexes are 3+ on the right side and 3+ on the left side.       Brachioradialis reflexes are 3+ on the right side and 3+ on the left side.       Patellar reflexes are 3+ on the right side and 3+ on the left side.  Psychiatric:         Speech: Speech normal.         NEUROLOGICAL EXAMINATION:     MENTAL STATUS   Oriented to person, place, and time.   Registration: recalls 3 of 3 objects. Recall at 5 minutes: recalls 3 of 3 objects.   Attention: normal. Concentration: normal.   Speech: speech is normal   Level of consciousness: alert  Knowledge: good.   Normal comprehension.     CRANIAL NERVES     CN II   Visual fields full to confrontation.     CN V   Facial sensation intact.     CN VII   Facial expression full, symmetric.     CN VIII   CN VIII normal.     CN  IX, X   CN IX normal.   CN X normal.     CN XI   CN XI normal.     CN XII   CN XII normal.        CATHY abnormal, pulls to left, 6/8/7, indicating vestibulopathy    Point of convergency at 40cm, worse on left with end gaze nystagmus     MOTOR EXAM   Muscle bulk: normal  Overall muscle tone: normal    Strength   Strength 5/5 throughout.     REFLEXES     Reflexes   Right brachioradialis: 3+  Left brachioradialis: 3+  Right biceps: 3+  Left biceps: 3+  Right triceps: 3+  Left triceps: 3+  Right patellar: 3+  Left patellar: 3+    SENSORY EXAM   Light touch normal.     GAIT AND COORDINATION     Gait  Gait: normal  Assessment/Plan:     Problem List Items Addressed This Visit          Neuro    H/O: CVA (cerebrovascular accident)    Overview     Cerebral infarction due to thrombosis of right posterior cerebral artery             Myelomalacia-cervical spine-H/O cervical fusion    Overview     H/O cervical fusion           Relevant Orders    Ambulatory consult to Neurosurgery       Cardiac/Vascular    HTN (hypertension)    Dyslipidemia       Endocrine    Controlled type 2 diabetes mellitus, without long-term current use of insulin    Overview     CARA Sloan MD)     Dx: Uncontrolled type 2 diabetes mellitus...     0 Result Notes    1 HM Topic             Component Ref Range & Units 3 mo ago  (12/20/22) 7 mo ago  (8/10/22) 8 mo ago  (7/8/22) 1 yr ago  (1/28/22) 1 yr ago  (7/27/21) 1 yr ago  (6/2/21) 1 yr ago  (4/8/21)   Hemoglobin A1C 4.0 - 5.6 % 6.8 High   5.8 High  CM  6.5 High  CM  6.3 High  CM  6.3 High  CM  6.9 High  CM  8.3 High  CM    Comment: ADA Screening Guidelines:   5.7-6.4%  Consistent with prediabetes   >or=6.5%  Consistent                   Other Visit Diagnoses       History of concussion    -  Primary    DDD (degenerative disc disease), cervical        Relevant Orders    Ambulatory consult to Pain Clinic    Ambulatory consult to Neurosurgery    C6 radiculopathy        Relevant Orders    Ambulatory consult to  Neurosurgery    Depression, unspecified depression type        Relevant Medications    DULoxetine (CYMBALTA) 60 MG capsule          67-year-old male presents for evaluation of concussion sustained in 2018.  The patient has a complicated neurologic history including a right posterior cerebellar artery punctate stroke in 2021.  At this time I explained to the patient that it seems as if his main complaints of headache neck pain and sleep issue may be into related.  We have discussed that he will be seen today by multidisciplinary concussion rehabilitation clinic with multiple specialists today including cognitive rehab with speech therapy, vestibular and cervical spine rehab with physical therapy and oculomotor rehab with occupational therapy.  I have discussed the case with the specialists at length.  Patient notes that he gets intermittently weak in bilateral hands dropping things small as a coffee cup unexplainably.  I have reviewed the patient's imaging of his cervical spine showing multilevel degenerative changes above and below his areas of surgery, with mild-to-moderate, moderate to severe bilateral neural foraminal stenosis at multiple levels.  I will make a referral for him to see my colleague in Neurosurgery for evaluation of his cervical spine.  I will also send the patient a referral to see pain management to discuss cervical spine injections to try to help alleviate some of the pain that he is dealing with.  The patient notes that he has an allergy to cortisone but I have told him he can speak with the pain management doctors to see what other options he may have.        Woody Crowe MD, FAAN  This note is dictated on M*Modal Fluency speech recognition program. There are word recognition mistakes that are occasionally missed on review.

## 2023-05-18 NOTE — PLAN OF CARE
[facility] Occupational Therapy  Initial Neurological Evaluation Post Concussion  CONCUSSION CLINIC     Date: 5/18/2023  Patient: Mane Ramires  Chart Number: [id number]    Therapy Diagnosis:   Encounter Diagnoses   Name Primary?    Concussion with unknown loss of consciousness status, sequela     Post concussion syndrome Yes    Nonintractable headache, unspecified chronicity pattern, unspecified headache type     Blurred vision     Saccadic eye movement deficiency     Convergence insufficiency     Deficit of vestibulo-ocular reflex      Physician: Woody Crowe III, MD    Physician Orders: Eval and Treat - Neuro OT   Medical Diagnosis: S06.0XAS (ICD-10-CM) - Concussion with unknown loss of consciousness status, sequela  Evaluation Date: 5/18/2023  Plan of Care Expiration Period: 10 visits; to around 7/27/2023  Insurance Authorization period Expiration: 5/9/2024  Date of Return to MD: 5/29/2023 vascular   Visit # / Visits Authorized: 1 / 1  FOTO: not administered; unavailable at concussion clinic     Time In/Out Evaluation: 2489-5651 (hx) / [id number] (OT objective)  Time In/Out Treatment: N/A  Total Billable (one on one) Time: 28 minutes    Precautions: Standard    Subjective     History of Current Condition: Mane Ramires is a 67 y.o. male who presents to [facility] Outpatient Occupational Therapy for evaluation and treatment secondary to post concussion syndrome. Per chart review, pt with extensive history including the following: falling off of a house in 2003 and suffered for years with neck pain; went to the ER in 2014 for cervical fusion (C5-C6/C6-C7); fell again in 2018 resulting in concussion. He was found to have a cyst at the base of his skull, which was causing headaches, and he was told that he had a pinched nerve in his neck bilaterally. Pt had a recent flare up early April 2023, which he contributed to a sexual encounter. Pt is currently receiving OP PT for  "cervical radiculopathy causing numbness bilaterally in ulnar distribution up to his elbows. Currently, pt is complaining of headaches, dizziness with head left, neck pain, worsened hearing in the past 6 weeks, and trouble with vision stating "one eye sees better close and one eye sess better far". PMHx: (+) prior concussions (2003 & 2018); (--) personal history of headaches/migraines; (--) familial history of headaches/migraines; (+) anxiety; (--) ADD/ADHD; (--) learning disability; (+) chronic neck pain prior to injury. 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     Falls: 0    Involved Side: N/A  Dominant Side: Right  Date of Onset: Concussions in 2003 & 2018  Surgical Procedure: R RTC repair 2019   Imaging: MRI studies and X-Ray; see full results in imaging  MRI Cervical Spine 4/24/2023: "IMPRESSION - Postoperative changes of ACDF at C5 through C7. Multilevel cervical spondylosis, most prominent at the level of C3-C4 through C6-7 levels, as above. Additional findings, as above."  X-Ray Cervical Spine 4/27/2023: "FINDINGS - Reversal of normal cervical lordosis.  No acute fractures.  Unremarkable predental space.  No widening prevertebral soft tissues.  Reconfirmed and stable findings of C5-C7 ACDF procedure with inter body fusion.  No findings of hardware malfunction.  Stable very minimal grade 1 anterolisthesis C4 on C5.  The flexion and extension views demonstrate no definite instability.  Stable mild disc narrowing C4-C5.  Other cervical disc levels preserved.  Some stable scattered facet arthropathic changes.  Intact odontoid tip and unremarkable C1-C2 articulation."  Previous Therapy: Currently received OP PT for cervical radiculopathy     Past Medical History/Physical Systems Review:     Past Medical History:  Mane Ramires  has a past medical history of Accident, Allergy, Anxiety, ASD (atrial septal defect), Cervical stenosis of spine, " CTS (carpal tunnel syndrome), DDD (degenerative disc disease), cervical, Depression, Diabetes, JEEVAN (generalized anxiety disorder), Gender identity disorder, H/O cardiovascular stress test, Herpes simplex type 2 infection, History of atypical hyperplasia of breast, Hypertension, Hypertensive retinopathy, IGT (impaired glucose tolerance), Myelomalacia, OA (osteoarthritis), Pineal gland cyst, and TBI (traumatic brain injury).    Past Surgical History:  Mane Ramires  has a past surgical history that includes Hysterectomy (1984); Appendectomy (1969); Cervical fusion (10/2014); Mastectomy (02/2015); Bilateral salpingoophorectomy (02/2015); Amputation; Upper gastrointestinal endoscopy (09/06/2017); Finger amputation (Left); Fracture surgery (Right, 2003); Cholecystectomy; Carpal tunnel release (12/2017); Reconstruction of nose (N/A, 8/23/2018); Endoscopic nasal septoplasty (N/A, 8/23/2018); Biopsy of liver with ultrasound guidance (N/A, 12/18/2018); and Rotator cuff repair (Right, 2019).    Current Medications:  Mane has a current medication list which includes the following prescription(s): aspirin, blood sugar diagnostic, blood-glucose meter, carboxymethylcellulose, duloxetine, fluticasone propionate, ibuprofen, lancets, levocetirizine, metoprolol succinate, needle (disp) 18 g, bd precisionglide, rosuvastatin, testosterone cypionate, and valsartan.    Allergies:  Review of patient's allergies indicates:   Allergen Reactions    Corticosteroids (glucocorticoids) Palpitations    Sudafed [pseudoephedrine hcl] Other (See Comments)     Heart racing      Codeine Other (See Comments)     Heart racing    Gabapentin      Caused memory loss    Pseudoephedrine     Adhesive Rash     Specifically clear tegaderm dressing     Cortisone Palpitations      Other: HTN, pre-diabetic     Patient's Goals for Therapy: to have less pain    Pain:  Pain Related Behaviors Observed: Yes   Functional Pain Scale Rating 0-10:   Location:  "Headache; starts at the base of the neck; can also be frontal   3/10 on current  0/10 at best; "pt reported it's more so like I get used to it"  10/10 at worst  Description: Pressure; headaches are daily and are typically constant   Aggravating Factors: Cervical flexion  Easing Factors: over the counter medication; rarely     Occupation:    Working presently: retired artist     Functional Limitations/Social History:    Prior Level of Function: Independent with all ADLs, IADLs, and functional mobility   Current Level of Function: Independent with all ADLs, IADLs, and functional mobility   ADLs/IADLs:  Feeding: Independent   Bathing: Independent    Dressing/Grooming:  Independent   Cooking/Homecare: Independent   Computer/phone use: Independent   Reading: Yes, current; is using finger more for tracker  Functional Mobility: Independent     Home/Living environment : lives alone  Home Access: 8th floor apartment; takes stairs and elevator   DME: none stated      Leisure: Walks for exercise 2-3x/wk    Driving: Yes    Adult Vision Questionnaire:   Directions: please check the answer that best describes your situation. If you wear glasses or contact lenses, answer the questions assuming that you are wearing them.   Never = never  Occasionally = less than 1 time/week  Frequently = at least 1 time/week  Always = everyday     Do you have headaches or facial pain? Always  Do you have pain in your eyes with eye movement? Never  Do you experience neck or shoulder discomfort? Always  Do you have dizziness, light headed or nausea while performing close up work? (computer work; reading; writing) Never  Do you have dizziness, light headed or nausea while performing far distance activities? (driving, tv, movies) Never  Do you experience dizziness when bending down and standing back up, or when getting up quickly? Frequently lightheadedness   Do you feel unsteady with walking, or drift to one side? Frequently  Do you feel overwhelmed " or anxious while walking in a large department store or walking in a crowd? Never  Do you feel dizzy or off balance when walking down a long hallway or on bold patterned carpeting? Never  Does riding in a car make you feel dizzy or uncomfortable? Never  Do you find yourself with your head tilted to one side? Never  Does your posture tend to be leaning more forward or backward than your used to? Never  Do you experience poor depth perception or have difficulty estimating distances accurately? Never  Do you experience double/overlapping/shadowed vision at far distances? Never  Do you experience double/overlapping/shadowed vision at near distances? Never  Do you experience glare or have sensitivity to bright lights? Always  Do you close or cover one eye with near or far tasks? Never  Do you skip lines or lose your place while reading? Always  Do you use your finger to keep your place on the page? Always  Do you tire easily with close-up tasks? Never  Do you experience blurred vision with far distance tasks? (driving, television, movies, chalkboard at school) Always  Do you experience blurred vision with close up tasks? (computer, reading) Always  Do you experience words running together or appearing to move on the page? Always    History:  Have you ever been diagnosed with:  Traumatic brain injury (TBI) or concussion? Yes  Reading disability? No  Lazy eye? No  Have you ever had an eye operation? No    Objective     Cognitive Exam  Oriented: Person, Place, Time, and Situation  Behaviors: normal, cooperative  Follows Commands/attention: Follows multistep  commands  Communication: clear/fluent  Memory: No Deficits noted but not formally assessed  Safety awareness/insight to disability: aware of diagnosis, treatment, and prognosis  Coping skills/emotional control: Appropriate to situation  Comments: See speech therapy evaluation for formal cognitive assessments    Physical Exam  Head Control/Neck Mobility: Not formally  assessed by OT; see PT eval for formal assessments    Oculomotor Exam  Vestibular/Ocular-Motor Screening (VOMS) for Concussion  Vestibular/Ocular Motor Test: Not Tested Headache   0-10 Dizziness  0-10 Nausea   0-10 Fogginess   0-10 Comments   Baseline  3 0 0 0    Smooth Pursuit  (1.5 feet left/right of center; 3 feet away; 2 times; 30 bpm each direction; horizontal and vertical)  3 0 0 0 See below    Saccades - Horizontal  (1.5 feet left/right of center; 3 feet away; 10 times; performed as quickly as possible)  5 0 0 0 See below   Saccades - Vertical   (1.5 feet up/down of center; 3 feet away; 10 times; performed as quickly as possible)  6 0 0 0 See below    Convergence (Near Point)  (14 pt font; stop when pt reports diplopia or outward deviation of eye is observed, blurring is ignored; measure distance between target and tip of nose; abnormal finding is >/= 6 cm)  6.5 0 0 0 (Near Point in cm):  Measure 1: 39  Measure 2: 53  Measure 3: 54   VOR - Horizontal  (14 pt font; 20 degrees rotation left/right; 10 reps; 180 bpm)  6.5 0 0 0 Pt reported diplopia; no visual slippage    VOR - Vertical  (14 pt font; 20 degrees rotation up/down; 10 times; 180 bpm)  7 0 0 0 Pt reported diplopia; no visual slippage; increased neck pain   Visual Motion Sensitivity Test  (80 degrees left/right; 5 times each direction; 50 bpm)  7.5 0 4 0 None     Oculomotor ROM: WNL  Eye Alignment: WNL  Visual Field: NT  Acuity: uses readers    Spontaneous Nystagmus: None  Gaze Holding Nystagmus: None  Gaze Holding (No Fixation): NT  Smooth Pursuits:   Horizontal: Smooth, coordinated eye movements; pt reported vision was more blurred tracking right of midline    Vertical: Smooth, coordinated eye movements; pt reported vision was more blurred tracking downward  Saccades: Pt reported mild increase in neck pain    Horizontal: Smooth, coordinated eye movements; pt reported targets became blurred    Vertical: Smooth, coordinated eye movements; pt reported  "targets became blurred, high target>low target   Near Point Convergence (cm): Impaired; Average 49 cm; pt also reported increased neck pain   Accommodation (gaze shift between near target (16") and far target (10ft)): Pt performed at appropriate speed, but reported increased headache (9/10)  Fixation (5 second sustained visual attention): Impaired; blurred vision with fixation    VOR Slow Head Movement: Deferred secondary to symptoms exacerbation and neck pain  Head Thrust: Deferred secondary to symptoms exacerbation and neck pain  Dynamic Visual Acuity (DVA): Deferred secondary to symptoms exacerbation and neck pain    FOTO: not administered, unavailable at concussion clinic      Treatment     Eval only.     Home Exercises and Patient Education Provided    Education provided:   - Role of OT, goals for OT, scheduling/cancellations, insurance limitations with patient.  - Additional Education provided: None today     Assessment     Mane Ramires is a 67 y.o. male referred to outpatient occupational therapy and presents with a medical diagnosis of post concussion syndrome, resulting in dizziness, headache, and neck pain, and demonstrates limitations as described in the chart below. Pt also reported light sensitivity, difficulty keeping place when reading, and blurred vision at both near and far distances. Pt's main deficits include decreased oculomotor endurance and convergence insufficiency. Although pt with smooth, coordinated eye movements during pursuits and saccades, and no visual slippage was noted during VOR components of the VOMS, pt's headache continued to increase. NPC was significantly impaired at an average of 49 cm (normal = 8-10 cm). No additional assessments to assess gaze stabilization were performed today secondary to symptom exacerbation and neck pain; will administer when appropriate. Pt also with an increase in nausea during visual motion sensitivity testing. Following medical record review it " is determined that patient will benefit from occupational therapy services in order to maximize oculomotor functioning and gaze stabilization, habituation for desensitization to environments/movements that elicit/increase symptoms, pt education, and HEP/HAP guidance to improve functional participation with meaningful occupations.    Assessment of Occupational Performance   Performance Deficits    Physical:  Visual Functions  Pain    Cognitive:  No Deficits    Psychosocial:    No Deficits     Patient prognosis is Fair due to history of multiple concussions and time since injuries   Patient will benefit from skilled outpatient Occupational Therapy to address the deficits stated above and in the chart below, provide patient/family education, and to maximize patient's level of independence.     Plan of care discussed with patient: Yes  Patient's spiritual, cultural and educational needs considered and patient is agreeable to the plan of care and goals as stated below:     Anticipated Barriers for therapy: none noted     Medical Necessity is demonstrated by the following  Occupational Profile/History  Co-morbidities and personal factors that may impact the plan of care [x] LOW: Brief chart review  [] MODERATE: Expanded chart review   [] HIGH: Extensive chart review    Moderate / High Support Documentation: N/A     Examination  Performance deficits relating to physical, cognitive or psychosocial skills that result in activity limitations and/or participation restrictions  [x] LOW: addressing 1-3 Performance deficits  [] MODERATE: 3-5 Performance deficits  [] HIGH: 5+ Performance deficits (please support below)    Moderate / High Support Documentation: N/A     Treatment Options [] LOW: Limited options  [x] MODERATE: Several options  [] HIGH: Multiple options      Decision Making/ Complexity Score: low       The following goals were discussed with the patient and patient is in agreement with them as to be addressed in the  "treatment plan.     Goals:  Short Term Goals: 5 weeks   1) Pt will tolerate oculomotor home exercise/activity program, reporting at least 50% compliance.   2) Near point convergence will decrease to 40 cm or less to improve oculomotor coordination and ability to fixate on close up work.   3) Pt to perform saccades WFL, in all directions, at 100 bpm without onset of headache or blurriness, to improve skills needed for reading.  4) Patient to perform smooth pursuits WFL in all planes, tracking single target, with no more than "mild" blurriness for improved ability to scan environment during driving   5) Pt will perform VORx1 horizontal/vertical directions at 130 bpm or more without increase in headache to improve gaze stabilization needed for driving.   6) Pt to participate in positional canal testing and additional goals to be established as indicated     Long Term Goals: 10 weeks   1) Pt will be independent with oculomotor home exercise/activity program.  2) Near point convergence will decrease to 25 cm or less to improve oculomotor coordination and ability to fixate on close up work.   3) Pt will change gaze between near and far targets without onset of headache or dizziness to improve accommodative flexibility, saccadic eye movements, and oculomotor coordination needed for participation in daily and leisure activities and to maximize safety while driving.  4) Pt will report ability to read without blurred vision or use of tracking aids for equal amount of time as prior to concussion  5) Patient to perform smooth pursuits WFL in all planes, tracking single target, without blurriness for improved ability to scan environment during driving   6) Patient to perform VORx1 WFL at 150 bpm using busy target facing busy background for improved gaze stabilization and decreased visual motion sensitivity.     More goals to be established as indicated.     Plan     Certification Period/Plan of care expiration: 10 visits; " 5/18/2023 to around 7/27/2023.    Outpatient Occupational Therapy 1 times weekly for 10 weeks to include the following interventions: Neuromuscular Re-ed, Paraffin, Patient Education, Self Care, Therapeutic Activities, and Therapeutic Exercise.    *Follow up at [facility]     First follow up visit: Establish HEP, positional canal testing, perform additional assessments for gaze stabilization as appropriate     Katarina Posey OT      I certify the need for these services furnished under this plan of treatment and while under my care.  ____________________________________ Physician/Referring Practitioner   Date of Signature

## 2023-05-18 NOTE — PATIENT INSTRUCTIONS
Memory Strategies:   Write: make a list or utilize a calendar   Repeat:  Repeat information out loud or internally   Associate:  Connect desired information with something you already recall. For example, when in the grocery, group items by meal or taste (sweet vs salty) or by category (vegetables, cold items, etc).  Picture: try to mentally picture what you are trying to remember  Mental Rehearsal: think through how you're going to complete a task before completing it.   KEVIN Brown., LISA Palomares, & DANYELLE Erwin (2012). Cognitive rehabilitation manual: Translating evidence-based recommendations into practice. [place], [place]: [facility] [employer].           Attention: Remember that inattention and lack of focus are major culprits to forgetting information so be sure and practice paying attention for adequate learning of information. If you rely on passive attention to remembering something (e.g., yeah, uh-huh approach), youll find you cannot recall it later. I recommend the following to improve attention, which may aid in later recall:   Reduce distractions in the area as much as possible.  Look at the person as they are speaking to you.   Paraphrase as they are speaking  Write down important pieces of information   Ask them to repeat if you zone out.   Have them simplify and reduce information that you need to attend to during conversation.   Have visual cues to remind you if you need to do something later.  Processing Speed:   Using multiple modalities (e.g., listening, writing notes, asking questions, recording) to learn new information is likely to allow additional time for processing, thus improving memory for the material.   Allowing sufficient time to complete tasks will reduce frustration and help to ensure completion.  Executive Functioning:  Dont attempt to multi-task.  Separate tasks so that each can be completed one at a time.  Consider using a calendar/day planner, as that may be effective to help  you plan and stay on track.  Color-coding specific tasks by importance may add additional benefit to your planner.  Break down large projects into smaller tasks and write down the steps to completing the task.  Taking notes while reading can help with recall.  Storing Information: Use the below strategies to help you further enhance how information is stored.  Rehearse - Immediately after seeing/hearing something, try to recall it.  Wait a few minutes, then check again.  Gradually lengthen the intervals between rehearsals.  Repetition of learned material is critical to ensure storage of information to be learned. Self-test at home to ensure learning.  Write down important information to improve your attention and focus and to have something to look back on when you need to recall it.  Make sure the person doesnt rattle off, but presents in a clear, logical, and unhurried manner.   Recalling Information:  Jog your memory - Lose something?  Think back to when you last had it.  What did you do next?  And after that?  Mentally walk yourself through each activity that followed.  Prodding your memory this way may enable you to recall the location of the missing item.  Use a cue - Symbolic reminders (the proverbial string around the finger) are helpful.  So too are memos, timers, calendar notes, etc.--keep them in visible, appropriate places.  Get organized - Have fixed locations for all important papers, key phone numbers, medications, keys, wallet, glasses, tools, etc.  Develop routines - Routines can anchor memories so they do not drift away.    6. Practice good cognitive/brain health hygiene:  Continue engaging in regular exercise, which increases alertness and arousal and can improve attention and focus.    Get a good nights sleep, as this can enhance alertness and cognition.  Eat healthy foods and balanced meals. It is notable that research indicates certain nutrients may aid in brain function, such as B vitamins  "(especially B6, B12, and folic acid), antioxidants (such as vitamins C and E, and beta carotene), and Omega-3 fatty acids. Talk with your physician or nutritionist about whats right for you.   Keep your brain active. Find activities to stay mentally active, such as reading, games (cards, checkers), puzzles (crosswords, Sudoku, jig saw), crafts (models, woodworking), gardening, or participating in activities in the community.  Stay socially engaged. Continue staying active with your family and friends.   Resources: Consider resources for support through the Governors Office of Elderly Affairs (http://goea.[place].gov/), [place] Chapter of the Alzheimers Association (www.alz.org/[facility]/), the Family Caregiver [place] (www.caregiver.org), and the American Psychological Association (http://www.apa.org/pi/about/publications/caregivers/consumers/index.aspxconsumers/index.aspx). See the following website for information about potential clinical trials: https://www.theaftd.org/research-clinical-trials/muqx-xy-wstzyptxnad/clinical-trials/    WRAP Write, Repeat, Associate, Picture - group of strategies for recall   Mental Rehearsal For "thinking through" your plan for the next day   Lists Create a list each night for the next day   Sticky Notes Bright and with specific instructions   Alarms  For things you need to remember regularly (i.e. Meals)   Mental Review How did I do with my strategies today?   Goal  Plan  Action  Review Goal - what is my goal?  Plan - how will I do it?  Action - try to complete goal  Review- how did it go?        Word Retrieval Strategies:   Visualization: try to see the thing in your head. Concentrate on the details of the picture and sometimes the word will come  Association:  Think of things that go with the word. For example, bread goes with butter, so if you are trying to think of the word "butter", you may think of the word "bread".  Gesture: use the hand motion you would use with the " "thing you are thinking of. For example, if you are thinking of the word "wash", you might make a motion as if you are washing your hands.   Description:  Describe the thing to the other person you are talking to. Even if the word does not come to you, the other person may be able to guess what you are trying to say.   First Letter or Sound:  Try to think of the first letter of the word. Sometimes you can think of the first letter even if you cannot think of the word. The letter may "lead" you to the word. You might even try going down the alphabet to find the first letter.     Goal Plan Action Review Strategy for Planning  Goal: what is your goal? What are you trying to accomplish?  Plan: what are the specific steps to get there?  Action: try to follow your plan  Review: How did it work?     KEVIN Brown., LISA Palomares, & DANYELLE Erwin (2012). Cognitive rehabilitation manual: Translating evidence-based recommendations into practice. [place], [place]: [facility].       Listening Strategies:  Repetition:  Tell the speaker "please repeat that" if you need to hear it again.   Processing Rate: tell the speaker "slow down" or "wait a minute" to allow you more processing time.  Explanation: tell the speaker "please explain that" If you do not understand the idea of the message.   Spelling:  Tell the speaker "please spell that for me" if you are writing the information down.   Back-channeling: tell the speaker "let me see if I understand you correctly" or "let's see if I got the sense of that" then summarize what he or she has told you.                                               "

## 2023-05-18 NOTE — PLAN OF CARE
[facility]  Speech Therapy Evaluation - Concussion Clinic    Date: 5/18/2023     Name: Mane Ramires   MRN: [id number]    Therapy Diagnosis:   Encounter Diagnosis   Name Primary?    Concussion with unknown loss of consciousness status, sequela     Physician: Woody Crowe III, MD  Physician Orders: Ambulatory Referral to Speech Therapy   Medical Diagnosis: S06.0XAS (ICD-10-CM) - Concussion with unknown loss of consciousness status, sequela    Visit # / Visits Authorized:  1 / 1   Date of Evaluation:  5/18/2023   Insurance Authorization Period: 5/10/23-5/9/24  Plan of Care Certification:    5/18/2023 to 5/18/2023; evaluation only      Time In: 9:51 am  Time Out: 10:43 am  Test Interpretation Time: 29 minutes   Procedure Min.   Cognitive Communication Evaluation - including administration, scoring and interpretation   81     Precautions: Standard  Subjective   Date of Onset: 2003 and 2018  History of Current Condition:  Mane Ramires is a 67 y.o. male who presents to [facility] Out[facility] Speech [facility] for evaluation and treatment secondary to post-concussion syndrome. Patient was referred to therapy by Dr. Crowe at the [facility]. Patient's concussion occurred after falling off house in 2003 and fell off couch in 2018 when changing light bulb. Patient reported initial symptoms included headaches, dizziness, nausea, vision changes. Currently, patient is complaining of headaches. Deficits reports in processing, memory, and word finding. Patient denied changes in mood. Patient endorsed cognitive fatigue. Patient does not feel as though he has returned to baseline cognitive communication functioning.    Previous history of:  Previous concussions: endorsed  Anxiety: endorsed  Depression: denied  Learning Disabilities: endorsed; Dyslexia   ADHD: denied  Headaches and/or Migraines: denied    Past Medical History: Mane Ramires  has a past medical history of  Accident, Allergy, Anxiety, ASD (atrial septal defect), Cervical stenosis of spine, CTS (carpal tunnel syndrome), DDD (degenerative disc disease), cervical (10/2014), Depression, Diabetes, JEEVAN (generalized anxiety disorder), Gender identity disorder, H/O cardiovascular stress test, Herpes simplex type 2 infection, History of atypical hyperplasia of breast, Hypertension, Hypertensive retinopathy, IGT (impaired glucose tolerance), Myelomalacia, OA (osteoarthritis), Pineal gland cyst, and TBI (traumatic brain injury).  Mane Ramires  has a past surgical history that includes Hysterectomy (1984); Appendectomy (1969); Cervical fusion (10/2014); Mastectomy (02/2015); Bilateral salpingoophorectomy (02/2015); Amputation; Upper gastrointestinal endoscopy (09/06/2017); Finger amputation (Left); Fracture surgery (Right, 2003); Cholecystectomy; Carpal tunnel release (12/2017); Reconstruction of nose (N/A, 8/23/2018); Endoscopic nasal septoplasty (N/A, 8/23/2018); Biopsy of liver with ultrasound guidance (N/A, 12/18/2018); and Rotator cuff repair (Right, 2019).  Medical Hx and Allergies: Mane has a current medication list which includes the following prescription(s): aspirin, blood sugar diagnostic, blood-glucose meter, carboxymethylcellulose, duloxetine, fluticasone propionate, ibuprofen, lancets, levocetirizine, metoprolol succinate, needle (disp) 18 g, bd precisionglide, rosuvastatin, testosterone cypionate, and valsartan.   Review of patient's allergies indicates:   Allergen Reactions    Corticosteroids (glucocorticoids) Palpitations    Sudafed [pseudoephedrine hcl] Other (See Comments)     Heart racing      Codeine Other (See Comments)     Heart racing    Gabapentin      Caused memory loss    Pseudoephedrine     Adhesive Rash     Specifically clear tegaderm dressing     Cortisone Palpitations     Prior Therapy:  Patient participated in speech therapy 2 years ago with Divya Cotton at [place] following CVA  Social  History:   Lives with: Patient lives alone.   Family responsibilities: Patient lives independently  Occupation: Patient is retired; patient was previously a mai and .   Computer usage: Phone - causes headaches   Driving: Currently driving; no difficulty if he wears sunglasses    Prior Level of Function: No cognitive concerns prior to concussion in 2003   Current Level of Function: Some cognitive-linguistic and word finding difficulty s/p concussion in 2003 and 2018 and cerebrovascular accident in 2021.    Pain:   Location: Headache  9/10 currently  6/10 on average  3/10 at best  10/10 at worst  Description: Pressure and aching   Aggravating Factors: Keeping his head down/neck down (I.e., when painting)  Easing Factors: rest and turing off light, closing eyes    Nutrition:  No deficits, Oral, Thin liquids (IDDSI 0) and Regular consistencies (IDDSI 7)   Patient's Therapy Goals:  Improve memory, processing, and word finding   Objective   Formal Assessment:    The Cognitive Communication Checklist for Acquired Brain Injury (CCCABI): The CCCABI is a referral tool designed to help flag communication difficulties after brain injury. This questionnaire screens for dysfunction in six domains: Functional Daily Communication, Auditory Comprehension & Information Processing, Expression, Discourse & Social Communication, Reading Comprehension, Written Expression, and Executive Functions & Self-Regulation. The results of the questionnaire are presented below.    Patient completed the questionnaire and  5/45 cognitive communication concerns were identified. These are listed below.    For the below categories only the patients self-identified complaints are listed for each domain.    Domain Patient Self-Identified Complaints   Functional Daily Communications Difficulties with:  No difficulties reported in this domain    Auditory Comprehension & Information Processing Difficulties with:  Hearing what is said,  sensitive to sounds, ringing in ears  Holding thoughts in mind while talking or listening  Remembering new conversations, events, and new information   Expression, Discourse & Social Communication Difficulties with:  Word finding, word retrieval, thinking of the word, vocabulary, word choice   Reading Comprehension Difficulties with:  Physical difficulties   Written Expression Difficulties with:  No difficulties reported in this domain   Thinking, Reasoning, Problem Solving, Executive Functions, Self-Regulation Difficulties with:  No difficulties reported in this domain    Reference: Karyn Devries (2015) Cognitive Communication Checklist for Acquired Brain Injury (CCCABI) [facility]; [facility], N1H 6J2 , [email or web address]      The Repeatable Battery for the Assessment of Neuropsychological Status (RBANS) Version B was administered to measure the patient's attention, language, visuospatial/constructional abilities, and immediate and delayed memory. The results are outlined below:    Domain Subtest Total Score Index Score   Immediate Memory List Learning 31   109    Story Memory 19    Visuospatial/  Constructional Figure Copy 20   121    Line Orientation 18    Language Picture Naming 10   96    Semantic Fluency 19    Attention Digit Span 14   132    Coding 57      Delayed Memory List Recall 6     121    List Recognition 19     Story Recall 11     Figure Recall 20        Total Scale   123     Percentile   94%     Descriptor   Superior    Interpretation:  Scaled score: mean of 10, standard deviation of 3. Therefore, 16+ = Very Superior; 14-15 = Superior; 12-13 = High Average; 8-11 = Average; 6-7 = Low Average; 4-5 = Borderline; 3 and below = Extremely Low    Index score: mean of 100, standard deviation of 15. Therefore, 130+ = Very Superior; 120-129 = Superior; 110-119 = High average;  = Average; 80-89 = Low Average; 70-79 = Borderline; 69 and below = Extremely Low. Apparently the most  reliable score; factor in education level.       Treatment   Total Treatment Time Separate from Evaluation: 8 minutes     Treatment included the following:  Patient was educated thoroughly regarding a variety of foundational cognitive elements related to mild TBI including the cognitive triangle (with emphasis on foundational versus higher level cognitive functions, awareness of deficits, impact of attention, information processing, memory, and executive functioning deficits as each area relates to assessment findings), spoon theory to discuss cognitive versus physical versus emotional fatigue and management of each to more efficiently use energy daily for more or less cognitively demanding tasks, internal versus external distractions and management of each, as well as overall impact of these deficits on daily functioning. Patient demonstrated awareness of information provided as demonstrated by asking good questions and expressing understanding.       Additional education provided:   -role of Speech Therapy, goals/plan of care, scheduling/cancellations, insurance limitations with patient  -Additional Education provided:   Memory strategies  Attention Strategies  Word finding strategies    Patient expressed understanding.     Home Program: n/a  Assessment     Yousif presents to [facility] status post medical diagnosis of  Concussion with unknown loss of consciousness status, sequela .      Interpretation of objective assessment:   Immediate Memory Score: Recalling information following immediate presentation is assessed through the List Learning and Story Memory subtests. In the List Learning subtest, the patient is given 10 words to remember. This list is presented four times overall. In this subtest, the patient did demonstrate learning over the 4 trials. The patient recalled 7 items on trial one, 6 items on trial two, 8 items on trial three, and 10 items in trial 4. In the Story  Memory subtest, the patient recalled 8 details on the first presentation and 11 details on the second presentation. Results of this domain indicate average performance.  Visuospatial score: Perceiving spatial relations and constructing a spatially accurate copy of a drawing is assessed through the Figure Copy and Line Orientation subtests. The Figure Copy subtest asks the patient to copy a complex line drawing. The patient accurately chucky 10/10 components of the figure. The patient accurately placed 10/10 components of the figure. The Line Orientation subtest the presents the patient with 12 line displays and asks the patient to match two given lines at the bottom to the display at the top.  The patient correctly identified 18/20. Results of this domain indicate superior performance.  Language score: Naming common items and retrieving learned material is assessed through the Picture Naming and Semantic Fluency subtests. The Picture Naming subtest asks the patient to name 10 line drawings. The patient was able to accurately name 10/10 items. The Semantic Fluency subtest asks the patient to name as many animals as he can in 60 seconds. The patient was able to name 15 animals. These results indicate average performance.   Attention score: Attending to, holding and manipulating information presented visually and orally in working memory is assessed with use of the Digit Span and Coding subtests. The Digit Span subtest asks the patient to repeat progressively lengthening strings of numbers. Patient able to repeat digit spans of 2 units progressing to 8 units; breakdown occurred at 9 units. The Coding subtest asks the patient to alternate attention between a key the given work and then to decode symbols to numbers. The patient accurately decoded 57/80 symbols. The patients results on these subtest indicate very superior performance.  Delayed Memory score: Anterograde memory capacity is assessed through the List Recall,  List Recognition, Story Recall, and Figure Recall subtests. The List Recall subtest asks the patient to recall items from the list presented at the beginning of the test. The patient was able to recall 6/10 items. The List Recognition subtest has the patient recall whether a word was or was not in the original list. The patient accurately identified whether a word was or was not on the list in 19 of 20 items. On the Story Recall subtest, the patient was able to recall 11/12 details. Finally, on the Figure Recall, the patient recalled 20/20 details. Results of this domain indicate superior performance.    Overall, according to the RBANS research, total Scale index is a good indicator of general cognitive functioning. The patient presents with a functional cognitive-communication skills at this time.        Demonstrates impairments including limitations as described in the problem list.     Positive prognostic factors: Patient motivation  Negative prognostic factors: Multiple concussion history   Barriers to therapy: No barriers to therapy identified.     Patient's spiritual, cultural, and educational needs considered and patient agreeable to plan of care and goals.    Patient will not benefit from skilled therapy.        Plan     Recommended Treatment Plan: Skilled speech therapy services are not warranted at this time.       Other Recommendations:   None at this time     Therapist's Name:   SWAPNA Samson, L-SLP, CCC-SLP  Speech Language Pathologist   5/18/2023

## 2023-05-18 NOTE — TELEPHONE ENCOUNTER
P/c to pt. With appt for ct of neck and appt with dr. Polanco on 6/15/23. Pt verbalized understanding of above and thanked me for responding so quickly in arranging the appt.

## 2023-05-19 PROBLEM — H81.90 VESTIBULOPATHY: Status: ACTIVE | Noted: 2023-05-19

## 2023-05-19 PROBLEM — R42 DIZZINESS: Status: ACTIVE | Noted: 2023-05-19

## 2023-05-19 NOTE — PLAN OF CARE
[facility]  Physical Therapy Neurological Rehabilitation Initial Evaluation (Re evaluation)  [facility]    Name: Mane Dodd [place]  Clinic Number: [id number]    Therapy Diagnosis:   Encounter Diagnoses   Name Primary?    Concussion with unknown loss of consciousness status, sequela     Vestibulopathy, unspecified laterality     Dizziness      Physician: Woody Crowe III, MD    Physician Orders: PT Eval and Treat neuro program  Medical Diagnosis from Referral: S06.0XAS (ICD-10-CM) - Concussion with unknown loss of consciousness status, sequela  Evaluation Date: 5/18/2023  Authorization Period Expiration: 5/9/2024  Plan of Care Expiration: 6/16/23  Visit # / Visits authorized: 1/ 1    PN Due: 6/18/23     Time In: 0845  Time Out: 0914  Total Billable Time: 29 minutes    Precautions: Standard    Subjective   Date of onset: 2003  History of current condition - Yousif reports: reports weakness and tingling in upper extremities, dropping objects in right hand. Patient had a fall from a house in 2003 resulting in chronic neck pain and a concussion. Patient reported left sided injuries and a left shoulder dislocation.  Patient experienced another fall (was standing on a couch fell and hit head) in 2018 resulting in a concussion. Primary impairment is headache and dizziness when turning head to left. Reports worsening hearing over the last 6 weeks (less hearing on right). Patient wear hearing aids. 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 Plans on following up with audiology. PMH: Cyst at base of skull (possible cause of headache), anxiety, HTN, prediabetic, CVA (4/2021)- right hemiparesis, cervical fusion C5-7 (2014), right rotator cuff repair in 2019.     Medical History:   Past Medical History:   Diagnosis Date    Accident     fell from roof with TBI (word finding issues personality changes, memory deficets), R rib fractures, clavicle fx    Allergy     Anxiety     ASD (atrial septal defect)     noted  on ECHO 6/16    Cervical stenosis of spine     noted on 6/15 MRI    CTS (carpal tunnel syndrome)     mild-mod on 4/17 NCS    DDD (degenerative disc disease), cervical 10/2014    C5-6 and C6-C7    Depression     Diabetes     JEEVAN (generalized anxiety disorder)     Gender identity disorder     H/O cardiovascular stress test     12/14 normal    Herpes simplex type 2 infection     History of atypical hyperplasia of breast     B precancer lesions    Hypertension     Hypertensive retinopathy     IGT (impaired glucose tolerance)     Myelomalacia     c-spine noted on 8/15 MRI    OA (osteoarthritis)     Pineal gland cyst     TBI (traumatic brain injury)     after falling off a roof in 2003       Surgical History:   Mane Ramires  has a past surgical history that includes Hysterectomy (1984); Appendectomy (1969); Cervical fusion (10/2014); Mastectomy (02/2015); Bilateral salpingoophorectomy (02/2015); Amputation; Upper gastrointestinal endoscopy (09/06/2017); Finger amputation (Left); Fracture surgery (Right, 2003); Cholecystectomy; Carpal tunnel release (12/2017); Reconstruction of nose (N/A, 8/23/2018); Endoscopic nasal septoplasty (N/A, 8/23/2018); Biopsy of liver with ultrasound guidance (N/A, 12/18/2018); and Rotator cuff repair (Right, 2019).    Medications:   Mane has a current medication list which includes the following prescription(s): aspirin, blood sugar diagnostic, blood-glucose meter, carboxymethylcellulose, duloxetine, fluticasone propionate, ibuprofen, lancets, levocetirizine, metoprolol succinate, needle (disp) 18 g, bd precisionglide, rosuvastatin, testosterone cypionate, and valsartan.    Allergies:   Review of patient's allergies indicates:   Allergen Reactions    Corticosteroids (glucocorticoids) Palpitations    Sudafed [pseudoephedrine hcl] Other (See Comments)     Heart racing      Codeine Other (See Comments)     Heart racing    Gabapentin      Caused memory loss    Pseudoephedrine     Adhesive  Rash     Specifically clear tegaderm dressing     Cortisone Palpitations        Imaging,   CT neck 4/24/23:   FINDINGS:  Skull base and craniocervical junction: Prominent suspected arachnoid cyst at the paramedian left occiput.  C1-C2: Dens is intact.  Pre dens space is maintained.  Alignment: Loss of normal cervical lordosis.  Vertebrae: Postsurgical change of ACDF at the level of C5 through C7.  Vertebral heights are well maintained with no evidence of infiltrative process or acute   fractures.  Discs: Normal height and signal elsewhere.  Cord: No signal abnormality is identified.     Degenerative findings:  C2-C3: Left-sided facet joint arthropathy, resulting in mild left-sided neural foraminal narrowing.  No significant spinal canal stenosis.  C3-C4: Posterior disc osteophyte complex with ligamentum flavum buckling resulting in mild spinal canal stenosis.  Mild right and minimal left neural foraminal   narrowing.  C4-C5: Posterior disc osteophyte complex with ligamentum flavum buckling and left-sided facet joint arthropathy, resulting in mild spinal canal stenosis and mild to   moderate right and moderate to severe left neural foraminal narrowing.  C5-C6: Bilateral uncovertebral spurring and bilateral facet joint arthropathy, resulting in mild right and moderate left neural foraminal narrowing.  No significant spinal   canal stenosis.  C6-C7: Posterior disc osteophyte complex and bilateral uncovertebral spurring, resulting in moderate bilateral neural foraminal narrowing.  No significant spinal   canal stenosis.  C7-T1: Posterior disc osteophyte complex resulting in minimal acquired canal stenosis.  Minimal right and mild-to-moderate left neural foraminal narrowing.  Paraspinal muscles & soft tissues: Unremarkable.     Electronically signed by resident: Saniya Dewey  Date:                                            04/24/2023     Electronically signed by: Michael Matute MD  Date:                                             04/24/2023    XRay 4/24/23:   FINDINGS:  Reversal of normal cervical lordosis.  No acute fractures.  Unremarkable predental space.  No widening prevertebral soft tissues.  Reconfirmed and stable findings of C5-C7 ACDF procedure with inter body fusion.  No findings of hardware malfunction.  Stable very minimal grade 1 anterolisthesis C4 on C5.  The flexion and extension views demonstrate no definite instability.  Stable mild disc narrowing C4-C5.  Other cervical disc levels preserved.  Some stable scattered facet arthropathic changes.  Intact odontoid tip and unremarkable C1-C2 articulation.     Electronically signed by: Gregorio Nielsen  Date:                                            04/27/2023      Prior Therapy: currently in orthopedic therapy for upper extremity weakness/ neck pain only. Never participated in vestibular physical therapy   Social History: lives alone  Falls: none   DME: none    Home Environment: 8th floor apartment, has elevator but takes stairs at times   Exercise Routine / History: walking 2-3x/week   Occupation: /    Prior Level of Function: no significant history of dizziness with tuning head to left. Had history of dizziness with head turn to right (likely BPPV) after fall in 2003. Reported a good response to treatment. Gradual worsening of headache. Reports hearing in right ear was better than left.   Current Level of Function: room spinning dizziness with laying down and turning head to the left. Dizziness lasts a few seconds. Dizziness occurs ~1x/day. Headaches are daily, lasts for hours. Neck pain occurs when looking downward/ cervical flexion.     Dizziness Handicap Inventory: 8/100   16-34= mild   26-53= moderate   >/= 54= severe    Pain:  Current 3/10, worst 10/10, best 0/10   Location: base of skull and frontal, seems to start at neck   Description: pressure  Aggravating Factors: cervical flexion  Easing Factors: ibuprofen    Pts goals: less  pain    Objective     Mental status: alert, oriented to person, place, and time  Appearance: Casually dressed  Behavior:  calm and cooperative  Attention Span and Concentration:  Normal    Dominant hand:  right     Posture Alignment :forward head    Sensation:  Light Touch: not tested, but patient reports numbness occurring in hands (right>left)           Proprioception:   not tested       SPECIAL TESTS:   Vertebral artery test: (-)  Sharp Pulsar Test: (-)   Transverse ligament test: (-)  Alar ligament test: (-)   Anterior Shear/ Sagittal Stress Test: not tested        OCULOMOTOR ASSESSMENT: Refer to OT report for details     Head- Neck Differentiation Test: not tested     VESTIBULAR ASSESSMENT:   Hallpike-Carlos Test: (+) with left head hanging, mild left rotational nystagmus, 17 sec dizziness. (-) R  Roll Test: (-) bilaterally      ORTHOPEDIC ASSESSMENT:  Muscle Tone: Not tested due to patient currently participating in orthopedic physical therapy   TTP: Not tested due to patient currently participating in orthopedic physical therapy       RANGE OF MOTION:  CERVICAL SPINE AROM:   WFLs for assessments performed today, not tested formally due to patient currently participating in orthopedic physical therapy   Dizziness with left rotation    Joint restrictions: Not tested due to patient currently participating in orthopedic physical therapy   Craniocervical flexion test: Not tested due to patient currently participating in orthopedic physical therapy      UPPER EXTREMITY  (R) UE: WFLs  (L) UE: WFLs         LOWER EXTREMITIES  Grossly WFLs, no reports of impairment by pt        STRENGTH: manual muscle test grades below   Deep neck flexors: Not tested due to patient currently participating in orthopedic physical therapy     Upper Extremity Strength  Not tested due to patient currently participating in orthopedic physical therapy     Lower Extremity Strength: Grossly WFLs, no reports of impairment by pt       POSTURAL  "CONTROL:  [place] Sensory Testing:  (P= Pass, F= Fail; note any sway; hold each position for 30")  Condition 1: (firm surface/feet together/eyes open) P, no sway  Condition 2: (firm surface/feet together/eyes closed) P, mod sway  Condition 3: (firm surface/feet in tandem/eyes open) P, min sway  Condition 4: (firm surface/feet in tandem/eyes closed) F (6 sec),   Condition 5: (soft surface/feet together/eyes open) P, min sway  Condition 6: (soft surface/feet together/eyes closed) P, mod sway  Condition 7: (Fakuda step test), measure distance varied from center starting position 68 deg to R     Eyes Open Eyes Closed   Single Limb Stance R LE Not tested   (<10 sec = HIGH FALL RISK) Not tested    Single Limb Stance L LE Not tested  Not tested        GAIT ASSESSMENT:   - AD used: none  - Assistance: I  - Distance: community  - Curb/ Ramp: I  - Stairs:  I    GAIT DEVIATIONS:  Mane displays the following deviations with ambulation: no significant deviations noted     Evaluation   Timed Up and Go NT   TUG cognitive NT   Self Selected Walking Speed NT     Endurance Deficit (per pt report): none reported by patient        Special Tests: not applicable       TREATMENT   Treatment Time In: 0933  Treatment Time Out: 0941  Total Treatment time separate from Evaluation: 8 minutes    Yousif received canalith repositioning maneuver to address left sided Benign paroxysmal positional vertigo:     Epley maneuver with left head hanging 2x  - decreased sx upon second attempt, slight dizziness reported with left>right head hanging and with right side lying to sit      Home Exercises and Patient Education Provided    Education provided:   - role of physical therapy/ plan of care  - educated on Benign paroxysmal positional vertigo cause and treatment    Written Home Exercises Provided: not applicable .  Yousif demonstrated good  understanding of the education provided.     See EMR under not applicable  for exercises provided not " applicable.    Assessment   Mane is a 67 y.o. male referred to outpatient Physical Therapy with a medical diagnosis of concussion without loss of consciousness. Patient reports room spinning dizziness lasting seconds that occurs with laying supine and turning head to left. Patient also has reports of upper extremity weakness and impaired sensation. This is currently being addressed with orthopedic physical therapy. Patient presents with increased sway on portions of [place] with eyes closed, Fukuda Step Test, and a positive Carlos Hallpike with left head hanging. 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 Patient was treated via Epley maneuver and reported decreased intensity of symptoms upon subsequent trial. Patient did report mild, short lasting dizziness with left>right rotation and right side lying to sit during treatment. It is suspected that patient could possibly have involvement of right canal. Physical therapy was instructed in avoiding certain positions for the next 48 hours to aid with maintaining repositioning of otoconia. Patient may follow up with physical therapy if dizziness is not resolved after today's treatment.        Pt prognosis is Good.   Pt will benefit from skilled outpatient Physical Therapy to address the deficits stated above and in the chart below, provide pt/family education, and to maximize pt's level of independence.     Plan of care discussed with patient: Yes  Pt's spiritual, cultural and educational needs considered and patient is agreeable to the plan of care and goals as stated below:     Anticipated Barriers for therapy: cyst at base of skull     Medical Necessity is demonstrated by the following  History  Co-morbidities and personal factors that may impact the plan of care Co-morbidities:   Cyst at base of skull, anxiety, HTN, prediabetic, CVA, cervical fusion C5-7, right  rotator cuff repair.    Personal Factors:   no deficits     high   Examination  Body Structures and Functions, activity limitations and participation restrictions that may impact the plan of care Body Regions:   neck  upper extremities    Body Systems:    ROM  gross coordinated movement  balance  gait  transfers  transitions  motor control  Vestibular system    Participation Restrictions:   Dizziness when laying and turning head to L    Activity limitations:   Learning and applying knowledge  no deficits    General Tasks and Commands  no deficits    Communication  no deficits    Mobility  no deficits    Self care  no deficits    Domestic Life  no deficits    Interactions/Relationships  no deficits    Life Areas  no deficits    Community and Social Life  recreation and leisure         high   Clinical Presentation stable and uncomplicated low   Decision Making/ Complexity Score: low     Goals:    Long Term Goals: 4 weeks   Patient will deny dizziness x 1 week to demonstrate a resolution of Benign paroxysmal positional vertigo.     Plan   Plan of care Certification: 5/18/2023 to 6/16/23.    Outpatient Physical Therapy 1 times weekly for 4 weeks to include the following interventions: Neuromuscular Re-ed, Patient Education, Therapeutic Activities, Therapeutic Exercise, and canalith repositioning maneuver.     Estrella Christianson, PT, DPT,   Board-Certified Clinical Specialist in Neurologic Physical Therapy   Certified Brain Injury Specialist    5/18/2023

## 2023-05-21 ENCOUNTER — PATIENT MESSAGE (OUTPATIENT)
Dept: FAMILY MEDICINE | Facility: CLINIC | Age: 68
End: 2023-05-21
Payer: MEDICARE

## 2023-05-21 DIAGNOSIS — E11.69 HYPERLIPIDEMIA ASSOCIATED WITH TYPE 2 DIABETES MELLITUS: Primary | ICD-10-CM

## 2023-05-21 DIAGNOSIS — E78.5 HYPERLIPIDEMIA ASSOCIATED WITH TYPE 2 DIABETES MELLITUS: Primary | ICD-10-CM

## 2023-05-21 RX ORDER — ROSUVASTATIN CALCIUM 5 MG/1
5 TABLET, COATED ORAL DAILY
Qty: 90 TABLET | Refills: 0 | Status: SHIPPED | OUTPATIENT
Start: 2023-05-21 | End: 2023-08-17

## 2023-05-22 ENCOUNTER — CLINICAL SUPPORT (OUTPATIENT)
Dept: REHABILITATION | Facility: HOSPITAL | Age: 68
End: 2023-05-22
Payer: MEDICARE

## 2023-05-22 ENCOUNTER — OFFICE VISIT (OUTPATIENT)
Dept: SURGERY | Facility: CLINIC | Age: 68
End: 2023-05-22
Payer: MEDICARE

## 2023-05-22 VITALS
HEIGHT: 65 IN | OXYGEN SATURATION: 98 % | WEIGHT: 167 LBS | BODY MASS INDEX: 27.82 KG/M2 | DIASTOLIC BLOOD PRESSURE: 66 MMHG | HEART RATE: 79 BPM | SYSTOLIC BLOOD PRESSURE: 119 MMHG | RESPIRATION RATE: 16 BRPM

## 2023-05-22 DIAGNOSIS — L81.8 TATTOOS: ICD-10-CM

## 2023-05-22 DIAGNOSIS — Z42.8 ENCOUNTER FOR OTHER PLASTIC AND RECONSTRUCTIVE SURGERY FOLLOWING MEDICAL PROCEDURE OR HEALED INJURY: ICD-10-CM

## 2023-05-22 DIAGNOSIS — M54.6 MIDLINE THORACIC BACK PAIN, UNSPECIFIED CHRONICITY: ICD-10-CM

## 2023-05-22 DIAGNOSIS — Z90.13 S/P MASTECTOMY, BILATERAL: Primary | ICD-10-CM

## 2023-05-22 DIAGNOSIS — R29.3 POSTURE ABNORMALITY: Primary | ICD-10-CM

## 2023-05-22 PROCEDURE — 99999 PR PBB SHADOW E&M-EST. PATIENT-LVL III: CPT | Mod: PBBFAC,,, | Performed by: PHYSICIAN ASSISTANT

## 2023-05-22 PROCEDURE — 1101F PR PT FALLS ASSESS DOC 0-1 FALLS W/OUT INJ PAST YR: ICD-10-PCS | Mod: CPTII,S$GLB,, | Performed by: PHYSICIAN ASSISTANT

## 2023-05-22 PROCEDURE — 97112 NEUROMUSCULAR REEDUCATION: CPT

## 2023-05-22 PROCEDURE — 1160F PR REVIEW ALL MEDS BY PRESCRIBER/CLIN PHARMACIST DOCUMENTED: ICD-10-PCS | Mod: CPTII,S$GLB,, | Performed by: PHYSICIAN ASSISTANT

## 2023-05-22 PROCEDURE — 1159F PR MEDICATION LIST DOCUMENTED IN MEDICAL RECORD: ICD-10-PCS | Mod: CPTII,S$GLB,, | Performed by: PHYSICIAN ASSISTANT

## 2023-05-22 PROCEDURE — 99212 PR OFFICE/OUTPT VISIT, EST, LEVL II, 10-19 MIN: ICD-10-PCS | Mod: S$GLB,,, | Performed by: PHYSICIAN ASSISTANT

## 2023-05-22 PROCEDURE — 3044F HG A1C LEVEL LT 7.0%: CPT | Mod: CPTII,S$GLB,, | Performed by: PHYSICIAN ASSISTANT

## 2023-05-22 PROCEDURE — 3008F BODY MASS INDEX DOCD: CPT | Mod: CPTII,S$GLB,, | Performed by: PHYSICIAN ASSISTANT

## 2023-05-22 PROCEDURE — 1101F PT FALLS ASSESS-DOCD LE1/YR: CPT | Mod: CPTII,S$GLB,, | Performed by: PHYSICIAN ASSISTANT

## 2023-05-22 PROCEDURE — 3074F SYST BP LT 130 MM HG: CPT | Mod: CPTII,S$GLB,, | Performed by: PHYSICIAN ASSISTANT

## 2023-05-22 PROCEDURE — 3078F PR MOST RECENT DIASTOLIC BLOOD PRESSURE < 80 MM HG: ICD-10-PCS | Mod: CPTII,S$GLB,, | Performed by: PHYSICIAN ASSISTANT

## 2023-05-22 PROCEDURE — 4010F PR ACE/ARB THEARPY RXD/TAKEN: ICD-10-PCS | Mod: CPTII,S$GLB,, | Performed by: PHYSICIAN ASSISTANT

## 2023-05-22 PROCEDURE — 3044F PR MOST RECENT HEMOGLOBIN A1C LEVEL <7.0%: ICD-10-PCS | Mod: CPTII,S$GLB,, | Performed by: PHYSICIAN ASSISTANT

## 2023-05-22 PROCEDURE — 3008F PR BODY MASS INDEX (BMI) DOCUMENTED: ICD-10-PCS | Mod: CPTII,S$GLB,, | Performed by: PHYSICIAN ASSISTANT

## 2023-05-22 PROCEDURE — 99213 OFFICE O/P EST LOW 20 MIN: CPT | Performed by: PHYSICIAN ASSISTANT

## 2023-05-22 PROCEDURE — 1157F PR ADVANCE CARE PLAN OR EQUIV PRESENT IN MEDICAL RECORD: ICD-10-PCS | Mod: CPTII,S$GLB,, | Performed by: PHYSICIAN ASSISTANT

## 2023-05-22 PROCEDURE — 1157F ADVNC CARE PLAN IN RCRD: CPT | Mod: CPTII,S$GLB,, | Performed by: PHYSICIAN ASSISTANT

## 2023-05-22 PROCEDURE — 1160F RVW MEDS BY RX/DR IN RCRD: CPT | Mod: CPTII,S$GLB,, | Performed by: PHYSICIAN ASSISTANT

## 2023-05-22 PROCEDURE — 99212 OFFICE O/P EST SF 10 MIN: CPT | Mod: S$GLB,,, | Performed by: PHYSICIAN ASSISTANT

## 2023-05-22 PROCEDURE — 99999 PR PBB SHADOW E&M-EST. PATIENT-LVL III: ICD-10-PCS | Mod: PBBFAC,,, | Performed by: PHYSICIAN ASSISTANT

## 2023-05-22 PROCEDURE — 4010F ACE/ARB THERAPY RXD/TAKEN: CPT | Mod: CPTII,S$GLB,, | Performed by: PHYSICIAN ASSISTANT

## 2023-05-22 PROCEDURE — 1126F PR PAIN SEVERITY QUANTIFIED, NO PAIN PRESENT: ICD-10-PCS | Mod: CPTII,S$GLB,, | Performed by: PHYSICIAN ASSISTANT

## 2023-05-22 PROCEDURE — 3078F DIAST BP <80 MM HG: CPT | Mod: CPTII,S$GLB,, | Performed by: PHYSICIAN ASSISTANT

## 2023-05-22 PROCEDURE — 1159F MED LIST DOCD IN RCRD: CPT | Mod: CPTII,S$GLB,, | Performed by: PHYSICIAN ASSISTANT

## 2023-05-22 PROCEDURE — 1126F AMNT PAIN NOTED NONE PRSNT: CPT | Mod: CPTII,S$GLB,, | Performed by: PHYSICIAN ASSISTANT

## 2023-05-22 PROCEDURE — 97530 THERAPEUTIC ACTIVITIES: CPT

## 2023-05-22 PROCEDURE — 3074F PR MOST RECENT SYSTOLIC BLOOD PRESSURE < 130 MM HG: ICD-10-PCS | Mod: CPTII,S$GLB,, | Performed by: PHYSICIAN ASSISTANT

## 2023-05-22 PROCEDURE — 3288F PR FALLS RISK ASSESSMENT DOCUMENTED: ICD-10-PCS | Mod: CPTII,S$GLB,, | Performed by: PHYSICIAN ASSISTANT

## 2023-05-22 PROCEDURE — 97110 THERAPEUTIC EXERCISES: CPT

## 2023-05-22 PROCEDURE — 3288F FALL RISK ASSESSMENT DOCD: CPT | Mod: CPTII,S$GLB,, | Performed by: PHYSICIAN ASSISTANT

## 2023-05-22 NOTE — PROGRESS NOTES
"[facility]   Physical Therapy Treatment Note      Name: Mane Dodd [place] [facility] Number: [id number]    Therapy Diagnosis:   Encounter Diagnoses   Name Primary?    Posture abnormality Yes    Midline thoracic back pain, unspecified chronicity          Physician: ROHIT Delgado NP    Visit Date: 5/22/2023       Physician: ROHIT Delgado NP     Physician Orders: PT Eval and Treat   Medical Diagnosis from Referral: M54.12 (ICD-10-CM) - Cervical radiculopathy  Evaluation Date: 4/28/2023  Authorization Period Expiration: 4/26/24  Plan of Care Expiration: 7/28/23  Progress Note Due: 5/28/23  Visit # / Visits authorized: 1/ 1, 7/20  FOTO: 6/5     Precautions:  h/o CVA, h/o cervical fusion       PTA Visit #: 3/5     Time In: 1:00 pm  Time Out: 1:45 pm  Total Billable Time: 45 minutes    Subjective     Pt reports: he has only felt slight L UE tingling today, pain is reducing.   He was compliant with home exercise program.  Response to previous treatment: neck pain reducing   Functional change: in progress    Pain: 2/10  Location: left neck & between shoulder blades     Objective        Treatment     Yousif received the treatments listed below:      therapeutic exercises to develop strength, endurance, ROM, flexibility, posture, and core stabilization for 15 minutes including:    UBE 3 min fwd/3' bkwd Lvl2 1/2 foam  Supine pec stretch on 1/2 foam roll NP   -snow angels on 1/2 foam roll 2x10 NP    (B) UT stretch 2x30"  (B) LS stretch 3x30"   Rhomboid stretch 5/10"- held due to L UE tingling       manual therapy techniques: Joint mobilizations, Manual traction, and Soft tissue Mobilization were applied to the:  for 18 minutes, including:    STM to B UT and cervical paraspinals with trigger point release     Manual traction (L cervical gapping) NP  T 4-7 AP joint mobs Gr3 NP    neuromuscular re-education activities to improve: Balance, Coordination, Kinesthetic, Sense, Proprioception, and Posture " for 12 minutes. The following activities were included:    Rowing CC 10# 3x10   Shoulder ext CC 10# 3x10 (7#)  B shoulder external rotations Green tb 2x10     Seated chin tucks x 10, w/ overpressure x 10NP   Supine chin retractions x10, seated x10, x10 with manual overpressure NP  Open books B x 15 not performed         therapeutic activities to improve functional performance for 00  minutes, including:        Not Performed:  Rowing BTB 2x10  Shoulder ext GTB 2x10  Scap sets 2x10 (held, pain)  Serratus punches B x 15 not performed    Patient Education and Home Exercises       Education provided:   - pain free exercises    Written Home Exercises Provided: yes. Exercises were reviewed and Yousif was able to demonstrate them prior to the end of the session.  Yousif demonstrated good  understanding of the education provided. See EMR under Patient Instructions for exercises provided during therapy sessions    Assessment     Pt tolerated session fairly well. Continued ravi-scap strength training with focus on mid/lower traps to inhibit upper trap and reduce over-activation with functional activities. Pt presented with trigger points along L upper trap, addressed with manual treatment with good relief reported afterwards.     Yousif Is progressing well towards his goals.   Pt prognosis is Good.       Pt will continue to benefit from skilled outpatient physical therapy to address the deficits listed in the problem list box on initial evaluation, provide pt/family education and to maximize pt's level of independence in the home and community environment.     Pt's spiritual, cultural and educational needs considered and pt agreeable to plan of care and goals.     Anticipated barriers to physical therapy: co-morbidities      Goals:   Short Term Goals: 6 visits  1. Pt will be compliant /c HEP to supplement PT in order to improve functional tasks  2. Pt will improve B ravi-scap MMTs to 4-/5 grossly to improve strength for  functional activities  3. Pt will improve B thoracic rotation range of motion to </= 25% limited painfree for improved mobility with ADLs     Long Term Goals: 12 visits   1. Pt will improve B ravi-scap  MMTs to 4/5 grossly to improve strength for functional activities  2. Pt will improve FOTO score to </= 39% limitation to reduce perceived pain with mobility   3. Pt will improve cervical Ext to >/= 50 deg for increased range of motion with household chores   Plan     Plan of care Certification: 4/28/2023 to 7/28/23.     Outpatient Physical Therapy 2 times weekly for 12 visits to include the following interventions: Manual Therapy, Moist Heat/ Ice, Neuromuscular Re-ed, Patient Education, Therapeutic Activities, and Therapeutic Exercise, E-stim & Dry Needling as needed.     Shannon Greene, PT

## 2023-05-22 NOTE — PROGRESS NOTES
"[facility] [place]  Department of Surgery       Follow-up      Subjective:      Mane Ramires is a 67 y.o.  male who presents to the Breast Surgery [facility] on 5/22/2023 for follow up visit status post bilateral 3D nipple areola tattoo on 4/14/23. Denies fever, chills, nausea, vomiting, or other systemic signs of infection. He is very pleased with the aesthetic outcome of his chest reconstruction and nipple areola tattoo(s).    Review of patient's allergies indicates:   Allergen Reactions    Corticosteroids (glucocorticoids) Palpitations    Sudafed [pseudoephedrine hcl] Other (See Comments)     Heart racing      Codeine Other (See Comments)     Heart racing    Gabapentin      Caused memory loss    Pseudoephedrine     Adhesive Rash     Specifically clear tegaderm dressing     Cortisone Palpitations       Current Outpatient Medications on File Prior to Visit   Medication Sig Dispense Refill    aspirin (ECOTRIN) 81 MG EC tablet Take 81 mg by mouth once daily.      blood sugar diagnostic Strp To check BG QD times daily, to use with insurance preferred meter 100 each 5    blood-glucose meter kit As directed 1 each 0    carboxymethylcellulose (REFRESH PLUS) 0.5 % Dpet 1 drop nightly.      DULoxetine (CYMBALTA) 60 MG capsule Take 1 capsule (60 mg total) by mouth once daily. 90 capsule 3    fluticasone propionate (FLONASE) 50 mcg/actuation nasal spray USE 2 SPRAYS IN EACH NOSTRIL ONCE DAILY 16 mL 3    ibuprofen (ADVIL,MOTRIN) 800 MG tablet Take 1 tablet (800 mg total) by mouth every 6 (six) hours as needed for Pain. 20 tablet 0    lancets Misc To check BG QD times daily, to use with insurance preferred meter 100 each 5    levocetirizine (XYZAL) 5 MG tablet TAKE 1 TABLET BY MOUTH EVERY NIGHT AS NEEDED FOR ALLERGIES 90 tablet 2    metoprolol succinate (TOPROL-XL) 25 MG 24 hr tablet Take 1 tablet (25 mg total) by mouth once daily. 90 tablet 1    needle, disp, 18 G (BD REGULAR BEVEL NEEDLES) 18 gauge x 1" Ndle USE TO " "INJECT 0.5ML OF TESTOSTERONE INTO THE MUCLE EVERY 14 DAYS 10 each 3    needle, disp, 25 gauge (BD PRECISIONGLIDE) 25 gauge x 1" Ndle USE TO DRAW UP 0.5ML OF TESTERONE EVERY 14 DAYS 10 each 3    rosuvastatin (CRESTOR) 5 MG tablet Take 1 tablet (5 mg total) by mouth once daily. 90 tablet 0    testosterone cypionate (DEPOTESTOTERONE CYPIONATE) 200 mg/mL injection Inject 1 mL (200 mg total) into the muscle every 14 (fourteen) days. 10 mL 3    valsartan (DIOVAN) 160 MG tablet TAKE 1 TABLET(160 MG) BY MOUTH EVERY DAY. 90 tablet 1     No current facility-administered medications on file prior to visit.       [unfilled]    Social History     Socioeconomic History    Marital status: Single   Occupational History    Occupation: Artist   Tobacco Use    Smoking status: Former     Packs/day: 0.25     Years: 2.00     Pack years: 0.50     Types: Cigarettes     Start date:      Quit date: 2002     Years since quittin.5     Passive exposure: Past    Smokeless tobacco: Never    Tobacco comments:     did not smoke regularly   Substance and Sexual Activity    Alcohol use: Yes     Comment: rare     Drug use: Yes     Types: Hydrocodone    Sexual activity: Never     Social Determinants of Health     Financial Resource Strain: Low Risk     Difficulty of Paying Living Expenses: Not hard at all   Food Insecurity: No Food Insecurity    Worried About Running Out of Food in the Last Year: Never true    Ran Out of Food in the Last Year: Never true   Transportation Needs: No Transportation Needs    Lack of Transportation (Medical): No    Lack of Transportation (Non-Medical): No   Stress: No Stress Concern Present    Feeling of Stress : Not at all   Social Connections: Unknown    Marital Status:    Housing Stability: Unknown    Unable to Pay for Housing in the Last Year: No    Unstable Housing in the Last Year: No         Review of Systems: Denies any cough, chest pain or shortness of breath.  Denies any fever or chills.  See " HPI/ Interval History for other systems reviewed.        Objective:     Physical Exam:  Vitals:    05/22/23 1525   BP: 119/66   Pulse: 79   Resp: 16       WD WN NAD  VSS  Normal resp effort  R breast - incisions healed, nipple areola tattoo well healed with good saturation, no erythema/drainage  L breast - incisions healed, nipple areola tattoo well healed with good saturation, no erythema/drainage        Assessment:       S/p bilateral 3D areola tattoos for chest reconstruction.     Plan:   67 y.o. male status post bilateral 3D nipple areola tattoo  - Doing well, no issues. Pleased with outcome.   - Both nipple areola tattoo(s) well healed with good saturation. Will not need second stage tattoo procedure at this time.   - RTC x PRN      All questions were answered. The patient was advised to call the clinic with any questions or concerns prior to their next visit.

## 2023-05-24 NOTE — PROGRESS NOTES
"[facility]   Physical Therapy Treatment Note      Name: [facility] Number: [id number]    Therapy Diagnosis:   Encounter Diagnoses   Name Primary?    Posture abnormality Yes    Midline thoracic back pain, unspecified chronicity            Physician: ROHIT Delgado NP    Visit Date: 5/25/2023       Physician: ROHIT Delgado NP     Physician Orders: PT Eval and Treat   Medical Diagnosis from Referral: M54.12 (ICD-10-CM) - Cervical radiculopathy  Evaluation Date: 4/28/2023  Authorization Period Expiration: 4/26/24  Plan of Care Expiration: 7/28/23  Progress Note Due: 5/28/23  Visit # / Visits authorized: 1/ 1, 8/20  FOTO: 6/5     Precautions:  h/o CVA, h/o cervical fusion       PTA Visit #: 3/5     Time In: 1:05 pm  Time Out: 1:45 pm  Total Billable Time: 40 minutes    Subjective     Pt reports: his neck is hurting more today, he thinks it may be related to his cyst and he is going to see the neurologist next week. He thinks the therapy is helping his radicular pain   He was compliant with home exercise program.  Response to previous treatment: neck pain reducing   Functional change: in progress    Pain: 2/10  Location: left neck & between shoulder blades     Objective        Treatment     Yousif received the treatments listed below:      therapeutic exercises to develop strength, endurance, ROM, flexibility, posture, and core stabilization for 15 minutes including:    UBE 3 min fwd/3' bkwd Lvl2 1/2 foam  Supine pec stretch on 1/2 foam roll    -snow angels on 1/2 foam roll 2x10   Seated physioball roll outs x5 (3 directions)       Not performed    (B) UT stretch 2x30"  (B) LS stretch 3x30"   Rhomboid stretch 5/10"- held due to L UE tingling       manual therapy techniques: Joint mobilizations, Manual traction, and Soft tissue Mobilization were applied to the:  for 15 minutes, including:    Suboccipital release  Passive L upper trap/levator scap stretching     Not performed "    STM to B UT and cervical paraspinals with trigger point release   Manual traction (L cervical gapping) NP  T 4-7 AP joint mobs Gr3 NP    neuromuscular re-education activities to improve: Balance, Coordination, Kinesthetic, Sense, Proprioception, and Posture for 15 minutes. The following activities were included:    Open books B x 15   Rowing CC 7# 3x10   Shoulder ext CC 7# 3x10   B shoulder external rotations Green tb 2x10       Not performed    Seated chin tucks x 10, w/ overpressure x 10NP   Supine chin retractions x10, seated x10, x10 with manual overpressure NP      therapeutic activities to improve functional performance for 00  minutes, including:      Not Performed:  Rowing BTB 2x10  Shoulder ext GTB 2x10  Scap sets 2x10 (held, pain)  Serratus punches B x 15 not performed    Patient Education and Home Exercises       Education provided:   - pain free exercises    Written Home Exercises Provided: yes. Exercises were reviewed and Yousif was able to demonstrate them prior to the end of the session.  Yousif demonstrated good  understanding of the education provided. See EMR under Patient Instructions for exercises provided during therapy sessions    Assessment     Pt reported some relief of neck pain following manual treatment today. Regressed weight on rows and shoulder extensions today due to subjective weakness. Continue ravi-scap strength training for improved resting posture and reduced neck pain.     Yousif Is progressing well towards his goals.   Pt prognosis is Good.       Pt will continue to benefit from skilled outpatient physical therapy to address the deficits listed in the problem list box on initial evaluation, provide pt/family education and to maximize pt's level of independence in the home and community environment.     Pt's spiritual, cultural and educational needs considered and pt agreeable to plan of care and goals.     Anticipated barriers to physical therapy: co-morbidities      Goals:    Short Term Goals: 6 visits  1. Pt will be compliant /c HEP to supplement PT in order to improve functional tasks  2. Pt will improve B ravi-scap MMTs to 4-/5 grossly to improve strength for functional activities  3. Pt will improve B thoracic rotation range of motion to </= 25% limited painfree for improved mobility with ADLs     Long Term Goals: 12 visits   1. Pt will improve B ravi-scap  MMTs to 4/5 grossly to improve strength for functional activities  2. Pt will improve FOTO score to </= 39% limitation to reduce perceived pain with mobility   3. Pt will improve cervical Ext to >/= 50 deg for increased range of motion with household chores   Plan     Plan of care Certification: 4/28/2023 to 7/28/23.     Outpatient Physical Therapy 2 times weekly for 12 visits to include the following interventions: Manual Therapy, Moist Heat/ Ice, Neuromuscular Re-ed, Patient Education, Therapeutic Activities, and Therapeutic Exercise, E-stim & Dry Needling as needed.     Shannon Greene, PT

## 2023-05-25 ENCOUNTER — CLINICAL SUPPORT (OUTPATIENT)
Dept: REHABILITATION | Facility: HOSPITAL | Age: 68
End: 2023-05-25
Payer: MEDICARE

## 2023-05-25 DIAGNOSIS — R29.3 POSTURE ABNORMALITY: Primary | ICD-10-CM

## 2023-05-25 DIAGNOSIS — M54.6 MIDLINE THORACIC BACK PAIN, UNSPECIFIED CHRONICITY: ICD-10-CM

## 2023-05-25 PROCEDURE — 97112 NEUROMUSCULAR REEDUCATION: CPT

## 2023-05-25 PROCEDURE — 97110 THERAPEUTIC EXERCISES: CPT

## 2023-05-25 PROCEDURE — 97140 MANUAL THERAPY 1/> REGIONS: CPT

## 2023-05-26 ENCOUNTER — OFFICE VISIT (OUTPATIENT)
Dept: CARDIOLOGY | Facility: CLINIC | Age: 68
End: 2023-05-26
Payer: MEDICARE

## 2023-05-26 VITALS
HEIGHT: 64 IN | BODY MASS INDEX: 28.98 KG/M2 | DIASTOLIC BLOOD PRESSURE: 71 MMHG | WEIGHT: 169.75 LBS | HEART RATE: 83 BPM | SYSTOLIC BLOOD PRESSURE: 108 MMHG

## 2023-05-26 DIAGNOSIS — Z91.89 RISK FOR CORONARY ARTERY DISEASE LESS THAN 10% IN NEXT 10 YEARS: ICD-10-CM

## 2023-05-26 DIAGNOSIS — Z82.49 FAMILY HISTORY OF ASCVD (ARTERIOSCLEROTIC CARDIOVASCULAR DISEASE): ICD-10-CM

## 2023-05-26 DIAGNOSIS — E66.3 OVERWEIGHT (BMI 25.0-29.9): ICD-10-CM

## 2023-05-26 DIAGNOSIS — E78.5 DYSLIPIDEMIA: ICD-10-CM

## 2023-05-26 DIAGNOSIS — Z86.73 H/O: CVA (CEREBROVASCULAR ACCIDENT): Primary | ICD-10-CM

## 2023-05-26 DIAGNOSIS — Q21.10 ASD (ATRIAL SEPTAL DEFECT): ICD-10-CM

## 2023-05-26 DIAGNOSIS — I10 HYPERTENSION, UNSPECIFIED TYPE: ICD-10-CM

## 2023-05-26 DIAGNOSIS — E11.21 CONTROLLED TYPE 2 DIABETES MELLITUS WITH DIABETIC NEPHROPATHY, WITHOUT LONG-TERM CURRENT USE OF INSULIN: ICD-10-CM

## 2023-05-26 PROCEDURE — 1101F PT FALLS ASSESS-DOCD LE1/YR: CPT | Mod: CPTII,S$GLB,, | Performed by: INTERNAL MEDICINE

## 2023-05-26 PROCEDURE — 99215 PR OFFICE/OUTPT VISIT, EST, LEVL V, 40-54 MIN: ICD-10-PCS | Mod: S$GLB,,, | Performed by: INTERNAL MEDICINE

## 2023-05-26 PROCEDURE — 1157F ADVNC CARE PLAN IN RCRD: CPT | Mod: CPTII,S$GLB,, | Performed by: INTERNAL MEDICINE

## 2023-05-26 PROCEDURE — 1101F PR PT FALLS ASSESS DOC 0-1 FALLS W/OUT INJ PAST YR: ICD-10-PCS | Mod: CPTII,S$GLB,, | Performed by: INTERNAL MEDICINE

## 2023-05-26 PROCEDURE — 3074F SYST BP LT 130 MM HG: CPT | Mod: CPTII,S$GLB,, | Performed by: INTERNAL MEDICINE

## 2023-05-26 PROCEDURE — 3288F PR FALLS RISK ASSESSMENT DOCUMENTED: ICD-10-PCS | Mod: CPTII,S$GLB,, | Performed by: INTERNAL MEDICINE

## 2023-05-26 PROCEDURE — 3008F BODY MASS INDEX DOCD: CPT | Mod: CPTII,S$GLB,, | Performed by: INTERNAL MEDICINE

## 2023-05-26 PROCEDURE — 3008F PR BODY MASS INDEX (BMI) DOCUMENTED: ICD-10-PCS | Mod: CPTII,S$GLB,, | Performed by: INTERNAL MEDICINE

## 2023-05-26 PROCEDURE — 3078F PR MOST RECENT DIASTOLIC BLOOD PRESSURE < 80 MM HG: ICD-10-PCS | Mod: CPTII,S$GLB,, | Performed by: INTERNAL MEDICINE

## 2023-05-26 PROCEDURE — 1159F PR MEDICATION LIST DOCUMENTED IN MEDICAL RECORD: ICD-10-PCS | Mod: CPTII,S$GLB,, | Performed by: INTERNAL MEDICINE

## 2023-05-26 PROCEDURE — 1157F PR ADVANCE CARE PLAN OR EQUIV PRESENT IN MEDICAL RECORD: ICD-10-PCS | Mod: CPTII,S$GLB,, | Performed by: INTERNAL MEDICINE

## 2023-05-26 PROCEDURE — 99999 PR PBB SHADOW E&M-EST. PATIENT-LVL IV: CPT | Mod: PBBFAC,,, | Performed by: INTERNAL MEDICINE

## 2023-05-26 PROCEDURE — 3074F PR MOST RECENT SYSTOLIC BLOOD PRESSURE < 130 MM HG: ICD-10-PCS | Mod: CPTII,S$GLB,, | Performed by: INTERNAL MEDICINE

## 2023-05-26 PROCEDURE — 3288F FALL RISK ASSESSMENT DOCD: CPT | Mod: CPTII,S$GLB,, | Performed by: INTERNAL MEDICINE

## 2023-05-26 PROCEDURE — 4010F PR ACE/ARB THEARPY RXD/TAKEN: ICD-10-PCS | Mod: CPTII,S$GLB,, | Performed by: INTERNAL MEDICINE

## 2023-05-26 PROCEDURE — 3078F DIAST BP <80 MM HG: CPT | Mod: CPTII,S$GLB,, | Performed by: INTERNAL MEDICINE

## 2023-05-26 PROCEDURE — 1125F PR PAIN SEVERITY QUANTIFIED, PAIN PRESENT: ICD-10-PCS | Mod: CPTII,S$GLB,, | Performed by: INTERNAL MEDICINE

## 2023-05-26 PROCEDURE — 99999 PR PBB SHADOW E&M-EST. PATIENT-LVL IV: ICD-10-PCS | Mod: PBBFAC,,, | Performed by: INTERNAL MEDICINE

## 2023-05-26 PROCEDURE — 1125F AMNT PAIN NOTED PAIN PRSNT: CPT | Mod: CPTII,S$GLB,, | Performed by: INTERNAL MEDICINE

## 2023-05-26 PROCEDURE — 1159F MED LIST DOCD IN RCRD: CPT | Mod: CPTII,S$GLB,, | Performed by: INTERNAL MEDICINE

## 2023-05-26 PROCEDURE — 4010F ACE/ARB THERAPY RXD/TAKEN: CPT | Mod: CPTII,S$GLB,, | Performed by: INTERNAL MEDICINE

## 2023-05-26 PROCEDURE — 3044F PR MOST RECENT HEMOGLOBIN A1C LEVEL <7.0%: ICD-10-PCS | Mod: CPTII,S$GLB,, | Performed by: INTERNAL MEDICINE

## 2023-05-26 PROCEDURE — 3044F HG A1C LEVEL LT 7.0%: CPT | Mod: CPTII,S$GLB,, | Performed by: INTERNAL MEDICINE

## 2023-05-26 PROCEDURE — 99215 OFFICE O/P EST HI 40 MIN: CPT | Mod: S$GLB,,, | Performed by: INTERNAL MEDICINE

## 2023-05-26 RX ORDER — CYCLOBENZAPRINE HCL 10 MG
TABLET ORAL
COMMUNITY
Start: 2023-05-07 | End: 2023-06-08

## 2023-05-26 NOTE — PATIENT INSTRUCTIONS
Assessment/Plan:  Mane Ramires is a 67 y.o. male with small secundum ASD (Qp/QS= 1.2), HTN, HLD, strong family history of CAD, gender identity disorder, history of stroke (2021), who presents for a scheduled appointment.    1. HLD- Continue rosuvastatin 5 mg daily.      2. Palpitations- No further episode since visit on 9/14/2017.  Echo shows a small secundum ASD. Qp/QS= 1.2.  Now taking Metoprolol succinate 50 mg daily.       3. HTN- Reports feeling tired when blood pressure is low.  Blood pressure today is 108/71.  Currently taking valsartan 160 mg dialy and metoprolol succinate 25 mg daily.  Pt to try valsartan at 80 mg daily and monitor blood pressure and dizziness on lower dosage.      Follow up in 6 months

## 2023-05-26 NOTE — PROGRESS NOTES
[facility]    CC: Re-establish Care    Patient ID: Mane Ramires is a 67 y.o. male with small secundum ASD (Qp/QS= 1.2), HTN, HLD, strong family history of CAD, gender identity disorder, history of stroke (2021), who presents for a scheduled appointment.  Pertinent history/events are as follows:       -Pt kindly referred by Dr. Gordon.    -Pt was seen by Dr. Monsalve in [place] on 5/26/2016 for cardiac evaluation due to abnormal EKG and palpitations. He was seen in 2014 for the same EKG abnormality and had a stress test done in 12/14 in [place] that reported normal myocardial perfusion and normal LVEF. Recently he has been experiencing frequent skipped beats on a daily basis.   -Holter monitor from 6/22/2016 unremarkable.  Showed predominant rhythm to be sinus with heart rates varying between 54 and 120 bpm with an average of 83 bpm.  No VT or VF.  No evidence of high grade SA or AV eric block.    -Echo from 6/23/2016 shows normal LVEF of 60-65%; left ventricular diastolic dysfunction. There is evidence of a left to right shunt across the atrial septum on color Doppler consistent with a small secundum ASD. Qp/QS= 1.2.  The atrial septum is  thickened c/w lipomatous hypertrophy.  -Started on Metoprolol succinate 100 mg daily with good control of the palpitations.     -At our initial appointment on 3/17/2017, Mr. Ramires reported occasional palpitations (once or twice a week).  He has no chest pain, SOB, LE edema, PND, claudication, TIA symptoms or syncope.  Taking all medications as prescribed with no issues.  BP today 146/71.  Plan:  Echo shows a small secundum ASD. Qp/QS= 1.2.  Continue Metoprolol succinate 100 mg daily.  Pt to keep log of home blood pressure/heart rate and bring in next visit.  Given strong family history and risk factors, will schedule for exercise nuclear stress test.  Check lipids/LFT's and plan to start statin next visit.    -At follow up clinic visit on 9/14/2017,   "Keyla reported doing well.  Palpitations occurring less frequently now (approximately three times a week vs. once or twice a day previously).  He has no chest pain, SOB, LE edema, PND, claudication, TIA symptoms or syncope.  Metoprolol succinate reduced to 50 mg daily by his PCP.  ASCVD risk is 3.6%.  Pt recently started on statin and had increase in LFT's.  Statin stopped and LFT's now improving.  BP today 109/61.  Nuclear stress test from 4/12/2017 shows no evidence for myocardial ischemia or injury. Normal LVEF of 53%.      -Underwent cholecystectomy on 11/9/2017.    -Underwent carpal tunnel surgery on 12/4/2017.    -At follow up clinic visit on 12/12/2017, Mr. Ramires reported no further episodes of palpitations since our last visit on 9/14/2017.  No chest pain.  Blood pressures running low 100's systolic.  Mr. Ramires states he feel's tired all the time and thinks this may be due to low blood pressures.  Now taking Metoprolol succinate 25 mg daily.  Plan: No further episodes of palpitations since last visit on 9/14/2017.  Echo shows a small secundum ASD. Qp/QS= 1.2.  Now taking Metoprolol succinate 25 mg daily.  Decrease lisinopril to 20 mg daily.  Pt to keep log of blood pressure/heart rate and bring in next visit.  Calculated ASCVD risk of 3.6%.  Given continued elevation in LFT's with atorvastatin, will hold off of restarting a statin at this time.  Repeat LFT's in 3 months.  Nuclear stress test from 4/12/2017 shows no evidence for myocardial ischemia or injury. Normal LVEF of 53%.      -At clinic visit on 12/11/2018, Mr. Ramires reported episodes of chest pain at rest approximately 3 weeks ago.  Described as "pressure", located at mid-chest, non-radiating, 6/10, no associated n/v/d.  No significant smoking history.  Family history of CAD with MI in mother in her 60's; dad at age 38; brother at age 62.  Ten year ASCVD risk score is 6.4%  Plan:   Chest Pain- Pt with multiple risk factors for CAD.  Check " echo and treadmill nuclear stress test to evaluate further.    Palpitations- No further episode since visit on 9/14/2017.  Echo shows a small secundum ASD. Qp/QS= 1.2.  Now taking Metoprolol succinate 50 mg daily.     HTN- Controlled.  Continue lisinopril 10 mg and metoprolol succinate 50 mg daily.      ASCVD risk- Calculated ASCVD risk of 6.4%.  Given continued elevation in LFT's with atorvastatin, will hold off of restarting a statin at this time.   Nuclear stress test from 4/12/2017 shows no evidence for myocardial ischemia or injury. Normal LVEF of 53%.      HPI:  Mr. Ramires reports was last seen in clinic on 12/11/2018.  States he has an upcoming colonoscopy and is in need of clearance.  He reports doing well with no chest pain, SOB, LE edema, TIA symptoms or syncope.  Can perform greater than 4 METS without difficulty.  Reports feeling tired when blood pressure is low.  Blood pressure today is 108/71.  Planning to move to [place] in 10/2023.  EKG on 4/8/2021 shows normal sinus rhythm with nonspecific ST/T wave changes.      Past Medical History:   Diagnosis Date    Accident     fell from roof with TBI (word finding issues personality changes, memory deficets), R rib fractures, clavicle fx    Allergy     Anxiety     ASD (atrial septal defect)     noted on ECHO 6/16    Cervical stenosis of spine     noted on 6/15 MRI    CTS (carpal tunnel syndrome)     mild-mod on 4/17 NCS    DDD (degenerative disc disease), cervical 10/2014    C5-6 and C6-C7    Depression     Diabetes     JEEVAN (generalized anxiety disorder)     Gender identity disorder     H/O cardiovascular stress test     12/14 normal    Herpes simplex type 2 infection     History of atypical hyperplasia of breast     B precancer lesions    Hypertension     Hypertensive retinopathy     IGT (impaired glucose tolerance)     Myelomalacia     c-spine noted on 8/15 MRI    OA (osteoarthritis)     Pineal gland cyst     TBI (traumatic brain injury)     after falling  off a roof in      Past Surgical History:   Procedure Laterality Date    AMPUTATION      L index finger    APPENDECTOMY  1969    BILATERAL SALPINGOOPHORECTOMY  2015    BIOPSY OF LIVER WITH ULTRASOUND GUIDANCE N/A 2018    Procedure: BIOPSY, LIVER, WITH US GUIDANCE;  Surgeon: [facility] Diagnostic Provider;  Location: [facility] OR [address] [place] [address];  Service: Radiology;  Laterality: N/A;  U/S GUIDED LIVER BIOPSY.  2018.  DOSC 7:30 AM  PROCEDURE 9 AM.  DR NITHIN GOLDEN / MARLON NUR.  DB 18 3:20P    CARPAL TUNNEL RELEASE  2017    right    CERVICAL FUSION  10/2014    C5-C6 and C6-C7    CHOLECYSTECTOMY      ENDOSCOPIC NASAL SEPTOPLASTY N/A 2018    Procedure: SEPTOPLASTY, NASAL, ENDOSCOPIC;  Surgeon: Reji Arce III, MD;  Location: [facility] OR [address] [place] [address];  Service: ENT;  Laterality: N/A;    FINGER AMPUTATION Left     FRACTURE SURGERY Right     rib fractures    HYSTERECTOMY  1984    MIGUEL    MASTECTOMY  2015    RECONSTRUCTION OF NOSE N/A 2018    Procedure: RECONSTRUCTION, NOSE;  Surgeon: Reji Arce III, MD;  Location: [facility] OR [address] [place] [address];  Service: ENT;  Laterality: N/A;    ROTATOR CUFF REPAIR Right 2019    UPPER GASTROINTESTINAL ENDOSCOPY  2017    Dr. Ayon     Social History     Socioeconomic History    Marital status: Single   Occupational History    Occupation: Artist   Tobacco Use    Smoking status: Former     Packs/day: 0.25     Years: 2.00     Pack years: 0.50     Types: Cigarettes     Start date:      Quit date: 2002     Years since quittin.5     Passive exposure: Past    Smokeless tobacco: Never    Tobacco comments:     did not smoke regularly   Substance and Sexual Activity    Alcohol use: Yes     Comment: rare     Drug use: Yes     Types: Hydrocodone    Sexual activity: Never     Social Determinants of Health     Financial Resource Strain: Low Risk     Difficulty of Paying Living Expenses: Not hard at all   Food Insecurity: No Food Insecurity    Worried About  Running Out of Food in the Last Year: Never true    Ran Out of Food in the Last Year: Never true   Transportation Needs: No Transportation Needs    Lack of Transportation (Medical): No    Lack of Transportation (Non-Medical): No   Stress: No Stress Concern Present    Feeling of Stress : Not at all   Social Connections: Unknown    Marital Status:    Housing Stability: Unknown    Unable to Pay for Housing in the Last Year: No    Unstable Housing in the Last Year: No     Family History   Problem Relation Age of Onset    Diabetes Mother     Heart disease Mother     Gallbladder disease Mother     Coronary artery disease Father     Breast cancer Sister     Heart disease Sister     Gallbladder disease Daughter     Craniosynostosis Daughter     Ovarian cancer Maternal Aunt     Heart disease Maternal Grandmother     Gallbladder disease Maternal Grandmother     Lung cancer Paternal Grandfather     Heart disease Paternal Grandfather     Heart attack Brother     Breast cancer Cousin     Breast cancer Cousin        Review of patient's allergies indicates:   Allergen Reactions    Sudafed [pseudoephedrine hcl] Other (See Comments)     Heart racing      Codeine Other (See Comments)     Heart racing    Hydrocodone Other (See Comments)     insomnia    Cortisone Palpitations       Medication List with Changes/Refills   Current Medications    ASPIRIN (ECOTRIN) 81 MG EC TABLET    Take 81 mg by mouth once daily.    BLOOD SUGAR DIAGNOSTIC STRP    To check BG QD times daily, to use with insurance preferred meter    BLOOD-GLUCOSE METER KIT    As directed    CARBOXYMETHYLCELLULOSE (REFRESH PLUS) 0.5 % DPET    1 drop nightly.    CYCLOBENZAPRINE (FLEXERIL) 10 MG TABLET    as needed.    DULOXETINE (CYMBALTA) 60 MG CAPSULE    Take 1 capsule (60 mg total) by mouth once daily.    FLUTICASONE PROPIONATE (FLONASE) 50 MCG/ACTUATION NASAL SPRAY    USE 2 SPRAYS IN EACH NOSTRIL ONCE DAILY    IBUPROFEN (ADVIL,MOTRIN) 800 MG TABLET    Take 1  "tablet (800 mg total) by mouth every 6 (six) hours as needed for Pain.    LANCETS MISC    To check BG QD times daily, to use with insurance preferred meter    LEVOCETIRIZINE (XYZAL) 5 MG TABLET    TAKE 1 TABLET BY MOUTH EVERY NIGHT AS NEEDED FOR ALLERGIES    METOPROLOL SUCCINATE (TOPROL-XL) 25 MG 24 HR TABLET    Take 1 tablet (25 mg total) by mouth once daily.    NEEDLE, DISP, 18 G (BD REGULAR BEVEL NEEDLES) 18 GAUGE X 1" NDLE    USE TO INJECT 0.5ML OF TESTOSTERONE INTO THE MUCLE EVERY 14 DAYS    NEEDLE, DISP, 25 GAUGE (BD PRECISIONGLIDE) 25 GAUGE X 1" NDLE    USE TO DRAW UP 0.5ML OF TESTERONE EVERY 14 DAYS    ROSUVASTATIN (CRESTOR) 5 MG TABLET    Take 1 tablet (5 mg total) by mouth once daily.    TESTOSTERONE CYPIONATE (DEPOTESTOTERONE CYPIONATE) 200 MG/ML INJECTION    Inject 1 mL (200 mg total) into the muscle every 14 (fourteen) days.    VALSARTAN (DIOVAN) 160 MG TABLET    TAKE 1 TABLET(160 MG) BY MOUTH EVERY DAY.       Review of Systems  Constitution: Negative for diaphoresis and fever.   HENT: Negative for hearing loss and hoarse voice.   Eyes: Negative for redness.   Cardiovascular: Positive for occasional palpitations. Negative for leg swelling and syncope.   Respiratory: Negative for cough and wheezing.   Endocrine: Negative for cold intolerance.   Hematologic/Lymphatic: Does not bruise/bleed easily.   Skin: Positive for flushing.   Musculoskeletal: Positive for joint pain. Negative for joint swelling.   Gastrointestinal: Negative for abdominal pain and vomiting.   Genitourinary: Negative for hematuria.   Neurological: Negative for seizures.   Psychiatric/Behavioral: The patient is not nervous/anxious.   Allergic/Immunologic: Negative for hives.     Physical Examination  /71   Pulse 83   Ht 5' 4" (1.626 m)   Wt 77 kg (169 lb 12.1 oz)   BMI 29.14 kg/m²     Constitutional: No acute distress, conversant  HEENT: Sclera anicteric, Pupils equal, round and reactive to light, extraocular motions intact, " Oropharynx clear  Neck: No JVD, no carotid bruits  Cardiovascular: regular rate and rhythm, no murmur, rubs or gallops, normal S1/S2  Pulmonary: Clear to auscultation bilaterally  Abdominal: Abdomen soft, nontender, nondistended, positive bowel sounds  Extremities: No lower extremity edema,   Pulses:  Carotid pulses are 2+ on the right side, and 2+ on the left side.  Radial pulses are 2+ on the right side, and 2+ on the left side.   Femoral pulses are 2+ on the right side, and 2+ on the left side.  Skin: No ecchymosis, erythema, or ulcers  Psych: Alert and oriented x 3, appropriate affect  Neuro: CNII-XII intact, no focal deficits    Labs:  Most Recent Data  CBC:   Lab Results   Component Value Date    WBC 9.53 02/01/2023    HGB 16.0 02/01/2023    HCT 49.3 02/01/2023     02/01/2023    MCV 97 02/01/2023    RDW 12.4 02/01/2023     BMP:   Lab Results   Component Value Date     02/01/2023    K 4.6 02/01/2023     02/01/2023    CO2 24 02/01/2023    BUN 21 02/01/2023     (H) 02/01/2023    CALCIUM 10.3 02/01/2023    MG 1.8 04/09/2021    PHOS 3.9 04/09/2021     LFTS;   Lab Results   Component Value Date    PROT 7.8 02/01/2023    ALBUMIN 4.1 02/01/2023    BILITOT 0.3 02/01/2023    AST 14 02/01/2023    ALKPHOS 89 02/01/2023    ALT 16 02/01/2023     COAGS:   Lab Results   Component Value Date    INR 0.9 04/09/2021     FLP:   Lab Results   Component Value Date    CHOL 256 (H) 05/11/2023    HDL 41 05/11/2023    LDLCALC 179.4 (H) 05/11/2023    TRIG 178 (H) 05/11/2023    CHOLHDL 16.0 (L) 05/11/2023       EKG 4/8/2021:  Normal sinus rhythm   Nonspecific ST abnormality    Echo 4/9/2021:  The left ventricle is normal in size with normal systolic function.  The estimated ejection fraction is 63%.  Normal left ventricular diastolic function.  Normal right ventricular size with normal right ventricular systolic function.  Normal central venous pressure (3 mmHg).  Trivial pericardial effusion.    Nuclear Stress  Test 4/12/2017:  Nuclear Quantitative Functional Analysis:   LVEF: 53 % (normal is 47 - 59)  LVED Volume: 66 ml (normal is 91 - 155)  LVES Volume: 31 ml (normal is 40 - 78)  Impression: NORMAL MYOCARDIAL PERFUSION  1. The perfusion scan is free of evidence for myocardial ischemia or injury.   2. Resting wall motion is physiologic.   3. Resting LV function is normal.  (normal is 47 - 59)  4. The ventricular volumes are normal at rest and stress.   5. The extracardiac distribution of radioactivity is normal.     Echo 6/23/2016:  Aorta: The aortic root is normal in size, measuring 2.6 cm at sinotubular junction.   Left Atrium: The left atrium is normal in size, measuring 3.7 cm across in the parasternal view, and 4.1 cm across in the apical view.   Left Ventricle: The left ventricle is normal in size, with an end-diastolic diameter of 4.3 cm, and an end-systolic diameter of 3.1 cm. LV wall thickness is normal, with the septum measuring 1.1 cm and the posterior wall measuring 0.8 cm across. Global   left ventricular systolic function appears normal. Visually estimated ejection fraction is 60-65%.   Mitral inflow patterns reveal an E:A ratio of 0.7, with a deceleration time of 200 msec., consistent with diastolic dysfunction secondary to relaxation abnormality.     Right Atrium: The right atrium is normal in size. There is Chiari network in the right atrium.  Right Ventricle: The right ventricle is normal in size measuring 2.3 cm at the base in the apical right ventricle-focused view. Global right ventricular systolic function appears normal. The estimated PA systolic pressure is greater than 19 mmHg.     Aortic Valve:  The aortic valve has normal leaflet mobility.   Mitral Valve:  The mitral valve is thickened. There is mild mitral regurgitation.   Tricuspid Valve:  The tricuspid valve is normal in structure. There is mild tricuspid regurgitation.   Pulmonary Valve:  The pulmonic valve is normal in structure. There is  "trivial pulmonic regurgitation.   Pericardium: There is evidence of a trivial pericardial effusion.   IVC: The IVC is not visualized.   Shunt: There is evidence of a left to right shunt across the atrial septum on color Doppler consistent with a small secundum ASD. Qp/QS= 1.2.  The atrial septum is thickened c/w lipomatous hypertrophy.  Intracavitary: There is no evidence of intracavity mass, thrombi, or vegetation.     CONCLUSIONS     1 - Normal left ventricular systolic function (EF 60-65%).     2 - Left ventricular diastolic dysfunction.     3 - Normal right ventricular systolic function .     4 - Small interatrial communication as above.    24 hour holter 6/22/2016:  PRE-TEST DATA   The diary was properly completed.    The diary included "heart beating hard/fast" on multiple occasions which correlated with sinus rhythm.    TEST DESCRIPTION   PREDOMINANT RHYTHM  1. Sinus rhythm with heart rates varying between 54 and 120 bpm with an average of 83 bpm.     VENTRICULAR ARRHYTHMIAS  1. There were rare PVCs totalling 6 and averaging less than 1 per hour.   2. There were no episodes of ventricular tachycardia.    SUPRA VENTRICULAR ARRHYTHMIAS  1. There were occasional PACs totalling 25 and averaging 1 per hour.   2. There were no episodes of sustained supraventricular tachycardia.  SINUS NODE FUNCTION  1. There was no evidence of high grade SA eric block.   AV CONDUCTION  1. There was no evidence of high grade AV block.   DIARY  1. The diary was properly completed.     MISCELLANEOUS  1. There were rare hookup related artifacts. Overall, the study was of good quality.   2. This was a tape of adequate length (24 hrs).     Assessment/Plan:  Mane Ramires is a 67 y.o. male with small secundum ASD (Qp/QS= 1.2), HTN, HLD, strong family history of CAD, gender identity disorder, history of stroke (2021), who presents for a scheduled appointment.    1. HLD- Continue rosuvastatin 5 mg daily.      2. Palpitations- No " further episode since visit on 9/14/2017.  Echo shows a small secundum ASD. Qp/QS= 1.2.  Now taking Metoprolol succinate 50 mg daily.       3. HTN- Reports feeling tired when blood pressure is low.  Blood pressure today is 108/71.  Currently taking valsartan 160 mg dialy and metoprolol succinate 25 mg daily.  Pt to try valsartan at 80 mg daily and monitor blood pressure and dizziness on lower dosage.      OK to proceed with colonoscopy  Follow up in 6 months    Total duration of face to face visit time 30 minutes.  Total time spent counseling greater than fifty percent of total visit time.  Counseling included discussion regarding imaging findings, diagnosis, possibilities, treatment options, risks and benefits.  The patient had many questions regarding the options and long-term effects.    Davian Art MD, PhD  Interventional Cardiology

## 2023-05-28 ENCOUNTER — PATIENT MESSAGE (OUTPATIENT)
Dept: FAMILY MEDICINE | Facility: CLINIC | Age: 68
End: 2023-05-28
Payer: MEDICARE

## 2023-05-29 ENCOUNTER — CLINICAL SUPPORT (OUTPATIENT)
Dept: REHABILITATION | Facility: HOSPITAL | Age: 68
End: 2023-05-29
Payer: MEDICARE

## 2023-05-29 DIAGNOSIS — R29.3 POSTURE ABNORMALITY: Primary | ICD-10-CM

## 2023-05-29 DIAGNOSIS — M54.6 MIDLINE THORACIC BACK PAIN, UNSPECIFIED CHRONICITY: ICD-10-CM

## 2023-05-29 PROCEDURE — 97110 THERAPEUTIC EXERCISES: CPT | Mod: CQ

## 2023-05-29 PROCEDURE — 97112 NEUROMUSCULAR REEDUCATION: CPT | Mod: CQ

## 2023-05-29 PROCEDURE — 97140 MANUAL THERAPY 1/> REGIONS: CPT | Mod: CQ

## 2023-05-29 NOTE — PROGRESS NOTES
"[facility]   Physical Therapy Treatment Note      Name: Mane Dodd [employer] Number: [id number]    Therapy Diagnosis:   Encounter Diagnoses   Name Primary?    Posture abnormality Yes    Midline thoracic back pain, unspecified chronicity            Physician: ROHIT Delgado NP    Visit Date: 5/29/2023       Physician: ROHIT Delgado NP     Physician Orders: PT Eval and Treat   Medical Diagnosis from Referral: M54.12 (ICD-10-CM) - Cervical radiculopathy  Evaluation Date: 4/28/2023  Authorization Period Expiration: 4/26/24  Plan of Care Expiration: 7/28/23  Progress Note Due: 5/28/23  Visit # / Visits authorized: 1/ 1, 10/20  FOTO: 6/5     Precautions:  h/o CVA, h/o cervical fusion       PTA Visit #: 3/5     Time In: 1:45 pm  Time Out: 2:30 pm  Total Billable Time: 45 minutes    Subjective     Pt reports: he has been doing pretty good. He feels his posture is getting better.  He was compliant with home exercise program.  Response to previous treatment: neck pain reducing   Functional change: in progress    Pain: 3/10  Location: left neck & between shoulder blades     Objective        Treatment     Yousif received the treatments listed below:      therapeutic exercises to develop strength, endurance, ROM, flexibility, posture, and core stabilization for 15 minutes including:    UBE 3 min fwd/3' bkwd Lvl2   Supine pec stretch on 1/2 foam roll   1/2 foam series  - Planada 20x  - Bear hugs 20x  - swimmers 20x  Seated physioball roll outs x5 (3 directions)   GTB W's 2x10    Not performed    (B) UT stretch 2x30"  (B) LS stretch 3x30"   Rhomboid stretch 5/10"- held due to L UE tingling       manual therapy techniques: Joint mobilizations, Manual traction, and Soft tissue Mobilization were applied to the:  for 15 minutes, including:    Suboccipital release  Passive L upper trap/levator scap stretching   R thoracic STM /c scapular dissociation    Not performed    STM to B UT and cervical " paraspinals with trigger point release   Manual traction (L cervical gapping) NP  T 4-7 AP joint mobs Gr3     neuromuscular re-education activities to improve: Balance, Coordination, Kinesthetic, Sense, Proprioception, and Posture for 15 minutes. The following activities were included:    Open books B x 15   Rowing CC 7# 3x10   Shoulder ext CC 7# 3x10   B shoulder external rotations Green tb 2x10       Not performed    Seated chin tucks x 10, w/ overpressure x 10NP   Supine chin retractions x10, seated x10, x10 with manual overpressure NP      therapeutic activities to improve functional performance for 00  minutes, including:      Not Performed:  Rowing BTB 2x10  Shoulder ext GTB 2x10  Scap sets 2x10 (held, pain)  Serratus punches B x 15 not performed    Patient Education and Home Exercises       Education provided:   - pain free exercises    Written Home Exercises Provided: yes. Exercises were reviewed and Yousif was able to demonstrate them prior to the end of the session.  Yousif demonstrated good  understanding of the education provided. See EMR under Patient Instructions for exercises provided during therapy sessions    Assessment     Pt presents with improved symptoms since last session. Resisted W's added for mid trap strengthening. Progressed patient on 1/2 roller with swimmers and bear hugs for improved thoracic mobility. Discharge planning discussed with upgrade to [facility], he will complete the next 2 and continue at home on his own. Continue ravi-scap strength training for improved resting posture and reduced neck pain.     Yousif Is progressing well towards his goals.   Pt prognosis is Good.       Pt will continue to benefit from skilled outpatient physical therapy to address the deficits listed in the problem list box on initial evaluation, provide pt/family education and to maximize pt's level of independence in the home and community environment.     Pt's spiritual, cultural and educational needs considered  and pt agreeable to plan of care and goals.     Anticipated barriers to physical therapy: co-morbidities      Goals:   Short Term Goals: 6 visits  1. Pt will be compliant /c HEP to supplement PT in order to improve functional tasks  2. Pt will improve B ravi-scap MMTs to 4-/5 grossly to improve strength for functional activities  3. Pt will improve B thoracic rotation range of motion to </= 25% limited painfree for improved mobility with ADLs     Long Term Goals: 12 visits   1. Pt will improve B ravi-scap  MMTs to 4/5 grossly to improve strength for functional activities  2. Pt will improve FOTO score to </= 39% limitation to reduce perceived pain with mobility   3. Pt will improve cervical Ext to >/= 50 deg for increased range of motion with household chores   Plan     Plan of care Certification: 4/28/2023 to 7/28/23.     Outpatient Physical Therapy 2 times weekly for 12 visits to include the following interventions: Manual Therapy, Moist Heat/ Ice, Neuromuscular Re-ed, Patient Education, Therapeutic Activities, and Therapeutic Exercise, E-stim & Dry Needling as needed.     Ari Olmos, PTA

## 2023-05-30 ENCOUNTER — CLINICAL SUPPORT (OUTPATIENT)
Dept: REHABILITATION | Facility: HOSPITAL | Age: 68
End: 2023-05-30
Payer: MEDICARE

## 2023-05-30 DIAGNOSIS — R51.9 NONINTRACTABLE HEADACHE, UNSPECIFIED CHRONICITY PATTERN, UNSPECIFIED HEADACHE TYPE: Primary | ICD-10-CM

## 2023-05-30 DIAGNOSIS — H51.11 CONVERGENCE INSUFFICIENCY: ICD-10-CM

## 2023-05-30 DIAGNOSIS — H55.81 SACCADIC EYE MOVEMENT DEFICIENCY: ICD-10-CM

## 2023-05-30 DIAGNOSIS — H53.8 BLURRED VISION: ICD-10-CM

## 2023-05-30 DIAGNOSIS — F07.81 POST CONCUSSION SYNDROME: ICD-10-CM

## 2023-05-30 DIAGNOSIS — H81.8X9 DEFICIT OF VESTIBULO-OCULAR REFLEX: ICD-10-CM

## 2023-05-30 PROCEDURE — 97112 NEUROMUSCULAR REEDUCATION: CPT | Mod: PO

## 2023-05-30 PROCEDURE — 95992 CANALITH REPOSITIONING PROC: CPT | Mod: PO

## 2023-05-30 NOTE — PATIENT INSTRUCTIONS
Perform in horizontal, vertical, and diagonal directions 2 x 30 seconds each. Keep head still, move target slowly, and track with your eyes.     Perform in horizontal, vertical, and diagonal directions 2 x 30 seconds each. Keep head still and shift gaze between the targets as fast as you can that you can keep the targets in focus.     Perform 2 x 10 reps holding near point x3-5 seconds.     Perform all of these 2 times a day.     - If exercises exacerbate symptoms greater than 3 levels past baseline or exceed high mild/low moderate level of difficulty slow down eye/head movements or decrease time exercises are being performed       Retrieved from:

## 2023-05-30 NOTE — PROGRESS NOTES
Occupational Therapy Treatment Note     Date: 5/30/2023  Name: Mane Dodd [facility] Number: [id number]    Therapy Diagnosis:   Encounter Diagnoses   Name Primary?    Nonintractable headache, unspecified chronicity pattern, unspecified headache type Yes    Blurred vision     Saccadic eye movement deficiency     Convergence insufficiency     Post concussion syndrome     Deficit of vestibulo-ocular reflex      Physician: Woody Crowe III, MD    Physician Orders: Eval and Treat - Neuro OT   Medical Diagnosis: S06.0XAS (ICD-10-CM) - Concussion with unknown loss of consciousness status, sequela  Evaluation Date: 5/18/2023  Insurance Authorization Period Expiration: 6/26/2023  Plan of Care Certification Period: 10 visits; to around 7/27/2023  Date of Return to MD: 5/29/2023 vascular     Visit # / Visits authorized: 1 / 4  Time In: 1308  Time Out: 1350  Total Billable (one on one) Time: 42 minutes    Precautions: Standard    Subjective     Pt reports: That he is no longer getting dizzy when he rolls to the left but he has been feeling more dizzy when rolling to the right   Response to previous treatment: less dizziness when rolling left   Functional change: ongoing    Date of Onset: Concussions in 2003 & 2018    Pain: 3/10  Location: Neck     Patient's Goals for Therapy: to have less pain    Objective      Canalith Repositioning for 17 minutes:   Positional Canal Testing:  Looking for nystagmus (slow phase followed by quick phase to the affected side for BPPV)    Trial 1:  Horizontal Canals   Right: Negative nystagmus, Negative dizziness   Left: Negative nystagmus, Negative dizziness  [place] Hallpike (posterior / CL anterior)   Right : Negative nystagmus, Positive dizziness (mild)    Left: Negative nystagmus, Negative dizziness  Loaded [place] Hallpike (posterior / CL anterior)  Right: Negative nystagmus, Negative dizziness      Treatment Performed:    Epley maneuver performed to treat potential   Right PSCC BPPV      Position 1: (--) nystagmus; (--) dizziness   Position 2: (--) nystagmus; (--) dizziness   Position 3: (--) nystagmus; (--) dizziness   Position 4: (--) nystagmus; (--) dizziness    Yousif participated in neuromuscular re-education activities to improve: oculomotor function for 25 minutes. The following activities were included:  Seated in open gym with plain target:   - Smooth pursuits, horizontal/vertical/diagonal directions, 2 x 30 seconds each   - Saccades, horizontal/vertical/diagonal directions, 1 x 30 seconds each   - Pencil push ups holding near point x3-5 seconds, 1 x 8     Home Exercises and Education Provided      Education provided:   - HEP  - Progress towards goals    Written Home Exercises Provided: yes.  Exercises were reviewed and Yousif was able to demonstrate them prior to the end of the session.    Yousif demonstrated good  understanding of the education provided.     See EMR under Patient Instructions for exercises provided 5/30/2023. (Pursuits, saccades, pencil push ups)     Assessment      Pt tolerated today's session well. Positional canal testing was performed. No nystagmus was noted in any test position, but pt reported occasional dizziness in right [place] Hallpike position. For this reason, along with pt's subjective c/o of dizziness when rolling/looking right, performed Epley maneuver to treat potential right PSCC BPPV. Administered initial oculomotor HEP this date, and pt demonstrated good understanding. Smooth, coordinated eye movements were noted with pursuits, saccades, and convergence. However, pt reported doubling when tracking into LLQ; blurriness with gaze shifts right of midline, upward, and into RUQ; and point of convergence was still outside of normal limits per observation. Pt would continue to benefit from skilled occupational therapy services to maximize oculomotor functioning and gaze stabilization, habituation for desensitization to environments/movements that elicit/increase  "symptoms, pt education, HEP/HAP guidance, and canalith repositioning as needed to improve functional participation with meaningful occupations.    Yousif is progressing well towards his goals and there are no updates to goals at this time. Pt prognosis is Fair.     Pt will continue to benefit from skilled outpatient occupational therapy to address the deficits listed in the problem list on initial evaluation provide pt/family education and to maximize pt's level of independence in the home and community environment.     Pt's spiritual, cultural and educational needs considered and pt agreeable to plan of care and goals.    Anticipated barriers to occupational therapy: none noted    Goals:  Short Term Goals: 5 weeks   1) Pt will tolerate oculomotor home exercise/activity program, reporting at least 50% compliance. ongoing  2) Near point convergence will decrease to 40 cm or less to improve oculomotor coordination and ability to fixate on close up work. ongoing  3) Pt to perform saccades WFL, in all directions, at 100 bpm without onset of headache or blurriness, to improve skills needed for reading. ongoing  4) Patient to perform smooth pursuits WFL in all planes, tracking single target, with no more than "mild" blurriness for improved ability to scan environment during driving ongoing  5) Pt will perform VORx1 horizontal/vertical directions at 130 bpm or more without increase in headache to improve gaze stabilization needed for driving. ongoing  6) Pt to participate in positional canal testing and additional goals to be established as indicated ongoing     Long Term Goals: 10 weeks   1) Pt will be independent with oculomotor home exercise/activity program. ongoing  2) Near point convergence will decrease to 25 cm or less to improve oculomotor coordination and ability to fixate on close up work. ongoing  3) Pt will change gaze between near and far targets without onset of headache or dizziness to improve accommodative " flexibility, saccadic eye movements, and oculomotor coordination needed for participation in daily and leisure activities and to maximize safety while driving. ongoing  4) Pt will report ability to read without blurred vision or use of tracking aids for equal amount of time as prior to concussion ongoing  5) Patient to perform smooth pursuits WFL in all planes, tracking single target, without blurriness for improved ability to scan environment during driving ongoing  6) Patient to perform VORx1 WFL at 150 bpm using busy target facing busy background for improved gaze stabilization and decreased visual motion sensitivity. ongoing     More goals to be established as indicated.     Plan     Certification Period/Plan of care expiration: 10 visits; 5/18/2023 to around 7/27/2023.     Outpatient Occupational Therapy 1 times weekly for 10 weeks to include the following interventions: Neuromuscular Re-ed, Paraffin, Patient Education, Self Care, Therapeutic Activities, and Therapeutic Exercise.    Updates/Grading for next session: further assess gaze stabilization once neck pain decreases, add VOR cancellation and VOR exercises to HEP, oculomotor progressions, and cont to assess positionals       Katarina Posey, OT

## 2023-06-01 NOTE — PROGRESS NOTES
Established Patient - Audio Only Telehealth Visit     The patient location is: home  The chief complaint leading to consultation is: f/u  Visit type: Virtual visit with audio only (telephone)  Total time spent with patient: 15min     The reason for the audio only service rather than synchronous audio and video virtual visit was related to technical difficulties or patient preference/necessity.     Each patient to whom I provide medical services by telemedicine is:  (1) informed of the relationship between the physician and patient and the respective role of any other health care provider with respect to management of the patient; and (2) notified that they may decline to receive medical services by telemedicine and may withdraw from such care at any time. Patient verbally consented to receive this service via voice-only telephone call.     DATE: 6/8/2023  PATIENT: Mane Ramires    Attending Physician: Jose Tran M.D.    HISTORY:  Mane Ramires is a 67 y.o. male who returns to me today for follow up.  He was last seen by me 4/27/2023.  Today he is doing well but notes a decrease in his neck pain since his last visit. He is not taking Mobic anymore due to facial swelling. He has also stopped Lyrica due to mild weight gain.    The Patient denies myelopathic symptoms such as handwriting changes or difficulty with buttons/coins/keys. Denies perineal paresthesias, bowel/bladder dysfunction.    PMH/PSH/FamHx/SocHx:  Unchanged from prior visit    ROS:  REVIEW OF SYSTEMS:  Constitution: Negative. Negative for chills, fever and night sweats.   HENT: Negative for congestion and headaches.    Eyes: Negative for blurred vision, left vision loss and right vision loss.   Cardiovascular: Negative for chest pain and syncope.   Respiratory: Negative for cough and shortness of breath.    Endocrine: Negative for polydipsia, polyphagia and polyuria.   Hematologic/Lymphatic: Negative for bleeding problem. Does not  bruise/bleed easily.   Skin: Negative for dry skin, itching and rash.   Musculoskeletal: Negative for falls and muscle weakness.   Gastrointestinal: Negative for abdominal pain and bowel incontinence.   Allergic/Immunologic: Negative for hives and persistent infections.  Genitourinary: Negative for urinary retention/incontinence and nocturia.   Neurological: negative for disturbances in coordination, no myelopathic symptoms such as handwriting changes or difficulty with buttons, coins, keys or small objects. No loss of balance and seizures.   Psychiatric/Behavioral: Negative for depression. The patient does not have insomnia.   Denies myelopathic symptoms, perineal paresthesias, bowel or bladder incontinence    EXAM:  There were no vitals taken for this visit.  Stable.     IMAGING:    Today I personally re-reviewed AP, Lat and Flex/Ex  upright C-spine films that demonstrate hardware in place at C5-7 without failure.      Cervical MRI shows mild stenosis at C4/5 with bilateral foraminal narrowing.      There is no height or weight on file to calculate BMI.    Hemoglobin A1C   Date Value Ref Range Status   05/11/2023 6.4 (H) 4.0 - 5.6 % Final     Comment:     ADA Screening Guidelines:  5.7-6.4%  Consistent with prediabetes  >or=6.5%  Consistent with diabetes    High levels of fetal hemoglobin interfere with the HbA1C  assay. Heterozygous hemoglobin variants (HbS, HgC, etc)do  not significantly interfere with this assay.   However, presence of multiple variants may affect accuracy.     12/20/2022 6.8 (H) 4.0 - 5.6 % Final     Comment:     ADA Screening Guidelines:  5.7-6.4%  Consistent with prediabetes  >or=6.5%  Consistent with diabetes    High levels of fetal hemoglobin interfere with the HbA1C  assay. Heterozygous hemoglobin variants (HbS, HgC, etc)do  not significantly interfere with this assay.   However, presence of multiple variants may affect accuracy.     08/10/2022 5.8 (H) 4.0 - 5.6 % Final     Comment:      ADA Screening Guidelines:  5.7-6.4%  Consistent with prediabetes  >or=6.5%  Consistent with diabetes    High levels of fetal hemoglobin interfere with the HbA1C  assay. Heterozygous hemoglobin variants (HbS, HgC, etc)do  not significantly interfere with this assay.   However, presence of multiple variants may affect accuracy.           ASSESSMENT/PLAN:    Diagnoses and all orders for this visit:    DDD (degenerative disc disease), cervical      The patient will continue to follow up with neurosurgery. CT scan ordered. He may follow up with my team as needed.     This service was not originating from a related E/M service provided within the previous 7 days nor will  to an E/M service or procedure within the next 24 hours or my soonest available appointment.  Prevailing standard of care was able to be met in this audio-only visit.

## 2023-06-02 ENCOUNTER — CLINICAL SUPPORT (OUTPATIENT)
Dept: REHABILITATION | Facility: HOSPITAL | Age: 68
End: 2023-06-02
Payer: MEDICARE

## 2023-06-02 DIAGNOSIS — M54.6 MIDLINE THORACIC BACK PAIN, UNSPECIFIED CHRONICITY: ICD-10-CM

## 2023-06-02 DIAGNOSIS — R29.3 POSTURE ABNORMALITY: Primary | ICD-10-CM

## 2023-06-02 PROCEDURE — 97110 THERAPEUTIC EXERCISES: CPT | Mod: CQ

## 2023-06-02 PROCEDURE — 97112 NEUROMUSCULAR REEDUCATION: CPT | Mod: CQ

## 2023-06-02 NOTE — PROGRESS NOTES
"[facility]   Physical Therapy Treatment Note      Name: Mane Dodd [facility] Number: [id number]    Therapy Diagnosis:   Encounter Diagnoses   Name Primary?    Posture abnormality Yes    Midline thoracic back pain, unspecified chronicity            Physician: ROHIT Delgado NP    Visit Date: 6/2/2023       Physician: ROHIT Delgado NP     Physician Orders: PT Eval and Treat   Medical Diagnosis from Referral: M54.12 (ICD-10-CM) - Cervical radiculopathy  Evaluation Date: 4/28/2023  Authorization Period Expiration: 4/26/24  Plan of Care Expiration: 7/28/23  Progress Note Due: 5/28/23  Visit # / Visits authorized: 1/ 1, 10/20  FOTO: 6/5     Precautions:  h/o CVA, h/o cervical fusion       PTA Visit #: 2/5     Time In: 1:08 pm  Time Out: 2:48 pm  Total Billable Time: 40 minutes    Subjective     Pt reports: numbness in B arms lately and difficulty sleeping d/t upper back pain.  He was compliant with home exercise program.  Response to previous treatment: neck pain reducing   Functional change: in progress    Pain: 3/10  Location: left neck & between shoulder blades     Objective        Treatment     Yousif received the treatments listed below:      therapeutic exercises to develop strength, endurance, ROM, flexibility, posture, and core stabilization for 23 minutes including:    UBE 3 min fwd/3' bkwd Lvl2   Supine pec stretch on 1/2 foam roll   1/2 foam series  - [place] 20x  - Bear hugs 20x  - swimmers 20x  Seated physioball roll outs x5 (3 directions)   GTB W's 2x10    Not performed    (B) UT stretch 2x30"  (B) LS stretch 3x30"   Rhomboid stretch 5/10"- held due to L UE tingling       manual therapy techniques: Joint mobilizations, Manual traction, and Soft tissue Mobilization were applied to the:  for 07 minutes, including:  T 4-7 AP joint mobs Gr3     Suboccipital release NP  Passive L upper trap/levator scap stretching NP  R thoracic STM /c scapular dissociation NP  STM to B UT " and cervical paraspinals with trigger point release NP  Manual traction (L cervical gapping) NP      neuromuscular re-education activities to improve: Balance, Coordination, Kinesthetic, Sense, Proprioception, and Posture for 10 minutes. The following activities were included:    Open books B x 15   Rowing CC 7# 3x10   Shoulder ext CC 7# 3x10       therapeutic activities to improve functional performance for 00  minutes, including:        Not performed    Seated chin tucks x 10, w/ overpressure x 10 NP   Supine chin retractions x10, seated x10, x10 with manual overpressure NP  B shoulder external rotations Green tb 2x10   Not Performed:  Rowing BTB 2x10  Shoulder ext GTB 2x10  Scap sets 2x10 (held, pain)  Serratus punches B x 15 not performed    Patient Education and Home Exercises       Education provided:   - pain free exercises    Written Home Exercises Provided: yes. Exercises were reviewed and Yousif was able to demonstrate them prior to the end of the session.  Yousif demonstrated good  understanding of the education provided. See EMR under Patient Instructions for exercises provided during therapy sessions    Assessment     Pt comes to therapy today symptomatic, with cc/o tingling into B UE. Mid thoracic pain present, but improved with mobilizations. He plans to see neurologist next week for a CT scan. Patient with improved posture as he has greater awareness. Patient will conclude PT at this time on next visit.     Yousif Is progressing well towards his goals.   Pt prognosis is Good.       Pt will continue to benefit from skilled outpatient physical therapy to address the deficits listed in the problem list box on initial evaluation, provide pt/family education and to maximize pt's level of independence in the home and community environment.     Pt's spiritual, cultural and educational needs considered and pt agreeable to plan of care and goals.     Anticipated barriers to physical therapy: co-morbidities       Goals:   Short Term Goals: 6 visits  1. Pt will be compliant /c HEP to supplement PT in order to improve functional tasks  2. Pt will improve B ravi-scap MMTs to 4-/5 grossly to improve strength for functional activities  3. Pt will improve B thoracic rotation range of motion to </= 25% limited painfree for improved mobility with ADLs     Long Term Goals: 12 visits   1. Pt will improve B ravi-scap  MMTs to 4/5 grossly to improve strength for functional activities  2. Pt will improve FOTO score to </= 39% limitation to reduce perceived pain with mobility   3. Pt will improve cervical Ext to >/= 50 deg for increased range of motion with household chores   Plan     Plan of care Certification: 4/28/2023 to 7/28/23.     Outpatient Physical Therapy 2 times weekly for 12 visits to include the following interventions: Manual Therapy, Moist Heat/ Ice, Neuromuscular Re-ed, Patient Education, Therapeutic Activities, and Therapeutic Exercise, E-stim & Dry Needling as needed.     Ari Olmos, PTA

## 2023-06-05 ENCOUNTER — CLINICAL SUPPORT (OUTPATIENT)
Dept: REHABILITATION | Facility: HOSPITAL | Age: 68
End: 2023-06-05
Payer: MEDICARE

## 2023-06-05 DIAGNOSIS — R29.3 POSTURE ABNORMALITY: Primary | ICD-10-CM

## 2023-06-05 DIAGNOSIS — M54.6 MIDLINE THORACIC BACK PAIN, UNSPECIFIED CHRONICITY: ICD-10-CM

## 2023-06-05 PROCEDURE — 97112 NEUROMUSCULAR REEDUCATION: CPT

## 2023-06-05 PROCEDURE — 97110 THERAPEUTIC EXERCISES: CPT

## 2023-06-05 NOTE — PROGRESS NOTES
"[facility]   Physical Therapy Treatment Note      Name: Mane Dodd [facility] Number: [id number]    Therapy Diagnosis:   Encounter Diagnoses   Name Primary?    Posture abnormality Yes    Midline thoracic back pain, unspecified chronicity          Physician: ROHIT Delgado NP    Visit Date: 6/5/2023       Physician: ROHIT Delgado NP     Physician Orders: PT Eval and Treat   Medical Diagnosis from Referral: M54.12 (ICD-10-CM) - Cervical radiculopathy  Evaluation Date: 4/28/2023  Authorization Period Expiration: 4/26/24  Plan of Care Expiration: 7/28/23  Progress Note Due: 5/28/23  Visit # / Visits authorized: 1/ 1, 11/20  FOTO: 6/5     Precautions:  h/o CVA, h/o cervical fusion       PTA Visit #: 2/5     Time In: 1:00 pm  Time Out: 1:45pm  Total Billable Time: 45 minutes    Subjective     Pt reports: he has no neck pain and no longer had radicular BUE symptoms.   He was compliant with home exercise program.  Response to previous treatment: sleeping better   Functional change: improved sleep     Pain: 3/10  Location: left neck & between shoulder blades     Objective      Range of Motion/Strength:      CERVICAL AROM Pain/Dysfunction with Movement   Flexion 60 deg Central thoracic   "Pinch"   Extension 40 deg L cervical "pinch"                   Right rotation 60 deg    Left rotation 60 deg L cervical "pinch"      Thoracolumbar AROM Pain/Dysfunction with Movement                                   Right rotation 25% limited     Left rotation 25 % limited         Shoulder Right MMT Left MMT Pain/Dysfunction with Movement (pain=!)   AROM             flexion  WFL 4/5  WFL 4/5    extension  WFL    WFL       abduction  WFL 4/5  WFL 4/5       Internal rotation NT  4/5  NT 4/5     ER at 0° abd  NT 4/5  NT 4/5     rhomboids   4-/5   4-/5     Mid trap   4-/5   4-/5     Lower trap    4-/5   4-/5        Elbow Right MMT Left MMT Pain/Dysfunction with Movement (pain=!)   AROM             flexion  " "WFL 4/5  WFL 4/5     extension  WFL 4/5  WFL 4/5        Treatment     Yousif received the treatments listed below:      therapeutic exercises to develop strength, endurance, ROM, flexibility, posture, and core stabilization for 35 minutes including:    Re-assessment completed     UBE 3 min fwd/3' bkwd Lvl2   Supine pec stretch on 1/2 foam roll   1/2 foam series  - [place] 20x  - Bear hugs 20x  - swimmers 20x    (B) UT stretch 2x30"  (B) LS stretch 3x30"     Not performed    Seated physioball roll outs x5 (3 directions)   GTB W's 2x10  Rhomboid stretch 5/10"- held due to L UE tingling       manual therapy techniques: Joint mobilizations, Manual traction, and Soft tissue Mobilization were applied to the:  for 0 minutes, including:  T 4-7 AP joint mobs Gr3     Suboccipital release NP  Passive L upper trap/levator scap stretching NP  R thoracic STM /c scapular dissociation NP  STM to B UT and cervical paraspinals with trigger point release NP  Manual traction (L cervical gapping) NP      neuromuscular re-education activities to improve: Balance, Coordination, Kinesthetic, Sense, Proprioception, and Posture for 10 minutes. The following activities were included:    Open books B x 15 not performed    Rowing CC 7# 3x10   Shoulder ext CC 7# 3x10       therapeutic activities to improve functional performance for 00  minutes, including:        Not performed    Seated chin tucks x 10, w/ overpressure x 10 NP   Supine chin retractions x10, seated x10, x10 with manual overpressure NP  B shoulder external rotations Green tb 2x10   Not Performed:  Rowing BTB 2x10  Shoulder ext GTB 2x10  Scap sets 2x10 (held, pain)  Serratus punches B x 15 not performed    Patient Education and Home Exercises       Education provided:   - pain free exercises  - HEP reviewed including progressions and updated handout provided     Written Home Exercises Provided: yes. Exercises were reviewed and Yousif was able to demonstrate them prior to the end of " the session.  Yousif demonstrated good  understanding of the education provided. See EMR under Patient Instructions for exercises provided during therapy sessions        [facility]  Discharge Note    Name: Mane Dodd [facility] Number: [id number]    Therapy Diagnosis:   Encounter Diagnoses   Name Primary?    Posture abnormality Yes    Midline thoracic back pain, unspecified chronicity      Physician: ROHIT Delgado, GERALDO    Physician Orders: PT Eval and Treat   Medical Diagnosis from Referral: M54.12 (ICD-10-CM) - Cervical radiculopathy  Evaluation Date: 4/28/2023      Date of Last visit: 6/5/23  Total Visits Received: 12    ASSESSMENT      Pt demonstrates improved cervical range of motion with reduced and intermittent pain during movement, improved B ravi scap strength, and is no longer having radicular symptoms. Postural awareness is improved. He is (I) with HEP and discharged from PT at this time.     Discharge reason: Patient has met all of his/her goals and Patient has reached the maximum rehab potential for the present time    Discharge FOTO Score: 33% limitation     Goals: Short Term Goals: 6 visits  1. Pt will be compliant /c HEP to supplement PT in order to improve functional tasks MET  2. Pt will improve B ravi-scap MMTs to 4-/5 grossly to improve strength for functional activities MET  3. Pt will improve B thoracic rotation range of motion to </= 25% limited painfree for improved mobility with ADLs MET     Long Term Goals: 12 visits   1. Pt will improve B ravi-scap  MMTs to 4/5 grossly to improve strength for functional activities MET  2. Pt will improve FOTO score to </= 39% limitation to reduce perceived pain with mobility  MET  3. Pt will improve cervical Ext to >/= 50 deg for increased range of motion with household chores NOT MET    PLAN   This patient is discharged from Physical Therapy      Shannon Greene, PT

## 2023-06-08 ENCOUNTER — OFFICE VISIT (OUTPATIENT)
Dept: ORTHOPEDICS | Facility: CLINIC | Age: 68
End: 2023-06-08
Payer: MEDICARE

## 2023-06-08 DIAGNOSIS — M50.30 DDD (DEGENERATIVE DISC DISEASE), CERVICAL: Primary | ICD-10-CM

## 2023-06-08 PROCEDURE — 1159F MED LIST DOCD IN RCRD: CPT | Mod: CPTII,95,, | Performed by: REGISTERED NURSE

## 2023-06-08 PROCEDURE — 3044F HG A1C LEVEL LT 7.0%: CPT | Mod: CPTII,95,, | Performed by: REGISTERED NURSE

## 2023-06-08 PROCEDURE — 1157F PR ADVANCE CARE PLAN OR EQUIV PRESENT IN MEDICAL RECORD: ICD-10-PCS | Mod: CPTII,95,, | Performed by: REGISTERED NURSE

## 2023-06-08 PROCEDURE — 4010F ACE/ARB THERAPY RXD/TAKEN: CPT | Mod: CPTII,95,, | Performed by: REGISTERED NURSE

## 2023-06-08 PROCEDURE — 99442 PR PHYSICIAN TELEPHONE EVALUATION 11-20 MIN: CPT | Mod: 95,,, | Performed by: REGISTERED NURSE

## 2023-06-08 PROCEDURE — 99442 PR PHYSICIAN TELEPHONE EVALUATION 11-20 MIN: ICD-10-PCS | Mod: 95,,, | Performed by: REGISTERED NURSE

## 2023-06-08 PROCEDURE — 1160F PR REVIEW ALL MEDS BY PRESCRIBER/CLIN PHARMACIST DOCUMENTED: ICD-10-PCS | Mod: CPTII,95,, | Performed by: REGISTERED NURSE

## 2023-06-08 PROCEDURE — 1159F PR MEDICATION LIST DOCUMENTED IN MEDICAL RECORD: ICD-10-PCS | Mod: CPTII,95,, | Performed by: REGISTERED NURSE

## 2023-06-08 PROCEDURE — 1157F ADVNC CARE PLAN IN RCRD: CPT | Mod: CPTII,95,, | Performed by: REGISTERED NURSE

## 2023-06-08 PROCEDURE — 4010F PR ACE/ARB THEARPY RXD/TAKEN: ICD-10-PCS | Mod: CPTII,95,, | Performed by: REGISTERED NURSE

## 2023-06-08 PROCEDURE — 1160F RVW MEDS BY RX/DR IN RCRD: CPT | Mod: CPTII,95,, | Performed by: REGISTERED NURSE

## 2023-06-08 PROCEDURE — 3044F PR MOST RECENT HEMOGLOBIN A1C LEVEL <7.0%: ICD-10-PCS | Mod: CPTII,95,, | Performed by: REGISTERED NURSE

## 2023-06-13 NOTE — PROGRESS NOTES
Occupational Therapy Treatment Note     Date: 6/14/2023  Name: Mane Dodd [facility] Number: [id number]    Therapy Diagnosis:   Encounter Diagnoses   Name Primary?    Nonintractable headache, unspecified chronicity pattern, unspecified headache type Yes    Blurred vision     Saccadic eye movement deficiency     Convergence insufficiency     Post concussion syndrome     Deficit of vestibulo-ocular reflex      Physician: Woody Crowe III, MD    Physician Orders: Eval and Treat - Neuro OT   Medical Diagnosis: S06.0XAS (ICD-10-CM) - Concussion with unknown loss of consciousness status, sequela  Evaluation Date: 5/18/2023  Insurance Authorization Period Expiration: 9/9/2023  Plan of Care Certification Period: 10 visits; to around 7/27/2023  Date of Return to MD: 5/29/2023 vascular     Visit # / Visits authorized: 2 / 16  Time In: 1432  Time Out: 1513  Total Billable (one on one) Time: 41 minutes    Precautions: Standard    Subjective     Pt reports: That he has not gotten dizzy since last session until this morning. He rolled over right in bed and sat up and felt mild room spinning dizziness.   Response to previous treatment: less dizziness when rolling left   Functional change: ongoing    Date of Onset: Concussions in 2003 & 2018    Pain: 3/10  Location: Neck     Patient's Goals for Therapy: to have less pain    Objective      Canalith Repositioning for 41 minutes:   Positional Canal Testing:  Looking for nystagmus (slow phase followed by quick phase to the affected side for BPPV)    Trial 1:  Horizontal Canals (sidelying)    Right: Negative nystagmus, Negative dizziness   Left: Negative nystagmus, Negative dizziness  Loaded Carlos Hallpike (posterior / CL anterior)   Right: Positive up/rotary nystagmus, Positive dizziness (first position of Epley; see below for details)    Left: Negative nystagmus, Negative dizziness     Treatment Performed:    Epley maneuver performed to treat  Right PSCC BPPV     Position 1: (+)  up/rotary nystagmus lasting ~10 sec; (+) dizziness   Position 2: (--) nystagmus; (--) dizziness   Position 3: (--) nystagmus; (--) dizziness   Position 4: (--) nystagmus; (--) dizziness  Comments: Each position held for 1 minute; Dizziness post Epley maneuver     Trial 2:  Loaded Carlos Hallpike (posterior / CL anterior)   Right: Possible nystagmus (brief/indistinct pattern), Positive dizziness (first position of Epley; see below for details)     Treatment Performed:    Epley maneuver performed to treat Right PSCC BPPV    Position 1: (+) possible nystagmus in indistinct pattern; (+) dizziness   Position 2: (--) nystagmus; (--) dizziness   Position 3: (--) nystagmus; (--) dizziness   Position 4: (--) nystagmus; (--) dizziness  Comments: Each position held for 30 seconds; Dizziness post Epley maneuver     Home Exercises and Education Provided      Education provided:   - HEP  - Progress towards goals    Written Home Exercises Provided: yes.  Exercises were reviewed and Yousif was able to demonstrate them prior to the end of the session.    Yousif demonstrated good  understanding of the education provided.     See EMR under Patient Instructions for exercises provided 5/30/2023. (Pursuits, saccades, pencil push ups)     Assessment      Pt tolerated today's session well. Positional canal testing again performed due to pt report of room spinning dizziness this AM. Pt was positive for up/rotary nystagmus and dizziness in right Carlos Hallpike position. Epley maneuver was performed to treat right PSCC BPPV. During re-check pt reported mild dizziness and possible nystagmus in indistinct pattern was observed. Performed one additional Epley for treatment. Educated patient on precautions post Epley maneuver. No additional treatment was performed this date. Pt is going out of town for 2 weeks. Next scheduled appt in on 7/3/2023. Pt would continue to benefit from skilled occupational therapy services to maximize oculomotor functioning  "and gaze stabilization, habituation for desensitization to environments/movements that elicit/increase symptoms, pt education, HEP/HAP guidance, and canalith repositioning as needed to improve functional participation with meaningful occupations.    Yousif is progressing well towards his goals and there are no updates to goals at this time. Pt prognosis is Fair.     Pt will continue to benefit from skilled outpatient occupational therapy to address the deficits listed in the problem list on initial evaluation provide pt/family education and to maximize pt's level of independence in the home and community environment.     Pt's spiritual, cultural and educational needs considered and pt agreeable to plan of care and goals.    Anticipated barriers to occupational therapy: none noted    Goals:  Short Term Goals: 5 weeks   1) Pt will tolerate oculomotor home exercise/activity program, reporting at least 50% compliance. ongoing  2) Near point convergence will decrease to 40 cm or less to improve oculomotor coordination and ability to fixate on close up work. ongoing  3) Pt to perform saccades WFL, in all directions, at 100 bpm without onset of headache or blurriness, to improve skills needed for reading. ongoing  4) Patient to perform smooth pursuits WFL in all planes, tracking single target, with no more than "mild" blurriness for improved ability to scan environment during driving ongoing  5) Pt will perform VORx1 horizontal/vertical directions at 130 bpm or more without increase in headache to improve gaze stabilization needed for driving. ongoing  6) Pt to participate in positional canal testing and additional goals to be established as indicated ongoing     Long Term Goals: 10 weeks   1) Pt will be independent with oculomotor home exercise/activity program. ongoing  2) Near point convergence will decrease to 25 cm or less to improve oculomotor coordination and ability to fixate on close up work. ongoing  3) Pt will " change gaze between near and far targets without onset of headache or dizziness to improve accommodative flexibility, saccadic eye movements, and oculomotor coordination needed for participation in daily and leisure activities and to maximize safety while driving. ongoing  4) Pt will report ability to read without blurred vision or use of tracking aids for equal amount of time as prior to concussion ongoing  5) Patient to perform smooth pursuits WFL in all planes, tracking single target, without blurriness for improved ability to scan environment during driving ongoing  6) Patient to perform VORx1 WFL at 150 bpm using busy target facing busy background for improved gaze stabilization and decreased visual motion sensitivity. ongoing     More goals to be established as indicated.     Plan     Certification Period/Plan of care expiration: 10 visits; 5/18/2023 to around 7/27/2023.     Outpatient Occupational Therapy 1 times weekly for 10 weeks to include the following interventions: Neuromuscular Re-ed, Paraffin, Patient Education, Self Care, Therapeutic Activities, and Therapeutic Exercise.    Updates/Grading for next session: further assess gaze stabilization once neck pain decreases, add VOR cancellation and VOR exercises to HEP, oculomotor progressions, and cont to assess positionals       Katarina Posey, OT

## 2023-06-14 ENCOUNTER — OFFICE VISIT (OUTPATIENT)
Dept: DERMATOLOGY | Facility: CLINIC | Age: 68
End: 2023-06-14
Payer: MEDICARE

## 2023-06-14 ENCOUNTER — CLINICAL SUPPORT (OUTPATIENT)
Dept: REHABILITATION | Facility: HOSPITAL | Age: 68
End: 2023-06-14
Payer: MEDICARE

## 2023-06-14 VITALS — BODY MASS INDEX: 29.01 KG/M2 | WEIGHT: 169 LBS

## 2023-06-14 DIAGNOSIS — H55.81 SACCADIC EYE MOVEMENT DEFICIENCY: ICD-10-CM

## 2023-06-14 DIAGNOSIS — D48.5 NEOPLASM OF UNCERTAIN BEHAVIOR OF SKIN: Primary | ICD-10-CM

## 2023-06-14 DIAGNOSIS — H53.8 BLURRED VISION: ICD-10-CM

## 2023-06-14 DIAGNOSIS — L21.9 SEBORRHEIC DERMATITIS: ICD-10-CM

## 2023-06-14 DIAGNOSIS — D18.01 CHERRY ANGIOMA: ICD-10-CM

## 2023-06-14 DIAGNOSIS — L81.4 LENTIGINES: ICD-10-CM

## 2023-06-14 DIAGNOSIS — H51.11 CONVERGENCE INSUFFICIENCY: ICD-10-CM

## 2023-06-14 DIAGNOSIS — Z12.83 SKIN EXAM, SCREENING FOR CANCER: ICD-10-CM

## 2023-06-14 DIAGNOSIS — H81.8X9 DEFICIT OF VESTIBULO-OCULAR REFLEX: ICD-10-CM

## 2023-06-14 DIAGNOSIS — F07.81 POST CONCUSSION SYNDROME: ICD-10-CM

## 2023-06-14 DIAGNOSIS — R51.9 NONINTRACTABLE HEADACHE, UNSPECIFIED CHRONICITY PATTERN, UNSPECIFIED HEADACHE TYPE: Primary | ICD-10-CM

## 2023-06-14 PROCEDURE — 3288F FALL RISK ASSESSMENT DOCD: CPT | Mod: CPTII,S$GLB,, | Performed by: DERMATOLOGY

## 2023-06-14 PROCEDURE — 99214 OFFICE O/P EST MOD 30 MIN: CPT | Mod: 25,S$GLB,, | Performed by: DERMATOLOGY

## 2023-06-14 PROCEDURE — 1157F ADVNC CARE PLAN IN RCRD: CPT | Mod: CPTII,S$GLB,, | Performed by: DERMATOLOGY

## 2023-06-14 PROCEDURE — 1101F PT FALLS ASSESS-DOCD LE1/YR: CPT | Mod: CPTII,S$GLB,, | Performed by: DERMATOLOGY

## 2023-06-14 PROCEDURE — 1160F RVW MEDS BY RX/DR IN RCRD: CPT | Mod: CPTII,S$GLB,, | Performed by: DERMATOLOGY

## 2023-06-14 PROCEDURE — 1126F PR PAIN SEVERITY QUANTIFIED, NO PAIN PRESENT: ICD-10-PCS | Mod: CPTII,S$GLB,, | Performed by: DERMATOLOGY

## 2023-06-14 PROCEDURE — 95992 CANALITH REPOSITIONING PROC: CPT | Mod: PO

## 2023-06-14 PROCEDURE — 99999 PR PBB SHADOW E&M-EST. PATIENT-LVL III: CPT | Mod: PBBFAC,,, | Performed by: DERMATOLOGY

## 2023-06-14 PROCEDURE — 11102 TANGNTL BX SKIN SINGLE LES: CPT | Mod: S$GLB,,, | Performed by: DERMATOLOGY

## 2023-06-14 PROCEDURE — 11102 PR TANGENTIAL BIOPSY, SKIN, SINGLE LESION: ICD-10-PCS | Mod: S$GLB,,, | Performed by: DERMATOLOGY

## 2023-06-14 PROCEDURE — 1101F PR PT FALLS ASSESS DOC 0-1 FALLS W/OUT INJ PAST YR: ICD-10-PCS | Mod: CPTII,S$GLB,, | Performed by: DERMATOLOGY

## 2023-06-14 PROCEDURE — 99999 PR PBB SHADOW E&M-EST. PATIENT-LVL III: ICD-10-PCS | Mod: PBBFAC,,, | Performed by: DERMATOLOGY

## 2023-06-14 PROCEDURE — 1126F AMNT PAIN NOTED NONE PRSNT: CPT | Mod: CPTII,S$GLB,, | Performed by: DERMATOLOGY

## 2023-06-14 PROCEDURE — 4010F PR ACE/ARB THEARPY RXD/TAKEN: ICD-10-PCS | Mod: CPTII,S$GLB,, | Performed by: DERMATOLOGY

## 2023-06-14 PROCEDURE — 3008F BODY MASS INDEX DOCD: CPT | Mod: CPTII,S$GLB,, | Performed by: DERMATOLOGY

## 2023-06-14 PROCEDURE — 1159F PR MEDICATION LIST DOCUMENTED IN MEDICAL RECORD: ICD-10-PCS | Mod: CPTII,S$GLB,, | Performed by: DERMATOLOGY

## 2023-06-14 PROCEDURE — 1160F PR REVIEW ALL MEDS BY PRESCRIBER/CLIN PHARMACIST DOCUMENTED: ICD-10-PCS | Mod: CPTII,S$GLB,, | Performed by: DERMATOLOGY

## 2023-06-14 PROCEDURE — 3044F PR MOST RECENT HEMOGLOBIN A1C LEVEL <7.0%: ICD-10-PCS | Mod: CPTII,S$GLB,, | Performed by: DERMATOLOGY

## 2023-06-14 PROCEDURE — 4010F ACE/ARB THERAPY RXD/TAKEN: CPT | Mod: CPTII,S$GLB,, | Performed by: DERMATOLOGY

## 2023-06-14 PROCEDURE — 88305 TISSUE EXAM BY PATHOLOGIST: CPT | Mod: 26,,, | Performed by: PATHOLOGY

## 2023-06-14 PROCEDURE — 1159F MED LIST DOCD IN RCRD: CPT | Mod: CPTII,S$GLB,, | Performed by: DERMATOLOGY

## 2023-06-14 PROCEDURE — 99214 PR OFFICE/OUTPT VISIT, EST, LEVL IV, 30-39 MIN: ICD-10-PCS | Mod: 25,S$GLB,, | Performed by: DERMATOLOGY

## 2023-06-14 PROCEDURE — 3288F PR FALLS RISK ASSESSMENT DOCUMENTED: ICD-10-PCS | Mod: CPTII,S$GLB,, | Performed by: DERMATOLOGY

## 2023-06-14 PROCEDURE — 3008F PR BODY MASS INDEX (BMI) DOCUMENTED: ICD-10-PCS | Mod: CPTII,S$GLB,, | Performed by: DERMATOLOGY

## 2023-06-14 PROCEDURE — 1157F PR ADVANCE CARE PLAN OR EQUIV PRESENT IN MEDICAL RECORD: ICD-10-PCS | Mod: CPTII,S$GLB,, | Performed by: DERMATOLOGY

## 2023-06-14 PROCEDURE — 88305 TISSUE EXAM BY PATHOLOGIST: ICD-10-PCS | Mod: 26,,, | Performed by: PATHOLOGY

## 2023-06-14 PROCEDURE — 3044F HG A1C LEVEL LT 7.0%: CPT | Mod: CPTII,S$GLB,, | Performed by: DERMATOLOGY

## 2023-06-14 PROCEDURE — 88305 TISSUE EXAM BY PATHOLOGIST: CPT | Performed by: PATHOLOGY

## 2023-06-14 RX ORDER — KETOCONAZOLE 20 MG/ML
SHAMPOO, SUSPENSION TOPICAL
Qty: 120 ML | Refills: 6 | Status: SHIPPED | OUTPATIENT
Start: 2023-06-14

## 2023-06-14 NOTE — PROGRESS NOTES
Subjective:      Patient ID:  Mane Ramires is a 67 y.o. male who presents for   Chief Complaint   Patient presents with    Skin Check     UBSE     Would like skin check, lesion on right arm for several years which bleeds with squeezing it.  Also seb derm scalp and chest would like tx, scalp clear at present.  Previously seen elsewhere.       Review of Systems   Constitutional:  Negative for fever, chills, weight loss, weight gain, fatigue, night sweats and malaise.   Skin:  Negative for daily sunscreen use, activity-related sunscreen use and wears hat.   Hematologic/Lymphatic: Bruises/bleeds easily.     Objective:   Physical Exam   Constitutional: He appears well-developed and well-nourished. No distress.   Neurological: He is alert and oriented to person, place, and time. He is not disoriented.   Psychiatric: He has a normal mood and affect.   Skin:   Areas Examined (abnormalities noted in diagram):   Scalp / Hair Palpated and Inspected  Head / Face Inspection Performed  Neck Inspection Performed  Chest / Axilla Inspection Performed  Abdomen Inspection Performed  Back Inspection Performed  RUE Inspected  LUE Inspection Performed               Diagram Legend     Erythematous scaling macule/papule c/w actinic keratosis       Vascular papule c/w angioma      Pigmented verrucoid papule/plaque c/w seborrheic keratosis      Yellow umbilicated papule c/w sebaceous hyperplasia      Irregularly shaped tan macule c/w lentigo     1-2 mm smooth white papules consistent with Milia      Movable subcutaneous cyst with punctum c/w epidermal inclusion cyst      Subcutaneous movable cyst c/w pilar cyst      Firm pink to brown papule c/w dermatofibroma      Pedunculated fleshy papule(s) c/w skin tag(s)      Evenly pigmented macule c/w junctional nevus     Mildly variegated pigmented, slightly irregular-bordered macule c/w mildly atypical nevus      Flesh colored to evenly pigmented papule c/w intradermal nevus       Pink  "pearly papule/plaque c/w basal cell carcinoma      Erythematous hyperkeratotic cursted plaque c/w SCC      Surgical scar with no sign of skin cancer recurrence      Open and closed comedones      Inflammatory papules and pustules      Verrucoid papule consistent consistent with wart     Erythematous eczematous patches and plaques     Dystrophic onycholytic nail with subungual debris c/w onychomycosis     Umbilicated papule    Erythematous-base heme-crusted tan verrucoid plaque consistent with inflamed seborrheic keratosis     Erythematous Silvery Scaling Plaque c/w Psoriasis     See annotation      Assessment / Plan:      Pathology Orders:       Normal Orders This Visit    Specimen to Pathology, Dermatology     Comments:    Number of Specimens:->1  ------------------------->-------------------------  Spec 1 Procedure:->Biopsy  Spec 1 Clinical Impression:->irritated nevus  Spec 1 Source:->right forearm    Questions:    Procedure Type: Dermatology and skin neoplasms    Number of Specimens: 1    ------------------------: -------------------------    Spec 1 Procedure: Biopsy    Spec 1 Clinical Impression: irritated nevus    Spec 1 Source: right forearm    Release to patient:           Neoplasm of uncertain behavior of skin  -     Specimen to Pathology, Dermatology  Shave biopsy performed after verbal consent including risk of infection, scar, recurrence, need for additional treatment of site. Area prepped with alcohol, anesthetized with 1% lidocaine with epinephrine. . Hemostasis achieved with monsels. No complications. Dressing applied. Wound care explained.        Seborrheic dermatitis  -     ketoconazole (NIZORAL) 2 % shampoo; Apply topically every 7 days.  Dispense: 120 mL; Refill: 6 can use on scalp and chest    Lentigines  The "ABCD" rules to observe pigmented lesions were reviewed.    Mckeon angiomas  This is a benign vascular lesion. Reassurance given. No treatment required.       Skin exam, screening for " cancer   No lesions suspicious for malignancy noted.    Recommend daily sun protection/avoidance and use of at least SPF 30, broad spectrum sunscreen (OTC drug).                Follow up in about 1 year (around 6/14/2024).

## 2023-06-15 ENCOUNTER — OFFICE VISIT (OUTPATIENT)
Dept: NEUROSURGERY | Facility: CLINIC | Age: 68
End: 2023-06-15
Payer: MEDICARE

## 2023-06-15 ENCOUNTER — HOSPITAL ENCOUNTER (OUTPATIENT)
Dept: RADIOLOGY | Facility: HOSPITAL | Age: 68
Discharge: HOME | End: 2023-06-15
Attending: NURSE PRACTITIONER
Payer: MEDICARE

## 2023-06-15 VITALS
WEIGHT: 169 LBS | SYSTOLIC BLOOD PRESSURE: 108 MMHG | DIASTOLIC BLOOD PRESSURE: 70 MMHG | HEIGHT: 64 IN | HEART RATE: 83 BPM | BODY MASS INDEX: 28.85 KG/M2

## 2023-06-15 DIAGNOSIS — M50.30 DDD (DEGENERATIVE DISC DISEASE), CERVICAL: ICD-10-CM

## 2023-06-15 DIAGNOSIS — M48.02 SPINAL STENOSIS, CERVICAL REGION: ICD-10-CM

## 2023-06-15 DIAGNOSIS — M54.12 C6 RADICULOPATHY: ICD-10-CM

## 2023-06-15 DIAGNOSIS — G95.89 MYELOMALACIA: ICD-10-CM

## 2023-06-15 PROCEDURE — 1157F PR ADVANCE CARE PLAN OR EQUIV PRESENT IN MEDICAL RECORD: ICD-10-PCS | Mod: CPTII,S$GLB,, | Performed by: NEUROLOGICAL SURGERY

## 2023-06-15 PROCEDURE — 3044F PR MOST RECENT HEMOGLOBIN A1C LEVEL <7.0%: ICD-10-PCS | Mod: CPTII,S$GLB,, | Performed by: NEUROLOGICAL SURGERY

## 2023-06-15 PROCEDURE — 3078F DIAST BP <80 MM HG: CPT | Mod: CPTII,S$GLB,, | Performed by: NEUROLOGICAL SURGERY

## 2023-06-15 PROCEDURE — 3078F PR MOST RECENT DIASTOLIC BLOOD PRESSURE < 80 MM HG: ICD-10-PCS | Mod: CPTII,S$GLB,, | Performed by: NEUROLOGICAL SURGERY

## 2023-06-15 PROCEDURE — 99999 PR PBB SHADOW E&M-EST. PATIENT-LVL IV: CPT | Mod: PBBFAC,,, | Performed by: NEUROLOGICAL SURGERY

## 2023-06-15 PROCEDURE — 3044F HG A1C LEVEL LT 7.0%: CPT | Mod: CPTII,S$GLB,, | Performed by: NEUROLOGICAL SURGERY

## 2023-06-15 PROCEDURE — 1101F PR PT FALLS ASSESS DOC 0-1 FALLS W/OUT INJ PAST YR: ICD-10-PCS | Mod: CPTII,S$GLB,, | Performed by: NEUROLOGICAL SURGERY

## 2023-06-15 PROCEDURE — 72125 CT CERVICAL SPINE WITHOUT CONTRAST: ICD-10-PCS | Mod: 26,,, | Performed by: INTERNAL MEDICINE

## 2023-06-15 PROCEDURE — 99213 OFFICE O/P EST LOW 20 MIN: CPT | Mod: S$GLB,,, | Performed by: NEUROLOGICAL SURGERY

## 2023-06-15 PROCEDURE — 4010F ACE/ARB THERAPY RXD/TAKEN: CPT | Mod: CPTII,S$GLB,, | Performed by: NEUROLOGICAL SURGERY

## 2023-06-15 PROCEDURE — 3074F SYST BP LT 130 MM HG: CPT | Mod: CPTII,S$GLB,, | Performed by: NEUROLOGICAL SURGERY

## 2023-06-15 PROCEDURE — 72125 CT NECK SPINE W/O DYE: CPT | Mod: TC

## 2023-06-15 PROCEDURE — 72125 CT NECK SPINE W/O DYE: CPT | Mod: 26,,, | Performed by: INTERNAL MEDICINE

## 2023-06-15 PROCEDURE — 1125F AMNT PAIN NOTED PAIN PRSNT: CPT | Mod: CPTII,S$GLB,, | Performed by: NEUROLOGICAL SURGERY

## 2023-06-15 PROCEDURE — 99213 PR OFFICE/OUTPT VISIT, EST, LEVL III, 20-29 MIN: ICD-10-PCS | Mod: S$GLB,,, | Performed by: NEUROLOGICAL SURGERY

## 2023-06-15 PROCEDURE — 1159F MED LIST DOCD IN RCRD: CPT | Mod: CPTII,S$GLB,, | Performed by: NEUROLOGICAL SURGERY

## 2023-06-15 PROCEDURE — 3008F BODY MASS INDEX DOCD: CPT | Mod: CPTII,S$GLB,, | Performed by: NEUROLOGICAL SURGERY

## 2023-06-15 PROCEDURE — 3288F PR FALLS RISK ASSESSMENT DOCUMENTED: ICD-10-PCS | Mod: CPTII,S$GLB,, | Performed by: NEUROLOGICAL SURGERY

## 2023-06-15 PROCEDURE — 3074F PR MOST RECENT SYSTOLIC BLOOD PRESSURE < 130 MM HG: ICD-10-PCS | Mod: CPTII,S$GLB,, | Performed by: NEUROLOGICAL SURGERY

## 2023-06-15 PROCEDURE — 1157F ADVNC CARE PLAN IN RCRD: CPT | Mod: CPTII,S$GLB,, | Performed by: NEUROLOGICAL SURGERY

## 2023-06-15 PROCEDURE — 3008F PR BODY MASS INDEX (BMI) DOCUMENTED: ICD-10-PCS | Mod: CPTII,S$GLB,, | Performed by: NEUROLOGICAL SURGERY

## 2023-06-15 PROCEDURE — 1125F PR PAIN SEVERITY QUANTIFIED, PAIN PRESENT: ICD-10-PCS | Mod: CPTII,S$GLB,, | Performed by: NEUROLOGICAL SURGERY

## 2023-06-15 PROCEDURE — 3288F FALL RISK ASSESSMENT DOCD: CPT | Mod: CPTII,S$GLB,, | Performed by: NEUROLOGICAL SURGERY

## 2023-06-15 PROCEDURE — 4010F PR ACE/ARB THEARPY RXD/TAKEN: ICD-10-PCS | Mod: CPTII,S$GLB,, | Performed by: NEUROLOGICAL SURGERY

## 2023-06-15 PROCEDURE — 1101F PT FALLS ASSESS-DOCD LE1/YR: CPT | Mod: CPTII,S$GLB,, | Performed by: NEUROLOGICAL SURGERY

## 2023-06-15 PROCEDURE — 99999 PR PBB SHADOW E&M-EST. PATIENT-LVL IV: ICD-10-PCS | Mod: PBBFAC,,, | Performed by: NEUROLOGICAL SURGERY

## 2023-06-15 PROCEDURE — 1159F PR MEDICATION LIST DOCUMENTED IN MEDICAL RECORD: ICD-10-PCS | Mod: CPTII,S$GLB,, | Performed by: NEUROLOGICAL SURGERY

## 2023-06-15 NOTE — PROGRESS NOTES
Dear Dr. Crowe, Woody UPTON III, MD,    Thank you for referring this patient to my clinic. The full details of my evaluation will also be forthcoming to you by letter.    CHIEF COMPLAINT:  Consultation for possible surgical intervention.    I, Sandor Handy, attest that this documentation has been prepared under the direction and in the presence of Mane Polanco MD.    HPI:  Mane Ramires is a 67 y.o.  male With a PMHx of CVA, myelomalacia of cervical spine s/p cervical fusion, HTN, DM type 2, who is referred to me by Woody Crowe III, MD, for consultation of possible surgical intervention.  He was last seen by Dr Crowe on 05/18/2023 for evaluation of postconcussion symptoms that persist since a head injury in 2018. During this visit, he endorsed he was trying to pull a cord on a ceiling fan when became dizzy and fell down. He does have a complicated history including a fall off of a roof more than a decade ago status post ACDF C5-C6 C7.  He also had a small right parieto-occipital stroke in 2021 and had dual antiplatelet therapy for the appropriate amount of time but is now only on ASA. He further noted bilateral neck pain, and numbness to his bilateral upper extremities which was recently exacerbated by a sexual episode.     Review of patient's allergies indicates:   Allergen Reactions    Corticosteroids (glucocorticoids) Palpitations    Mobic [meloxicam] Swelling    Sudafed [pseudoephedrine hcl] Other (See Comments)     Heart racing      Codeine Other (See Comments)     Heart racing    Gabapentin      Caused memory loss    Pseudoephedrine     Adhesive Rash     Specifically clear tegaderm dressing     Cortisone Palpitations       Past Medical History:   Diagnosis Date    Accident     fell from roof with TBI (word finding issues personality changes, memory deficets), R rib fractures, clavicle fx    Allergy     Anxiety     ASD (atrial septal defect)     noted on ECHO 6/16    Cervical stenosis of spine     noted  on 6/15 MRI    CTS (carpal tunnel syndrome)     mild-mod on 4/17 NCS    DDD (degenerative disc disease), cervical 10/2014    C5-6 and C6-C7    Depression     Diabetes     JEEVAN (generalized anxiety disorder)     Gender identity disorder     H/O cardiovascular stress test     12/14 normal    Herpes simplex type 2 infection     History of atypical hyperplasia of breast     B precancer lesions    Hypertension     Hypertensive retinopathy     IGT (impaired glucose tolerance)     Myelomalacia     c-spine noted on 8/15 MRI    OA (osteoarthritis)     Pineal gland cyst     TBI (traumatic brain injury)     after falling off a roof in 2003     Past Surgical History:   Procedure Laterality Date    AMPUTATION      L index finger    APPENDECTOMY  1969    BILATERAL SALPINGOOPHORECTOMY  02/2015    BIOPSY OF LIVER WITH ULTRASOUND GUIDANCE N/A 12/18/2018    Procedure: BIOPSY, LIVER, WITH US GUIDANCE;  Surgeon: [facility] Provider;  Location: [facility] OR [address] [place] [address];  Service: Radiology;  Laterality: N/A;  U/S GUIDED LIVER BIOPSY.  12/18/2018.  DOSC 7:30 AM  PROCEDURE 9 AM.  DR NITHIN GOLDEN / MARLON NUR.  DB 12/14/18 3:20P    CARPAL TUNNEL RELEASE  12/2017    right    CERVICAL FUSION  10/2014    C5-C6 and C6-C7    CHOLECYSTECTOMY      ENDOSCOPIC NASAL SEPTOPLASTY N/A 8/23/2018    Procedure: SEPTOPLASTY, NASAL, ENDOSCOPIC;  Surgeon: Reji Arce III, MD;  Location: [facility] OR [address] [place] [address];  Service: ENT;  Laterality: N/A;    FINGER AMPUTATION Left     FRACTURE SURGERY Right 2003    rib fractures    HYSTERECTOMY  1984    MIGUEL    MASTECTOMY  02/2015    RECONSTRUCTION OF NOSE N/A 8/23/2018    Procedure: RECONSTRUCTION, NOSE;  Surgeon: Reji Arce III, MD;  Location: [facility] OR [address] [place] [address];  Service: ENT;  Laterality: N/A;    ROTATOR CUFF REPAIR Right 2019    UPPER GASTROINTESTINAL ENDOSCOPY  09/06/2017    Dr. Ayon     Family History   Problem Relation Age of Onset    Diabetes Mother     Heart disease Mother     Gallbladder disease  Mother     Coronary artery disease Father     Breast cancer Sister     Heart disease Sister     Gallbladder disease Daughter     Craniosynostosis Daughter     Ovarian cancer Maternal Aunt     Heart disease Maternal Grandmother     Gallbladder disease Maternal Grandmother     Lung cancer Paternal Grandfather     Heart disease Paternal Grandfather     Heart attack Brother     Breast cancer Cousin     Breast cancer Cousin      Social History     Tobacco Use    Smoking status: Former     Packs/day: 0.25     Years: 2.00     Pack years: 0.50     Types: Cigarettes     Start date:      Quit date: 2002     Years since quittin.6     Passive exposure: Past    Smokeless tobacco: Never    Tobacco comments:     did not smoke regularly   Substance Use Topics    Alcohol use: Yes     Comment: rare     Drug use: Yes     Types: Hydrocodone        Review of Systems   Constitutional: Negative.    HENT: Negative.     Eyes: Negative.    Respiratory: Negative.     Cardiovascular: Negative.    Gastrointestinal: Negative.    Endocrine: Negative.    Genitourinary: Negative.    Musculoskeletal:  Positive for neck pain. Negative for back pain and gait problem.   Skin: Negative.    Allergic/Immunologic: Negative.    Neurological:  Positive for numbness (bilateral upper extremities). Negative for weakness, light-headedness and headaches.   Hematological: Negative.    Psychiatric/Behavioral: Negative.       OBJECTIVE:   Vital Signs:  Pulse: 83 (06/15/23 1351)  BP: 108/70 (06/15/23 1351)    Physical Exam:    Vital signs: All nursing notes and vital signs reviewed -- afebrile, vital signs stable.  Constitutional: Patient sitting comfortably in chair. Appears well developed and well nourished.  Skin: Exposed areas are intact without abnormal markings, rashes or other lesions.  HEENT: Normocephalic. Normal conjunctivae.  Cardiovascular: Normal rate and regular rhythm.  Respiratory: Chest wall rises and falls symmetrically, without signs  of respiratory distress.  Abdomen: Soft and non-tender.  Extremities: Warm and without edema. Calves supple, non-tender.  Psych/Behavior: Normal affect.  Musculoskeletal:     Positive scratch collapse test +    positive tinel on right cubital tunnel.     Neurological:    Mental status: Alert and oriented. Conversational and appropriate.       Cranial Nerves: VFF to confrontation. PERRL. EOMI without nystagmus. Facial STLT normal and symmetric. Strong, symmetric muscles of mastication. Facial strength full and symmetric. Hearing equal bilaterally to finger rub. Palate and uvula rise and fall normally in midline. Shoulder shrug 5/5 strength. Tongue midline.     Motor:    Upper:  Deltoids Triceps Biceps WE WF     R 5/5 5/5 5/5 5/5 5/5 5/5    L 5/5 5/5 5/5 5/5 5/5 5/5      Lower:  HF KE KF DF PF EHL    R 5/5 5/5 5/5 5/5 5/5 5/5    L 5/5 5/5 5/5 5/5 5/5 5/5     Sensory: Intact sensation to light touch in all extremities. Romberg negative.    Reflexes:          DTR: 2+ symmetrically throughout.     Al's: Negative.     Babinski's: Negative.     Clonus: Negative.    Cerebellar: Finger-to-nose and rapid alternating movements normal. Gait stable, fluid.    Spine:    Posture: Head well aligned over pelvis in front and side views.  No focal or global spinal deformity visible on inspection. Shoulders and hips even. No obvious leg length discrepancy. No scapula winging.    Bending: Full ROM with forward, back and lateral bending. No rib prominence with forward bend.    Cervical:      ROM: Full with flexion, extension, lateral rotation and ear-to-shoulder bend.      Midline TTP: Negative.     Spurling's test: Negative.     Lhermitte's: Negative.    Thoracic:     Midline TTP: Negative    Lumbar:     Midline TTP: Negative     Straight Leg Test: Negative     Crossed Straight Leg Test: Negative     Sciatic notch tenderness: Negative.    Other:     SI joint TTP: Negative.     Greater trochanter TTP: Negative.     Tenderness  with external/internal hip rotation: Negative.    Diagnostic Results:  All imaging was independently reviewed by me.    CT Cervical Spine dated 06/15/2023:  Postoperative changes from C5-7 ACDF.  No hardware complication.     Degenerative changes as described, similar to 04/24/2023 MRI.       X-Ray Cervical Spine dated 04/27/2023:  Reversal of normal cervical lordosis.  No acute fractures. Unremarkable predental space.  No widening prevertebral soft tissues.  Reconfirmed and stable findings of C5-C7 ACDF procedure with inter body fusion.  No findings of hardware malfunction.  Stable very minimal grade 1 anterolisthesis C4 on C5.  The flexion and extension views demonstrate no definite instability but with borderline angular instability.  Stable mild disc narrowing C4-C5.  Other cervical disc levels preserved.  Some stable scattered facet arthropathic changes.  Intact odontoid tip and unremarkable C1-C2 articulation.    MRI Cervical Spine 04/24/2023:  Postoperative changes of ACDF at C5 through C7.     Multilevel cervical spondylosis, most prominent at the level of C3-C4 through C6-7 levels, as above.        ASSESSMENT/PLAN:     Mane Ramires has had 5 weeks of neck pain and BUE numbness after sex. His neck pain is minor and he denies radicular arm pain. He does endorse some symptoms concerning for myelopathy such as gait ataxia, hand clumsiness and urinary urgency. On imaging I see borderline angular instability at C4-5 above his prior ACD  but no severe central stenosis. I recommend BUE EMG because there is also an element of ulnar neuropathy in his pattern of numbness and he has a positive Tinel's at the right cubital tunnel.     Overall I think it is unlikely that he will benefit from cervical surgery but he will follow up with [facility] after his EMG is done    The patient understands and agrees with the plan of care. All questions were answered.     EMG to evaluate reason for numbness in arms   2. RTC  [facility] clinic pending #1      I, Dr. Mane Polanco personally performed the services described in this documentation. All medical record entries made by the scribe, Sandor Handy, were at my direction and in my presence.  I have reviewed the chart and agree that the record reflects my personal performance and is accurate and complete.      Mane Polanco M.D.  Department of Neurosurgery  [facility]

## 2023-06-22 LAB
FINAL PATHOLOGIC DIAGNOSIS: NORMAL
Lab: NORMAL

## 2023-06-23 ENCOUNTER — TELEPHONE (OUTPATIENT)
Dept: HEMATOLOGY/ONCOLOGY | Facility: CLINIC | Age: 68
End: 2023-06-23
Payer: MEDICARE

## 2023-06-23 ENCOUNTER — PATIENT MESSAGE (OUTPATIENT)
Dept: DERMATOLOGY | Facility: CLINIC | Age: 68
End: 2023-06-23
Payer: MEDICARE

## 2023-06-23 NOTE — TELEPHONE ENCOUNTER
----- Message from Bowen Smith Jr., MD sent at 6/23/2023  8:29 AM CDT -----  Alfredo Muhammad, could you get this patient scheduled with me.    Thank you, Bowen

## 2023-06-23 NOTE — PROGRESS NOTES
The lesion biopsied will need further removal on his arm.  Dr Smith will be able to do this for him.   SKIN, RIGHT FOREARM LESION, SHAVE BIOPSY:   - Basal cell carcinoma, superficial and nodular-type, transected.

## 2023-06-23 NOTE — NURSING
Oncology Navigation   Intake  Date of Diagnosis: 06/14/23  Cancer Type: Skin Cancer Non-Melanoma  Internal / External Referral: Internal  Date of Referral: 06/23/23  Initial Nurse Navigator Contact: 06/23/23  Referral to Initial Contact Timeline (days): 0  Date Worked: 06/23/23  First Appointment Available: 07/03/23  Appointment Date: 07/03/23  First Available Date vs. Scheduled Date (days): 0  Multiple appointments: No     Treatment  Current Status: Staging work-up    Surgical Oncologist: Dr. Bowen Smith  Consult Date: 07/03/23          Procedures: Biopsy  Biopsy Schedule Date: 06/14/23                 Acuity      Follow Up  No follow-ups on file.

## 2023-06-28 ENCOUNTER — TELEPHONE (OUTPATIENT)
Dept: NEUROSURGERY | Facility: CLINIC | Age: 68
End: 2023-06-28
Payer: MEDICARE

## 2023-06-28 NOTE — TELEPHONE ENCOUNTER
----- Message from Roldan Mcgee sent at 6/21/2023  3:57 PM CDT -----  Regarding: RE: EMG Request  Alfredo Heller,    Patient is on my EMG schedule for Thursday, July 20, 2023 for 10am. I have printed a letter and will place it in the mail if you could please assist with reaching out to the patient I would greatly appreciate it.    Thanks,  Roldan    ----- Message -----  From: Prince Butcher MA  Sent: 6/16/2023   4:27 PM CDT  To: Roldan Mcgee  Subject: EMG Request                                      Good afternoon Roldan!    Can we get a date for an EMG for this patient?    Thanks in advance!  Arron

## 2023-07-02 NOTE — PATIENT INSTRUCTIONS
Retrieved from: [email or web address]

## 2023-07-02 NOTE — PROGRESS NOTES
"Occupational Therapy Discharge Summary     Date: 7/3/2023  Name: Mane Dodd [facility] Number: [id number]    Therapy Diagnosis:   Encounter Diagnoses   Name Primary?    Nonintractable headache, unspecified chronicity pattern, unspecified headache type Yes    Blurred vision     Saccadic eye movement deficiency     Convergence insufficiency     Post concussion syndrome     Deficit of vestibulo-ocular reflex        Physician: Woody Crowe III, MD    Physician Orders: Eval and Treat - Neuro OT   Medical Diagnosis: S06.0XAS (ICD-10-CM) - Concussion with unknown loss of consciousness status, sequela  Evaluation Date: 5/18/2023  Insurance Authorization Period Expiration: 9/9/2023  Plan of Care Certification Period: 10 visits; to around 7/27/2023  Date of Return to MD: 5/29/2023 vascular     Visit # / Visits authorized: 3 / 16  Time In: 1:04 PM  Time Out: 1:30 PM  Total Billable (one on one) Time: 26 minutes    Precautions: Standard    Subjective     Pt reports: no dizziness since last session; "I'm amazed at how good I feel;" feeling better than PLOF, has had some degree of dizziness since 2003 but does not currently   Response to previous treatment: less dizziness when rolling left   Functional change: ongoing    Date of Onset: Concussions in 2003 & 2018    Pain: 3/10  Location: Neck     Patient's Goals for Therapy: to have less pain    Objective      Yousif participated in neuromuscular re-education activities to improve: Oculomotor function and gaze stabilization for 26 minutes. The following activities were included:     Positional Canal Testing:  Looking for nystagmus (slow phase followed by quick phase to the affected side for BPPV)    Trial 1:  Horizontal Canals (sidelying)    Right: Negative nystagmus, Negative dizziness   Left: Negative nystagmus, Negative dizziness  Carlos Hallpike (posterior / CL anterior)   Right: Negative nystagmus, Negative dizziness    Left: Negative nystagmus, Negative " dizziness    Vestibular/Ocular-Motor Screening (VOMS) for Concussion  Vestibular/Ocular Motor Test: Not Tested Headache   0-10 Dizziness  0-10 Nausea   0-10 Fogginess   0-10 Comments   Baseline N/A 0 0 0 0    Smooth Pursuit  (1.5 feet left/right of center; 3 feet away; 2 times; 30 bpm each direction; horizontal and vertical)  0 0 0 0 See below.   Saccades - Horizontal  (1.5 feet left/right of center; 3 feet away; 10 times; performed as quickly as possible)  0 0 0 0 See below.   Saccades - Vertical   (1.5 feet up/down of center; 3 feet away; 10 times; performed as quickly as possible)  0 0 0 0 See below.   Convergence (Near Point)  (14 pt font; stop when pt reports diplopia or outward deviation of eye is observed, blurring is ignored; measure distance between target and tip of nose; abnormal finding is >/= 6 cm)  0 0 0 0 See below.   VOR - Horizontal  (14 pt font; 20 degrees rotation left/right; 10 reps; 180 bpm)  0 0 0 0 No visual slippage noted.    VOR - Vertical  (14 pt font; 20 degrees rotation up/down; 10 times; 180 bpm)  0 0 0 0 No visual slippage noted. Patient reported vertical more difficult due to neck pain.    Visual Motion Sensitivity Test  (80 degrees left/right; 5 times each direction; 50 bpm)  0 0 0 0 No visual slippage noted.       Oculomotor ROM: WNL  Eye Alignment: WNL  Visual Field: NT  Acuity: uses readers     Spontaneous Nystagmus: None  Gaze Holding Nystagmus: None  Gaze Holding (No Fixation): NT  Smooth Pursuits:   Horizontal: Smooth, coordinated eye movements; patient reported no blurriness               Vertical: Smooth, coordinated eye movements; patent reported no blurriness  Saccades:               Horizontal: Smooth, coordinated eye movements; patient reported no blurriness              Vertical: Smooth, coordinated eye movements; patient reported no blurriness  Near Point Convergence (cm): WFL; patient without report of target doubling; however, patient reported target enlarged at 20 cm.  "    Home Exercises and Education Provided      Education provided:   - HEP  - Progress towards goals    Written Home Exercises Provided: yes.  Exercises were reviewed and Yousif was able to demonstrate them prior to the end of the session.    Yousif demonstrated good  understanding of the education provided.     See EMR under Patient Instructions for exercises provided 5/30/2023. (Pursuits, saccades, pencil push ups)     OCCUPATIONAL THERAPY ASSESSMENT & DISCHARGE SUMMARY     Total No-Shows: 0  Total Cancels: 0    Mane "Yousif", has made good progress with occupational therapy. Patient reported feeling better than prior level of function and requested discharge this date. Patient with negative nystagmus and dizziness in all positional canal test positions indicating BPPV is cleared. Patient WFL for all parts of VOMS, and patient reported no increase in symptoms. Patient is no longer demonstrating vestibular or oculomotor deficits and reports no functional limitations due to dizziness. Patient is discharged at this time; patient agreeable.      Goals:  Short Term Goals: 5 weeks   1) Pt will tolerate oculomotor home exercise/activity program, reporting at least 50% compliance. MET 7/3/2023 per patient report   2) Near point convergence will decrease to 40 cm or less to improve oculomotor coordination and ability to fixate on close up work. MET 7/3/2023  3) Pt to perform saccades WFL, in all directions, at 100 bpm without onset of headache or blurriness, to improve skills needed for reading. Patient able to perform, not measured at 100bpm.   4) Patient to perform smooth pursuits WFL in all planes, tracking single target, with no more than "mild" blurriness for improved ability to scan environment during driving MET 7/3/2023   5) Pt will perform VORx1 horizontal/vertical directions at 130 bpm or more without increase in headache to improve gaze stabilization needed for driving. MET 7/3/2023  6) Pt to participate in " positional canal testing and additional goals to be established as indicated MET. Patient negative in all positions 7/3/2023      Long Term Goals: 10 weeks   1) Pt will be independent with oculomotor home exercise/activity program. MET 7/3/2023 per patient report   2) Near point convergence will decrease to 25 cm or less to improve oculomotor coordination and ability to fixate on close up work. MET 7/3/2023  3) Pt will change gaze between near and far targets without onset of headache or dizziness to improve accommodative flexibility, saccadic eye movements, and oculomotor coordination needed for participation in daily and leisure activities and to maximize safety while driving. MET 7/3/2023 per patient report   4) Pt will report ability to read without blurred vision or use of tracking aids for equal amount of time as prior to concussion. MET 7/3/2023 per patient report   5) Patient to perform smooth pursuits WFL in all planes, tracking single target, without blurriness for improved ability to scan environment during driving. MET 7/3/2023   6) Patient to perform VORx1 WFL at 150 bpm using busy target facing busy background for improved gaze stabilization and decreased visual motion sensitivity. MET 7/3/2023     Status Towards Goals Met: 6/6 STG, 6/6 LTG  Goals not achieved and why: N/A    Pt's spiritual, cultural and educational needs considered and pt agreeable to plan of care and goals.    Anticipated barriers to occupational therapy: none noted    Discharge reason : Patient requested discharge, Met goals, Patient has maximized benefit from OT at this time    PLAN     This patient is discharged from Outpatient Occupational Therapy Services.     Cami Albarran, REYMUNDO  07/03/2023    I certify that I was present in the room directing the student in service delivery and guiding them using my skilled judgment. As the co-signing therapist I have reviewed the students documentation and am responsible for the treatment,  assessment, and plan.     Katarina Posey, GILDARDO, KAJALR

## 2023-07-03 ENCOUNTER — OFFICE VISIT (OUTPATIENT)
Dept: SURGERY | Facility: CLINIC | Age: 68
End: 2023-07-03
Payer: MEDICARE

## 2023-07-03 ENCOUNTER — TELEPHONE (OUTPATIENT)
Dept: SURGERY | Facility: CLINIC | Age: 68
End: 2023-07-03
Payer: MEDICARE

## 2023-07-03 ENCOUNTER — CLINICAL SUPPORT (OUTPATIENT)
Dept: REHABILITATION | Facility: HOSPITAL | Age: 68
End: 2023-07-03
Payer: MEDICARE

## 2023-07-03 VITALS
DIASTOLIC BLOOD PRESSURE: 78 MMHG | OXYGEN SATURATION: 97 % | WEIGHT: 165.56 LBS | HEIGHT: 64 IN | SYSTOLIC BLOOD PRESSURE: 132 MMHG | HEART RATE: 84 BPM | BODY MASS INDEX: 28.27 KG/M2

## 2023-07-03 DIAGNOSIS — H55.81 SACCADIC EYE MOVEMENT DEFICIENCY: ICD-10-CM

## 2023-07-03 DIAGNOSIS — H53.8 BLURRED VISION: ICD-10-CM

## 2023-07-03 DIAGNOSIS — H81.8X9 DEFICIT OF VESTIBULO-OCULAR REFLEX: ICD-10-CM

## 2023-07-03 DIAGNOSIS — R51.9 NONINTRACTABLE HEADACHE, UNSPECIFIED CHRONICITY PATTERN, UNSPECIFIED HEADACHE TYPE: Primary | ICD-10-CM

## 2023-07-03 DIAGNOSIS — C44.612 BASAL CELL CARCINOMA (BCC) OF RIGHT UPPER EXTREMITY: ICD-10-CM

## 2023-07-03 DIAGNOSIS — H51.11 CONVERGENCE INSUFFICIENCY: ICD-10-CM

## 2023-07-03 DIAGNOSIS — F07.81 POST CONCUSSION SYNDROME: ICD-10-CM

## 2023-07-03 PROBLEM — Z00.00 ENCOUNTER FOR PREVENTIVE HEALTH EXAMINATION: Status: RESOLVED | Noted: 2023-03-30 | Resolved: 2023-07-03

## 2023-07-03 PROBLEM — Z00.00 ENCOUNTER FOR MEDICARE ANNUAL WELLNESS EXAM: Chronic | Status: RESOLVED | Noted: 2023-03-30 | Resolved: 2023-07-03

## 2023-07-03 PROCEDURE — 99203 PR OFFICE/OUTPT VISIT, NEW, LEVL III, 30-44 MIN: ICD-10-PCS | Mod: HCNC,S$GLB,, | Performed by: STUDENT IN AN ORGANIZED HEALTH CARE EDUCATION/TRAINING PROGRAM

## 2023-07-03 PROCEDURE — 3044F PR MOST RECENT HEMOGLOBIN A1C LEVEL <7.0%: ICD-10-PCS | Mod: HCNC,CPTII,S$GLB, | Performed by: STUDENT IN AN ORGANIZED HEALTH CARE EDUCATION/TRAINING PROGRAM

## 2023-07-03 PROCEDURE — 1160F PR REVIEW ALL MEDS BY PRESCRIBER/CLIN PHARMACIST DOCUMENTED: ICD-10-PCS | Mod: HCNC,CPTII,S$GLB, | Performed by: STUDENT IN AN ORGANIZED HEALTH CARE EDUCATION/TRAINING PROGRAM

## 2023-07-03 PROCEDURE — 3078F PR MOST RECENT DIASTOLIC BLOOD PRESSURE < 80 MM HG: ICD-10-PCS | Mod: HCNC,CPTII,S$GLB, | Performed by: STUDENT IN AN ORGANIZED HEALTH CARE EDUCATION/TRAINING PROGRAM

## 2023-07-03 PROCEDURE — 3288F FALL RISK ASSESSMENT DOCD: CPT | Mod: HCNC,CPTII,S$GLB, | Performed by: STUDENT IN AN ORGANIZED HEALTH CARE EDUCATION/TRAINING PROGRAM

## 2023-07-03 PROCEDURE — 1159F PR MEDICATION LIST DOCUMENTED IN MEDICAL RECORD: ICD-10-PCS | Mod: HCNC,CPTII,S$GLB, | Performed by: STUDENT IN AN ORGANIZED HEALTH CARE EDUCATION/TRAINING PROGRAM

## 2023-07-03 PROCEDURE — 3008F PR BODY MASS INDEX (BMI) DOCUMENTED: ICD-10-PCS | Mod: HCNC,CPTII,S$GLB, | Performed by: STUDENT IN AN ORGANIZED HEALTH CARE EDUCATION/TRAINING PROGRAM

## 2023-07-03 PROCEDURE — 99999 PR PBB SHADOW E&M-EST. PATIENT-LVL III: CPT | Mod: PBBFAC,HCNC,, | Performed by: STUDENT IN AN ORGANIZED HEALTH CARE EDUCATION/TRAINING PROGRAM

## 2023-07-03 PROCEDURE — 3288F PR FALLS RISK ASSESSMENT DOCUMENTED: ICD-10-PCS | Mod: HCNC,CPTII,S$GLB, | Performed by: STUDENT IN AN ORGANIZED HEALTH CARE EDUCATION/TRAINING PROGRAM

## 2023-07-03 PROCEDURE — 3075F SYST BP GE 130 - 139MM HG: CPT | Mod: HCNC,CPTII,S$GLB, | Performed by: STUDENT IN AN ORGANIZED HEALTH CARE EDUCATION/TRAINING PROGRAM

## 2023-07-03 PROCEDURE — 4010F ACE/ARB THERAPY RXD/TAKEN: CPT | Mod: HCNC,CPTII,S$GLB, | Performed by: STUDENT IN AN ORGANIZED HEALTH CARE EDUCATION/TRAINING PROGRAM

## 2023-07-03 PROCEDURE — 99203 OFFICE O/P NEW LOW 30 MIN: CPT | Mod: HCNC,S$GLB,, | Performed by: STUDENT IN AN ORGANIZED HEALTH CARE EDUCATION/TRAINING PROGRAM

## 2023-07-03 PROCEDURE — 4010F PR ACE/ARB THEARPY RXD/TAKEN: ICD-10-PCS | Mod: HCNC,CPTII,S$GLB, | Performed by: STUDENT IN AN ORGANIZED HEALTH CARE EDUCATION/TRAINING PROGRAM

## 2023-07-03 PROCEDURE — 3078F DIAST BP <80 MM HG: CPT | Mod: HCNC,CPTII,S$GLB, | Performed by: STUDENT IN AN ORGANIZED HEALTH CARE EDUCATION/TRAINING PROGRAM

## 2023-07-03 PROCEDURE — 3075F PR MOST RECENT SYSTOLIC BLOOD PRESS GE 130-139MM HG: ICD-10-PCS | Mod: HCNC,CPTII,S$GLB, | Performed by: STUDENT IN AN ORGANIZED HEALTH CARE EDUCATION/TRAINING PROGRAM

## 2023-07-03 PROCEDURE — 1159F MED LIST DOCD IN RCRD: CPT | Mod: HCNC,CPTII,S$GLB, | Performed by: STUDENT IN AN ORGANIZED HEALTH CARE EDUCATION/TRAINING PROGRAM

## 2023-07-03 PROCEDURE — 99999 PR PBB SHADOW E&M-EST. PATIENT-LVL III: ICD-10-PCS | Mod: PBBFAC,HCNC,, | Performed by: STUDENT IN AN ORGANIZED HEALTH CARE EDUCATION/TRAINING PROGRAM

## 2023-07-03 PROCEDURE — 97112 NEUROMUSCULAR REEDUCATION: CPT | Mod: HCNC,PO

## 2023-07-03 PROCEDURE — 1157F PR ADVANCE CARE PLAN OR EQUIV PRESENT IN MEDICAL RECORD: ICD-10-PCS | Mod: HCNC,CPTII,S$GLB, | Performed by: STUDENT IN AN ORGANIZED HEALTH CARE EDUCATION/TRAINING PROGRAM

## 2023-07-03 PROCEDURE — 1101F PT FALLS ASSESS-DOCD LE1/YR: CPT | Mod: HCNC,CPTII,S$GLB, | Performed by: STUDENT IN AN ORGANIZED HEALTH CARE EDUCATION/TRAINING PROGRAM

## 2023-07-03 PROCEDURE — 3044F HG A1C LEVEL LT 7.0%: CPT | Mod: HCNC,CPTII,S$GLB, | Performed by: STUDENT IN AN ORGANIZED HEALTH CARE EDUCATION/TRAINING PROGRAM

## 2023-07-03 PROCEDURE — 1160F RVW MEDS BY RX/DR IN RCRD: CPT | Mod: HCNC,CPTII,S$GLB, | Performed by: STUDENT IN AN ORGANIZED HEALTH CARE EDUCATION/TRAINING PROGRAM

## 2023-07-03 PROCEDURE — 3008F BODY MASS INDEX DOCD: CPT | Mod: HCNC,CPTII,S$GLB, | Performed by: STUDENT IN AN ORGANIZED HEALTH CARE EDUCATION/TRAINING PROGRAM

## 2023-07-03 PROCEDURE — 1101F PR PT FALLS ASSESS DOC 0-1 FALLS W/OUT INJ PAST YR: ICD-10-PCS | Mod: HCNC,CPTII,S$GLB, | Performed by: STUDENT IN AN ORGANIZED HEALTH CARE EDUCATION/TRAINING PROGRAM

## 2023-07-03 PROCEDURE — 1157F ADVNC CARE PLAN IN RCRD: CPT | Mod: HCNC,CPTII,S$GLB, | Performed by: STUDENT IN AN ORGANIZED HEALTH CARE EDUCATION/TRAINING PROGRAM

## 2023-07-03 NOTE — PROGRESS NOTES
Surgical Oncology Clinic Note  [facility]       Referring Provider: Dr. Ange Sánchez   PCP: Patience Gordon MD    Reason For Visit: Basal Cell Carcinoma    Oncologic History: None    History of Present Illness:    Mane Ramires is a 67 y.o. male with history of who presents today for evaluation and management of a biopsy proven basal cell carcinoma. He says this lesion has been present since 2016 and has never bothered him. At a wellness check he was referred to dermatology. No previous history of skin cancers. Sister with breast cancer, no other family history of cancer. 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 Denies weight loss.     Pathology: SKIN, RIGHT FOREARM LESION, SHAVE BIOPSY:   - Basal cell carcinoma, superficial and nodular-type, transected.     Staging: Cancer Staging   No matching staging information was found for the patient.     Prior Cancer Treatment: None    Past Medical History:   Diagnosis Date    Accident     fell from roof with TBI (word finding issues personality changes, memory deficets), R rib fractures, clavicle fx    Allergy     Anxiety     ASD (atrial septal defect)     noted on ECHO 6/16    Cervical stenosis of spine     noted on 6/15 MRI    CTS (carpal tunnel syndrome)     mild-mod on 4/17 NCS    DDD (degenerative disc disease), cervical 10/2014    C5-6 and C6-C7    Depression     Diabetes     JEEVAN (generalized anxiety disorder)     Gender identity disorder     H/O cardiovascular stress test     12/14 normal    Herpes simplex type 2 infection     History of atypical hyperplasia of breast     B precancer lesions    Hypertension     Hypertensive retinopathy     IGT (impaired glucose tolerance)     Myelomalacia     c-spine noted on 8/15 MRI    OA (osteoarthritis)     Pineal gland cyst     TBI (traumatic brain injury)     after falling off a roof in 2003       Past Surgical History:   Procedure Laterality Date    AMPUTATION      L index finger     APPENDECTOMY  1969    BILATERAL SALPINGOOPHORECTOMY  02/2015    BIOPSY OF LIVER WITH ULTRASOUND GUIDANCE N/A 12/18/2018    Procedure: BIOPSY, LIVER, WITH US GUIDANCE;  Surgeon: [facility] Diagnostic Provider;  Location: [facility] OR 2ND FLR;  Service: Radiology;  Laterality: N/A;  U/S GUIDED LIVER BIOPSY.  12/18/2018.  DOSC 7:30 AM  PROCEDURE 9 AM.  DR NITHIN GOLDEN / MARLON NUR.  DB 12/14/18 3:20P    CARPAL TUNNEL RELEASE  12/2017    right    CERVICAL FUSION  10/2014    C5-C6 and C6-C7    CHOLECYSTECTOMY      ENDOSCOPIC NASAL SEPTOPLASTY N/A 8/23/2018    Procedure: SEPTOPLASTY, NASAL, ENDOSCOPIC;  Surgeon: Reji Arce III, MD;  Location: [facility] OR 2ND FLR;  Service: ENT;  Laterality: N/A;    FINGER AMPUTATION Left     FRACTURE SURGERY Right 2003    rib fractures    HYSTERECTOMY  1984    MIGUEL    MASTECTOMY  02/2015    RECONSTRUCTION OF NOSE N/A 8/23/2018    Procedure: RECONSTRUCTION, NOSE;  Surgeon: Reji Arce III, MD;  Location: [facility] OR 2ND FLR;  Service: ENT;  Laterality: N/A;    ROTATOR CUFF REPAIR Right 2019    UPPER GASTROINTESTINAL ENDOSCOPY  09/06/2017    Dr. Ayon       Family History   Problem Relation Age of Onset    Diabetes Mother     Heart disease Mother     Gallbladder disease Mother     Coronary artery disease Father     Breast cancer Sister     Heart disease Sister     Gallbladder disease Daughter     Craniosynostosis Daughter     Ovarian cancer Maternal Aunt     Heart disease Maternal Grandmother     Gallbladder disease Maternal Grandmother     Lung cancer Paternal Grandfather     Heart disease Paternal Grandfather     Heart attack Brother     Breast cancer Cousin     Breast cancer Cousin        Social History     Socioeconomic History    Marital status: Single   Occupational History    Occupation: Artist   Tobacco Use    Smoking status: Former     Packs/day: 0.25     Years: 2.00     Pack years: 0.50     Types: Cigarettes     Start date: 1975     Quit date: 11/1/2002     Years since  "quittin.6     Passive exposure: Past    Smokeless tobacco: Never    Tobacco comments:     did not smoke regularly   Substance and Sexual Activity    Alcohol use: Yes     Comment: rare     Drug use: Yes     Types: Hydrocodone    Sexual activity: Never     Social Determinants of Health     Financial Resource Strain: Low Risk     Difficulty of Paying Living Expenses: Not hard at all   Food Insecurity: No Food Insecurity    Worried About Running Out of Food in the Last Year: Never true    Ran Out of Food in the Last Year: Never true   Transportation Needs: No Transportation Needs    Lack of Transportation (Medical): No    Lack of Transportation (Non-Medical): No   Stress: No Stress Concern Present    Feeling of Stress : Not at all   Social Connections: Unknown    Marital Status:    Housing Stability: Unknown    Unable to Pay for Housing in the Last Year: No    Unstable Housing in the Last Year: No          Medication List            Accurate as of July 3, 2023  2:47 PM. If you have any questions, ask your nurse or doctor.                CHANGE how you take these medications      valsartan 160 MG tablet  Commonly known as: DIOVAN  TAKE 1 TABLET(160 MG) BY MOUTH EVERY DAY.  What changed:   when to take this  reasons to take this  additional instructions            CONTINUE taking these medications      aspirin 81 MG EC tablet  Commonly known as: ECOTRIN     * BD PRECISIONGLIDE 25 gauge x 1" Ndle  Generic drug: needle (disp) 25 gauge  USE TO DRAW UP 0.5ML OF TESTERONE EVERY 14 DAYS     * needle (disp) 18 G 18 gauge x 1" Ndle  Commonly known as: BD REGULAR BEVEL NEEDLES  USE TO INJECT 0.5ML OF TESTOSTERONE INTO THE MUCLE EVERY 14 DAYS     blood sugar diagnostic Strp  To check BG QD times daily, to use with insurance preferred meter     blood-glucose meter kit  As directed     carboxymethylcellulose 0.5 % Dpet  Commonly known as: REFRESH PLUS     DULoxetine 60 MG capsule  Commonly known as: CYMBALTA  Take 1 " capsule (60 mg total) by mouth once daily.     fluticasone propionate 50 mcg/actuation nasal spray  Commonly known as: FLONASE  USE 2 SPRAYS IN EACH NOSTRIL ONCE DAILY     ibuprofen 800 MG tablet  Commonly known as: ADVIL,MOTRIN  Take 1 tablet (800 mg total) by mouth every 6 (six) hours as needed for Pain.     ketoconazole 2 % shampoo  Commonly known as: NIZORAL  Apply topically every 7 days.     lancets Misc  To check BG QD times daily, to use with insurance preferred meter     levocetirizine 5 MG tablet  Commonly known as: XYZAL  TAKE 1 TABLET BY MOUTH EVERY NIGHT AS NEEDED FOR ALLERGIES     metoprolol succinate 25 MG 24 hr tablet  Commonly known as: TOPROL-XL  Take 1 tablet (25 mg total) by mouth once daily.     rosuvastatin 5 MG tablet  Commonly known as: CRESTOR  Take 1 tablet (5 mg total) by mouth once daily.     testosterone cypionate 200 mg/mL injection  Commonly known as: DEPOTESTOTERONE CYPIONATE  Inject 1 mL (200 mg total) into the muscle every 14 (fourteen) days.           * This list has 2 medication(s) that are the same as other medications prescribed for you. Read the directions carefully, and ask your doctor or other care provider to review them with you.                  Review of patient's allergies indicates:   Allergen Reactions    Corticosteroids (glucocorticoids) Palpitations    Mobic [meloxicam] Swelling    Sudafed [pseudoephedrine hcl] Other (See Comments)     Heart racing      Codeine Other (See Comments)     Heart racing    Gabapentin      Caused memory loss    Pseudoephedrine     Adhesive Rash     Specifically clear tegaderm dressing     Cortisone Palpitations       Review of Systems   All other systems reviewed and are negative.      Vitals:    07/03/23 1442   BP: 132/78   Pulse: 84     Body mass index is 28.42 kg/m².  ECOG SCORE           Physical Exam       DATA REVIEW:  I personally reviewed the following records for this visit: lab work from prior visit, notes from other physicians,  surgical pathology results, endoscopy results, radiographic study evaluation, and laboratory results done by primary care physician    Labs:    Lab Results   Component Value Date    WBC 9.53 02/01/2023    HGB 16.0 02/01/2023    HCT 49.3 02/01/2023     02/01/2023     Lab Results   Component Value Date     (H) 02/01/2023    CALCIUM 10.3 02/01/2023    ALBUMIN 4.1 02/01/2023    PROT 7.8 02/01/2023     02/01/2023    K 4.6 02/01/2023    CO2 24 02/01/2023     02/01/2023    BUN 21 02/01/2023    CREATININE 1.3 02/01/2023    ALKPHOS 89 02/01/2023    ALT 16 02/01/2023    AST 14 02/01/2023    BILITOT 0.3 02/01/2023       Nutritional:  No results found for: PREALBUMIN    Tumor Markers:  Lab Results   Component Value Date    AMYLASE 64 04/10/2021         Assessment & Plan:  No diagnosis found.   Mane Ramires is a 67 y.o. male with a lesion on his forearm that is biopsy confirmed basal cell carcinoma.    -Excision of lesion        Follow-up: 1 week    Chevy Eaton MD  General Surgery PGY-1

## 2023-07-03 NOTE — TELEPHONE ENCOUNTER
Returned pt call and informed pt  his px is [facility] for Tuesday 7/11/23  px time 8 am and pt has to check in @ 7:45 am. Facility address provided. Pt verbalized understanding.

## 2023-07-03 NOTE — TELEPHONE ENCOUNTER
----- Message from Drea Sierra sent at 7/3/2023  3:22 PM CDT -----  Regarding: returing call  Contact: [phone]  Pt calling in requesting call back please call to discuss Further

## 2023-07-05 PROBLEM — C44.612 BASAL CELL CARCINOMA (BCC) OF RIGHT UPPER EXTREMITY: Status: ACTIVE | Noted: 2023-07-05

## 2023-07-10 ENCOUNTER — TELEPHONE (OUTPATIENT)
Dept: SURGERY | Facility: CLINIC | Age: 68
End: 2023-07-10
Payer: MEDICARE

## 2023-07-11 ENCOUNTER — PROCEDURE VISIT (OUTPATIENT)
Dept: SURGERY | Facility: CLINIC | Age: 68
End: 2023-07-11
Payer: MEDICARE

## 2023-07-11 ENCOUNTER — PATIENT MESSAGE (OUTPATIENT)
Dept: SURGERY | Facility: CLINIC | Age: 68
End: 2023-07-11

## 2023-07-11 VITALS
BODY MASS INDEX: 28.02 KG/M2 | HEIGHT: 64 IN | DIASTOLIC BLOOD PRESSURE: 70 MMHG | TEMPERATURE: 98 F | OXYGEN SATURATION: 97 % | HEART RATE: 82 BPM | SYSTOLIC BLOOD PRESSURE: 117 MMHG | WEIGHT: 164.13 LBS

## 2023-07-11 DIAGNOSIS — C44.612 BASAL CELL CARCINOMA (BCC) OF RIGHT UPPER EXTREMITY: Primary | ICD-10-CM

## 2023-07-11 PROCEDURE — 13121 CMPLX RPR S/A/L 2.6-7.5 CM: CPT | Mod: HCNC,51,S$GLB, | Performed by: STUDENT IN AN ORGANIZED HEALTH CARE EDUCATION/TRAINING PROGRAM

## 2023-07-11 PROCEDURE — 11606 EXC TR-EXT MAL+MARG >4 CM: CPT | Mod: HCNC,S$GLB,, | Performed by: STUDENT IN AN ORGANIZED HEALTH CARE EDUCATION/TRAINING PROGRAM

## 2023-07-11 PROCEDURE — 88305 TISSUE EXAM BY PATHOLOGIST: ICD-10-PCS | Mod: 26,HCNC,, | Performed by: PATHOLOGY

## 2023-07-11 PROCEDURE — 13121 PR RECMPL WND SCALP,EXTR 2.6-7.5 CM: ICD-10-PCS | Mod: HCNC,51,S$GLB, | Performed by: STUDENT IN AN ORGANIZED HEALTH CARE EDUCATION/TRAINING PROGRAM

## 2023-07-11 PROCEDURE — 88305 TISSUE EXAM BY PATHOLOGIST: CPT | Mod: 26,HCNC,, | Performed by: PATHOLOGY

## 2023-07-11 PROCEDURE — 11606 PR EXC SKIN MALIG >4 CM TRUNK,ARM,LEG: ICD-10-PCS | Mod: HCNC,S$GLB,, | Performed by: STUDENT IN AN ORGANIZED HEALTH CARE EDUCATION/TRAINING PROGRAM

## 2023-07-11 PROCEDURE — 88305 TISSUE EXAM BY PATHOLOGIST: CPT | Mod: HCNC | Performed by: PATHOLOGY

## 2023-07-11 RX ORDER — HYDROCODONE BITARTRATE AND ACETAMINOPHEN 5; 325 MG/1; MG/1
1 TABLET ORAL EVERY 6 HOURS PRN
Qty: 5 TABLET | Refills: 0 | Status: SHIPPED | OUTPATIENT
Start: 2023-07-11 | End: 2023-07-11

## 2023-07-11 RX ORDER — HYDROCODONE BITARTRATE AND ACETAMINOPHEN 5; 325 MG/1; MG/1
1 TABLET ORAL EVERY 6 HOURS PRN
Qty: 5 TABLET | Refills: 0 | Status: SHIPPED | OUTPATIENT
Start: 2023-07-11 | End: 2023-10-16

## 2023-07-11 NOTE — PROGRESS NOTES
Health Maintenance Due   Topic Date Due    Colonoscopy  Never done    Shingles Vaccine (1 of 2) Never done    COVID-19 Vaccine (2 - Booster for Marco A series) 11/10/2021     Updates were requested from care everywhere.  Chart was reviewed for overdue Proactive [facility] Encounters (DOMINIC) topics (CRS, Breast Cancer Screening, Eye exam)  Health Maintenance has been updated.  LINKS immunization registry triggered.  Immunizations were reconciled.  Case Request Endoscopy: COLONOSCOPYOrdered 11/8/2021      
Detail Level: Detailed

## 2023-07-11 NOTE — PROCEDURES
[facility]  Surgical Oncology  Operative Note       Date of Procedure:    Time: 0800    Surgeon: Bowen Smith MD  Assistant: Chevy Eaton MD    Pre-Operative Diagnosis:   Basal cell carcinoma of right upper extremity    Post-Operative Diagnosis:   Same    Pre-Operative Variables:  Stage: Local  Adjacent organ involvement: None  Chemotherapy within 90 Days: No  Radiation Therapy within 90 Days: No    Anesthesia: * No surgery found *    Procedure:  Wide excision of right upper extremity basal cell carcinoma measuring 5.5 x 2.5 cm  Complex wound closure of wound measuring 5.5 x 3.0 cm    Operative Findings:   Dimensions of excised malignant lesion with oncologic margins: 5.5 x 2.5 cm  Wound Closure: Complex  Dimensions of wound prior to closure: 5.5 x 3.0 cm  [place] Lymph Node Biopsy: Not performed not indicated  Resection status: Pending final pathology.  No gross disease remaining post resection.         Indications:  Mane Ramires is a 67 y.o. year old male with a recent diagnosis basal cell carcinoma of the right upper extremity.  I saw him in clinic last week, he did not exhibit any evidence of lymphadenopathy.  He presents today for definitive resection.     Risks and benefits were reviewed including bleeding, infection, pain, scar, damage to surrounding structures, cardiovascular and pulmonary complications, recurrence of cancer or inability to remove entire cancer, wound infection, wound dehiscence, need for additional procedures, death, and imponderables.  He understands and gave informed consent to proceed.    Procedure in Detail:  After informed consent was obtained, the patient was properly identified and the site was marked in the preoperative holding area. The patient was then taken to the operating room and placed in the supine position. After the uneventful induction of general anesthesia, the patient's right upper extremity was prepped and draped in the standard surgical  fashion. A timeout was performed according to the [facility] guidelines.    Using a ruler, a 0.5 cm margin was measured from the edge of the previous biopsy scar in all directions and this was marked with a marking pen. Next, an ellipse was designed in a longitudinal fashion to include the denoted resection dimensions. Excised diameter of the primary lesion including appropriate margins measured 5.5 x 2.5 cm.  An incision was made to include the entire excised diameter outlined above including the ellipse to facilitate closure. After skin incision was made, Bovie electrocautery was utilized to dissect down to fascia. Once the fascia was identified, the specimen was lifted off of the deep fascia, ensuring to include all skin, dermis and subcutaneous fat. Fascia was preserved in its entirety. Once the specimen was freed, it was oriented using a silk suture with short stitch denoting the superior portion of the specimen and a long stitch denoting the thenar portion of the specimen. This was sent for final pathology. At this time, the right upper extremity surgical site was inspected and hemostasis confirmed.    Attention was then turned to closure of the primary right upper extremity wound which measured 5.5 x 3.0 cm. Extensive undermining was required to provide a tension free closure. The wound was closed in 3 separate layers using a combination of Vicryl interrupted sutures for deep subcutaneous and dermal layers and Monocryl suture for skin. Dermabond was applied for sterile dressing.     All lap, needle, and sponge counts were correct x2. The patient tolerated the procedure well. There were no complications.       Portions of the record were created with [employer] Direct voice recognition software. This may lead to occasional typographical errors due to the inherent limitations of the software. Read the chart carefully and recognize, using context, where substitutions have occurred. Please do not  hesitate to contact me directly if clarification is needed.          Implants: * No surgical log found *    Drains: None    Estimated Blood Loss (EBL):   1 mL    Specimens:   Specimen (24h ago, onward)       Start     Ordered    07/11/23 0000  Specimen to Pathology Dermatology and skin neoplasms (Right Forearm Excision BCC)        Comments: Specimen [id number]: Number of Specimens: 1Name of Specimens: Right ForearmRelease to patient->Immediate     References:    Click here for ordering Quick Tip   Question Answer Comment   Procedure Type: Dermatology and skin neoplasms Right Forearm Excision BCC   Clinical Information: Right forearm BCC-Suture marks short superior and long lateral    Release to patient Immediate        07/11/23 0827                            Condition: Good    Disposition:  Home    Attestation: I was present and scrubbed for the entire procedure including all described portions above.           Bowen Smith Jr, MD      Surgical Oncology      7/11/2023, 9:16 AM

## 2023-07-13 ENCOUNTER — PATIENT MESSAGE (OUTPATIENT)
Dept: SURGERY | Facility: CLINIC | Age: 68
End: 2023-07-13
Payer: MEDICARE

## 2023-07-14 NOTE — TELEPHONE ENCOUNTER
"Call placed to pt regarding portal message. Pt reports he put pressure on surgical site and "it felt like I ripped something" a couple of days ago and he then experienced pain. The pain resolved but he noticed drainage from site yesterday. Pt reports he saw a "couple drops of blood". Pt reports the surgical site is not open. Denies fever, n/v, diarrhea, foul odor, and pus. Pt reports site does have "pink drainage".  Offered appt today with NP. Pt declined. Instructed pt to keep site clean and dry and call the office if condition worsens and/or does not improve. Will update Dr. Smith's nurse Graciela PERKINS RN. Reminded pt of appt on 7/24. Pt verbalized understanding of all of the above.  "

## 2023-07-14 NOTE — PROGRESS NOTES
"  Post-Op Follow-up Visit:   7/17/2023  Patient ID: Mane Ramires is a 67 y.o. male, born 1955    Chief Complaint   Patient presents with    Post-op Evaluation     7/11/2023: Wide excision of right upper extremity basal cell carcinoma measuring 5.5 x 2.5 cm with complex wound closure of wound measuring 5.5 x 3.0 cm    Interval History: This 66 y/o gentleman returns to clinic alone from [place]. He requested today's appt given concern about this surgical site. He underwent WLE of RUE BCC on 7/11/23. Pathology is pending.   Reports minimal bleeding/ drainage from proximal aspect of R FA. Remains afebrile. C/o discomfort when he supinates/ pronates right FA but he is not taking Rx pain meds, none since 7/13/2023. He reports and demonstrated good ROM in his hand and wrist. Denies numbness and tingling in R FA and R hand.     Physical Exam:  /73   Pulse 86   Ht 5' 4" (1.626 m)   Wt 75.7 kg (166 lb 12.5 oz)   SpO2 96%   BMI 28.63 kg/m²     General:  Non-toxic, ambulatory  Abd:  Soft, non-tender  Incision:  RFA dermabond intact, edges well approximated without erythema      Pathology: pending      ICD-10-CM ICD-9-CM    1. Basal cell carcinoma (BCC) of right upper extremity  C44.612 173.61       Plan   Await final pathology  Discussed incision care and s/s of infection    Questions were asked and answered to patient's satisfaction.      F/u with Dr. Sánchez from dermatology, due for annual appt 6/2024        Marlene Lama NP  Upper GI / Hepatobiliary Surgical Oncology  [facility] [place], [place]  Office: [phone]  Fax: [phone]       "

## 2023-07-17 ENCOUNTER — OFFICE VISIT (OUTPATIENT)
Dept: SURGERY | Facility: CLINIC | Age: 68
End: 2023-07-17
Payer: MEDICARE

## 2023-07-17 ENCOUNTER — OFFICE VISIT (OUTPATIENT)
Dept: OPTOMETRY | Facility: CLINIC | Age: 68
End: 2023-07-17
Payer: COMMERCIAL

## 2023-07-17 VITALS
HEIGHT: 64 IN | SYSTOLIC BLOOD PRESSURE: 122 MMHG | HEART RATE: 86 BPM | WEIGHT: 166.75 LBS | DIASTOLIC BLOOD PRESSURE: 73 MMHG | BODY MASS INDEX: 28.47 KG/M2 | OXYGEN SATURATION: 96 %

## 2023-07-17 DIAGNOSIS — H04.123 DRY EYE SYNDROME, BILATERAL: ICD-10-CM

## 2023-07-17 DIAGNOSIS — E11.9 TYPE 2 DIABETES MELLITUS WITHOUT OPHTHALMIC MANIFESTATIONS: ICD-10-CM

## 2023-07-17 DIAGNOSIS — H25.13 NS (NUCLEAR SCLEROSIS), BILATERAL: ICD-10-CM

## 2023-07-17 DIAGNOSIS — E11.21 CONTROLLED TYPE 2 DIABETES MELLITUS WITH DIABETIC NEPHROPATHY, WITHOUT LONG-TERM CURRENT USE OF INSULIN: ICD-10-CM

## 2023-07-17 DIAGNOSIS — Z86.73 H/O: CVA (CEREBROVASCULAR ACCIDENT): ICD-10-CM

## 2023-07-17 DIAGNOSIS — E11.8 DIABETES MELLITUS TYPE 2 WITH COMPLICATIONS: ICD-10-CM

## 2023-07-17 DIAGNOSIS — C44.612 BASAL CELL CARCINOMA (BCC) OF RIGHT UPPER EXTREMITY: Primary | ICD-10-CM

## 2023-07-17 DIAGNOSIS — I10 PRIMARY HYPERTENSION: ICD-10-CM

## 2023-07-17 DIAGNOSIS — H52.4 PRESBYOPIA: Primary | ICD-10-CM

## 2023-07-17 PROCEDURE — 3044F HG A1C LEVEL LT 7.0%: CPT | Mod: HCNC,CPTII,S$GLB, | Performed by: NURSE PRACTITIONER

## 2023-07-17 PROCEDURE — 1157F ADVNC CARE PLAN IN RCRD: CPT | Mod: HCNC,CPTII,S$GLB, | Performed by: NURSE PRACTITIONER

## 2023-07-17 PROCEDURE — 92004 COMPRE OPH EXAM NEW PT 1/>: CPT | Mod: S$GLB,,, | Performed by: OPTOMETRIST

## 2023-07-17 PROCEDURE — 3078F DIAST BP <80 MM HG: CPT | Mod: HCNC,CPTII,S$GLB, | Performed by: NURSE PRACTITIONER

## 2023-07-17 PROCEDURE — 3074F SYST BP LT 130 MM HG: CPT | Mod: HCNC,CPTII,S$GLB, | Performed by: NURSE PRACTITIONER

## 2023-07-17 PROCEDURE — 99999 PR PBB SHADOW E&M-EST. PATIENT-LVL IV: CPT | Mod: PBBFAC,HCNC,, | Performed by: NURSE PRACTITIONER

## 2023-07-17 PROCEDURE — 3288F PR FALLS RISK ASSESSMENT DOCUMENTED: ICD-10-PCS | Mod: HCNC,CPTII,S$GLB, | Performed by: NURSE PRACTITIONER

## 2023-07-17 PROCEDURE — 3008F BODY MASS INDEX DOCD: CPT | Mod: HCNC,CPTII,S$GLB, | Performed by: NURSE PRACTITIONER

## 2023-07-17 PROCEDURE — 4010F PR ACE/ARB THEARPY RXD/TAKEN: ICD-10-PCS | Mod: HCNC,CPTII,S$GLB, | Performed by: NURSE PRACTITIONER

## 2023-07-17 PROCEDURE — 99999 PR PBB SHADOW E&M-EST. PATIENT-LVL III: ICD-10-PCS | Mod: PBBFAC,,, | Performed by: OPTOMETRIST

## 2023-07-17 PROCEDURE — 1126F AMNT PAIN NOTED NONE PRSNT: CPT | Mod: HCNC,CPTII,S$GLB, | Performed by: NURSE PRACTITIONER

## 2023-07-17 PROCEDURE — 3044F PR MOST RECENT HEMOGLOBIN A1C LEVEL <7.0%: ICD-10-PCS | Mod: HCNC,CPTII,S$GLB, | Performed by: NURSE PRACTITIONER

## 2023-07-17 PROCEDURE — 3288F FALL RISK ASSESSMENT DOCD: CPT | Mod: HCNC,CPTII,S$GLB, | Performed by: NURSE PRACTITIONER

## 2023-07-17 PROCEDURE — 99999 PR PBB SHADOW E&M-EST. PATIENT-LVL IV: ICD-10-PCS | Mod: PBBFAC,HCNC,, | Performed by: NURSE PRACTITIONER

## 2023-07-17 PROCEDURE — 3008F PR BODY MASS INDEX (BMI) DOCUMENTED: ICD-10-PCS | Mod: HCNC,CPTII,S$GLB, | Performed by: NURSE PRACTITIONER

## 2023-07-17 PROCEDURE — 1101F PR PT FALLS ASSESS DOC 0-1 FALLS W/OUT INJ PAST YR: ICD-10-PCS | Mod: HCNC,CPTII,S$GLB, | Performed by: NURSE PRACTITIONER

## 2023-07-17 PROCEDURE — 4010F ACE/ARB THERAPY RXD/TAKEN: CPT | Mod: HCNC,CPTII,S$GLB, | Performed by: NURSE PRACTITIONER

## 2023-07-17 PROCEDURE — 92015 DETERMINE REFRACTIVE STATE: CPT | Mod: S$GLB,,, | Performed by: OPTOMETRIST

## 2023-07-17 PROCEDURE — 1101F PT FALLS ASSESS-DOCD LE1/YR: CPT | Mod: HCNC,CPTII,S$GLB, | Performed by: NURSE PRACTITIONER

## 2023-07-17 PROCEDURE — 3074F PR MOST RECENT SYSTOLIC BLOOD PRESSURE < 130 MM HG: ICD-10-PCS | Mod: HCNC,CPTII,S$GLB, | Performed by: NURSE PRACTITIONER

## 2023-07-17 PROCEDURE — 92004 PR EYE EXAM, NEW PATIENT,COMPREHESV: ICD-10-PCS | Mod: S$GLB,,, | Performed by: OPTOMETRIST

## 2023-07-17 PROCEDURE — 99024 POSTOP FOLLOW-UP VISIT: CPT | Mod: HCNC,S$GLB,, | Performed by: NURSE PRACTITIONER

## 2023-07-17 PROCEDURE — 1126F PR PAIN SEVERITY QUANTIFIED, NO PAIN PRESENT: ICD-10-PCS | Mod: HCNC,CPTII,S$GLB, | Performed by: NURSE PRACTITIONER

## 2023-07-17 PROCEDURE — 92015 PR REFRACTION: ICD-10-PCS | Mod: S$GLB,,, | Performed by: OPTOMETRIST

## 2023-07-17 PROCEDURE — 3078F PR MOST RECENT DIASTOLIC BLOOD PRESSURE < 80 MM HG: ICD-10-PCS | Mod: HCNC,CPTII,S$GLB, | Performed by: NURSE PRACTITIONER

## 2023-07-17 PROCEDURE — 99024 PR POST-OP FOLLOW-UP VISIT: ICD-10-PCS | Mod: HCNC,S$GLB,, | Performed by: NURSE PRACTITIONER

## 2023-07-17 PROCEDURE — 99999 PR PBB SHADOW E&M-EST. PATIENT-LVL III: CPT | Mod: PBBFAC,,, | Performed by: OPTOMETRIST

## 2023-07-17 PROCEDURE — 1157F PR ADVANCE CARE PLAN OR EQUIV PRESENT IN MEDICAL RECORD: ICD-10-PCS | Mod: HCNC,CPTII,S$GLB, | Performed by: NURSE PRACTITIONER

## 2023-07-17 RX ORDER — CYCLOSPORINE 0.5 MG/ML
1 EMULSION OPHTHALMIC 2 TIMES DAILY
Qty: 180 EACH | Refills: 3 | Status: SHIPPED | OUTPATIENT
Start: 2023-07-17 | End: 2024-07-16

## 2023-07-17 NOTE — PROGRESS NOTES
HPI    DLS: x 1 year ago with Dr Dailey    No eye surgery   Refresh daily OU     PFamHx:  (-)mac degen  (-)glauc  (-)RD    Pt here for check of ocular health.  Pt states he has very dry eyes and   they are very bothersome in the mornings. Pt states he has floaters OU x   3o years.  Pt states he has flashes OS x 2 years.  Pt states he gets   headaches.  Pt states tearing and burning OU. Pt denies itching or eye   pain OU. Pt had a stroke x 2 years ago and has had 2 brain injuries. Pt is   prediabetic and has seen in his notes that he has diabetic retinopathy.     Last edited by Janice Beasley MA on 7/17/2023 11:18 AM.            Assessment /Plan     For exam results, see Encounter Report.    Presbyopia   Rx specs    Diabetes mellitus type 2 with complications  Controlled type 2 diabetes mellitus with diabetic nephropathy, without long-term current use of insulin  Type 2 diabetes mellitus without ophthalmic manifestations    History of Brain injury in 2003 and 2018    Primary hypertension  H/O: CVA (cerebrovascular accident)   See notes in MEDIA from Dr. DAILEY November 2021     NS (nuclear sclerosis), bilateral    Dry eye syndrome, bilateral   Start    cycloSPORINE (RESTASIS) 0.05 % ophthalmic emulsion; Place 1 drop into both eyes 2 (two) times daily.  Dispense: 180 each; Refill: 3   Start IVIZIA BID   Consider plugs/tyrvaya in the future

## 2023-07-18 LAB
FINAL PATHOLOGIC DIAGNOSIS: NORMAL
Lab: NORMAL

## 2023-07-20 ENCOUNTER — PROCEDURE VISIT (OUTPATIENT)
Dept: NEUROLOGY | Facility: CLINIC | Age: 68
End: 2023-07-20
Payer: MEDICARE

## 2023-07-20 DIAGNOSIS — G95.89 MYELOMALACIA: ICD-10-CM

## 2023-07-20 PROCEDURE — 95886 PR EMG COMPLETE, W/ NERVE CONDUCTION STUDIES, 5+ MUSCLES: ICD-10-PCS | Mod: HCNC,S$GLB,, | Performed by: PSYCHIATRY & NEUROLOGY

## 2023-07-20 PROCEDURE — 95913 NRV CNDJ TEST 13/> STUDIES: CPT | Mod: HCNC,S$GLB,, | Performed by: PSYCHIATRY & NEUROLOGY

## 2023-07-20 PROCEDURE — 95913 PR NERVE CONDUCTION STUDY; 13 OR MORE STUDIES: ICD-10-PCS | Mod: HCNC,S$GLB,, | Performed by: PSYCHIATRY & NEUROLOGY

## 2023-07-20 PROCEDURE — 95886 MUSC TEST DONE W/N TEST COMP: CPT | Mod: HCNC,S$GLB,, | Performed by: PSYCHIATRY & NEUROLOGY

## 2023-07-24 ENCOUNTER — PATIENT MESSAGE (OUTPATIENT)
Dept: SURGERY | Facility: CLINIC | Age: 68
End: 2023-07-24
Payer: MEDICARE

## 2023-07-26 ENCOUNTER — PATIENT MESSAGE (OUTPATIENT)
Dept: FAMILY MEDICINE | Facility: CLINIC | Age: 68
End: 2023-07-26
Payer: MEDICARE

## 2023-07-26 DIAGNOSIS — I10 HYPERTENSION, ESSENTIAL: ICD-10-CM

## 2023-07-26 RX ORDER — METOPROLOL SUCCINATE 25 MG/1
TABLET, EXTENDED RELEASE ORAL
Qty: 90 TABLET | Refills: 0 | Status: SHIPPED | OUTPATIENT
Start: 2023-07-26 | End: 2023-10-16 | Stop reason: SDUPTHER

## 2023-07-26 RX ORDER — VALSARTAN 160 MG/1
TABLET ORAL
Qty: 90 TABLET | Refills: 0 | Status: SHIPPED | OUTPATIENT
Start: 2023-07-26 | End: 2023-10-16 | Stop reason: SDUPTHER

## 2023-07-26 NOTE — TELEPHONE ENCOUNTER
Refill Decision Note   Mane Ramires  is requesting a refill authorization.  Brief Assessment and Rationale for Refill:  Approve     Medication Therapy Plan:  Reviewed ED visit notes. 90d+0rf approved. Pt needs followup with PCP      Comments:     Note composed:2:33 PM 07/26/2023

## 2023-07-26 NOTE — TELEPHONE ENCOUNTER
No care due was identified.  [facility] Embedded Care Due Messages. Reference number: [id number].   7/26/2023 6:39:23 AM CDT

## 2023-07-26 NOTE — TELEPHONE ENCOUNTER
Provider Staff:  Action required. This patient has received emergency care.     Please schedule patient for a follow up appointment.     Thanks!  [facility]     Appointments      Date Provider   Last Visit   2/9/2023 Patience Gordon MD   Next Visit   Visit date not found Patience Gordon MD

## 2023-08-03 ENCOUNTER — TELEPHONE (OUTPATIENT)
Dept: NEUROLOGY | Facility: CLINIC | Age: 68
End: 2023-08-03
Payer: MEDICARE

## 2023-08-03 NOTE — TELEPHONE ENCOUNTER
----- Message from Derick Joseph sent at 8/3/2023  2:12 PM CDT -----  Regarding: Need sooner appt  Contact: @ [phone]  Pt is calling to speak to someone in the office; asking for a sooner appt than what they are scheduled for on 8/29/2023. Pt is already on the wait list; no available appts in Epic that are coming up soon. Pt is asking to speak to someone in the office. Pt states that a message on the portal will be fine. Please call to advise. Thanks.    Reason for sooner appt: pt will be going out of town on 8/19/2023 and needs the appt before that     When is the first available appointment? Nov. 15th

## 2023-08-13 NOTE — TELEPHONE ENCOUNTER
No care due was identified.  [facility] Embedded Care Due Messages. Reference number: [id number].   8/13/2023 4:05:45 PM CDT

## 2023-08-14 ENCOUNTER — TELEPHONE (OUTPATIENT)
Dept: FAMILY MEDICINE | Facility: CLINIC | Age: 68
End: 2023-08-14
Payer: MEDICARE

## 2023-08-14 ENCOUNTER — PATIENT MESSAGE (OUTPATIENT)
Dept: FAMILY MEDICINE | Facility: CLINIC | Age: 68
End: 2023-08-14
Payer: MEDICARE

## 2023-08-14 RX ORDER — CYCLOBENZAPRINE HCL 10 MG
10 TABLET ORAL NIGHTLY PRN
Qty: 90 TABLET | Refills: 1 | Status: SHIPPED | OUTPATIENT
Start: 2023-08-14 | End: 2024-02-10

## 2023-08-14 NOTE — TELEPHONE ENCOUNTER
----- Message from Sondra Reid sent at 8/14/2023  4:39 PM CDT -----  Regarding: Patient needs labs put in for [facility] location, needs date 8/17 for [facility] - please call and advise  Contact: @[phone]  Patient needs labs put in for [facility] location, needs date 8/17 (after 9:20 appointment) for [facility] - please call and advise @[phone]      Hyperlipidemia associated with type 2 diabetes mellitus [E11.69, E78.5]    Patient also asking about urine test, due 8/15/2023 Diabetes urine screening.

## 2023-08-17 ENCOUNTER — LAB VISIT (OUTPATIENT)
Dept: LAB | Facility: HOSPITAL | Age: 68
End: 2023-08-17
Attending: FAMILY MEDICINE
Payer: MEDICARE

## 2023-08-17 ENCOUNTER — OFFICE VISIT (OUTPATIENT)
Dept: NEUROLOGY | Facility: CLINIC | Age: 68
End: 2023-08-17
Payer: MEDICARE

## 2023-08-17 ENCOUNTER — PATIENT MESSAGE (OUTPATIENT)
Dept: FAMILY MEDICINE | Facility: CLINIC | Age: 68
End: 2023-08-17
Payer: MEDICARE

## 2023-08-17 VITALS
BODY MASS INDEX: 28 KG/M2 | HEIGHT: 64 IN | SYSTOLIC BLOOD PRESSURE: 125 MMHG | DIASTOLIC BLOOD PRESSURE: 78 MMHG | WEIGHT: 164 LBS | HEART RATE: 76 BPM

## 2023-08-17 DIAGNOSIS — M54.12 C6 RADICULOPATHY: ICD-10-CM

## 2023-08-17 DIAGNOSIS — Z86.73 H/O: CVA (CEREBROVASCULAR ACCIDENT): ICD-10-CM

## 2023-08-17 DIAGNOSIS — E78.5 HYPERLIPIDEMIA ASSOCIATED WITH TYPE 2 DIABETES MELLITUS: ICD-10-CM

## 2023-08-17 DIAGNOSIS — Q21.10 ASD (ATRIAL SEPTAL DEFECT): ICD-10-CM

## 2023-08-17 DIAGNOSIS — E78.5 DYSLIPIDEMIA: ICD-10-CM

## 2023-08-17 DIAGNOSIS — E11.9 CONTROLLED TYPE 2 DIABETES MELLITUS WITHOUT COMPLICATION, WITHOUT LONG-TERM CURRENT USE OF INSULIN: ICD-10-CM

## 2023-08-17 DIAGNOSIS — F32.A DEPRESSION, UNSPECIFIED DEPRESSION TYPE: ICD-10-CM

## 2023-08-17 DIAGNOSIS — G56.23 CUBITAL TUNNEL SYNDROME OF BOTH UPPER EXTREMITIES: ICD-10-CM

## 2023-08-17 DIAGNOSIS — M50.30 DDD (DEGENERATIVE DISC DISEASE), CERVICAL: ICD-10-CM

## 2023-08-17 DIAGNOSIS — F07.81 POST CONCUSSION SYNDROME: Primary | ICD-10-CM

## 2023-08-17 DIAGNOSIS — I10 PRIMARY HYPERTENSION: ICD-10-CM

## 2023-08-17 DIAGNOSIS — H91.11 PRESBYCUSIS OF RIGHT EAR WITH RESTRICTED HEARING OF LEFT EAR: ICD-10-CM

## 2023-08-17 DIAGNOSIS — G47.33 OSA (OBSTRUCTIVE SLEEP APNEA): ICD-10-CM

## 2023-08-17 DIAGNOSIS — E11.69 HYPERLIPIDEMIA ASSOCIATED WITH TYPE 2 DIABETES MELLITUS: ICD-10-CM

## 2023-08-17 DIAGNOSIS — G95.89 MYELOMALACIA: ICD-10-CM

## 2023-08-17 DIAGNOSIS — E11.8 DIABETES MELLITUS TYPE 2 WITH COMPLICATIONS: Primary | ICD-10-CM

## 2023-08-17 DIAGNOSIS — G56.01 RIGHT CARPAL TUNNEL SYNDROME: ICD-10-CM

## 2023-08-17 LAB
CHOLEST SERPL-MCNC: 156 MG/DL (ref 120–199)
CHOLEST/HDLC SERPL: 3.5 {RATIO} (ref 2–5)
HDLC SERPL-MCNC: 45 MG/DL (ref 40–75)
HDLC SERPL: 28.8 % (ref 20–50)
LDLC SERPL CALC-MCNC: 92 MG/DL (ref 63–159)
NONHDLC SERPL-MCNC: 111 MG/DL
TRIGL SERPL-MCNC: 95 MG/DL (ref 30–150)

## 2023-08-17 PROCEDURE — 3044F HG A1C LEVEL LT 7.0%: CPT | Mod: HCNC,CPTII,S$GLB, | Performed by: PSYCHIATRY & NEUROLOGY

## 2023-08-17 PROCEDURE — 1157F ADVNC CARE PLAN IN RCRD: CPT | Mod: HCNC,CPTII,S$GLB, | Performed by: PSYCHIATRY & NEUROLOGY

## 2023-08-17 PROCEDURE — 3008F BODY MASS INDEX DOCD: CPT | Mod: HCNC,CPTII,S$GLB, | Performed by: PSYCHIATRY & NEUROLOGY

## 2023-08-17 PROCEDURE — 1159F MED LIST DOCD IN RCRD: CPT | Mod: HCNC,CPTII,S$GLB, | Performed by: PSYCHIATRY & NEUROLOGY

## 2023-08-17 PROCEDURE — 99215 PR OFFICE/OUTPT VISIT, EST, LEVL V, 40-54 MIN: ICD-10-PCS | Mod: HCNC,S$GLB,, | Performed by: PSYCHIATRY & NEUROLOGY

## 2023-08-17 PROCEDURE — 1160F PR REVIEW ALL MEDS BY PRESCRIBER/CLIN PHARMACIST DOCUMENTED: ICD-10-PCS | Mod: HCNC,CPTII,S$GLB, | Performed by: PSYCHIATRY & NEUROLOGY

## 2023-08-17 PROCEDURE — 99999 PR PBB SHADOW E&M-EST. PATIENT-LVL V: ICD-10-PCS | Mod: PBBFAC,HCNC,, | Performed by: PSYCHIATRY & NEUROLOGY

## 2023-08-17 PROCEDURE — 1101F PR PT FALLS ASSESS DOC 0-1 FALLS W/OUT INJ PAST YR: ICD-10-PCS | Mod: HCNC,CPTII,S$GLB, | Performed by: PSYCHIATRY & NEUROLOGY

## 2023-08-17 PROCEDURE — 4010F PR ACE/ARB THEARPY RXD/TAKEN: ICD-10-PCS | Mod: HCNC,CPTII,S$GLB, | Performed by: PSYCHIATRY & NEUROLOGY

## 2023-08-17 PROCEDURE — 3078F PR MOST RECENT DIASTOLIC BLOOD PRESSURE < 80 MM HG: ICD-10-PCS | Mod: HCNC,CPTII,S$GLB, | Performed by: PSYCHIATRY & NEUROLOGY

## 2023-08-17 PROCEDURE — 36415 COLL VENOUS BLD VENIPUNCTURE: CPT | Mod: HCNC | Performed by: FAMILY MEDICINE

## 2023-08-17 PROCEDURE — 3044F PR MOST RECENT HEMOGLOBIN A1C LEVEL <7.0%: ICD-10-PCS | Mod: HCNC,CPTII,S$GLB, | Performed by: PSYCHIATRY & NEUROLOGY

## 2023-08-17 PROCEDURE — 99215 OFFICE O/P EST HI 40 MIN: CPT | Mod: HCNC,S$GLB,, | Performed by: PSYCHIATRY & NEUROLOGY

## 2023-08-17 PROCEDURE — 3288F PR FALLS RISK ASSESSMENT DOCUMENTED: ICD-10-PCS | Mod: HCNC,CPTII,S$GLB, | Performed by: PSYCHIATRY & NEUROLOGY

## 2023-08-17 PROCEDURE — 1125F PR PAIN SEVERITY QUANTIFIED, PAIN PRESENT: ICD-10-PCS | Mod: HCNC,CPTII,S$GLB, | Performed by: PSYCHIATRY & NEUROLOGY

## 2023-08-17 PROCEDURE — 80061 LIPID PANEL: CPT | Mod: HCNC | Performed by: FAMILY MEDICINE

## 2023-08-17 PROCEDURE — 3074F PR MOST RECENT SYSTOLIC BLOOD PRESSURE < 130 MM HG: ICD-10-PCS | Mod: HCNC,CPTII,S$GLB, | Performed by: PSYCHIATRY & NEUROLOGY

## 2023-08-17 PROCEDURE — 3288F FALL RISK ASSESSMENT DOCD: CPT | Mod: HCNC,CPTII,S$GLB, | Performed by: PSYCHIATRY & NEUROLOGY

## 2023-08-17 PROCEDURE — 3008F PR BODY MASS INDEX (BMI) DOCUMENTED: ICD-10-PCS | Mod: HCNC,CPTII,S$GLB, | Performed by: PSYCHIATRY & NEUROLOGY

## 2023-08-17 PROCEDURE — 1157F PR ADVANCE CARE PLAN OR EQUIV PRESENT IN MEDICAL RECORD: ICD-10-PCS | Mod: HCNC,CPTII,S$GLB, | Performed by: PSYCHIATRY & NEUROLOGY

## 2023-08-17 PROCEDURE — 4010F ACE/ARB THERAPY RXD/TAKEN: CPT | Mod: HCNC,CPTII,S$GLB, | Performed by: PSYCHIATRY & NEUROLOGY

## 2023-08-17 PROCEDURE — 99999 PR PBB SHADOW E&M-EST. PATIENT-LVL V: CPT | Mod: PBBFAC,HCNC,, | Performed by: PSYCHIATRY & NEUROLOGY

## 2023-08-17 PROCEDURE — 1125F AMNT PAIN NOTED PAIN PRSNT: CPT | Mod: HCNC,CPTII,S$GLB, | Performed by: PSYCHIATRY & NEUROLOGY

## 2023-08-17 PROCEDURE — 3074F SYST BP LT 130 MM HG: CPT | Mod: HCNC,CPTII,S$GLB, | Performed by: PSYCHIATRY & NEUROLOGY

## 2023-08-17 PROCEDURE — 3078F DIAST BP <80 MM HG: CPT | Mod: HCNC,CPTII,S$GLB, | Performed by: PSYCHIATRY & NEUROLOGY

## 2023-08-17 PROCEDURE — 1101F PT FALLS ASSESS-DOCD LE1/YR: CPT | Mod: HCNC,CPTII,S$GLB, | Performed by: PSYCHIATRY & NEUROLOGY

## 2023-08-17 PROCEDURE — 1160F RVW MEDS BY RX/DR IN RCRD: CPT | Mod: HCNC,CPTII,S$GLB, | Performed by: PSYCHIATRY & NEUROLOGY

## 2023-08-17 PROCEDURE — 1159F PR MEDICATION LIST DOCUMENTED IN MEDICAL RECORD: ICD-10-PCS | Mod: HCNC,CPTII,S$GLB, | Performed by: PSYCHIATRY & NEUROLOGY

## 2023-08-17 RX ORDER — ROSUVASTATIN CALCIUM 10 MG/1
10 TABLET, COATED ORAL DAILY
Qty: 90 TABLET | Refills: 3 | Status: SHIPPED | OUTPATIENT
Start: 2023-08-17 | End: 2023-08-18 | Stop reason: SDUPTHER

## 2023-08-17 NOTE — PROGRESS NOTES
Subjective:       Patient ID: Mane Ramires is a 67 y.o. male.    Reason for Consult: Follow-up      Interval History:  Mane Ramires is here for follow up. Their condition has been relatively clinically stable.  He notes that he is having issues with neck pain and had a recent ER visit in June for this particular problem.  Even notes that he woke up this morning with pain in the right side of his neck and going up to the back of his head.  We have discussed multiple issues and discussed how some of these may no longer be specifically related to concussion but more related to his neck.  He does note that he has some issues with his eyes and has new glasses coming.  He also notes that he just had a recent refitting of his hearing aids and he notes his right side is worse than his left.  We have discussed appropriate referrals for his ophthalmologic and auditory needs.  He has seen my colleague in Neurosurgery who got an EMG nerve conduction study that shows he has right-sided mild carpal tunnel syndrome and bilateral cubital tunnel syndrome.  We have discussed the appropriate referral to Orthopedics for these particular issues as well at length.  I have interpreted the EMG/nerve conduction study for the patient.      Objective:     Vitals:    08/17/23 0929   BP: 125/78   Pulse: 76     Cervical range of motion is limited especially on lateral rotation to the right.  Tenderness to palpation of bilateral cervical paraspinal musculature, suboccipital musculature, trapezius, right worse than left.  Tinel sign equivocal bilaterally.  Patient is awake alert oriented to person place and time.  Moves all 4 extremities against gravity.  Gait and station within normal limits.  Cranial nerves 2-12 were without focal deficits.  Focused examination was undertaken today. Most of the visit time was spent giving guidance, counseling and discussing treatment options.    Results for orders placed or performed during the  hospital encounter of 09/16/22   MRI Brain W WO Contrast    Narrative    EXAMINATION:  MRI BRAIN W WO CONTRAST    CLINICAL HISTORY:  pineal cyst; Personal history of other endocrine, nutritional and metabolic disease    TECHNIQUE:  Multiplanar multisequence MR imaging of the brain was performed before and after the administration of 7 mL Gadavist intravenous contrast.    COMPARISON:  04/09/2021    FINDINGS:  Ventricles are normal in size for age.  No hydrocephalus.    The brain appears within normal limits for age, noting only a few scattered punctate T2-FLAIR hyperintense foci in the supratentorial white matter.  No recent or remote major vascular distribution infarct.  No recent or remote hemorrhage.  No mass effect or midline shift..  Small developmental venous anomaly in the posterior right frontal lobe.  No abnormal parenchymal or leptomeningeal enhancement.    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  This again measures on the order of 1.3 cm maximal diameter.  Homogeneous T2 hyperintense signal.  No associated solid enhancing components.  No significant mass effect on the brain parenchyma or subjacent parenchymal edema.  Lesion appears stable dating back to a cervical spine MRI of 07/11/2014.    No new extra-axial blood or fluid collections.    The T2 skull base flow voids are preserved.    Bone marrow signal intensity unremarkable.      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  This can not be definitively characterized, but almost certainly benign noting stable dating back to 2014.      Electronically signed by: Zaki Mccoy MD  Date:    09/16/2022  Time:    17:28   Results for orders placed or performed during the hospital encounter of 04/08/21   MRI Brain Without Contrast    Narrative    EXAMINATION:  MRI BRAIN WITHOUT CONTRAST    CLINICAL HISTORY:  Rigth wide weakness;    TECHNIQUE:  Sagittal and axial T1, axial T2, axial FLAIR, axial gradient and axial diffusion  imaging of the whole brain without contrast.    COMPARISON:  04/08/2021 and the 04/27/2018    FINDINGS:  There is continued although slightly less apparent subcentimeter diffusion signal abnormality right posterior temporal lobe suggestive for evolving recent to subacute age infarction.  There is no new diffusion signal abnormality to suggest new infarction.  There are few punctate areas of T2 FLAIR signal hyperintensity elsewhere supratentorial white matter which are nonspecific and suggestive for component of mild chronic ischemic change.  There is no midline shift or mass effect.  Major intracranial T2 flow voids are present.  No abnormal parenchymal susceptibility to suggest parenchymal hemorrhage.  There is subtle tubular susceptibility and FLAIR hyperintensity in the right posterior frontal cortex suggestive for underlying developmental venous anomaly.  Ventricles stable without hydrocephalus.  1.3 cm  fluid signal focus in the pineal fossa most compatible with pineal cyst however due to size pineal cytoma not excluded and continued follow-up recommended.      Impression    Evolving subcentimeter diffusion signal abnormality right posterior temporal lobe concerning for evolving recent to subacute age infarction.    No evidence for new infarction or significant new parenchymal signal abnormality    Few scattered punctate foci of T2 FLAIR signal hyperintensity elsewhere supratentorial white matter suggestive for mild chronic ischemic change.    Stable 1.3 cm cyst pineal fossa which may represent simple pineal cyst in light of stability however continued follow-up recommended    Small probable developmental venous anomaly right posterior frontal lobe.      Electronically signed by: Jeramie Ruth DO  Date:    04/09/2021  Time:    13:46   Results for orders placed or performed during the hospital encounter of 11/23/19   CT Head Without Contrast    Narrative    EXAMINATION:  CT HEAD WITHOUT CONTRAST    CLINICAL  HISTORY:  Dizziness;    TECHNIQUE:  Low dose axial images were obtained through the head.  Coronal and sagittal reformations were also performed. Contrast was not administered.    COMPARISON:  CT 04/26/2018.    FINDINGS:  There is no acute hemorrhage or infarction.  There is mild cortical atrophy.  There are periventricular deep white matter changes consistent with early small vessel ischemic disease.    No extra-axial fluid collections.  Ventricles are normal in size, shape and configuration.  The basal cisterns are patent.    The imaged paranasal sinuses are well aerated.  Scattered mucoperiosteal thickening within the ethmoid air cells.    The mastoid air cells and middle ears are normally pneumatized.      Impression    1. Mild cortical atrophy with periventricular deep white matter change consistent with early small vessel ischemic disease.  2. Mild ethmoid sinus disease.      Electronically signed by: Miki Spencer  Date:    11/23/2019  Time:    13:36     *Note: Due to a large number of results and/or encounters for the requested time period, some results have not been displayed. A complete set of results can be found in Results Review.       Assessment/Plan:     Problem List Items Addressed This Visit          Neuro    Post concussion syndrome - Primary    Overview     Headaches, confusion, ataxia, vision change occurring after a fall on 4/26/18 with posterior head trauma and LOC 15-20 min, symptoms improved but not resolved 3 months out.          Relevant Orders    Ambulatory referral/consult to Ophthalmology    Ambulatory consult to Pain Clinic    Ambulatory consult to ENT    H/O: CVA (cerebrovascular accident)    Overview     Cerebral infarction due to thrombosis of right posterior cerebral artery             Myelomalacia-cervical spine-H/O cervical fusion    Overview     H/O cervical fusion         Relevant Orders    Ambulatory consult to Pain Clinic       Cardiac/Vascular    HTN (hypertension)     Dyslipidemia    ASD (atrial septal defect)    Overview     Cardiology notes 2018, 2019            Endocrine    Controlled type 2 diabetes mellitus, without long-term current use of insulin    Overview     CARA Sloan MD)     Dx: Uncontrolled type 2 diabetes mellitus...     0 Result Notes    1 HM Topic             Component Ref Range & Units 3 mo ago  (12/20/22) 7 mo ago  (8/10/22) 8 mo ago  (7/8/22) 1 yr ago  (1/28/22) 1 yr ago  (7/27/21) 1 yr ago  (6/2/21) 1 yr ago  (4/8/21)   Hemoglobin A1C 4.0 - 5.6 % 6.8 High   5.8 High  CM  6.5 High  CM  6.3 High  CM  6.3 High  CM  6.9 High  CM  8.3 High  CM    Comment: ADA Screening Guidelines:   5.7-6.4%  Consistent with prediabetes   >or=6.5%  Consistent                  Relevant Orders    Ambulatory referral/consult to Ophthalmology     Other Visit Diagnoses       DDD (degenerative disc disease), cervical        Relevant Orders    Ambulatory consult to Pain Clinic    C6 radiculopathy        Depression, unspecified depression type        Right carpal tunnel syndrome        Relevant Orders    Ambulatory referral/consult to Orthopedics    Cubital tunnel syndrome of both upper extremities        Relevant Orders    Ambulatory referral/consult to Orthopedics    Presbycusis of right ear with restricted hearing of left ear        Relevant Orders    Ambulatory consult to ENT    YARI (obstructive sleep apnea)        Relevant Orders    Ambulatory referral/consult to Sleep Disorders          67-year-old male presents for evaluation follow-up of remote concussions, the last 1 sustained in 2018.  He does also have a history of ACDF fusion of the cervical spine.  He seems to be having worsening symptoms of his cervical spine.  CT scan shows multiple areas of degenerative change which could be triggering pain for him.  I will make a referral for him to be seen by pain management for possible intervention there.  The patient also notes that he has having trouble sleeping and can have headaches  on waking up.  We have discussed that inadequate sleep or sleep apnea may be triggering some of this.  I will make a referral for the patient to be seen in sleep Medicine.  He also notes that he is having worsening of his right hearing as confirmed by recent audiology reports.  I will make a referral to our neuro otologist to see if this is just presbycusis or if there is something else going on here.  I will make a referral for him to see Neuro-Ophthalmology as well as he notes he is having some difficulty even with his newer glasses.  For now we have discussed that due to his allergy to injectable steroids, if he does see pain management it may be lidocaine only injections and then possible more longer-term relief with RFA as necessary.  I have also discussed with him since he has allergy to gabapentin we can think about adjusting his Cymbalta but for today's visit he defers adjustment of Cymbalta.  I will make a referral for him to be seen in the hand Clinic with orthopedics for his bilateral cubital tunnel syndrome and his right mild carpal tunnel syndrome.  I will see the patient back in about 3 months.      The patient verbalizes understanding and agreement with the treatment plan. I have discussed risks, benefits and alternatives to the treatment plan. Questions were sought and answered to his stated verbal satisfaction.          Issac Crowe MD, FAAN    This note is dictated on M*Modal Fluency Direct word recognition program. There are word recognition mistakes that are occasionally missed on review.    Based on our encounter today, my overall Medical Decision Making is a Level 5 because of High = 1 or more chronic illnesses with severe exacerbation, progression, or side effects of treatment; 1 acute or chronic illness or injury that poses a threat to life or bodily function and High = High risk of morbidity from additional diagnostic testing or treatment (e.g. Drug therapy requiring intensive, Monitoring  for toxicity, Decision regarding elective major surgery with identified patient or procedure risk factors, Decision regarding emergency major surgery, Decision regarding hospitalization, Decision not to resuscitate or to deescalate care because of poor prognosis) based on Number of Problems or Complexity of Problems and Risk of Complications and/or Morbidity

## 2023-08-18 RX ORDER — ROSUVASTATIN CALCIUM 10 MG/1
10 TABLET, COATED ORAL DAILY
Qty: 90 TABLET | Refills: 0 | Status: SHIPPED | OUTPATIENT
Start: 2023-08-18 | End: 2024-08-17

## 2023-08-18 NOTE — TELEPHONE ENCOUNTER
My Chart message sent to pt notifying him that rx was sent to [facility] in [place] per his request. Also, notified the pt that Dr. Gordon would like to do labs in 3 months and offered for him to call us so we can assist him with scheduling.

## 2023-09-15 ENCOUNTER — PATIENT MESSAGE (OUTPATIENT)
Dept: FAMILY MEDICINE | Facility: CLINIC | Age: 68
End: 2023-09-15
Payer: MEDICARE

## 2023-09-18 ENCOUNTER — TELEPHONE (OUTPATIENT)
Dept: ORTHOPEDICS | Facility: CLINIC | Age: 68
End: 2023-09-18
Payer: MEDICARE

## 2023-09-18 DIAGNOSIS — M79.641 RIGHT HAND PAIN: Primary | ICD-10-CM

## 2023-09-18 DIAGNOSIS — M25.521 BILATERAL ELBOW JOINT PAIN: Primary | ICD-10-CM

## 2023-09-18 DIAGNOSIS — M25.522 BILATERAL ELBOW JOINT PAIN: Primary | ICD-10-CM

## 2023-09-27 ENCOUNTER — TELEPHONE (OUTPATIENT)
Dept: OPHTHALMOLOGY | Facility: CLINIC | Age: 68
End: 2023-09-27
Payer: MEDICARE

## 2023-09-27 NOTE — TELEPHONE ENCOUNTER
----- Message from Opal Roper sent at 9/27/2023 11:44 AM CDT -----  Regarding: Appt R/s  Patient called about needing to r/s tomorrow appt due to transportation issues.    Please call back to further assist-[phone]

## 2023-09-27 NOTE — TELEPHONE ENCOUNTER
Ok to change to cozaar - notify pt  Be sure to upload BP readings with this new change so we can see how you're doing with the change   Drysol Counseling:  I discussed with the patient the risks of drysol/aluminum chloride including but not limited to skin rash, itching, irritation, burning.

## 2023-10-04 ENCOUNTER — OFFICE VISIT (OUTPATIENT)
Dept: PAIN MEDICINE | Facility: CLINIC | Age: 68
End: 2023-10-04
Payer: MEDICARE

## 2023-10-04 VITALS
SYSTOLIC BLOOD PRESSURE: 129 MMHG | WEIGHT: 157.63 LBS | HEART RATE: 79 BPM | RESPIRATION RATE: 18 BRPM | BODY MASS INDEX: 27.06 KG/M2 | OXYGEN SATURATION: 100 % | DIASTOLIC BLOOD PRESSURE: 84 MMHG | TEMPERATURE: 98 F

## 2023-10-04 DIAGNOSIS — G95.89 MYELOMALACIA: ICD-10-CM

## 2023-10-04 DIAGNOSIS — M54.12 CERVICAL RADICULOPATHY: Primary | ICD-10-CM

## 2023-10-04 PROCEDURE — 1125F AMNT PAIN NOTED PAIN PRSNT: CPT | Mod: HCNC,CPTII,S$GLB, | Performed by: ANESTHESIOLOGY

## 2023-10-04 PROCEDURE — 1157F ADVNC CARE PLAN IN RCRD: CPT | Mod: HCNC,CPTII,S$GLB, | Performed by: ANESTHESIOLOGY

## 2023-10-04 PROCEDURE — 4010F PR ACE/ARB THEARPY RXD/TAKEN: ICD-10-PCS | Mod: HCNC,CPTII,S$GLB, | Performed by: ANESTHESIOLOGY

## 2023-10-04 PROCEDURE — 1101F PR PT FALLS ASSESS DOC 0-1 FALLS W/OUT INJ PAST YR: ICD-10-PCS | Mod: HCNC,CPTII,S$GLB, | Performed by: ANESTHESIOLOGY

## 2023-10-04 PROCEDURE — 3079F PR MOST RECENT DIASTOLIC BLOOD PRESSURE 80-89 MM HG: ICD-10-PCS | Mod: HCNC,CPTII,S$GLB, | Performed by: ANESTHESIOLOGY

## 2023-10-04 PROCEDURE — 3044F HG A1C LEVEL LT 7.0%: CPT | Mod: HCNC,CPTII,S$GLB, | Performed by: ANESTHESIOLOGY

## 2023-10-04 PROCEDURE — 99204 OFFICE O/P NEW MOD 45 MIN: CPT | Mod: HCNC,S$GLB,, | Performed by: ANESTHESIOLOGY

## 2023-10-04 PROCEDURE — 1101F PT FALLS ASSESS-DOCD LE1/YR: CPT | Mod: HCNC,CPTII,S$GLB, | Performed by: ANESTHESIOLOGY

## 2023-10-04 PROCEDURE — 3079F DIAST BP 80-89 MM HG: CPT | Mod: HCNC,CPTII,S$GLB, | Performed by: ANESTHESIOLOGY

## 2023-10-04 PROCEDURE — 3074F SYST BP LT 130 MM HG: CPT | Mod: HCNC,CPTII,S$GLB, | Performed by: ANESTHESIOLOGY

## 2023-10-04 PROCEDURE — 99999 PR PBB SHADOW E&M-EST. PATIENT-LVL III: CPT | Mod: PBBFAC,HCNC,, | Performed by: ANESTHESIOLOGY

## 2023-10-04 PROCEDURE — 1125F PR PAIN SEVERITY QUANTIFIED, PAIN PRESENT: ICD-10-PCS | Mod: HCNC,CPTII,S$GLB, | Performed by: ANESTHESIOLOGY

## 2023-10-04 PROCEDURE — 3044F PR MOST RECENT HEMOGLOBIN A1C LEVEL <7.0%: ICD-10-PCS | Mod: HCNC,CPTII,S$GLB, | Performed by: ANESTHESIOLOGY

## 2023-10-04 PROCEDURE — 3288F PR FALLS RISK ASSESSMENT DOCUMENTED: ICD-10-PCS | Mod: HCNC,CPTII,S$GLB, | Performed by: ANESTHESIOLOGY

## 2023-10-04 PROCEDURE — 3008F BODY MASS INDEX DOCD: CPT | Mod: HCNC,CPTII,S$GLB, | Performed by: ANESTHESIOLOGY

## 2023-10-04 PROCEDURE — 3074F PR MOST RECENT SYSTOLIC BLOOD PRESSURE < 130 MM HG: ICD-10-PCS | Mod: HCNC,CPTII,S$GLB, | Performed by: ANESTHESIOLOGY

## 2023-10-04 PROCEDURE — 3008F PR BODY MASS INDEX (BMI) DOCUMENTED: ICD-10-PCS | Mod: HCNC,CPTII,S$GLB, | Performed by: ANESTHESIOLOGY

## 2023-10-04 PROCEDURE — 4010F ACE/ARB THERAPY RXD/TAKEN: CPT | Mod: HCNC,CPTII,S$GLB, | Performed by: ANESTHESIOLOGY

## 2023-10-04 PROCEDURE — 99999 PR PBB SHADOW E&M-EST. PATIENT-LVL III: ICD-10-PCS | Mod: PBBFAC,HCNC,, | Performed by: ANESTHESIOLOGY

## 2023-10-04 PROCEDURE — 99204 PR OFFICE/OUTPT VISIT, NEW, LEVL IV, 45-59 MIN: ICD-10-PCS | Mod: HCNC,S$GLB,, | Performed by: ANESTHESIOLOGY

## 2023-10-04 PROCEDURE — 1157F PR ADVANCE CARE PLAN OR EQUIV PRESENT IN MEDICAL RECORD: ICD-10-PCS | Mod: HCNC,CPTII,S$GLB, | Performed by: ANESTHESIOLOGY

## 2023-10-04 PROCEDURE — 3288F FALL RISK ASSESSMENT DOCD: CPT | Mod: HCNC,CPTII,S$GLB, | Performed by: ANESTHESIOLOGY

## 2023-10-04 NOTE — PROGRESS NOTES
PCP: Patience Gordon MD    REFERRING PHYSICIAN: Woody Crowe III, MD    CHIEF COMPLAINT: Neck pain    Original HISTORY OF PRESENT ILLNESS: Mane Ramires presents to the clinic for the evaluation of the above pain. The pain started since C5-7 ACDF in 2014, progressively getting worse, inconsistent.     Original Pain Description:  The pain is located in the left neck and radiates to the left shoulder. The pain is described as aching. Patient endorses paresthesias in arms, resolving since PT. Patient continuing to do home exercises with improvement in symptoms. Exacerbating factors: Bending, Extension, and Flexing. Mitigating factors physical therapy and rest. Symptoms interfere with daily activity. The patient feels like symptoms have been unchanged. Patient denies significant motor weakness and loss of sensations.    Original PAIN SCORES:  Best: Pain is 3  Worst: Pain is 10  Current: Pain is 6        5/1/2017    11:07 AM   Last 3 PDI Scores   Pain Disability Index (PDI) 58       INTERVAL HISTORY: (Newest visit at the bottom)   Interval History (Date):       6 weeks of Conservative therapy:  PT: Mague reports at least 5-6 weeks in June-July 2023, discharged due to good progress  Chiro:  HEP: Continues to use HEP      Treatments / Medications: (Ice/Heat/NSAIDS/APAP/etc):  Duloxetine daily      Interventional Pain Procedures: (Previous injections)  None    Past Medical History:   Diagnosis Date    Accident     fell from roof with TBI (word finding issues personality changes, memory deficets), R rib fractures, clavicle fx    Allergy     Anxiety     ASD (atrial septal defect)     noted on ECHO 6/16    Cervical stenosis of spine     noted on 6/15 MRI    CTS (carpal tunnel syndrome)     mild-mod on 4/17 NCS    DDD (degenerative disc disease), cervical 10/2014    C5-6 and C6-C7    Depression     Diabetes     JEEVAN (generalized anxiety disorder)     Gender identity disorder     H/O cardiovascular stress test       normal    Herpes simplex type 2 infection     History of atypical hyperplasia of breast     B precancer lesions    Hypertension     Hypertensive retinopathy     IGT (impaired glucose tolerance)     Myelomalacia     c-spine noted on 8/15 MRI    OA (osteoarthritis)     Pineal gland cyst     TBI (traumatic brain injury)     after falling off a roof in      Past Surgical History:   Procedure Laterality Date    AMPUTATION      L index finger    APPENDECTOMY  1969    BILATERAL SALPINGOOPHORECTOMY  2015    BIOPSY OF LIVER WITH ULTRASOUND GUIDANCE N/A 2018    Procedure: BIOPSY, LIVER, WITH US GUIDANCE;  Surgeon: Dosc Diagnostic Provider;  Location: [facility] OR [address] [place] [address];  Service: Radiology;  Laterality: N/A;  U/S GUIDED LIVER BIOPSY.  2018.  [facility] 7:30 AM  PROCEDURE 9 AM.  DR NITHIN GOLDEN / MARLON NUR.  DB 18 3:20P    CARPAL TUNNEL RELEASE  2017    right    CERVICAL FUSION  10/2014    C5-C6 and C6-C7    CHOLECYSTECTOMY      ENDOSCOPIC NASAL SEPTOPLASTY N/A 2018    Procedure: SEPTOPLASTY, NASAL, ENDOSCOPIC;  Surgeon: Reji Arce III, MD;  Location: [facility] OR [address] [place] [address];  Service: ENT;  Laterality: N/A;    FINGER AMPUTATION Left     FRACTURE SURGERY Right     rib fractures    HYSTERECTOMY  1984    MIGUEL    MASTECTOMY  2015    RECONSTRUCTION OF NOSE N/A 2018    Procedure: RECONSTRUCTION, NOSE;  Surgeon: Reji Arce III, MD;  Location: [facility] OR [address] [place] [address];  Service: ENT;  Laterality: N/A;    ROTATOR CUFF REPAIR Right 2019    UPPER GASTROINTESTINAL ENDOSCOPY  2017    Dr. Ayon     Social History     Socioeconomic History    Marital status: Single   Occupational History    Occupation: Artist   Tobacco Use    Smoking status: Former     Current packs/day: 0.00     Average packs/day: 0.3 packs/day for 27.8 years (7.0 ttl pk-yrs)     Types: Cigarettes     Start date:      Quit date: 2002     Years since quittin.9     Passive exposure: Past    Smokeless  tobacco: Never    Tobacco comments:     did not smoke regularly   Substance and Sexual Activity    Alcohol use: Yes     Comment: rare     Drug use: Yes     Types: Hydrocodone    Sexual activity: Never     Social Determinants of Health     Financial Resource Strain: Low Risk  (3/30/2023)    Overall Financial Resource Strain (CARDIA)     Difficulty of Paying Living Expenses: Not hard at all   Food Insecurity: No Food Insecurity (3/30/2023)    Hunger Vital Sign     Worried About Running Out of Food in the Last Year: Never true     Ran Out of Food in the Last Year: Never true   Transportation Needs: No Transportation Needs (3/30/2023)    PRAPARE - Transportation     Lack of Transportation (Medical): No     Lack of Transportation (Non-Medical): No   Stress: No Stress Concern Present (3/30/2023)    [employer] - Occupational Stress Questionnaire     Feeling of Stress : Not at all   Social Connections: Unknown (3/30/2023)    Social Connection and Isolation Panel [NHANES]     Marital Status:    Housing Stability: Unknown (3/30/2023)    Housing Stability Vital Sign     Unable to Pay for Housing in the Last Year: No     Unstable Housing in the Last Year: No     Family History   Problem Relation Age of Onset    Diabetes Mother     Heart disease Mother     Gallbladder disease Mother     Coronary artery disease Father     Breast cancer Sister     Heart disease Sister     Heart attack Brother     Ovarian cancer Maternal Aunt     Heart disease Maternal Grandmother     Gallbladder disease Maternal Grandmother     Lung cancer Paternal Grandfather     Heart disease Paternal Grandfather     Gallbladder disease Daughter     Craniosynostosis Daughter     Breast cancer Cousin     Breast cancer Cousin     Glaucoma Neg Hx     Macular degeneration Neg Hx     Retinal detachment Neg Hx        Review of patient's allergies indicates:   Allergen Reactions    Corticosteroids (glucocorticoids) Palpitations     "Mobic [meloxicam] Swelling    Sudafed [pseudoephedrine hcl] Other (See Comments)     Heart racing      Codeine Other (See Comments)     Heart racing    Gabapentin      Caused memory loss    Pseudoephedrine     Adhesive Rash     Specifically clear tegaderm dressing     Cortisone Palpitations       Current Outpatient Medications   Medication Sig    aspirin (ECOTRIN) 81 MG EC tablet Take 81 mg by mouth once daily.    blood sugar diagnostic Strp To check BG QD times daily, to use with insurance preferred meter    blood-glucose meter kit As directed    carboxymethylcellulose (REFRESH PLUS) 0.5 % Dpet 1 drop nightly.    cyclobenzaprine (FLEXERIL) 10 MG tablet Take 1 tablet (10 mg total) by mouth nightly as needed for Muscle spasms (spasms).    cycloSPORINE (RESTASIS) 0.05 % ophthalmic emulsion Place 1 drop into both eyes 2 (two) times daily.    DULoxetine (CYMBALTA) 60 MG capsule Take 1 capsule (60 mg total) by mouth once daily.    fluticasone propionate (FLONASE) 50 mcg/actuation nasal spray USE 2 SPRAYS IN EACH NOSTRIL ONCE DAILY    HYDROcodone-acetaminophen (NORCO) 5-325 mg per tablet Take 1 tablet by mouth every 6 (six) hours as needed for Pain.    ibuprofen (ADVIL,MOTRIN) 800 MG tablet Take 1 tablet (800 mg total) by mouth every 6 (six) hours as needed for Pain.    ketoconazole (NIZORAL) 2 % shampoo Apply topically every 7 days.    lancets Misc To check BG QD times daily, to use with insurance preferred meter    levocetirizine (XYZAL) 5 MG tablet TAKE 1 TABLET BY MOUTH EVERY NIGHT AS NEEDED FOR ALLERGIES    metoprolol succinate (TOPROL-XL) 25 MG 24 hr tablet TAKE 1 TABLET BY MOUTH EVERY DAY    needle, disp, 18 G (BD REGULAR BEVEL NEEDLES) 18 gauge x 1" Ndle USE TO INJECT 0.5ML OF TESTOSTERONE INTO THE MUCLE EVERY 14 DAYS    needle, disp, 25 gauge (BD PRECISIONGLIDE) 25 gauge x 1" Ndle USE TO DRAW UP 0.5ML OF TESTERONE EVERY 14 DAYS    rosuvastatin (CRESTOR) 10 MG tablet Take 1 tablet (10 mg total) by mouth once " daily.    testosterone cypionate (DEPOTESTOTERONE CYPIONATE) 200 mg/mL injection Inject 1 mL (200 mg total) into the muscle every 14 (fourteen) days.    valsartan (DIOVAN) 160 MG tablet TAKE 1 TABLET(160 MG) BY MOUTH EVERY DAY.     No current facility-administered medications for this visit.       ROS:  GENERAL: No fever. No chills. No fatigue. Denies weight loss. Denies weight gain.  HEENT: Denies headaches. Denies vision change. Denies eye pain. Denies double vision. Denies ear pain.   CV: Denies chest pain.   PULM: Denies of shortness of breath.  GI: Denies constipation. No diarrhea. No abdominal pain. Denies nausea. Denies vomiting. No blood in stool.  HEME: Denies bleeding problems.  : Denies urgency. No painful urination. No blood in urine.  MS: Denies joint stiffness. Denies joint swelling.  Denies back pain.  SKIN: Denies rash.   NEURO: Denies seizures. No weakness.  PSYCH:  Denies difficulty sleeping. No anxiety. Denies depression. No suicidal thoughts.       VITALS:   Vitals:    10/04/23 1006   Resp: 18   SpO2: 100%   Weight: 71.5 kg (157 lb 10.1 oz)   PainSc: 10-Worst pain ever   PainLoc: Arm         PHYSICAL EXAM:   GENERAL: Well appearing, in no acute distress, alert and oriented x3.  PSYCH:  Mood and affect appropriate.  SKIN: Skin color, texture, turgor normal, no rashes or lesions.  HEENT:  Normocephalic, atraumatic. Cranial nerves grossly intact.  NECK: No pain to palpation over the cervical paraspinous muscles. No pain to palpation over facets. No pain with neck flexion, extension. Normal ROM with neck flexion, about 50 degrees neck extension. Pain with right and left lateral flexion. Positive left Spurling's maneuver.   PULM: No evidence of respiratory difficulty, symmetric chest rise.  GI:  Non-distended  BACK: Normal range of motion. No pain to palpation over the spinous processes. No pain to palpation over facet joints. There is no pain with palpation over the sacroiliac joints bilaterally.    EXTREMITIES: No deformities, edema, or skin discoloration.   MUSCULOSKELETAL: Shoulder, hip, and knee provocative maneuvers are negative. No atrophy is noted.  NEURO: Sensation is equal and appropriate bilaterally. Bilateral upper and lower extremity strength is normal and symmetric. Bilateral upper and lower extremity coordination and muscle stretch reflexes are physiologic and symmetric. Plantar response are downgoing. Straight leg raising in the supine position is negative to radicular pain.   GAIT: normal.      LABS:      IMAGIN/24/2023  EXAMINATION:  MRI CERVICAL SPINE WITHOUT CONTRAST     CLINICAL HISTORY:  Spinal stenosis, cervical;Myelopathy, acute, cervical spine;     TECHNIQUE:  Multiplanar, multisequence MR images of the cervical spine were performed without the administration of contrast.     COMPARISON:  CTA head and neck: 2021.     FINDINGS:  Skull base and craniocervical junction: Prominent suspected arachnoid cyst at the paramedian left occiput.     C1-C2: Dens is intact.  Pre dens space is maintained.     Alignment: Loss of normal cervical lordosis.     Vertebrae: Postsurgical change of ACDF at the level of C5 through C7.  Vertebral heights are well maintained with no evidence of infiltrative process or acute fractures.     Discs: Normal height and signal elsewhere.     Cord: No signal abnormality is identified.     Degenerative findings:     C2-C3: Left-sided facet joint arthropathy, resulting in mild left-sided neural foraminal narrowing.  No significant spinal canal stenosis.     C3-C4: Posterior disc osteophyte complex with ligamentum flavum buckling resulting in mild spinal canal stenosis.  Mild right and minimal left neural foraminal narrowing.     C4-C5: Posterior disc osteophyte complex with ligamentum flavum buckling and left-sided facet joint arthropathy, resulting in mild spinal canal stenosis and mild to moderate right and moderate to severe left neural foraminal  narrowing.     C5-C6: Bilateral uncovertebral spurring and bilateral facet joint arthropathy, resulting in mild right and moderate left neural foraminal narrowing.  No significant spinal canal stenosis.     C6-C7: Posterior disc osteophyte complex and bilateral uncovertebral spurring, resulting in moderate bilateral neural foraminal narrowing.  No significant spinal canal stenosis.     C7-T1: Posterior disc osteophyte complex resulting in minimal acquired canal stenosis.  Minimal right and mild-to-moderate left neural foraminal narrowing.     Paraspinal muscles & soft tissues: Unremarkable.     Impression:     Postoperative changes of ACDF at C5 through C7.     Multilevel cervical spondylosis, most prominent at the level of C3-C4 through C6-7 levels, as above.     Additional findings, as above.     Electronically signed by resident: Saniya Dewey  Date:                                            04/24/2023  Time:                                           19:53     Electronically signed by: Michael Matute MD  Date:                                            04/24/2023  Time:                                           20:40      ASSESSMENT: 67 y.o. year old male with pain, consistent with:    Encounter Diagnoses   Name Primary?    Post concussion syndrome     Myelomalacia-cervical spine-H/O cervical fusion     DDD (degenerative disc disease), cervical        DISCUSSION: Mane Ramires is a gentleman with a history of a C5-7 ACDF in 2014. He comes to us with chronic left neck and shoulder pain. Imaging shows moderate to severe neural foraminal narrowing C3-C7. Exam shows pain reproduced with neck lateral flexion and left Spurling's maneuver. Reported improvement in pain with physical therapy and use of duloxetine.      PLAN:  Continue duloxetine use as patient is having excellent results in pain and mood with this medication.   Patient would like to trial a TENS unit and requested a prescription. TENS unit  prescription sent to his pharmacy on [address].   Return to office PRN to discuss further interventions.       I would like to thank Woody Crowe III, MD for the opportunity to assist in the care of this patient. We had a very nice visit and I look forward to continuing their care. Please let me know if I can be of further assistance.     Ankit Ornelas  10/04/2023

## 2023-10-10 ENCOUNTER — HOSPITAL ENCOUNTER (OUTPATIENT)
Dept: RADIOLOGY | Facility: OTHER | Age: 68
Discharge: HOME | End: 2023-10-10
Attending: ORTHOPAEDIC SURGERY
Payer: MEDICARE

## 2023-10-10 ENCOUNTER — OFFICE VISIT (OUTPATIENT)
Dept: ORTHOPEDICS | Facility: CLINIC | Age: 68
End: 2023-10-10
Payer: MEDICARE

## 2023-10-10 VITALS — BODY MASS INDEX: 26.8 KG/M2 | WEIGHT: 157 LBS | HEIGHT: 64 IN

## 2023-10-10 DIAGNOSIS — G56.23 CUBITAL TUNNEL SYNDROME OF BOTH UPPER EXTREMITIES: ICD-10-CM

## 2023-10-10 DIAGNOSIS — M79.641 RIGHT HAND PAIN: ICD-10-CM

## 2023-10-10 DIAGNOSIS — M79.642 LEFT HAND PAIN: ICD-10-CM

## 2023-10-10 DIAGNOSIS — G56.01 RIGHT CARPAL TUNNEL SYNDROME: ICD-10-CM

## 2023-10-10 DIAGNOSIS — M25.522 BILATERAL ELBOW JOINT PAIN: ICD-10-CM

## 2023-10-10 DIAGNOSIS — M25.521 BILATERAL ELBOW JOINT PAIN: ICD-10-CM

## 2023-10-10 DIAGNOSIS — M79.642 LEFT HAND PAIN: Primary | ICD-10-CM

## 2023-10-10 PROCEDURE — 1125F PR PAIN SEVERITY QUANTIFIED, PAIN PRESENT: ICD-10-PCS | Mod: HCNC,CPTII,S$GLB, | Performed by: ORTHOPAEDIC SURGERY

## 2023-10-10 PROCEDURE — 99204 PR OFFICE/OUTPT VISIT, NEW, LEVL IV, 45-59 MIN: ICD-10-PCS | Mod: HCNC,S$GLB,, | Performed by: ORTHOPAEDIC SURGERY

## 2023-10-10 PROCEDURE — 3061F PR NEG MICROALBUMINURIA RESULT DOCUMENTED/REVIEW: ICD-10-PCS | Mod: HCNC,CPTII,S$GLB, | Performed by: ORTHOPAEDIC SURGERY

## 2023-10-10 PROCEDURE — 1157F ADVNC CARE PLAN IN RCRD: CPT | Mod: HCNC,CPTII,S$GLB, | Performed by: ORTHOPAEDIC SURGERY

## 2023-10-10 PROCEDURE — 73130 X-RAY EXAM OF HAND: CPT | Mod: 26,HCNC,LT, | Performed by: STUDENT IN AN ORGANIZED HEALTH CARE EDUCATION/TRAINING PROGRAM

## 2023-10-10 PROCEDURE — 99999 PR PBB SHADOW E&M-EST. PATIENT-LVL IV: CPT | Mod: PBBFAC,HCNC,, | Performed by: ORTHOPAEDIC SURGERY

## 2023-10-10 PROCEDURE — 3044F PR MOST RECENT HEMOGLOBIN A1C LEVEL <7.0%: ICD-10-PCS | Mod: HCNC,CPTII,S$GLB, | Performed by: ORTHOPAEDIC SURGERY

## 2023-10-10 PROCEDURE — 3008F PR BODY MASS INDEX (BMI) DOCUMENTED: ICD-10-PCS | Mod: HCNC,CPTII,S$GLB, | Performed by: ORTHOPAEDIC SURGERY

## 2023-10-10 PROCEDURE — 1101F PR PT FALLS ASSESS DOC 0-1 FALLS W/OUT INJ PAST YR: ICD-10-PCS | Mod: HCNC,CPTII,S$GLB, | Performed by: ORTHOPAEDIC SURGERY

## 2023-10-10 PROCEDURE — 1157F PR ADVANCE CARE PLAN OR EQUIV PRESENT IN MEDICAL RECORD: ICD-10-PCS | Mod: HCNC,CPTII,S$GLB, | Performed by: ORTHOPAEDIC SURGERY

## 2023-10-10 PROCEDURE — 3061F NEG MICROALBUMINURIA REV: CPT | Mod: HCNC,CPTII,S$GLB, | Performed by: ORTHOPAEDIC SURGERY

## 2023-10-10 PROCEDURE — 3066F PR DOCUMENTATION OF TREATMENT FOR NEPHROPATHY: ICD-10-PCS | Mod: HCNC,CPTII,S$GLB, | Performed by: ORTHOPAEDIC SURGERY

## 2023-10-10 PROCEDURE — 3066F NEPHROPATHY DOC TX: CPT | Mod: HCNC,CPTII,S$GLB, | Performed by: ORTHOPAEDIC SURGERY

## 2023-10-10 PROCEDURE — 3008F BODY MASS INDEX DOCD: CPT | Mod: HCNC,CPTII,S$GLB, | Performed by: ORTHOPAEDIC SURGERY

## 2023-10-10 PROCEDURE — 1101F PT FALLS ASSESS-DOCD LE1/YR: CPT | Mod: HCNC,CPTII,S$GLB, | Performed by: ORTHOPAEDIC SURGERY

## 2023-10-10 PROCEDURE — 73130 X-RAY EXAM OF HAND: CPT | Mod: 26,HCNC,RT, | Performed by: STUDENT IN AN ORGANIZED HEALTH CARE EDUCATION/TRAINING PROGRAM

## 2023-10-10 PROCEDURE — 4010F ACE/ARB THERAPY RXD/TAKEN: CPT | Mod: HCNC,CPTII,S$GLB, | Performed by: ORTHOPAEDIC SURGERY

## 2023-10-10 PROCEDURE — 1125F AMNT PAIN NOTED PAIN PRSNT: CPT | Mod: HCNC,CPTII,S$GLB, | Performed by: ORTHOPAEDIC SURGERY

## 2023-10-10 PROCEDURE — 3288F PR FALLS RISK ASSESSMENT DOCUMENTED: ICD-10-PCS | Mod: HCNC,CPTII,S$GLB, | Performed by: ORTHOPAEDIC SURGERY

## 2023-10-10 PROCEDURE — 3288F FALL RISK ASSESSMENT DOCD: CPT | Mod: HCNC,CPTII,S$GLB, | Performed by: ORTHOPAEDIC SURGERY

## 2023-10-10 PROCEDURE — 4010F PR ACE/ARB THEARPY RXD/TAKEN: ICD-10-PCS | Mod: HCNC,CPTII,S$GLB, | Performed by: ORTHOPAEDIC SURGERY

## 2023-10-10 PROCEDURE — 73080 XR ELBOW COMPLETE 3 VIEW BILATERAL: ICD-10-PCS | Mod: 26,50,HCNC, | Performed by: STUDENT IN AN ORGANIZED HEALTH CARE EDUCATION/TRAINING PROGRAM

## 2023-10-10 PROCEDURE — 73130 X-RAY EXAM OF HAND: CPT | Mod: TC,HCNC,FY,RT

## 2023-10-10 PROCEDURE — 73080 X-RAY EXAM OF ELBOW: CPT | Mod: TC,50,HCNC,FY

## 2023-10-10 PROCEDURE — 99999 PR PBB SHADOW E&M-EST. PATIENT-LVL IV: ICD-10-PCS | Mod: PBBFAC,HCNC,, | Performed by: ORTHOPAEDIC SURGERY

## 2023-10-10 PROCEDURE — 73130 X-RAY EXAM OF HAND: CPT | Mod: TC,HCNC,FY,LT

## 2023-10-10 PROCEDURE — 99204 OFFICE O/P NEW MOD 45 MIN: CPT | Mod: HCNC,S$GLB,, | Performed by: ORTHOPAEDIC SURGERY

## 2023-10-10 PROCEDURE — 73130 XR HAND COMPLETE 3 VIEW RIGHT: ICD-10-PCS | Mod: 26,HCNC,RT, | Performed by: STUDENT IN AN ORGANIZED HEALTH CARE EDUCATION/TRAINING PROGRAM

## 2023-10-10 PROCEDURE — 73080 X-RAY EXAM OF ELBOW: CPT | Mod: 26,50,HCNC, | Performed by: STUDENT IN AN ORGANIZED HEALTH CARE EDUCATION/TRAINING PROGRAM

## 2023-10-10 PROCEDURE — 3044F HG A1C LEVEL LT 7.0%: CPT | Mod: HCNC,CPTII,S$GLB, | Performed by: ORTHOPAEDIC SURGERY

## 2023-10-10 NOTE — PROGRESS NOTES
DATE: 10/31/2023  PATIENT: Mane Ramires      CHIEF COMPLAINT: neck pain, pain in elbow and hand    HISTORY:  Mane Ramires is a 68 y.o. male wih pmhx of CVA, HTN, C5-6 ACDF in 2014 for myelomalacia, and DM2 (A1c:6.8) here for initial evaluation of neck and bilateral arm pain (Neck - 7, Arm - 8). The pain has been present for about 1 week. He states the pain increased after painting his house. The patient went to the ED on 4/24/23 for neck and bilateral arm pain x1week. He was referred to me for follow up. The patient describes the pain as sharp and electric. The pain is worse with any movement and improved by nothing. There is associated numbness and tingling. There is subjective weakness. Prior treatments have included advil, but no PT, RITA or recent surgery.     The patient denies myelopathic symptoms such as handwriting changes or difficulty with buttons/coins/keys. Denies perineal paresthesias, bowel/bladder dysfunction.    PAST MEDICAL/SURGICAL HISTORY:  Past Medical History:   Diagnosis Date    Accident     fell from roof with TBI (word finding issues personality changes, memory deficets), R rib fractures, clavicle fx    Allergy     Anxiety     ASD (atrial septal defect)     noted on ECHO 6/16    Cervical stenosis of spine     noted on 6/15 MRI    CTS (carpal tunnel syndrome)     mild-mod on 4/17 NCS    DDD (degenerative disc disease), cervical 10/2014    C5-6 and C6-C7    Depression     Diabetes     JEEVAN (generalized anxiety disorder)     Gender identity disorder     H/O cardiovascular stress test     12/14 normal    Herpes simplex type 2 infection     History of atypical hyperplasia of breast     B precancer lesions    History of cardiovascular stress test     normal 10/23    Hypertension     Hypertensive retinopathy     IGT (impaired glucose tolerance)     Myelomalacia     c-spine noted on 8/15 MRI    OA (osteoarthritis)     Pineal gland cyst     TBI (traumatic brain injury)     after falling  off a roof in 2003     Past Surgical History:   Procedure Laterality Date    AMPUTATION      L index finger    APPENDECTOMY  1969    BILATERAL SALPINGOOPHORECTOMY  02/2015    BIOPSY OF LIVER WITH ULTRASOUND GUIDANCE N/A 12/18/2018    Procedure: BIOPSY, LIVER, WITH US GUIDANCE;  Surgeon: Clifton Diagnostic Provider;  Location: [facility] OR [address] [place] [address];  Service: Radiology;  Laterality: N/A;  U/S GUIDED LIVER BIOPSY.  12/18/2018.  [facility] 7:30 AM  PROCEDURE 9 AM.  DR NITHIN GOLDEN / MARLON NUR.  DB 12/14/18 3:20P    CARPAL TUNNEL RELEASE  12/2017    right    CERVICAL FUSION  10/2014    C5-C6 and C6-C7    CHOLECYSTECTOMY      ENDOSCOPIC NASAL SEPTOPLASTY N/A 8/23/2018    Procedure: SEPTOPLASTY, NASAL, ENDOSCOPIC;  Surgeon: Reji Arce III, MD;  Location: [facility] OR [address] [place] [address];  Service: ENT;  Laterality: N/A;    FINGER AMPUTATION Left     FRACTURE SURGERY Right 2003    rib fractures    HYSTERECTOMY  1984    MIGUEL    MASTECTOMY  02/2015    RECONSTRUCTION OF NOSE N/A 8/23/2018    Procedure: RECONSTRUCTION, NOSE;  Surgeon: Reji Arce III, MD;  Location: [facility] OR [address] [place] [address];  Service: ENT;  Laterality: N/A;    ROTATOR CUFF REPAIR Right 2019    UPPER GASTROINTESTINAL ENDOSCOPY  09/06/2017    Dr. Ayon       Medications:  Current Outpatient Medications on File Prior to Visit   Medication Sig Dispense Refill    aspirin (ECOTRIN) 81 MG EC tablet Take 81 mg by mouth once daily.      blood-glucose meter kit As directed 1 each 0    carboxymethylcellulose (REFRESH PLUS) 0.5 % Dpet 1 drop nightly.      cyclobenzaprine (FLEXERIL) 10 MG tablet Take 1 tablet (10 mg total) by mouth nightly as needed for Muscle spasms (spasms). 90 tablet 1    cycloSPORINE (RESTASIS) 0.05 % ophthalmic emulsion Place 1 drop into both eyes 2 (two) times daily. 180 each 3    DULoxetine (CYMBALTA) 60 MG capsule Take 1 capsule (60 mg total) by mouth once daily. 90 capsule 3    fluticasone propionate (FLONASE) 50 mcg/actuation nasal spray USE 2 SPRAYS IN EACH  "NOSTRIL ONCE DAILY 16 mL 3    ketoconazole (NIZORAL) 2 % shampoo Apply topically every 7 days. 120 mL 6    lancets Misc To check BG QD times daily, to use with insurance preferred meter 100 each 5    levocetirizine (XYZAL) 5 MG tablet TAKE 1 TABLET BY MOUTH EVERY NIGHT AS NEEDED FOR ALLERGIES 90 tablet 2    needle, disp, 18 G (BD REGULAR BEVEL NEEDLES) 18 gauge x 1" Ndle USE TO INJECT 0.5ML OF TESTOSTERONE INTO THE MUCLE EVERY 14 DAYS 10 each 3    needle, disp, 25 gauge (BD PRECISIONGLIDE) 25 gauge x 1" Ndle USE TO DRAW UP 0.5ML OF TESTERONE EVERY 14 DAYS 10 each 3    rosuvastatin (CRESTOR) 10 MG tablet Take 1 tablet (10 mg total) by mouth once daily. 90 tablet 0    TENS unit and electrodes Cmpk 1 Units by Misc.(Non-Drug; Combo Route) route 3 (three) times daily as needed. 1 each 0    testosterone cypionate (DEPOTESTOTERONE CYPIONATE) 200 mg/mL injection Inject 1 mL (200 mg total) into the muscle every 14 (fourteen) days. 10 mL 3     No current facility-administered medications on file prior to visit.       Social History:   Social History     Socioeconomic History    Marital status: Single   Occupational History    Occupation: Artist   Tobacco Use    Smoking status: Former     Current packs/day: 0.00     Average packs/day: 0.3 packs/day for 27.8 years (7.0 ttl pk-yrs)     Types: Cigarettes     Start date:      Quit date: 2002     Years since quittin.0     Passive exposure: Past    Smokeless tobacco: Never    Tobacco comments:     did not smoke regularly   Substance and Sexual Activity    Alcohol use: Yes     Comment: rare     Drug use: Yes     Types: Hydrocodone    Sexual activity: Never     Social Determinants of Health     Financial Resource Strain: Low Risk  (3/30/2023)    Overall Financial Resource Strain (CARDIA)     Difficulty of Paying Living Expenses: Not hard at all   Food Insecurity: No Food Insecurity (3/30/2023)    Hunger Vital Sign     Worried About Running Out of Food in the Last Year: " "Never true     Ran Out of Food in the Last Year: Never true   Transportation Needs: No Transportation Needs (3/30/2023)    PRAPARE - Transportation     Lack of Transportation (Medical): No     Lack of Transportation (Non-Medical): No   Stress: No Stress Concern Present (3/30/2023)    [employer] - Occupational Stress Questionnaire     Feeling of Stress : Not at all   Social Connections: Unknown (3/30/2023)    Social Connection and Isolation Panel [NHANES]     Marital Status:    Housing Stability: Unknown (3/30/2023)    Housing Stability Vital Sign     Unable to Pay for Housing in the Last Year: No     Unstable Housing in the Last Year: No       REVIEW OF SYSTEMS:  Constitution: Negative. Negative for chills, fever and night sweats.   Cardiovascular: Negative for chest pain and syncope.   Respiratory: Negative for cough and shortness of breath.   Gastrointestinal: See HPI. Negative for nausea/vomiting. Negative for abdominal pain.  Genitourinary: See HPI. Negative for discoloration or dysuria.  Skin: Negative for dry skin, itching and rash.   Hematologic/Lymphatic: Negative for bleeding problem. Does not bruise/bleed easily.   Musculoskeletal: Negative for falls and muscle weakness.   Neurological: See HPI. No seizures.   Endocrine: Negative for polydipsia, polyphagia and polyuria.   Allergic/Immunologic: Negative for hives and persistent infections.  Psychiatric/Behavioral: Negative for depression and insomnia.         EXAM:  Ht 5' 4" (1.626 m)   Wt 71.2 kg (157 lb)   BMI 26.95 kg/m²     General: The patient is a very pleasant 68 y.o. male in no apparent distress, the patient is oriented to person, place and time.  Psych: Normal mood and affect  HEENT: Vision grossly intact, hearing intact to the spoken word.  Lungs: Respirations unlabored.  Gait: Normal station and gait, no difficulty with toe or heel walk.   Skin: Cervical skin negative for rashes,    Musculoskeletal: No " pain with the range of motion of the bilateral shoulders and elbows. Normal bulk and contour of the bilateral hands.  Vascular: Bilateral hands warm and well perfused, radial pulses 2+ bilaterally.  Neurological: Normal strength and tone in all major motor groups in the bilateral upper      IMAGING:   Elbow and hand reviewed- DJD  Cervical MRI shows mild stenosis at C4/5 with bilateral foraminal narrowing.      Body mass index is 26.95 kg/m².    Hemoglobin A1C   Date Value Ref Range Status   05/11/2023 6.4 (H) 4.0 - 5.6 % Final     Comment:     ADA Screening Guidelines:  5.7-6.4%  Consistent with prediabetes  >or=6.5%  Consistent with diabetes    High levels of fetal hemoglobin interfere with the HbA1C  assay. Heterozygous hemoglobin variants (HbS, HgC, etc)do  not significantly interfere with this assay.   However, presence of multiple variants may affect accuracy.     12/20/2022 6.8 (H) 4.0 - 5.6 % Final     Comment:     ADA Screening Guidelines:  5.7-6.4%  Consistent with prediabetes  >or=6.5%  Consistent with diabetes    High levels of fetal hemoglobin interfere with the HbA1C  assay. Heterozygous hemoglobin variants (HbS, HgC, etc)do  not significantly interfere with this assay.   However, presence of multiple variants may affect accuracy.     08/10/2022 5.8 (H) 4.0 - 5.6 % Final     Comment:     ADA Screening Guidelines:  5.7-6.4%  Consistent with prediabetes  >or=6.5%  Consistent with diabetes    High levels of fetal hemoglobin interfere with the HbA1C  assay. Heterozygous hemoglobin variants (HbS, HgC, etc)do  not significantly interfere with this assay.   However, presence of multiple variants may affect accuracy.             ASSESSMENT/PLAN:    OT for nerve glides, ROM for CTS and cubital tunnel

## 2023-10-10 NOTE — PROGRESS NOTES
Subjective:      Patient ID: Mane Ramires is a 68 y.o. male.    Chief Complaint: Pain of the Right Hand, Pain of the Left Elbow, and Pain of the Right Elbow      HPI  Mane Ramires is a right hand dominant 68 y.o. male presenting today for bilateral cubital tunnel.  Patient has had issues with numbness and tingling into both hands for the past year that was aggravated by helping move his daughter 2 months ago. He went to neurology who ordered a nerve test showing bilateral cubital tunnel. He is here for follow up and for treatment options for continued pain with associated numbness and tingling aggravated by position.     Review of patient's allergies indicates:   Allergen Reactions    Corticosteroids (glucocorticoids) Palpitations    Mobic [meloxicam] Swelling    Sudafed [pseudoephedrine hcl] Other (See Comments)     Heart racing      Codeine Other (See Comments)     Heart racing    Gabapentin      Caused memory loss    Pseudoephedrine     Adhesive Rash     Specifically clear tegaderm dressing     Cortisone Palpitations         Current Outpatient Medications   Medication Sig Dispense Refill    aspirin (ECOTRIN) 81 MG EC tablet Take 81 mg by mouth once daily.      blood sugar diagnostic Strp To check BG QD times daily, to use with insurance preferred meter 100 each 5    blood-glucose meter kit As directed 1 each 0    carboxymethylcellulose (REFRESH PLUS) 0.5 % Dpet 1 drop nightly.      cyclobenzaprine (FLEXERIL) 10 MG tablet Take 1 tablet (10 mg total) by mouth nightly as needed for Muscle spasms (spasms). 90 tablet 1    cycloSPORINE (RESTASIS) 0.05 % ophthalmic emulsion Place 1 drop into both eyes 2 (two) times daily. 180 each 3    DULoxetine (CYMBALTA) 60 MG capsule Take 1 capsule (60 mg total) by mouth once daily. 90 capsule 3    fluticasone propionate (FLONASE) 50 mcg/actuation nasal spray USE 2 SPRAYS IN EACH NOSTRIL ONCE DAILY 16 mL 3    HYDROcodone-acetaminophen (NORCO) 5-325 mg per tablet Take 1  "tablet by mouth every 6 (six) hours as needed for Pain. 5 tablet 0    ibuprofen (ADVIL,MOTRIN) 800 MG tablet Take 1 tablet (800 mg total) by mouth every 6 (six) hours as needed for Pain. 20 tablet 0    ketoconazole (NIZORAL) 2 % shampoo Apply topically every 7 days. 120 mL 6    lancets Misc To check BG QD times daily, to use with insurance preferred meter 100 each 5    levocetirizine (XYZAL) 5 MG tablet TAKE 1 TABLET BY MOUTH EVERY NIGHT AS NEEDED FOR ALLERGIES 90 tablet 2    metoprolol succinate (TOPROL-XL) 25 MG 24 hr tablet TAKE 1 TABLET BY MOUTH EVERY DAY 90 tablet 0    needle, disp, 18 G (BD REGULAR BEVEL NEEDLES) 18 gauge x 1" Ndle USE TO INJECT 0.5ML OF TESTOSTERONE INTO THE MUCLE EVERY 14 DAYS 10 each 3    needle, disp, 25 gauge (BD PRECISIONGLIDE) 25 gauge x 1" Ndle USE TO DRAW UP 0.5ML OF TESTERONE EVERY 14 DAYS 10 each 3    rosuvastatin (CRESTOR) 10 MG tablet Take 1 tablet (10 mg total) by mouth once daily. 90 tablet 0    TENS unit and electrodes Cmpk 1 Units by Misc.(Non-Drug; Combo Route) route 3 (three) times daily as needed. 1 each 0    testosterone cypionate (DEPOTESTOTERONE CYPIONATE) 200 mg/mL injection Inject 1 mL (200 mg total) into the muscle every 14 (fourteen) days. 10 mL 3    valsartan (DIOVAN) 160 MG tablet TAKE 1 TABLET(160 MG) BY MOUTH EVERY DAY. 90 tablet 0     No current facility-administered medications for this visit.       Past Medical History:   Diagnosis Date    Accident     fell from roof with TBI (word finding issues personality changes, memory deficets), R rib fractures, clavicle fx    Allergy     Anxiety     ASD (atrial septal defect)     noted on ECHO 6/16    Cervical stenosis of spine     noted on 6/15 MRI    CTS (carpal tunnel syndrome)     mild-mod on 4/17 NCS    DDD (degenerative disc disease), cervical 10/2014    C5-6 and C6-C7    Depression     Diabetes     JEEVAN (generalized anxiety disorder)     Gender identity disorder     H/O cardiovascular stress test     12/14 normal " "   Herpes simplex type 2 infection     History of atypical hyperplasia of breast     B precancer lesions    Hypertension     Hypertensive retinopathy     IGT (impaired glucose tolerance)     Myelomalacia     c-spine noted on 8/15 MRI    OA (osteoarthritis)     Pineal gland cyst     TBI (traumatic brain injury)     after falling off a roof in 2003       Past Surgical History:   Procedure Laterality Date    AMPUTATION      L index finger    APPENDECTOMY  1969    BILATERAL SALPINGOOPHORECTOMY  02/2015    BIOPSY OF LIVER WITH ULTRASOUND GUIDANCE N/A 12/18/2018    Procedure: BIOPSY, LIVER, WITH US GUIDANCE;  Surgeon: Dosdign[employer] Diagnostic Provider;  Location: [facility] OR [address] [place] [address];  Service: Radiology;  Laterality: N/A;  U/S GUIDED LIVER BIOPSY.  12/18/2018.  DOSC 7:30 AM  PROCEDURE 9 AM.  DR NITHIN GOLDEN / MARLON NUR.  DB 12/14/18 3:20P    CARPAL TUNNEL RELEASE  12/2017    right    CERVICAL FUSION  10/2014    C5-C6 and C6-C7    CHOLECYSTECTOMY      ENDOSCOPIC NASAL SEPTOPLASTY N/A 8/23/2018    Procedure: SEPTOPLASTY, NASAL, ENDOSCOPIC;  Surgeon: Reji Arce III, MD;  Location: [facility] OR [address] [place] [address];  Service: ENT;  Laterality: N/A;    FINGER AMPUTATION Left     FRACTURE SURGERY Right 2003    rib fractures    HYSTERECTOMY  1984    MIGUEL    MASTECTOMY  02/2015    RECONSTRUCTION OF NOSE N/A 8/23/2018    Procedure: RECONSTRUCTION, NOSE;  Surgeon: Reji Arce III, MD;  Location: [facility] OR [address] [place] [address];  Service: ENT;  Laterality: N/A;    ROTATOR CUFF REPAIR Right 2019    UPPER GASTROINTESTINAL ENDOSCOPY  09/06/2017    Dr. Ayon       Review of Systems:  Constitutional: Negative for chills and fever.   Respiratory: Negative for cough and shortness of breath.    Gastrointestinal: Negative for nausea and vomiting.   Skin: Negative for rash.   Neurological: Negative for dizziness and headaches.   Psychiatric/Behavioral: Negative for depression.   MSK as in HPI       OBJECTIVE:     PHYSICAL EXAM:  Ht 5' 4" (1.626 m)   Wt 71.2 kg " (157 lb)   BMI 26.95 kg/m²     GEN:  NAD, well-developed, well-groomed.  NEURO: Awake, alert, and oriented. Normal attention and concentration.    PSYCH: Normal mood and affect. Behavior is normal.  HEENT: No cervical lymphadenopathy noted.  CARDIOVASCULAR: Radial pulses 2+ bilaterally. No LE edema noted.  PULMONARY: Breath sounds normal. No respiratory distress.  SKIN: Intact, no rashes.      MSK:     RUE:  TTP at Lateral and medial epicondyles. Provocative numbness and tingling with elbow and wrist flexion of ulnar nerve distribution.   - Tinel of Median Nerve  + Tinel of Cubital Tunnel  - Phalen Test    - Durken's Test    Good active ROM of the wrist and fingers. AIN/PIN/Radial/Median/Ulnar Nerves assessed in isolation without deficit. Radial & Ulnar arteries palpated 2+. Capillary Refill <3s.    LUE:  Non TTP. Provocative numbness and tingling with elbow and wrist flexion of ulnar nerve distribution.   - Tinel of Median Nerve  + Tinel of Cubital Tunnel  - Phalen Test    - Durken's Test    Good active ROM of the wrist and fingers. AIN/PIN/Radial/Median/Ulnar Nerves assessed in isolation without deficit. Radial & Ulnar arteries palpated 2+. Capillary Refill <3s.      RADIOGRAPHS:  10/10/23 Bi Elbow XR  FINDINGS:  Left: No fracture or dislocation.  No fat pad elevation.  Cartilage spaces are maintained.  Mild spurring about the medial and lateral epicondyles, unchanged.  Soft tissues are unremarkable.     Right: No fracture or dislocation. No fat pad elevation. Cartilage spaces are maintained.  Mild spurring about the medial and lateral epicondyles.  Soft tissues are unremarkable.    10/10/23 R Hand XR  FINDINGS:  No fracture or dislocation.     Similar calcific density overlying the TFCC, which may relate to sequela of remote trauma.     Minimal degenerative change about the 1st carpometacarpal and 1st interphalangeal joint spaces.  Additional similar degenerative change about the distal interphalangeal joints  with minimal marginal osteophytosis.     Soft tissues are unremarkable.  Comments: I have personally reviewed the imaging and I agree with the above radiologist's report.    ASSESSMENT/PLAN:       ICD-10-CM ICD-9-CM   1. Right carpal tunnel syndrome  G56.01 354.0   2. Cubital tunnel syndrome of both upper extremities  G56.23 354.2          No orders of the defined types were placed in this encounter.      Plan:   - Therapy with Dry Needling, Nerve Glides and Deep Tissue Massage  - f/u in clinic in 6 weeks or if symptoms are not improving.         The patient indicates understanding of these issues and agrees to the plan.    Nakul Gomez PA-C, ATC  [facility]   [facility]  [place], [place]

## 2023-10-11 ENCOUNTER — PATIENT MESSAGE (OUTPATIENT)
Dept: FAMILY MEDICINE | Facility: CLINIC | Age: 68
End: 2023-10-11
Payer: MEDICARE

## 2023-10-12 RX ORDER — FAMCICLOVIR 500 MG/1
500 TABLET ORAL 3 TIMES DAILY
Qty: 21 TABLET | Refills: 0 | Status: SHIPPED | OUTPATIENT
Start: 2023-10-12 | End: 2023-10-19

## 2023-10-16 ENCOUNTER — PATIENT MESSAGE (OUTPATIENT)
Dept: FAMILY MEDICINE | Facility: CLINIC | Age: 68
End: 2023-10-16

## 2023-10-16 ENCOUNTER — OFFICE VISIT (OUTPATIENT)
Dept: FAMILY MEDICINE | Facility: CLINIC | Age: 68
End: 2023-10-16
Payer: MEDICARE

## 2023-10-16 VITALS
OXYGEN SATURATION: 97 % | TEMPERATURE: 97 F | WEIGHT: 160.06 LBS | HEART RATE: 75 BPM | BODY MASS INDEX: 27.33 KG/M2 | SYSTOLIC BLOOD PRESSURE: 126 MMHG | RESPIRATION RATE: 18 BRPM | HEIGHT: 64 IN | DIASTOLIC BLOOD PRESSURE: 82 MMHG

## 2023-10-16 DIAGNOSIS — E78.5 HYPERLIPIDEMIA ASSOCIATED WITH TYPE 2 DIABETES MELLITUS: ICD-10-CM

## 2023-10-16 DIAGNOSIS — E11.59 HYPERTENSION ASSOCIATED WITH DIABETES: ICD-10-CM

## 2023-10-16 DIAGNOSIS — Z12.11 COLON CANCER SCREENING: ICD-10-CM

## 2023-10-16 DIAGNOSIS — I10 HYPERTENSION, ESSENTIAL: ICD-10-CM

## 2023-10-16 DIAGNOSIS — R07.89 ATYPICAL CHEST PAIN: ICD-10-CM

## 2023-10-16 DIAGNOSIS — Z23 IMMUNIZATION DUE: ICD-10-CM

## 2023-10-16 DIAGNOSIS — I15.2 HYPERTENSION ASSOCIATED WITH DIABETES: ICD-10-CM

## 2023-10-16 DIAGNOSIS — E11.8 DIABETES MELLITUS TYPE 2 WITH COMPLICATIONS: Primary | ICD-10-CM

## 2023-10-16 DIAGNOSIS — E11.69 HYPERLIPIDEMIA ASSOCIATED WITH TYPE 2 DIABETES MELLITUS: ICD-10-CM

## 2023-10-16 PROCEDURE — 1125F PR PAIN SEVERITY QUANTIFIED, PAIN PRESENT: ICD-10-PCS | Mod: CPTII,S$GLB,, | Performed by: FAMILY MEDICINE

## 2023-10-16 PROCEDURE — 3008F BODY MASS INDEX DOCD: CPT | Mod: CPTII,S$GLB,, | Performed by: FAMILY MEDICINE

## 2023-10-16 PROCEDURE — 93005 EKG 12-LEAD: ICD-10-PCS | Mod: S$GLB,,, | Performed by: FAMILY MEDICINE

## 2023-10-16 PROCEDURE — 4010F PR ACE/ARB THEARPY RXD/TAKEN: ICD-10-PCS | Mod: CPTII,S$GLB,, | Performed by: FAMILY MEDICINE

## 2023-10-16 PROCEDURE — 82043 UR ALBUMIN QUANTITATIVE: CPT | Mod: HCNC | Performed by: FAMILY MEDICINE

## 2023-10-16 PROCEDURE — 3079F DIAST BP 80-89 MM HG: CPT | Mod: CPTII,S$GLB,, | Performed by: FAMILY MEDICINE

## 2023-10-16 PROCEDURE — 99214 PR OFFICE/OUTPT VISIT, EST, LEVL IV, 30-39 MIN: ICD-10-PCS | Mod: 25,S$GLB,, | Performed by: FAMILY MEDICINE

## 2023-10-16 PROCEDURE — 1101F PT FALLS ASSESS-DOCD LE1/YR: CPT | Mod: CPTII,S$GLB,, | Performed by: FAMILY MEDICINE

## 2023-10-16 PROCEDURE — 3008F PR BODY MASS INDEX (BMI) DOCUMENTED: ICD-10-PCS | Mod: CPTII,S$GLB,, | Performed by: FAMILY MEDICINE

## 2023-10-16 PROCEDURE — 90694 VACC AIIV4 NO PRSRV 0.5ML IM: CPT | Mod: S$GLB,,, | Performed by: FAMILY MEDICINE

## 2023-10-16 PROCEDURE — 3288F FALL RISK ASSESSMENT DOCD: CPT | Mod: CPTII,S$GLB,, | Performed by: FAMILY MEDICINE

## 2023-10-16 PROCEDURE — 3044F HG A1C LEVEL LT 7.0%: CPT | Mod: CPTII,S$GLB,, | Performed by: FAMILY MEDICINE

## 2023-10-16 PROCEDURE — G0009 PNEUMOCOCCAL CONJUGATE VACCINE 20-VALENT: ICD-10-PCS | Mod: S$GLB,,, | Performed by: FAMILY MEDICINE

## 2023-10-16 PROCEDURE — 90677 PNEUMOCOCCAL CONJUGATE VACCINE 20-VALENT: ICD-10-PCS | Mod: S$GLB,,, | Performed by: FAMILY MEDICINE

## 2023-10-16 PROCEDURE — 1159F PR MEDICATION LIST DOCUMENTED IN MEDICAL RECORD: ICD-10-PCS | Mod: CPTII,S$GLB,, | Performed by: FAMILY MEDICINE

## 2023-10-16 PROCEDURE — 99214 OFFICE O/P EST MOD 30 MIN: CPT | Mod: 25,S$GLB,, | Performed by: FAMILY MEDICINE

## 2023-10-16 PROCEDURE — 1101F PR PT FALLS ASSESS DOC 0-1 FALLS W/OUT INJ PAST YR: ICD-10-PCS | Mod: CPTII,S$GLB,, | Performed by: FAMILY MEDICINE

## 2023-10-16 PROCEDURE — 4010F ACE/ARB THERAPY RXD/TAKEN: CPT | Mod: CPTII,S$GLB,, | Performed by: FAMILY MEDICINE

## 2023-10-16 PROCEDURE — G0009 ADMIN PNEUMOCOCCAL VACCINE: HCPCS | Mod: S$GLB,,, | Performed by: FAMILY MEDICINE

## 2023-10-16 PROCEDURE — 93010 EKG 12-LEAD: ICD-10-PCS | Mod: S$GLB,,, | Performed by: INTERNAL MEDICINE

## 2023-10-16 PROCEDURE — G0008 FLU VACCINE - QUADRIVALENT - ADJUVANTED: ICD-10-PCS | Mod: S$GLB,,, | Performed by: FAMILY MEDICINE

## 2023-10-16 PROCEDURE — 3288F PR FALLS RISK ASSESSMENT DOCUMENTED: ICD-10-PCS | Mod: CPTII,S$GLB,, | Performed by: FAMILY MEDICINE

## 2023-10-16 PROCEDURE — 93010 ELECTROCARDIOGRAM REPORT: CPT | Mod: S$GLB,,, | Performed by: INTERNAL MEDICINE

## 2023-10-16 PROCEDURE — 3074F SYST BP LT 130 MM HG: CPT | Mod: CPTII,S$GLB,, | Performed by: FAMILY MEDICINE

## 2023-10-16 PROCEDURE — 1157F PR ADVANCE CARE PLAN OR EQUIV PRESENT IN MEDICAL RECORD: ICD-10-PCS | Mod: CPTII,S$GLB,, | Performed by: FAMILY MEDICINE

## 2023-10-16 PROCEDURE — 1159F MED LIST DOCD IN RCRD: CPT | Mod: CPTII,S$GLB,, | Performed by: FAMILY MEDICINE

## 2023-10-16 PROCEDURE — 1160F RVW MEDS BY RX/DR IN RCRD: CPT | Mod: CPTII,S$GLB,, | Performed by: FAMILY MEDICINE

## 2023-10-16 PROCEDURE — 90677 PCV20 VACCINE IM: CPT | Mod: S$GLB,,, | Performed by: FAMILY MEDICINE

## 2023-10-16 PROCEDURE — 3044F PR MOST RECENT HEMOGLOBIN A1C LEVEL <7.0%: ICD-10-PCS | Mod: CPTII,S$GLB,, | Performed by: FAMILY MEDICINE

## 2023-10-16 PROCEDURE — 93005 ELECTROCARDIOGRAM TRACING: CPT | Mod: S$GLB,,, | Performed by: FAMILY MEDICINE

## 2023-10-16 PROCEDURE — 1160F PR REVIEW ALL MEDS BY PRESCRIBER/CLIN PHARMACIST DOCUMENTED: ICD-10-PCS | Mod: CPTII,S$GLB,, | Performed by: FAMILY MEDICINE

## 2023-10-16 PROCEDURE — 90694 FLU VACCINE - QUADRIVALENT - ADJUVANTED: ICD-10-PCS | Mod: S$GLB,,, | Performed by: FAMILY MEDICINE

## 2023-10-16 PROCEDURE — 1157F ADVNC CARE PLAN IN RCRD: CPT | Mod: CPTII,S$GLB,, | Performed by: FAMILY MEDICINE

## 2023-10-16 PROCEDURE — G0008 ADMIN INFLUENZA VIRUS VAC: HCPCS | Mod: S$GLB,,, | Performed by: FAMILY MEDICINE

## 2023-10-16 PROCEDURE — 1125F AMNT PAIN NOTED PAIN PRSNT: CPT | Mod: CPTII,S$GLB,, | Performed by: FAMILY MEDICINE

## 2023-10-16 PROCEDURE — 3074F PR MOST RECENT SYSTOLIC BLOOD PRESSURE < 130 MM HG: ICD-10-PCS | Mod: CPTII,S$GLB,, | Performed by: FAMILY MEDICINE

## 2023-10-16 PROCEDURE — 3079F PR MOST RECENT DIASTOLIC BLOOD PRESSURE 80-89 MM HG: ICD-10-PCS | Mod: CPTII,S$GLB,, | Performed by: FAMILY MEDICINE

## 2023-10-16 RX ORDER — TRAZODONE HYDROCHLORIDE 50 MG/1
50 TABLET ORAL NIGHTLY
Qty: 30 TABLET | Refills: 1 | Status: SHIPPED | OUTPATIENT
Start: 2023-10-16 | End: 2023-11-08

## 2023-10-16 RX ORDER — POVIDONE 50 MG/10ML
SOLUTION/ DROPS OPHTHALMIC
COMMUNITY

## 2023-10-16 NOTE — PROGRESS NOTES
Subjective:       Patient ID: Mane Ramires is a 68 y.o. male.    Chief Complaint: Follow-up    HPI  The patient is a 68-year-old who is here today for follow-up.  He is moving to [place] to live with his daughter and grandchildren.  He will come back here occasionally for his medical care.  He does have some upcoming appointment with specialists.    Today we discussed the followin)  Diabetes.  In August, his A1c was 6.4.  He is managing his diabetes with lifestyle interventions.  2)  Hypertension.  Today's blood pressure is 126/82.  He is taking Diovan and Toprol which he is tolerating well  3)  Hyperlipidemia.  In August, his total cholesterol was 156 and his LDL was 92.  He is taking his Crestor consistently.  4) chest pain.  Last night and this morning he experienced chest pain on the left side of his chest.  Last night he is laying in bed thinking all of the things that he had to do for the move and he had a faint pinching sensation in left side of his chest.  This morning, he was talking to his daughter about some family issues and he had a slight pressure in the left side of his chest.  Both bouts of chest pain lasted for just minutes.  Neither bout was associated with shortness of breath diaphoresis nausea or vomiting.      Review of systems is remarkable for insomnia.  He is waking up 4-5 times at night.  We did discuss trying trazodone which she would be interested in doing      Review of Systems   Constitutional:  Negative for appetite change, chills, diaphoresis, fatigue, fever and unexpected weight change.   HENT:  Negative for congestion, ear pain, postnasal drip, rhinorrhea, sinus pressure, sneezing, sore throat and trouble swallowing.    Eyes:  Negative for pain, discharge and visual disturbance.   Respiratory:  Negative for cough, chest tightness, shortness of breath and wheezing.    Cardiovascular:  Negative for chest pain, palpitations and leg swelling.   Gastrointestinal:  Negative  for abdominal distention, abdominal pain, blood in stool, constipation, diarrhea, nausea and vomiting.   Musculoskeletal:  Positive for arthralgias.   Skin:  Negative for rash.   Neurological:  Positive for numbness.   Psychiatric/Behavioral:  Positive for sleep disturbance. Negative for confusion, dysphoric mood, self-injury and suicidal ideas. The patient is not nervous/anxious and is not hyperactive.          Objective:      Physical Exam  Constitutional:       General: He is not in acute distress.     Appearance: Normal appearance. He is well-developed.   HENT:      Head: Normocephalic and atraumatic.      Right Ear: Hearing, tympanic membrane, ear canal and external ear normal.      Left Ear: Hearing, tympanic membrane, ear canal and external ear normal.      Nose: Nose normal.      Mouth/Throat:      Mouth: No oral lesions.      Pharynx: No oropharyngeal exudate or posterior oropharyngeal erythema.   Eyes:      General: Lids are normal. No scleral icterus.     Extraocular Movements: Extraocular movements intact.      Conjunctiva/sclera: Conjunctivae normal.      Pupils: Pupils are equal, round, and reactive to light.   Neck:      Thyroid: No thyroid mass or thyromegaly.      Vascular: No carotid bruit.   Cardiovascular:      Rate and Rhythm: Normal rate and regular rhythm. No extrasystoles are present.     Chest Wall: PMI is not displaced.      Pulses:           Dorsalis pedis pulses are 2+ on the right side and 2+ on the left side.        Posterior tibial pulses are 2+ on the right side and 2+ on the left side.      Heart sounds: Normal heart sounds. No murmur heard.     No gallop.   Pulmonary:      Effort: Pulmonary effort is normal. No accessory muscle usage or respiratory distress.      Breath sounds: Normal breath sounds.   Abdominal:      General: Bowel sounds are normal. There is no abdominal bruit.      Palpations: Abdomen is soft.      Tenderness: There is no abdominal tenderness. There is no rebound.  "  Musculoskeletal:      Cervical back: Normal range of motion and neck supple.      Right foot: No deformity.      Left foot: No deformity.   Feet:      Right foot:      Protective Sensation: 10 sites tested.  10 sites sensed.      Skin integrity: Warmth present. No ulcer or callus.      Left foot:      Protective Sensation: 10 sites tested.  10 sites sensed.      Skin integrity: Warmth present. No ulcer or callus.   Lymphadenopathy:      Head:      Right side of head: No submental or submandibular adenopathy.      Left side of head: No submental or submandibular adenopathy.      Cervical:      Right cervical: No superficial, deep or posterior cervical adenopathy.     Left cervical: No superficial, deep or posterior cervical adenopathy.      Upper Body:      Right upper body: No supraclavicular adenopathy.      Left upper body: No supraclavicular adenopathy.   Skin:     General: Skin is warm and dry.   Neurological:      Mental Status: He is alert and oriented to person, place, and time.       Blood pressure 126/82, pulse 75, temperature 97.3 °F (36.3 °C), resp. rate 18, height 5' 4" (1.626 m), weight 72.6 kg (160 lb 0.9 oz), SpO2 97 %.Body mass index is 27.47 kg/m².            A/P:  1)  Diabetes.  Well controlled.  Continue with lifestyle interventions.  We will check a urine microalbumin today.  We will check an A1c again in February   2)  Hypertension.  Well controlled.  Continue with Diovan and Toprol  3)  Hyperlipidemia.  Well controlled.  We will continue with Crestor.  We will recheck fasting lipid profile in the summer  4) insomnia.  New.  We are going to try trazodone.  We could increase this dose if needed.    5) atypical chest pain.  I suspect this is stress related but he does have risk factors for cardiovascular disease.  Due to his risk factors for cardiovascular disease and his current symptoms, we are going to schedule him for a stress test.  6)  health maintenance issues.  He will receive his flu " and Prevnar vaccine today.  He will do a fit kit for colon cancer screening.      As long as he does well, we will check an A1c in February and I will see him back in 9 months with fasting labs prior to that visit.

## 2023-10-17 ENCOUNTER — CLINICAL SUPPORT (OUTPATIENT)
Dept: AUDIOLOGY | Facility: CLINIC | Age: 68
End: 2023-10-17
Payer: MEDICARE

## 2023-10-17 ENCOUNTER — PATIENT MESSAGE (OUTPATIENT)
Dept: FAMILY MEDICINE | Facility: CLINIC | Age: 68
End: 2023-10-17
Payer: MEDICARE

## 2023-10-17 ENCOUNTER — OFFICE VISIT (OUTPATIENT)
Dept: OTOLARYNGOLOGY | Facility: CLINIC | Age: 68
End: 2023-10-17
Payer: MEDICARE

## 2023-10-17 DIAGNOSIS — H91.11 PRESBYCUSIS OF RIGHT EAR WITH RESTRICTED HEARING OF LEFT EAR: ICD-10-CM

## 2023-10-17 DIAGNOSIS — H93.13 TINNITUS, BILATERAL: ICD-10-CM

## 2023-10-17 DIAGNOSIS — H90.3 SENSORINEURAL HEARING LOSS (SNHL) OF BOTH EARS: Primary | ICD-10-CM

## 2023-10-17 DIAGNOSIS — H90.A21 SENSORINEURAL HEARING LOSS (SNHL) OF RIGHT EAR WITH RESTRICTED HEARING OF LEFT EAR: ICD-10-CM

## 2023-10-17 DIAGNOSIS — F07.81 POST CONCUSSION SYNDROME: ICD-10-CM

## 2023-10-17 LAB
ALBUMIN/CREAT UR: 6.4 UG/MG (ref 0–30)
CREAT UR-MCNC: 140 MG/DL (ref 23–375)
MICROALBUMIN UR DL<=1MG/L-MCNC: 9 UG/ML

## 2023-10-17 PROCEDURE — 4010F ACE/ARB THERAPY RXD/TAKEN: CPT | Mod: HCNC,CPTII,S$GLB, | Performed by: OTOLARYNGOLOGY

## 2023-10-17 PROCEDURE — 1159F PR MEDICATION LIST DOCUMENTED IN MEDICAL RECORD: ICD-10-PCS | Mod: HCNC,CPTII,S$GLB, | Performed by: OTOLARYNGOLOGY

## 2023-10-17 PROCEDURE — 3044F HG A1C LEVEL LT 7.0%: CPT | Mod: HCNC,CPTII,S$GLB, | Performed by: OTOLARYNGOLOGY

## 2023-10-17 PROCEDURE — 1126F PR PAIN SEVERITY QUANTIFIED, NO PAIN PRESENT: ICD-10-PCS | Mod: HCNC,CPTII,S$GLB, | Performed by: OTOLARYNGOLOGY

## 2023-10-17 PROCEDURE — 4010F PR ACE/ARB THEARPY RXD/TAKEN: ICD-10-PCS | Mod: HCNC,CPTII,S$GLB, | Performed by: OTOLARYNGOLOGY

## 2023-10-17 PROCEDURE — 1157F PR ADVANCE CARE PLAN OR EQUIV PRESENT IN MEDICAL RECORD: ICD-10-PCS | Mod: HCNC,CPTII,S$GLB, | Performed by: OTOLARYNGOLOGY

## 2023-10-17 PROCEDURE — 1126F AMNT PAIN NOTED NONE PRSNT: CPT | Mod: HCNC,CPTII,S$GLB, | Performed by: OTOLARYNGOLOGY

## 2023-10-17 PROCEDURE — 3061F PR NEG MICROALBUMINURIA RESULT DOCUMENTED/REVIEW: ICD-10-PCS | Mod: HCNC,CPTII,S$GLB, | Performed by: OTOLARYNGOLOGY

## 2023-10-17 PROCEDURE — 92550 PR TYMPANOMETRY AND REFLEX THRESHOLD MEASUREMENTS: ICD-10-PCS | Mod: HCNC,S$GLB,,

## 2023-10-17 PROCEDURE — 1101F PT FALLS ASSESS-DOCD LE1/YR: CPT | Mod: HCNC,CPTII,S$GLB, | Performed by: OTOLARYNGOLOGY

## 2023-10-17 PROCEDURE — 3061F NEG MICROALBUMINURIA REV: CPT | Mod: HCNC,CPTII,S$GLB, | Performed by: OTOLARYNGOLOGY

## 2023-10-17 PROCEDURE — 1157F ADVNC CARE PLAN IN RCRD: CPT | Mod: HCNC,CPTII,S$GLB, | Performed by: OTOLARYNGOLOGY

## 2023-10-17 PROCEDURE — 1101F PR PT FALLS ASSESS DOC 0-1 FALLS W/OUT INJ PAST YR: ICD-10-PCS | Mod: HCNC,CPTII,S$GLB, | Performed by: OTOLARYNGOLOGY

## 2023-10-17 PROCEDURE — 1159F MED LIST DOCD IN RCRD: CPT | Mod: HCNC,CPTII,S$GLB, | Performed by: OTOLARYNGOLOGY

## 2023-10-17 PROCEDURE — 3288F PR FALLS RISK ASSESSMENT DOCUMENTED: ICD-10-PCS | Mod: HCNC,CPTII,S$GLB, | Performed by: OTOLARYNGOLOGY

## 2023-10-17 PROCEDURE — 3044F PR MOST RECENT HEMOGLOBIN A1C LEVEL <7.0%: ICD-10-PCS | Mod: HCNC,CPTII,S$GLB, | Performed by: OTOLARYNGOLOGY

## 2023-10-17 PROCEDURE — 99214 OFFICE O/P EST MOD 30 MIN: CPT | Mod: HCNC,S$GLB,, | Performed by: OTOLARYNGOLOGY

## 2023-10-17 PROCEDURE — 92550 TYMPANOMETRY & REFLEX THRESH: CPT | Mod: HCNC,S$GLB,,

## 2023-10-17 PROCEDURE — 92557 PR COMPREHENSIVE HEARING TEST: ICD-10-PCS | Mod: HCNC,S$GLB,,

## 2023-10-17 PROCEDURE — 99999 PR PBB SHADOW E&M-EST. PATIENT-LVL IV: CPT | Mod: PBBFAC,HCNC,, | Performed by: OTOLARYNGOLOGY

## 2023-10-17 PROCEDURE — 3066F PR DOCUMENTATION OF TREATMENT FOR NEPHROPATHY: ICD-10-PCS | Mod: HCNC,CPTII,S$GLB, | Performed by: OTOLARYNGOLOGY

## 2023-10-17 PROCEDURE — 99214 PR OFFICE/OUTPT VISIT, EST, LEVL IV, 30-39 MIN: ICD-10-PCS | Mod: HCNC,S$GLB,, | Performed by: OTOLARYNGOLOGY

## 2023-10-17 PROCEDURE — 99999 PR PBB SHADOW E&M-EST. PATIENT-LVL IV: ICD-10-PCS | Mod: PBBFAC,HCNC,, | Performed by: OTOLARYNGOLOGY

## 2023-10-17 PROCEDURE — 3066F NEPHROPATHY DOC TX: CPT | Mod: HCNC,CPTII,S$GLB, | Performed by: OTOLARYNGOLOGY

## 2023-10-17 PROCEDURE — 3288F FALL RISK ASSESSMENT DOCD: CPT | Mod: HCNC,CPTII,S$GLB, | Performed by: OTOLARYNGOLOGY

## 2023-10-17 PROCEDURE — 92557 COMPREHENSIVE HEARING TEST: CPT | Mod: HCNC,S$GLB,,

## 2023-10-17 RX ORDER — LANCETS
100 EACH MISCELLANEOUS DAILY
Qty: 100 EACH | Refills: 0 | Status: SHIPPED | OUTPATIENT
Start: 2023-10-17

## 2023-10-17 RX ORDER — METOPROLOL SUCCINATE 25 MG/1
25 TABLET, EXTENDED RELEASE ORAL DAILY
Qty: 90 TABLET | Refills: 0 | Status: SHIPPED | OUTPATIENT
Start: 2023-10-17 | End: 2024-01-17

## 2023-10-17 RX ORDER — VALSARTAN 160 MG/1
160 TABLET ORAL DAILY
Qty: 90 TABLET | Refills: 0 | Status: SHIPPED | OUTPATIENT
Start: 2023-10-17 | End: 2024-01-17

## 2023-10-17 NOTE — PROGRESS NOTES
Mane Ramires was seen today in the clinic for an audiologic evaluation.  Patient's main complaint was hearing loss.  Mr. Ramires reported noticing a decrease in hearing bilaterally and also mentioned experiencing bilateral bothersome tinnitus. He previously experienced episodes of vertigo that have since improved. Mr. Ramires wears binaural amplification that was purchased elsewhere. He denied otalgia and aural fullness.     Tympanometry revealed Type A in the right ear and Type A in the left ear.     Audiogram results revealed a slight sloping to moderately-severe sensorineural hearing loss in the right ear and normal hearing precipitously sloping to a moderately-severe sensorineural hearing loss in the left ear. Ipsilateral and contralateral acoustic reflexes were completed. Right and left ipsilateral reflexes were present at 500 Hz - 1000 Hz and were absent at all other frequencies. Right contralateral reflexes were absent at all frequencies. Left contralateral reflexes were present at 500 Hz - 1000 Hz and absent at all other frequencies.     Speech reception thresholds were noted at 25 dB in the right ear and 15 dB in the left ear.    Speech discrimination scores were 88% in the right ear and 84% in the left ear.    Recommendations:  Otologic evaluation  Follow-up with an audiologist for hearing aid adjustments as needed  Annual audiogram, or sooner if any changes in hearing are noted  Hearing protection when in noise

## 2023-10-17 NOTE — PROGRESS NOTES
Subjective     Patient ID: Mane Ramires is a 68 y.o. male.    Chief Complaint: Hearing Loss    Hearing Loss: No fever.      Mane Ramires is a 68 y.o. male presents for evaluation of asymmetric right hearing loss.  He has a history of a CVA 2-1/2 years ago.  He reports that 2016 he had a hearing test which showed symmetric sensorineural hearing loss.  Then had a hearing test more recently since the stroke which showed an asymmetry in his right ear.  He wants to know why this may have happened.  Denies any other ear problems.  He was not aware of the asymmetry until it was pointed out to him on the audiogram.  He is a regular hearing aid user      Review of Systems   Constitutional:  Negative for chills and fever.   HENT:  Positive for hearing loss. Negative for sore throat and trouble swallowing.    Respiratory:  Negative for apnea and chest tightness.    Cardiovascular:  Negative for chest pain.          Objective     Physical Exam  Vitals and nursing note reviewed.   Constitutional:       Appearance: Normal appearance.   HENT:      Head: Normocephalic and atraumatic.      Right Ear: Tympanic membrane, ear canal and external ear normal. There is no impacted cerumen.      Left Ear: Tympanic membrane, ear canal and external ear normal. There is no impacted cerumen.   Neurological:      Mental Status: He is alert.       Audiogram independently reviewed by me shows bilateral sensorineural hearing loss with slight asymmetry of the right ear.       Assessment and Plan     1. Post concussion syndrome  Overview:  Headaches, confusion, ataxia, vision change occurring after a fall on 4/26/18 with posterior head trauma and LOC 15-20 min, symptoms improved but not resolved 3 months out.     Orders:  -     Ambulatory consult to ENT    2. Presbycusis of right ear with restricted hearing of left ear  -     Ambulatory consult to ENT    3. Sensorineural hearing loss (SNHL) of right ear with restricted hearing of left  ear  -     Ambulatory referral/consult to Audiology; Future; Expected date: 10/19/2023  -     MRI IAC/Temporal Bones W W/O Contrast; Future; Expected date: 10/17/2023      Discussed the problem in detail recommend MRI IAC to rule out CPA or IAC lesion.    Continue hearing aids.         No follow-ups on file.

## 2023-10-20 ENCOUNTER — PATIENT MESSAGE (OUTPATIENT)
Dept: FAMILY MEDICINE | Facility: CLINIC | Age: 68
End: 2023-10-20
Payer: MEDICARE

## 2023-10-22 NOTE — TELEPHONE ENCOUNTER
No care due was identified.  [facility] Embedded Care Due Messages. Reference number: [id number].   10/22/2023 11:08:57 AM CDT

## 2023-10-23 ENCOUNTER — OFFICE VISIT (OUTPATIENT)
Dept: OPHTHALMOLOGY | Facility: CLINIC | Age: 68
End: 2023-10-23
Payer: MEDICARE

## 2023-10-23 ENCOUNTER — HOSPITAL ENCOUNTER (OUTPATIENT)
Dept: CARDIOLOGY | Facility: HOSPITAL | Age: 68
Discharge: HOME | End: 2023-10-23
Attending: FAMILY MEDICINE
Payer: MEDICARE

## 2023-10-23 VITALS
SYSTOLIC BLOOD PRESSURE: 110 MMHG | WEIGHT: 160 LBS | HEIGHT: 64 IN | DIASTOLIC BLOOD PRESSURE: 64 MMHG | HEART RATE: 74 BPM | BODY MASS INDEX: 27.31 KG/M2 | RESPIRATION RATE: 16 BRPM

## 2023-10-23 DIAGNOSIS — R07.89 ATYPICAL CHEST PAIN: ICD-10-CM

## 2023-10-23 DIAGNOSIS — G90.2 ACQUIRED RIGHT-SIDED HORNER SYNDROME: Primary | ICD-10-CM

## 2023-10-23 LAB
ASCENDING AORTA: 3.1 CM
BSA FOR ECHO PROCEDURE: 1.81 M2
CV ECHO LV RWT: 0.39 CM
CV STRESS BASE HR: 74 BPM
DIASTOLIC BLOOD PRESSURE: 74 MMHG
DOP CALC LVOT AREA: 3.5 CM2
DOP CALC LVOT DIAMETER: 2.11 CM
DOP CALC LVOT PEAK VEL: 0.68 M/S
DOP CALC LVOT STROKE VOLUME: 51.83 CM3
DOP CALCLVOT PEAK VEL VTI: 14.83 CM
E WAVE DECELERATION TIME: 172.65 MSEC
E/A RATIO: 0.71
E/E' RATIO: 12.67 M/S
ECHO LV POSTERIOR WALL: 0.77 CM (ref 0.6–1.1)
EJECTION FRACTION: 55 %
FRACTIONAL SHORTENING: 39 % (ref 28–44)
INTERVENTRICULAR SEPTUM: 0.88 CM (ref 0.6–1.1)
LA MAJOR: 4.59 CM
LA WIDTH: 3.26 CM
LEFT ATRIUM SIZE: 3.29 CM
LEFT ATRIUM VOLUME INDEX MOD: 19.2 ML/M2
LEFT ATRIUM VOLUME MOD: 34.21 CM3
LEFT INTERNAL DIMENSION IN SYSTOLE: 2.39 CM (ref 2.1–4)
LEFT VENTRICLE DIASTOLIC VOLUME INDEX: 36.96 ML/M2
LEFT VENTRICLE DIASTOLIC VOLUME: 65.78 ML
LEFT VENTRICLE MASS INDEX: 53 G/M2
LEFT VENTRICLE SYSTOLIC VOLUME INDEX: 11.2 ML/M2
LEFT VENTRICLE SYSTOLIC VOLUME: 19.98 ML
LEFT VENTRICULAR INTERNAL DIMENSION IN DIASTOLE: 3.9 CM (ref 3.5–6)
LEFT VENTRICULAR MASS: 93.48 G
LV LATERAL E/E' RATIO: 11.4 M/S
LV SEPTAL E/E' RATIO: 14.25 M/S
MV A" WAVE DURATION": 8.85 MSEC
MV PEAK A VEL: 0.8 M/S
MV PEAK E VEL: 0.57 M/S
MV STENOSIS PRESSURE HALF TIME: 50.07 MS
MV VALVE AREA P 1/2 METHOD: 4.39 CM2
OHS CV CPX 1 MINUTE RECOVERY HEART RATE: 114 BPM
OHS CV CPX 85 PERCENT MAX PREDICTED HEART RATE MALE: 131
OHS CV CPX MAX PREDICTED HEART RATE: 154
OHS CV CPX PATIENT IS FEMALE: 0
OHS CV CPX PATIENT IS MALE: 1
OHS CV CPX PEAK DIASTOLIC BLOOD PRESSURE: 52 MMHG
OHS CV CPX PEAK HEAR RATE: 134 BPM
OHS CV CPX PEAK RATE PRESSURE PRODUCT: NORMAL
OHS CV CPX PEAK SYSTOLIC BLOOD PRESSURE: 147 MMHG
OHS CV CPX PERCENT MAX PREDICTED HEART RATE ACHIEVED: 87
OHS CV CPX RATE PRESSURE PRODUCT PRESENTING: 8214
OHS CV INITIAL DOSE: 10 MCG/KG/MIN
OHS CV PEAK DOSE: 30 MCG/KG/MIN
PULM VEIN S/D RATIO: 1.17
PV PEAK D VEL: 0.41 M/S
PV PEAK S VEL: 0.48 M/S
RA MAJOR: 4.29 CM
RA PRESSURE ESTIMATED: 3 MMHG
RA WIDTH: 2.5 CM
RIGHT VENTRICULAR END-DIASTOLIC DIMENSION: 2 CM
SINUS: 3.12 CM
STJ: 2.79 CM
SYSTOLIC BLOOD PRESSURE: 111 MMHG
TDI LATERAL: 0.05 M/S
TDI SEPTAL: 0.04 M/S
TDI: 0.05 M/S
TRICUSPID ANNULAR PLANE SYSTOLIC EXCURSION: 1.69 CM
Z-SCORE OF LEFT VENTRICULAR DIMENSION IN END DIASTOLE: -2.32
Z-SCORE OF LEFT VENTRICULAR DIMENSION IN END SYSTOLE: -1.91

## 2023-10-23 PROCEDURE — 1160F PR REVIEW ALL MEDS BY PRESCRIBER/CLIN PHARMACIST DOCUMENTED: ICD-10-PCS | Mod: HCNC,CPTII,S$GLB, | Performed by: OPHTHALMOLOGY

## 2023-10-23 PROCEDURE — 93351 STRESS ECHO (CUPID ONLY): ICD-10-PCS | Mod: 26,HCNC,, | Performed by: INTERNAL MEDICINE

## 2023-10-23 PROCEDURE — 3061F PR NEG MICROALBUMINURIA RESULT DOCUMENTED/REVIEW: ICD-10-PCS | Mod: HCNC,CPTII,S$GLB, | Performed by: OPHTHALMOLOGY

## 2023-10-23 PROCEDURE — 1157F PR ADVANCE CARE PLAN OR EQUIV PRESENT IN MEDICAL RECORD: ICD-10-PCS | Mod: HCNC,CPTII,S$GLB, | Performed by: OPHTHALMOLOGY

## 2023-10-23 PROCEDURE — 99204 OFFICE O/P NEW MOD 45 MIN: CPT | Mod: HCNC,S$GLB,, | Performed by: OPHTHALMOLOGY

## 2023-10-23 PROCEDURE — 3066F PR DOCUMENTATION OF TREATMENT FOR NEPHROPATHY: ICD-10-PCS | Mod: HCNC,CPTII,S$GLB, | Performed by: OPHTHALMOLOGY

## 2023-10-23 PROCEDURE — 4010F ACE/ARB THERAPY RXD/TAKEN: CPT | Mod: HCNC,CPTII,S$GLB, | Performed by: OPHTHALMOLOGY

## 2023-10-23 PROCEDURE — 3044F HG A1C LEVEL LT 7.0%: CPT | Mod: HCNC,CPTII,S$GLB, | Performed by: OPHTHALMOLOGY

## 2023-10-23 PROCEDURE — 3061F NEG MICROALBUMINURIA REV: CPT | Mod: HCNC,CPTII,S$GLB, | Performed by: OPHTHALMOLOGY

## 2023-10-23 PROCEDURE — 1126F PR PAIN SEVERITY QUANTIFIED, NO PAIN PRESENT: ICD-10-PCS | Mod: HCNC,CPTII,S$GLB, | Performed by: OPHTHALMOLOGY

## 2023-10-23 PROCEDURE — 99999 PR PBB SHADOW E&M-EST. PATIENT-LVL III: CPT | Mod: PBBFAC,HCNC,, | Performed by: OPHTHALMOLOGY

## 2023-10-23 PROCEDURE — 1126F AMNT PAIN NOTED NONE PRSNT: CPT | Mod: HCNC,CPTII,S$GLB, | Performed by: OPHTHALMOLOGY

## 2023-10-23 PROCEDURE — 93351 STRESS TTE COMPLETE: CPT | Mod: HCNC

## 2023-10-23 PROCEDURE — 93351 STRESS TTE COMPLETE: CPT | Mod: 26,HCNC,, | Performed by: INTERNAL MEDICINE

## 2023-10-23 PROCEDURE — 3288F FALL RISK ASSESSMENT DOCD: CPT | Mod: HCNC,CPTII,S$GLB, | Performed by: OPHTHALMOLOGY

## 2023-10-23 PROCEDURE — 1157F ADVNC CARE PLAN IN RCRD: CPT | Mod: HCNC,CPTII,S$GLB, | Performed by: OPHTHALMOLOGY

## 2023-10-23 PROCEDURE — 3044F PR MOST RECENT HEMOGLOBIN A1C LEVEL <7.0%: ICD-10-PCS | Mod: HCNC,CPTII,S$GLB, | Performed by: OPHTHALMOLOGY

## 2023-10-23 PROCEDURE — 1159F MED LIST DOCD IN RCRD: CPT | Mod: HCNC,CPTII,S$GLB, | Performed by: OPHTHALMOLOGY

## 2023-10-23 PROCEDURE — 1160F RVW MEDS BY RX/DR IN RCRD: CPT | Mod: HCNC,CPTII,S$GLB, | Performed by: OPHTHALMOLOGY

## 2023-10-23 PROCEDURE — 1159F PR MEDICATION LIST DOCUMENTED IN MEDICAL RECORD: ICD-10-PCS | Mod: HCNC,CPTII,S$GLB, | Performed by: OPHTHALMOLOGY

## 2023-10-23 PROCEDURE — 1101F PT FALLS ASSESS-DOCD LE1/YR: CPT | Mod: HCNC,CPTII,S$GLB, | Performed by: OPHTHALMOLOGY

## 2023-10-23 PROCEDURE — 3066F NEPHROPATHY DOC TX: CPT | Mod: HCNC,CPTII,S$GLB, | Performed by: OPHTHALMOLOGY

## 2023-10-23 PROCEDURE — 1101F PR PT FALLS ASSESS DOC 0-1 FALLS W/OUT INJ PAST YR: ICD-10-PCS | Mod: HCNC,CPTII,S$GLB, | Performed by: OPHTHALMOLOGY

## 2023-10-23 PROCEDURE — 3288F PR FALLS RISK ASSESSMENT DOCUMENTED: ICD-10-PCS | Mod: HCNC,CPTII,S$GLB, | Performed by: OPHTHALMOLOGY

## 2023-10-23 PROCEDURE — 4010F PR ACE/ARB THEARPY RXD/TAKEN: ICD-10-PCS | Mod: HCNC,CPTII,S$GLB, | Performed by: OPHTHALMOLOGY

## 2023-10-23 PROCEDURE — 99204 PR OFFICE/OUTPT VISIT, NEW, LEVL IV, 45-59 MIN: ICD-10-PCS | Mod: HCNC,S$GLB,, | Performed by: OPHTHALMOLOGY

## 2023-10-23 PROCEDURE — 63600150 PHARM REV CODE 636: Mod: HCNC | Performed by: FAMILY MEDICINE

## 2023-10-23 PROCEDURE — 99999 PR PBB SHADOW E&M-EST. PATIENT-LVL III: ICD-10-PCS | Mod: PBBFAC,HCNC,, | Performed by: OPHTHALMOLOGY

## 2023-10-23 RX ORDER — DOBUTAMINE HYDROCHLORIDE 100 MG/100ML
10 INJECTION INTRAVENOUS
Status: COMPLETED | OUTPATIENT
Start: 2023-10-23 | End: 2023-10-23

## 2023-10-23 RX ORDER — ROSUVASTATIN CALCIUM 5 MG/1
5 TABLET, COATED ORAL
Qty: 90 TABLET | Refills: 0 | OUTPATIENT
Start: 2023-10-23

## 2023-10-23 RX ADMIN — DOBUTAMINE HYDROCHLORIDE 10 MCG/KG/MIN: 100 INJECTION INTRAVENOUS at 11:10

## 2023-10-23 NOTE — TELEPHONE ENCOUNTER
Refill Decision Note   Mane Ramires  is requesting a refill authorization.  Brief Assessment and Rationale for Refill:  Quick Discontinue     Medication Therapy Plan:  discontinued on 8/17/2023 by Patience Gordon MD, current dose is 10 mg      Comments:     Note composed:2:56 PM 10/23/2023

## 2023-10-23 NOTE — LETTER
[facility] - [address] [place] [address]  [address]  [facility] [address]  Phone: [phone]  Fax: [phone]   October 23, 2023    Woody Crowe III, MD  [address] [facility]  7th Floor Clinic [facility] [address]    Patient: Mane Ramires   MR Number: [id number]   YOB: 1955   Date of Visit: 10/23/2023       Dear Dr. Crowe:    Thank you for referring Mane Ramires to me for evaluation. Here is my assessment and plan of care:    Assessment/Plan    For exam results, see Encounter Report.    Acquired right-sided Bonnie syndrome      Mr. Ramires has a traumatic right Hoyhdrome suffered when he fell and injured his neck. Further diagnostic testing is not indicated. He is interested in possible lid surgery to correct his ptosis and the sensation that hie is missing things with his right eye. He will return to me as needed.           Below you will find my full exam findings. If you have questions, please do not hesitate to call me. I look forward to following Mr. Mane Ramires along with you.    Sincerely,          Zaki Warren MD       CC  No Recipients             Base Eye Exam       Visual Acuity (Snellen - Linear)         Right Left    Dist cc 20/25 -2 20/40 -1      Correction: Glasses              Pupils         Dark Light Shape React APD    Right 5.5 4 Round Brisk None    Left 4 3 Round Dilation lag None              Visual Fields (Counting fingers)         Right Left     Full Full              Extraocular Movement         Right Left     Full, Ortho Full, Ortho              Neuro/Psych       Oriented x3: Yes    Mood/Affect: Normal              Dilation    Apraclonidine OU at 9:30 AM - pupil dilation reverses at 25 minutes.                 Slit Lamp and Fundus Exam       External Exam         Right Left    External Normal Normal    Palpebral fissure 12 mm 10 mm    MRD1 4.0 mm 3.0 mm    Superior scleral show 0 mm 0 mm    Inferior scleral show 0 mm 0 mm              Slit Lamp  Exam         Right Left    Lids/Lashes Normal Normal    Conjunctiva/Sclera White and quiet White and quiet    Cornea Clear Clear    Anterior Chamber Deep and quiet Deep and quiet    Iris Round and reactive Round and reactive    Lens Clear Clear    Vitreous Normal Normal              Fundus Exam         Right Left    Disc Normal Normal    Macula Normal Normal    Vessels Normal Normal

## 2023-10-23 NOTE — PROGRESS NOTES
HPI    69 yo old male comes in established ocular health. Patient reports having   a stroke two years ago. Patient noticed visual changes in OD. Pt states OD   pupil stay dilated. No new floaters, occasionally flashes.     Gtts: Restasis BID OU    I have personally interviewed the patient, reviewed the history and   examined the patient and agree with the technician's exam.   Last edited by Zaki Warren MD on 10/23/2023  9:00 AM.            Assessment /Plan     For exam results, see Encounter Report.    Acquired right-sided Bonnie syndrome      Mr. Ramires has a traumatic right Hoyhdrome suffered when he fell and injured his neck. Further diagnostic testing is not indicated. He is interested in possible lid surgery to correct his ptosis and the sensation that hie is missing things with his right eye. He will return to me as needed.

## 2023-10-23 NOTE — PATIENT INSTRUCTIONS
Mr. Ramires has a traumatic right Hoyhdrome suffered when he fell and injured his neck. Further diagnostic testing is not indicated. He is interested in possible lid surgery to correct his ptosis and the sensation that hie is missing things with his right eye. He will return to me as needed.

## 2023-10-24 ENCOUNTER — PATIENT MESSAGE (OUTPATIENT)
Dept: FAMILY MEDICINE | Facility: CLINIC | Age: 68
End: 2023-10-24
Payer: MEDICARE

## 2023-10-24 ENCOUNTER — PATIENT MESSAGE (OUTPATIENT)
Dept: ADMINISTRATIVE | Facility: OTHER | Age: 68
End: 2023-10-24
Payer: MEDICARE

## 2023-10-26 ENCOUNTER — PATIENT MESSAGE (OUTPATIENT)
Dept: OPTOMETRY | Facility: CLINIC | Age: 68
End: 2023-10-26
Payer: MEDICARE

## 2023-10-26 ENCOUNTER — LAB VISIT (OUTPATIENT)
Dept: LAB | Facility: HOSPITAL | Age: 68
End: 2023-10-26
Attending: FAMILY MEDICINE
Payer: MEDICARE

## 2023-10-26 DIAGNOSIS — Z12.11 COLON CANCER SCREENING: ICD-10-CM

## 2023-10-26 PROCEDURE — 82274 ASSAY TEST FOR BLOOD FECAL: CPT | Mod: HCNC | Performed by: FAMILY MEDICINE

## 2023-10-27 ENCOUNTER — CLINICAL SUPPORT (OUTPATIENT)
Dept: REHABILITATION | Facility: HOSPITAL | Age: 68
End: 2023-10-27
Payer: MEDICARE

## 2023-10-27 DIAGNOSIS — R20.1 IMPAIRED SENSATION: ICD-10-CM

## 2023-10-27 DIAGNOSIS — G56.23 CUBITAL TUNNEL SYNDROME OF BOTH UPPER EXTREMITIES: ICD-10-CM

## 2023-10-27 PROCEDURE — 97165 OT EVAL LOW COMPLEX 30 MIN: CPT

## 2023-10-27 PROCEDURE — 97530 THERAPEUTIC ACTIVITIES: CPT

## 2023-10-27 NOTE — PATIENT INSTRUCTIONS
[facility], OCCUPATIONAL THERAPY  HOME EXERCISE PROGRAM       Complete 10 repetitions of each exercise 4 times a day.              Extend your arm in front of you keeping your elbow straight, and bend the wrist and fingers.  Extend your wrist and fingers.  Bend your elbow.  Bring your arm out to the side, bend your wrist and fingers.  Rotate your arm so that your palm is facing behind you.  Tilt your head to the opposite side.        If any of these exercises aggravate your hands, stop, rest and try returning again to the previous exercise performed without pain. Proceed at your own pace using pain tolerance as your guide.        ______________________________________   Hand Therapist      Tips for Prevention    Preventing or reducing ulnar nerve compression can be as simple as maintaining good posture and practicing good form when using your elbow and arm. A variety of methods can help to prevent ulnar nerve compression:      Limit activities that can make it worse, such as tennis or golf.   Do not lean on your elbow while driving or sitting.   Keep your arm straight while at rest.   Wear a splint while you sleep to prevent the elbow from bending.   Try to prevent falls or direct impact to the inside of the elbow (near the funny bone).

## 2023-10-27 NOTE — PROGRESS NOTES
[facility]: Occupational Therapy Note     See plan of care for full initial OT evaluation.    Vangie Reyna, GILDARDO, OTR/L

## 2023-10-30 ENCOUNTER — OFFICE VISIT (OUTPATIENT)
Dept: OPTOMETRY | Facility: CLINIC | Age: 68
End: 2023-10-30
Payer: MEDICARE

## 2023-10-30 DIAGNOSIS — H52.4 PRESBYOPIA: Primary | ICD-10-CM

## 2023-10-30 PROBLEM — R20.1 IMPAIRED SENSATION: Status: ACTIVE | Noted: 2023-10-30

## 2023-10-30 PROCEDURE — 99499 NO LOS: ICD-10-PCS | Mod: HCNC,S$GLB,, | Performed by: OPTOMETRIST

## 2023-10-30 PROCEDURE — 99999 PR PBB SHADOW E&M-EST. PATIENT-LVL III: ICD-10-PCS | Mod: PBBFAC,HCNC,, | Performed by: OPTOMETRIST

## 2023-10-30 PROCEDURE — 99499 UNLISTED E&M SERVICE: CPT | Mod: HCNC,S$GLB,, | Performed by: OPTOMETRIST

## 2023-10-30 PROCEDURE — 99999 PR PBB SHADOW E&M-EST. PATIENT-LVL III: CPT | Mod: PBBFAC,HCNC,, | Performed by: OPTOMETRIST
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
## 2023-10-30 NOTE — PROGRESS NOTES
"HPI     Blurred Vision     Additional comments: Patient Mane Ramires (Allan) is a 68 year old male.           Comments    Pt here for MRX check per Dr. Warren. Pt states blurry VA OS in new   glasses. Pt states that he may also have some visual trouble with OD. Pt   has had glasses for the past months, made at [facility]. Pt denies   any eye pain.    DLS: 108/23/2023 with Dr. Warren and 07/17/2023 with Dr. Rivera    Gtts:  1. Restasis BID OU  2. Ivizia BID OU PRN    POHx:  1. Presbyopia  2. Diabetes mellitus type 2 with complications  3. Controlled type 2 diabetes mellitus with diabetic nephropathy, without   long-term current use of insulin  4. Type 2 diabetes mellitus without ophthalmic manifestations  5. History of Brain injury in 2003 and 2018  6. Primary hypertension  7. H/O: CVA (cerebrovascular accident)  8. NS (nuclear sclerosis), bilateral  9. Dry eye syndrome, bilateral    (+)DM  Hemoglobin A1C       Date                     Value               Ref Range             Status                05/11/2023               6.4 (H)             4.0 - 5.6 %           Final                     12/20/2022               6.8 (H)             4.0 - 5.6 %           Final                   08/10/2022               5.8 (H)             4.0 - 5.6 %           Final          Last edited by Chela Lyles on 10/30/2023 10:04 AM.            Assessment /Plan     For exam results, see Encounter Report.    Presbyopia      Rx change  Remake OU                   "

## 2023-10-31 ENCOUNTER — PATIENT MESSAGE (OUTPATIENT)
Dept: FAMILY MEDICINE | Facility: CLINIC | Age: 68
End: 2023-10-31
Payer: MEDICARE

## 2023-10-31 LAB — HEMOCCULT STL QL IA: NEGATIVE

## 2024-01-17 DIAGNOSIS — I10 HYPERTENSION, ESSENTIAL: ICD-10-CM

## 2024-01-17 RX ORDER — VALSARTAN 160 MG/1
160 TABLET ORAL
Qty: 90 TABLET | Refills: 0 | Status: SHIPPED | OUTPATIENT
Start: 2024-01-17 | End: 2024-04-08

## 2024-01-17 RX ORDER — METOPROLOL SUCCINATE 25 MG/1
25 TABLET, EXTENDED RELEASE ORAL
Qty: 90 TABLET | Refills: 2 | Status: SHIPPED | OUTPATIENT
Start: 2024-01-17

## 2024-01-17 NOTE — TELEPHONE ENCOUNTER
Provider Staff:  Action required for this patient    Requires labs      Please see care gap opportunities below in Care Due Message.    Thanks!  [facility]     Appointments      Date Provider   Last Visit   10/16/2023 Patience Gordon MD   Next Visit   Visit date not found Patience Gordon MD     Refill Decision Note   Mane Ramires  is requesting a refill authorization.    Brief Assessment and Rationale for Refill:  Approve       Medication Therapy Plan:         Comments:     Note composed:4:53 PM 01/17/2024

## 2024-01-17 NOTE — TELEPHONE ENCOUNTER
Care Due:                  Date            Visit Type   Department     Provider  --------------------------------------------------------------------------------                                EP -                              PRIMARY      [facility]    Patience Davisondbei  Last Visit: 10-      CARE ([facility])   MEDICINE       Anger                              EP -                              PRIMARY      [facility]    Patience Davisondebi  Next Visit: 07-      CARE ([facility])   MEDICINE       Anger                                                            Last  Test          Frequency    Reason                     Performed    Due Date  --------------------------------------------------------------------------------    CMP.........  12 months..  rosuvastatin, valsartan..  02- 01-    Health [facility] Embedded Care Due Messages. Reference number: [id number].   1/17/2024 12:19:05 AM CST

## 2024-04-05 ENCOUNTER — OFFICE VISIT (OUTPATIENT)
Dept: FAMILY MEDICINE | Facility: CLINIC | Age: 69
End: 2024-04-05
Payer: MEDICARE

## 2024-04-05 ENCOUNTER — LAB VISIT (OUTPATIENT)
Dept: LAB | Facility: HOSPITAL | Age: 69
End: 2024-04-05
Attending: FAMILY MEDICINE
Payer: MEDICARE

## 2024-04-05 VITALS
DIASTOLIC BLOOD PRESSURE: 64 MMHG | HEART RATE: 81 BPM | WEIGHT: 156.75 LBS | SYSTOLIC BLOOD PRESSURE: 120 MMHG | RESPIRATION RATE: 17 BRPM | BODY MASS INDEX: 26.76 KG/M2 | HEIGHT: 64 IN | TEMPERATURE: 98 F | OXYGEN SATURATION: 95 %

## 2024-04-05 DIAGNOSIS — N18.31 STAGE 3A CHRONIC KIDNEY DISEASE: ICD-10-CM

## 2024-04-05 DIAGNOSIS — E11.8 DIABETES MELLITUS TYPE 2 WITH COMPLICATIONS: ICD-10-CM

## 2024-04-05 DIAGNOSIS — F32.A DEPRESSION, UNSPECIFIED DEPRESSION TYPE: ICD-10-CM

## 2024-04-05 DIAGNOSIS — Z78.9 FEMALE-TO-MALE TRANSGENDER PERSON: ICD-10-CM

## 2024-04-05 DIAGNOSIS — E11.69 HYPERLIPIDEMIA ASSOCIATED WITH TYPE 2 DIABETES MELLITUS: ICD-10-CM

## 2024-04-05 DIAGNOSIS — Z00.00 ENCOUNTER FOR MEDICARE ANNUAL WELLNESS EXAM: ICD-10-CM

## 2024-04-05 DIAGNOSIS — F32.5 MAJOR DEPRESSIVE DISORDER WITH SINGLE EPISODE, IN REMISSION: ICD-10-CM

## 2024-04-05 DIAGNOSIS — G95.89 MYELOMALACIA: ICD-10-CM

## 2024-04-05 DIAGNOSIS — E11.59 HYPERTENSION ASSOCIATED WITH DIABETES: ICD-10-CM

## 2024-04-05 DIAGNOSIS — E78.5 HYPERLIPIDEMIA ASSOCIATED WITH TYPE 2 DIABETES MELLITUS: ICD-10-CM

## 2024-04-05 DIAGNOSIS — I15.2 HYPERTENSION ASSOCIATED WITH DIABETES: ICD-10-CM

## 2024-04-05 DIAGNOSIS — Z00.00 ENCOUNTER FOR PREVENTIVE HEALTH EXAMINATION: Primary | ICD-10-CM

## 2024-04-05 LAB
ALBUMIN SERPL BCP-MCNC: 4.2 G/DL (ref 3.5–5.2)
ALP SERPL-CCNC: 81 U/L (ref 55–135)
ALT SERPL W/O P-5'-P-CCNC: 25 U/L (ref 10–44)
ANION GAP SERPL CALC-SCNC: 7 MMOL/L (ref 8–16)
AST SERPL-CCNC: 20 U/L (ref 10–40)
BASOPHILS # BLD AUTO: 0.04 K/UL (ref 0–0.2)
BASOPHILS NFR BLD: 0.4 % (ref 0–1.9)
BILIRUB SERPL-MCNC: 0.6 MG/DL (ref 0.1–1)
BUN SERPL-MCNC: 22 MG/DL (ref 8–23)
CALCIUM SERPL-MCNC: 9.7 MG/DL (ref 8.7–10.5)
CHLORIDE SERPL-SCNC: 103 MMOL/L (ref 95–110)
CHOLEST SERPL-MCNC: 143 MG/DL (ref 120–199)
CHOLEST/HDLC SERPL: 2.6 {RATIO} (ref 2–5)
CO2 SERPL-SCNC: 27 MMOL/L (ref 23–29)
CREAT SERPL-MCNC: 1.5 MG/DL (ref 0.5–1.4)
DIFFERENTIAL METHOD BLD: ABNORMAL
EOSINOPHIL # BLD AUTO: 0.2 K/UL (ref 0–0.5)
EOSINOPHIL NFR BLD: 2.2 % (ref 0–8)
ERYTHROCYTE [DISTWIDTH] IN BLOOD BY AUTOMATED COUNT: 11.9 % (ref 11.5–14.5)
EST. GFR  (NO RACE VARIABLE): 51 ML/MIN/1.73 M^2
GLUCOSE SERPL-MCNC: 131 MG/DL (ref 70–110)
HCT VFR BLD AUTO: 49.7 % (ref 40–54)
HDLC SERPL-MCNC: 54 MG/DL (ref 40–75)
HDLC SERPL: 37.8 % (ref 20–50)
HGB BLD-MCNC: 16.4 G/DL (ref 14–18)
IMM GRANULOCYTES # BLD AUTO: 0.02 K/UL (ref 0–0.04)
IMM GRANULOCYTES NFR BLD AUTO: 0.2 % (ref 0–0.5)
LDLC SERPL CALC-MCNC: 70 MG/DL (ref 63–159)
LYMPHOCYTES # BLD AUTO: 2.8 K/UL (ref 1–4.8)
LYMPHOCYTES NFR BLD: 30.4 % (ref 18–48)
MCH RBC QN AUTO: 31.2 PG (ref 27–31)
MCHC RBC AUTO-ENTMCNC: 33 G/DL (ref 32–36)
MCV RBC AUTO: 95 FL (ref 82–98)
MONOCYTES # BLD AUTO: 0.4 K/UL (ref 0.3–1)
MONOCYTES NFR BLD: 4.1 % (ref 4–15)
NEUTROPHILS # BLD AUTO: 5.8 K/UL (ref 1.8–7.7)
NEUTROPHILS NFR BLD: 62.7 % (ref 38–73)
NONHDLC SERPL-MCNC: 89 MG/DL
NRBC BLD-RTO: 0 /100 WBC
PLATELET # BLD AUTO: 235 K/UL (ref 150–450)
PMV BLD AUTO: 9.7 FL (ref 9.2–12.9)
POTASSIUM SERPL-SCNC: 4.8 MMOL/L (ref 3.5–5.1)
PROT SERPL-MCNC: 7.6 G/DL (ref 6–8.4)
PTH-INTACT SERPL-MCNC: 94.2 PG/ML (ref 9–77)
RBC # BLD AUTO: 5.26 M/UL (ref 4.6–6.2)
SODIUM SERPL-SCNC: 137 MMOL/L (ref 136–145)
TRIGL SERPL-MCNC: 95 MG/DL (ref 30–150)
WBC # BLD AUTO: 9.27 K/UL (ref 3.9–12.7)

## 2024-04-05 PROCEDURE — 1123F ACP DISCUSS/DSCN MKR DOCD: CPT | Mod: HCNC,CPTII,S$GLB,

## 2024-04-05 PROCEDURE — 80053 COMPREHEN METABOLIC PANEL: CPT | Mod: HCNC | Performed by: FAMILY MEDICINE

## 2024-04-05 PROCEDURE — 3074F SYST BP LT 130 MM HG: CPT | Mod: HCNC,CPTII,S$GLB,

## 2024-04-05 PROCEDURE — G0439 PPPS, SUBSEQ VISIT: HCPCS | Mod: HCNC,S$GLB,,

## 2024-04-05 PROCEDURE — 4010F ACE/ARB THERAPY RXD/TAKEN: CPT | Mod: HCNC,CPTII,S$GLB,

## 2024-04-05 PROCEDURE — 99999 PR PBB SHADOW E&M-EST. PATIENT-LVL V: CPT | Mod: PBBFAC,HCNC,,

## 2024-04-05 PROCEDURE — 1159F MED LIST DOCD IN RCRD: CPT | Mod: HCNC,CPTII,S$GLB,

## 2024-04-05 PROCEDURE — 80061 LIPID PANEL: CPT | Mod: HCNC | Performed by: FAMILY MEDICINE

## 2024-04-05 PROCEDURE — 1125F AMNT PAIN NOTED PAIN PRSNT: CPT | Mod: HCNC,CPTII,S$GLB,

## 2024-04-05 PROCEDURE — 36415 COLL VENOUS BLD VENIPUNCTURE: CPT | Mod: HCNC,PO | Performed by: FAMILY MEDICINE

## 2024-04-05 PROCEDURE — 3078F DIAST BP <80 MM HG: CPT | Mod: HCNC,CPTII,S$GLB,

## 2024-04-05 PROCEDURE — 1101F PT FALLS ASSESS-DOCD LE1/YR: CPT | Mod: HCNC,CPTII,S$GLB,

## 2024-04-05 PROCEDURE — 1160F RVW MEDS BY RX/DR IN RCRD: CPT | Mod: HCNC,CPTII,S$GLB,

## 2024-04-05 PROCEDURE — 83036 HEMOGLOBIN GLYCOSYLATED A1C: CPT | Mod: HCNC | Performed by: FAMILY MEDICINE

## 2024-04-05 PROCEDURE — 83970 ASSAY OF PARATHORMONE: CPT | Mod: HCNC

## 2024-04-05 PROCEDURE — 3288F FALL RISK ASSESSMENT DOCD: CPT | Mod: HCNC,CPTII,S$GLB,

## 2024-04-05 PROCEDURE — 1170F FXNL STATUS ASSESSED: CPT | Mod: HCNC,CPTII,S$GLB,

## 2024-04-05 PROCEDURE — G9919 SCRN ND POS ND PROV OF REC: HCPCS | Mod: HCNC,CPTII,S$GLB,

## 2024-04-05 PROCEDURE — 85025 COMPLETE CBC W/AUTO DIFF WBC: CPT | Mod: HCNC | Performed by: FAMILY MEDICINE

## 2024-04-05 RX ORDER — DULOXETIN HYDROCHLORIDE 30 MG/1
90 CAPSULE, DELAYED RELEASE ORAL DAILY
Qty: 270 CAPSULE | Refills: 3 | Status: SHIPPED | OUTPATIENT
Start: 2024-04-05 | End: 2025-04-05

## 2024-04-05 RX ORDER — DULOXETIN HYDROCHLORIDE 60 MG/1
60 CAPSULE, DELAYED RELEASE ORAL 2 TIMES DAILY
Qty: 90 CAPSULE | Refills: 3 | Status: CANCELLED | OUTPATIENT
Start: 2024-04-05

## 2024-04-05 NOTE — PATIENT INSTRUCTIONS
Alfredo Gilliam,     If you are due for any health screening(s) below please notify me so we can arrange them to be ordered and scheduled to maintain your health. Most healthy patients complete it. Don't lose out on improving your health.     Tests to Keep You Healthy    Eye Exam: Met on 10/23/2023  Colon Cancer Screening: DUE  Last Blood Pressure <= 139/89 (4/5/2024): Yes  Last HbA1c < 8 (05/11/2023): Yes         Colon Cancer Screening    Of cancers affecting both men and women, colorectal cancer is the third leading cancer killer in the [place]. But it doesnt have to be. Screening can prevent colorectal cancer or find it at an early stage when treatment often leads to a cure.    A colonoscopy is the preferred test for detecting colon cancer. It is needed only once every 10 years if results are negative. While sedated, a flexible, lighted tube with a tiny camera is inserted into the rectum and advanced through the colon to look for cancers. An alternative screening test that is used at home and returned to the lab may also be used. It detects hidden blood in bowel movements which could indicate cancer in the colon. If results are positive, you will need a colonoscopy to determine if the blood is a sign of cancer. This type of follow up (diagnostic) colonoscopy usually requires additional copays as required by your insurance provider. Please contact your PCP if you have any questions.            Diabetic A1c Testing    Your chart identifies you as having diabetes. It is recommended that all diabetes patients have an A1c test done at least once a year, but [facility] recommends twice a year for most patients. This helps your doctor to better help you manage your diabetes. This is a non-fasting lab test which can be completed at any time. Your result will be sent to your Primary Care Provider for review and that office will contact you with the results.         Counseling and Referral of Other  Preventative  (Italic type indicates deductible and co-insurance are waived)    Patient Name: Mane Ramires  Today's Date: 4/5/2024    Health Maintenance       Date Due Completion Date    Shingles Vaccine (1 of 2) Never done ---    RSV Vaccine (Age 60+ and Pregnant patients) (1 - 1-dose 60+ series) Never done ---    Colorectal Cancer Screening 10/27/2023 10/26/2023    Hemoglobin A1c 11/11/2023 5/11/2023    Lipid Panel 08/17/2024 8/17/2023    Diabetes Urine Screening 10/16/2024 10/16/2023    Foot Exam 10/16/2024 10/16/2023    Eye Exam 10/23/2024 10/23/2023    Override on 7/17/2023: Done    High Dose Statin 04/05/2025 4/5/2024    TETANUS VACCINE 05/17/2027 5/17/2017        Orders Placed This Encounter   Procedures    PTH, intact       The following information is provided to all patients.  This information is to help you find resources for any of the problems found today that may be affecting your health:                  Living healthy guide: www.[facility].[place].gov      Understanding Diabetes: www.diabetes.org      Eating healthy: www.cdc.gov/healthyweight      CDC home safety checklist: www.cdc.gov/steadi/patient.html      Agency on Aging: www.goea.[place].gov      Alcoholics anonymous (AA): www.aa.org      Physical Activity: www.laurent.nih.gov/uf2gbmo      Tobacco use: www.quitwithusla.org

## 2024-04-05 NOTE — PROGRESS NOTES
"  Mane Ramires presented for a  Medicare AWV and comprehensive Health Risk Assessment today. The following components were reviewed and updated:    Medical history  Family History  Social history  Allergies and Current Medications  Health Risk Assessment  Health Maintenance  Care Team     Patient screened moderate and/or high risk for one or more social determinants of health (SDOH). Patient connected to community resources through the ED Navigator.      ** See Completed Assessments for Annual Wellness Visit within the encounter summary.**         The following assessments were completed:  Living Situation  CAGE  Depression Screening  Timed Get Up and Go  Whisper Test  Cognitive Function Screening  Nutrition Screening  ADL Screening  PAQ Screening          Opioid documentation:      Patient does not have a current opioid prescription.        Vitals:    04/05/24 0841   BP: 120/64   BP Location: Right arm   Patient Position: Sitting   BP Method: Large (Manual)   Pulse: 81   Resp: 17   Temp: 97.8 °F (36.6 °C)   TempSrc: Oral   SpO2: 95%   Weight: 71.1 kg (156 lb 12 oz)   Height: 5' 4" (1.626 m)     Body mass index is 26.91 kg/m².  Physical Exam          Diagnoses and health risks identified today and associated recommendations/orders:    1. Encounter for preventive health examination  Discussed health maintenance guidelines appropriate for age.      2. Hypertension associated with diabetes        -    Stable. Continue meds. Will continue to monitor.       3. Diabetes mellitus type 2 with complications        -    Labs today. Will continue to monitor.       4. Hyperlipidemia associated with type 2 diabetes mellitus  Labs today. Will continue to monitor, Continue meds.     5. Myelomalacia  Adjust therapy. Follow up with pain management PRN. Plans to discuss low dose norco with Dr. Gordon at follow up.   - DULoxetine (CYMBALTA) 30 MG capsule; Take 3 capsules (90 mg total) by mouth once daily.  Dispense: 270 capsule; " Refill: 3    6. Major depressive disorder with single episode, in remission  Adjust therapy due to pain symptoms. Has been stable.     7. Depression, unspecified depression type    - DULoxetine (CYMBALTA) 30 MG capsule; Take 3 capsules (90 mg total) by mouth once daily.  Dispense: 270 capsule; Refill: 3    8. Encounter for Medicare annual wellness exam    - PTH, intact; Future    9. Female-to-male transgender person  - Continue T therapy as prescribed.     10. Stage 3a chronic kidney disease  Stable long term. Present years ago. Will continue to monitor.       Provided Maen with a 5-10 year written screening schedule and personal prevention plan. Recommendations were developed using the USPSTF age appropriate recommendations. Education, counseling, and referrals were provided as needed. After Visit Summary printed and given to patient which includes a list of additional screenings\tests needed.    No follow-ups on file.    Casa Garza PA-C        I offered to discuss advanced care planning, including how to pick a person who would make decisions for you if you were unable to make them for yourself, called a health care power of , and what kind of decisions you might make such as use of life sustaining treatments such as ventilators and tube feeding when faced with a life limiting illness recorded on a living will that they will need to know. (How you want to be cared for as you near the end of your natural life)     X Patient is interested in learning more about how to make advanced directives.  I provided them paperwork and offered to discuss this with them.

## 2024-04-06 LAB
ESTIMATED AVG GLUCOSE: 166 MG/DL (ref 68–131)
HBA1C MFR BLD: 7.4 % (ref 4–5.6)

## 2024-04-07 DIAGNOSIS — I10 HYPERTENSION, ESSENTIAL: ICD-10-CM

## 2024-04-07 NOTE — TELEPHONE ENCOUNTER
No care due was identified.  Health [facility] Embedded Care Due Messages. Reference number: [id number].   4/07/2024 3:44:16 PM CDT

## 2024-04-08 DIAGNOSIS — Z13.820 SCREENING FOR OSTEOPOROSIS: ICD-10-CM

## 2024-04-08 DIAGNOSIS — E21.3 HYPERPARATHYROIDISM: Primary | ICD-10-CM

## 2024-04-08 RX ORDER — VALSARTAN 160 MG/1
160 TABLET ORAL DAILY
Qty: 90 TABLET | Refills: 1 | Status: SHIPPED | OUTPATIENT
Start: 2024-04-08

## 2024-04-08 NOTE — TELEPHONE ENCOUNTER
Refill Routing Note   Medication(s) are not appropriate for processing by [facility] for the following reason(s):        Required labs abnormal:     [facility] action(s):  Defer      Medication Therapy Plan:       Pharmacist review requested: Yes     Appointments  past 12m or future 3m with PCP    Date Provider   Last Visit   10/16/2023 Patience Gordon MD   Next Visit   7/18/2024 Patience Gordon MD   ED visits in past 90 days: 0        Note composed:11:42 AM 04/08/2024

## 2024-04-08 NOTE — TELEPHONE ENCOUNTER
Refill Decision Note   Mane Keyla  is requesting a refill authorization.  Brief Assessment and Rationale for Refill:  Approve     Medication Therapy Plan:  No dose adjustment recommended per renal fxn.      Pharmacist review requested: Yes   Extended chart review required: Yes   Comments:     Note composed:1:02 PM 04/08/2024

## 2024-04-12 RX ORDER — CYCLOBENZAPRINE HCL 10 MG
TABLET ORAL
Qty: 90 TABLET | Refills: 1 | Status: SHIPPED | OUTPATIENT
Start: 2024-04-12

## 2024-04-12 NOTE — TELEPHONE ENCOUNTER
No care due was identified.  [facility] Embedded Care Due Messages. Reference number: [id number].   4/12/2024 9:58:27 AM CDT

## 2024-04-22 ENCOUNTER — PATIENT MESSAGE (OUTPATIENT)
Dept: ADMINISTRATIVE | Facility: OTHER | Age: 69
End: 2024-04-22
Payer: MEDICARE

## 2024-04-30 ENCOUNTER — HOSPITAL ENCOUNTER (OUTPATIENT)
Dept: RADIOLOGY | Facility: CLINIC | Age: 69
Discharge: HOME | End: 2024-04-30
Payer: MEDICARE

## 2024-04-30 ENCOUNTER — TELEPHONE (OUTPATIENT)
Dept: FAMILY MEDICINE | Facility: CLINIC | Age: 69
End: 2024-04-30
Payer: MEDICARE

## 2024-04-30 DIAGNOSIS — Z13.820 SCREENING FOR OSTEOPOROSIS: ICD-10-CM

## 2024-04-30 DIAGNOSIS — E21.3 HYPERPARATHYROIDISM: ICD-10-CM

## 2024-04-30 PROCEDURE — 77080 DXA BONE DENSITY AXIAL: CPT | Mod: TC,HCNC,PO

## 2024-04-30 PROCEDURE — 77080 DXA BONE DENSITY AXIAL: CPT | Mod: 26,HCNC,, | Performed by: RADIOLOGY
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
## 2024-05-01 NOTE — TELEPHONE ENCOUNTER
----- Message from Casa Garza PA-C sent at 4/30/2024  4:21 PM CDT -----  Mildly decreased bone density on the DXA scan. Recommend weight bearing exercises a few times a week. Parathyroid may be contributing factor here. Continue with regularly scheduled DXA scans.     Weight bearing exercises:     Jump rope  Hiking  Dance  Stair climbing  Aerobics  Jogging  Elliptical  Tennis  Weightlifting  Squats  Power walking  Bicep curl  Push-ups  Lunge  Step aerobics  Pilates  Overhead press  Strength training  Walking  Vignesh chi  Yoga  Running  Jumping
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
## 2024-05-10 ENCOUNTER — PATIENT MESSAGE (OUTPATIENT)
Dept: FAMILY MEDICINE | Facility: CLINIC | Age: 69
End: 2024-05-10
Payer: MEDICARE

## 2024-05-12 RX ORDER — DICLOFENAC SODIUM 50 MG/1
50 TABLET, DELAYED RELEASE ORAL 2 TIMES DAILY PRN
Qty: 40 TABLET | Refills: 0 | Status: SHIPPED | OUTPATIENT
Start: 2024-05-12

## 2024-05-23 ENCOUNTER — PATIENT MESSAGE (OUTPATIENT)
Dept: FAMILY MEDICINE | Facility: CLINIC | Age: 69
End: 2024-05-23
Payer: MEDICARE

## 2024-05-24 RX ORDER — FAMCICLOVIR 500 MG/1
500 TABLET ORAL 3 TIMES DAILY
Qty: 21 TABLET | Refills: 2 | Status: SHIPPED | OUTPATIENT
Start: 2024-05-24 | End: 2024-05-31

## 2024-06-01 ENCOUNTER — PATIENT MESSAGE (OUTPATIENT)
Dept: FAMILY MEDICINE | Facility: CLINIC | Age: 69
End: 2024-06-01
Payer: MEDICARE

## 2024-06-25 ENCOUNTER — PATIENT MESSAGE (OUTPATIENT)
Dept: FAMILY MEDICINE | Facility: CLINIC | Age: 69
End: 2024-06-25
Payer: MEDICARE

## 2024-06-25 DIAGNOSIS — L21.9 SEBORRHEIC DERMATITIS: ICD-10-CM

## 2024-06-25 RX ORDER — KETOCONAZOLE 20 MG/ML
SHAMPOO, SUSPENSION TOPICAL
Qty: 120 ML | Refills: 6 | Status: SHIPPED | OUTPATIENT
Start: 2024-06-25

## 2024-06-26 RX ORDER — CLONAZEPAM 0.5 MG/1
0.5 TABLET ORAL DAILY PRN
Qty: 30 TABLET | Refills: 0 | Status: SHIPPED | OUTPATIENT
Start: 2024-06-26

## 2024-06-26 RX ORDER — VALACYCLOVIR HYDROCHLORIDE 500 MG/1
500 TABLET, FILM COATED ORAL DAILY
Qty: 90 TABLET | Refills: 1 | Status: SHIPPED | OUTPATIENT
Start: 2024-06-26 | End: 2024-06-26

## 2024-06-26 NOTE — TELEPHONE ENCOUNTER
See portal message re Clonazepam . If you agree, pharmacy has been updated .  Pt has appt sched 8/23. --lp

## 2024-07-10 ENCOUNTER — PATIENT MESSAGE (OUTPATIENT)
Dept: OTHER | Facility: OTHER | Age: 69
End: 2024-07-10
Payer: MEDICARE

## 2024-08-09 RX ORDER — TRAZODONE HYDROCHLORIDE 50 MG/1
50 TABLET ORAL NIGHTLY
Qty: 90 TABLET | Refills: 0 | Status: SHIPPED | OUTPATIENT
Start: 2024-08-09

## 2024-08-23 ENCOUNTER — OFFICE VISIT (OUTPATIENT)
Dept: FAMILY MEDICINE | Facility: CLINIC | Age: 69
End: 2024-08-23
Payer: MEDICARE

## 2024-08-23 VITALS
SYSTOLIC BLOOD PRESSURE: 128 MMHG | HEART RATE: 70 BPM | HEIGHT: 64 IN | TEMPERATURE: 99 F | DIASTOLIC BLOOD PRESSURE: 82 MMHG | OXYGEN SATURATION: 97 % | RESPIRATION RATE: 16 BRPM | BODY MASS INDEX: 26.05 KG/M2 | WEIGHT: 152.56 LBS

## 2024-08-23 DIAGNOSIS — M77.10 LATERAL EPICONDYLITIS, UNSPECIFIED LATERALITY: ICD-10-CM

## 2024-08-23 DIAGNOSIS — E11.8 DIABETES MELLITUS TYPE 2 WITH COMPLICATIONS: ICD-10-CM

## 2024-08-23 DIAGNOSIS — M77.00 MEDIAL EPICONDYLITIS OF ELBOW, UNSPECIFIED LATERALITY: ICD-10-CM

## 2024-08-23 DIAGNOSIS — Z12.11 COLON CANCER SCREENING: ICD-10-CM

## 2024-08-23 DIAGNOSIS — M54.9 BACK PAIN, UNSPECIFIED BACK LOCATION, UNSPECIFIED BACK PAIN LATERALITY, UNSPECIFIED CHRONICITY: Primary | ICD-10-CM

## 2024-08-23 DIAGNOSIS — E11.59 HYPERTENSION ASSOCIATED WITH DIABETES: Primary | ICD-10-CM

## 2024-08-23 DIAGNOSIS — I15.2 HYPERTENSION ASSOCIATED WITH DIABETES: Primary | ICD-10-CM

## 2024-08-23 DIAGNOSIS — E21.3 HYPERPARATHYROIDISM: ICD-10-CM

## 2024-08-23 LAB
25(OH)D3+25(OH)D2 SERPL-MCNC: 36 NG/ML (ref 30–96)
ALBUMIN SERPL BCP-MCNC: 4.1 G/DL (ref 3.5–5.2)
ALBUMIN/CREAT UR: 7.3 UG/MG (ref 0–30)
ALP SERPL-CCNC: 78 U/L (ref 55–135)
ALT SERPL W/O P-5'-P-CCNC: 16 U/L (ref 10–44)
ANION GAP SERPL CALC-SCNC: 9 MMOL/L (ref 8–16)
AST SERPL-CCNC: 15 U/L (ref 10–40)
BILIRUB SERPL-MCNC: 0.6 MG/DL (ref 0.1–1)
BUN SERPL-MCNC: 13 MG/DL (ref 8–23)
CALCIUM SERPL-MCNC: 9.8 MG/DL (ref 8.7–10.5)
CHLORIDE SERPL-SCNC: 104 MMOL/L (ref 95–110)
CO2 SERPL-SCNC: 25 MMOL/L (ref 23–29)
CREAT SERPL-MCNC: 1.4 MG/DL (ref 0.5–1.4)
CREAT UR-MCNC: 150 MG/DL (ref 23–375)
ERYTHROCYTE [DISTWIDTH] IN BLOOD BY AUTOMATED COUNT: 12.8 % (ref 11.5–14.5)
EST. GFR  (NO RACE VARIABLE): 55.4 ML/MIN/1.73 M^2
ESTIMATED AVG GLUCOSE: 154 MG/DL (ref 68–131)
GLUCOSE SERPL-MCNC: 142 MG/DL (ref 70–110)
HBA1C MFR BLD: 7 % (ref 4–5.6)
HCT VFR BLD AUTO: 47.8 % (ref 40–54)
HGB BLD-MCNC: 15.8 G/DL (ref 14–18)
MCH RBC QN AUTO: 32.1 PG (ref 27–31)
MCHC RBC AUTO-ENTMCNC: 33.1 G/DL (ref 32–36)
MCV RBC AUTO: 97 FL (ref 82–98)
MICROALBUMIN UR DL<=1MG/L-MCNC: 11 UG/ML
PLATELET # BLD AUTO: 233 K/UL (ref 150–450)
PMV BLD AUTO: 10.5 FL (ref 9.2–12.9)
POTASSIUM SERPL-SCNC: 4.4 MMOL/L (ref 3.5–5.1)
PROT SERPL-MCNC: 7.3 G/DL (ref 6–8.4)
PTH-INTACT SERPL-MCNC: 63.8 PG/ML (ref 9–77)
RBC # BLD AUTO: 4.92 M/UL (ref 4.6–6.2)
SODIUM SERPL-SCNC: 138 MMOL/L (ref 136–145)
WBC # BLD AUTO: 7.41 K/UL (ref 3.9–12.7)

## 2024-08-23 PROCEDURE — 3066F NEPHROPATHY DOC TX: CPT | Mod: HCNC,CPTII,S$GLB, | Performed by: FAMILY MEDICINE

## 2024-08-23 PROCEDURE — 83970 ASSAY OF PARATHORMONE: CPT | Mod: HCNC | Performed by: FAMILY MEDICINE

## 2024-08-23 PROCEDURE — 82306 VITAMIN D 25 HYDROXY: CPT | Mod: HCNC | Performed by: FAMILY MEDICINE

## 2024-08-23 PROCEDURE — 3008F BODY MASS INDEX DOCD: CPT | Mod: HCNC,CPTII,S$GLB, | Performed by: FAMILY MEDICINE

## 2024-08-23 PROCEDURE — 1101F PT FALLS ASSESS-DOCD LE1/YR: CPT | Mod: HCNC,CPTII,S$GLB, | Performed by: FAMILY MEDICINE

## 2024-08-23 PROCEDURE — 4010F ACE/ARB THERAPY RXD/TAKEN: CPT | Mod: HCNC,CPTII,S$GLB, | Performed by: FAMILY MEDICINE

## 2024-08-23 PROCEDURE — 3051F HG A1C>EQUAL 7.0%<8.0%: CPT | Mod: HCNC,CPTII,S$GLB, | Performed by: FAMILY MEDICINE

## 2024-08-23 PROCEDURE — 83036 HEMOGLOBIN GLYCOSYLATED A1C: CPT | Mod: HCNC | Performed by: FAMILY MEDICINE

## 2024-08-23 PROCEDURE — 1159F MED LIST DOCD IN RCRD: CPT | Mod: HCNC,CPTII,S$GLB, | Performed by: FAMILY MEDICINE

## 2024-08-23 PROCEDURE — 80053 COMPREHEN METABOLIC PANEL: CPT | Mod: HCNC | Performed by: FAMILY MEDICINE

## 2024-08-23 PROCEDURE — 1125F AMNT PAIN NOTED PAIN PRSNT: CPT | Mod: HCNC,CPTII,S$GLB, | Performed by: FAMILY MEDICINE

## 2024-08-23 PROCEDURE — 1157F ADVNC CARE PLAN IN RCRD: CPT | Mod: HCNC,CPTII,S$GLB, | Performed by: FAMILY MEDICINE

## 2024-08-23 PROCEDURE — 1160F RVW MEDS BY RX/DR IN RCRD: CPT | Mod: HCNC,CPTII,S$GLB, | Performed by: FAMILY MEDICINE

## 2024-08-23 PROCEDURE — G2211 COMPLEX E/M VISIT ADD ON: HCPCS | Mod: HCNC,S$GLB,, | Performed by: FAMILY MEDICINE

## 2024-08-23 PROCEDURE — 82570 ASSAY OF URINE CREATININE: CPT | Mod: HCNC | Performed by: FAMILY MEDICINE

## 2024-08-23 PROCEDURE — 3074F SYST BP LT 130 MM HG: CPT | Mod: HCNC,CPTII,S$GLB, | Performed by: FAMILY MEDICINE

## 2024-08-23 PROCEDURE — 3288F FALL RISK ASSESSMENT DOCD: CPT | Mod: HCNC,CPTII,S$GLB, | Performed by: FAMILY MEDICINE

## 2024-08-23 PROCEDURE — 85027 COMPLETE CBC AUTOMATED: CPT | Mod: HCNC | Performed by: FAMILY MEDICINE

## 2024-08-23 PROCEDURE — 3079F DIAST BP 80-89 MM HG: CPT | Mod: HCNC,CPTII,S$GLB, | Performed by: FAMILY MEDICINE

## 2024-08-23 PROCEDURE — 3061F NEG MICROALBUMINURIA REV: CPT | Mod: HCNC,CPTII,S$GLB, | Performed by: FAMILY MEDICINE

## 2024-08-23 PROCEDURE — 99214 OFFICE O/P EST MOD 30 MIN: CPT | Mod: HCNC,S$GLB,, | Performed by: FAMILY MEDICINE

## 2024-08-23 RX ORDER — VALSARTAN 80 MG/1
80 TABLET ORAL DAILY
Qty: 90 TABLET | Refills: 0 | Status: SHIPPED | OUTPATIENT
Start: 2024-08-23 | End: 2025-08-23

## 2024-08-23 RX ORDER — VALACYCLOVIR HYDROCHLORIDE 500 MG/1
500 TABLET, FILM COATED ORAL DAILY
COMMUNITY
Start: 2024-06-26

## 2024-08-23 RX ORDER — AMOXICILLIN 500 MG/1
500 TABLET, FILM COATED ORAL 3 TIMES DAILY
COMMUNITY
Start: 2024-08-21

## 2024-08-23 RX ORDER — ROSUVASTATIN CALCIUM 10 MG/1
10 TABLET, COATED ORAL DAILY
Qty: 90 TABLET | Refills: 2 | Status: SHIPPED | OUTPATIENT
Start: 2024-08-23 | End: 2025-08-23

## 2024-08-26 ENCOUNTER — OFFICE VISIT (OUTPATIENT)
Dept: HEPATOLOGY | Facility: CLINIC | Age: 69
End: 2024-08-26
Payer: MEDICARE

## 2024-08-26 VITALS
TEMPERATURE: 98 F | SYSTOLIC BLOOD PRESSURE: 149 MMHG | OXYGEN SATURATION: 99 % | BODY MASS INDEX: 26.19 KG/M2 | HEIGHT: 64 IN | DIASTOLIC BLOOD PRESSURE: 73 MMHG | HEART RATE: 70 BPM

## 2024-08-26 DIAGNOSIS — R74.8 ELEVATED LIVER ENZYMES: Primary | ICD-10-CM

## 2024-08-26 PROCEDURE — 4010F ACE/ARB THERAPY RXD/TAKEN: CPT | Mod: HCNC,CPTII,S$GLB, | Performed by: INTERNAL MEDICINE

## 2024-08-26 PROCEDURE — 99999 PR PBB SHADOW E&M-EST. PATIENT-LVL IV: CPT | Mod: PBBFAC,HCNC,, | Performed by: INTERNAL MEDICINE

## 2024-08-26 PROCEDURE — 3051F HG A1C>EQUAL 7.0%<8.0%: CPT | Mod: HCNC,CPTII,S$GLB, | Performed by: INTERNAL MEDICINE

## 2024-08-26 PROCEDURE — 99213 OFFICE O/P EST LOW 20 MIN: CPT | Mod: HCNC,S$GLB,, | Performed by: INTERNAL MEDICINE

## 2024-08-26 PROCEDURE — 3008F BODY MASS INDEX DOCD: CPT | Mod: HCNC,CPTII,S$GLB, | Performed by: INTERNAL MEDICINE

## 2024-08-26 PROCEDURE — 1157F ADVNC CARE PLAN IN RCRD: CPT | Mod: HCNC,CPTII,S$GLB, | Performed by: INTERNAL MEDICINE

## 2024-08-26 PROCEDURE — 1101F PT FALLS ASSESS-DOCD LE1/YR: CPT | Mod: HCNC,CPTII,S$GLB, | Performed by: INTERNAL MEDICINE

## 2024-08-26 PROCEDURE — 1125F AMNT PAIN NOTED PAIN PRSNT: CPT | Mod: HCNC,CPTII,S$GLB, | Performed by: INTERNAL MEDICINE

## 2024-08-26 PROCEDURE — 3066F NEPHROPATHY DOC TX: CPT | Mod: HCNC,CPTII,S$GLB, | Performed by: INTERNAL MEDICINE

## 2024-08-26 PROCEDURE — 3288F FALL RISK ASSESSMENT DOCD: CPT | Mod: HCNC,CPTII,S$GLB, | Performed by: INTERNAL MEDICINE

## 2024-08-26 PROCEDURE — 3061F NEG MICROALBUMINURIA REV: CPT | Mod: HCNC,CPTII,S$GLB, | Performed by: INTERNAL MEDICINE

## 2024-08-26 PROCEDURE — 1159F MED LIST DOCD IN RCRD: CPT | Mod: HCNC,CPTII,S$GLB, | Performed by: INTERNAL MEDICINE

## 2024-08-26 PROCEDURE — 3077F SYST BP >= 140 MM HG: CPT | Mod: HCNC,CPTII,S$GLB, | Performed by: INTERNAL MEDICINE

## 2024-08-26 PROCEDURE — 3078F DIAST BP <80 MM HG: CPT | Mod: HCNC,CPTII,S$GLB, | Performed by: INTERNAL MEDICINE
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
## 2024-08-26 NOTE — Clinical Note
PLAN: -  No need to restart ursodiol as alk phos has my normal. All other lFTs are normal as well.   -  Liver profile every year, next in 8/2025.   -  Return in 1 year.

## 2024-08-27 ENCOUNTER — LAB VISIT (OUTPATIENT)
Dept: LAB | Facility: HOSPITAL | Age: 69
End: 2024-08-27
Attending: FAMILY MEDICINE
Payer: MEDICARE

## 2024-08-27 DIAGNOSIS — Z12.11 COLON CANCER SCREENING: ICD-10-CM

## 2024-08-27 LAB — HEMOCCULT STL QL IA: NEGATIVE

## 2024-08-27 PROCEDURE — 82274 ASSAY TEST FOR BLOOD FECAL: CPT | Mod: HCNC | Performed by: FAMILY MEDICINE

## 2024-08-29 ENCOUNTER — PATIENT MESSAGE (OUTPATIENT)
Dept: FAMILY MEDICINE | Facility: CLINIC | Age: 69
End: 2024-08-29
Payer: MEDICARE

## 2024-08-29 DIAGNOSIS — E11.8 DIABETES MELLITUS TYPE 2 WITH COMPLICATIONS: Primary | ICD-10-CM
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
## 2024-11-05 ENCOUNTER — PATIENT MESSAGE (OUTPATIENT)
Dept: ADMINISTRATIVE | Facility: OTHER | Age: 69
End: 2024-11-05
Payer: MEDICARE

## 2024-11-07 ENCOUNTER — PATIENT MESSAGE (OUTPATIENT)
Dept: FAMILY MEDICINE | Facility: CLINIC | Age: 69
End: 2024-11-07
Payer: MEDICARE

## 2024-11-08 RX ORDER — TRAZODONE HYDROCHLORIDE 50 MG/1
50 TABLET ORAL NIGHTLY
Qty: 90 TABLET | Refills: 3 | Status: SHIPPED | OUTPATIENT
Start: 2024-11-08

## 2024-11-08 NOTE — TELEPHONE ENCOUNTER
Refill Decision Note   Mane Ramires  is requesting a refill authorization.  Brief Assessment and Rationale for Refill:  Approve     Medication Therapy Plan:         Comments:     Note composed:7:24 AM 11/08/2024

## 2024-11-08 NOTE — TELEPHONE ENCOUNTER
No care due was identified.  [facility] Embedded Care Due Messages. Reference number: [id number].   11/08/2024 12:48:08 AM CST

## 2024-11-10 RX ORDER — METOPROLOL SUCCINATE 25 MG/1
12.5 TABLET, EXTENDED RELEASE ORAL DAILY
Qty: 45 TABLET | Refills: 1 | Status: SHIPPED | OUTPATIENT
Start: 2024-11-10 | End: 2025-11-10

## 2024-11-17 RX ORDER — VALSARTAN 80 MG/1
80 TABLET ORAL DAILY
Qty: 90 TABLET | Refills: 1 | Status: SHIPPED | OUTPATIENT
Start: 2024-11-17

## 2024-11-17 NOTE — TELEPHONE ENCOUNTER
Refill Routing Note   Medication(s) are not appropriate for processing by [facility] for the following reason(s):        Required vitals abnormal  New or recently adjusted medication    [facility] action(s):  Defer               Appointments  past 12m or future 3m with PCP    Date Provider   Last Visit   8/23/2024 Patience Gordon MD   Next Visit   2/27/2025 Patience Gordon MD   ED visits in past 90 days: 0        Note composed:5:01 PM 11/17/2024

## 2024-11-17 NOTE — TELEPHONE ENCOUNTER
No care due was identified.  [facility] Embedded Care Due Messages. Reference number: [id number].   11/17/2024 7:29:57 AM CST

## 2024-12-20 NOTE — TELEPHONE ENCOUNTER
Refill Routing Note   Medication(s) are not appropriate for processing by [facility] for the following reason(s):        No active prescription written by provider    [facility] action(s):  Defer               Appointments  past 12m or future 3m with PCP    Date Provider   Last Visit   8/23/2024 Patience Gordon MD   Next Visit   2/27/2025 Patience Gordon MD   ED visits in past 90 days: 0        Note composed:2:47 PM 12/20/2024

## 2024-12-20 NOTE — TELEPHONE ENCOUNTER
No care due was identified.  [facility] Embedded Care Due Messages. Reference number: [id number].   12/20/2024 12:54:49 AM CST

## 2024-12-21 RX ORDER — VALACYCLOVIR HYDROCHLORIDE 500 MG/1
500 TABLET, FILM COATED ORAL
Qty: 90 TABLET | Refills: 2 | Status: SHIPPED | OUTPATIENT
Start: 2024-12-21

## 2025-01-09 ENCOUNTER — PATIENT MESSAGE (OUTPATIENT)
Dept: FAMILY MEDICINE | Facility: CLINIC | Age: 70
End: 2025-01-09
Payer: MEDICARE

## 2025-01-09 DIAGNOSIS — S46.101A INJURY OF TENDON OF LONG HEAD OF RIGHT BICEPS, INITIAL ENCOUNTER: Primary | ICD-10-CM

## 2025-01-13 ENCOUNTER — OFFICE VISIT (OUTPATIENT)
Dept: OPTOMETRY | Facility: CLINIC | Age: 70
End: 2025-01-13
Payer: MEDICARE

## 2025-01-13 DIAGNOSIS — S46.101A INJURY OF TENDON OF LONG HEAD OF RIGHT BICEPS, INITIAL ENCOUNTER: Primary | ICD-10-CM

## 2025-01-13 DIAGNOSIS — H52.203 HYPEROPIA WITH ASTIGMATISM AND PRESBYOPIA, BILATERAL: Primary | ICD-10-CM

## 2025-01-13 DIAGNOSIS — E11.8 DIABETES MELLITUS TYPE 2 WITH COMPLICATIONS: ICD-10-CM

## 2025-01-13 DIAGNOSIS — H02.831 DERMATOCHALASIS OF BOTH UPPER EYELIDS: ICD-10-CM

## 2025-01-13 DIAGNOSIS — H16.223 KERATOCONJUNCTIVITIS SICCA OF BOTH EYES NOT SPECIFIED AS SJOGREN'S: ICD-10-CM

## 2025-01-13 DIAGNOSIS — E11.9 TYPE 2 DIABETES MELLITUS WITHOUT RETINOPATHY: ICD-10-CM

## 2025-01-13 DIAGNOSIS — H52.03 HYPEROPIA WITH ASTIGMATISM AND PRESBYOPIA, BILATERAL: Primary | ICD-10-CM

## 2025-01-13 DIAGNOSIS — H25.813 COMBINED FORMS OF AGE-RELATED CATARACT, BILATERAL: ICD-10-CM

## 2025-01-13 DIAGNOSIS — Z00.00 ENCOUNTER FOR MEDICARE ANNUAL WELLNESS EXAM: ICD-10-CM

## 2025-01-13 DIAGNOSIS — H02.834 DERMATOCHALASIS OF BOTH UPPER EYELIDS: ICD-10-CM

## 2025-01-13 DIAGNOSIS — H52.4 HYPEROPIA WITH ASTIGMATISM AND PRESBYOPIA, BILATERAL: Primary | ICD-10-CM

## 2025-01-13 PROBLEM — H51.11 CONVERGENCE INSUFFICIENCY: Status: RESOLVED | Noted: 2023-05-18 | Resolved: 2025-01-13

## 2025-01-13 PROBLEM — H53.8 BLURRED VISION: Status: RESOLVED | Noted: 2023-05-18 | Resolved: 2025-01-13

## 2025-01-13 PROCEDURE — 99999 PR PBB SHADOW E&M-EST. PATIENT-LVL III: CPT | Mod: PBBFAC,HCNC,,

## 2025-01-13 PROCEDURE — 92015 DETERMINE REFRACTIVE STATE: CPT | Mod: HCNC,S$GLB,,

## 2025-01-13 PROCEDURE — 1157F ADVNC CARE PLAN IN RCRD: CPT | Mod: HCNC,CPTII,S$GLB,

## 2025-01-13 PROCEDURE — 1159F MED LIST DOCD IN RCRD: CPT | Mod: HCNC,CPTII,S$GLB,

## 2025-01-13 PROCEDURE — 92014 COMPRE OPH EXAM EST PT 1/>: CPT | Mod: HCNC,S$GLB,,

## 2025-01-13 PROCEDURE — 1126F AMNT PAIN NOTED NONE PRSNT: CPT | Mod: HCNC,CPTII,S$GLB,

## 2025-01-13 RX ORDER — CYCLOSPORINE 0.5 MG/ML
1 EMULSION OPHTHALMIC 2 TIMES DAILY
Qty: 60 EACH | Refills: 11 | Status: SHIPPED | OUTPATIENT
Start: 2025-01-13 | End: 2026-01-13

## 2025-01-13 NOTE — PROGRESS NOTES
HPI    Pt here for annual exam. Last exam- 1 year    Pt sts VA OU blurry occasionally. Pt seeing floaters OU, no change. Pt   denies flashes/pain. Pt using Restasis on and off, last used 2 days ago.   Pt last  midday.     Hemoglobin A1C       Date                     Value               Ref Range             Status                08/23/2024               7.0 (H)             4.0 - 5.6 %           Final                04/05/2024               7.4 (H)             4.0 - 5.6 %           Final                05/11/2023               6.4 (H)             4.0 - 5.6 %           Final               Last edited by Stan Hollis, OD on 1/13/2025  2:46 PM.            Assessment /Plan     For exam results, see Encounter Report.    Hyperopia with astigmatism and presbyopia, bilateral    Type 2 diabetes mellitus without retinopathy    Diabetes mellitus type 2 with complications  -     Ambulatory referral/consult to Optometry    Keratoconjunctivitis sicca of both eyes not specified as Sjogren's  -     cycloSPORINE (RESTASIS) 0.05 % ophthalmic emulsion; Place 1 drop into both eyes 2 (two) times daily.  Dispense: 60 each; Refill: 11    Combined forms of age-related cataract, bilateral    Dermatochalasis of both upper eyelids  -     Borges Visual Field - OU - Extended - Both Eyes; Future; Expected date: 07/13/2025      Discussed spectacle options with pt and released final spec rx. Cut to aid with better adaptation. Ed pt on change in rx and adaptation.    2-3. No diabetic retinopathy or macular edema evident on today's exam OD, OS. Ed pt on importance of maintaining strict BS control due to potential for visual fluctuations and/or vision loss. Monitor with yearly dilated exam.    4. Longstanding dry eye signs and symptoms, pt states it has improved over the years, only instill Restasis BID prn. Refills sent in for pt. Continue to monitor yearly for changes, sooner if any worsening signs or symptoms.    5. Pt bothered by  dermatochalasis of the upper eyelids. Ed pt on findings and on age-related nature of dermatochalasis. Discussed option for surgical consultation, pt would like to get taped and untaped 24-2 HVF done first. Pt scheduled.    RTC: 1 year for comprehensive exam or sooner prn

## 2025-01-14 ENCOUNTER — HOSPITAL ENCOUNTER (OUTPATIENT)
Dept: RADIOLOGY | Facility: HOSPITAL | Age: 70
Discharge: HOME | End: 2025-01-14
Attending: ORTHOPAEDIC SURGERY
Payer: MEDICARE

## 2025-01-14 ENCOUNTER — OFFICE VISIT (OUTPATIENT)
Dept: ORTHOPEDICS | Facility: CLINIC | Age: 70
End: 2025-01-14
Payer: MEDICARE

## 2025-01-14 DIAGNOSIS — S46.101A INJURY OF TENDON OF LONG HEAD OF RIGHT BICEPS, INITIAL ENCOUNTER: ICD-10-CM

## 2025-01-14 DIAGNOSIS — M25.521 RIGHT ELBOW PAIN: Primary | ICD-10-CM

## 2025-01-14 DIAGNOSIS — S46.211A RUPTURE OF RIGHT DISTAL BICEPS TENDON, INITIAL ENCOUNTER: ICD-10-CM

## 2025-01-14 PROCEDURE — 99204 OFFICE O/P NEW MOD 45 MIN: CPT | Mod: HCNC,S$GLB,, | Performed by: ORTHOPAEDIC SURGERY

## 2025-01-14 PROCEDURE — 73030 X-RAY EXAM OF SHOULDER: CPT | Mod: TC,HCNC,PO,RT

## 2025-01-14 PROCEDURE — 99999 PR PBB SHADOW E&M-EST. PATIENT-LVL II: CPT | Mod: PBBFAC,HCNC,, | Performed by: ORTHOPAEDIC SURGERY

## 2025-01-14 PROCEDURE — 73030 X-RAY EXAM OF SHOULDER: CPT | Mod: 26,HCNC,RT, | Performed by: RADIOLOGY

## 2025-01-14 PROCEDURE — 1157F ADVNC CARE PLAN IN RCRD: CPT | Mod: HCNC,CPTII,S$GLB, | Performed by: ORTHOPAEDIC SURGERY

## 2025-01-14 NOTE — PROGRESS NOTES
Patient ID: Mane Ramires is a 69 y.o. male    Chief Complaint: No chief complaint on file.      History of Present Illness:    Pleasant 69-year-old male here for evaluation of right shoulder and elbow pain.  He is a mai by trade.  He was working on some wood using a dull saw back in November and since then has had a lot of pain in his elbow and also radiating up into his shoulder.  He also has history of a right shoulder mini open rotator cuff repair in 2019.  Reports some nighttime symptoms and pain with lifting and overhead activity.  Increased pain with rotation of the wrist and spasms in the biceps.    PAST MEDICAL HISTORY:   Past Medical History:   Diagnosis Date    Accident     fell from roof with TBI (word finding issues personality changes, memory deficets), R rib fractures, clavicle fx    Allergy     Anxiety     ASD (atrial septal defect)     noted on ECHO 6/16    Cervical stenosis of spine     noted on 6/15 MRI    CTS (carpal tunnel syndrome)     mild-mod on 4/17 NCS    DDD (degenerative disc disease), cervical 10/2014    C5-6 and C6-C7    Depression     Diabetes     JEEVAN (generalized anxiety disorder)     Gender identity disorder     H/O cardiovascular stress test     10/23 normal    Herpes simplex type 2 infection     History of atypical hyperplasia of breast     B precancer lesions    History of cardiovascular stress test     normal 10/23    Hypertension     Hypertensive retinopathy     IGT (impaired glucose tolerance)     Myelomalacia     c-spine noted on 8/15 MRI    OA (osteoarthritis)     Pineal gland cyst     TBI (traumatic brain injury)     after falling off a roof in 2003    Type 2 diabetes mellitus      PAST SURGICAL HISTORY:   Past Surgical History:   Procedure Laterality Date    AMPUTATION      L index finger    APPENDECTOMY  1969    BILATERAL SALPINGOOPHORECTOMY  02/2015    BIOPSY OF LIVER WITH ULTRASOUND GUIDANCE N/A 12/18/2018    Procedure: BIOPSY, LIVER, WITH US GUIDANCE;   Surgeon: Dosc Diagnostic Provider;  Location: [facility] OR 2ND FLR;  Service: Radiology;  Laterality: N/A;  U/S GUIDED LIVER BIOPSY.  2018.  DOSC 7:30 AM  PROCEDURE 9 AM.  DR NITHIN GOLDEN / MARLON NUR.  DB 18 3:20P    CARPAL TUNNEL RELEASE  2017    right    CERVICAL FUSION  10/2014    C5-C6 and C6-C7    CHOLECYSTECTOMY      ENDOSCOPIC NASAL SEPTOPLASTY N/A 2018    Procedure: SEPTOPLASTY, NASAL, ENDOSCOPIC;  Surgeon: Reji Arce III, MD;  Location: [facility] OR 2ND FLR;  Service: ENT;  Laterality: N/A;    FINGER AMPUTATION Left     FRACTURE SURGERY Right     rib fractures    HYSTERECTOMY  1984    MIGUEL    MASTECTOMY  2015    RECONSTRUCTION OF NOSE N/A 2018    Procedure: RECONSTRUCTION, NOSE;  Surgeon: Reji Arce III, MD;  Location: [facility] OR 2ND FLR;  Service: ENT;  Laterality: N/A;    ROTATOR CUFF REPAIR Right 2019    UPPER GASTROINTESTINAL ENDOSCOPY  2017    Dr. Ayon     FAMILY HISTORY:   Family History   Problem Relation Name Age of Onset    Diabetes Mother      Heart disease Mother      Gallbladder disease Mother      Coronary artery disease Father      Breast cancer Sister      Heart disease Sister      Heart attack Brother      Heart disease Maternal Grandmother      Gallbladder disease Maternal Grandmother      Lung cancer Paternal Grandfather      Heart disease Paternal Grandfather      Gallbladder disease Daughter      Craniosynostosis Daughter      Ovarian cancer Maternal Aunt      Breast cancer Cousin paternal     Breast cancer Cousin paternal     Glaucoma Neg Hx      Macular degeneration Neg Hx      Retinal detachment Neg Hx       SOCIAL HISTORY:   Social History     Occupational History    Occupation: Artist   Tobacco Use    Smoking status: Former     Current packs/day: 0.00     Average packs/day: 0.3 packs/day for 27.8 years (7.0 ttl pk-yrs)     Types: Cigarettes     Start date:      Quit date: 2002     Years since quittin.2     Passive  "exposure: Past    Smokeless tobacco: Never    Tobacco comments:     did not smoke regularly   Substance and Sexual Activity    Alcohol use: Yes     Comment: rare     Drug use: Yes     Types: Hydrocodone    Sexual activity: Never        MEDICATIONS:   Current Outpatient Medications:     aspirin (ECOTRIN) 81 MG EC tablet, Take 81 mg by mouth once daily., Disp: , Rfl:     blood sugar diagnostic Strp, To check BG QD times daily, to use with insurance preferred meter, Disp: 100 each, Rfl: 5    blood-glucose meter kit, As directed, Disp: 1 each, Rfl: 0    clonazePAM (KLONOPIN) 0.5 MG tablet, Take 1 tablet (0.5 mg total) by mouth daily as needed for Anxiety., Disp: 30 tablet, Rfl: 0    cyclobenzaprine (FLEXERIL) 10 MG tablet, TAKE 1 TABLET (10 MG TOTAL) BY MOUTH NIGHTLY AS NEEDED FOR MUSCLE SPASMS (SPASMS)., Disp: 90 tablet, Rfl: 1    cycloSPORINE (RESTASIS) 0.05 % ophthalmic emulsion, Place 1 drop into both eyes 2 (two) times daily., Disp: 60 each, Rfl: 11    diclofenac (VOLTAREN) 50 MG EC tablet, Take 1 tablet (50 mg total) by mouth 2 (two) times daily as needed., Disp: 40 tablet, Rfl: 0    DULoxetine (CYMBALTA) 30 MG capsule, Take 3 capsules (90 mg total) by mouth once daily., Disp: 270 capsule, Rfl: 3    ketoconazole (NIZORAL) 2 % shampoo, APPLY TOPICALLY EVERY 7 DAYS, Disp: 120 mL, Rfl: 6    lancets Misc, To check BG QD times daily, to use with insurance preferred meter, Disp: 100 each, Rfl: 5    lancets Misc, 100 each by Misc.(Non-Drug; Combo Route) route once daily., Disp: 100 each, Rfl: 0    levocetirizine (XYZAL) 5 MG tablet, TAKE 1 TABLET BY MOUTH EVERY NIGHT AS NEEDED FOR ALLERGIES, Disp: 90 tablet, Rfl: 2    metoprolol succinate (TOPROL-XL) 25 MG 24 hr tablet, Take 0.5 tablets (12.5 mg total) by mouth once daily., Disp: 45 tablet, Rfl: 1    needle, disp, 18 G (BD REGULAR BEVEL NEEDLES) 18 gauge x 1" Ndle, USE TO INJECT 0.5ML OF TESTOSTERONE INTO THE MUCLE EVERY 14 DAYS, Disp: 10 each, Rfl: 3    needle, disp, 25 " "gauge (BD PRECISIONGLIDE) 25 gauge x 1" Ndle, USE TO DRAW UP 0.5ML OF TESTERONE EVERY 14 DAYS, Disp: 10 each, Rfl: 3    rosuvastatin (CRESTOR) 10 MG tablet, Take 1 tablet (10 mg total) by mouth once daily., Disp: 90 tablet, Rfl: 2    testosterone cypionate (DEPOTESTOTERONE CYPIONATE) 200 mg/mL injection, Inject 1 mL (200 mg total) into the muscle every 14 (fourteen) days., Disp: 10 mL, Rfl: 3    traZODone (DESYREL) 50 MG tablet, TAKE 1 TABLET BY MOUTH EVERY DAY IN THE EVENING, Disp: 90 tablet, Rfl: 3    valACYclovir (VALTREX) 500 MG tablet, TAKE 1 TABLET BY MOUTH EVERY DAY, Disp: 90 tablet, Rfl: 2    valsartan (DIOVAN) 80 MG tablet, Take 1 tablet (80 mg total) by mouth once daily., Disp: 90 tablet, Rfl: 1  ALLERGIES:   Review of patient's allergies indicates:   Allergen Reactions    Corticosteroids (glucocorticoids) Palpitations    Cortisone Other (See Comments), Palpitations and Anaphylaxis    Mobic [meloxicam] Swelling    Sudafed [pseudoephedrine hcl] Other (See Comments)     Heart racing      Gabapentin      Caused memory loss    Other Reaction(s): MENTAL CONFUSION    Pseudoephedrine Other (See Comments)    Adhesive Rash     Specifically clear tegaderm dressing     Codeine Other (See Comments) and Palpitations     Heart racing         Physical Exam     There were no vitals filed for this visit.  Alert and oriented to person, place and time. No acute distress. Well-groomed, not ill appearing. Pupils round and reactive, normal respiratory effort, no audible wheezing.     On exam he has well-healed right shoulder incisions.  He has significant pain with resisted wrist supination and forearm supination.  He has tenderness all along the radial tuberosity.  No obvious Gautam deformity.  No significant pain over the proximal biceps tendon.  Positive Speed's and Yergason's.  Negative drop-arm test.        Imaging:       X-Ray: I have reviewed all pertinent results/findings and my personal findings are:  Negative right " shoulder radiographs, mild DJD glenohumeral joint.  Previous humeral head anchor      Assessment & Plan    Right elbow pain  -     MRI Elbow Joint Without Contrast Right; Future; Expected date: 01/14/2025    Rupture of right distal biceps tendon, initial encounter  -     Ambulatory referral/consult to Orthopedics  -     MRI Elbow Joint Without Contrast Right; Future; Expected date: 01/14/2025         Treatment options were discussed with the patient in detail. We discussed the patient's current imaging as well as differential diagnosis in detail. Based on the patient's symptoms of failure of conservative treatment, traumatic injury, and concern for distal biceps tear, I do believe an MRI of the right elbow is indicated to rule out damage to intra-articular structures including cartilage, tendons, and ligaments.     The patient will follow-up after MRI to discuss results and further treatment options. They will call the clinic with any questions or concerns.

## 2025-01-18 ENCOUNTER — PATIENT MESSAGE (OUTPATIENT)
Dept: ORTHOPEDICS | Facility: CLINIC | Age: 70
End: 2025-01-18
Payer: MEDICARE

## 2025-01-28 ENCOUNTER — PATIENT MESSAGE (OUTPATIENT)
Dept: ADMINISTRATIVE | Facility: OTHER | Age: 70
End: 2025-01-28
Payer: MEDICARE

## 2025-01-30 ENCOUNTER — PATIENT MESSAGE (OUTPATIENT)
Dept: ADMINISTRATIVE | Facility: OTHER | Age: 70
End: 2025-01-30
Payer: MEDICARE

## 2025-02-19 ENCOUNTER — HOSPITAL ENCOUNTER (OUTPATIENT)
Dept: RADIOLOGY | Facility: HOSPITAL | Age: 70
Discharge: HOME | End: 2025-02-19
Attending: ORTHOPAEDIC SURGERY
Payer: MEDICARE

## 2025-02-19 DIAGNOSIS — M25.521 RIGHT ELBOW PAIN: ICD-10-CM

## 2025-02-19 DIAGNOSIS — S46.211A RUPTURE OF RIGHT DISTAL BICEPS TENDON, INITIAL ENCOUNTER: ICD-10-CM

## 2025-02-19 PROCEDURE — 73221 MRI JOINT UPR EXTREM W/O DYE: CPT | Mod: TC,RT

## 2025-02-20 ENCOUNTER — HOSPITAL ENCOUNTER (OUTPATIENT)
Dept: RADIOLOGY | Facility: CLINIC | Age: 70
Discharge: HOME | End: 2025-02-20
Payer: MEDICARE

## 2025-02-20 ENCOUNTER — OFFICE VISIT (OUTPATIENT)
Dept: FAMILY MEDICINE | Facility: CLINIC | Age: 70
End: 2025-02-20
Payer: MEDICARE

## 2025-02-20 VITALS
DIASTOLIC BLOOD PRESSURE: 76 MMHG | SYSTOLIC BLOOD PRESSURE: 130 MMHG | HEIGHT: 64 IN | BODY MASS INDEX: 27.03 KG/M2 | WEIGHT: 158.31 LBS | HEART RATE: 80 BPM | OXYGEN SATURATION: 99 % | TEMPERATURE: 98 F

## 2025-02-20 VITALS
HEIGHT: 64 IN | BODY MASS INDEX: 27.03 KG/M2 | OXYGEN SATURATION: 97 % | HEART RATE: 79 BPM | SYSTOLIC BLOOD PRESSURE: 130 MMHG | TEMPERATURE: 98 F | DIASTOLIC BLOOD PRESSURE: 76 MMHG | WEIGHT: 158.31 LBS

## 2025-02-20 DIAGNOSIS — I15.2 HYPERTENSION ASSOCIATED WITH DIABETES: ICD-10-CM

## 2025-02-20 DIAGNOSIS — E11.21 CONTROLLED TYPE 2 DIABETES MELLITUS WITH DIABETIC NEPHROPATHY, WITHOUT LONG-TERM CURRENT USE OF INSULIN: ICD-10-CM

## 2025-02-20 DIAGNOSIS — N18.31 STAGE 3A CHRONIC KIDNEY DISEASE: ICD-10-CM

## 2025-02-20 DIAGNOSIS — J30.2 SEASONAL ALLERGIES: ICD-10-CM

## 2025-02-20 DIAGNOSIS — E11.59 HYPERTENSION ASSOCIATED WITH DIABETES: ICD-10-CM

## 2025-02-20 DIAGNOSIS — G89.29 CHRONIC RIGHT SHOULDER PAIN: ICD-10-CM

## 2025-02-20 DIAGNOSIS — M25.511 CHRONIC RIGHT SHOULDER PAIN: ICD-10-CM

## 2025-02-20 DIAGNOSIS — J34.89 NASAL PAIN: ICD-10-CM

## 2025-02-20 DIAGNOSIS — Z00.00 ENCOUNTER FOR MEDICARE ANNUAL WELLNESS EXAM: Primary | ICD-10-CM

## 2025-02-20 DIAGNOSIS — J34.89 NASAL PAIN: Primary | ICD-10-CM

## 2025-02-20 DIAGNOSIS — G95.89 MYELOMALACIA: ICD-10-CM

## 2025-02-20 DIAGNOSIS — Z23 NEED FOR VACCINATION: ICD-10-CM

## 2025-02-20 PROCEDURE — 99999 PR PBB SHADOW E&M-EST. PATIENT-LVL V: CPT | Mod: PBBFAC,HCNC,, | Performed by: NURSE PRACTITIONER

## 2025-02-20 PROCEDURE — 70140 X-RAY EXAM OF FACIAL BONES: CPT | Mod: TC,HCNC,FY,PO

## 2025-02-20 RX ORDER — LEVOCETIRIZINE DIHYDROCHLORIDE 5 MG/1
5 TABLET, FILM COATED ORAL NIGHTLY
Qty: 90 TABLET | Refills: 0 | Status: SHIPPED | OUTPATIENT
Start: 2025-02-20

## 2025-02-20 RX ORDER — FAMCICLOVIR 500 MG/1
500 TABLET ORAL 3 TIMES DAILY
COMMUNITY
Start: 2025-02-09

## 2025-02-20 RX ORDER — DOXYLAMINE SUCCINATE 25 MG
TABLET ORAL 2 TIMES DAILY
Qty: 28 G | Refills: 0 | Status: SHIPPED | OUTPATIENT
Start: 2025-02-20

## 2025-02-20 RX ORDER — DICLOFENAC SODIUM 10 MG/G
2 GEL TOPICAL DAILY
Qty: 100 G | Refills: 0 | Status: SHIPPED | OUTPATIENT
Start: 2025-02-20

## 2025-02-20 NOTE — PROGRESS NOTES
Orders:    Vitamin B12; Future     Subjective:       Patient ID: Mane Ramires is a 69 y.o. male.    Chief Complaint: Facial Injury    Patient presents to the clinic with complaint of facial/nose pain.     States he opened the back glass on his vehicle and then it fell down and hit him in the face. Reports pain to nose and around eyes. Has not taken anything for pain. Has used ice to area.     Requests refill on Xyzal, Diclofenac cream, and antifungal cream.     Patient educated on plan of care, verbalized understanding.      Facial Injury   The incident occurred 1 to 3 hours ago. The injury mechanism was a direct blow. There was no loss of consciousness. There was no blood loss. The quality of the pain is described as aching. The pain is moderate. The pain has been constant since the injury. Pertinent negatives include no vomiting.     Review of Systems   Constitutional:  Negative for activity change, appetite change, chills, diaphoresis and fever.   HENT:  Negative for congestion, ear pain, postnasal drip, sinus pressure, sneezing and sore throat.         Nose pain   Eyes:  Negative for pain, discharge, redness and itching.   Respiratory:  Negative for apnea, cough, chest tightness, shortness of breath and wheezing.    Cardiovascular:  Negative for chest pain and leg swelling.   Gastrointestinal:  Negative for abdominal distention, abdominal pain, constipation, diarrhea, nausea and vomiting.   Genitourinary:  Negative for difficulty urinating, dysuria, flank pain and frequency.   Skin:  Negative for color change, rash and wound.   Neurological:  Negative for dizziness.   All other systems reviewed and are negative.      Problem List[1]    Objective:      Physical Exam  Vitals and nursing note reviewed.   Constitutional:       Appearance: Normal appearance. He is not ill-appearing.   HENT:      Head: Normocephalic. Contusion present. No raccoon eyes or Hurley's sign.        Nose: Nose normal.   Eyes:      General: Lids are normal.  "  Cardiovascular:      Rate and Rhythm: Normal rate.      Pulses: Normal pulses.   Pulmonary:      Effort: Pulmonary effort is normal. No tachypnea or respiratory distress.      Breath sounds: No wheezing.   Musculoskeletal:         General: Normal range of motion.      Cervical back: Full passive range of motion without pain and normal range of motion.      Left lower leg: No edema.   Skin:     General: Skin is warm and dry.   Neurological:      Mental Status: He is alert and oriented to person, place, and time.   Psychiatric:         Mood and Affect: Mood normal.         Behavior: Behavior normal.         Lab Results   Component Value Date    WBC 7.41 08/23/2024    HGB 15.8 08/23/2024    HCT 47.8 08/23/2024     08/23/2024    CHOL 143 04/05/2024    TRIG 95 04/05/2024    HDL 54 04/05/2024    ALT 16 08/23/2024    AST 15 08/23/2024     08/23/2024    K 4.4 08/23/2024     08/23/2024    CREATININE 1.4 08/23/2024    BUN 13 08/23/2024    CO2 25 08/23/2024    TSH 1.627 02/01/2023    INR 0.9 04/09/2021    HGBA1C 7.0 (H) 08/23/2024     The ASCVD Risk score (Getachew CAI, et al., 2019) failed to calculate for the following reasons:    Risk score cannot be calculated because patient has a medical history suggesting prior/existing ASCVD  Visit Vitals  /76   Pulse 79   Temp 98.2 °F (36.8 °C) (Oral)   Ht 5' 4" (1.626 m)   Wt 71.8 kg (158 lb 4.6 oz)   SpO2 97%   BMI 27.17 kg/m²      Assessment:       1. Nasal pain    2. Seasonal allergies    3. Chronic right shoulder pain        Plan:       1. Nasal pain  -     X-Ray Facial Bones Limited 1-2 View; Future; Expected date: 02/20/2025   - If fractured will refer to ENT   - Apply ice at 15 minute intervals   - Tylenol as directed for pain    2. Seasonal allergies  -     levocetirizine (XYZAL) 5 MG tablet; Take 1 tablet (5 mg total) by mouth every evening.  Dispense: 90 tablet; Refill: 0    3. Chronic right shoulder pain  -     diclofenac sodium (VOLTAREN) 1 % Gel; " Apply 2 g topically once daily.  Dispense: 100 g; Refill: 0    Other orders  -     miconazole (MICOTIN) 2 % cream; Apply topically 2 (two) times daily.  Dispense: 28 g; Refill: 0       Follow up if symptoms worsen or fail to improve.      Future Appointments       Date Provider Specialty Appt Notes    2/20/2025  Radiology xray    2/21/2025 Mike Solomon MD Orthopedics MRI f/u elbow    2/25/2025  Ophthalmology HVF 24-2 SF Tapped & untapped.    2/27/2025 Patience Gordon MD Family Medicine follow up    6/18/2025 Darya Galdamez NP Endocrinology F/u    8/26/2025  Lab labs                  [1]   Patient Active Problem List  Diagnosis    HTN (hypertension)    Vitamin D deficiency    Dyslipidemia    Neck pain    Chronic right-sided low back pain    Generalized anxiety disorder    Major depressive disorder with single episode, in remission    Post concussion syndrome    Nasal obstruction    Shoulder pain    Family history of ASCVD (arteriosclerotic cardiovascular disease)    Risk for coronary artery disease less than 10% in next 10 years    Left shoulder pain    Shoulder stiffness, right    Shoulder weakness    Shoulder stiffness, left    Impaired functional mobility, balance, and endurance    Right sided weakness    Female-to-male transgender person    H/O: CVA (cerebrovascular accident)    Overweight (BMI 25.0-29.9)    ASD (atrial septal defect)    Myelomalacia-cervical spine-H/O cervical fusion    Controlled type 2 diabetes mellitus, without long-term current use of insulin    Posture abnormality    Midline thoracic back pain    Headache    Saccadic eye movement deficiency    Deficit of vestibulo-ocular reflex    Vestibulopathy    Dizziness    Basal cell carcinoma (BCC) of right upper extremity    Impaired sensation    Hypertension associated with diabetes

## 2025-02-20 NOTE — PROGRESS NOTES
"  Mane Ramires presented for a  Medicare AWV and comprehensive Health Risk Assessment today. The following components were reviewed and updated:    Medical history  Family History  Social history  Allergies and Current Medications  Health Risk Assessment  Health Maintenance  Care Team     Patient screened moderate and/or high risk for one or more social determinants of health (SDOH). Patient connected to community resources through the ED Navigator.      ** See Completed Assessments for Annual Wellness Visit within the encounter summary.**         The following assessments were completed:  Living Situation  CAGE  Depression Screening  Timed Get Up and Go  Whisper Test  Cognitive Function Screening  Nutrition Screening  ADL Screening  PAQ Screening    Clock in media   Opioid documentation:      Patient does not have a current opioid prescription.        Vitals:    02/20/25 1245   BP: 130/76   Pulse: 80   Temp: 98.2 °F (36.8 °C)   TempSrc: Oral   SpO2: 99%   Weight: 71.8 kg (158 lb 4.6 oz)   Height: 5' 4" (1.626 m)     Body mass index is 27.17 kg/m².  Physical Exam  Constitutional:       Appearance: He is well-developed.   HENT:      Head: Normocephalic and atraumatic.      Right Ear: Hearing normal.      Left Ear: Hearing normal.      Nose: Nose normal.   Eyes:      General: Lids are normal.      Conjunctiva/sclera: Conjunctivae normal.      Pupils: Pupils are equal, round, and reactive to light.   Cardiovascular:      Rate and Rhythm: Normal rate.   Pulmonary:      Effort: Pulmonary effort is normal.   Abdominal:      Palpations: Abdomen is soft.   Musculoskeletal:         General: Normal range of motion.      Cervical back: Normal range of motion and neck supple.   Skin:     General: Skin is warm and dry.   Neurological:      Mental Status: He is alert and oriented to person, place, and time.               Diagnoses and health risks identified today and associated recommendations/orders:    1. Encounter for " Medicare annual wellness exam  Discussed health maintenance guidelines appropriate for age.      - Ambulatory Referral/Consult to Enhanced Annual Wellness Visit (eAWV)    2. Hypertension associated with diabetes  Controlled, continue current medication regimen  Low salt diet  Increase physical activity  Followed by pcp      3. Controlled type 2 diabetes mellitus with diabetic nephropathy, without long-term current use of insulin  Sub optimal control A1c 7.0  Continue current medication  Followed byt pcp     4. Myelomalacia  Stable, continue to monitor      5. Stage 3a chronic kidney disease  Stable, continue to monitor  Followed by pcp     6. Need for vaccination    - influenza (adjuvanted) (Fluad) 45 mcg/0.5 mL IM vaccine (> or = 66 yo) 0.5 mL      Provided Mane with a 5-10 year written screening schedule and personal prevention plan. Recommendations were developed using the USPSTF age appropriate recommendations. Education, counseling, and referrals were provided as needed. After Visit Summary printed and given to patient which includes a list of additional screenings\tests needed.    Follow up for One year for Annual Wellness Visit.    Luna Mahan, NP      I offered to discuss advanced care planning, including how to pick a person who would make decisions for you if you were unable to make them for yourself, called a health care power of , and what kind of decisions you might make such as use of life sustaining treatments such as ventilators and tube feeding when faced with a life limiting illness recorded on a living will that they will need to know. (How you want to be cared for as you near the end of your natural life)     X  Patient has advanced directives on file, which we reviewed, and they do not wish to make changes.

## 2025-02-20 NOTE — PATIENT INSTRUCTIONS

## 2025-02-20 NOTE — PATIENT INSTRUCTIONS
Counseling and Referral of Other Preventative  (Italic type indicates deductible and co-insurance are waived)    Patient Name: Mane Ramires  Today's Date: 2/20/2025    Health Maintenance       Date Due Completion Date    Shingles Vaccine (1 of 2) Never done ---    RSV Vaccine (Age 60+ and Pregnant patients) (1 - Risk 60-74 years 1-dose series) Never done ---    Influenza Vaccine (1) 09/01/2024 10/16/2023    Hemoglobin A1c 02/23/2025 8/23/2024    Lipid Panel 04/05/2025 4/5/2024    Diabetes Urine Screening 08/23/2025 8/23/2024    Foot Exam 08/23/2025 8/23/2024    Colorectal Cancer Screening 08/27/2025 8/27/2024    Diabetic Eye Exam 01/13/2026 1/13/2025    Override on 7/17/2023: Done    High Dose Statin 02/20/2026 2/20/2025    TETANUS VACCINE 10/01/2034 10/1/2024        No orders of the defined types were placed in this encounter.      The following information is provided to all patients.  This information is to help you find resources for any of the problems found today that may be affecting your health:                  Living healthy guide: www.[facility].[place].gov      Understanding Diabetes: www.diabetes.org      Eating healthy: www.cdc.gov/healthyweight      CDC home safety checklist: www.cdc.gov/steadi/patient.html      Agency on Aging: www.goea.[place].[facility]      Alcoholics anonymous (AA): www.aa.org      Physical Activity: www.laurent.nih.gov/sj8vpzx      Tobacco use: www.quitwithusla.org

## 2025-02-21 ENCOUNTER — OFFICE VISIT (OUTPATIENT)
Dept: ORTHOPEDICS | Facility: CLINIC | Age: 70
End: 2025-02-21
Payer: MEDICARE

## 2025-02-21 ENCOUNTER — RESULTS FOLLOW-UP (OUTPATIENT)
Dept: FAMILY MEDICINE | Facility: CLINIC | Age: 70
End: 2025-02-21

## 2025-02-21 VITALS — WEIGHT: 158.31 LBS | BODY MASS INDEX: 27.03 KG/M2 | HEIGHT: 64 IN

## 2025-02-21 DIAGNOSIS — S46.211A TEAR OF BICEPS MUSCLE, RIGHT, INITIAL ENCOUNTER: ICD-10-CM

## 2025-02-21 DIAGNOSIS — S46.211A RUPTURE OF RIGHT DISTAL BICEPS TENDON, INITIAL ENCOUNTER: Primary | ICD-10-CM

## 2025-02-21 DIAGNOSIS — Z01.818 PRE-OP TESTING: ICD-10-CM

## 2025-02-21 RX ORDER — MUPIROCIN 20 MG/G
OINTMENT TOPICAL
OUTPATIENT
Start: 2025-02-21

## 2025-02-21 NOTE — H&P (VIEW-ONLY)
Patient ID: Mane Ramires is a 69 y.o. male    Chief Complaint:   Chief Complaint   Patient presents with    Right Elbow - Pain     MRI f/u        History of Present Illness:    Pleasant 69-year-old male here for evaluation of right shoulder and elbow pain.  He is a mai by trade.  He was working on some wood using a dull saw back in November and since then has had a lot of pain in his elbow and also radiating up into his shoulder.  He also has history of a right shoulder mini open rotator cuff repair in 2019.  Reports some nighttime symptoms and pain with lifting and overhead activity.  Increased pain with rotation of the wrist and spasms in the biceps.    ____________________________________________________________________    Interval history 02/21/2025 : Patient returns today for MRI follow-up of their right elbow.  They report no changes since I saw them last.  Pain 8 out of 10    PAST MEDICAL HISTORY:   Past Medical History:   Diagnosis Date    Accident     fell from roof with TBI (word finding issues personality changes, memory deficets), R rib fractures, clavicle fx    Allergy     Anxiety     ASD (atrial septal defect)     noted on ECHO 6/16    Cervical stenosis of spine     noted on 6/15 MRI    CTS (carpal tunnel syndrome)     mild-mod on 4/17 NCS    DDD (degenerative disc disease), cervical 10/2014    C5-6 and C6-C7    Depression     Diabetes     JEEVAN (generalized anxiety disorder)     Gender identity disorder     H/O cardiovascular stress test     10/23 normal    Herpes simplex type 2 infection     History of atypical hyperplasia of breast     B precancer lesions    History of cardiovascular stress test     normal 10/23    Hypertension     Hypertensive retinopathy     IGT (impaired glucose tolerance)     Myelomalacia     c-spine noted on 8/15 MRI    OA (osteoarthritis)     Pineal gland cyst     TBI (traumatic brain injury)     after falling off a roof in 2003    Type 2 diabetes mellitus      PAST  SURGICAL HISTORY:   Past Surgical History:   Procedure Laterality Date    AMPUTATION      L index finger    APPENDECTOMY  1969    BILATERAL SALPINGOOPHORECTOMY  02/2015    BIOPSY OF LIVER WITH ULTRASOUND GUIDANCE N/A 12/18/2018    Procedure: BIOPSY, LIVER, WITH US GUIDANCE;  Surgeon: [facility] Diagnostic Provider;  Location: [facility] OR [address] [place] [address];  Service: Radiology;  Laterality: N/A;  U/S GUIDED LIVER BIOPSY.  12/18/2018.  DOS 7:30 AM  PROCEDURE 9 AM.  DR NITHIN GOLDEN / MARLON NUR.  DB 12/14/18 3:20P    CARPAL TUNNEL RELEASE  12/2017    right    CERVICAL FUSION  10/2014    C5-C6 and C6-C7    CHOLECYSTECTOMY      ENDOSCOPIC NASAL SEPTOPLASTY N/A 8/23/2018    Procedure: SEPTOPLASTY, NASAL, ENDOSCOPIC;  Surgeon: Reji Arce III, MD;  Location: [facility] OR [address] [place] [address];  Service: ENT;  Laterality: N/A;    FINGER AMPUTATION Left     FRACTURE SURGERY Right 2003    rib fractures    HYSTERECTOMY  1984    MIGUEL    MASTECTOMY  02/2015    RECONSTRUCTION OF NOSE N/A 8/23/2018    Procedure: RECONSTRUCTION, NOSE;  Surgeon: Reji Arce III, MD;  Location: [facility] OR [address] [place] [address];  Service: ENT;  Laterality: N/A;    ROTATOR CUFF REPAIR Right 2019    UPPER GASTROINTESTINAL ENDOSCOPY  09/06/2017    Dr. Ayon     FAMILY HISTORY:   Family History   Problem Relation Name Age of Onset    Diabetes Mother      Heart disease Mother      Gallbladder disease Mother      Coronary artery disease Father      Breast cancer Sister      Heart disease Sister      Heart attack Brother      Heart disease Maternal Grandmother      Gallbladder disease Maternal Grandmother      Lung cancer Paternal Grandfather      Heart disease Paternal Grandfather      Gallbladder disease Daughter      Craniosynostosis Daughter      Ovarian cancer Maternal Aunt      Breast cancer Cousin paternal     Breast cancer Cousin paternal     Glaucoma Neg Hx      Macular degeneration Neg Hx      Retinal detachment Neg Hx       SOCIAL HISTORY:   Social History     Occupational  History    Occupation: Artist   Tobacco Use    Smoking status: Former     Current packs/day: 0.00     Average packs/day: 0.3 packs/day for 27.8 years (7.0 ttl pk-yrs)     Types: Cigarettes     Start date:      Quit date: 2002     Years since quittin.3     Passive exposure: Past    Smokeless tobacco: Never    Tobacco comments:     did not smoke regularly   Substance and Sexual Activity    Alcohol use: Yes     Comment: rare     Drug use: Yes     Types: Hydrocodone    Sexual activity: Never        MEDICATIONS:   Current Outpatient Medications:     aspirin (ECOTRIN) 81 MG EC tablet, Take 81 mg by mouth once daily., Disp: , Rfl:     blood sugar diagnostic Strp, To check BG QD times daily, to use with insurance preferred meter, Disp: 100 each, Rfl: 5    blood-glucose meter kit, As directed, Disp: 1 each, Rfl: 0    clonazePAM (KLONOPIN) 0.5 MG tablet, Take 1 tablet (0.5 mg total) by mouth daily as needed for Anxiety., Disp: 30 tablet, Rfl: 0    cyclobenzaprine (FLEXERIL) 10 MG tablet, TAKE 1 TABLET (10 MG TOTAL) BY MOUTH NIGHTLY AS NEEDED FOR MUSCLE SPASMS (SPASMS)., Disp: 90 tablet, Rfl: 1    cycloSPORINE (RESTASIS) 0.05 % ophthalmic emulsion, Place 1 drop into both eyes 2 (two) times daily., Disp: 60 each, Rfl: 11    diclofenac (VOLTAREN) 50 MG EC tablet, Take 1 tablet (50 mg total) by mouth 2 (two) times daily as needed., Disp: 40 tablet, Rfl: 0    diclofenac sodium (VOLTAREN) 1 % Gel, Apply 2 g topically once daily., Disp: 100 g, Rfl: 0    DULoxetine (CYMBALTA) 30 MG capsule, Take 3 capsules (90 mg total) by mouth once daily., Disp: 270 capsule, Rfl: 3    famciclovir (FAMVIR) 500 MG tablet, Take 500 mg by mouth 3 (three) times daily., Disp: , Rfl:     ketoconazole (NIZORAL) 2 % shampoo, APPLY TOPICALLY EVERY 7 DAYS, Disp: 120 mL, Rfl: 6    lancets Misc, To check BG QD times daily, to use with insurance preferred meter, Disp: 100 each, Rfl: 5    levocetirizine (XYZAL) 5 MG tablet, Take 1 tablet (5 mg  "total) by mouth every evening., Disp: 90 tablet, Rfl: 0    metoprolol succinate (TOPROL-XL) 25 MG 24 hr tablet, Take 0.5 tablets (12.5 mg total) by mouth once daily., Disp: 45 tablet, Rfl: 1    miconazole (MICOTIN) 2 % cream, Apply topically 2 (two) times daily., Disp: 28 g, Rfl: 0    needle, disp, 18 G (BD REGULAR BEVEL NEEDLES) 18 gauge x 1" Ndle, USE TO INJECT 0.5ML OF TESTOSTERONE INTO THE MUCLE EVERY 14 DAYS, Disp: 10 each, Rfl: 3    needle, disp, 25 gauge (BD PRECISIONGLIDE) 25 gauge x 1" Ndle, USE TO DRAW UP 0.5ML OF TESTERONE EVERY 14 DAYS, Disp: 10 each, Rfl: 3    rosuvastatin (CRESTOR) 10 MG tablet, Take 1 tablet (10 mg total) by mouth once daily., Disp: 90 tablet, Rfl: 2    traZODone (DESYREL) 50 MG tablet, TAKE 1 TABLET BY MOUTH EVERY DAY IN THE EVENING, Disp: 90 tablet, Rfl: 3    valACYclovir (VALTREX) 500 MG tablet, TAKE 1 TABLET BY MOUTH EVERY DAY, Disp: 90 tablet, Rfl: 2    valsartan (DIOVAN) 80 MG tablet, Take 1 tablet (80 mg total) by mouth once daily., Disp: 90 tablet, Rfl: 1    testosterone cypionate (DEPOTESTOTERONE CYPIONATE) 200 mg/mL injection, Inject 1 mL (200 mg total) into the muscle every 14 (fourteen) days., Disp: 10 mL, Rfl: 3  No current facility-administered medications for this visit.  ALLERGIES:   Review of patient's allergies indicates:   Allergen Reactions    Corticosteroids (glucocorticoids) Palpitations    Cortisone Other (See Comments), Palpitations and Anaphylaxis    Mobic [meloxicam] Swelling    Sudafed [pseudoephedrine hcl] Other (See Comments)     Heart racing      Gabapentin      Caused memory loss    Other Reaction(s): MENTAL CONFUSION    Pseudoephedrine Other (See Comments)    Adhesive Rash     Specifically clear tegaderm dressing     Codeine Other (See Comments) and Palpitations     Heart racing         Physical Exam     There were no vitals filed for this visit.  Alert and oriented to person, place and time. No acute distress. Well-groomed, not ill appearing. Pupils " round and reactive, normal respiratory effort, no audible wheezing.     On exam he has well-healed right shoulder incisions.  He has significant pain with resisted wrist supination and forearm supination.  He has tenderness all along the radial tuberosity.  No obvious Gautam deformity.  No significant pain over the proximal biceps tendon.  Positive Speed's and Yergason's.  Negative drop-arm test.        Imaging:       X-Ray: I have reviewed all pertinent results/findings and my personal findings are:  Negative right shoulder radiographs, mild DJD glenohumeral joint.  Previous humeral head anchor  MRI of the right elbow reviewed by me showing high-grade partial tear of the distal biceps insertion    Assessment & Plan    Rupture of right distal biceps tendon, initial encounter         Mane Ramires is a 69 y.o. male who sustained an injury to the right elbow, resulting in a high-grade partial tear of the distal biceps.     We discussed multiple treatment options including non-operative and operative treatment options. We discussed the indications for surgery as well as the rehabilitation associated with surgery, need for physical therapy, and the time table for returns to normal activities of daily living. We discussed the risks and benefits of surgery in detail, specifically risks of damage to surrounding neurovascular structures, CRPS, stiffness/arthrofibrosis, poor functional result and possible need in the future for revision surgery including hardware removal were discussed. We also discussed risk of bleeding and infection, wound healing complications. Despite the risks as explained to them, they wished to proceed with surgery to restore normal anatomy, function and improve pain. He expressed understanding and agreement with the treatment plan and understand that no guarantee of outcome is implied.     _________________________________________________________________      Mane Ramires will be  scheduled for the following procedure(s):     Distal biceps repair, right        Post-Op Medications to be prescribed:   Percocet 5/325mg Take 1-2 tablets every 4-6 hours PRN pain #28  Zofran 4mg oral disintegrating tablets every 8 hours PRN nausea/vomiting   Motrin 600 mg TID PRN     DME/Bracing:  None  Post-op Physical Therapy to begin: 2 weeks    Medical Clearance: No  Hx of DVT,PE, anesthetic complications: No    Additional notes/concerns:  None

## 2025-02-21 NOTE — PROGRESS NOTES
Patient ID: Mane Ramires is a 69 y.o. male    Chief Complaint:   Chief Complaint   Patient presents with    Right Elbow - Pain     MRI f/u        History of Present Illness:    Pleasant 69-year-old male here for evaluation of right shoulder and elbow pain.  He is a mai by trade.  He was working on some wood using a dull saw back in November and since then has had a lot of pain in his elbow and also radiating up into his shoulder.  He also has history of a right shoulder mini open rotator cuff repair in 2019.  Reports some nighttime symptoms and pain with lifting and overhead activity.  Increased pain with rotation of the wrist and spasms in the biceps.    ____________________________________________________________________    Interval history 02/21/2025 : Patient returns today for MRI follow-up of their right elbow.  They report no changes since I saw them last.  Pain 8 out of 10    PAST MEDICAL HISTORY:   Past Medical History:   Diagnosis Date    Accident     fell from roof with TBI (word finding issues personality changes, memory deficets), R rib fractures, clavicle fx    Allergy     Anxiety     ASD (atrial septal defect)     noted on ECHO 6/16    Cervical stenosis of spine     noted on 6/15 MRI    CTS (carpal tunnel syndrome)     mild-mod on 4/17 NCS    DDD (degenerative disc disease), cervical 10/2014    C5-6 and C6-C7    Depression     Diabetes     JEEVAN (generalized anxiety disorder)     Gender identity disorder     H/O cardiovascular stress test     10/23 normal    Herpes simplex type 2 infection     History of atypical hyperplasia of breast     B precancer lesions    History of cardiovascular stress test     normal 10/23    Hypertension     Hypertensive retinopathy     IGT (impaired glucose tolerance)     Myelomalacia     c-spine noted on 8/15 MRI    OA (osteoarthritis)     Pineal gland cyst     TBI (traumatic brain injury)     after falling off a roof in 2003    Type 2 diabetes mellitus      PAST  SURGICAL HISTORY:   Past Surgical History:   Procedure Laterality Date    AMPUTATION      L index finger    APPENDECTOMY  1969    BILATERAL SALPINGOOPHORECTOMY  02/2015    BIOPSY OF LIVER WITH ULTRASOUND GUIDANCE N/A 12/18/2018    Procedure: BIOPSY, LIVER, WITH US GUIDANCE;  Surgeon: [facility] Diagnostic Provider;  Location: [facility] OR [address] [place] [address];  Service: Radiology;  Laterality: N/A;  U/S GUIDED LIVER BIOPSY.  12/18/2018.  DOS 7:30 AM  PROCEDURE 9 AM.  DR NITHIN GOLDEN / MARLON NUR.  DB 12/14/18 3:20P    CARPAL TUNNEL RELEASE  12/2017    right    CERVICAL FUSION  10/2014    C5-C6 and C6-C7    CHOLECYSTECTOMY      ENDOSCOPIC NASAL SEPTOPLASTY N/A 8/23/2018    Procedure: SEPTOPLASTY, NASAL, ENDOSCOPIC;  Surgeon: Reji Arce III, MD;  Location: [facility] OR [address] [place] [address];  Service: ENT;  Laterality: N/A;    FINGER AMPUTATION Left     FRACTURE SURGERY Right 2003    rib fractures    HYSTERECTOMY  1984    MIGUEL    MASTECTOMY  02/2015    RECONSTRUCTION OF NOSE N/A 8/23/2018    Procedure: RECONSTRUCTION, NOSE;  Surgeon: Reji Arce III, MD;  Location: [facility] OR [address] [place] [address];  Service: ENT;  Laterality: N/A;    ROTATOR CUFF REPAIR Right 2019    UPPER GASTROINTESTINAL ENDOSCOPY  09/06/2017    Dr. Ayon     FAMILY HISTORY:   Family History   Problem Relation Name Age of Onset    Diabetes Mother      Heart disease Mother      Gallbladder disease Mother      Coronary artery disease Father      Breast cancer Sister      Heart disease Sister      Heart attack Brother      Heart disease Maternal Grandmother      Gallbladder disease Maternal Grandmother      Lung cancer Paternal Grandfather      Heart disease Paternal Grandfather      Gallbladder disease Daughter      Craniosynostosis Daughter      Ovarian cancer Maternal Aunt      Breast cancer Cousin paternal     Breast cancer Cousin paternal     Glaucoma Neg Hx      Macular degeneration Neg Hx      Retinal detachment Neg Hx       SOCIAL HISTORY:   Social History     Occupational  History    Occupation: Artist   Tobacco Use    Smoking status: Former     Current packs/day: 0.00     Average packs/day: 0.3 packs/day for 27.8 years (7.0 ttl pk-yrs)     Types: Cigarettes     Start date:      Quit date: 2002     Years since quittin.3     Passive exposure: Past    Smokeless tobacco: Never    Tobacco comments:     did not smoke regularly   Substance and Sexual Activity    Alcohol use: Yes     Comment: rare     Drug use: Yes     Types: Hydrocodone    Sexual activity: Never        MEDICATIONS:   Current Outpatient Medications:     aspirin (ECOTRIN) 81 MG EC tablet, Take 81 mg by mouth once daily., Disp: , Rfl:     blood sugar diagnostic Strp, To check BG QD times daily, to use with insurance preferred meter, Disp: 100 each, Rfl: 5    blood-glucose meter kit, As directed, Disp: 1 each, Rfl: 0    clonazePAM (KLONOPIN) 0.5 MG tablet, Take 1 tablet (0.5 mg total) by mouth daily as needed for Anxiety., Disp: 30 tablet, Rfl: 0    cyclobenzaprine (FLEXERIL) 10 MG tablet, TAKE 1 TABLET (10 MG TOTAL) BY MOUTH NIGHTLY AS NEEDED FOR MUSCLE SPASMS (SPASMS)., Disp: 90 tablet, Rfl: 1    cycloSPORINE (RESTASIS) 0.05 % ophthalmic emulsion, Place 1 drop into both eyes 2 (two) times daily., Disp: 60 each, Rfl: 11    diclofenac (VOLTAREN) 50 MG EC tablet, Take 1 tablet (50 mg total) by mouth 2 (two) times daily as needed., Disp: 40 tablet, Rfl: 0    diclofenac sodium (VOLTAREN) 1 % Gel, Apply 2 g topically once daily., Disp: 100 g, Rfl: 0    DULoxetine (CYMBALTA) 30 MG capsule, Take 3 capsules (90 mg total) by mouth once daily., Disp: 270 capsule, Rfl: 3    famciclovir (FAMVIR) 500 MG tablet, Take 500 mg by mouth 3 (three) times daily., Disp: , Rfl:     ketoconazole (NIZORAL) 2 % shampoo, APPLY TOPICALLY EVERY 7 DAYS, Disp: 120 mL, Rfl: 6    lancets Misc, To check BG QD times daily, to use with insurance preferred meter, Disp: 100 each, Rfl: 5    levocetirizine (XYZAL) 5 MG tablet, Take 1 tablet (5 mg  "total) by mouth every evening., Disp: 90 tablet, Rfl: 0    metoprolol succinate (TOPROL-XL) 25 MG 24 hr tablet, Take 0.5 tablets (12.5 mg total) by mouth once daily., Disp: 45 tablet, Rfl: 1    miconazole (MICOTIN) 2 % cream, Apply topically 2 (two) times daily., Disp: 28 g, Rfl: 0    needle, disp, 18 G (BD REGULAR BEVEL NEEDLES) 18 gauge x 1" Ndle, USE TO INJECT 0.5ML OF TESTOSTERONE INTO THE MUCLE EVERY 14 DAYS, Disp: 10 each, Rfl: 3    needle, disp, 25 gauge (BD PRECISIONGLIDE) 25 gauge x 1" Ndle, USE TO DRAW UP 0.5ML OF TESTERONE EVERY 14 DAYS, Disp: 10 each, Rfl: 3    rosuvastatin (CRESTOR) 10 MG tablet, Take 1 tablet (10 mg total) by mouth once daily., Disp: 90 tablet, Rfl: 2    traZODone (DESYREL) 50 MG tablet, TAKE 1 TABLET BY MOUTH EVERY DAY IN THE EVENING, Disp: 90 tablet, Rfl: 3    valACYclovir (VALTREX) 500 MG tablet, TAKE 1 TABLET BY MOUTH EVERY DAY, Disp: 90 tablet, Rfl: 2    valsartan (DIOVAN) 80 MG tablet, Take 1 tablet (80 mg total) by mouth once daily., Disp: 90 tablet, Rfl: 1    testosterone cypionate (DEPOTESTOTERONE CYPIONATE) 200 mg/mL injection, Inject 1 mL (200 mg total) into the muscle every 14 (fourteen) days., Disp: 10 mL, Rfl: 3  No current facility-administered medications for this visit.  ALLERGIES:   Review of patient's allergies indicates:   Allergen Reactions    Corticosteroids (glucocorticoids) Palpitations    Cortisone Other (See Comments), Palpitations and Anaphylaxis    Mobic [meloxicam] Swelling    Sudafed [pseudoephedrine hcl] Other (See Comments)     Heart racing      Gabapentin      Caused memory loss    Other Reaction(s): MENTAL CONFUSION    Pseudoephedrine Other (See Comments)    Adhesive Rash     Specifically clear tegaderm dressing     Codeine Other (See Comments) and Palpitations     Heart racing         Physical Exam     There were no vitals filed for this visit.  Alert and oriented to person, place and time. No acute distress. Well-groomed, not ill appearing. Pupils " round and reactive, normal respiratory effort, no audible wheezing.     On exam he has well-healed right shoulder incisions.  He has significant pain with resisted wrist supination and forearm supination.  He has tenderness all along the radial tuberosity.  No obvious Gautam deformity.  No significant pain over the proximal biceps tendon.  Positive Speed's and Yergason's.  Negative drop-arm test.        Imaging:       X-Ray: I have reviewed all pertinent results/findings and my personal findings are:  Negative right shoulder radiographs, mild DJD glenohumeral joint.  Previous humeral head anchor  MRI of the right elbow reviewed by me showing high-grade partial tear of the distal biceps insertion    Assessment & Plan    Rupture of right distal biceps tendon, initial encounter         Mane Ramires is a 69 y.o. male who sustained an injury to the right elbow, resulting in a high-grade partial tear of the distal biceps.     We discussed multiple treatment options including non-operative and operative treatment options. We discussed the indications for surgery as well as the rehabilitation associated with surgery, need for physical therapy, and the time table for returns to normal activities of daily living. We discussed the risks and benefits of surgery in detail, specifically risks of damage to surrounding neurovascular structures, CRPS, stiffness/arthrofibrosis, poor functional result and possible need in the future for revision surgery including hardware removal were discussed. We also discussed risk of bleeding and infection, wound healing complications. Despite the risks as explained to them, they wished to proceed with surgery to restore normal anatomy, function and improve pain. He expressed understanding and agreement with the treatment plan and understand that no guarantee of outcome is implied.     _________________________________________________________________      Mane Ramires will be  scheduled for the following procedure(s):     Distal biceps repair, right        Post-Op Medications to be prescribed:   Percocet 5/325mg Take 1-2 tablets every 4-6 hours PRN pain #28  Zofran 4mg oral disintegrating tablets every 8 hours PRN nausea/vomiting   Motrin 600 mg TID PRN     DME/Bracing:  None  Post-op Physical Therapy to begin: 2 weeks    Medical Clearance: No  Hx of DVT,PE, anesthetic complications: No    Additional notes/concerns:  None

## 2025-02-24 ENCOUNTER — PATIENT MESSAGE (OUTPATIENT)
Dept: FAMILY MEDICINE | Facility: CLINIC | Age: 70
End: 2025-02-24
Payer: MEDICARE

## 2025-02-25 ENCOUNTER — CLINICAL SUPPORT (OUTPATIENT)
Dept: OPHTHALMOLOGY | Facility: CLINIC | Age: 70
End: 2025-02-25
Payer: MEDICARE

## 2025-02-25 DIAGNOSIS — H02.834 DERMATOCHALASIS OF BOTH UPPER EYELIDS: ICD-10-CM

## 2025-02-25 DIAGNOSIS — H02.831 DERMATOCHALASIS OF BOTH UPPER EYELIDS: ICD-10-CM

## 2025-02-25 NOTE — PROGRESS NOTES
Pt here for VF 24-2 SF taped & un taped   Pt did well w testing *   A lot less BS errors when taped *       Assessment /Plan     For exam results, see Encounter Report.    Dermatochalasis of both upper eyelids  -     Borges Visual Field - OU - Extended - Both Eyes

## 2025-02-26 NOTE — TELEPHONE ENCOUNTER
Pt has a lab appt on Friday.  Does he need anything other than the A1C?  Pt also requesting to have his b vitamin labs done, reports increased fatigue but wonders if it may be possible sleep apnea.  Pt will discuss with you at appt tomorrow.     Linked A1C to Friday appt.        Pt was advised that there are no labs attached to that lab appt and advised that he may need to reach out to that office.

## 2025-02-27 ENCOUNTER — OFFICE VISIT (OUTPATIENT)
Dept: FAMILY MEDICINE | Facility: CLINIC | Age: 70
End: 2025-02-27
Payer: MEDICARE

## 2025-02-27 VITALS
RESPIRATION RATE: 16 BRPM | BODY MASS INDEX: 26.63 KG/M2 | WEIGHT: 156 LBS | OXYGEN SATURATION: 96 % | HEART RATE: 94 BPM | DIASTOLIC BLOOD PRESSURE: 70 MMHG | TEMPERATURE: 98 F | HEIGHT: 64 IN | SYSTOLIC BLOOD PRESSURE: 118 MMHG

## 2025-02-27 DIAGNOSIS — I15.2 HYPERTENSION ASSOCIATED WITH DIABETES: ICD-10-CM

## 2025-02-27 DIAGNOSIS — E11.59 HYPERTENSION ASSOCIATED WITH DIABETES: ICD-10-CM

## 2025-02-27 DIAGNOSIS — E78.5 HYPERLIPIDEMIA, UNSPECIFIED HYPERLIPIDEMIA TYPE: ICD-10-CM

## 2025-02-27 DIAGNOSIS — E53.8 DEFICIENCY OF OTHER SPECIFIED B GROUP VITAMINS: ICD-10-CM

## 2025-02-27 DIAGNOSIS — E11.8 DIABETES MELLITUS TYPE 2 WITH COMPLICATIONS: Primary | ICD-10-CM

## 2025-02-27 PROCEDURE — 3051F HG A1C>EQUAL 7.0%<8.0%: CPT | Mod: CPTII,S$GLB,, | Performed by: FAMILY MEDICINE

## 2025-02-27 PROCEDURE — 99214 OFFICE O/P EST MOD 30 MIN: CPT | Mod: S$GLB,,, | Performed by: FAMILY MEDICINE

## 2025-02-27 PROCEDURE — 3008F BODY MASS INDEX DOCD: CPT | Mod: CPTII,S$GLB,, | Performed by: FAMILY MEDICINE

## 2025-02-27 PROCEDURE — 1125F AMNT PAIN NOTED PAIN PRSNT: CPT | Mod: CPTII,S$GLB,, | Performed by: FAMILY MEDICINE

## 2025-02-27 PROCEDURE — 3288F FALL RISK ASSESSMENT DOCD: CPT | Mod: CPTII,S$GLB,, | Performed by: FAMILY MEDICINE

## 2025-02-27 PROCEDURE — 1101F PT FALLS ASSESS-DOCD LE1/YR: CPT | Mod: CPTII,S$GLB,, | Performed by: FAMILY MEDICINE

## 2025-02-27 PROCEDURE — 1160F RVW MEDS BY RX/DR IN RCRD: CPT | Mod: CPTII,S$GLB,, | Performed by: FAMILY MEDICINE

## 2025-02-27 PROCEDURE — 3078F DIAST BP <80 MM HG: CPT | Mod: CPTII,S$GLB,, | Performed by: FAMILY MEDICINE

## 2025-02-27 PROCEDURE — 3074F SYST BP LT 130 MM HG: CPT | Mod: CPTII,S$GLB,, | Performed by: FAMILY MEDICINE

## 2025-02-27 PROCEDURE — G2211 COMPLEX E/M VISIT ADD ON: HCPCS | Mod: S$GLB,,, | Performed by: FAMILY MEDICINE

## 2025-02-27 PROCEDURE — 1159F MED LIST DOCD IN RCRD: CPT | Mod: CPTII,S$GLB,, | Performed by: FAMILY MEDICINE

## 2025-02-27 PROCEDURE — 1157F ADVNC CARE PLAN IN RCRD: CPT | Mod: CPTII,S$GLB,, | Performed by: FAMILY MEDICINE

## 2025-02-27 PROCEDURE — 4010F ACE/ARB THERAPY RXD/TAKEN: CPT | Mod: CPTII,S$GLB,, | Performed by: FAMILY MEDICINE

## 2025-02-27 RX ORDER — METOPROLOL SUCCINATE 25 MG/1
12.5 TABLET, EXTENDED RELEASE ORAL 2 TIMES DAILY
Qty: 60 TABLET | Refills: 1 | Status: SHIPPED | OUTPATIENT
Start: 2025-02-27 | End: 2026-02-27

## 2025-02-27 RX ORDER — VALSARTAN 80 MG/1
80 TABLET ORAL DAILY
Qty: 90 TABLET | Refills: 3 | Status: SHIPPED | OUTPATIENT
Start: 2025-02-27

## 2025-02-28 ENCOUNTER — HOSPITAL ENCOUNTER (OUTPATIENT)
Dept: RADIOLOGY | Facility: HOSPITAL | Age: 70
Discharge: HOME | End: 2025-02-28
Attending: ORTHOPAEDIC SURGERY
Payer: MEDICARE

## 2025-02-28 ENCOUNTER — OFFICE VISIT (OUTPATIENT)
Dept: OPTOMETRY | Facility: CLINIC | Age: 70
End: 2025-02-28
Payer: MEDICARE

## 2025-02-28 ENCOUNTER — HOSPITAL ENCOUNTER (OUTPATIENT)
Dept: PREADMISSION TESTING | Facility: HOSPITAL | Age: 70
Discharge: HOME | End: 2025-02-28
Attending: ORTHOPAEDIC SURGERY
Payer: MEDICARE

## 2025-02-28 VITALS — HEIGHT: 64 IN | BODY MASS INDEX: 26.46 KG/M2 | WEIGHT: 155 LBS

## 2025-02-28 DIAGNOSIS — H52.4 HYPEROPIA WITH ASTIGMATISM AND PRESBYOPIA, BILATERAL: Primary | ICD-10-CM

## 2025-02-28 DIAGNOSIS — H52.03 HYPEROPIA WITH ASTIGMATISM AND PRESBYOPIA, BILATERAL: Primary | ICD-10-CM

## 2025-02-28 DIAGNOSIS — H52.203 HYPEROPIA WITH ASTIGMATISM AND PRESBYOPIA, BILATERAL: Primary | ICD-10-CM

## 2025-02-28 DIAGNOSIS — S46.211A RUPTURE OF RIGHT DISTAL BICEPS TENDON, INITIAL ENCOUNTER: ICD-10-CM

## 2025-02-28 DIAGNOSIS — Z01.818 PRE-OP TESTING: ICD-10-CM

## 2025-02-28 LAB
ALBUMIN SERPL BCP-MCNC: 4.4 G/DL (ref 3.5–5.2)
ALP SERPL-CCNC: 79 U/L (ref 40–150)
ALT SERPL W/O P-5'-P-CCNC: 27 U/L (ref 10–44)
ANION GAP SERPL CALC-SCNC: 10 MMOL/L (ref 8–16)
AST SERPL-CCNC: 21 U/L (ref 10–40)
BASOPHILS # BLD AUTO: 0.03 K/UL (ref 0–0.2)
BASOPHILS NFR BLD: 0.4 % (ref 0–1.9)
BILIRUB SERPL-MCNC: 0.7 MG/DL (ref 0.1–1)
BUN SERPL-MCNC: 18 MG/DL (ref 8–23)
CALCIUM SERPL-MCNC: 9.8 MG/DL (ref 8.7–10.5)
CHLORIDE SERPL-SCNC: 103 MMOL/L (ref 95–110)
CO2 SERPL-SCNC: 25 MMOL/L (ref 23–29)
CREAT SERPL-MCNC: 1.4 MG/DL (ref 0.5–1.4)
DIFFERENTIAL METHOD BLD: ABNORMAL
EOSINOPHIL # BLD AUTO: 0.1 K/UL (ref 0–0.5)
EOSINOPHIL NFR BLD: 0.8 % (ref 0–8)
ERYTHROCYTE [DISTWIDTH] IN BLOOD BY AUTOMATED COUNT: 12 % (ref 11.5–14.5)
EST. GFR  (NO RACE VARIABLE): 55 ML/MIN/1.73 M^2
GLUCOSE SERPL-MCNC: 147 MG/DL (ref 70–110)
HCT VFR BLD AUTO: 50 % (ref 40–54)
HGB BLD-MCNC: 16.6 G/DL (ref 14–18)
IMM GRANULOCYTES # BLD AUTO: 0.01 K/UL (ref 0–0.04)
IMM GRANULOCYTES NFR BLD AUTO: 0.1 % (ref 0–0.5)
LYMPHOCYTES # BLD AUTO: 2 K/UL (ref 1–4.8)
LYMPHOCYTES NFR BLD: 26.5 % (ref 18–48)
MCH RBC QN AUTO: 31.6 PG (ref 27–31)
MCHC RBC AUTO-ENTMCNC: 33.2 G/DL (ref 32–36)
MCV RBC AUTO: 95 FL (ref 82–98)
MONOCYTES # BLD AUTO: 0.3 K/UL (ref 0.3–1)
MONOCYTES NFR BLD: 4.2 % (ref 4–15)
NEUTROPHILS # BLD AUTO: 5.1 K/UL (ref 1.8–7.7)
NEUTROPHILS NFR BLD: 68 % (ref 38–73)
NRBC BLD-RTO: 0 /100 WBC
PLATELET # BLD AUTO: 219 K/UL (ref 150–450)
PMV BLD AUTO: 9.1 FL (ref 9.2–12.9)
POTASSIUM SERPL-SCNC: 4.9 MMOL/L (ref 3.5–5.1)
PROT SERPL-MCNC: 8.1 G/DL (ref 6–8.4)
RBC # BLD AUTO: 5.25 M/UL (ref 4.6–6.2)
SODIUM SERPL-SCNC: 138 MMOL/L (ref 136–145)
WBC # BLD AUTO: 7.55 K/UL (ref 3.9–12.7)

## 2025-02-28 PROCEDURE — 80053 COMPREHEN METABOLIC PANEL: CPT | Performed by: ORTHOPAEDIC SURGERY

## 2025-02-28 PROCEDURE — 99999 PR PBB SHADOW E&M-EST. PATIENT-LVL III: CPT | Mod: PBBFAC,HCNC,,

## 2025-02-28 PROCEDURE — 71046 X-RAY EXAM CHEST 2 VIEWS: CPT | Mod: 26,,, | Performed by: RADIOLOGY

## 2025-02-28 PROCEDURE — 85025 COMPLETE CBC W/AUTO DIFF WBC: CPT | Performed by: ORTHOPAEDIC SURGERY

## 2025-02-28 PROCEDURE — 93010 ELECTROCARDIOGRAM REPORT: CPT | Mod: ,,, | Performed by: GENERAL PRACTICE

## 2025-02-28 PROCEDURE — 93005 ELECTROCARDIOGRAM TRACING: CPT

## 2025-02-28 PROCEDURE — 71046 X-RAY EXAM CHEST 2 VIEWS: CPT | Mod: TC

## 2025-02-28 NOTE — PROGRESS NOTES
HPI    Pt presents today for a MRX recheck.  Pt states he had PAL's gls made @ [facility].  Pt picked up x 1 wk ago.  Pt c/o blurry VA OD dist and near VA.  Pt states his previous PALs were perfect.  Last edited by Stan Hollis, OD on 2/28/2025  4:27 PM.            Assessment /Plan     For exam results, see Encounter Report.    Hyperopia with astigmatism and presbyopia, bilateral      Pt presented for rx check only. Pt's new specs appear to have too high of a seg height OU causing increased blur at both distance and near. Pt prefers old specs - read lenses and rx did not match wearing rx from original exam (01/13/25). New refraction done today. Adjusted final rx to be closer to current specs that pt likes (reduced the add power). Dispensed final rx to reflect changes and advised remake. Ed pt to RTC if issues persist.     RTC: as scheduled for annual, sooner prn.

## 2025-02-28 NOTE — DISCHARGE INSTRUCTIONS
To confirm, Your doctor has instructed you that surgery is scheduled for:  3-6-25    Please report to [facility], Registration the morning of surgery. You must check-in and receive a wristband before going to your procedure.  [address] [place] [address] JACE ZENG [id number]    Pre-Op will call the afternoon prior to surgery between 1:00 and 6:00 PM with the final arrival time.  Phone number: [phone]    PLEASE NOTE:  The surgery schedule has many variables which may affect the time of your surgery case.  Family members should be available if your surgery time changes.  Plan to be here the day of your procedure between 4-6 hours.    MEDICATIONS:  TAKE ONLY THESE MEDICATIONS WITH A SMALL SIP OF WATER THE MORNING OF YOUR PROCEDURE:    See med sheet      DO NOT TAKE THESE MEDICATIONS 5-7 DAYS PRIOR to your procedure or per your surgeon's request:   ASPIRIN, ALEVE, ADVIL, IBUPROFEN, FISH OIL VITAMIN E, HERBALS  (May take Tylenol)    ONLY if you are prescribed any types of blood thinners such as:  Aspirin, Coumadin, Plavix, Pradaxa, Xarelto, Aggrenox, Effient, Eliquis, Savasya, Brilinta, or any other, ask your surgeon whether you should stop taking them and how long before surgery you should stop.  You may also need to verify with the prescribing physician if it is ok to stop your medication.      INSTRUCTIONS IMPORTANT!!  Do not eat or drink anything between midnight and the time of your procedure- this includes gum, mints, and candy.  EXCEPT: you may have clear liquids such as:  WATER, BLACK COFFEE, UNSWEET TEA, OR GATORADE (NO RED OR PURPLE) UP TO 2 HOURS PRIOR TO YOUR ARRIVAL TIME.  Do not smoke or drink alcoholic beverages 24 hours prior to your procedure.  Shower the night before AND the morning of your procedure with a Chlorhexidine wash such as Hibiclens or Dial antibacterial soap from the neck down.  Do not get it on your face or in your eyes.  You may use your own shampoo and face wash. This  helps your skin to be as bacteria free as possible.    If you wear contact lenses, dentures, hearing aids or glasses, bring a container to put them in during surgery and give to a family member for safe keeping.  Please leave all jewelry, piercing's and valuables at home. You must remove your false eyelashes prior to surgery.    DO NOT remove hair from the surgery site.  Do not shave the incision site unless you are given specific instructions to do so.    ONLY if you have been diagnosed with sleep apnea please bring your C-PAP machine.  ONLY if you wear home oxygen please bring your portable oxygen tank the day of your procedure.  ONLY if you have a history of OPEN HEART SURGERY you will need a clearance from your Cardiologist per Anesthesia.      ONLY for patients requiring bowel prep, written instructions will be given by your doctor's office.  ONLY if you have a neuro stimulator, please bring the controller with you the morning of surgery  ONLY if a type and screen test is needed before surgery, please return:  If your doctor has scheduled you for an overnight stay, bring a small overnight bag with any personal items you need.  Make arrangements in advance for transportation home by a responsible adult. You can not go home in an uber or a cab per hospital policy.  It is not safe to drive a vehicle during the 24 hours after anesthesia.          All  facilities and properties are tobacco free.  Smoking is NOT allowed.   If you have any questions about these instructions, call Pre-Op Admit  Nursing at [phone] or the Pre-Op Day Surgery Unit at [phone].

## 2025-03-01 ENCOUNTER — PATIENT MESSAGE (OUTPATIENT)
Dept: FAMILY MEDICINE | Facility: CLINIC | Age: 70
End: 2025-03-01
Payer: MEDICARE

## 2025-03-01 DIAGNOSIS — E11.8 DIABETES MELLITUS TYPE 2 WITH COMPLICATIONS: Primary | ICD-10-CM

## 2025-03-01 LAB
OHS QRS DURATION: 86 MS
OHS QTC CALCULATION: 390 MS

## 2025-03-02 NOTE — PROGRESS NOTES
Subjective:       Patient ID: Mane Ramires is a 69 y.o. male.    Chief Complaint: Follow-up    History of Present Illness    CHIEF COMPLAINT:  Patient presents today for follow up.  Overall, he is doing well.  He is enjoying living in [place] with his daughter and grandchildren.  He does come back to this area to see his medical providers and his other daughter in her family.  He does have upcoming labs later this week.      SLEEP:  He reports sleep disruption with frequent nighttime awakenings, using the bathroom 3 times per night when not taking medication. He has been taking Trazodone nightly for the past 2-3 days with significant improvement in sleep quality, reporting feeling well-rested upon awakening. He has extra Trazodone due to not taking it daily.    DIABETES:  He is involved in the digital diabetes program.  He reports morning blood sugar readings ranging from 109-122 mg/dL, with higher readings attributed to ice cream consumption. Evening blood sugar readings are in the 150s after snacks. Morning fasting blood sugars are consistently under 140 mg/dL.  Through the digital diabetes program, 100% of his readings are between .  He does have an upcoming A1c scheduled    HYPERTENSION:  Today's blood pressure is 118/70.  Through the digital hypertension program, his mean blood pressure is 122/86 and 0% of his readings are at a goal of less than 130/80.  He is taking Toprol 12.5 mg once a day and Diovan 80 mg once a day.    PAIN MANAGEMENT:  He uses Flexeril as needed for back muscle spasms. He takes Cymbalta 90mg for pain management and reports severe pain when not taking the medication.    RECURRENT ZOSTER:  We tried daily suppressive Valtrex.  Unfortunately, he gets headaches when taking Valacyclovir, which resolve 2-3 days after discontinuing the medication.    UPCOMING SURGERY:  He has scheduled bicep surgery on the sixth due to bicep tears.    OPHTHALMOLOGY:  He reports vision issues due to  droopy eyelids seriously affecting his vision. Eyelid surgery has been recommended to address this problem.         Review of Systems   Constitutional:  Negative for appetite change, chills, diaphoresis, fatigue, fever and unexpected weight change.   HENT:  Negative for congestion, ear pain, postnasal drip, rhinorrhea, sinus pressure, sneezing, sore throat and trouble swallowing.    Eyes:  Negative for pain, discharge and visual disturbance.   Respiratory:  Negative for cough, chest tightness, shortness of breath and wheezing.    Cardiovascular:  Negative for chest pain, palpitations and leg swelling.   Gastrointestinal:  Negative for abdominal distention, abdominal pain, blood in stool, constipation, diarrhea, nausea and vomiting.   Skin:  Negative for rash.         Objective:      Physical Exam  Constitutional:       General: He is not in acute distress.     Appearance: Normal appearance. He is well-developed.   HENT:      Head: Normocephalic and atraumatic.      Right Ear: Hearing, tympanic membrane, ear canal and external ear normal.      Left Ear: Hearing, tympanic membrane, ear canal and external ear normal.      Nose: Nose normal.      Mouth/Throat:      Mouth: No oral lesions.      Pharynx: No oropharyngeal exudate or posterior oropharyngeal erythema.   Eyes:      General: Lids are normal. No scleral icterus.     Extraocular Movements: Extraocular movements intact.      Conjunctiva/sclera: Conjunctivae normal.      Pupils: Pupils are equal, round, and reactive to light.   Neck:      Thyroid: No thyroid mass or thyromegaly.      Vascular: No carotid bruit.   Cardiovascular:      Rate and Rhythm: Normal rate and regular rhythm. No extrasystoles are present.     Chest Wall: PMI is not displaced.      Heart sounds: Normal heart sounds. No murmur heard.     No gallop.   Pulmonary:      Effort: Pulmonary effort is normal. No accessory muscle usage or respiratory distress.      Breath sounds: Normal breath sounds.  "  Abdominal:      General: Bowel sounds are normal. There is no abdominal bruit.      Palpations: Abdomen is soft.      Tenderness: There is no abdominal tenderness. There is no rebound.   Musculoskeletal:      Cervical back: Normal range of motion and neck supple.   Lymphadenopathy:      Head:      Right side of head: No submental or submandibular adenopathy.      Left side of head: No submental or submandibular adenopathy.      Cervical:      Right cervical: No superficial, deep or posterior cervical adenopathy.     Left cervical: No superficial, deep or posterior cervical adenopathy.      Upper Body:      Right upper body: No supraclavicular adenopathy.      Left upper body: No supraclavicular adenopathy.   Skin:     General: Skin is warm and dry.   Neurological:      Mental Status: He is alert and oriented to person, place, and time.       Blood pressure 118/70, pulse 94, temperature 97.9 °F (36.6 °C), temperature source Temporal, resp. rate 16, height 5' 4" (1.626 m), weight 70.8 kg (155 lb 15.6 oz), SpO2 96%.Body mass index is 26.77 kg/m².            Assessment & Plan    1. Reassessed diabetes management; morning fasting glucose readings consistently below 140 mg/dL  2. Evaluated hypertension control; mean blood pressure 122/86 mmHg  3. Considered increasing Toprolol dosage to improve diastolic blood pressure control  4. Discontinued daily Valtrex (valacyclovir) due to patient-reported headaches  5. Maintained current Cymbalta (duloxetine) dosage at 90 mg due to reported pain control benefits  6. Discussed upcoming bicep surgery and pre-operative preparations  7. Noted patient's release from liver specialist (Dr. Hilario) care    DIABETES:  - Ordered A1C test to be completed later this week.  - Assessed that all blood sugar readings are at goal according to the digital program data.  - Anticipate favorable A1c results based on current blood sugar readings.    HYPERTENSION:  - Measured patient's blood pressure at " 118/70 during the visit.  - Noted mean blood pressure through the digital program is 122/86, with recent diastolic readings of 80, 82, 85, 86.  - Emphasized the importance of consistent diastolic blood pressure control, aiming to get it consistently under 80.  - Discussed potential causes of elevated blood pressure, including salt intake and processed foods.  - Increased Toprolol to 12.5 mg twice daily from current dosage.  - Instructed patient to monitor blood pressure readings, particularly diastolic values.  - Mentioned the possibility of increasing the strength of Valsartan next if needed.  - Advised patient to contact the office if experiencing significant side effects with increased Toprolol dosage.    HYPERLIPIDEMIA:  - continue with Crestor  - recheck labs later this week    INSOMNIA:  - Continued Trazodone nightly as needed for sleep.  - Noted patient reports tiredness and waking up 3 times a night to urinate when not taking Trazodone.  - Observed patient reports feeling rested and ready to start the day when taking Trazodone nightly.  - Acknowledged patient has been taking Trazodone nightly for the past 2-3 days and has a few bottles saved up.    MUSCLE SPASMS:  - Continued Flexeril (cyclobenzaprine) as needed for muscle spasms.    CHRONIC PAIN:  - Continued Cymbalta (duloxetine) 90 mg daily, with flexibility to take an extra dose on particularly difficult days.  - Noted patient reports being in severe pain without Cymbalta.    BICEP INJURY:  - follow-up for surgery on the bicep on the 6th.    BLEPHAROPTOSIS:  - Noted patient reports that droopy eyelids are seriously affecting vision.  - Confirmed patient sees an optometrist annually for diabetic eye exam.    RECURRENT ZOSTER:  - Discussed potential side effects of Valtrex, including headaches  - Discontinued Valtrex (valacyclovir) daily preventative dosage due to adverse effects.    FOLLOW UP:  - labs scheduled later this week   - Follow up with Dr. Hilario  as recommended.         This note was generated with the assistance of ambient listening technology. Verbal consent was obtained by the patient and accompanying visitor(s) for the recording of patient appointment to facilitate this note. I attest to having reviewed and edited the generated note for accuracy, though some syntax or spelling errors may persist. Please contact the author of this note for any clarification.

## 2025-03-05 ENCOUNTER — ANESTHESIA EVENT (OUTPATIENT)
Dept: SURGERY | Facility: HOSPITAL | Age: 70
End: 2025-03-05
Payer: MEDICARE

## 2025-03-05 DIAGNOSIS — S46.211A RUPTURE OF RIGHT DISTAL BICEPS TENDON, INITIAL ENCOUNTER: Primary | ICD-10-CM

## 2025-03-05 RX ORDER — ONDANSETRON 4 MG/1
4 TABLET, ORALLY DISINTEGRATING ORAL EVERY 8 HOURS PRN
Qty: 30 TABLET | Refills: 0 | Status: SHIPPED | OUTPATIENT
Start: 2025-03-05

## 2025-03-05 RX ORDER — ASPIRIN 81 MG/1
81 TABLET ORAL 2 TIMES DAILY
Qty: 28 TABLET | Refills: 0 | OUTPATIENT
Start: 2025-03-05 | End: 2026-03-05

## 2025-03-05 RX ORDER — IBUPROFEN 600 MG/1
600 TABLET ORAL 3 TIMES DAILY
Qty: 90 TABLET | Refills: 1 | Status: SHIPPED | OUTPATIENT
Start: 2025-03-05

## 2025-03-05 RX ORDER — CYCLOBENZAPRINE HCL 10 MG
10 TABLET ORAL 3 TIMES DAILY PRN
Qty: 30 TABLET | Refills: 0 | Status: SHIPPED | OUTPATIENT
Start: 2025-03-05 | End: 2025-03-16

## 2025-03-05 RX ORDER — OXYCODONE AND ACETAMINOPHEN 5; 325 MG/1; MG/1
1 TABLET ORAL EVERY 6 HOURS PRN
Qty: 28 TABLET | Refills: 0 | Status: SHIPPED | OUTPATIENT
Start: 2025-03-05 | End: 2025-03-06 | Stop reason: HOSPADM

## 2025-03-06 ENCOUNTER — ANESTHESIA (OUTPATIENT)
Dept: SURGERY | Facility: HOSPITAL | Age: 70
End: 2025-03-06
Payer: MEDICARE

## 2025-03-06 ENCOUNTER — HOSPITAL ENCOUNTER (OUTPATIENT)
Facility: HOSPITAL | Age: 70
Discharge: HOME | End: 2025-03-06
Attending: ORTHOPAEDIC SURGERY | Admitting: ORTHOPAEDIC SURGERY
Payer: MEDICARE

## 2025-03-06 DIAGNOSIS — S46.211A TEAR OF BICEPS MUSCLE, RIGHT, INITIAL ENCOUNTER: ICD-10-CM

## 2025-03-06 DIAGNOSIS — S46.211A RUPTURE OF RIGHT DISTAL BICEPS TENDON, INITIAL ENCOUNTER: ICD-10-CM

## 2025-03-06 DIAGNOSIS — Z01.818 PRE-OP TESTING: ICD-10-CM

## 2025-03-06 PROCEDURE — 63600175 PHARM REV CODE 636 W HCPCS: Performed by: ORTHOPAEDIC SURGERY

## 2025-03-06 PROCEDURE — 63600175 PHARM REV CODE 636 W HCPCS: Performed by: NURSE ANESTHETIST, CERTIFIED REGISTERED

## 2025-03-06 PROCEDURE — 36000708 HC OR TIME LEV III 1ST 15 MIN: Performed by: ORTHOPAEDIC SURGERY

## 2025-03-06 PROCEDURE — 37000008 HC ANESTHESIA 1ST 15 MINUTES: Performed by: ORTHOPAEDIC SURGERY

## 2025-03-06 PROCEDURE — 63600175 PHARM REV CODE 636 W HCPCS: Mod: JW,TB | Performed by: ANESTHESIOLOGY

## 2025-03-06 PROCEDURE — 36000709 HC OR TIME LEV III EA ADD 15 MIN: Performed by: ORTHOPAEDIC SURGERY

## 2025-03-06 PROCEDURE — 63600175 PHARM REV CODE 636 W HCPCS

## 2025-03-06 PROCEDURE — 37000009 HC ANESTHESIA EA ADD 15 MINS: Performed by: ORTHOPAEDIC SURGERY

## 2025-03-06 PROCEDURE — 24342 REPAIR OF RUPTURED TENDON: CPT | Mod: RT,,, | Performed by: ORTHOPAEDIC SURGERY

## 2025-03-06 PROCEDURE — 64415 NJX AA&/STRD BRCH PLXS IMG: CPT | Performed by: ANESTHESIOLOGY

## 2025-03-06 PROCEDURE — 25000003 PHARM REV CODE 250: Performed by: ORTHOPAEDIC SURGERY

## 2025-03-06 PROCEDURE — C1713 ANCHOR/SCREW BN/BN,TIS/BN: HCPCS | Performed by: ORTHOPAEDIC SURGERY

## 2025-03-06 PROCEDURE — 25000003 PHARM REV CODE 250: Performed by: ANESTHESIOLOGY

## 2025-03-06 PROCEDURE — 27201423 OPTIME MED/SURG SUP & DEVICES STERILE SUPPLY: Performed by: ORTHOPAEDIC SURGERY

## 2025-03-06 RX ORDER — LIDOCAINE HYDROCHLORIDE 10 MG/ML
1 INJECTION, SOLUTION EPIDURAL; INFILTRATION; INTRACAUDAL; PERINEURAL ONCE
Status: DISCONTINUED | OUTPATIENT
Start: 2025-03-06 | End: 2025-03-06 | Stop reason: HOSPADM

## 2025-03-06 RX ORDER — MIDAZOLAM HYDROCHLORIDE 1 MG/ML
INJECTION INTRAMUSCULAR; INTRAVENOUS
Status: DISCONTINUED | OUTPATIENT
Start: 2025-03-06 | End: 2025-03-06

## 2025-03-06 RX ORDER — PROPOFOL 10 MG/ML
VIAL (ML) INTRAVENOUS
Status: DISCONTINUED | OUTPATIENT
Start: 2025-03-06 | End: 2025-03-06

## 2025-03-06 RX ORDER — OXYCODONE HYDROCHLORIDE 10 MG/1
10 TABLET ORAL EVERY 4 HOURS PRN
Refills: 0 | Status: CANCELLED | OUTPATIENT
Start: 2025-03-06

## 2025-03-06 RX ORDER — PHENYLEPHRINE HYDROCHLORIDE 10 MG/ML
INJECTION INTRAVENOUS
Status: DISCONTINUED | OUTPATIENT
Start: 2025-03-06 | End: 2025-03-06

## 2025-03-06 RX ORDER — OXYCODONE HYDROCHLORIDE 5 MG/1
5 TABLET ORAL EVERY 4 HOURS PRN
Refills: 0 | Status: CANCELLED | OUTPATIENT
Start: 2025-03-06

## 2025-03-06 RX ORDER — FENTANYL CITRATE 50 UG/ML
25 INJECTION, SOLUTION INTRAMUSCULAR; INTRAVENOUS EVERY 5 MIN PRN
Status: DISCONTINUED | OUTPATIENT
Start: 2025-03-06 | End: 2025-03-06 | Stop reason: HOSPADM

## 2025-03-06 RX ORDER — ACETAMINOPHEN 10 MG/ML
INJECTION, SOLUTION INTRAVENOUS
Status: DISCONTINUED | OUTPATIENT
Start: 2025-03-06 | End: 2025-03-06

## 2025-03-06 RX ORDER — FENTANYL CITRATE 50 UG/ML
INJECTION, SOLUTION INTRAMUSCULAR; INTRAVENOUS
Status: DISCONTINUED | OUTPATIENT
Start: 2025-03-06 | End: 2025-03-06

## 2025-03-06 RX ORDER — SODIUM CHLORIDE 0.9 % (FLUSH) 0.9 %
10 SYRINGE (ML) INJECTION
Status: DISCONTINUED | OUTPATIENT
Start: 2025-03-06 | End: 2025-03-06 | Stop reason: HOSPADM

## 2025-03-06 RX ORDER — ACETAMINOPHEN 325 MG/1
650 TABLET ORAL EVERY 4 HOURS PRN
Status: CANCELLED | OUTPATIENT
Start: 2025-03-06

## 2025-03-06 RX ORDER — ONDANSETRON 4 MG/1
8 TABLET, ORALLY DISINTEGRATING ORAL EVERY 8 HOURS PRN
Status: CANCELLED | OUTPATIENT
Start: 2025-03-06

## 2025-03-06 RX ORDER — LIDOCAINE HYDROCHLORIDE 20 MG/ML
INJECTION INTRAVENOUS
Status: DISCONTINUED | OUTPATIENT
Start: 2025-03-06 | End: 2025-03-06

## 2025-03-06 RX ORDER — BUPIVACAINE 13.3 MG/ML
INJECTION, SUSPENSION, LIPOSOMAL INFILTRATION
Status: COMPLETED | OUTPATIENT
Start: 2025-03-06 | End: 2025-03-06

## 2025-03-06 RX ORDER — MUPIROCIN 20 MG/G
OINTMENT TOPICAL 2 TIMES DAILY
Status: CANCELLED | OUTPATIENT
Start: 2025-03-06 | End: 2025-03-11

## 2025-03-06 RX ORDER — CEFAZOLIN 2 G/1
2 INJECTION, POWDER, FOR SOLUTION INTRAMUSCULAR; INTRAVENOUS
Status: COMPLETED | OUTPATIENT
Start: 2025-03-06 | End: 2025-03-06

## 2025-03-06 RX ORDER — PROCHLORPERAZINE EDISYLATE 5 MG/ML
5 INJECTION INTRAMUSCULAR; INTRAVENOUS EVERY 30 MIN PRN
Status: DISCONTINUED | OUTPATIENT
Start: 2025-03-06 | End: 2025-03-06 | Stop reason: HOSPADM

## 2025-03-06 RX ORDER — OXYCODONE HYDROCHLORIDE 5 MG/1
5 TABLET ORAL
Status: DISCONTINUED | OUTPATIENT
Start: 2025-03-06 | End: 2025-03-06 | Stop reason: HOSPADM

## 2025-03-06 RX ORDER — HYDROCODONE BITARTRATE AND ACETAMINOPHEN 5; 325 MG/1; MG/1
1 TABLET ORAL EVERY 4 HOURS PRN
Qty: 28 TABLET | Refills: 0 | Status: SHIPPED | OUTPATIENT
Start: 2025-03-06 | End: 2025-03-13

## 2025-03-06 RX ORDER — BUPIVACAINE HYDROCHLORIDE 5 MG/ML
INJECTION, SOLUTION EPIDURAL; INTRACAUDAL
Status: COMPLETED | OUTPATIENT
Start: 2025-03-06 | End: 2025-03-06

## 2025-03-06 RX ORDER — MUPIROCIN 20 MG/G
OINTMENT TOPICAL
Status: DISCONTINUED | OUTPATIENT
Start: 2025-03-06 | End: 2025-03-06 | Stop reason: HOSPADM

## 2025-03-06 RX ORDER — ONDANSETRON HYDROCHLORIDE 2 MG/ML
INJECTION, SOLUTION INTRAMUSCULAR; INTRAVENOUS
Status: DISCONTINUED | OUTPATIENT
Start: 2025-03-06 | End: 2025-03-06

## 2025-03-06 RX ADMIN — BUPIVACAINE 10 ML: 13.3 INJECTION, SUSPENSION, LIPOSOMAL INFILTRATION at 10:03

## 2025-03-06 RX ADMIN — PROPOFOL 170 MG: 10 INJECTION, EMULSION INTRAVENOUS at 12:03

## 2025-03-06 RX ADMIN — LIDOCAINE HYDROCHLORIDE 100 MG: 20 INJECTION, SOLUTION INTRAVENOUS at 12:03

## 2025-03-06 RX ADMIN — SODIUM CHLORIDE, SODIUM GLUCONATE, SODIUM ACETATE, POTASSIUM CHLORIDE AND MAGNESIUM CHLORIDE: 526; 502; 368; 37; 30 INJECTION, SOLUTION INTRAVENOUS at 10:03

## 2025-03-06 RX ADMIN — FENTANYL CITRATE 50 MCG: 50 INJECTION, SOLUTION INTRAMUSCULAR; INTRAVENOUS at 10:03

## 2025-03-06 RX ADMIN — PHENYLEPHRINE HYDROCHLORIDE 100 MCG: 10 INJECTION INTRAVENOUS at 01:03

## 2025-03-06 RX ADMIN — FENTANYL CITRATE 50 MCG: 50 INJECTION, SOLUTION INTRAMUSCULAR; INTRAVENOUS at 12:03

## 2025-03-06 RX ADMIN — ACETAMINOPHEN 1000 MG: 10 INJECTION INTRAVENOUS at 12:03

## 2025-03-06 RX ADMIN — ONDANSETRON 4 MG: 2 INJECTION INTRAMUSCULAR; INTRAVENOUS at 12:03

## 2025-03-06 RX ADMIN — CEFAZOLIN 2 G: 2 INJECTION, POWDER, FOR SOLUTION INTRAMUSCULAR; INTRAVENOUS at 12:03

## 2025-03-06 RX ADMIN — BUPIVACAINE HYDROCHLORIDE 10 ML: 5 INJECTION, SOLUTION EPIDURAL; INTRACAUDAL; PERINEURAL at 10:03

## 2025-03-06 RX ADMIN — MUPIROCIN 1 G: 20 OINTMENT TOPICAL at 10:03

## 2025-03-06 RX ADMIN — FENTANYL CITRATE 25 MCG: 50 INJECTION, SOLUTION INTRAMUSCULAR; INTRAVENOUS at 02:03

## 2025-03-06 RX ADMIN — MIDAZOLAM HYDROCHLORIDE 2 MG: 1 INJECTION, SOLUTION INTRAMUSCULAR; INTRAVENOUS at 10:03

## 2025-03-06 NOTE — ANESTHESIA PREPROCEDURE EVALUATION
03/06/2025  Mane Ramires is a 69 y.o., male.      Pre-op Assessment    I have reviewed the Patient Summary Reports.     I have reviewed the Nursing Notes. I have reviewed the NPO Status.   I have reviewed the Medications.     Review of Systems  Anesthesia Hx:  No problems with previous Anesthesia                Social:  Former Smoker       Hematology/Oncology:                        --  Cancer in past history ([facility]):                     Cardiovascular:     Hypertension Valvular problems/Murmurs (ASD)                                         Pulmonary:  Pulmonary Normal                       Renal/:  Renal/ Normal                 Musculoskeletal:  Arthritis          Spine Disorders: (s/p cervical fusion) cervical Disc disease and Degenerative disease           Neurological:   CVA (R weakness), residual symptoms   Headaches                                 Endocrine:  Diabetes, well controlled, type 2           Psych:  Psychiatric History anxiety depression              Physical Exam  General: Well nourished, Cooperative, Alert and Oriented    Airway:  Mallampati: II   Mouth Opening: Normal  TM Distance: Normal  Neck ROM: Normal ROM    Anesthesia Plan  Type of Anesthesia, risks & benefits discussed:    Anesthesia Type: Gen ETT, Gen Supraglottic Airway, Gen Natural Airway, MAC  Intra-op Monitoring Plan: Standard ASA Monitors  Post Op Pain Control Plan: multimodal analgesia and peripheral nerve block  Induction:  IV  Airway Plan: Direct, Video and Fiberoptic, Post-Induction  Informed Consent: Informed consent signed with the Patient and all parties understand the risks and agree with anesthesia plan.  All questions answered.   ASA Score: 3    Ready For Surgery From Anesthesia Perspective.   .

## 2025-03-06 NOTE — OP NOTE
03/06/2025    PREOPERATIVE DIAGNOSIS:      High-grade partial tear right distal biceps    POSTOPERATIVE DIAGNOSIS: Same    PROCEDURE:      Right distal biceps repair      SURGEON: Mike Solomon MD    ASSISTANT:  Juan M DIAZ    He was present and scrubbed for the entirety of the procedure. They were required for patient positioning, retraction, insertion of implants and closure. Without him I would have been unable to safely perform the case due to only one scrub tech available.     ANESTHESIA:  General/regional     ESTIMATED BLOOD LOSS:  20 mL    INDICATIONS:  Mane Ramires is a 69 y.o. year-old male who presented to my clinic with a chief complaint of right elbow pain and biceps pain after feeling a pop in his elbow. Imaging revealed a high-grade partial tear of the distal attachment of the biceps.  We discussed nonoperative and operative treatment options.  Ultimately he elected to have this repaired due to persistent pain and dysfunction with supination specifically.      We discussed the risks and benefits of the surgery in detail including stiffness, CRPS, damage to surrounding neurovascular structures, failure of fixation as well as need for revision surgery. Despite these risks they elected to proceed.       COMPONENTS USED:      Implant Name Type Inv. Item Serial No.  Lot No. LRB No. Used Action   SYS IMPL DEL DISTAL BICEPS BC - [id number]  SYS IMPL DEL DISTAL BICEPS BC  [employer] [id number] Right 1 Implanted         DESCRIPTION OF PROCEDURE:  The patient was seen in the pre-operative area where consents were verified and the surgical site marked. They did receive a preoperative block for intra-operative and postoperative analgesia. The patient was taken to the Operating Room where anesthesia was administered by the Anesthesia Department. He was then placed in the supine  position and all superficial neurovascular structures were well padded.  The right upper extremity  was then  sterilely prepped and draped in the normal fashion.  Preoperative antibiotics were administered.  A time-out was performed verifying the procedure and laterality, all agreed and elected to proceed as planned.    We 1st brought fluoroscopy into the field to isaac out our incision which was a transverse incision about 3 cm distal to the elbow crease over the radial tuberosity.  Esmarch tourniquet was used to exsanguinate the limb and the tourniquet inflated to 250 mmHg.  Transverse incision was then made over the tuberosity.  Blunt dissection was carried down to the superficial fascia.  This was incised.  We did identify the radial sensory branch and this was retracted out of harm's way.  There were some venous structures surrounding the capsule and pseudo capsule of the biceps tendon.  These were cauterized.  We then identified our biceps as it inserted onto the tuberosity.  We supinated the arm and using a key elevator completed the tear.  There was also a cyst distally at the insertion which decompressed.  We then used a Allis clamp and withdrew the biceps tendon out of the wound.  We used a looped FiberWire suture and passed 4 passes through the distal biceps tendon.  This past well from a 7 mm tunnel.  We used Metzenbaum scissors to debride any non viable tissue edges.  We then brought fluoroscopy into the field and passed a guidewire at the radial tuberosity.  We checked on fluoro our position and then unicortically drilled a 8 mm tunnel.  We then passed our sutures through a Arthrex button.  The button was then attached on the  device and passed dorsally through our far cortex.  The button was then flipped and the tendon was then tensioned into the socket.  We then took 1 suture limb and passed it through a interference screw.  The interference screw was placed radial to push the tendon more ulnarly to help with supination.  We did use copious irrigation to prevent heterotopic ossification as well as  bone wax.  We then took final pictures using fluoroscopy.  The wound was closed with Vicryl and Monocryl and Dermabond.  He overall tolerated the procedure well.  He was placed in a simple sling for comfort.    Disposition:      He will be nonweightbearing.  We will allow for passive supination, no active supination.  Gentle elbow range of motion with flexion and extension.  Follow up in 2 weeks    Mike Solomon MD

## 2025-03-06 NOTE — PLAN OF CARE
Pre-op complete. Granddaughter at bedside. Text notifications initiated. Pt denies need for safe. Belongings in postop.

## 2025-03-06 NOTE — PLAN OF CARE
Patient and granddaughter given discharge instructions. Educated on post-anesthesia precautions, dressing care and when to notify MD. Instructed when to follow up. Peripheral IV discontinued with catheter intact. Transferred to car via wheelchair and left with granddaughter to home.

## 2025-03-06 NOTE — DISCHARGE INSTRUCTIONS
Post op instructions for prevention of DVT  What is deep vein thrombosis?  Deep vein thrombosis (DVT) is the medical term for blood clots in the deep veins of the leg.  These blood clots can be dangerous.  A DVT can block a blood vessel and keep blood from getting where it needs to go.  Another problem is that the clot can travel to other parts of the body such as the lungs.  A clot that travels to the lungs is called a pulmonary embolus (PE) and can cause serious problems with breathing which can lead to death.  Am I at risk for DVT/PE?  If you are not very active, you are at risk of DVT.  Anyone confined to bed, sitting for long periods of time, recovering from surgery, etc. increases the risk of DVT.  Other risk factors are cancer diagnosis, certain medications, estrogen replacement in any form,older age, obesity, pregnancy, smoking, history of clotting disorders, and dehydration.  How will I know if I have a DVT?  Swelling in the lower leg  Pain  Warmth, redness, hardness or bulging of the vein  If you have any of these symptoms, call your doctors office right away.  Some people will not have any symptoms until the clot moves to the lungs.  What are the symptoms of a PE?  Panting, shortness of breath, or trouble breathing  Sharp, knife-like chest pain when you breathe  Coughing or coughing up blood  Rapid heartbeat  If you have any of these symptoms or get worse quickly, call 911 for emergency treatment.  How can I prevent a DVT?  Avoid long periods of inactivity and dont cross your legs--get up and walk around every hour or so.  Stay active--walking after surgery is highly encouraged.  This means you should get out of the house and walk in the neighborhood.  Going up and down stairs will not impair healing (unless advised against such activity by your doctor).    Drink plenty of noncaffeinated, nonalcoholic fluids each day to prevent dehydration.  Wear special support stockings, if they have been advised by  your doctor.  If you travel, stop at least once an hour and walk around.  Avoid smoking (assistance with stopping is available through your healthcare provider)  Always notify your doctor if you are not able to follow the post operative instructions that are given to you at the time of discharge.  It may be necessary to prescribe one of the medications available to prevent DVT.      Using an Incentive Spirometer    An incentive spirometer is a device that helps you do deep breathing exercises. These exercises expand your lungs, aid in circulation, and help prevent pneumonia. Deep breathing exercises also help you breathe better and improve the function of your lungs by:  Keeping your lungs clear  Strengthening your breathing muscles  Helping prevent respiratory complications or problems  The incentive spirometer gives you a way to take an active part in recover. A nurse or therapist will teach you breathing exercises. To do these exercises, you will breathe in through your mouth and not your nose. The incentive spirometer only works correctly if you breathe in through your mouth.  Steps to clear lungs  Step 1. Exhale normally. Then, inhale normally.  Relax and breathe out.  Step 2. Place your lips tightly around the mouthpiece.  Make sure the device is upright and not tilted.  Step 3. Inhale as much air as you can through the mouthpiece (don't breath through your nose).  Inhale slowly and deeply.  Hold your breath long enough to keep the balls or disk raised for at least 3 to 5 seconds, or as instructed by your healthcare provider.  Some spirometers have an indicator to let you know that you are breathing in too fast. If the indicator goes off, breathe in more slowly.  Step 4. Repeat the exercise regularly.  Do this exercise every hour while you're awake, or as instructed by your healthcare provider.  If you were taught deep breathing and coughing exercises, do them regularly as instructed by your healthcare  provider.

## 2025-03-06 NOTE — ANESTHESIA PROCEDURE NOTES
Intubation    Date/Time: 3/6/2025 12:42 PM    Performed by: Eduardo Kahn CRNA  Authorized by: Bowen Sequeira MD    Intubation:     Induction:  Intravenous    Intubated:  Postinduction    Mask Ventilation:  Not attempted    Attempts:  1    Attempted By:  CRNA    Difficult Airway Encountered?: No      Complications:  None    Airway Device:  Supraglottic airway/LMA    Airway Device Size:  3.0    Style/Cuff Inflation:  Cuffed    Secured at:  The lips    Placement Verified By:  Capnometry    Complicating Factors:  None    Findings Post-Intubation:  BS equal bilateral

## 2025-03-06 NOTE — ANESTHESIA PROCEDURE NOTES
Right supraclavicular    Patient location during procedure: pre-op   Block not for primary anesthetic.  Reason for block: at surgeon's request and post-op pain management   Post-op Pain Location: right arm pain   Start time: 3/6/2025 10:45 AM  Timeout: 3/6/2025 10:45 AM   End time: 3/6/2025 10:50 AM    Staffing  Authorizing Provider: Bowen Sequeira MD  Performing Provider: Bowen Sequeira MD    Staffing  Performed by: Bowen Sequeira MD  Authorized by: Bowen Sequeira MD    Preanesthetic Checklist  Completed: patient identified, IV checked, site marked, risks and benefits discussed, surgical consent, monitors and equipment checked, pre-op evaluation and timeout performed  Peripheral Block  Patient position: supine  Prep: ChloraPrep  Patient monitoring: heart rate, cardiac monitor, continuous pulse ox, continuous capnometry and frequent blood pressure checks  Block type: supraclavicular  Laterality: right  Injection technique: single shot  Needle  Needle type: Stimuplex   Needle gauge: 22 G  Needle length: 2 in  Needle localization: anatomical landmarks and ultrasound guidance  Needle insertion depth: 5 cm   -ultrasound image captured on disc.  Assessment  Injection assessment: negative aspiration, negative parasthesia and local visualized surrounding nerve  Paresthesia pain: none  Heart rate change: no  Slow fractionated injection: yes  Pain Tolerance: comfortable throughout block and no complaints  Medications:    Medications: BUPivacaine liposome (PF) 1.3 % (13.3 mg/mL) suspension - Injection   10 mL - 3/6/2025 10:50:00 AM  bupivacaine (pf) (MARCAINE) injection 0.5% - Perineural   10 mL - 3/6/2025 10:50:00 AM    Additional Notes  VSS.  [facility] RN monitoring vitals throughout procedure.  Patient tolerated procedure well.     Exparel 10ml + Marcaine 10ml 0.5% dosed under direct Ultrasound guidance

## 2025-03-06 NOTE — TRANSFER OF CARE
"Anesthesia Transfer of Care Note    Patient: Mane Ramires    Procedure(s) Performed: Procedure(s) (LRB):  REPAIR, TENDON, BICEPS, DISTAL (Right)    Patient location: PACU    Anesthesia Type: general    Transport from OR: Transported from OR on 2-3 L/min O2 by NC with adequate spontaneous ventilation    Post pain: adequate analgesia    Post assessment: tolerated procedure well and no apparent anesthetic complications    Post vital signs: stable    Level of consciousness: awake    Nausea/Vomiting: no nausea/vomiting    Complications: none    Transfer of care protocol was followed    Last vitals: Visit Vitals  /68 (BP Location: Left arm, Patient Position: Lying)   Pulse 82   Temp 36.4 °C (97.5 °F) (Skin)   Resp 15   Ht 5' 4" (1.626 m)   Wt 70.3 kg (155 lb)   SpO2 98%   BMI 26.61 kg/m²     "

## 2025-03-06 NOTE — ANESTHESIA POSTPROCEDURE EVALUATION
Anesthesia Post Evaluation    Patient: Mane Ramires    Procedure(s) Performed: Procedure(s) (LRB):  REPAIR, TENDON, BICEPS, DISTAL (Right)    Final Anesthesia Type: general      Patient location during evaluation: PACU  Patient participation: Yes- Able to Participate  Level of consciousness: awake and alert and oriented  Post-procedure vital signs: reviewed and stable  Pain management: adequate  Airway patency: patent    PONV status at discharge: No PONV  Anesthetic complications: no      Cardiovascular status: blood pressure returned to baseline and stable  Respiratory status: unassisted and spontaneous ventilation  Hydration status: euvolemic  Follow-up not needed.              Vitals Value Taken Time   /74 03/06/25 15:26   Temp 36.7 °C (98.1 °F) 03/06/25 14:40   Pulse 86 03/06/25 15:30   Resp 20 03/06/25 15:30   SpO2 95 % 03/06/25 15:30   Vitals shown include unfiled device data.      No case tracking events are documented in the log.      Pain/Ry Score: Ry Score: 8 (3/6/2025  2:50 PM)

## 2025-03-06 NOTE — BRIEF OP NOTE
[facility]  Brief Operative Note    Surgery Date: 3/6/2025     Surgeons and Role:     * Mike Solomon MD - Primary    Assisting Surgeon: None    Pre-op Diagnosis:  Rupture of right distal biceps tendon, initial encounter [S46.211A]  Pre-op testing [Z01.818]    Post-op Diagnosis:  Post-Op Diagnosis Codes:     * Rupture of right distal biceps tendon, initial encounter [S46.211A]     * Pre-op testing [Z01.818]    Procedure(s) (LRB):  REPAIR, TENDON, BICEPS, DISTAL (Right)    Anesthesia: General/Regional    Operative Findings: high grade partial distal biceps tear with cyst     Estimated Blood Loss: * No values recorded between 3/6/2025 12:27 PM and 3/6/2025  2:43 PM *         Specimens:   Specimen (24h ago, onward)      None              Discharge Note    OUTCOME: Patient tolerated treatment/procedure well without complication and is now ready for discharge.    DISPOSITION: Home or Self Care    FINAL DIAGNOSIS:  <principal problem not specified>    FOLLOWUP: In clinic    DISCHARGE INSTRUCTIONS:    Discharge Procedure Orders   Ambulatory referral/consult to Physical/Occupational Therapy   Standing Status: Future   Referral Priority: Routine Referral Type: Physical Medicine   Referral Reason: Specialty Services Required   Number of Visits Requested: 1     Diet general     Call MD for:  temperature >100.4     Call MD for:  persistent nausea and vomiting     Call MD for:  severe uncontrolled pain     Call MD for:  difficulty breathing, headache or visual disturbances     Call MD for:  redness, tenderness, or signs of infection (pain, swelling, redness, odor or green/yellow discharge around incision site)     Call MD for:  hives     Call MD for:  persistent dizziness or light-headedness     Call MD for:  extreme fatigue     Keep surgical extremity elevated     Ice to affected area     No driving, operating heavy equipment or signing legal documents while taking pain medication.     Other  restrictions (specify):   Order Comments: Non weight bearing. Ok for gentle elbow ROM, passive extension and active flexion. No supination.     Remove dressing in 48 hours   Order Comments: Change dressing after 48 hours with waterproof bandaid and OK to shower

## 2025-03-07 VITALS
TEMPERATURE: 98 F | RESPIRATION RATE: 16 BRPM | HEART RATE: 88 BPM | WEIGHT: 155 LBS | BODY MASS INDEX: 26.46 KG/M2 | DIASTOLIC BLOOD PRESSURE: 65 MMHG | SYSTOLIC BLOOD PRESSURE: 120 MMHG | OXYGEN SATURATION: 97 % | HEIGHT: 64 IN

## 2025-03-13 ENCOUNTER — PATIENT MESSAGE (OUTPATIENT)
Dept: ORTHOPEDICS | Facility: CLINIC | Age: 70
End: 2025-03-13
Payer: MEDICARE

## 2025-03-19 ENCOUNTER — CLINICAL SUPPORT (OUTPATIENT)
Dept: REHABILITATION | Facility: HOSPITAL | Age: 70
End: 2025-03-19
Payer: MEDICARE

## 2025-03-19 DIAGNOSIS — M25.621 STIFFNESS OF ELBOW JOINT, RIGHT: ICD-10-CM

## 2025-03-19 DIAGNOSIS — M79.601 PAIN OF RIGHT ARM: Primary | ICD-10-CM

## 2025-03-19 DIAGNOSIS — R53.1 WEAKNESS: ICD-10-CM

## 2025-03-19 DIAGNOSIS — S46.211A TEAR OF BICEPS MUSCLE, RIGHT, INITIAL ENCOUNTER: ICD-10-CM

## 2025-03-19 PROCEDURE — 97530 THERAPEUTIC ACTIVITIES: CPT | Mod: HCNC,PN

## 2025-03-19 PROCEDURE — 97166 OT EVAL MOD COMPLEX 45 MIN: CPT | Mod: HCNC,PN

## 2025-03-19 NOTE — PATIENT INSTRUCTIONS
Complete 5-6 times per day passively          Begin:                    2. Full forearm supination and pronation PASSIVE           3. Elbow ROM from 30° of extension to 130° of flexion

## 2025-03-20 PROBLEM — M25.621 STIFFNESS OF ELBOW JOINT, RIGHT: Status: ACTIVE | Noted: 2025-03-20

## 2025-03-20 PROBLEM — R53.1 WEAKNESS: Status: ACTIVE | Noted: 2025-03-20

## 2025-03-20 PROBLEM — M79.601 PAIN OF RIGHT ARM: Status: ACTIVE | Noted: 2025-03-20

## 2025-03-20 NOTE — PROGRESS NOTES
Outpatient Rehab    Occupational Therapy Evaluation    Patient Name: Yousif Ramires  MRN: [id number]  YOB: 1955  Encounter Date: 3/19/2025    Therapy Diagnosis:   Encounter Diagnoses   Name Primary?    Tear of biceps muscle, right, initial encounter     Pain of right arm Yes    Stiffness of elbow joint, right     Weakness      Physician: Mike Solomon MD    Physician Orders: Eval and Treat   From MD note:He will be nonweightbearing. We will allow for passive supination, no active supination. Gentle elbow range of motion with flexion and extension.   Medical Diagnosis: Tear of biceps muscle, right, initial encounter    Visit # / Visits Authorized: 1 / 1  Date of Evaluation:  3/19/2025   Insurance Authorization Period: 3/6/2025 to 3/6/2026  Plan of Care Certification:  3/19/2025 to 6/18/25      Time In: 1700   Time Out: 1800  Total Time: 60   Total Billable Time: 60    Intake Outcome Measure for FOTO Survey    Therapist reviewed FOTO scores for Yousif Ramires on 3/19/2025.   FOTO report - see Media section or FOTO account episode details.     Intake Score: Will take at first after          Subjective   History of Present Illness  Yousif is a 69 y.o. male who reports to occupational therapy with a chief concern of Right arm pain, stiffness, weakness.     The patient reports a medical diagnosis of S46.211A (ICD-10-CM) - Tear of biceps muscle, right, initial encounter.  Patient reports a surgery of High-grade partial tear right distal biceps.          Dominant Hand: Right  History of Present Condition/Illness: Pt states that he was using a saw with a dull blade working on his daughters house around [facility]. After that, he noticed a lump on his arm and had pain in his elbow. He was out of town for a month and was unable to go to the doctor right away. He had surgery on 3/6/25 for a high grade partial tear of the right distal biceps. He had a repair done on 3/6/25.     Activities of Daily  Living  Social history was obtained from Patient.          Patient Responsibilities: Personal ADL, Driving, Home management    Previously independent with activities of daily living? Yes     Currently independent with activities of daily living? Yes          Previously independent with instrumental activities of daily living? Yes     Currently independent with instrumental activities of daily living? Yes              Pain     Patient reports a current pain level of 2/10. Pain at best is reported as 2/10. Pain at worst is reported as 10/10.   Location: Right biceps  Clinical Progression (since onset): Stable  Pain Qualities: Sharp, Throbbing  Pain-Relieving Factors: Activity modification, Medications - prescription, Relaxation, Rest  Pain-Aggravating Factors: Driving, Cooking, Holding objects, Lifting, Reaching, Rotation         Treatment History  Treatments  Previously Received Treatments: No  Currently Receiving Treatments: No    Living Arrangements  Living Situation  Living Arrangements: Family members  Support Systems: Family members        Employment  Patient does not report that: Does the patient's condition impact their ability to work?  Employment Status: Retired          Past Medical History/Physical Systems Review:   Mane Ramires  has a past medical history of Accident, Allergy, Anxiety, ASD (atrial septal defect), Cervical stenosis of spine, CVA (cerebral vascular accident), DDD (degenerative disc disease), cervical, Depression, JEEVAN (generalized anxiety disorder), Gender identity disorder, H/O cardiovascular stress test, Herpes simplex type 2 infection, History of atypical hyperplasia of breast, History of cardiovascular stress test, Hypertension, Hypertensive retinopathy, IGT (impaired glucose tolerance), Myelomalacia, OA (osteoarthritis), Pineal gland cyst, TBI (traumatic brain injury), and Type 2 diabetes mellitus without complications.    Mane Ramires  has a past surgical history that  includes Hysterectomy (1984); Appendectomy (1969); Cervical fusion (10/2014); Mastectomy (02/2015); Bilateral salpingoophorectomy (02/2015); Amputation; Upper gastrointestinal endoscopy (09/06/2017); Finger amputation (Left); Fracture surgery (Right, 2003); Cholecystectomy; Carpal tunnel release (12/2017); Reconstruction of nose (N/A, 8/23/2018); Endoscopic nasal septoplasty (N/A, 8/23/2018); Biopsy of liver with ultrasound guidance (N/A, 12/18/2018); Rotator cuff repair (Right, 2019); and repair, tendon, biceps, distal (Right, 3/6/2025).    Mane has a current medication list which includes the following prescription(s): aspirin, blood sugar diagnostic, blood-glucose meter, clonazepam, cyclobenzaprine, cyclosporine, diclofenac, diclofenac sodium, duloxetine, famciclovir, ibuprofen, ketoconazole, lancets, levocetirizine, metoprolol succinate, miconazole, needle (disp) 18 g, bd precisionglide, ondansetron, rosuvastatin, testosterone cypionate, trazodone, and valsartan.    Review of patient's allergies indicates:   Allergen Reactions    Corticosteroids (glucocorticoids) Palpitations    Cortisone Other (See Comments), Palpitations and Anaphylaxis    Sudafed [pseudoephedrine hcl] Other (See Comments)     Heart racing      Adhesive tape-silicones     Gabapentin      Caused memory loss    Other Reaction(s): MENTAL CONFUSION    Pseudoephedrine Other (See Comments)    Adhesive Rash     Specifically clear tegaderm dressing     Codeine Other (See Comments) and Palpitations     Heart racing        Objective   Elbow Observations     Pt arrived at clinic wearing a shoulder sling. Mild swelling of the elbow noted.       Elbow Circumferential Measurements  Right Location of Measurement Right  (cm) Left Location of  Measurement Left  (cm)   At Joint Line 26 At Joint Line 26   3 cm Above Joint Line 28 3 cm Above Joint Line 28   3 cm Below Joint Line 28 3 cm Below Joint Line 27             Shoulder Range of Motion  Right Shoulder    Active (deg) Passive (deg) Pain   Flexion 170       Extension         Scaption         ABduction 150       ADduction         Horizontal ABduction         Horizontal ADduction         External Rotation (Shoulder ABducted 0 degrees)         External Rotation (Shoulder ABducted 45 degrees)         External Rotation (Shoulder ABducted 90 degrees) 90       Internal Rotation (Shoulder ABducted 0 degrees)         Internal Rotation (Shoulder ABducted 45 degrees)         Internal Rotation (Shoulder ABducted 90 degrees) 60                Elbow/Forearm Range of Motion   Right Elbow/Forearm   Active (deg) Passive (deg) Pain   Flexion   130     Extension   30     Forearm Pronation   55     Forearm Supination   50       Left Elbow/Forearm   Active (deg) Passive (deg) Pain   Flexion 140       Extension 0       Forearm Pronation 60       Forearm Supination 55                        Left  Strength  Left Hand Dynamometer Position: 2  Elbow Position Forearm Position Trial 1 (lbs) Trial 2 (lbs) Trial 3 (lbs) Average (lbs) Pain   Flexed Neutral 80 80 80 80           /Pinch Details  Right hand & pinch strength not tested today          Treatment:  Therapeutic Activity  TA 1: demonstration and explanation of HEP    Time Entry(in minutes):  OT Evaluation (Moderate) Time Entry: 30  Therapeutic Activity Time Entry: 30    Assessment & Plan   Assessment  Yousif presents with a condition of Moderate complexity.   Presentation of Symptoms: Stable  Will Comorbidities Impact Care: No       ADL Limitations : Bathing/showering, Dressing  IADL Limitations: Communication management, Driving, Health management and maintenance  Leisure Limitations: Leisure participation  Functional Limitations: Activity tolerance, Carrying objects, Gross motor coordination, Pain when reaching, Pain with ADLs/IADLs, Range of motion              Occupational profile: Pt is very active in wood-working and house projects. He is unable to work in his shop at this  time..   Evaluation/Treatment Response: Patient responded to treatment well  Patient Goal for Therapy (OT): Get the use of his arm back  Prognosis: Excellent  Assessment Details: Pt tolerated treatment well. Passive elbow extension and flexion were preformed between 130-30 and passive supination and pronation. Pt with good movement. OT completed passive movement of pt's shoulder to decrease stiffness. Pt is eager to get back to his wood working and understands the best way to do that is to be compliant with therapy and HEP. Will progress as tolerated.    Plan  From an occupational therapy perspective, the patient would benefit from: Skilled Rehab Services    Planned therapy interventions include: Therapeutic exercise, Therapeutic activities, Neuromuscular re-education, Manual therapy, ADLs/IADLs, and Orthotic management and training.    Planned modalities to include: Contrast bath, Cryotherapy (cold pack), Electrical stimulation - attended, Electrical stimulation - passive/unattended, Fluidotherapy, Paraffin bath, Iontophoresis, Thermotherapy (hot pack), Ultrasound, and Whirlpool.        Visit Frequency: 2 times Per Week for 12 Weeks.       This plan was discussed with Patient.   Discussion participants: Agreed Upon Plan of Care             Patient's spiritual, cultural, and educational needs considered and patient agreeable to plan of care and goals.     Education  Education was done with Patient. The patient's learning style includes Demonstration. The patient Demonstrates understanding and Verbalizes understanding.         Pt was educated on the role of occupational therapy and the importance of completing ADLs and functional mobility. Pt educated on HEP which can be found under patient instructions. Pt educated on precautions and patient demonstrated good understanding.       Goals:   Active       LTGs       Pt will have full ROM of right elbow without complaints of pain.        Start:  03/20/25    Expected End:   06/18/25            Pt will decrease pain to a 1/10 at worst while completing ADLs/IADLs/leisure activities       Start:  03/20/25    Expected End:  06/18/25            Pt will increase strength in R biceps to a 5/5 in order to participate in wood working activities       Start:  03/20/25    Expected End:  06/18/25               STGs       Pt will increase active flexion of elbow to 130 degrees        Start:  03/20/25    Expected End:  05/04/25            Pt's pain will decrease to a 5/10 at worst when completing ADLs/ IADLs/ leisure activities        Start:  03/20/25    Expected End:  05/04/25            Pt will increase strength in his R biceps to a 3/5 in order to start participating in ADLs with less modification       Start:  03/20/25    Expected End:  05/04/25                Rema Cole, OT

## 2025-03-21 ENCOUNTER — OFFICE VISIT (OUTPATIENT)
Dept: ORTHOPEDICS | Facility: CLINIC | Age: 70
End: 2025-03-21
Payer: MEDICARE

## 2025-03-21 DIAGNOSIS — S46.211A RUPTURE OF RIGHT DISTAL BICEPS TENDON, INITIAL ENCOUNTER: Primary | ICD-10-CM

## 2025-03-21 PROCEDURE — 99999 PR PBB SHADOW E&M-EST. PATIENT-LVL I: CPT | Mod: PBBFAC,HCNC,, | Performed by: ORTHOPAEDIC SURGERY

## 2025-03-21 NOTE — PROGRESS NOTES
Post-op Note    HPI    Mane Ramires is here 2 weeks s/p the following procedure:     Right distal biceps repair    Overall doing well. Pain controlled on current regimen. He is currently enrolled in Physical Therapy, started yesterday.  Denies any chest pain or shortness of breathe. Denies any drainage from the incision. Denies any fevers, chills or paresthesias.  Proximal biceps pain is better.  Compliant with home exercise program and sling use    DVT Prophylaxis:  None      Physical Exam:     Patient is alert and oriented no acute distress.   Assistive Device:  Sling for comfort    Right elbow Incision(s) are well healed.  There is no evidence of dehiscence.  There is no induration erythema or signs of infection.  Appropriate soft tissue swelling.  Compartments are soft and compressible.  Warm well-perfused extremity.  10° of extension to 130 flexion.  Full pronation and supination without mechanical block or pain    Imaging:     I have personally reviewed the following imaging and these are an interpretation of my findings:     None    Assessment    Mane Ramires is 2 weeks Post-op     Plan:    Overall doing as expected.  We discussed expectations of surgery and postoperative course.     Pain: Continued postoperative pain regimen -- Tylenol/ibuprofen  Resist any resisted supination.  Full range of motion as tolerated passively.  Okay for eccentric contraction.  PT/OT: Continue/Initiate physical therapy (weight bearing status:  Nonweightbearing), physical therapy we will be continued in [place] as he is going for work for the next 2 months.      Follow-up: 4 weeks   X-rays next visit:  Right elbow

## 2025-03-25 ENCOUNTER — CLINICAL SUPPORT (OUTPATIENT)
Dept: REHABILITATION | Facility: HOSPITAL | Age: 70
End: 2025-03-25
Payer: MEDICARE

## 2025-03-25 DIAGNOSIS — R53.1 WEAKNESS: ICD-10-CM

## 2025-03-25 DIAGNOSIS — M25.621 STIFFNESS OF ELBOW JOINT, RIGHT: ICD-10-CM

## 2025-03-25 DIAGNOSIS — M79.601 PAIN OF RIGHT ARM: Primary | ICD-10-CM

## 2025-03-25 PROCEDURE — 97140 MANUAL THERAPY 1/> REGIONS: CPT | Mod: HCNC,PN

## 2025-03-25 PROCEDURE — 97110 THERAPEUTIC EXERCISES: CPT | Mod: HCNC,PN

## 2025-03-25 NOTE — PROGRESS NOTES
"  Outpatient Rehab    Occupational Therapy Visit    Patient Name: Yousif Ramires  MRN: [id number]  YOB: 1955  Encounter Date: 3/25/2025    Therapy Diagnosis:   Encounter Diagnoses   Name Primary?    Pain of right arm Yes    Stiffness of elbow joint, right     Weakness      Physician: Mike Solomon MD    Physician Orders: Eval and Treat  Medical Diagnosis: Tear of biceps muscle, right, initial encounter    Visit # / Visits Authorized: 1 / 20  Insurance Authorization Period: 3/25/2025 to 12/31/2025  Date of Evaluation: 3/20/25  Plan of Care Certification: 3/20/25 to 6/18/25     Time In: 1500   Time Out: 1540  Total Time: 40   Total Billable Time:  40         Subjective   "I think I over did it yesterday. My incision site is a little sore.".  Pain reported as 2/10. Right elbow    Objective       Please see evaluation.     Treatment:  Therapeutic Exercise  TE 1: PROM of elbow flex/exten/pro/sup  TE 2: PROM of shoulder in flex/ext/IR/ER  TE 3: Gentle AROM of elbow in flex/ext/pro/sup  TE 4: eccentric contraction of right biceps elbow extension  Manual Therapy  MT 1: Scar massage to incision site to mold scar and to help with desensitization    Time Entry(in minutes):  Manual Therapy Time Entry: 15  Therapeutic Exercise Time Entry: 25    Assessment & Plan   Assessment: Pt tolerated added therapeutic exercises well without additional complaints of pain. Scar massage preformed and dycem issued to patient. Will progress as tolerated. Pt is motivated.  Evaluation/Treatment Tolerance: Patient tolerated treatment well    Patient will continue to benefit from skilled outpatient occupational therapy to address the deficits listed in the problem list box on initial evaluation, provide pt/family education and to maximize pt's level of independence in the home and community environment.     Patient's spiritual, cultural, and educational needs considered and patient agreeable to plan of care and goals. "     Education  Education was done with Patient. The patient's learning style includes Listening. The patient Verbalizes understanding.         Progression of protocol       Plan: Continue with current plan of care    Goals:   Active       LTGs       Pt will have full ROM of right elbow without complaints of pain.  (Progressing)       Start:  03/20/25    Expected End:  06/18/25            Pt will decrease pain to a 1/10 at worst while completing ADLs/IADLs/leisure activities (Progressing)       Start:  03/20/25    Expected End:  06/18/25            Pt will increase strength in R biceps to a 5/5 in order to participate in wood working activities (Progressing)       Start:  03/20/25    Expected End:  06/18/25               STGs       Pt will increase active flexion of elbow to 130 degrees  (Progressing)       Start:  03/20/25    Expected End:  05/04/25            Pt's pain will decrease to a 5/10 at worst when completing ADLs/ IADLs/ leisure activities  (Progressing)       Start:  03/20/25    Expected End:  05/04/25            Pt will increase strength in his R biceps to a 3/5 in order to start participating in ADLs with less modification (Progressing)       Start:  03/20/25    Expected End:  05/04/25                Rema Cole, OT

## 2025-03-27 ENCOUNTER — CLINICAL SUPPORT (OUTPATIENT)
Dept: REHABILITATION | Facility: HOSPITAL | Age: 70
End: 2025-03-27
Payer: MEDICARE

## 2025-03-27 DIAGNOSIS — R53.1 WEAKNESS: ICD-10-CM

## 2025-03-27 DIAGNOSIS — M79.601 PAIN OF RIGHT ARM: Primary | ICD-10-CM

## 2025-03-27 DIAGNOSIS — M25.621 STIFFNESS OF ELBOW JOINT, RIGHT: ICD-10-CM

## 2025-03-27 PROCEDURE — 97022 WHIRLPOOL THERAPY: CPT | Mod: HCNC,PN

## 2025-03-27 PROCEDURE — 97110 THERAPEUTIC EXERCISES: CPT | Mod: HCNC,PN

## 2025-03-27 NOTE — PROGRESS NOTES
"  Outpatient Rehab    Occupational Therapy Visit    Patient Name: Yousif Ramires  MRN: [id number]  YOB: 1955  Encounter Date: 3/27/2025    Therapy Diagnosis:   Encounter Diagnoses   Name Primary?    Pain of right arm Yes    Stiffness of elbow joint, right     Weakness      Physician: Mike Solomon MD    Physician Orders: Eval and Treat  Medical Diagnosis: Tear of biceps muscle, right, initial encounter    Visit # / Visits Authorized: 2 / 20  Insurance Authorization Period: 3/25/2025 to 12/31/2025  Date of Evaluation: 3/25/25  Plan of Care Certification: 3/25/25 to 6/18/25     Time In: 1500   Time Out: 1545  Total Time: 45   Total Billable Time: 45           Subjective   "I'm a little sore today, but not too bad.".  Pain reported as 2/10.      Objective       See initial evaluation     Treatment:  Therapeutic Exercise  TE 1: PROM of elbow flex/exten/pro/sup  TE 2: PROM of shoulder in flex/ext/IR/ER  TE 3: Gentle AROM of elbow in flex/ext/pro/sup  TE 4: eccentric contraction of right biceps elbow extension with 1# weight  Therapeutic Activity  TA 1: wrist maze x 5 minutes  Manual Therapy  MT 1: Scar massage to incision site to mold scar and to help with desensitization  Modalities  Fluidotherapy: x 15 minutes to increase circulation and stiffness and decrease pain and sensitization    Time Entry(in minutes):  Whirlpool Time Entry: 15  Therapeutic Exercise Time Entry: 30    Assessment & Plan   Assessment: Pt tolerated added therapeutic exercises well without additional complaints of pain. Scar massage preformed. Will progress as tolerated. Pt is motivated.  Evaluation/Treatment Tolerance: Patient tolerated treatment well    Patient will continue to benefit from skilled outpatient occupational therapy to address the deficits listed in the problem list box on initial evaluation, provide pt/family education and to maximize pt's level of independence in the home and community environment.     Patient's " spiritual, cultural, and educational needs considered and patient agreeable to plan of care and goals.           Plan: Continue with current plan of care    Goals:   Active       LTGs       Pt will have full ROM of right elbow without complaints of pain.  (Progressing)       Start:  03/20/25    Expected End:  06/18/25            Pt will decrease pain to a 1/10 at worst while completing ADLs/IADLs/leisure activities (Progressing)       Start:  03/20/25    Expected End:  06/18/25            Pt will increase strength in R biceps to a 5/5 in order to participate in wood working activities (Progressing)       Start:  03/20/25    Expected End:  06/18/25               STGs       Pt will increase active flexion of elbow to 130 degrees  (Progressing)       Start:  03/20/25    Expected End:  05/04/25            Pt's pain will decrease to a 5/10 at worst when completing ADLs/ IADLs/ leisure activities  (Progressing)       Start:  03/20/25    Expected End:  05/04/25            Pt will increase strength in his R biceps to a 3/5 in order to start participating in ADLs with less modification (Progressing)       Start:  03/20/25    Expected End:  05/04/25                Rema Cole, OT

## 2025-04-01 ENCOUNTER — CLINICAL SUPPORT (OUTPATIENT)
Dept: REHABILITATION | Facility: HOSPITAL | Age: 70
End: 2025-04-01
Payer: MEDICARE

## 2025-04-01 DIAGNOSIS — M79.601 PAIN OF RIGHT ARM: Primary | ICD-10-CM

## 2025-04-01 DIAGNOSIS — R53.1 WEAKNESS: ICD-10-CM

## 2025-04-01 DIAGNOSIS — M25.621 STIFFNESS OF ELBOW JOINT, RIGHT: ICD-10-CM

## 2025-04-01 PROCEDURE — 97124 MASSAGE THERAPY: CPT | Mod: HCNC,PN

## 2025-04-01 PROCEDURE — 97022 WHIRLPOOL THERAPY: CPT | Mod: HCNC,PN

## 2025-04-01 PROCEDURE — 97530 THERAPEUTIC ACTIVITIES: CPT | Mod: HCNC,PN

## 2025-04-01 PROCEDURE — 97140 MANUAL THERAPY 1/> REGIONS: CPT | Mod: HCNC,PN

## 2025-04-01 NOTE — PROGRESS NOTES
"  Outpatient Rehab    Occupational Therapy Visit    Patient Name: Yousif Ramires  MRN: [id number]  YOB: 1955  Encounter Date: 4/1/2025    Therapy Diagnosis:   Encounter Diagnoses   Name Primary?    Pain of right arm Yes    Stiffness of elbow joint, right     Weakness      Physician: Mike Solomon MD    Physician Orders: Eval and Treat  Medical Diagnosis: Tear of biceps muscle, right, initial encounter    Visit # / Visits Authorized: 3 / 20  Insurance Authorization Period: 3/25/2025 to 12/31/2025  Date of Evaluation: 3/20/25  Plan of Care Certification: 3/20/25 to 6/18/25     Time In: 1615   Time Out: 1715  Total Time: 60   Total Billable Time: 60      Subjective   "It feels pretty good. I'm not hurting".  Pain reported as 0/10.      Objective         See progress note    Treatment:  Therapeutic Exercise  TE 1: PROM of elbow flex/exten/pro/sup  TE 2: PROM of shoulder in flex/ext/IR/ER  TE 3: Gentle AROM of elbow in flex/ext/pro/sup  TE 4: eccentric contraction of right biceps elbow extension with 1# weight  Therapeutic Activity  TA 1: wrist maze x 5 minutes  TA 2: wrist 3 ways over wedge with dowel 1 minute each  TA 3: wrist wheel pro/sup & flex/ext 2 minutes each  TA 4: yellow flex bar pro/sup  Manual Therapy  MT 1: Scar massage to incision site to mold scar and to help with desensitization  Modalities  Fluidotherapy: x 15 minutes to increase circulation and stiffness and decrease pain and sensitization    Time Entry(in minutes):  Whirlpool Time Entry: 15  Manual Therapy Time Entry: 15  Therapeutic Activity Time Entry: 15  Therapeutic Massage Time Entry: 15    Assessment & Plan   Assessment: Pt tolerated added therapeutic exercises well without additional complaints of pain. Scar massage preformed. Will progress as tolerated. Pt is motivated.  Evaluation/Treatment Tolerance: Patient tolerated treatment well    Patient will continue to benefit from skilled outpatient occupational therapy to address " the deficits listed in the problem list box on initial evaluation, provide pt/family education and to maximize pt's level of independence in the home and community environment.     Patient's spiritual, cultural, and educational needs considered and patient agreeable to plan of care and goals.           Plan: Continue with current plan of care    Goals:   Active       LTGs       Pt will have full ROM of right elbow without complaints of pain.  (Progressing)       Start:  03/20/25    Expected End:  06/18/25            Pt will decrease pain to a 1/10 at worst while completing ADLs/IADLs/leisure activities (Progressing)       Start:  03/20/25    Expected End:  06/18/25            Pt will increase strength in R biceps to a 5/5 in order to participate in wood working activities (Progressing)       Start:  03/20/25    Expected End:  06/18/25               STGs       Pt will increase active flexion of elbow to 130 degrees  (Progressing)       Start:  03/20/25    Expected End:  05/04/25            Pt's pain will decrease to a 5/10 at worst when completing ADLs/ IADLs/ leisure activities  (Progressing)       Start:  03/20/25    Expected End:  05/04/25            Pt will increase strength in his R biceps to a 3/5 in order to start participating in ADLs with less modification (Progressing)       Start:  03/20/25    Expected End:  05/04/25                Rema Cole OT

## 2025-04-03 ENCOUNTER — CLINICAL SUPPORT (OUTPATIENT)
Dept: REHABILITATION | Facility: HOSPITAL | Age: 70
End: 2025-04-03
Payer: MEDICARE

## 2025-04-03 DIAGNOSIS — M25.621 STIFFNESS OF ELBOW JOINT, RIGHT: ICD-10-CM

## 2025-04-03 DIAGNOSIS — M79.601 PAIN OF RIGHT ARM: Primary | ICD-10-CM

## 2025-04-03 DIAGNOSIS — R53.1 WEAKNESS: ICD-10-CM

## 2025-04-03 PROCEDURE — 97140 MANUAL THERAPY 1/> REGIONS: CPT | Mod: HCNC,PN

## 2025-04-03 PROCEDURE — 97530 THERAPEUTIC ACTIVITIES: CPT | Mod: HCNC,PN

## 2025-04-03 NOTE — PROGRESS NOTES
"  Outpatient Rehab    Occupational Therapy Progress Note    Patient Name: Yousif Ramires  MRN: [id number]  YOB: 1955  Encounter Date: 4/3/2025    Therapy Diagnosis: No diagnosis found.  Physician: Mike Solomon MD    Physician Orders: Eval and Treat  Medical Diagnosis: Tear of biceps muscle, right, initial encounter    Visit # / Visits Authorized: 4 / 20  Insurance Authorization Period: 3/25/2025 to 12/31/2025  Date of Evaluation: 3/20/25  Plan of Care Certification: 3/20/25 to 6/18/25     Time In: 1500   Time Out: 1600  Total Time: 60   Total Billable Time: 60         Subjective   " The shelf in the inside door of my fridge fell last night and I instinctively caught it. I'm a little sore today from it..  Pain reported as 4/10.      Objective    Please see progress note        Treatment:  Therapeutic Exercise  TE 1: PROM of elbow flex/exten/pro/sup  TE 2: PROM of shoulder in flex/ext/IR/ER  TE 3: Gentle AROM of elbow in flex/ext/pro/sup  TE 4: eccentric contraction of right biceps elbow extension with 1# weight  Therapeutic Activity  TA 1: wrist maze x 5 minutes  TA 2: wrist 3 ways over wedge with dowel 1 minute each  TA 3: wrist wheel pro/sup & flex/ext 2 minutes each  TA 4: yellow flex bar pro/sup  TA 5: shoulder pulleys in flexion for 3 minutes  Manual Therapy  MT 1: Scar massage to incision site to mold scar and to help with desensitization  Modalities  Fluidotherapy: x 15 minutes to increase circulation and stiffness and decrease pain and sensitization    Time Entry(in minutes):  Whirlpool Time Entry: 15  Manual Therapy Time Entry: 15  Therapeutic Activity Time Entry: 15  Therapeutic Exercise Time Entry: 15    Assessment & Plan   Assessment: Pt tolerated added therapeutic exercises well without additional complaints of pain. Scar massage preformed. Will progress as tolerated. Pt is motivated.  Evaluation/Treatment Tolerance: Patient tolerated treatment well    Patient will continue to benefit " from skilled outpatient occupational therapy to address the deficits listed in the problem list box on initial evaluation, provide pt/family education and to maximize pt's level of independence in the home and community environment.     Patient's spiritual, cultural, and educational needs considered and patient agreeable to plan of care and goals.           Plan: Continue with current plan of care    Goals:   Active       LTGs       Pt will have full ROM of right elbow without complaints of pain.  (Progressing)       Start:  03/20/25    Expected End:  06/18/25            Pt will decrease pain to a 1/10 at worst while completing ADLs/IADLs/leisure activities (Progressing)       Start:  03/20/25    Expected End:  06/18/25            Pt will increase strength in R biceps to a 5/5 in order to participate in wood working activities (Progressing)       Start:  03/20/25    Expected End:  06/18/25               STGs       Pt will increase active flexion of elbow to 130 degrees  (Progressing)       Start:  03/20/25    Expected End:  05/04/25            Pt's pain will decrease to a 5/10 at worst when completing ADLs/ IADLs/ leisure activities  (Progressing)       Start:  03/20/25    Expected End:  05/04/25            Pt will increase strength in his R biceps to a 3/5 in order to start participating in ADLs with less modification (Progressing)       Start:  03/20/25    Expected End:  05/04/25                Rema Cole OT

## 2025-04-08 ENCOUNTER — CLINICAL SUPPORT (OUTPATIENT)
Dept: REHABILITATION | Facility: HOSPITAL | Age: 70
End: 2025-04-08
Payer: MEDICARE

## 2025-04-08 DIAGNOSIS — M79.601 PAIN OF RIGHT ARM: Primary | ICD-10-CM

## 2025-04-08 DIAGNOSIS — M25.621 STIFFNESS OF ELBOW JOINT, RIGHT: ICD-10-CM

## 2025-04-08 DIAGNOSIS — R53.1 WEAKNESS: ICD-10-CM

## 2025-04-08 PROCEDURE — 97140 MANUAL THERAPY 1/> REGIONS: CPT | Mod: HCNC,PN

## 2025-04-08 PROCEDURE — 97110 THERAPEUTIC EXERCISES: CPT | Mod: HCNC,PN

## 2025-04-08 PROCEDURE — 97530 THERAPEUTIC ACTIVITIES: CPT | Mod: HCNC,PN

## 2025-04-08 PROCEDURE — 97022 WHIRLPOOL THERAPY: CPT | Mod: HCNC,PN

## 2025-04-08 NOTE — PROGRESS NOTES
"  Outpatient Rehab    Occupational Therapy Visit    Patient Name: Yousif Ramires  MRN: [id number]  YOB: 1955  Encounter Date: 4/8/2025    Therapy Diagnosis:   Encounter Diagnoses   Name Primary?    Pain of right arm Yes    Stiffness of elbow joint, right     Weakness      Physician: Mike Solomon MD    Physician Orders: Eval and Treat  Medical Diagnosis: Tear of biceps muscle, right, initial encounter    Visit # / Visits Authorized: 5 / 20  Insurance Authorization Period: 3/25/2025 to 12/31/2025  Date of Evaluation: 3/20/25  Plan of Care Certification: 3/20/25 to 6/18/25     Time In: 1500   Time Out: 1600  Total Time: 60   Total Billable Time: 60           Subjective   "It's bothering me a little bit today.".  Pain reported as 3/10.      Objective       Please refer to progress note/evaluation    Treatment:  Therapeutic Exercise  TE 1: PROM of elbow flex/exten/pro/sup  TE 2: PROM of shoulder in flex/ext/IR/ER  TE 3: Gentle AROM of elbow in flex/ext/pro/sup  TE 4: eccentric contraction of right biceps elbow extension with 1# weight  Therapeutic Activity  TA 1: wrist maze x 5 minutes  TA 2: wrist 3 ways over wedge with dowel 1 minute each  TA 3: wrist wheel pro/sup & flex/ext 2 minutes each  TA 4: yellow flex bar pro/sup  TA 5: shoulder pulleys in flexion for 3 minutes  TA 6: yellow putty  x 3 minutes  Manual Therapy  MT 1: Scar massage to incision site to mold scar and to help with desensitization  Modalities  Fluidotherapy: x 15 minutes to increase circulation and stiffness and decrease pain and sensitization    Time Entry(in minutes):  Whirlpool Time Entry: 15  Manual Therapy Time Entry: 15  Therapeutic Activity Time Entry: 15  Therapeutic Exercise Time Entry: 15    Assessment & Plan   Assessment: Pt tolerated added therapeutic exercises well without additional complaints of pain. Scar massage preformed. Light putty  were initated today. Will progress as tolerated. Pt is " motivated.  Evaluation/Treatment Tolerance: Patient tolerated treatment well    Patient will continue to benefit from skilled outpatient occupational therapy to address the deficits listed in the problem list box on initial evaluation, provide pt/family education and to maximize pt's level of independence in the home and community environment.     Patient's spiritual, cultural, and educational needs considered and patient agreeable to plan of care and goals.           Plan: Continue with current plan of care    Goals:   Active       LTGs       Pt will have full ROM of right elbow without complaints of pain.  (Progressing)       Start:  03/20/25    Expected End:  06/18/25            Pt will decrease pain to a 1/10 at worst while completing ADLs/IADLs/leisure activities (Progressing)       Start:  03/20/25    Expected End:  06/18/25            Pt will increase strength in R biceps to a 5/5 in order to participate in wood working activities (Progressing)       Start:  03/20/25    Expected End:  06/18/25               STGs       Pt will increase active flexion of elbow to 130 degrees  (Progressing)       Start:  03/20/25    Expected End:  05/04/25            Pt's pain will decrease to a 5/10 at worst when completing ADLs/ IADLs/ leisure activities  (Progressing)       Start:  03/20/25    Expected End:  05/04/25            Pt will increase strength in his R biceps to a 3/5 in order to start participating in ADLs with less modification (Progressing)       Start:  03/20/25    Expected End:  05/04/25                Rema Cole OT

## 2025-04-10 ENCOUNTER — CLINICAL SUPPORT (OUTPATIENT)
Dept: REHABILITATION | Facility: HOSPITAL | Age: 70
End: 2025-04-10
Payer: MEDICARE

## 2025-04-10 DIAGNOSIS — M25.621 STIFFNESS OF ELBOW JOINT, RIGHT: ICD-10-CM

## 2025-04-10 DIAGNOSIS — M79.601 PAIN OF RIGHT ARM: Primary | ICD-10-CM

## 2025-04-10 DIAGNOSIS — R53.1 WEAKNESS: ICD-10-CM

## 2025-04-10 PROCEDURE — 97022 WHIRLPOOL THERAPY: CPT | Mod: HCNC,PN

## 2025-04-10 PROCEDURE — 97140 MANUAL THERAPY 1/> REGIONS: CPT | Mod: HCNC,PN

## 2025-04-10 PROCEDURE — 97530 THERAPEUTIC ACTIVITIES: CPT | Mod: HCNC,PN

## 2025-04-10 NOTE — PROGRESS NOTES
"  Outpatient Rehab    Occupational Therapy Visit    Patient Name: Yousif Ramires  MRN: [id number]  YOB: 1955  Encounter Date: 4/10/2025    Therapy Diagnosis:   Encounter Diagnoses   Name Primary?    Pain of right arm Yes    Stiffness of elbow joint, right     Weakness      Physician: Mike Solomon MD    Physician Orders: Eval and Treat  Medical Diagnosis: Tear of biceps muscle, right, initial encounter    Visit # / Visits Authorized: 6 / 20  Insurance Authorization Period: 3/25/2025 to 12/31/2025  Date of Evaluation: 3/20/25  Plan of Care Certification: 3/20/25 to 6/18/25     Time In: 1500   Time Out: 1600  Total Time: 60   Total Billable Time: 60       Subjective   "I'm hurting all over today due to my arthritis.".  Pain reported as 1/10.      Objective       Progress note to be taken every 10th visit or every 30 days with updated objective information     Treatment:  Therapeutic Exercise  TE 1: PROM of elbow flex/exten/pro/sup  TE 2: PROM of shoulder in flex/ext/IR/ER  TE 3: Gentle AROM of elbow in flex/ext/pro/sup  TE 4: eccentric contraction of right biceps elbow extension with 1# weight  Therapeutic Activity  TA 1: wrist maze x 5 minutes  TA 2: wrist 3 ways over wedge with dowel 1 minute each  TA 3: wrist wheel pro/sup & flex/ext 2 minutes each  TA 4: yellow flex bar pro/sup  TA 5: shoulder pulleys in flexion for 3 minutes  TA 6: yellow putty  x 3 minutes  Manual Therapy  MT 1: Scar massage to incision site to mold scar and to help with desensitization  Modalities  Fluidotherapy: x 15 minutes to increase circulation and stiffness and decrease pain and sensitization    Time Entry(in minutes):  Whirlpool Time Entry: 15  Manual Therapy Time Entry: 15  Therapeutic Activity Time Entry: 30    Assessment & Plan   Assessment: Pt tolerated added therapeutic exercises well without additional complaints of pain. Scar massage preformed. Light putty  were continued today. Will progress as " tolerated. Pt is motivated.  Evaluation/Treatment Tolerance: Patient tolerated treatment well    Patient will continue to benefit from skilled outpatient occupational therapy to address the deficits listed in the problem list box on initial evaluation, provide pt/family education and to maximize pt's level of independence in the home and community environment.     Patient's spiritual, cultural, and educational needs considered and patient agreeable to plan of care and goals.           Plan: Continue with current plan of care    Goals:   Active       LTGs       Pt will have full ROM of right elbow without complaints of pain.  (Progressing)       Start:  03/20/25    Expected End:  06/18/25            Pt will decrease pain to a 1/10 at worst while completing ADLs/IADLs/leisure activities (Progressing)       Start:  03/20/25    Expected End:  06/18/25            Pt will increase strength in R biceps to a 5/5 in order to participate in wood working activities (Progressing)       Start:  03/20/25    Expected End:  06/18/25               STGs       Pt will increase active flexion of elbow to 130 degrees  (Progressing)       Start:  03/20/25    Expected End:  05/04/25            Pt's pain will decrease to a 5/10 at worst when completing ADLs/ IADLs/ leisure activities  (Progressing)       Start:  03/20/25    Expected End:  05/04/25            Pt will increase strength in his R biceps to a 3/5 in order to start participating in ADLs with less modification (Progressing)       Start:  03/20/25    Expected End:  05/04/25                Rema Cole, OT

## 2025-04-15 ENCOUNTER — CLINICAL SUPPORT (OUTPATIENT)
Dept: REHABILITATION | Facility: HOSPITAL | Age: 70
End: 2025-04-15
Payer: MEDICARE

## 2025-04-15 DIAGNOSIS — M25.621 STIFFNESS OF ELBOW JOINT, RIGHT: ICD-10-CM

## 2025-04-15 DIAGNOSIS — R53.1 WEAKNESS: ICD-10-CM

## 2025-04-15 DIAGNOSIS — M79.601 PAIN OF RIGHT ARM: Primary | ICD-10-CM

## 2025-04-15 PROCEDURE — 97530 THERAPEUTIC ACTIVITIES: CPT | Mod: HCNC,PN

## 2025-04-15 PROCEDURE — 97022 WHIRLPOOL THERAPY: CPT | Mod: HCNC,PN

## 2025-04-15 PROCEDURE — 97140 MANUAL THERAPY 1/> REGIONS: CPT | Mod: HCNC,PN

## 2025-04-15 NOTE — PROGRESS NOTES
"  Outpatient Rehab    Occupational Therapy Visit    Patient Name: Yousif Ramires  MRN: [id number]  YOB: 1955  Encounter Date: 4/15/2025    Therapy Diagnosis:   Encounter Diagnoses   Name Primary?    Pain of right arm Yes    Stiffness of elbow joint, right     Weakness      Physician: Mike Solomon MD    Physician Orders: Eval and Treat  Medical Diagnosis: Tear of biceps muscle, right, initial encounter    Visit # / Visits Authorized: 7 / 20  Insurance Authorization Period: 3/25/2025 to 12/31/2025  Date of Evaluation: 3/20/25  Plan of Care Certification: 3/20/25 to 6/18/25     Time In: 1500   Time Out: 1600  Total Time: 60   Total Billable Time: 60           Subjective   "Its sore today. I think its because I haven't really done anything with it.".  Pain reported as 7/10.      Objective         Progress note to be taken every 10th visit or every 30 days with updated objective information   Treatment:  Therapeutic Activity  TA 1: TE 1: PROM of elbow flex/exten/pro/sup  TA 2: TE 2: PROM of shoulder in flex/ext/IR/ER  TA 3: TE 3: Gentle AROM of elbow in flex/ext/pro/sup  TA 4: TE 4: eccentric contraction of right biceps elbow extension with 1# weight  TA 5: Therapeutic Activity TA 1: wrist maze x 5 minutes  TA 6: TA 2: wrist 3 ways over wedge with dowel 1 minute each  TA 7: TA 3: wrist wheel pro/sup & flex/ext 2 minutes each  TA 8: TA 4: yellow flex bar pro/sup  TA 9: TA 5: shoulder pulleys in flexion for 3 minutes  TA 10: TA 5: shoulder pulleys in flexion for 3 minutes  Modalities  Fluidotherapy: x 15 minutes to increase circulation and stiffness and decrease pain and sensitization    Time Entry(in minutes):  Whirlpool Time Entry: 15  Manual Therapy Time Entry: 15  Therapeutic Activity Time Entry: 30    Assessment & Plan   Assessment: Pt tolerated added therapeutic exercises well without additional complaints of pain. Scar massage preformed. Light putty  were continued today. Pt started therapy " session with 7/10 at the beginning of therapy and was a 3/10 at the end of treatment session. Will progress as tolerated. Pt is motivated.  Evaluation/Treatment Tolerance: Patient tolerated treatment well    Patient will continue to benefit from skilled outpatient occupational therapy to address the deficits listed in the problem list box on initial evaluation, provide pt/family education and to maximize pt's level of independence in the home and community environment.     Patient's spiritual, cultural, and educational needs considered and patient agreeable to plan of care and goals.           Plan: Continue with current plan of care    Goals:   Active       LTGs       Pt will have full ROM of right elbow without complaints of pain.  (Progressing)       Start:  03/20/25    Expected End:  06/18/25            Pt will decrease pain to a 1/10 at worst while completing ADLs/IADLs/leisure activities (Progressing)       Start:  03/20/25    Expected End:  06/18/25            Pt will increase strength in R biceps to a 5/5 in order to participate in wood working activities (Progressing)       Start:  03/20/25    Expected End:  06/18/25               STGs       Pt will increase active flexion of elbow to 130 degrees  (Progressing)       Start:  03/20/25    Expected End:  05/04/25            Pt's pain will decrease to a 5/10 at worst when completing ADLs/ IADLs/ leisure activities  (Progressing)       Start:  03/20/25    Expected End:  05/04/25            Pt will increase strength in his R biceps to a 3/5 in order to start participating in ADLs with less modification (Progressing)       Start:  03/20/25    Expected End:  05/04/25                Rema Cole, OT

## 2025-04-17 ENCOUNTER — CLINICAL SUPPORT (OUTPATIENT)
Dept: REHABILITATION | Facility: HOSPITAL | Age: 70
End: 2025-04-17
Payer: MEDICARE

## 2025-04-17 DIAGNOSIS — M79.601 PAIN OF RIGHT ARM: Primary | ICD-10-CM

## 2025-04-17 DIAGNOSIS — R53.1 WEAKNESS: ICD-10-CM

## 2025-04-17 DIAGNOSIS — M25.621 STIFFNESS OF ELBOW JOINT, RIGHT: ICD-10-CM

## 2025-04-17 PROCEDURE — 97140 MANUAL THERAPY 1/> REGIONS: CPT | Mod: HCNC,PN

## 2025-04-17 PROCEDURE — 97530 THERAPEUTIC ACTIVITIES: CPT | Mod: HCNC,PN

## 2025-04-17 PROCEDURE — 97022 WHIRLPOOL THERAPY: CPT | Mod: HCNC,PN

## 2025-04-17 NOTE — PROGRESS NOTES
"  Outpatient Rehab    Occupational Therapy Visit    Patient Name: Yousif Ramires  MRN: [id number]  YOB: 1955  Encounter Date: 4/17/2025    Therapy Diagnosis: No diagnosis found.  Physician: Mike Solomon MD    Physician Orders: Eval and Treat  Medical Diagnosis: Tear of biceps muscle, right, initial encounter    Visit # / Visits Authorized: 8 / 20  Insurance Authorization Period: 3/25/2025 to 12/31/2025  Date of Evaluation: 3/20/25  Plan of Care Certification: 3/20/25 to 6/18/25     Time In: 1500   Time Out: 1600  Total Time: 60   Total Billable Time: 60         Subjective   "It doesn't hurt as bad today.".  Pain reported as 2/10.      Objective       Progress note to be taken every 10th visit or every 30 days with updated objective information     Treatment:  Therapeutic Activity  TA 1: TE 1: PROM of elbow flex/exten/pro/sup  TA 2: TE 2: PROM of shoulder in flex/ext/IR/ER  TA 3: TE 3: Gentle AROM of elbow in flex/ext/pro/sup  TA 4: TE 4: eccentric contraction of right biceps elbow extension with 1# weight  TA 5: Therapeutic Activity TA 1: wrist maze x 5 minutes  TA 6: TA 2: wrist 3 ways over wedge with dowel 1 minute each  TA 7: TA 3: wrist wheel pro/sup & flex/ext 2 minutes each  TA 8: TA 4: yellow flex bar pro/sup  TA 9: TA 5: shoulder pulleys in flexion for 3 minutes  TA 10: TA 5: shoulder pulleys in flexion for 3 minutes  Modalities  Fluidotherapy: x 15 minutes to increase circulation and stiffness and decrease pain and sensitization    Time Entry(in minutes):  Whirlpool Time Entry: 15  Manual Therapy Time Entry: 15  Therapeutic Activity Time Entry: 30    Assessment & Plan   Assessment: Pt tolerated added therapeutic exercises well without additional complaints of pain. Scar massage preformed.   Will progress as tolerated. Pt is motivated.  Evaluation/Treatment Tolerance: Patient tolerated treatment well    Patient will continue to benefit from skilled outpatient occupational therapy to address " the deficits listed in the problem list box on initial evaluation, provide pt/family education and to maximize pt's level of independence in the home and community environment.     Patient's spiritual, cultural, and educational needs considered and patient agreeable to plan of care and goals.           Plan: Continue with current plan of care    Goals:   Active       LTGs       Pt will have full ROM of right elbow without complaints of pain.  (Progressing)       Start:  03/20/25    Expected End:  06/18/25            Pt will decrease pain to a 1/10 at worst while completing ADLs/IADLs/leisure activities (Progressing)       Start:  03/20/25    Expected End:  06/18/25            Pt will increase strength in R biceps to a 5/5 in order to participate in wood working activities (Progressing)       Start:  03/20/25    Expected End:  06/18/25               STGs       Pt will increase active flexion of elbow to 130 degrees  (Progressing)       Start:  03/20/25    Expected End:  05/04/25            Pt's pain will decrease to a 5/10 at worst when completing ADLs/ IADLs/ leisure activities  (Progressing)       Start:  03/20/25    Expected End:  05/04/25            Pt will increase strength in his R biceps to a 3/5 in order to start participating in ADLs with less modification (Progressing)       Start:  03/20/25    Expected End:  05/04/25                Rema Cole OT

## 2025-04-22 ENCOUNTER — CLINICAL SUPPORT (OUTPATIENT)
Dept: REHABILITATION | Facility: HOSPITAL | Age: 70
End: 2025-04-22
Payer: MEDICARE

## 2025-04-22 DIAGNOSIS — R53.1 WEAKNESS: ICD-10-CM

## 2025-04-22 DIAGNOSIS — M25.621 STIFFNESS OF ELBOW JOINT, RIGHT: ICD-10-CM

## 2025-04-22 DIAGNOSIS — M79.601 PAIN OF RIGHT ARM: Primary | ICD-10-CM

## 2025-04-22 PROCEDURE — 97530 THERAPEUTIC ACTIVITIES: CPT | Mod: HCNC,PN

## 2025-04-22 PROCEDURE — 97022 WHIRLPOOL THERAPY: CPT | Mod: HCNC,PN

## 2025-04-22 PROCEDURE — 97140 MANUAL THERAPY 1/> REGIONS: CPT | Mod: HCNC,PN

## 2025-04-22 NOTE — PROGRESS NOTES
"  Outpatient Rehab    Occupational Therapy Progress Note    Patient Name: Yousif Ramires  MRN: [id number]  YOB: 1955  Encounter Date: 4/22/2025    Therapy Diagnosis:   Encounter Diagnoses   Name Primary?    Pain of right arm Yes    Stiffness of elbow joint, right     Weakness      Physician: Mike Solomon MD    Physician Orders: Eval and Treat  Medical Diagnosis: Tear of biceps muscle, right, initial encounter    Visit # / Visits Authorized: 9 / 20  Insurance Authorization Period: 3/25/2025 to 12/31/2025  Date of Evaluation: 3/20/25  Plan of Care Certification: 3/20/25 to 6/18/25     Time In: 1500   Time Out: 1555  Total Time: 55   Total Billable Time: 55         Subjective   "Its not feeling too bad today. The more I move it, the less it hurts".  Pain reported as 2/10.      Objective    Progress note to be taken every 10th visit or every 30 days with updated objective information        Treatment:  Therapeutic Activity  TA 2: Elbow 3 ways with 2# weight 1 minute each  TA 3: AROM of elbow in flex/ext/pro/sup  TA 5: wrist maze x 5 minutes  TA 6: wrist 3 ways over wedge with 2# weight 1 minute each  TA 7: wrist wheel pro/sup & flex/ext 2 minutes each  TA 8: yellow flex bar smiles/twists/frowns  TA 10: yellow flex bar shakes  Manual Therapy  MT 1: scar massage  MT 2: IASTM preformed on forearm to decrease stiffness    Time Entry(in minutes):  Whirlpool Time Entry: 15  Manual Therapy Time Entry: 10  Therapeutic Activity Time Entry: 30    Assessment & Plan   Assessment: Pt tolerated added therapeutic exercises well without additional complaints of pain. Scar massage preformed. Pt has met all of his STGs. Focus of treatments to be on gradual strengthening.  Will progress as tolerated. Pt is motivated.  Evaluation/Treatment Tolerance: Patient tolerated treatment well    Patient will continue to benefit from skilled outpatient occupational therapy to address the deficits listed in the problem list box on " initial evaluation, provide pt/family education and to maximize pt's level of independence in the home and community environment.     Patient's spiritual, cultural, and educational needs considered and patient agreeable to plan of care and goals.           Plan: Continue with current plan of care    Goals:   Active       LTGs       Pt will have full ROM of right elbow without complaints of pain.  (Progressing)       Start:  03/20/25    Expected End:  06/18/25            Pt will decrease pain to a 1/10 at worst while completing ADLs/IADLs/leisure activities (Progressing)       Start:  03/20/25    Expected End:  06/18/25            Pt will increase strength in R biceps to a 5/5 in order to participate in wood working activities (Progressing)       Start:  03/20/25    Expected End:  06/18/25              Resolved       STGs       Pt will increase active flexion of elbow to 130 degrees  (Met)       Start:  03/20/25    Expected End:  05/04/25    Resolved:  04/22/25         Pt's pain will decrease to a 5/10 at worst when completing ADLs/ IADLs/ leisure activities  (Met)       Start:  03/20/25    Expected End:  05/04/25    Resolved:  04/22/25         Pt will increase strength in his R biceps to a 3/5 in order to start participating in ADLs with less modification (Met)       Start:  03/20/25    Expected End:  05/04/25    Resolved:  04/22/25             Rema Cole, OT

## 2025-04-24 ENCOUNTER — CLINICAL SUPPORT (OUTPATIENT)
Dept: REHABILITATION | Facility: HOSPITAL | Age: 70
End: 2025-04-24
Payer: MEDICARE

## 2025-04-24 DIAGNOSIS — M25.621 STIFFNESS OF ELBOW JOINT, RIGHT: ICD-10-CM

## 2025-04-24 DIAGNOSIS — R53.1 WEAKNESS: ICD-10-CM

## 2025-04-24 DIAGNOSIS — M79.601 PAIN OF RIGHT ARM: Primary | ICD-10-CM

## 2025-04-24 PROCEDURE — 97022 WHIRLPOOL THERAPY: CPT | Mod: HCNC,PN

## 2025-04-24 PROCEDURE — 97140 MANUAL THERAPY 1/> REGIONS: CPT | Mod: HCNC,PN

## 2025-04-24 PROCEDURE — 97530 THERAPEUTIC ACTIVITIES: CPT | Mod: HCNC,PN

## 2025-04-24 NOTE — PROGRESS NOTES
"  Outpatient Rehab    Occupational Therapy Visit    Patient Name: Yousif Ramires  MRN: [id number]  YOB: 1955  Encounter Date: 4/24/2025    Therapy Diagnosis:   Encounter Diagnoses   Name Primary?    Pain of right arm Yes    Stiffness of elbow joint, right     Weakness      Physician: Mike Solomon MD    Physician Orders: Eval and Treat  Medical Diagnosis: Tear of biceps muscle, right, initial encounter    Visit # / Visits Authorized: 10 / 20  Insurance Authorization Period: 3/25/2025 to 12/31/2025  Date of Evaluation: 3/20/25  Plan of Care Certification: 3/20/25 to 6/18/25     Time In: 1500   Time Out: 1600  Total Time: 60   Total Billable Time: 60         Subjective   "Its sore today, because I haven't done anything.".  Pain reported as 3/10.      Objective         Progress note to be taken every 10th visit or every 30 days with updated objective information     Treatment:  Therapeutic Activity  TA 2: Elbow 3 ways with 3# weight 1 minute each  TA 3: AROM of elbow in flex/ext/pro/sup  TA 5: wrist maze x 5 minutes  TA 6: wrist 3 ways over wedge with 3# weight 1 minute each  TA 7: wrist wheel pro/sup with yellow theraband  TA 8: red flex bar smiles/twists/frowns  TA 10: red flex bar shakes  Manual Therapy  MT 1: scar massage  MT 2: IASTM preformed on forearm to decrease stiffness  Modalities  Fluidotherapy: x 15 minutes to increase circulation and stiffness and decrease pain and sensitization    Time Entry(in minutes):  Whirlpool Time Entry: 15  Manual Therapy Time Entry: 15  Therapeutic Activity Time Entry: 30    Assessment & Plan   Assessment: Pt tolerated added therapeutic exercises well without additional complaints of pain. Scar massage preformed.  Focus of treatments to be on gradual strengthening.  Will progress as tolerated. Pt is motivated.  Evaluation/Treatment Tolerance: Patient tolerated treatment well    Patient will continue to benefit from skilled outpatient occupational therapy to " address the deficits listed in the problem list box on initial evaluation, provide pt/family education and to maximize pt's level of independence in the home and community environment.     Patient's spiritual, cultural, and educational needs considered and patient agreeable to plan of care and goals.           Plan: Continue with current plan of care    Goals:   Active       LTGs       Pt will have full ROM of right elbow without complaints of pain.  (Progressing)       Start:  03/20/25    Expected End:  06/18/25            Pt will decrease pain to a 1/10 at worst while completing ADLs/IADLs/leisure activities (Progressing)       Start:  03/20/25    Expected End:  06/18/25            Pt will increase strength in R biceps to a 5/5 in order to participate in wood working activities (Progressing)       Start:  03/20/25    Expected End:  06/18/25              Resolved       STGs       Pt will increase active flexion of elbow to 130 degrees  (Met)       Start:  03/20/25    Expected End:  05/04/25    Resolved:  04/22/25         Pt's pain will decrease to a 5/10 at worst when completing ADLs/ IADLs/ leisure activities  (Met)       Start:  03/20/25    Expected End:  05/04/25    Resolved:  04/22/25         Pt will increase strength in his R biceps to a 3/5 in order to start participating in ADLs with less modification (Met)       Start:  03/20/25    Expected End:  05/04/25    Resolved:  04/22/25             Rema Cole OT

## 2025-04-29 ENCOUNTER — CLINICAL SUPPORT (OUTPATIENT)
Dept: REHABILITATION | Facility: HOSPITAL | Age: 70
End: 2025-04-29
Payer: MEDICARE

## 2025-04-29 DIAGNOSIS — M25.621 STIFFNESS OF ELBOW JOINT, RIGHT: ICD-10-CM

## 2025-04-29 DIAGNOSIS — M79.601 PAIN OF RIGHT ARM: Primary | ICD-10-CM

## 2025-04-29 DIAGNOSIS — R53.1 WEAKNESS: ICD-10-CM

## 2025-04-29 PROCEDURE — 97022 WHIRLPOOL THERAPY: CPT | Mod: HCNC,PN

## 2025-04-29 PROCEDURE — 97140 MANUAL THERAPY 1/> REGIONS: CPT | Mod: HCNC,PN

## 2025-04-29 PROCEDURE — 97530 THERAPEUTIC ACTIVITIES: CPT | Mod: HCNC,PN

## 2025-04-29 NOTE — PROGRESS NOTES
"  Outpatient Rehab    Occupational Therapy Visit    Patient Name: Yousif Ramires  MRN: [id number]  YOB: 1955  Encounter Date: 4/29/2025    Therapy Diagnosis:   Encounter Diagnoses   Name Primary?    Pain of right arm Yes    Stiffness of elbow joint, right     Weakness      Physician: Mike Solomon MD    Physician Orders: Eval and Treat  Medical Diagnosis: Tear of biceps muscle, right, initial encounter    Visit # / Visits Authorized: 11 / 20  Insurance Authorization Period: 3/25/2025 to 12/31/2025  Date of Evaluation: 3/20/25  Plan of Care Certification: 3/20/25 to 6/18/25     Time In: 1500   Time Out: 1600  Total Time: 60   Total Billable Time: 60       Subjective   "Its a little sore, but its okay.".  Pain reported as 2/10.      Objective       Progress note to be taken every 10th visit or every 30 days with updated objective information     Treatment:  Therapeutic Activity  TA 2: Elbow 3 ways with 3# weight 1 minute each  TA 3: AROM of elbow in flex/ext/pro/sup  TA 5: wrist maze x 5 minutes  TA 6: wrist 3 ways over wedge with 3# weight 1 minute each  TA 7: wrist wheel pro/sup with yellow theraband  TA 8: red flex bar smiles/twists/frowns  TA 9: yellow putty squeezes  TA 10: red flex bar shakes  Manual Therapy  MT 1: scar massage  MT 2: IASTM preformed on forearm to decrease stiffness    Time Entry(in minutes):  Whirlpool Time Entry: 15  Manual Therapy Time Entry: 10  Therapeutic Activity Time Entry: 35    Assessment & Plan   Assessment: Pt tolerated added therapeutic exercises well without additional complaints of pain. Scar massage preformed.  Focus of treatments to be on gradual strengthening.  Will progress as tolerated. Pt is motivated.  Evaluation/Treatment Tolerance: Patient tolerated treatment well    Patient will continue to benefit from skilled outpatient occupational therapy to address the deficits listed in the problem list box on initial evaluation, provide pt/family education and to " maximize pt's level of independence in the home and community environment.     Patient's spiritual, cultural, and educational needs considered and patient agreeable to plan of care and goals.           Plan: Continue with current plan of care    Goals:   Active       LTGs       Pt will have full ROM of right elbow without complaints of pain.  (Progressing)       Start:  03/20/25    Expected End:  06/18/25            Pt will decrease pain to a 1/10 at worst while completing ADLs/IADLs/leisure activities (Progressing)       Start:  03/20/25    Expected End:  06/18/25            Pt will increase strength in R biceps to a 5/5 in order to participate in wood working activities (Progressing)       Start:  03/20/25    Expected End:  06/18/25              Resolved       STGs       Pt will increase active flexion of elbow to 130 degrees  (Met)       Start:  03/20/25    Expected End:  05/04/25    Resolved:  04/22/25         Pt's pain will decrease to a 5/10 at worst when completing ADLs/ IADLs/ leisure activities  (Met)       Start:  03/20/25    Expected End:  05/04/25    Resolved:  04/22/25         Pt will increase strength in his R biceps to a 3/5 in order to start participating in ADLs with less modification (Met)       Start:  03/20/25    Expected End:  05/04/25    Resolved:  04/22/25             Rema Cole OT

## 2025-04-30 DIAGNOSIS — S46.211A RUPTURE OF RIGHT DISTAL BICEPS TENDON, INITIAL ENCOUNTER: Primary | ICD-10-CM

## 2025-05-01 ENCOUNTER — HOSPITAL ENCOUNTER (OUTPATIENT)
Dept: RADIOLOGY | Facility: CLINIC | Age: 70
Discharge: HOME | End: 2025-05-01
Attending: ORTHOPAEDIC SURGERY
Payer: MEDICARE

## 2025-05-01 DIAGNOSIS — S46.211A RUPTURE OF RIGHT DISTAL BICEPS TENDON, INITIAL ENCOUNTER: ICD-10-CM

## 2025-05-01 PROCEDURE — 73080 X-RAY EXAM OF ELBOW: CPT | Mod: TC,HCNC,FY,PO,RT

## 2025-05-01 PROCEDURE — 73080 X-RAY EXAM OF ELBOW: CPT | Mod: 26,HCNC,RT, | Performed by: RADIOLOGY

## 2025-05-02 ENCOUNTER — OFFICE VISIT (OUTPATIENT)
Dept: ORTHOPEDICS | Facility: CLINIC | Age: 70
End: 2025-05-02
Payer: MEDICARE

## 2025-05-02 ENCOUNTER — CLINICAL SUPPORT (OUTPATIENT)
Dept: REHABILITATION | Facility: HOSPITAL | Age: 70
End: 2025-05-02
Payer: MEDICARE

## 2025-05-02 DIAGNOSIS — S46.211A RUPTURE OF RIGHT DISTAL BICEPS TENDON, INITIAL ENCOUNTER: Primary | ICD-10-CM

## 2025-05-02 DIAGNOSIS — R53.1 WEAKNESS: ICD-10-CM

## 2025-05-02 DIAGNOSIS — M25.621 STIFFNESS OF ELBOW JOINT, RIGHT: ICD-10-CM

## 2025-05-02 DIAGNOSIS — M79.601 PAIN OF RIGHT ARM: Primary | ICD-10-CM

## 2025-05-02 PROCEDURE — 97530 THERAPEUTIC ACTIVITIES: CPT | Mod: HCNC,PN

## 2025-05-02 PROCEDURE — 97140 MANUAL THERAPY 1/> REGIONS: CPT | Mod: HCNC,PN

## 2025-05-02 PROCEDURE — 97022 WHIRLPOOL THERAPY: CPT | Mod: HCNC,PN

## 2025-05-02 NOTE — PROGRESS NOTES
Post-op Note    HPI    Mane Ramires is here 8 weeks s/p the following procedure:     Right distal biceps repair    Overall doing well.  Denies any pain.  Finishing up physical therapy this week.  Overall very good range of motion.  Continue to work on strength.  Denies any paresthesias.  Shoulder pain much better.      Physical Exam:     Patient is alert and oriented no acute distress.   Assistive Device:  None    Right elbow Incision(s) are well healed.  There is no evidence of dehiscence.  There is no induration erythema or signs of infection.  No significant soft tissue swelling.  Compartments are soft and compressible.  Warm well-perfused extremity.  0° of extension to 140 flexion.  Full pronation and supination without mechanical block or pain.  4+ out of 5 strength with supination compared to contralateral side    Imaging:     I have personally reviewed the following imaging and these are an interpretation of my findings:     Status post biceps repair with button in the appropriate position.  No acute complication    Assessment    Mane Ramires is 8 weeks Post-op     Plan:    Overall doing as expected.  We discussed expectations of surgery and postoperative course.     Pain: Continued postoperative pain regimen -- Tylenol/ibuprofen  Resist any resisted supination.  Full range of motion as tolerated passively.  Okay for strengthening.  5-10 lb weight restriction      Follow-up: 6 weeks   X-rays next visit:  Right elbow

## 2025-05-02 NOTE — PROGRESS NOTES
"  Outpatient Rehab    Occupational Therapy Discharge    Patient Name: Yousif Ramires  MRN: [id number]  YOB: 1955  Encounter Date: 5/2/2025    Therapy Diagnosis:   Encounter Diagnoses   Name Primary?    Pain of right arm Yes    Stiffness of elbow joint, right     Weakness      Physician: Mike Solomon MD    Physician Orders: Eval and Treat  Medical Diagnosis: Tear of biceps muscle, right, initial encounter    Visit # / Visits Authorized: 12 / 20  Insurance Authorization Period: 3/25/2025 to 12/31/2025  Date of Evaluation: 3/20/25  Plan of Care Certification: 3/20/35 to 6/18/25     Time In: 1230   Time Out: 1330  Total Time: 60   Total Billable Time: 60           Subjective   "I went to the doctor today and he discharged me from therapy.".  Pain reported as 0/10.      Objective       Progress note to be taken every 10th visit or every 30 days with updated objective information     Treatment:  Therapeutic Activity  TA 2: Elbow 3 ways with 3# weight 1 minute each  TA 3: AROM of elbow in flex/ext/pro/sup  TA 5: wrist maze x 5 minutes  TA 6: wrist 3 ways over wedge with 3# weight 1 minute each  TA 7: wrist wheel pro/sup  TA 8: red flex bar smiles/twists/frowns  TA 9: yellow putty squeezes  TA 10: red flex bar shakes  Manual Therapy  MT 1: scar massage  MT 2: IASTM preformed on forearm to decrease stiffness  Modalities  Fluidotherapy: x 15 minutes to increase circulation and stiffness and decrease pain and sensitization    Time Entry(in minutes):  Whirlpool Time Entry: 15  Manual Therapy Time Entry: 10  Therapeutic Activity Time Entry: 35    Assessment & Plan   Assessment: Pt has met all of his LTGs and STGs at this time. He is no longer having pain when completing ADLs and has full range of motion of his hand. Due to patient's progression, he is no longer a candidate for occupational therapy services. Pt is being discharged from occupational therapy services at this time. Pt was given an updated HEP and " he demonstrated good understanding. Pt was informed if that he has any changes contact his MD.  Evaluation/Treatment Tolerance: Patient tolerated treatment well    Patient's spiritual, cultural, and educational needs considered and patient agreeable to plan of care and goals.           Plan: Pt discharged    Goals:   Resolved       LTGs       Pt will have full ROM of right elbow without complaints of pain.  (Met)       Start:  03/20/25    Expected End:  06/18/25    Resolved:  05/02/25         Pt will decrease pain to a 1/10 at worst while completing ADLs/IADLs/leisure activities (Met)       Start:  03/20/25    Expected End:  06/18/25    Resolved:  05/02/25         Pt will increase strength in R biceps to a 5/5 in order to participate in wood working activities (Met)       Start:  03/20/25    Expected End:  06/18/25    Resolved:  05/02/25            STGs       Pt will increase active flexion of elbow to 130 degrees  (Met)       Start:  03/20/25    Expected End:  05/04/25    Resolved:  04/22/25         Pt's pain will decrease to a 5/10 at worst when completing ADLs/ IADLs/ leisure activities  (Met)       Start:  03/20/25    Expected End:  05/04/25    Resolved:  04/22/25         Pt will increase strength in his R biceps to a 3/5 in order to start participating in ADLs with less modification (Met)       Start:  03/20/25    Expected End:  05/04/25    Resolved:  04/22/25             Rema Cole, OT

## 2025-05-28 DIAGNOSIS — G95.89 MYELOMALACIA: ICD-10-CM

## 2025-05-28 DIAGNOSIS — F32.A DEPRESSION, UNSPECIFIED DEPRESSION TYPE: ICD-10-CM

## 2025-05-28 RX ORDER — DULOXETIN HYDROCHLORIDE 30 MG/1
CAPSULE, DELAYED RELEASE ORAL
Qty: 270 CAPSULE | Refills: 4 | Status: SHIPPED | OUTPATIENT
Start: 2025-05-28

## 2025-05-28 NOTE — TELEPHONE ENCOUNTER
Refill Routing Note   Medication(s) are not appropriate for processing by [facility] for the following reason(s):        Non-participating provider  Responsible provider unclear    [facility] action(s):  Route             Appointments  past 12m or future 3m with PCP    Date Provider   Last Visit   4/5/2024 Casa Garza PA-C   Next Visit   Visit date not found Casa Garza PA-C   ED visits in past 90 days: 0        Note composed:7:42 AM 05/28/2025

## 2025-05-29 RX ORDER — METOPROLOL SUCCINATE 25 MG/1
TABLET, EXTENDED RELEASE ORAL
Qty: 90 TABLET | Refills: 1 | Status: SHIPPED | OUTPATIENT
Start: 2025-05-29

## 2025-05-29 NOTE — TELEPHONE ENCOUNTER
Refill Routing Note   Medication(s) are not appropriate for processing by [facility] for the following reason(s):        New or recently adjusted medication    [facility] action(s):  Defer               Appointments  past 12m or future 3m with PCP    Date Provider   Last Visit   2/27/2025 Patience Gordon MD   Next Visit   9/4/2025 Patience Gordon MD   ED visits in past 90 days: 0        Note composed:6:45 AM 05/29/2025

## 2025-05-29 NOTE — TELEPHONE ENCOUNTER
No care due was identified.  [facility] Embedded Care Due Messages. Reference number: [id number].   5/29/2025 12:27:33 AM CDT

## 2025-06-14 NOTE — TELEPHONE ENCOUNTER
No care due was identified.  [facility] Embedded Care Due Messages. Reference number: [id number].   6/14/2025 7:21:00 AM CDT

## 2025-06-15 RX ORDER — ROSUVASTATIN CALCIUM 10 MG/1
10 TABLET, COATED ORAL
Qty: 90 TABLET | Refills: 2 | Status: SHIPPED | OUTPATIENT
Start: 2025-06-15

## 2025-06-15 NOTE — TELEPHONE ENCOUNTER
Refill Decision Note   Mane Ramires  is requesting a refill authorization.  Brief Assessment and Rationale for Refill:  Approve     Medication Therapy Plan:         Comments:     Note composed:8:17 AM 06/15/2025

## 2025-06-16 NOTE — PROGRESS NOTES
Subjective:      Patient ID: Mane Ramires is a 69 y.o. adult.    Chief Complaint:  Diabetes    History of Present Illness  Mane Ramires is here for follow up of DM.  Previously seen by me 2/2023.    With regards to Diabetes:    Diagnosed: 2020 + FH of DM - sibling, mother   Education - last visit: 4/20/2021  Known complications:  DKA -   RN -  Eye Exam: 2/2025  PN -  Podiatry: 2016  Nephropathy -  CAD -  Denies history of pancreatitis & personal/family history of medullary thyroid cancer.     Diet/Exercise:   Eats 2 meals a day.   Snacks : occasionally   Drinks : water, tea.   Exercise: walks daily and active   Illness, injury, steroids: shingles x2 in May      Current regimen:  None     Other medications tried:  Metformin     Glucose Monitor:   0-1 times a day testing  Log reviewed: log reviewed     Hypoglycemia:  None.   Knows how to correct with 15 grams of carbs- juice, coke, or a peppermint.       Diabetes Management Status    Hemoglobin A1C   Date Value Ref Range Status   02/28/2025 7.1 (H) 4.5 - 6.2 % Final     Comment:     ADA Screening Guidelines:  5.7-6.4%  Consistent with prediabetes  >or=6.5%  Consistent with diabetes    High levels of fetal hemoglobin interfere with the HbA1C  assay. Heterozygous hemoglobin variants (HbS, HgC, etc)do  not significantly interfere with this assay.   However, presence of multiple variants may affect accuracy.     08/23/2024 7.0 (H) 4.0 - 5.6 % Final     Comment:     ADA Screening Guidelines:  5.7-6.4%  Consistent with prediabetes  >or=6.5%  Consistent with diabetes    High levels of fetal hemoglobin interfere with the HbA1C  assay. Heterozygous hemoglobin variants (HbS, HgC, etc)do  not significantly interfere with this assay.   However, presence of multiple variants may affect accuracy.     04/05/2024 7.4 (H) 4.0 - 5.6 % Final     Comment:     ADA Screening Guidelines:  5.7-6.4%  Consistent with prediabetes  >or=6.5%  Consistent with diabetes    High levels  of fetal hemoglobin interfere with the HbA1C  assay. Heterozygous hemoglobin variants (HbS, HgC, etc)do  not significantly interfere with this assay.   However, presence of multiple variants may affect accuracy.         Statin: Taking  ACE/ARB: Taking  Screening or Prevention Patient's value Goal Complete/Controlled?   HgA1C Testing and Control   Lab Results   Component Value Date    HGBA1C 7.1 (H) 02/28/2025      Annually/Less than 8% No   Lipid profile : 02/28/2025 Annually Yes   LDL control Lab Results   Component Value Date    LDLCALC 79.0 02/28/2025    Annually/Less than 100 mg/dl  No   Nephropathy screening Lab Results   Component Value Date    LABMICR 11.0 08/23/2024     Lab Results   Component Value Date    PROTEINUA Negative 11/30/2017    Annually Yes   Blood pressure BP Readings from Last 1 Encounters:   06/18/25 120/79    Less than 140/90 Yes   Dilated retinal exam : 01/13/2025 Annually No   Foot exam   : 08/23/2024 Annually No     With regards to Vitamin D Deficiency:    Vit D, 25-Hydroxy   Date Value Ref Range Status   08/23/2024 36 30 - 96 ng/mL Final     Comment:     Vitamin D deficiency.........<10 ng/mL                              Vitamin D insufficiency......10-29 ng/mL       Vitamin D sufficiency........> or equal to 30 ng/mL  Vitamin D toxicity............>100 ng/mL       Current Meds: None.       Follows with Dr. Ellsworth for gender dysphoria:    Started cross hormone therapy in 1992  Legally changed in 2007 2/2015  s/p b/l total complete mastectomy with Dr Yi, and oophorectomy/removal of fallopian tubes with lysis of adhesions by Dr Carbajal      Current Medication:  Restarted 4/2021  Testosterone 200mg every 2 weeks   Off for 3 months       Reports increase in fatigue, decreased energy level when off.     Relationships  - women   Has 3 biological children    Lives in [place].     Review of Systems  As above.    Objective:   Physical Exam  Vitals reviewed.   Neck:      Thyroid: No  "thyromegaly.   Cardiovascular:      Rate and Rhythm: Normal rate.      Comments: No edema present  Pulmonary:      Effort: Pulmonary effort is normal.   Abdominal:      Palpations: Abdomen is soft.         Visit Vitals  /79 (BP Location: Left arm, Patient Position: Sitting)   Pulse 76   Ht 5' 4" (1.626 m)   Wt 69.7 kg (153 lb 10.6 oz)   BMI 26.38 kg/m²       Body mass index is 26.38 kg/m².    Lab Review:   Lab Results   Component Value Date    HGBA1C 7.1 (H) 02/28/2025    HGBA1C 7.0 (H) 08/23/2024    HGBA1C 7.4 (H) 04/05/2024       Lab Results   Component Value Date    CHOL 147 02/28/2025    HDL 52 02/28/2025    LDLCALC 79.0 02/28/2025    TRIG 80 02/28/2025    CHOLHDL 35.4 02/28/2025     Lab Results   Component Value Date     02/28/2025     02/28/2025    K 4.7 02/28/2025    K 4.9 02/28/2025     02/28/2025     02/28/2025    CO2 25 02/28/2025    CO2 25 02/28/2025     (H) 02/28/2025     (H) 02/28/2025    BUN 18 02/28/2025    BUN 18 02/28/2025    CREATININE 1.4 02/28/2025    CREATININE 1.4 02/28/2025    CALCIUM 9.9 02/28/2025    CALCIUM 9.8 02/28/2025    PROT 8.3 02/28/2025    PROT 8.1 02/28/2025    ALBUMIN 4.4 02/28/2025    ALBUMIN 4.4 02/28/2025    BILITOT 0.7 02/28/2025    BILITOT 0.7 02/28/2025    ALKPHOS 80 02/28/2025    ALKPHOS 79 02/28/2025    AST 21 02/28/2025    AST 21 02/28/2025    ALT 28 02/28/2025    ALT 27 02/28/2025    ANIONGAP 11 02/28/2025    ANIONGAP 10 02/28/2025    ESTGFRAFRICA >60.0 01/28/2022    EGFRNONAA 57.7 (A) 01/28/2022    TSH 0.920 02/28/2025     Vit D, 25-Hydroxy   Date Value Ref Range Status   08/23/2024 36 30 - 96 ng/mL Final     Comment:     Vitamin D deficiency.........<10 ng/mL                              Vitamin D insufficiency......10-29 ng/mL       Vitamin D sufficiency........> or equal to 30 ng/mL  Vitamin D toxicity............>100 ng/mL       Assessment and Plan     1. Controlled type 2 diabetes mellitus with diabetic nephropathy, " without long-term current use of insulin  Hemoglobin A1C    Comprehensive Metabolic Panel    Microalbumin/Creatinine Ratio, Urine    Vitamin D    CBC Auto Differential    Hemoglobin A1C    Comprehensive Metabolic Panel    Microalbumin/Creatinine Ratio, Urine    Vitamin D    CBC Auto Differential      2. Other long term (current) drug therapy  Vitamin D    Vitamin D      3. Female-to-male transgender person  testosterone cypionate (DEPOTESTOTERONE CYPIONATE) 200 mg/mL injection          Controlled type 2 diabetes mellitus, without long-term current use of insulin  -- Labs in 3 months - [facility].   -- A1c goal <7%.  -- Medications discussed:  MFM   -- Reviewed logs/CGM:  Increase in A1c - per patient, dietary/lifestyle. Would like to continue to work on modifications before starting medication.   Review logs in 2-4 weeks.   Reach out to me sooner for any glucose <70 or consistently >150.  -- Medication Changes:   Diet controlled  -- Reviewed goals of therapy are to get the best control we can without hypoglycemia.  -- Reviewed patient's current insulin regimen. Clarified proper insulin dose and timing in relation to meals, etc. Insulin injection sites and proper rotation instructed.    -- Advised frequent self blood glucose monitoring.  Patient encouraged to document glucose results and bring them to every clinic visit.  -- Hypoglycemia precautions discussed. Instructed on precautions before driving.    -- Call for Bg repeatedly < 90 or > 180.   -- Close adherence to lifestyle changes recommended.   -- Periodic follow ups for eye evaluations, foot care and dental care suggested.    Female-to-male transgender person  -- We discussed extensively about benefits and risks of testosterone replacement including benefits as improvement in QOL, etc, and risks as erythrocytosis, abnormal liver function.  -- We also discussed about recent FDA black-box warning of thromboembolic events while on T replacement, which is independent  of erythrocytosis.   -- Does report a better QOL when on this. Follow labs (CBC, CMP) and symptoms.   -- RESTART:  Testosterone 200mg every 14 days      Follow up in about 6 months (around 12/18/2025).      Visit today included increased complexity associated with the care of the problems addressed and managing the longitudinal care of the patient due to the serious and/or complex managed problems.

## 2025-06-18 ENCOUNTER — OFFICE VISIT (OUTPATIENT)
Dept: ENDOCRINOLOGY | Facility: CLINIC | Age: 70
End: 2025-06-18
Payer: MEDICARE

## 2025-06-18 VITALS
BODY MASS INDEX: 26.24 KG/M2 | DIASTOLIC BLOOD PRESSURE: 79 MMHG | SYSTOLIC BLOOD PRESSURE: 120 MMHG | HEIGHT: 64 IN | WEIGHT: 153.69 LBS | HEART RATE: 76 BPM

## 2025-06-18 DIAGNOSIS — E11.21 CONTROLLED TYPE 2 DIABETES MELLITUS WITH DIABETIC NEPHROPATHY, WITHOUT LONG-TERM CURRENT USE OF INSULIN: Primary | ICD-10-CM

## 2025-06-18 DIAGNOSIS — Z78.9 FEMALE-TO-MALE TRANSGENDER PERSON: ICD-10-CM

## 2025-06-18 DIAGNOSIS — Z79.899 OTHER LONG TERM (CURRENT) DRUG THERAPY: ICD-10-CM

## 2025-06-18 PROCEDURE — 1159F MED LIST DOCD IN RCRD: CPT | Mod: CPTII,HCNC,S$GLB, | Performed by: NURSE PRACTITIONER

## 2025-06-18 PROCEDURE — 3074F SYST BP LT 130 MM HG: CPT | Mod: CPTII,HCNC,S$GLB, | Performed by: NURSE PRACTITIONER

## 2025-06-18 PROCEDURE — 4010F ACE/ARB THERAPY RXD/TAKEN: CPT | Mod: CPTII,HCNC,S$GLB, | Performed by: NURSE PRACTITIONER

## 2025-06-18 PROCEDURE — 1160F RVW MEDS BY RX/DR IN RCRD: CPT | Mod: CPTII,HCNC,S$GLB, | Performed by: NURSE PRACTITIONER

## 2025-06-18 PROCEDURE — 3008F BODY MASS INDEX DOCD: CPT | Mod: CPTII,HCNC,S$GLB, | Performed by: NURSE PRACTITIONER

## 2025-06-18 PROCEDURE — 99214 OFFICE O/P EST MOD 30 MIN: CPT | Mod: HCNC,S$GLB,, | Performed by: NURSE PRACTITIONER

## 2025-06-18 PROCEDURE — 3288F FALL RISK ASSESSMENT DOCD: CPT | Mod: CPTII,HCNC,S$GLB, | Performed by: NURSE PRACTITIONER

## 2025-06-18 PROCEDURE — 99999 PR PBB SHADOW E&M-EST. PATIENT-LVL V: CPT | Mod: PBBFAC,HCNC,, | Performed by: NURSE PRACTITIONER

## 2025-06-18 PROCEDURE — G2211 COMPLEX E/M VISIT ADD ON: HCPCS | Mod: HCNC,S$GLB,, | Performed by: NURSE PRACTITIONER

## 2025-06-18 PROCEDURE — 3078F DIAST BP <80 MM HG: CPT | Mod: CPTII,HCNC,S$GLB, | Performed by: NURSE PRACTITIONER

## 2025-06-18 PROCEDURE — 3051F HG A1C>EQUAL 7.0%<8.0%: CPT | Mod: CPTII,HCNC,S$GLB, | Performed by: NURSE PRACTITIONER

## 2025-06-18 PROCEDURE — 1157F ADVNC CARE PLAN IN RCRD: CPT | Mod: CPTII,HCNC,S$GLB, | Performed by: NURSE PRACTITIONER

## 2025-06-18 PROCEDURE — 1125F AMNT PAIN NOTED PAIN PRSNT: CPT | Mod: CPTII,HCNC,S$GLB, | Performed by: NURSE PRACTITIONER

## 2025-06-18 PROCEDURE — 1101F PT FALLS ASSESS-DOCD LE1/YR: CPT | Mod: CPTII,HCNC,S$GLB, | Performed by: NURSE PRACTITIONER

## 2025-06-18 RX ORDER — TESTOSTERONE CYPIONATE 200 MG/ML
200 INJECTION, SOLUTION INTRAMUSCULAR
Qty: 10 ML | Refills: 3 | Status: SHIPPED | OUTPATIENT
Start: 2025-06-18 | End: 2025-07-18

## 2025-06-18 NOTE — ASSESSMENT & PLAN NOTE
-- We discussed extensively about benefits and risks of testosterone replacement including benefits as improvement in QOL, etc, and risks as erythrocytosis, abnormal liver function.  -- We also discussed about recent FDA black-box warning of thromboembolic events while on T replacement, which is independent of erythrocytosis.   -- Does report a better QOL when on this. Follow labs (CBC, CMP) and symptoms.   -- RESTART:  Testosterone 200mg every 14 days

## 2025-06-18 NOTE — ASSESSMENT & PLAN NOTE
-- Labs in 3 months - [facility].   -- A1c goal <7%.  -- Medications discussed:  MFM   -- Reviewed logs/CGM:  Increase in A1c - per patient, dietary/lifestyle. Would like to continue to work on modifications before starting medication.   Review logs in 2-4 weeks.   Reach out to me sooner for any glucose <70 or consistently >150.  -- Medication Changes:   Diet controlled  -- Reviewed goals of therapy are to get the best control we can without hypoglycemia.  -- Reviewed patient's current insulin regimen. Clarified proper insulin dose and timing in relation to meals, etc. Insulin injection sites and proper rotation instructed.    -- Advised frequent self blood glucose monitoring.  Patient encouraged to document glucose results and bring them to every clinic visit.  -- Hypoglycemia precautions discussed. Instructed on precautions before driving.    -- Call for Bg repeatedly < 90 or > 180.   -- Close adherence to lifestyle changes recommended.   -- Periodic follow ups for eye evaluations, foot care and dental care suggested.

## 2025-06-20 ENCOUNTER — OFFICE VISIT (OUTPATIENT)
Dept: ORTHOPEDICS | Facility: CLINIC | Age: 70
End: 2025-06-20
Payer: MEDICARE

## 2025-06-20 VITALS — HEIGHT: 64 IN | WEIGHT: 153.69 LBS | BODY MASS INDEX: 26.24 KG/M2

## 2025-06-20 DIAGNOSIS — M54.9 BACK PAIN, UNSPECIFIED BACK LOCATION, UNSPECIFIED BACK PAIN LATERALITY, UNSPECIFIED CHRONICITY: Primary | ICD-10-CM

## 2025-06-20 PROCEDURE — 99999 PR PBB SHADOW E&M-EST. PATIENT-LVL III: CPT | Mod: PBBFAC,HCNC,, | Performed by: ORTHOPAEDIC SURGERY

## 2025-06-20 NOTE — PROGRESS NOTES
Post-op Note    HPI    Mane Ramires is here three-month s/p the following procedure:     Right distal biceps repair    Overall doing well.  Denies any pain.  Has finished physical therapy.  Has some new onset lower back pain recently and wants a referral to back and spine.  He has no issues with his right elbow and happy with his progress.      Physical Exam:     Patient is alert and oriented no acute distress.   Assistive Device:  None    Right elbow Incision(s) are well healed.  There is no evidence of dehiscence.  There is no induration erythema or signs of infection.  No significant soft tissue swelling.  Compartments are soft and compressible.  Warm well-perfused extremity.  0° of extension to 140 flexion.  Full pronation and supination without mechanical block or pain.  5 out of 5 strength with supination compared to contralateral side    Imaging:     I have personally reviewed the following imaging and these are an interpretation of my findings:     Status post biceps repair with button in the appropriate position.  No acute complication    Assessment    Mane Ramires is 3 months Post-op     Plan:    Overall doing well.  Progress activities as tolerated.  Referral to back and spine per his request.

## 2025-06-23 ENCOUNTER — TELEPHONE (OUTPATIENT)
Dept: ADMINISTRATIVE | Facility: OTHER | Age: 70
End: 2025-06-23
Payer: MEDICARE

## 2025-07-29 ENCOUNTER — PATIENT MESSAGE (OUTPATIENT)
Dept: FAMILY MEDICINE | Facility: CLINIC | Age: 70
End: 2025-07-29
Payer: MEDICARE

## 2025-07-29 ENCOUNTER — PATIENT MESSAGE (OUTPATIENT)
Dept: ENDOCRINOLOGY | Facility: CLINIC | Age: 70
End: 2025-07-29
Payer: MEDICARE

## 2025-09-02 ENCOUNTER — TELEPHONE (OUTPATIENT)
Dept: FAMILY MEDICINE | Facility: CLINIC | Age: 70
End: 2025-09-02
Payer: MEDICARE

## 2025-09-04 ENCOUNTER — TELEPHONE (OUTPATIENT)
Dept: FAMILY MEDICINE | Facility: CLINIC | Age: 70
End: 2025-09-04

## 2025-09-04 DIAGNOSIS — H43.399 VITREOUS FLOATERS, UNSPECIFIED LATERALITY: Primary | ICD-10-CM

## (undated) DEVICE — SCISSOR CURVED ENDOPATH 5MM

## (undated) DEVICE — NDL HYPO 27G X 1 1/2

## (undated) DEVICE — BLADE SURGICAL 15C

## (undated) DEVICE — NDL SAFETY 21G X 1 1/2 ECLPSE

## (undated) DEVICE — SPLINT REUTER BIVALVE 0.5MM

## (undated) DEVICE — SEE MEDLINE ITEM 152487

## (undated) DEVICE — SEE MEDLINE ITEM 157117

## (undated) DEVICE — SHEET EENT SPLIT

## (undated) DEVICE — DISSECTOR CURVED 5DCD

## (undated) DEVICE — SUT 4/0 18IN PROLENE BL MON

## (undated) DEVICE — ELECTRODE NEEDLE 2.8IN

## (undated) DEVICE — TUBING PNEUMO

## (undated) DEVICE — PENCIL ROCKER SWITCH 10FT CORD

## (undated) DEVICE — ADHESIVE MASTISOL VIAL 48/BX

## (undated) DEVICE — SEE MEDLINE ITEM 146372

## (undated) DEVICE — BAG TISS RETRV MONARCH 10MM

## (undated) DEVICE — SKINMARKER & RULER REGULAR X-F

## (undated) DEVICE — SOL WATER STRL IRR 1000ML

## (undated) DEVICE — SUT MILD CHROMIC 6-0 C1

## (undated) DEVICE — SUT ETHILON 4-0 PS2 18 BLK

## (undated) DEVICE — SUT 5-0 CHROMIC GUT / P-3

## (undated) DEVICE — SEE MEDLINE ITEM 152622

## (undated) DEVICE — CANNULA ENDOPATH XCEL 5X100MM

## (undated) DEVICE — SUT 0 VICRYL / UR6 (J603)

## (undated) DEVICE — STAPLER SEPTAL ENTACT

## (undated) DEVICE — BLADE INFERIOR TURBINATE 2MM

## (undated) DEVICE — SPONGE DERMACEA 4X4IN 12PLY

## (undated) DEVICE — SEE MEDLINE ITEM 157128

## (undated) DEVICE — SLEEVE SCD EXPRESS CALF MEDIUM

## (undated) DEVICE — ELECTRODE REM PLYHSV RETURN 9

## (undated) DEVICE — DRESSING EYE OVAL LF

## (undated) DEVICE — SPONGE PATTY SURGICAL .5X3IN

## (undated) DEVICE — TROCAR ENDOPATH XCEL 5X100MM

## (undated) DEVICE — SUT ETHILON 6-0 BLK P-1 BLK

## (undated) DEVICE — HEMOSTAT SURGICEL 4X8IN

## (undated) DEVICE — TRAY ENT 4/CS

## (undated) DEVICE — PACK CUSTOM ENDO CHOLO SLI

## (undated) DEVICE — LINER SUCTION 3000CC

## (undated) DEVICE — SEE MEDLINE ITEM 152680

## (undated) DEVICE — GLOVE SURG ULTRA TOUCH 7.5

## (undated) DEVICE — GAUZE SPONGE 4X4 12PLY

## (undated) DEVICE — APPLIER CLIP ENDO LIGAMAX 5MM

## (undated) DEVICE — SEE MEDLINE ITEM 156913

## (undated) DEVICE — SEE MEDLINE ITEM 157194

## (undated) DEVICE — APPLICATOR CHLORAPREP ORN 26ML

## (undated) DEVICE — KIT ANTIFOG

## (undated) DEVICE — SUT MONOCRYL 4-0 PS-2

## (undated) DEVICE — CLOSURE SKIN STERI STRIP 1/2X4

## (undated) DEVICE — SUT PROLENE 6-0 P-1 18